# Patient Record
Sex: MALE | Race: BLACK OR AFRICAN AMERICAN | NOT HISPANIC OR LATINO | URBAN - METROPOLITAN AREA
[De-identification: names, ages, dates, MRNs, and addresses within clinical notes are randomized per-mention and may not be internally consistent; named-entity substitution may affect disease eponyms.]

---

## 2018-05-14 ENCOUNTER — EMERGENCY (EMERGENCY)
Facility: HOSPITAL | Age: 68
LOS: 1 days | Discharge: HOME | End: 2018-05-14
Attending: EMERGENCY MEDICINE | Admitting: EMERGENCY MEDICINE
Payer: COMMERCIAL

## 2018-05-14 VITALS
SYSTOLIC BLOOD PRESSURE: 156 MMHG | HEART RATE: 65 BPM | TEMPERATURE: 98 F | DIASTOLIC BLOOD PRESSURE: 88 MMHG | RESPIRATION RATE: 16 BRPM | OXYGEN SATURATION: 100 %

## 2018-05-14 VITALS
DIASTOLIC BLOOD PRESSURE: 93 MMHG | TEMPERATURE: 98 F | SYSTOLIC BLOOD PRESSURE: 155 MMHG | RESPIRATION RATE: 18 BRPM | HEART RATE: 79 BPM | OXYGEN SATURATION: 99 %

## 2018-05-14 DIAGNOSIS — Z98.890 OTHER SPECIFIED POSTPROCEDURAL STATES: Chronic | ICD-10-CM

## 2018-05-14 LAB
ALBUMIN SERPL ELPH-MCNC: 3.9 G/DL — SIGNIFICANT CHANGE UP (ref 3.3–5)
ALP SERPL-CCNC: 72 U/L — SIGNIFICANT CHANGE UP (ref 40–120)
ALT FLD-CCNC: 22 U/L — SIGNIFICANT CHANGE UP (ref 4–41)
APTT BLD: 38.4 SEC — HIGH (ref 27.5–37.4)
AST SERPL-CCNC: 30 U/L — SIGNIFICANT CHANGE UP (ref 4–40)
BASE EXCESS BLDV CALC-SCNC: 4 MMOL/L — SIGNIFICANT CHANGE UP
BASOPHILS # BLD AUTO: 0.05 K/UL — SIGNIFICANT CHANGE UP (ref 0–0.2)
BASOPHILS NFR BLD AUTO: 0.6 % — SIGNIFICANT CHANGE UP (ref 0–2)
BILIRUB SERPL-MCNC: 0.5 MG/DL — SIGNIFICANT CHANGE UP (ref 0.2–1.2)
BLD GP AB SCN SERPL QL: NEGATIVE — SIGNIFICANT CHANGE UP
BLOOD GAS VENOUS - CREATININE: 1.05 MG/DL — SIGNIFICANT CHANGE UP (ref 0.5–1.3)
BUN SERPL-MCNC: 25 MG/DL — HIGH (ref 7–23)
CALCIUM SERPL-MCNC: 8.6 MG/DL — SIGNIFICANT CHANGE UP (ref 8.4–10.5)
CHLORIDE BLDV-SCNC: 106 MMOL/L — SIGNIFICANT CHANGE UP (ref 96–108)
CHLORIDE SERPL-SCNC: 100 MMOL/L — SIGNIFICANT CHANGE UP (ref 98–107)
CO2 SERPL-SCNC: 28 MMOL/L — SIGNIFICANT CHANGE UP (ref 22–31)
CREAT SERPL-MCNC: 1.07 MG/DL — SIGNIFICANT CHANGE UP (ref 0.5–1.3)
EOSINOPHIL # BLD AUTO: 0.16 K/UL — SIGNIFICANT CHANGE UP (ref 0–0.5)
EOSINOPHIL NFR BLD AUTO: 1.9 % — SIGNIFICANT CHANGE UP (ref 0–6)
GAS PNL BLDV: 134 MMOL/L — LOW (ref 136–146)
GLUCOSE BLDV-MCNC: 98 — SIGNIFICANT CHANGE UP (ref 70–99)
GLUCOSE SERPL-MCNC: 93 MG/DL — SIGNIFICANT CHANGE UP (ref 70–99)
HCO3 BLDV-SCNC: 27 MMOL/L — SIGNIFICANT CHANGE UP (ref 20–27)
HCT VFR BLD CALC: 36.1 % — LOW (ref 39–50)
HCT VFR BLDV CALC: 37.4 % — LOW (ref 39–51)
HGB BLD-MCNC: 11.7 G/DL — LOW (ref 13–17)
HGB BLDV-MCNC: 12.1 G/DL — LOW (ref 13–17)
IMM GRANULOCYTES # BLD AUTO: 0.04 # — SIGNIFICANT CHANGE UP
IMM GRANULOCYTES NFR BLD AUTO: 0.5 % — SIGNIFICANT CHANGE UP (ref 0–1.5)
INR BLD: 1.19 — HIGH (ref 0.88–1.17)
LACTATE BLDV-MCNC: 0.6 MMOL/L — SIGNIFICANT CHANGE UP (ref 0.5–2)
LYMPHOCYTES # BLD AUTO: 1.56 K/UL — SIGNIFICANT CHANGE UP (ref 1–3.3)
LYMPHOCYTES # BLD AUTO: 18.2 % — SIGNIFICANT CHANGE UP (ref 13–44)
MCHC RBC-ENTMCNC: 29.6 PG — SIGNIFICANT CHANGE UP (ref 27–34)
MCHC RBC-ENTMCNC: 32.4 % — SIGNIFICANT CHANGE UP (ref 32–36)
MCV RBC AUTO: 91.4 FL — SIGNIFICANT CHANGE UP (ref 80–100)
MONOCYTES # BLD AUTO: 0.96 K/UL — HIGH (ref 0–0.9)
MONOCYTES NFR BLD AUTO: 11.2 % — SIGNIFICANT CHANGE UP (ref 2–14)
NEUTROPHILS # BLD AUTO: 5.82 K/UL — SIGNIFICANT CHANGE UP (ref 1.8–7.4)
NEUTROPHILS NFR BLD AUTO: 67.6 % — SIGNIFICANT CHANGE UP (ref 43–77)
NRBC # FLD: 0 — SIGNIFICANT CHANGE UP
PCO2 BLDV: 51 MMHG — SIGNIFICANT CHANGE UP (ref 41–51)
PH BLDV: 7.38 PH — SIGNIFICANT CHANGE UP (ref 7.32–7.43)
PLATELET # BLD AUTO: 165 K/UL — SIGNIFICANT CHANGE UP (ref 150–400)
PMV BLD: 11.4 FL — SIGNIFICANT CHANGE UP (ref 7–13)
PO2 BLDV: 35 MMHG — SIGNIFICANT CHANGE UP (ref 35–40)
POTASSIUM BLDV-SCNC: 3.6 MMOL/L — SIGNIFICANT CHANGE UP (ref 3.4–4.5)
POTASSIUM SERPL-MCNC: 3.9 MMOL/L — SIGNIFICANT CHANGE UP (ref 3.5–5.3)
POTASSIUM SERPL-SCNC: 3.9 MMOL/L — SIGNIFICANT CHANGE UP (ref 3.5–5.3)
PROT SERPL-MCNC: 6.8 G/DL — SIGNIFICANT CHANGE UP (ref 6–8.3)
PROTHROM AB SERPL-ACNC: 13.2 SEC — HIGH (ref 9.8–13.1)
RBC # BLD: 3.95 M/UL — LOW (ref 4.2–5.8)
RBC # FLD: 14.3 % — SIGNIFICANT CHANGE UP (ref 10.3–14.5)
RH IG SCN BLD-IMP: POSITIVE — SIGNIFICANT CHANGE UP
SAO2 % BLDV: 60.8 % — SIGNIFICANT CHANGE UP (ref 60–85)
SODIUM SERPL-SCNC: 138 MMOL/L — SIGNIFICANT CHANGE UP (ref 135–145)
WBC # BLD: 8.59 K/UL — SIGNIFICANT CHANGE UP (ref 3.8–10.5)
WBC # FLD AUTO: 8.59 K/UL — SIGNIFICANT CHANGE UP (ref 3.8–10.5)

## 2018-05-14 PROCEDURE — 99284 EMERGENCY DEPT VISIT MOD MDM: CPT

## 2018-05-14 NOTE — ED PROVIDER NOTE - MEDICAL DECISION MAKING DETAILS
68M w/ PMH of HTN, HLD, h/o hemorrhoidectomy (a few years ago) p/w BRBPR/melena for 3 days, also with lightheadedness and chest tightness.   Will check CBC, coags, T&S, cardiac enzymes.  [place]

## 2018-05-14 NOTE — ED PROVIDER NOTE - OBJECTIVE STATEMENT
68M w/ PMH of HTN, HLD, h/o hemorrhoidectomy (a few years ago) p/w BRBPR/melena for 3 days, also with lightheadedness and chest tightness.  Patient takes ASA 81mg, no other blood thinners, no iron tablets.  Has been feeling lightheaded prior to episodes of melena/BRPBR, endorses normal appetite and PO intake.  No dyspnea, n/v, diarrhea. Reports "sticking" sensation in his chest that has been on and off since yesterday.    Patient has had a colonoscopy in the past, was normal.  Has an appointment with a GI doctor at the end of May, but was told to come to the ED by his PMD due to multiple episodes of bleeding.

## 2018-05-14 NOTE — ED PROVIDER NOTE - SHIFT CHANGE DETAILS
awaiting labs and re-eval; pt stable for outpatient follow up if labs and repeat vitals reassuring and pt feeling well

## 2018-05-14 NOTE — ED ADULT TRIAGE NOTE - CHIEF COMPLAINT QUOTE
pt c/o BRBPR and some black stool x 3 days.  pt denies abd pain.  pt takes asa daily.  pt now reports chest pain

## 2018-05-14 NOTE — ED PROVIDER NOTE - ATTENDING CONTRIBUTION TO CARE
ED Attending Dr. Johnson: 67 yo male with HTN, HLD, s/p hemorrhoidectomy in the past, in ED with 3 days of BRBPR when having a bowel movement.  Bloody stools were initially formed, today more loose.  Pt also notes intermittent lightheadedness and chest "sticking" (denies "pain"), nonexertional and with no SOB, and these symptoms have been persisting for time longer than BRBPR.  Scheduled to see a GI doctor in 2 weeks.  No fever, urinary complaints or abdominal pain.  On exam pt well appearing, in NAD, heart RRR, lungs CTAB, abd NTND, extremities without swelling, strength 5/5 in all extremities and skin without rash.  Rectal exam as documented by resident.  Pt does not appear pale as per friend at the bedside.  EKG without acute changes.

## 2018-05-14 NOTE — ED PROVIDER NOTE - PROGRESS NOTE DETAILS
MD Yanick (resident): Patient feeling well, hgb 11.7, BUN 25, rectal exam without blood in vault.  Patient can follow-up with PMD this week and already has a GI appt on 5/25.  Agreeable for discharge home with outpatient follow-up.

## 2021-04-27 ENCOUNTER — INPATIENT (INPATIENT)
Facility: HOSPITAL | Age: 71
LOS: 3 days | Discharge: HOME | End: 2021-05-01
Attending: STUDENT IN AN ORGANIZED HEALTH CARE EDUCATION/TRAINING PROGRAM | Admitting: STUDENT IN AN ORGANIZED HEALTH CARE EDUCATION/TRAINING PROGRAM
Payer: COMMERCIAL

## 2021-04-27 VITALS
HEART RATE: 86 BPM | TEMPERATURE: 98 F | DIASTOLIC BLOOD PRESSURE: 58 MMHG | SYSTOLIC BLOOD PRESSURE: 93 MMHG | RESPIRATION RATE: 17 BRPM | OXYGEN SATURATION: 100 %

## 2021-04-27 DIAGNOSIS — C61 MALIGNANT NEOPLASM OF PROSTATE: ICD-10-CM

## 2021-04-27 DIAGNOSIS — M10.9 GOUT, UNSPECIFIED: ICD-10-CM

## 2021-04-27 DIAGNOSIS — Z98.890 OTHER SPECIFIED POSTPROCEDURAL STATES: Chronic | ICD-10-CM

## 2021-04-27 DIAGNOSIS — E78.5 HYPERLIPIDEMIA, UNSPECIFIED: ICD-10-CM

## 2021-04-27 DIAGNOSIS — K92.2 GASTROINTESTINAL HEMORRHAGE, UNSPECIFIED: ICD-10-CM

## 2021-04-27 DIAGNOSIS — I10 ESSENTIAL (PRIMARY) HYPERTENSION: ICD-10-CM

## 2021-04-27 LAB
ALBUMIN SERPL ELPH-MCNC: 3.1 G/DL — LOW (ref 3.3–5)
ALP SERPL-CCNC: 44 U/L — SIGNIFICANT CHANGE UP (ref 40–120)
ALT FLD-CCNC: 11 U/L — SIGNIFICANT CHANGE UP (ref 4–41)
ANION GAP SERPL CALC-SCNC: 8 MMOL/L — SIGNIFICANT CHANGE UP (ref 7–14)
APTT BLD: 27.3 SEC — SIGNIFICANT CHANGE UP (ref 27–36.3)
AST SERPL-CCNC: 16 U/L — SIGNIFICANT CHANGE UP (ref 4–40)
BASOPHILS # BLD AUTO: 0.07 K/UL — SIGNIFICANT CHANGE UP (ref 0–0.2)
BASOPHILS NFR BLD AUTO: 0.7 % — SIGNIFICANT CHANGE UP (ref 0–2)
BILIRUB SERPL-MCNC: 0.4 MG/DL — SIGNIFICANT CHANGE UP (ref 0.2–1.2)
BLD GP AB SCN SERPL QL: NEGATIVE — SIGNIFICANT CHANGE UP
BUN SERPL-MCNC: 40 MG/DL — HIGH (ref 7–23)
CALCIUM SERPL-MCNC: 8.2 MG/DL — LOW (ref 8.4–10.5)
CHLORIDE SERPL-SCNC: 102 MMOL/L — SIGNIFICANT CHANGE UP (ref 98–107)
CO2 SERPL-SCNC: 28 MMOL/L — SIGNIFICANT CHANGE UP (ref 22–31)
CREAT SERPL-MCNC: 1.14 MG/DL — SIGNIFICANT CHANGE UP (ref 0.5–1.3)
EOSINOPHIL # BLD AUTO: 0.13 K/UL — SIGNIFICANT CHANGE UP (ref 0–0.5)
EOSINOPHIL NFR BLD AUTO: 1.3 % — SIGNIFICANT CHANGE UP (ref 0–6)
GLUCOSE SERPL-MCNC: 203 MG/DL — HIGH (ref 70–99)
HCT VFR BLD CALC: 18.2 % — CRITICAL LOW (ref 39–50)
HCT VFR BLD CALC: 20.5 % — CRITICAL LOW (ref 39–50)
HGB BLD-MCNC: 6.1 G/DL — CRITICAL LOW (ref 13–17)
HGB BLD-MCNC: 6.8 G/DL — CRITICAL LOW (ref 13–17)
IANC: 8.01 K/UL — SIGNIFICANT CHANGE UP (ref 1.5–8.5)
IMM GRANULOCYTES NFR BLD AUTO: 1.5 % — SIGNIFICANT CHANGE UP (ref 0–1.5)
INR BLD: 1.14 RATIO — SIGNIFICANT CHANGE UP (ref 0.88–1.16)
LYMPHOCYTES # BLD AUTO: 1.41 K/UL — SIGNIFICANT CHANGE UP (ref 1–3.3)
LYMPHOCYTES # BLD AUTO: 13.6 % — SIGNIFICANT CHANGE UP (ref 13–44)
MCHC RBC-ENTMCNC: 31.3 PG — SIGNIFICANT CHANGE UP (ref 27–34)
MCHC RBC-ENTMCNC: 31.6 PG — SIGNIFICANT CHANGE UP (ref 27–34)
MCHC RBC-ENTMCNC: 32.4 GM/DL — SIGNIFICANT CHANGE UP (ref 32–36)
MCHC RBC-ENTMCNC: 33.2 GM/DL — SIGNIFICANT CHANGE UP (ref 32–36)
MCV RBC AUTO: 94.5 FL — SIGNIFICANT CHANGE UP (ref 80–100)
MCV RBC AUTO: 97.4 FL — SIGNIFICANT CHANGE UP (ref 80–100)
MONOCYTES # BLD AUTO: 0.61 K/UL — SIGNIFICANT CHANGE UP (ref 0–0.9)
MONOCYTES NFR BLD AUTO: 5.9 % — SIGNIFICANT CHANGE UP (ref 2–14)
NEUTROPHILS # BLD AUTO: 8.01 K/UL — HIGH (ref 1.8–7.4)
NEUTROPHILS NFR BLD AUTO: 77 % — SIGNIFICANT CHANGE UP (ref 43–77)
NRBC # BLD: 0 /100 WBCS — SIGNIFICANT CHANGE UP
NRBC # BLD: 0 /100 WBCS — SIGNIFICANT CHANGE UP
NRBC # FLD: 0.02 K/UL — HIGH
NRBC # FLD: 0.06 K/UL — HIGH
OB PNL STL: POSITIVE
PLATELET # BLD AUTO: 140 K/UL — LOW (ref 150–400)
PLATELET # BLD AUTO: 183 K/UL — SIGNIFICANT CHANGE UP (ref 150–400)
POTASSIUM SERPL-MCNC: 3.2 MMOL/L — LOW (ref 3.5–5.3)
POTASSIUM SERPL-SCNC: 3.2 MMOL/L — LOW (ref 3.5–5.3)
PROT SERPL-MCNC: 5.5 G/DL — LOW (ref 6–8.3)
PROTHROM AB SERPL-ACNC: 13 SEC — SIGNIFICANT CHANGE UP (ref 10.6–13.6)
RBC # BLD: 1.9 M/UL — LOW (ref 4.2–5.8)
RBC # BLD: 2.17 M/UL — LOW (ref 4.2–5.8)
RBC # FLD: 12.6 % — SIGNIFICANT CHANGE UP (ref 10.3–14.5)
RBC # FLD: 13.1 % — SIGNIFICANT CHANGE UP (ref 10.3–14.5)
RH IG SCN BLD-IMP: POSITIVE — SIGNIFICANT CHANGE UP
SARS-COV-2 RNA SPEC QL NAA+PROBE: SIGNIFICANT CHANGE UP
SODIUM SERPL-SCNC: 138 MMOL/L — SIGNIFICANT CHANGE UP (ref 135–145)
WBC # BLD: 10.39 K/UL — SIGNIFICANT CHANGE UP (ref 3.8–10.5)
WBC # BLD: 13 K/UL — HIGH (ref 3.8–10.5)
WBC # FLD AUTO: 10.39 K/UL — SIGNIFICANT CHANGE UP (ref 3.8–10.5)
WBC # FLD AUTO: 13 K/UL — HIGH (ref 3.8–10.5)

## 2021-04-27 PROCEDURE — 99291 CRITICAL CARE FIRST HOUR: CPT

## 2021-04-27 PROCEDURE — 71045 X-RAY EXAM CHEST 1 VIEW: CPT | Mod: 26

## 2021-04-27 PROCEDURE — 99223 1ST HOSP IP/OBS HIGH 75: CPT | Mod: GC

## 2021-04-27 PROCEDURE — 99291 CRITICAL CARE FIRST HOUR: CPT | Mod: 25

## 2021-04-27 PROCEDURE — 93010 ELECTROCARDIOGRAM REPORT: CPT

## 2021-04-27 PROCEDURE — 99253 IP/OBS CNSLTJ NEW/EST LOW 45: CPT | Mod: GC

## 2021-04-27 RX ORDER — PANTOPRAZOLE SODIUM 20 MG/1
40 TABLET, DELAYED RELEASE ORAL
Refills: 0 | Status: DISCONTINUED | OUTPATIENT
Start: 2021-04-27 | End: 2021-04-27

## 2021-04-27 RX ORDER — ACETAMINOPHEN 500 MG
650 TABLET ORAL EVERY 6 HOURS
Refills: 0 | Status: DISCONTINUED | OUTPATIENT
Start: 2021-04-27 | End: 2021-05-01

## 2021-04-27 RX ORDER — FINASTERIDE 5 MG/1
5 TABLET, FILM COATED ORAL DAILY
Refills: 0 | Status: DISCONTINUED | OUTPATIENT
Start: 2021-04-27 | End: 2021-05-01

## 2021-04-27 RX ORDER — SODIUM CHLORIDE 9 MG/ML
1000 INJECTION INTRAMUSCULAR; INTRAVENOUS; SUBCUTANEOUS ONCE
Refills: 0 | Status: COMPLETED | OUTPATIENT
Start: 2021-04-27 | End: 2021-04-27

## 2021-04-27 RX ORDER — ATORVASTATIN CALCIUM 80 MG/1
10 TABLET, FILM COATED ORAL AT BEDTIME
Refills: 0 | Status: DISCONTINUED | OUTPATIENT
Start: 2021-04-27 | End: 2021-05-01

## 2021-04-27 RX ORDER — PANTOPRAZOLE SODIUM 20 MG/1
80 TABLET, DELAYED RELEASE ORAL ONCE
Refills: 0 | Status: COMPLETED | OUTPATIENT
Start: 2021-04-27 | End: 2021-04-27

## 2021-04-27 RX ORDER — FERROUS SULFATE 325(65) MG
325 TABLET ORAL DAILY
Refills: 0 | Status: DISCONTINUED | OUTPATIENT
Start: 2021-04-27 | End: 2021-05-01

## 2021-04-27 RX ORDER — ONDANSETRON 8 MG/1
4 TABLET, FILM COATED ORAL EVERY 6 HOURS
Refills: 0 | Status: DISCONTINUED | OUTPATIENT
Start: 2021-04-27 | End: 2021-05-01

## 2021-04-27 RX ORDER — PANTOPRAZOLE SODIUM 20 MG/1
40 TABLET, DELAYED RELEASE ORAL ONCE
Refills: 0 | Status: DISCONTINUED | OUTPATIENT
Start: 2021-04-27 | End: 2021-04-27

## 2021-04-27 RX ORDER — POTASSIUM CHLORIDE 20 MEQ
40 PACKET (EA) ORAL ONCE
Refills: 0 | Status: COMPLETED | OUTPATIENT
Start: 2021-04-27 | End: 2021-04-27

## 2021-04-27 RX ORDER — PANTOPRAZOLE SODIUM 20 MG/1
8 TABLET, DELAYED RELEASE ORAL
Qty: 80 | Refills: 0 | Status: DISCONTINUED | OUTPATIENT
Start: 2021-04-27 | End: 2021-04-28

## 2021-04-27 RX ORDER — SODIUM CHLORIDE 9 MG/ML
1000 INJECTION INTRAMUSCULAR; INTRAVENOUS; SUBCUTANEOUS
Refills: 0 | Status: DISCONTINUED | OUTPATIENT
Start: 2021-04-27 | End: 2021-04-30

## 2021-04-27 RX ADMIN — PANTOPRAZOLE SODIUM 80 MILLIGRAM(S): 20 TABLET, DELAYED RELEASE ORAL at 07:52

## 2021-04-27 RX ADMIN — SODIUM CHLORIDE 1000 MILLILITER(S): 9 INJECTION INTRAMUSCULAR; INTRAVENOUS; SUBCUTANEOUS at 07:44

## 2021-04-27 RX ADMIN — PANTOPRAZOLE SODIUM 10 MG/HR: 20 TABLET, DELAYED RELEASE ORAL at 15:53

## 2021-04-27 RX ADMIN — Medication 40 MILLIEQUIVALENT(S): at 11:39

## 2021-04-27 RX ADMIN — Medication 30 MILLILITER(S): at 14:04

## 2021-04-27 RX ADMIN — ATORVASTATIN CALCIUM 10 MILLIGRAM(S): 80 TABLET, FILM COATED ORAL at 21:07

## 2021-04-27 RX ADMIN — SODIUM CHLORIDE 75 MILLILITER(S): 9 INJECTION INTRAMUSCULAR; INTRAVENOUS; SUBCUTANEOUS at 15:58

## 2021-04-27 NOTE — CONSULT NOTE ADULT - SUBJECTIVE AND OBJECTIVE BOX
Chief Complaint:  Patient is a 70y old  Male who presents with a chief complaint of GIB (27 Apr 2021 12:43)      HPI:    Allergies:  No Known Allergies      Home Medications:    Hospital Medications:  acetaminophen   Tablet .. 650 milliGRAM(s) Oral every 6 hours PRN  atorvastatin 10 milliGRAM(s) Oral at bedtime  ferrous    sulfate 325 milliGRAM(s) Oral daily  finasteride 5 milliGRAM(s) Oral daily  ondansetron Injectable 4 milliGRAM(s) IV Push every 6 hours PRN  pantoprazole Infusion 8 mG/Hr IV Continuous <Continuous>  sodium chloride 0.9%. 1000 milliLiter(s) IV Continuous <Continuous>      PMHX/PSHX:  Hypertension    Hyperlipidemia    GIB (gastrointestinal bleeding)    Prostate cancer    Gout    History of hemorrhoidectomy        Family history:  No pertinent family history in first degree relatives        Denies family history of colon cancer/polyps, stomach cancer/polyps, pancreatic cancer/masses, liver cancer/disease, ovarian cancer and endometrial cancer.    Social History:     Tob: Denies  EtOH: Denies  Illicit Drugs: Denies    ROS:     General:  No wt loss, fevers, chills, night sweats, fatigue  Eyes:  Good vision, no reported pain  ENT:  No sore throat, pain, runny nose, dysphagia  CV:  No pain, palpitations, hypo/hypertension  Pulm:  No dyspnea, cough, tachypnea, wheezing  GI:  No pain, No nausea, No vomiting, No diarrhea, No constipation, No weight loss, No fever, No pruritis, No rectal bleeding, No tarry stools, No dysphagia,  :  No pain, bleeding, incontinence, nocturia  Muscle:  No pain, weakness  Neuro:  No weakness, tingling, memory problems  Psych:  No fatigue, insomnia, mood problems, depression  Endocrine:  No polyuria, polydipsia, cold/heat intolerance  Heme:  No petechiae, ecchymosis, easy bruisability  Skin:  No rash, tattoos, scars, edema    PHYSICAL EXAM:     GENERAL:  No acute distress  HEENT:  Normocephalic/atraumatic, no scleral icterus  CHEST:  Clear to auscultation bilaterally, no wheezes/rales/ronchi, no accessory muscle use  HEART:  Regular rate and rhythm, no murmurs/rubs/gallops  ABDOMEN:  Soft, non-tender, non-distended, normoactive bowel sounds,  no masses, no hepato-splenomegaly, no signs of chronic liver disease  EXTREMITIES: No cyanosis, clubbing, or edema  SKIN:  No rash/erythema/ecchymoses/petechiae/wounds/abscess/warm/dry  NEURO:  Alert and oriented x 3, no asterixis    Vital Signs:  Vital Signs Last 24 Hrs  T(C): 36.4 (27 Apr 2021 13:58), Max: 36.8 (27 Apr 2021 07:59)  T(F): 97.6 (27 Apr 2021 13:58), Max: 98.2 (27 Apr 2021 07:59)  HR: 84 (27 Apr 2021 13:58) (78 - 86)  BP: 100/64 (27 Apr 2021 13:58) (93/58 - 116/57)  BP(mean): --  RR: 18 (27 Apr 2021 13:58) (17 - 18)  SpO2: 100% (27 Apr 2021 13:58) (100% - 100%)  Daily     Daily     LABS:                        6.1    10.39 )-----------( 183      ( 27 Apr 2021 09:23 )             18.2     Mean Cell Volume: 97.4 fL (04-27-21 @ 09:23)    04-27    138  |  102  |  40<H>  ----------------------------<  203<H>  3.2<L>   |  28  |  1.14    Ca    8.2<L>      27 Apr 2021 09:23    TPro  5.5<L>  /  Alb  3.1<L>  /  TBili  0.4  /  DBili  x   /  AST  16  /  ALT  11  /  AlkPhos  44  04-27    LIVER FUNCTIONS - ( 27 Apr 2021 09:23 )  Alb: 3.1 g/dL / Pro: 5.5 g/dL / ALK PHOS: 44 U/L / ALT: 11 U/L / AST: 16 U/L / GGT: x           PT/INR - ( 27 Apr 2021 09:23 )   PT: 13.0 sec;   INR: 1.14 ratio         PTT - ( 27 Apr 2021 09:23 )  PTT:27.3 sec                            6.1    10.39 )-----------( 183      ( 27 Apr 2021 09:23 )             18.2       Imaging:           Chief Complaint:  Patient is a 70y old  Male who presents with a chief complaint of GIB (27 Apr 2021 12:43)      HPI: 70M Hx of prostrate cancer s/p radiation, hemorrhoidectomy, gout, HTN, HLD who reports having diarrhea w/ black stools x 2 days.     Pt endorses for over 2 days now he has been having multiple black + dark red BMs, thinks greater than 10-15 episodes (have kept him running back to the bathroom). Also endorses some mild epigastric burning/pain. Denies n/v/f/c, weight loss. Has been taking     Allergies:  No Known Allergies      Home Medications:    Hospital Medications:  acetaminophen   Tablet .. 650 milliGRAM(s) Oral every 6 hours PRN  atorvastatin 10 milliGRAM(s) Oral at bedtime  ferrous    sulfate 325 milliGRAM(s) Oral daily  finasteride 5 milliGRAM(s) Oral daily  ondansetron Injectable 4 milliGRAM(s) IV Push every 6 hours PRN  pantoprazole Infusion 8 mG/Hr IV Continuous <Continuous>  sodium chloride 0.9%. 1000 milliLiter(s) IV Continuous <Continuous>      PMHX/PSHX:  Hypertension    Hyperlipidemia    GIB (gastrointestinal bleeding)    Prostate cancer    Gout    History of hemorrhoidectomy        Family history:  No pertinent family history in first degree relatives        Denies family history of colon cancer/polyps, stomach cancer/polyps, pancreatic cancer/masses, liver cancer/disease, ovarian cancer and endometrial cancer.    Social History:     Tob: Denies  EtOH: Denies  Illicit Drugs: Denies    ROS:     General:  No wt loss, fevers, chills, night sweats, fatigue  Eyes:  Good vision, no reported pain  ENT:  No sore throat, pain, runny nose, dysphagia  CV:  No pain, palpitations, hypo/hypertension  Pulm:  No dyspnea, cough, tachypnea, wheezing  GI:  No pain, No nausea, No vomiting, No diarrhea, No constipation, No weight loss, No fever, No pruritis, No rectal bleeding, No tarry stools, No dysphagia,  :  No pain, bleeding, incontinence, nocturia  Muscle:  No pain, weakness  Neuro:  No weakness, tingling, memory problems  Psych:  No fatigue, insomnia, mood problems, depression  Endocrine:  No polyuria, polydipsia, cold/heat intolerance  Heme:  No petechiae, ecchymosis, easy bruisability  Skin:  No rash, tattoos, scars, edema    PHYSICAL EXAM:     GENERAL:  No acute distress  HEENT:  Normocephalic/atraumatic, no scleral icterus  CHEST:  Clear to auscultation bilaterally, no wheezes/rales/ronchi, no accessory muscle use  HEART:  Regular rate and rhythm, no murmurs/rubs/gallops  ABDOMEN:  Soft, non-tender, non-distended, normoactive bowel sounds,  no masses, no hepato-splenomegaly, no signs of chronic liver disease  EXTREMITIES: No cyanosis, clubbing, or edema  SKIN:  No rash/erythema/ecchymoses/petechiae/wounds/abscess/warm/dry  NEURO:  Alert and oriented x 3, no asterixis    Vital Signs:  Vital Signs Last 24 Hrs  T(C): 36.4 (27 Apr 2021 13:58), Max: 36.8 (27 Apr 2021 07:59)  T(F): 97.6 (27 Apr 2021 13:58), Max: 98.2 (27 Apr 2021 07:59)  HR: 84 (27 Apr 2021 13:58) (78 - 86)  BP: 100/64 (27 Apr 2021 13:58) (93/58 - 116/57)  BP(mean): --  RR: 18 (27 Apr 2021 13:58) (17 - 18)  SpO2: 100% (27 Apr 2021 13:58) (100% - 100%)  Daily     Daily     LABS:                        6.1    10.39 )-----------( 183      ( 27 Apr 2021 09:23 )             18.2     Mean Cell Volume: 97.4 fL (04-27-21 @ 09:23)    04-27    138  |  102  |  40<H>  ----------------------------<  203<H>  3.2<L>   |  28  |  1.14    Ca    8.2<L>      27 Apr 2021 09:23    TPro  5.5<L>  /  Alb  3.1<L>  /  TBili  0.4  /  DBili  x   /  AST  16  /  ALT  11  /  AlkPhos  44  04-27    LIVER FUNCTIONS - ( 27 Apr 2021 09:23 )  Alb: 3.1 g/dL / Pro: 5.5 g/dL / ALK PHOS: 44 U/L / ALT: 11 U/L / AST: 16 U/L / GGT: x           PT/INR - ( 27 Apr 2021 09:23 )   PT: 13.0 sec;   INR: 1.14 ratio         PTT - ( 27 Apr 2021 09:23 )  PTT:27.3 sec                            6.1    10.39 )-----------( 183      ( 27 Apr 2021 09:23 )             18.2       Imaging:           Chief Complaint:  Patient is a 70y old  Male who presents with a chief complaint of GIB (27 Apr 2021 12:43)      HPI: 70M Hx of prostrate cancer s/p radiation, hemorrhoidectomy, gout, HTN, HLD who reports having diarrhea w/ black stools x 2 days.     Pt endorses for over 2 days now he has been having multiple black + dark red BMs, thinks greater than 10-15 episodes (have kept him running back to the bathroom). Also endorses some mild epigastric burning/pain. Denies n/v/f/c, weight loss. Has been taking indomethacin recently for gout flare (at least for the last 2 days). Had similar episode of black stools in 2018 (presented here to ED), however, did not receive endoscopic intervention at the time.     Last colonoscopy 3 years ago at [facility] -- reportedly with a lot of polyps, due for colonoscopy this year.     VS 90/50s, HR 70-80s  Hb 6.1 <-- 11.7   BUN 40/ Cr 1.14    Allergies:  No Known Allergies      Home Medications:    Hospital Medications:  acetaminophen   Tablet .. 650 milliGRAM(s) Oral every 6 hours PRN  atorvastatin 10 milliGRAM(s) Oral at bedtime  ferrous    sulfate 325 milliGRAM(s) Oral daily  finasteride 5 milliGRAM(s) Oral daily  ondansetron Injectable 4 milliGRAM(s) IV Push every 6 hours PRN  pantoprazole Infusion 8 mG/Hr IV Continuous <Continuous>  sodium chloride 0.9%. 1000 milliLiter(s) IV Continuous <Continuous>      PMHX/PSHX:  Hypertension    Hyperlipidemia    GIB (gastrointestinal bleeding)    Prostate cancer    Gout    History of hemorrhoidectomy        Family history:  No pertinent family history in first degree relatives        Denies family history of colon cancer/polyps, stomach cancer/polyps, pancreatic cancer/masses, liver cancer/disease, ovarian cancer and endometrial cancer.    Social History:     Tob: Denies  EtOH: Denies  Illicit Drugs: Denies    ROS:     General:  No wt loss, fevers, chills, night sweats, fatigue  Eyes:  Good vision, no reported pain  ENT:  No sore throat, pain, runny nose, dysphagia  CV:  No pain, palpitations, hypo/hypertension  Pulm:  No dyspnea, cough, tachypnea, wheezing  GI:  see above  :  No pain, bleeding, incontinence, nocturia  Muscle:  No pain, weakness  Neuro:  No weakness, tingling, memory problems  Psych:  No fatigue, insomnia, mood problems, depression  Endocrine:  No polyuria, polydipsia, cold/heat intolerance  Heme:  No petechiae, ecchymosis, easy bruisability  Skin:  No rash, tattoos, scars, edema    PHYSICAL EXAM:     GENERAL:  No acute distress, elderly man, eyes closed, tired appearing  HEENT:  Normocephalic/atraumatic, no scleral icterus  CHEST:  Clear to auscultation bilaterally, no wheezes/rales/ronchi, no accessory muscle use  HEART:  Regular rate and rhythm, no murmurs/rubs/gallops  ABDOMEN:  Soft, non-tender, non-distended, normoactive bowel sounds,  no masses  RECTAL: +dark red stool   EXTREMITIES: No cyanosis, clubbing, or edema  SKIN:  No rash/erythema/ecchymoses/petechiae/wounds/abscess/warm/dry  NEURO:  Alert and oriented x 3, no asterixis    Vital Signs:  Vital Signs Last 24 Hrs  T(C): 36.4 (27 Apr 2021 13:58), Max: 36.8 (27 Apr 2021 07:59)  T(F): 97.6 (27 Apr 2021 13:58), Max: 98.2 (27 Apr 2021 07:59)  HR: 84 (27 Apr 2021 13:58) (78 - 86)  BP: 100/64 (27 Apr 2021 13:58) (93/58 - 116/57)  BP(mean): --  RR: 18 (27 Apr 2021 13:58) (17 - 18)  SpO2: 100% (27 Apr 2021 13:58) (100% - 100%)  Daily     Daily     LABS:                        6.1    10.39 )-----------( 183      ( 27 Apr 2021 09:23 )             18.2     Mean Cell Volume: 97.4 fL (04-27-21 @ 09:23)    04-27    138  |  102  |  40<H>  ----------------------------<  203<H>  3.2<L>   |  28  |  1.14    Ca    8.2<L>      27 Apr 2021 09:23    TPro  5.5<L>  /  Alb  3.1<L>  /  TBili  0.4  /  DBili  x   /  AST  16  /  ALT  11  /  AlkPhos  44  04-27    LIVER FUNCTIONS - ( 27 Apr 2021 09:23 )  Alb: 3.1 g/dL / Pro: 5.5 g/dL / ALK PHOS: 44 U/L / ALT: 11 U/L / AST: 16 U/L / GGT: x           PT/INR - ( 27 Apr 2021 09:23 )   PT: 13.0 sec;   INR: 1.14 ratio         PTT - ( 27 Apr 2021 09:23 )  PTT:27.3 sec                            6.1    10.39 )-----------( 183      ( 27 Apr 2021 09:23 )             18.2       Imaging:           Chief Complaint:  Patient is a 70y old  Male who presents with a chief complaint of GIB (27 Apr 2021 12:43)      HPI: 70M Hx of prostrate cancer s/p radiation, hemorrhoidectomy, gout, HTN, HLD who reports having diarrhea w/ black stools x 2 days.     Pt endorses for over 2 days now he has been having multiple black + dark red BMs, thinks greater than 10-15 episodes (have kept him running back to the bathroom). Also endorses some mild epigastric burning/pain. Denies n/v/f/c, weight loss. Has been taking indomethacin recently for gout flare (at least for the last 2 days). Had similar episode of black stools in 2018 (presented here to ED), however, did not receive endoscopic intervention at the time.     Last colonoscopy 3 years ago at [facility] -- reportedly with a lot of polyps, due for colonoscopy this year.     VS 90/50s, HR 70-80s  Hb 6.1 <-- 11.7   BUN 40/ Cr 1.14    Allergies:  No Known Allergies      Home Medications:    Hospital Medications:  acetaminophen   Tablet .. 650 milliGRAM(s) Oral every 6 hours PRN  atorvastatin 10 milliGRAM(s) Oral at bedtime  ferrous    sulfate 325 milliGRAM(s) Oral daily  finasteride 5 milliGRAM(s) Oral daily  ondansetron Injectable 4 milliGRAM(s) IV Push every 6 hours PRN  pantoprazole Infusion 8 mG/Hr IV Continuous <Continuous>  sodium chloride 0.9%. 1000 milliLiter(s) IV Continuous <Continuous>      PMHX/PSHX:  Hypertension    Hyperlipidemia    GIB (gastrointestinal bleeding)    Prostate cancer    Gout    History of hemorrhoidectomy        Family history:  No pertinent family history in first degree relatives        Denies family history of colon cancer/polyps, stomach cancer/polyps, pancreatic cancer/masses, liver cancer/disease, ovarian cancer and endometrial cancer.    Social History:     Tob: Denies  EtOH: Denies  Illicit Drugs: Denies    ROS:     General:  No wt loss, fevers, chills, night sweats, fatigue  Eyes:  Good vision, no reported pain  ENT:  No sore throat, pain, runny nose, dysphagia  CV:  No pain, palpitations, hypo/hypertension  Pulm:  No dyspnea, cough, tachypnea, wheezing  GI:  see above  :  No pain, bleeding, incontinence, nocturia  Muscle:  No pain, weakness  Neuro:  No weakness, tingling, memory problems  Psych:  No fatigue, insomnia, mood problems, depression  Endocrine:  No polyuria, polydipsia, cold/heat intolerance  Heme:  No petechiae, ecchymosis, easy bruisability  Skin:  No rash, tattoos, scars, edema    PHYSICAL EXAM:     GENERAL:  No acute distress, elderly man, eyes closed, tired appearing  HEENT:  Normocephalic/atraumatic, no scleral icterus  NECK: supple  CHEST:  Clear to auscultation bilaterally, no wheezes/rales/ronchi, no accessory muscle use  HEART:  Regular rate and rhythm, no murmurs/rubs/gallops  ABDOMEN:  Soft, non-tender, non-distended, normoactive bowel sounds,  no masses  RECTAL: +dark red stool   EXTREMITIES: No cyanosis, clubbing, or edema  SKIN:  No rash/erythema/ecchymoses/petechiae/wounds/abscess/warm/dry  NEURO:  Alert and oriented x 3, no asterixis  PSYCH: normal affect    Vital Signs:  Vital Signs Last 24 Hrs  T(C): 36.4 (27 Apr 2021 13:58), Max: 36.8 (27 Apr 2021 07:59)  T(F): 97.6 (27 Apr 2021 13:58), Max: 98.2 (27 Apr 2021 07:59)  HR: 84 (27 Apr 2021 13:58) (78 - 86)  BP: 100/64 (27 Apr 2021 13:58) (93/58 - 116/57)  BP(mean): --  RR: 18 (27 Apr 2021 13:58) (17 - 18)  SpO2: 100% (27 Apr 2021 13:58) (100% - 100%)  Daily     Daily     LABS:                        6.1    10.39 )-----------( 183      ( 27 Apr 2021 09:23 )             18.2     Mean Cell Volume: 97.4 fL (04-27-21 @ 09:23)    04-27    138  |  102  |  40<H>  ----------------------------<  203<H>  3.2<L>   |  28  |  1.14    Ca    8.2<L>      27 Apr 2021 09:23    TPro  5.5<L>  /  Alb  3.1<L>  /  TBili  0.4  /  DBili  x   /  AST  16  /  ALT  11  /  AlkPhos  44  04-27    LIVER FUNCTIONS - ( 27 Apr 2021 09:23 )  Alb: 3.1 g/dL / Pro: 5.5 g/dL / ALK PHOS: 44 U/L / ALT: 11 U/L / AST: 16 U/L / GGT: x           PT/INR - ( 27 Apr 2021 09:23 )   PT: 13.0 sec;   INR: 1.14 ratio         PTT - ( 27 Apr 2021 09:23 )  PTT:27.3 sec                            6.1    10.39 )-----------( 183      ( 27 Apr 2021 09:23 )             18.2       Imaging:

## 2021-04-27 NOTE — ED ADULT NURSE REASSESSMENT NOTE - NS ED NURSE REASSESS COMMENT FT1
Patient completed second PRBC without adverse reactions. Patient with third blood stool in ED today. Patient slumped over bed after getting off commode, reports feeling dizzy, was able to call for help, no LOC or falls, MD Karissa Gifford ([id number]) made aware.

## 2021-04-27 NOTE — H&P ADULT - ASSESSMENT
71 yo M pm hx of GIB 1 year ago w/ negative colonoscopy at [facility], hemorrhoidectomy, HTN, HLD, prostate cancer s/p radiation therapy p/w GIB for 2 days.  69 yo M pm hx of GIB 1 year ago w/ negative colonoscopy at [facility], hemorrhoidectomy, gout, HTN, HLD, prostate cancer s/p radiation therapy, p/w GIB for 2 days.

## 2021-04-27 NOTE — H&P ADULT - NSHPLABSRESULTS_GEN_ALL_CORE
LABS:  cret                        6.1    10.39 )-----------( 183      ( 27 Apr 2021 09:23 )             18.2     04-27    138  |  102  |  40<H>  ----------------------------<  203<H>  3.2<L>   |  28  |  1.14    Ca    8.2<L>      27 Apr 2021 09:23    TPro  5.5<L>  /  Alb  3.1<L>  /  TBili  0.4  /  DBili  x   /  AST  16  /  ALT  11  /  AlkPhos  44  04-27    PT/INR - ( 27 Apr 2021 09:23 )   PT: 13.0 sec;   INR: 1.14 ratio         PTT - ( 27 Apr 2021 09:23 )  PTT:27.3 sec        IMAGING:     EXAM:  XR CHEST AP OR PA 1V        PROCEDURE DATE:  Apr 27 2021         INTERPRETATION:  TIME OF EXAM: April 2017, 2021 at 7:44 AM    CLINICAL INFORMATION: Preprocedure.    TECHNIQUE:   AP chest:    INTERPRETATION:    There is haziness at the left lung base that may represent a small effusion with underlying atelectasis. Lungs are otherwise clear, the heart is not enlarged and there is no vascular congestion to suggest CHF.      COMPARISON:  None available      IMPRESSION:  Clear lungs with possible small left effusion.

## 2021-04-27 NOTE — ED ADULT TRIAGE NOTE - CHIEF COMPLAINT QUOTE
Pt arrives to ED c/o abd pain starting yesterday morning which then progressed to diarrhea and nausea.  Pt feels tired and that he wants to lay down.  Pt denies CP.  Pt hypotensive in triage but reports hx of HTN.

## 2021-04-27 NOTE — H&P ADULT - HISTORY OF PRESENT ILLNESS
69 yo M pm hx of GIB 1 year ago w/ negative colonoscopy at [facility], hemorrhoidectomy, HTN, HLD, prostate cancer s/p radiation therapy, pw dark stools x 2 days. Pt states he started having dark loose stools since Saturday. Some abd pain, denies CP, SOB, nausea, vomiting, GI symptoms, cough, fever and chills. States the stools started out black but changed to have some red in them as well. Has blood on toilet paper as well. States blood was like "water gushing out." Has felt dizzy and weak since GIB began, particularly when standing up from toilet.     ED course: AF, 86, 93/58, 100% RA. Received 80mg protonix, 2 units PRBC, 1L NS, 40meQ KCl.  69 yo M pm hx of GIB 1 year ago w/ negative colonoscopy at [facility], hemorrhoidectomy, gout, HTN, HLD, prostate cancer s/p radiation therapy, p/w dark stools x 2 days. 2 days ago, pt took indomethacin 2 days ago for gout flare. Pt states he started having dark loose stools since Saturday. Some abd pain, denies CP, SOB, nausea, vomiting, GI symptoms, cough, fever and chills. States the stools started out black but changed to have some red in them as well. Has blood on toilet paper as well. States blood was like "water gushing out." Has felt dizzy and weak since GIB began, particularly when standing up from toilet.     ED course: AF, 86, 93/58, 100% RA. Received 80mg protonix, 2 units PRBC, 1L NS, 40meQ KCl.  71 yo M pm hx of GIB 1 year ago w/ negative colonoscopy at [facility], hemorrhoidectomy, gout, HTN, HLD, prostate cancer s/p radiation therapy, p/w dark stools x 2 days. 2 days ago, pt took indomethacin and tramadol 2 days ago for gout flare. Pt states he started having dark loose stools since Saturday. Some abd pain, denies CP, SOB, nausea, vomiting, GI symptoms, cough, fever and chills. States the stools started out black but changed to have some red in them as well. Has blood on toilet paper as well. States blood was like "water gushing out." Has felt dizzy and weak since GIB began, particularly when standing up from toilet.     ED course: AF, 86, 93/58, 100% RA. Received 80mg protonix, 2 units PRBC, 1L NS, 40meQ KCl.

## 2021-04-27 NOTE — ED PROVIDER NOTE - PHYSICAL EXAMINATION
GEN: Patient awake alert NAD.   HEENT: normocephalic, atraumatic, EOMI, no scleral icterus + pale conjunctiva  CARDIAC: RRR, S1, S2, no murmur.   PULM: CTA B/L no wheeze, rhonchi, rales.   ABD: soft NT, ND.   MSK: Moving all extremities, no edema.   NEURO: A&Ox3, gait normal, no focal neurological deficits, CN 2-12 grossly intact  SKIN: warm, dry, no rash.

## 2021-04-27 NOTE — H&P ADULT - ATTENDING COMMENTS
Telephone Encounter by Van Conway RMA at 01/27/18 02:06 PM     Author:  Van Conway RMA Service:  (none) Author Type:  Certified Medical Assistant     Filed:  01/27/18 02:07 PM Encounter Date:  1/27/2018 Status:  Signed     :  Van Conway RMA (Certified Medical Assistant)            [employer] message sent, postpone for read.  Electronically Signed by:    JESSENIA Cam , 1/27/2018[AK1.1T]        Revision History        User Key Date/Time User Provider Type Action    > AK1.1 01/27/18 02:07 PM Van Conway RMA Certified Medical Assistant Sign    T - Template             71 yo gentlemen with h/o of GIB, HTN, HLD pw dark stools x 1 day. Occult positive, Hb 6.1, and BP somewhat soft at 93/58 on admission. Last endoscopy 2 years ago. Patient blood pressure does appear to repspond to boluses.     Will monitor Hb and vitals, if worsening then low threshold for MICU consult  IV protonix   GI consult, possible endoscopy tomorrow  Continue the rest of the work up and management as stated above.

## 2021-04-27 NOTE — ED PROVIDER NOTE - CLINICAL SUMMARY MEDICAL DECISION MAKING FREE TEXT BOX
69 yo M pm hx of GIB, HTN, HLD pw dark stools x 1 day. likely UGIB. + dark blood on occult testing. no sig finding on exam. likely require transfusion and admission for GI scope.

## 2021-04-27 NOTE — ED ADULT NURSE REASSESSMENT NOTE - NS ED NURSE REASSESS COMMENT FT1
Patient alert and awake, oriented x4, completed PRBC #1 without any adverse reactions. Patient with bloody bowel movements x2 episodes. Patient reports feeling dizzy when getting in and out of bed to bedside commode. Patient reports "feeling the same" s/p PRBC #1. Release request for PRBC #1 en route.

## 2021-04-27 NOTE — H&P ADULT - NSICDXPASTMEDICALHX_GEN_ALL_CORE_FT
PAST MEDICAL HISTORY:  GIB (gastrointestinal bleeding)     Hyperlipidemia     Hypertension     Prostate cancer s/p radiation therapy     PAST MEDICAL HISTORY:  GIB (gastrointestinal bleeding)     Gout     Hyperlipidemia     Hypertension     Prostate cancer s/p radiation therapy

## 2021-04-27 NOTE — H&P ADULT - PROBLEM SELECTOR PLAN 4
Took indomethacin 2 days ago for gout flaire.   - Monitor off NSAIDs. S/p radiation therapy.   - C/w finasteride  - Hold tamsulosin iso hypotension

## 2021-04-27 NOTE — H&P ADULT - PROBLEM SELECTOR PLAN 5
Took indomethacin and tramadol 2 days ago for gout flaire.   - Monitor off NSAIDs.  - Hold home allopurinol

## 2021-04-27 NOTE — ED ADULT NURSE NOTE - OBJECTIVE STATEMENT
Patient received in room 5, A&OX4, ambulatory at baseline. Patient reports to ED complaining of nausea and diarrhea x 1 day. Patient reports blood in stool for past day causing him to feel weak. Patient denies chest pain, sob, abdominal pain, vomiting, syncope. 18 placed in right ac, labs collected and sent. Fluids/medication given as per MD order. MD at bedside for evaluation. Will continue to monitor.

## 2021-04-27 NOTE — H&P ADULT - NSHPREVIEWOFSYSTEMS_GEN_ALL_CORE
CONSTITUTIONAL: No fever, weight loss, + fatigue  EYES: No eye pain, visual disturbances, or discharge  ENMT:  No difficulty hearing, tinnitus, vertigo; No sinus or throat pain  NECK: No pain or stiffness  BREASTS: No pain, masses, or nipple discharge  RESPIRATORY: No cough, wheezing, chills or hemoptysis; No shortness of breath  CARDIOVASCULAR: No chest pain, palpitations, dizziness, or leg swelling  GASTROINTESTINAL: + epigastric pain. No nausea, vomiting, or hematemesis; No diarrhea or constipation. + black stools  GENITOURINARY: No dysuria, frequency, hematuria, or incontinence  NEUROLOGICAL: No headaches, memory loss, loss of strength, numbness, or tremors  SKIN: No itching, burning, rashes, or lesions   LYMPH NODES: No enlarged glands  ENDOCRINE: No heat or cold intolerance; No hair loss  MUSCULOSKELETAL: No joint pain or swelling; No muscle, back, or extremity pain  PSYCHIATRIC: No depression, anxiety, mood swings, or difficulty sleeping  HEME/LYMPH: No easy bruising, or bleeding gums  ALLERY AND IMMUNOLOGIC: No hives or eczema    Review of Systems: Systems otherwise negative except as per HPI

## 2021-04-27 NOTE — ED ADULT NURSE NOTE - NSIMPLEMENTINTERV_GEN_ALL_ED
Implemented All Fall Risk Interventions:  [place] to call system. Call bell, personal items and telephone within reach. Instruct patient to call for assistance. Room bathroom lighting operational. Non-slip footwear when patient is off stretcher. Physically safe environment: no spills, clutter or unnecessary equipment. Stretcher in lowest position, wheels locked, appropriate side rails in place. Provide visual cue, wrist band, yellow gown, etc. Monitor gait and stability. Monitor for mental status changes and reorient to person, place, and time. Review medications for side effects contributing to fall risk. Reinforce activity limits and safety measures with patient and family.

## 2021-04-27 NOTE — ED PROVIDER NOTE - PROGRESS NOTE DETAILS
Isabella Alcantara M.D. Resident  7 beats of [facility]. currently in sinus. Dr. Ramos: Pt was signed out to me awaiting labs. Concern for symptomatic anemia from GI bleed. Pt currently resting in [facility]. Isabella Alcantara M.D. Resident  7 beats of [facility]. currently in sinus.

## 2021-04-27 NOTE — CONSULT NOTE ADULT - SUBJECTIVE AND OBJECTIVE BOX
CHIEF COMPLAINT:     HPI:  69 yo M pm hx of GIB 1 year ago w/ negative colonoscopy at [facility], hemorrhoidectomy, gout, HTN, HLD, prostate cancer s/p radiation therapy, p/w dark stools x 2 days. 2 days ago, pt took indomethacin and tramadol 2 days ago for gout flare. Pt states he started having dark loose stools since Saturday. Some abd pain, denies CP, SOB, nausea, vomiting, GI symptoms, cough, fever and chills. States the stools started out black but changed to have some red in them as well. Has blood on toilet paper as well. States blood was like "water gushing out." Has felt dizzy and weak since GIB began, particularly when standing up from toilet.     ED course: AF, 86, 93/58, 100% RA. Received 80mg protonix, 2 units PRBC, 1L NS, 40meQ KCl.  (27 Apr 2021 12:43)    MICU consulted for hypotension in the setting of GIB. Patient was seen and examined at bedside in ED. Took indomethacin for gout flare 2 days ago and on aspirin at home, last dose was sunday. Prior hx of GIB and reportedly "found nothing." Reports having 3 "dark BMs" for the past 6 hours. Feels a little dizzy when sitting up. Otherwise, lyle sob, chest pain, abd pain, vomiting. Repeat BP at bedside with MAP>65.    FAMILY HISTORY:  No pertinent family history in first degree relatives        SOCIAL HISTORY:  Smoking: __ packs x ___ years  EtOH Use:  Marital Status:  Occupation:  Recent Travel:  Country of Birth:  Advance Directives:    Allergies    No Known Allergies    Intolerances        HOME MEDICATIONS:    REVIEW OF SYSTEMS:  Constitutional:   Eyes:  ENT:  CV:  Resp:  GI:  :  MSK:  Integumentary:  Neurological:  Psychiatric:  Endocrine:  Hematologic/Lymphatic:  Allergic/Immunologic:  [ ] All other systems negative  [ ] Unable to assess ROS because ________    OBJECTIVE:  ICU Vital Signs Last 24 Hrs  T(C): 36.2 (27 Apr 2021 17:00), Max: 36.8 (27 Apr 2021 07:59)  T(F): 97.1 (27 Apr 2021 17:00), Max: 98.2 (27 Apr 2021 07:59)  HR: 97 (27 Apr 2021 17:00) (78 - 97)  BP: 111/62 (27 Apr 2021 17:00) (93/58 - 116/57)  BP(mean): --  ABP: --  ABP(mean): --  RR: 18 (27 Apr 2021 17:00) (17 - 18)  SpO2: 100% (27 Apr 2021 17:00) (100% - 100%)        CAPILLARY BLOOD GLUCOSE          PHYSICAL EXAM:  GENERAL: NAD, tired-appearing laying in stretcher  HEAD:  Atraumatic, Normocephalic  EYES: conjunctiva pallor  NECK: Supple  CHEST/LUNG: non-labored breathing, satting well on room air  HEART: Regular rate and rhythm  ABDOMEN: Soft, Nontender, Nondistended; Bowel sounds present  EXTREMITIES:  2+ Peripheral Pulses, No clubbing, cyanosis, or edema  PSYCH: AAOx3  NEUROLOGY: non-focal  SKIN: No rashes or lesions      HOSPITAL MEDICATIONS:  MEDICATIONS  (STANDING):  atorvastatin 10 milliGRAM(s) Oral at bedtime  ferrous    sulfate 325 milliGRAM(s) Oral daily  finasteride 5 milliGRAM(s) Oral daily  pantoprazole Infusion 8 mG/Hr (10 mL/Hr) IV Continuous <Continuous>  sodium chloride 0.9%. 1000 milliLiter(s) (75 mL/Hr) IV Continuous <Continuous>    MEDICATIONS  (PRN):  acetaminophen   Tablet .. 650 milliGRAM(s) Oral every 6 hours PRN Temp greater or equal to 38C (100.4F), Mild Pain (1 - 3), Moderate Pain (4 - 6)  ondansetron Injectable 4 milliGRAM(s) IV Push every 6 hours PRN Nausea and/or Vomiting      LABS:                        6.8    13.00 )-----------( 140      ( 27 Apr 2021 17:47 )             20.5     04-27    138  |  102  |  40<H>  ----------------------------<  203<H>  3.2<L>   |  28  |  1.14    Ca    8.2<L>      27 Apr 2021 09:23    TPro  5.5<L>  /  Alb  3.1<L>  /  TBili  0.4  /  DBili  x   /  AST  16  /  ALT  11  /  AlkPhos  44  04-27    PT/INR - ( 27 Apr 2021 09:23 )   PT: 13.0 sec;   INR: 1.14 ratio         PTT - ( 27 Apr 2021 09:23 )  PTT:27.3 sec          MICROBIOLOGY:     RADIOLOGY:  [ ] Reviewed and interpreted by me    EKG: CHIEF COMPLAINT:     HPI:  71 yo M pm hx of GIB 1 year ago w/ negative colonoscopy at [facility], hemorrhoidectomy, gout, HTN, HLD, prostate cancer s/p radiation therapy, p/w dark stools x 2 days. 2 days ago, pt took indomethacin and tramadol 2 days ago for gout flare. Pt states he started having dark loose stools since Saturday. Some abd pain, denies CP, SOB, nausea, vomiting, GI symptoms, cough, fever and chills. States the stools started out black but changed to have some red in them as well. Has blood on toilet paper as well. States blood was like "water gushing out." Has felt dizzy and weak since GIB began, particularly when standing up from toilet.     ED course: AF, 86, 93/58, 100% RA. Received 80mg protonix, 2 units PRBC, 1L NS, 40meQ KCl.  (27 Apr 2021 12:43)    MICU consulted for hypotension in the setting of GIB. Hb 6.8 (<--6.1 after 2u prbc). Patient was seen and examined at bedside in ED. Took indomethacin for gout flare 2 days ago and on aspirin at home, last dose was sunday. Prior hx of GIB and reportedly "found nothing." Reports having 3 "dark BMs" for the past 6 hours. Feels a little dizzy when sitting up. Otherwise, lyle sob, chest pain, abd pain, vomiting. Repeat BP at bedside with MAP>65.    FAMILY HISTORY:  No pertinent family history in first degree relatives        SOCIAL HISTORY:  Smoking: __ packs x ___ years  EtOH Use:  Marital Status:  Occupation:  Recent Travel:  Country of Birth:  Advance Directives:    Allergies    No Known Allergies    Intolerances        HOME MEDICATIONS:      OBJECTIVE:  ICU Vital Signs Last 24 Hrs  T(C): 36.2 (27 Apr 2021 17:00), Max: 36.8 (27 Apr 2021 07:59)  T(F): 97.1 (27 Apr 2021 17:00), Max: 98.2 (27 Apr 2021 07:59)  HR: 97 (27 Apr 2021 17:00) (78 - 97)  BP: 111/62 (27 Apr 2021 17:00) (93/58 - 116/57)  BP(mean): --  ABP: --  ABP(mean): --  RR: 18 (27 Apr 2021 17:00) (17 - 18)  SpO2: 100% (27 Apr 2021 17:00) (100% - 100%)        CAPILLARY BLOOD GLUCOSE          PHYSICAL EXAM:  GENERAL: NAD, tired-appearing laying in stretcher  HEAD:  Atraumatic, Normocephalic  EYES: conjunctiva pallor  NECK: Supple  CHEST/LUNG: non-labored breathing, satting well on room air  HEART: Regular rate and rhythm  ABDOMEN: Soft, epigastric tender, Nondistended; Bowel sounds present  EXTREMITIES:  2+ Peripheral Pulses, No clubbing, cyanosis, or edema  PSYCH: AAOx3  NEUROLOGY: non-focal  SKIN: No rashes or lesions      HOSPITAL MEDICATIONS:  MEDICATIONS  (STANDING):  atorvastatin 10 milliGRAM(s) Oral at bedtime  ferrous    sulfate 325 milliGRAM(s) Oral daily  finasteride 5 milliGRAM(s) Oral daily  pantoprazole Infusion 8 mG/Hr (10 mL/Hr) IV Continuous <Continuous>  sodium chloride 0.9%. 1000 milliLiter(s) (75 mL/Hr) IV Continuous <Continuous>    MEDICATIONS  (PRN):  acetaminophen   Tablet .. 650 milliGRAM(s) Oral every 6 hours PRN Temp greater or equal to 38C (100.4F), Mild Pain (1 - 3), Moderate Pain (4 - 6)  ondansetron Injectable 4 milliGRAM(s) IV Push every 6 hours PRN Nausea and/or Vomiting      LABS:                        6.8    13.00 )-----------( 140      ( 27 Apr 2021 17:47 )             20.5     04-27    138  |  102  |  40<H>  ----------------------------<  203<H>  3.2<L>   |  28  |  1.14    Ca    8.2<L>      27 Apr 2021 09:23    TPro  5.5<L>  /  Alb  3.1<L>  /  TBili  0.4  /  DBili  x   /  AST  16  /  ALT  11  /  AlkPhos  44  04-27    PT/INR - ( 27 Apr 2021 09:23 )   PT: 13.0 sec;   INR: 1.14 ratio         PTT - ( 27 Apr 2021 09:23 )  PTT:27.3 sec          MICROBIOLOGY:     RADIOLOGY:  [ ] Reviewed and interpreted by me    EKG:

## 2021-04-27 NOTE — ED PROVIDER NOTE - ATTENDING CONTRIBUTION TO CARE
Afebrile. Awake and Alert. Lungs CTA. Heart RRR. Abdomen soft NTND. CN II-XII grossly intact. Moves all extremities without lateralization. Pale conjunctiva.    UGIB on ASA  HD monitoring  Trend H&H, transfuse as indicated

## 2021-04-27 NOTE — CONSULT NOTE ADULT - ATTENDING COMMENTS
69 yo Male with history of prostate cancer, recent gout flare on indomethacin presenting with melena, acute blood loss anemia, likely upper GI bleed.  Monitor Hb, transfuse to Hb > 7, pantoprazole gtt.  May have clear liquids today, NPO after midnight for EGD tomorrow.  Low threshold for MICU evaluation if patient remains hypotensive requiring transfusions.  If EGD negative will consider colonoscopy (was supposed to follow up next month for surveillance given history of polyps).
69 yo M pm hx of GIB 1 year ago w/ negative colonoscopy at [facility], hemorrhoidectomy, gout, HTN, HLD, prostate cancer s/p radiation therapy, p/w GIB for 2 days with up to 15 episodes of melena at home. Now s/p 2uPRBC with hb not adequate but improvement in symptoms. However on my exam still orthostatic with SBP as low as 70 but up to 110 laying down.  However he states he has only had 3 BMS over the last 6-8 hours.  Signifying improvement  - Would give him 1 uprbc, and 1 uplt in the setting of platelet dysfunction on asa until Sunday and NSAID.  if bleeding worsens will give txa  - if  Not improving or has hypotension will take to the ICU  - IF GI wants to scope tonight even if hemodynamically stable can facilitate scope in the ICU.

## 2021-04-27 NOTE — ED PROVIDER NOTE - OBJECTIVE STATEMENT
71 yo M pm hx of GIB, HTN, HLD pw dark stools x 1 day. Pt states he started having dark loose stools last night, no abd pain, denies CP, SOB, nausea, vomiting, GI symptoms, cough, fever and chills. In triage, soft BPS.

## 2021-04-27 NOTE — ED PROVIDER NOTE - NS ED ROS FT
GENERAL: No fever, chills  EYES: no vision changes, no discharge.   ENT: no difficulty swallowing or speaking   CARDIAC: no chest pain/pressure, SOB, lower extremity swelling  PULMONARY: no cough, SOB  GI: no abdominal pain, n/v + dark stools  : no dysuria, no hematuria  SKIN: no rashes, no ecchymosis  NEURO: no headache, lightheadedness  MSK: No joint pain, myalgia, weakness.

## 2021-04-27 NOTE — ED ADULT NURSE REASSESSMENT NOTE - NS ED NURSE REASSESS COMMENT FT1
break covering RN: pt receiving PRBCs at this time. pt denies any pain, discomfort, or sob. Pt respirations even and unlabored. 2nd IV access obtained at this time. 20G IV L AC. VS as noted, call bell in reach, comfort measures provided, will continue to monitor

## 2021-04-27 NOTE — H&P ADULT - PROBLEM SELECTOR PLAN 1
Pt with large GIB (black stools) over last 2 days, suspicious for UGIB. BUN 40, Cr 1.14. Soft BP in ED, Hgb 6.1. Had previous GIB approx. 1 year ago, had negative colonoscopy at [facility].   - S/p 2 units PRBC in ED, f/u post-transfusion CBC  - S/p 1L NS bolus  - S/p 80mg IV protonix  - Transfuse Hgb>7  - C/w NS 75cc/hr  - Protonix 40mg IV BID  - NPO  - GI consulted for endoscopy/colonoscopy  - Maintain active T&S Pt with large GIB (black stools) over last 2 days, suspicious for UGIB. BUN 40, Cr 1.14. Soft BP in ED, Hgb 6.1. Had previous GIB approx. 1 year ago, had negative colonoscopy at [facility]. Recently took indomethacin for gout flare, may have been triggered by this.   - S/p 2 units PRBC in ED, f/u post-transfusion CBC  - S/p 1L NS bolus  - S/p 80mg IV protonix  - Transfuse Hgb>7  - C/w NS 75cc/hr  - Protonix 40mg IV BID  - NPO  - GI consulted for endoscopy/colonoscopy  - Maintain active T&S Pt with large GIB (black stools) over last 2 days, suspicious for UGIB. BUN 40, Cr 1.14. Soft BP in ED, Hgb 6.1. Had previous GIB approx. 1 year ago, had negative colonoscopy at [facility]. Recently took indomethacin and tramadol for gout flare, may have been triggered by this.   - S/p 2 units PRBC in ED, f/u post-transfusion CBC  - S/p 1L NS bolus  - S/p 80mg IV protonix  - Transfuse Hgb>7  - C/w NS 75cc/hr  - Protonix 40mg IV BID  - NPO  - GI consulted for endoscopy/colonoscopy  - Maintain active T&S  - F/u iron studies

## 2021-04-27 NOTE — H&P ADULT - NSHPPHYSICALEXAM_GEN_ALL_CORE
Vital Signs (24 Hrs):  T(C): 36.5 (04-27-21 @ 11:00), Max: 36.8 (04-27-21 @ 07:59)  HR: 84 (04-27-21 @ 11:00) (78 - 86)  BP: 103/59 (04-27-21 @ 11:00) (93/58 - 116/57)  RR: 18 (04-27-21 @ 11:00) (17 - 18)  SpO2: 100% (04-27-21 @ 11:00) (100% - 100%)  Wt(kg): --  Daily     Daily     I&O's Summary      GENERAL: no acute distress; obese  HEAD:  Atraumatic, Normocephalic  EYES: EOMI, PERRLA, conjunctival pall  ENT: MMM; oropharynx clear  NECK: Supple, No JVD  CHEST/LUNG: Clear to auscultation bilaterally; No wheeze  HEART: Regular rate and rhythm; No murmurs, rubs, or gallops  ABDOMEN: Soft, TTP epigastric, Nondistended; Bowel sounds present  EXTREMITIES:  2+ Peripheral Pulses, No clubbing, cyanosis, or edema  PSYCH: AAOx3  NEUROLOGY: no focal motor or sensory deficits. 5/5 muscle strength in all extremities.   SKIN: No rashes or lesions

## 2021-04-28 LAB
ANION GAP SERPL CALC-SCNC: 12 MMOL/L — SIGNIFICANT CHANGE UP (ref 7–14)
BUN SERPL-MCNC: 7 MG/DL — SIGNIFICANT CHANGE UP (ref 7–23)
CALCIUM SERPL-MCNC: 9.5 MG/DL — SIGNIFICANT CHANGE UP (ref 8.4–10.5)
CHLORIDE SERPL-SCNC: 98 MMOL/L — SIGNIFICANT CHANGE UP (ref 98–107)
CO2 SERPL-SCNC: 24 MMOL/L — SIGNIFICANT CHANGE UP (ref 22–31)
COVID-19 SPIKE DOMAIN AB INTERP: POSITIVE
COVID-19 SPIKE DOMAIN ANTIBODY RESULT: 107 U/ML — HIGH
CREAT SERPL-MCNC: 0.93 MG/DL — SIGNIFICANT CHANGE UP (ref 0.5–1.3)
GLUCOSE SERPL-MCNC: 75 MG/DL — SIGNIFICANT CHANGE UP (ref 70–99)
HCT VFR BLD CALC: 21.4 % — LOW (ref 39–50)
HCT VFR BLD CALC: 37.1 % — LOW (ref 39–50)
HGB BLD-MCNC: 12.5 G/DL — LOW (ref 13–17)
HGB BLD-MCNC: 7.5 G/DL — LOW (ref 13–17)
MAGNESIUM SERPL-MCNC: 2.2 MG/DL — SIGNIFICANT CHANGE UP (ref 1.6–2.6)
MCHC RBC-ENTMCNC: 29.1 PG — SIGNIFICANT CHANGE UP (ref 27–34)
MCHC RBC-ENTMCNC: 31.8 PG — SIGNIFICANT CHANGE UP (ref 27–34)
MCHC RBC-ENTMCNC: 33.7 GM/DL — SIGNIFICANT CHANGE UP (ref 32–36)
MCHC RBC-ENTMCNC: 35 GM/DL — SIGNIFICANT CHANGE UP (ref 32–36)
MCV RBC AUTO: 86.3 FL — SIGNIFICANT CHANGE UP (ref 80–100)
MCV RBC AUTO: 90.7 FL — SIGNIFICANT CHANGE UP (ref 80–100)
NRBC # BLD: 0 /100 WBCS — SIGNIFICANT CHANGE UP
NRBC # BLD: 0 /100 WBCS — SIGNIFICANT CHANGE UP
NRBC # FLD: 0 K/UL — SIGNIFICANT CHANGE UP
NRBC # FLD: 0.13 K/UL — HIGH
PHOSPHATE SERPL-MCNC: 3.9 MG/DL — SIGNIFICANT CHANGE UP (ref 2.5–4.5)
PLATELET # BLD AUTO: 170 K/UL — SIGNIFICANT CHANGE UP (ref 150–400)
PLATELET # BLD AUTO: 289 K/UL — SIGNIFICANT CHANGE UP (ref 150–400)
POTASSIUM SERPL-MCNC: 4 MMOL/L — SIGNIFICANT CHANGE UP (ref 3.5–5.3)
POTASSIUM SERPL-SCNC: 4 MMOL/L — SIGNIFICANT CHANGE UP (ref 3.5–5.3)
RBC # BLD: 2.36 M/UL — LOW (ref 4.2–5.8)
RBC # BLD: 4.3 M/UL — SIGNIFICANT CHANGE UP (ref 4.2–5.8)
RBC # FLD: 12.8 % — SIGNIFICANT CHANGE UP (ref 10.3–14.5)
RBC # FLD: 14.4 % — SIGNIFICANT CHANGE UP (ref 10.3–14.5)
SARS-COV-2 IGG+IGM SERPL QL IA: 107 U/ML — HIGH
SARS-COV-2 IGG+IGM SERPL QL IA: POSITIVE
SODIUM SERPL-SCNC: 134 MMOL/L — LOW (ref 135–145)
WBC # BLD: 16.02 K/UL — HIGH (ref 3.8–10.5)
WBC # BLD: 4.5 K/UL — SIGNIFICANT CHANGE UP (ref 3.8–10.5)
WBC # FLD AUTO: 16.02 K/UL — HIGH (ref 3.8–10.5)
WBC # FLD AUTO: 4.5 K/UL — SIGNIFICANT CHANGE UP (ref 3.8–10.5)

## 2021-04-28 PROCEDURE — 43239 EGD BIOPSY SINGLE/MULTIPLE: CPT | Mod: GC

## 2021-04-28 PROCEDURE — 99233 SBSQ HOSP IP/OBS HIGH 50: CPT | Mod: GC

## 2021-04-28 PROCEDURE — 88305 TISSUE EXAM BY PATHOLOGIST: CPT | Mod: 26

## 2021-04-28 RX ORDER — PANTOPRAZOLE SODIUM 20 MG/1
40 TABLET, DELAYED RELEASE ORAL
Refills: 0 | Status: DISCONTINUED | OUTPATIENT
Start: 2021-04-28 | End: 2021-04-30

## 2021-04-28 RX ORDER — SOD SULF/SODIUM/NAHCO3/KCL/PEG
4000 SOLUTION, RECONSTITUTED, ORAL ORAL ONCE
Refills: 0 | Status: COMPLETED | OUTPATIENT
Start: 2021-04-28 | End: 2021-04-28

## 2021-04-28 RX ADMIN — Medication 325 MILLIGRAM(S): at 11:58

## 2021-04-28 RX ADMIN — ATORVASTATIN CALCIUM 10 MILLIGRAM(S): 80 TABLET, FILM COATED ORAL at 21:29

## 2021-04-28 RX ADMIN — FINASTERIDE 5 MILLIGRAM(S): 5 TABLET, FILM COATED ORAL at 11:59

## 2021-04-28 RX ADMIN — SODIUM CHLORIDE 75 MILLILITER(S): 9 INJECTION INTRAMUSCULAR; INTRAVENOUS; SUBCUTANEOUS at 12:49

## 2021-04-28 RX ADMIN — Medication 4000 MILLILITER(S): at 16:39

## 2021-04-28 NOTE — PROGRESS NOTE ADULT - PROBLEM SELECTOR PLAN 1
Pt with large GIB (black stools) over last 2 days, suspicious for UGIB. BUN 40, Cr 1.14. Soft BP in ED, Hgb 6.1. Had previous GIB approx. 1 year ago, had negative colonoscopy at [facility]. Recently took indomethacin and tramadol for gout flare, may have been triggered by this.   - S/p 3 units PRBC, 1 unit plt  - S/p 1L NS bolus  - C/w protonix gtt  - Transfuse Hgb>7  - C/w NS 75cc/hr  - NPO for endoscopy today  - MICU consulted, not a candidate  - Maintain active T&S Pt with large GIB (black stools) over last 2 days, negative for UGIB on endoscopy. Soft BP in ED, Hgb 6.1. Had previous GIB approx. 1 year ago, had negative colonoscopy at [facility].   - S/p 3 units PRBC, 1 unit plt  - S/p 1L NS bolus  - switch protonix gtt to PO 40 daily  - Transfuse Hgb>7  - C/w NS 75cc/hr  - endoscopy negative for UGIB  - NPO at [facility] for colonoscopy  - MICU consulted, not a candidate  - Maintain active T&S

## 2021-04-28 NOTE — PROGRESS NOTE ADULT - SUBJECTIVE AND OBJECTIVE BOX
PROGRESS NOTE:     CONTACT INFO:  Kamila Hurtado MD   PGY-1  Pager: [phone] [facility]    Patient is a 70y old  Male who presents with a chief complaint of GIB (27 Apr 2021 19:31)      SUBJECTIVE / OVERNIGHT EVENTS:  No acute events overnight. Patient seen and evaluated at bedside. No fever/chills.  Denies SOB at rest, chest pain, palpitations, abdominal pain, nausea/vomiting. Had one episode of dark stools yesterday. Felt dizzy standing up.     ADDITIONAL REVIEW OF SYSTEMS:    MEDICATIONS  (STANDING):  atorvastatin 10 milliGRAM(s) Oral at bedtime  ferrous    sulfate 325 milliGRAM(s) Oral daily  finasteride 5 milliGRAM(s) Oral daily  pantoprazole Infusion 8 mG/Hr (10 mL/Hr) IV Continuous <Continuous>  sodium chloride 0.9%. 1000 milliLiter(s) (75 mL/Hr) IV Continuous <Continuous>    MEDICATIONS  (PRN):  acetaminophen   Tablet .. 650 milliGRAM(s) Oral every 6 hours PRN Temp greater or equal to 38C (100.4F), Mild Pain (1 - 3), Moderate Pain (4 - 6)  ondansetron Injectable 4 milliGRAM(s) IV Push every 6 hours PRN Nausea and/or Vomiting      CAPILLARY BLOOD GLUCOSE        I&O's Summary      PHYSICAL EXAM:  Vital Signs Last 24 Hrs  T(C): 36.6 (28 Apr 2021 07:19), Max: 37.2 (27 Apr 2021 20:59)  T(F): 97.8 (28 Apr 2021 07:19), Max: 98.9 (27 Apr 2021 20:59)  HR: 96 (28 Apr 2021 07:19) (84 - 102)  BP: 112/54 (28 Apr 2021 07:19) (96/59 - 112/54)  BP(mean): --  RR: 15 (28 Apr 2021 07:19) (15 - 19)  SpO2: 98% (28 Apr 2021 07:19) (98% - 100%)    GENERAL: no acute distress; obese  HEAD:  Atraumatic, Normocephalic  EYES: EOMI, PERRLA, conjunctival pall  ENT: MMM; oropharynx clear  NECK: Supple, No JVD  CHEST/LUNG: Clear to auscultation bilaterally; No wheeze  HEART: Irregular rate and rhythm; No murmurs, rubs, or gallops  ABDOMEN: Soft, TTP epigastric, Nondistended; Bowel sounds present  EXTREMITIES:  2+ Peripheral Pulses, No clubbing, cyanosis, or edema  PSYCH: AAOx3  NEUROLOGY: no focal motor or sensory deficits. 5/5 muscle strength in all extremities.   SKIN: No rashes or lesions    LABS:                        12.5   4.50  )-----------( 289      ( 28 Apr 2021 07:27 )             37.1     04-28    134<L>  |  98  |  7   ----------------------------<  75  4.0   |  24  |  0.93    Ca    9.5      28 Apr 2021 07:27  Phos  3.9     04-28  Mg     2.2     04-28    TPro  5.5<L>  /  Alb  3.1<L>  /  TBili  0.4  /  DBili  x   /  AST  16  /  ALT  11  /  AlkPhos  44  04-27    PT/INR - ( 27 Apr 2021 09:23 )   PT: 13.0 sec;   INR: 1.14 ratio         PTT - ( 27 Apr 2021 09:23 )  PTT:27.3 sec            RADIOLOGY & ADDITIONAL TESTS:  Results Reviewed   PROGRESS NOTE:     CONTACT INFO:  Kamila Hurtado MD   PGY-1  Pager: [phone] [facility]    Patient is a 70y old  Male who presents with a chief complaint of GIB (27 Apr 2021 19:31)      SUBJECTIVE / OVERNIGHT EVENTS:  No acute events overnight. Patient seen and evaluated at bedside. No fever/chills.  Denies SOB at rest, chest pain, palpitations, abdominal pain, nausea/vomiting. Had one episode of dark stools yesterday. Felt dizzy standing up.     ADDITIONAL REVIEW OF SYSTEMS:    MEDICATIONS  (STANDING):  atorvastatin 10 milliGRAM(s) Oral at bedtime  ferrous    sulfate 325 milliGRAM(s) Oral daily  finasteride 5 milliGRAM(s) Oral daily  pantoprazole Infusion 8 mG/Hr (10 mL/Hr) IV Continuous <Continuous>  sodium chloride 0.9%. 1000 milliLiter(s) (75 mL/Hr) IV Continuous <Continuous>    MEDICATIONS  (PRN):  acetaminophen   Tablet .. 650 milliGRAM(s) Oral every 6 hours PRN Temp greater or equal to 38C (100.4F), Mild Pain (1 - 3), Moderate Pain (4 - 6)  ondansetron Injectable 4 milliGRAM(s) IV Push every 6 hours PRN Nausea and/or Vomiting      CAPILLARY BLOOD GLUCOSE        I&O's Summary      PHYSICAL EXAM:  Vital Signs Last 24 Hrs  T(C): 36.6 (28 Apr 2021 07:19), Max: 37.2 (27 Apr 2021 20:59)  T(F): 97.8 (28 Apr 2021 07:19), Max: 98.9 (27 Apr 2021 20:59)  HR: 96 (28 Apr 2021 07:19) (84 - 102)  BP: 112/54 (28 Apr 2021 07:19) (96/59 - 112/54)  BP(mean): --  RR: 15 (28 Apr 2021 07:19) (15 - 19)  SpO2: 98% (28 Apr 2021 07:19) (98% - 100%)    GENERAL: no acute distress; obese  HEAD:  Atraumatic, Normocephalic  EYES: EOMI, PERRLA, conjunctival pall  ENT: MMM; oropharynx clear  NECK: Supple, No JVD  CHEST/LUNG: Clear to auscultation bilaterally; No wheeze  HEART: Irregular rate and rhythm; No murmurs, rubs, or gallops  ABDOMEN: Soft, TTP epigastric, Nondistended; Bowel sounds present  EXTREMITIES:  2+ Peripheral Pulses, No clubbing, cyanosis, or edema  PSYCH: AAOx3  NEUROLOGY: no focal motor or sensory deficits. 5/5 muscle strength in all extremities.   SKIN: No rashes or lesions      LABS:  cret                        12.5   4.50  )-----------( 289      ( 28 Apr 2021 07:27 )             37.1     04-28    134<L>  |  98  |  7   ----------------------------<  75  4.0   |  24  |  0.93    Ca    9.5      28 Apr 2021 07:27  Phos  3.9     04-28  Mg     2.2     04-28    TPro  5.5<L>  /  Alb  3.1<L>  /  TBili  0.4  /  DBili  x   /  AST  16  /  ALT  11  /  AlkPhos  44  04-27    PT/INR - ( 27 Apr 2021 09:23 )   PT: 13.0 sec;   INR: 1.14 ratio         PTT - ( 27 Apr 2021 09:23 )  PTT:27.3 sec        RADIOLOGY & ADDITIONAL TESTS:  Procedure:           Upper GI endoscopy      Impression:          - No evidence of bleeding in the upper GI tract.                       - LA Grade A esophagitis.                       - Small hiatal hernia.                       - Antral erythema, otherwise normal stomach. Biopsied.                       - Normal duodenum.

## 2021-04-28 NOTE — PROGRESS NOTE ADULT - PROBLEM SELECTOR PLAN 5
Took indomethacin and tramadol 2 days ago for gout flaire.   - Monitor off NSAIDs.  - Hold home allopurinol Took indomethacin and tramadol 2 days ago for gout flare.   - Monitor off NSAIDs.  - Hold home allopurinol

## 2021-04-28 NOTE — PROGRESS NOTE ADULT - ATTENDING COMMENTS
Patient seen and examined. Case discussed with the medical team on rounds. I agree with the findings and the plan above.    69 yo gentlemen with h/o of GIB, HTN, HLD pw dark stools x 1 day. Occult positive, Hb 6.1, and BP somewhat soft at 93/58 on admission. Last endoscopy 2 years ago. MICU consulted on the day of admission. After multiple transfusions and IV hydration, BPs and hb improved.     Will continue monitor Hb and vitals  Endoscopy obtained, no source of bleeding noted. Colonoscopy to be done tomorrow  IV protonix   Continue the rest of the work up and management as stated above

## 2021-04-29 DIAGNOSIS — Z29.9 ENCOUNTER FOR PROPHYLACTIC MEASURES, UNSPECIFIED: ICD-10-CM

## 2021-04-29 LAB
ALBUMIN SERPL ELPH-MCNC: 3 G/DL — LOW (ref 3.3–5)
ALP SERPL-CCNC: 41 U/L — SIGNIFICANT CHANGE UP (ref 40–120)
ALT FLD-CCNC: 14 U/L — SIGNIFICANT CHANGE UP (ref 4–41)
ANION GAP SERPL CALC-SCNC: 11 MMOL/L — SIGNIFICANT CHANGE UP (ref 7–14)
AST SERPL-CCNC: 24 U/L — SIGNIFICANT CHANGE UP (ref 4–40)
BILIRUB SERPL-MCNC: 0.5 MG/DL — SIGNIFICANT CHANGE UP (ref 0.2–1.2)
BUN SERPL-MCNC: 15 MG/DL — SIGNIFICANT CHANGE UP (ref 7–23)
CALCIUM SERPL-MCNC: 7.5 MG/DL — LOW (ref 8.4–10.5)
CHLORIDE SERPL-SCNC: 104 MMOL/L — SIGNIFICANT CHANGE UP (ref 98–107)
CO2 SERPL-SCNC: 26 MMOL/L — SIGNIFICANT CHANGE UP (ref 22–31)
CREAT SERPL-MCNC: 0.81 MG/DL — SIGNIFICANT CHANGE UP (ref 0.5–1.3)
GLUCOSE SERPL-MCNC: 123 MG/DL — HIGH (ref 70–99)
HCT VFR BLD CALC: 18.4 % — CRITICAL LOW (ref 39–50)
HCT VFR BLD CALC: 19.3 % — CRITICAL LOW (ref 39–50)
HCT VFR BLD CALC: 23.8 % — LOW (ref 39–50)
HCV AB S/CO SERPL IA: 0.2 S/CO — SIGNIFICANT CHANGE UP (ref 0–0.99)
HCV AB SERPL-IMP: SIGNIFICANT CHANGE UP
HGB BLD-MCNC: 6.3 G/DL — CRITICAL LOW (ref 13–17)
HGB BLD-MCNC: 6.4 G/DL — CRITICAL LOW (ref 13–17)
HGB BLD-MCNC: 8 G/DL — LOW (ref 13–17)
MAGNESIUM SERPL-MCNC: 2 MG/DL — SIGNIFICANT CHANGE UP (ref 1.6–2.6)
MCHC RBC-ENTMCNC: 31.6 PG — SIGNIFICANT CHANGE UP (ref 27–34)
MCHC RBC-ENTMCNC: 31.8 PG — SIGNIFICANT CHANGE UP (ref 27–34)
MCHC RBC-ENTMCNC: 31.8 PG — SIGNIFICANT CHANGE UP (ref 27–34)
MCHC RBC-ENTMCNC: 33.2 GM/DL — SIGNIFICANT CHANGE UP (ref 32–36)
MCHC RBC-ENTMCNC: 33.6 GM/DL — SIGNIFICANT CHANGE UP (ref 32–36)
MCHC RBC-ENTMCNC: 34.2 GM/DL — SIGNIFICANT CHANGE UP (ref 32–36)
MCV RBC AUTO: 92.9 FL — SIGNIFICANT CHANGE UP (ref 80–100)
MCV RBC AUTO: 94.1 FL — SIGNIFICANT CHANGE UP (ref 80–100)
MCV RBC AUTO: 96 FL — SIGNIFICANT CHANGE UP (ref 80–100)
NRBC # BLD: 1 /100 WBCS — SIGNIFICANT CHANGE UP
NRBC # BLD: 1 /100 WBCS — SIGNIFICANT CHANGE UP
NRBC # BLD: 2 /100 WBCS — SIGNIFICANT CHANGE UP
NRBC # FLD: 0.11 K/UL — HIGH
NRBC # FLD: 0.13 K/UL — HIGH
NRBC # FLD: 0.16 K/UL — HIGH
PHOSPHATE SERPL-MCNC: 2.3 MG/DL — LOW (ref 2.5–4.5)
PLATELET # BLD AUTO: 175 K/UL — SIGNIFICANT CHANGE UP (ref 150–400)
PLATELET # BLD AUTO: 176 K/UL — SIGNIFICANT CHANGE UP (ref 150–400)
PLATELET # BLD AUTO: 196 K/UL — SIGNIFICANT CHANGE UP (ref 150–400)
POTASSIUM SERPL-MCNC: 2.8 MMOL/L — CRITICAL LOW (ref 3.5–5.3)
POTASSIUM SERPL-SCNC: 2.8 MMOL/L — CRITICAL LOW (ref 3.5–5.3)
PROT SERPL-MCNC: 4.8 G/DL — LOW (ref 6–8.3)
RBC # BLD: 1.98 M/UL — LOW (ref 4.2–5.8)
RBC # BLD: 2.01 M/UL — LOW (ref 4.2–5.8)
RBC # BLD: 2.53 M/UL — LOW (ref 4.2–5.8)
RBC # FLD: 15 % — HIGH (ref 10.3–14.5)
RBC # FLD: 15.4 % — HIGH (ref 10.3–14.5)
RBC # FLD: 15.5 % — HIGH (ref 10.3–14.5)
SODIUM SERPL-SCNC: 141 MMOL/L — SIGNIFICANT CHANGE UP (ref 135–145)
WBC # BLD: 10.11 K/UL — SIGNIFICANT CHANGE UP (ref 3.8–10.5)
WBC # BLD: 10.49 K/UL — SIGNIFICANT CHANGE UP (ref 3.8–10.5)
WBC # BLD: 9.69 K/UL — SIGNIFICANT CHANGE UP (ref 3.8–10.5)
WBC # FLD AUTO: 10.11 K/UL — SIGNIFICANT CHANGE UP (ref 3.8–10.5)
WBC # FLD AUTO: 10.49 K/UL — SIGNIFICANT CHANGE UP (ref 3.8–10.5)
WBC # FLD AUTO: 9.69 K/UL — SIGNIFICANT CHANGE UP (ref 3.8–10.5)

## 2021-04-29 PROCEDURE — 99233 SBSQ HOSP IP/OBS HIGH 50: CPT | Mod: GC

## 2021-04-29 PROCEDURE — 45378 DIAGNOSTIC COLONOSCOPY: CPT | Mod: GC

## 2021-04-29 RX ORDER — POTASSIUM PHOSPHATE, MONOBASIC POTASSIUM PHOSPHATE, DIBASIC 236; 224 MG/ML; MG/ML
15 INJECTION, SOLUTION INTRAVENOUS ONCE
Refills: 0 | Status: COMPLETED | OUTPATIENT
Start: 2021-04-29 | End: 2021-04-29

## 2021-04-29 RX ORDER — POTASSIUM CHLORIDE 20 MEQ
40 PACKET (EA) ORAL ONCE
Refills: 0 | Status: COMPLETED | OUTPATIENT
Start: 2021-04-29 | End: 2021-04-29

## 2021-04-29 RX ORDER — POTASSIUM CHLORIDE 20 MEQ
10 PACKET (EA) ORAL ONCE
Refills: 0 | Status: COMPLETED | OUTPATIENT
Start: 2021-04-29 | End: 2021-04-29

## 2021-04-29 RX ADMIN — FINASTERIDE 5 MILLIGRAM(S): 5 TABLET, FILM COATED ORAL at 11:42

## 2021-04-29 RX ADMIN — Medication 325 MILLIGRAM(S): at 11:42

## 2021-04-29 RX ADMIN — PANTOPRAZOLE SODIUM 40 MILLIGRAM(S): 20 TABLET, DELAYED RELEASE ORAL at 05:27

## 2021-04-29 RX ADMIN — Medication 40 MILLIEQUIVALENT(S): at 10:28

## 2021-04-29 RX ADMIN — SODIUM CHLORIDE 75 MILLILITER(S): 9 INJECTION INTRAMUSCULAR; INTRAVENOUS; SUBCUTANEOUS at 03:41

## 2021-04-29 RX ADMIN — POTASSIUM PHOSPHATE, MONOBASIC POTASSIUM PHOSPHATE, DIBASIC 62.5 MILLIMOLE(S): 236; 224 INJECTION, SOLUTION INTRAVENOUS at 11:41

## 2021-04-29 RX ADMIN — ATORVASTATIN CALCIUM 10 MILLIGRAM(S): 80 TABLET, FILM COATED ORAL at 21:16

## 2021-04-29 RX ADMIN — Medication 100 MILLIEQUIVALENT(S): at 09:12

## 2021-04-29 RX ADMIN — Medication 40 MILLIEQUIVALENT(S): at 11:41

## 2021-04-29 RX ADMIN — SODIUM CHLORIDE 75 MILLILITER(S): 9 INJECTION INTRAMUSCULAR; INTRAVENOUS; SUBCUTANEOUS at 11:41

## 2021-04-29 NOTE — PROGRESS NOTE ADULT - PROBLEM SELECTOR PLAN 5
Took indomethacin and tramadol prior to admission for gout flare.   - Monitor off NSAIDs.  - Hold home allopurinol

## 2021-04-29 NOTE — PROGRESS NOTE ADULT - PROBLEM SELECTOR PLAN 1
-Pt with large GIB (black stools) X 2 days  -EGD w/ no evidence of active GIB  -PPT gtt d/c'd, started pantoprazole 40mg QD  -Pending colonoscopy today  - S/p 3 units PRBC, 1 unit plt  - Monitor CBCs, Transfuse Hgb>7  - Maintain active T&S

## 2021-04-29 NOTE — ASU PREOP CHECKLIST - ISOLATION PRECAUTIONS
Progress Notes by Amarilis Tony DO at 03/14/17 08:34 AM     Author:  Amarilis Tony DO Service:  (none) Author Type:  Physician     Filed:  03/14/17 08:38 AM Encounter Date:  3/14/2017 Status:  Signed     :  Amarilis Tony DO (Physician)            Last visit with AMARILIS TONY was on 09/04/2012 at  7:30 AM [facility]  [facility]  Match done based on reference date of today 3/14/17    SUBJECTIVE  HPI Tyler Finney is 11 year old male who presents w/ c/o[MB1.1T]  sore throat[MB1.1M] this has been present for[MB1.1T] 2 days[MB1.1M]. Other symptoms include[MB1.1T] fever and nasal congestion[MB1.1M]. No nausea, vomiting, diarrhea, constipation, urinary difficulties. No SOB or wheezing. ROS negative otherwise    OTC meds tried:[MB1.1T] [-][MB1.1M]  No past medical history on file.   No past surgical history on file.     OBJECTIVE  Filed Vitals:     03/14/17 0821   Pulse: 76   Resp: 20   Temp: 97.7 °F (36.5 °C)   TempSrc: Temporal   SpO2: 100%   Weight: 72 lb (32.7 kg)   PainSc:   0 (0-10 Scale)     No acute distress, voice[MB1.1T] clear[MB1.1M]  Ears:[MB1.1T] Normal bilateral ear exam[MB1.1M]  Eyes:[MB1.1T] conjunctivae and sclerae normal, pupils equal, round, reactive to light and accomodation[MB1.1M]  Nares:[MB1.1T] mucosa erythematous and swollen, clear rhinorrhea[MB1.1M]  Throat:  Moist,[MB1.1T] mild redness[MB1.1M]  Neck:[MB1.1T] no lymphadenopathy or thyromegaly[MB1.1M]  Lungs:[MB1.1T] clear to auscultation[MB1.1M]  Heart:[MB1.1T] regular rate and rhythm[MB1.1M]  Abdomen:[MB1.1T] Abdomen soft, non-tender. BS normal. No masses, organomegaly[MB1.1M]  Peripheral pulse was intact, capillary refill was normal, sensory function was intact and no edema  Skin:[MB1.1T] Skin color, texture, turgor normal. No rashes or lesions.[MB1.1M]    ASSESSMENT[MB1.1T]  URI  ST[MB1.1M]    PLAN  Underwent discussion with[MB1.1T] mother[MB1.1M]  regarding this clinical situation. symptomatic measures were given and[MB1.1T] meds given[MB1.1M]    F/U: Tyler Finney will follow up with his primary care physician as needed or as directed but may return to the [facility] if needed. If symptoms become severe may go to the ER.    Electronically Signed by:    Alvarado Sanchez DO , 3/14/2017[MB1.1T]           Revision History        User Key Date/Time User Provider Type Action    > MB1.1 03/14/17 08:38 AM Alvarado Sanchez DO Physician Sign    M - Manual, T - Template             none

## 2021-04-29 NOTE — PROGRESS NOTE ADULT - ATTENDING COMMENTS
69 yo gentlemen with h/o of GIB, HTN, HLD pw dark stools x 1 day. Occult positive, Hb 6.1, and BP somewhat soft at 93/58 on admission. Last endoscopy 2 years ago. MICU consulted on the day of admission. After multiple transfusions and IV hydration, BPs improved, though Hb still remains low, patient requiring transfusions    Will continue monitor Hb and vitals  S/P endoscopy obtained, no source of bleeding noted. S/P Colonoscopy, diverticulosis noted, not active bleeding noted  IV protonix   Will require another transfusion today  Continue the rest of the work up and management as stated above.

## 2021-04-29 NOTE — PROVIDER CONTACT NOTE (OTHER) - ASSESSMENT
/57 T 97.9 HR 95 RR 17 oxygen sat %; not in distress, alert and responsive, no verbalized complaints or discomfort

## 2021-04-29 NOTE — PROVIDER CONTACT NOTE (CRITICAL VALUE NOTIFICATION) - ACTION/TREATMENT ORDERED:
MD notified. States she will order unit of PRBC
MD notified. States she will order unit of PRBC
Will order another unit of PRBC's, continue to monitor.
MD notified. States she will order potassium

## 2021-04-29 NOTE — PROVIDER CONTACT NOTE (CRITICAL VALUE NOTIFICATION) - BACKGROUND
Patient admitted for GI bleed after presenting with dark stools x1day
Patient admitted for GI bleed. PMH prostate cancer, hypertension

## 2021-04-29 NOTE — ASU PREOP CHECKLIST - HAIR REMOVAL
malnutrition  · Etiology: related to Cognitive or neurological impairment, Insufficient energy/nutrient consumption     Signs and symptoms:  as evidenced by Diet history of poor intake, Weight loss greater than or equal to 10% in 6 months, Severe loss of subcutaneous fat, Severe muscle loss    Objective Information:  · Nutrition-Focused Physical Findings: No edema noted.  Bowel sounds active  · Wound Type: None  · Current Nutrition Therapies:  · Oral Diet Orders: Cardiac, Dysphagia  Soft and Bite-Sized (Dysphagia 3)   · Oral Diet intake: 26-50%, 51-75%  · Oral Nutrition Supplement (ONS) Orders: None  · Anthropometric Measures:  · Ht: 5' 10\" (177.8 cm)   · Current Body Wt: 136 lb 4.8 oz (61.8 kg)  · Admission Body Wt: 136 lb 4.8 oz (61.8 kg)  · Usual Body Wt: (170 lbs 1 yr. ago, 163 lbs 6 months ago)  · % Weight Change:  ,  17% in 6 months  · Ideal Body Wt: 166 lb (75.3 kg), % Ideal Body 82% based on admission weight  · BMI Classification: BMI 18.5 - 24.9 Normal Weight    Nutrition Interventions:   Continue current diet, Start ONS  Continued Inpatient Monitoring    Nutrition Evaluation:   · Evaluation: Goals set   · Goals: PO intake to meet % of estimated nutrition needs    · Monitoring: Meal Intake, Supplement Intake, Diet Tolerance, Skin Integrity, Weight, Pertinent Labs, Chewing/Swallowing, Monitor Hemodynamic Status, Monitor Bowel Function        Some areas of assessment may be incomplete due to COVID-19 precautions    Olesya Kenney RD, LD  Office phone [phone] hair removal not indicated

## 2021-04-29 NOTE — PROGRESS NOTE ADULT - SUBJECTIVE AND OBJECTIVE BOX
Karissa Gifford MD  PGY 2 Department of Internal Medicine  Pager: [phone] ([facility])   Pager: [phone] ([facility])    Patient is a 70y old  Male who presents with a chief complaint of GIB (28 Apr 2021 08:54)      SUBJECTIVE / OVERNIGHT EVENTS: Pt seen and examined. No acute overnight events.     MEDICATIONS  (STANDING):  atorvastatin 10 milliGRAM(s) Oral at bedtime  ferrous    sulfate 325 milliGRAM(s) Oral daily  finasteride 5 milliGRAM(s) Oral daily  pantoprazole    Tablet 40 milliGRAM(s) Oral before breakfast  sodium chloride 0.9%. 1000 milliLiter(s) (75 mL/Hr) IV Continuous <Continuous>    MEDICATIONS  (PRN):  acetaminophen   Tablet .. 650 milliGRAM(s) Oral every 6 hours PRN Temp greater or equal to 38C (100.4F), Mild Pain (1 - 3), Moderate Pain (4 - 6)  ondansetron Injectable 4 milliGRAM(s) IV Push every 6 hours PRN Nausea and/or Vomiting      I&O's Summary      Vital Signs Last 24 Hrs  T(C): 36.6 (29 Apr 2021 05:25), Max: 36.7 (28 Apr 2021 14:15)  T(F): 97.9 (29 Apr 2021 05:25), Max: 98.1 (28 Apr 2021 14:15)  HR: 95 (29 Apr 2021 05:25) (91 - 96)  BP: 118/57 (29 Apr 2021 05:25) (102/62 - 132/78)  BP(mean): 88 (28 Apr 2021 10:07) (73 - 88)  RR: 17 (29 Apr 2021 05:25) (14 - 17)  SpO2: 100% (29 Apr 2021 05:25) (97% - 100%)    CAPILLARY BLOOD GLUCOSE          PHYSICAL EXAM:  Unable to assess pt as pt obtaining colonoscopy        LABS:                        12.5   4.50  )-----------( 289      ( 28 Apr 2021 07:27 )             37.1     Auto Eosinophil # x     / Auto Eosinophil % x     / Auto Neutrophil # x     / Auto Neutrophil % x     / BANDS % x                            7.5    16.02 )-----------( 170      ( 28 Apr 2021 01:48 )             21.4     Auto Eosinophil # x     / Auto Eosinophil % x     / Auto Neutrophil # x     / Auto Neutrophil % x     / BANDS % x                            6.8    13.00 )-----------( 140      ( 27 Apr 2021 17:47 )             20.5     Auto Eosinophil # x     / Auto Eosinophil % x     / Auto Neutrophil # x     / Auto Neutrophil % x     / BANDS % x        04-28    134<L>  |  98  |  7   ----------------------------<  75  4.0   |  24  |  0.93  04-27    138  |  102  |  40<H>  ----------------------------<  203<H>  3.2<L>   |  28  |  1.14    Ca    9.5      28 Apr 2021 07:27  Mg     2.2     04-28  Phos  3.9     04-28  TPro  5.5<L>  /  Alb  3.1<L>  /  TBili  0.4  /  DBili  x   /  AST  16  /  ALT  11  /  AlkPhos  44  04-27    PT/INR - ( 27 Apr 2021 09:23 )   PT: 13.0 sec;   INR: 1.14 ratio         PTT - ( 27 Apr 2021 09:23 )  PTT:27.3 sec              RADIOLOGY & ADDITIONAL TESTS:    Imaging Personally Reviewed:    Consultant(s) Notes Reviewed:      Care Discussed with Consultants/Other Providers:

## 2021-04-30 LAB
ANION GAP SERPL CALC-SCNC: 9 MMOL/L — SIGNIFICANT CHANGE UP (ref 7–14)
ANION GAP SERPL CALC-SCNC: 9 MMOL/L — SIGNIFICANT CHANGE UP (ref 7–14)
BUN SERPL-MCNC: 4 MG/DL — LOW (ref 7–23)
BUN SERPL-MCNC: 8 MG/DL — SIGNIFICANT CHANGE UP (ref 7–23)
CALCIUM SERPL-MCNC: 8 MG/DL — LOW (ref 8.4–10.5)
CALCIUM SERPL-MCNC: 8 MG/DL — LOW (ref 8.4–10.5)
CHLORIDE SERPL-SCNC: 105 MMOL/L — SIGNIFICANT CHANGE UP (ref 98–107)
CHLORIDE SERPL-SCNC: 105 MMOL/L — SIGNIFICANT CHANGE UP (ref 98–107)
CO2 SERPL-SCNC: 28 MMOL/L — SIGNIFICANT CHANGE UP (ref 22–31)
CO2 SERPL-SCNC: 28 MMOL/L — SIGNIFICANT CHANGE UP (ref 22–31)
CREAT SERPL-MCNC: 0.85 MG/DL — SIGNIFICANT CHANGE UP (ref 0.5–1.3)
CREAT SERPL-MCNC: 1.06 MG/DL — SIGNIFICANT CHANGE UP (ref 0.5–1.3)
GLUCOSE SERPL-MCNC: 124 MG/DL — HIGH (ref 70–99)
GLUCOSE SERPL-MCNC: 150 MG/DL — HIGH (ref 70–99)
HCT VFR BLD CALC: 23.6 % — LOW (ref 39–50)
HCT VFR BLD CALC: 24.3 % — LOW (ref 39–50)
HGB BLD-MCNC: 7.9 G/DL — LOW (ref 13–17)
HGB BLD-MCNC: 8.1 G/DL — LOW (ref 13–17)
MAGNESIUM SERPL-MCNC: 1.9 MG/DL — SIGNIFICANT CHANGE UP (ref 1.6–2.6)
MCHC RBC-ENTMCNC: 31.2 PG — SIGNIFICANT CHANGE UP (ref 27–34)
MCHC RBC-ENTMCNC: 31.9 PG — SIGNIFICANT CHANGE UP (ref 27–34)
MCHC RBC-ENTMCNC: 32.5 GM/DL — SIGNIFICANT CHANGE UP (ref 32–36)
MCHC RBC-ENTMCNC: 34.3 GM/DL — SIGNIFICANT CHANGE UP (ref 32–36)
MCV RBC AUTO: 92.9 FL — SIGNIFICANT CHANGE UP (ref 80–100)
MCV RBC AUTO: 96 FL — SIGNIFICANT CHANGE UP (ref 80–100)
NRBC # BLD: 2 /100 WBCS — SIGNIFICANT CHANGE UP
NRBC # BLD: 3 /100 WBCS — SIGNIFICANT CHANGE UP
NRBC # FLD: 0.17 K/UL — HIGH
NRBC # FLD: 0.23 K/UL — HIGH
PHOSPHATE SERPL-MCNC: 2.6 MG/DL — SIGNIFICANT CHANGE UP (ref 2.5–4.5)
PLATELET # BLD AUTO: 196 K/UL — SIGNIFICANT CHANGE UP (ref 150–400)
PLATELET # BLD AUTO: 199 K/UL — SIGNIFICANT CHANGE UP (ref 150–400)
POTASSIUM SERPL-MCNC: 3.3 MMOL/L — LOW (ref 3.5–5.3)
POTASSIUM SERPL-MCNC: 3.7 MMOL/L — SIGNIFICANT CHANGE UP (ref 3.5–5.3)
POTASSIUM SERPL-SCNC: 3.3 MMOL/L — LOW (ref 3.5–5.3)
POTASSIUM SERPL-SCNC: 3.7 MMOL/L — SIGNIFICANT CHANGE UP (ref 3.5–5.3)
RBC # BLD: 2.53 M/UL — LOW (ref 4.2–5.8)
RBC # BLD: 2.54 M/UL — LOW (ref 4.2–5.8)
RBC # FLD: 16.2 % — HIGH (ref 10.3–14.5)
RBC # FLD: 17 % — HIGH (ref 10.3–14.5)
SODIUM SERPL-SCNC: 142 MMOL/L — SIGNIFICANT CHANGE UP (ref 135–145)
SODIUM SERPL-SCNC: 142 MMOL/L — SIGNIFICANT CHANGE UP (ref 135–145)
SURGICAL PATHOLOGY STUDY: SIGNIFICANT CHANGE UP
WBC # BLD: 8.41 K/UL — SIGNIFICANT CHANGE UP (ref 3.8–10.5)
WBC # BLD: 8.59 K/UL — SIGNIFICANT CHANGE UP (ref 3.8–10.5)
WBC # FLD AUTO: 8.41 K/UL — SIGNIFICANT CHANGE UP (ref 3.8–10.5)
WBC # FLD AUTO: 8.59 K/UL — SIGNIFICANT CHANGE UP (ref 3.8–10.5)

## 2021-04-30 PROCEDURE — 99233 SBSQ HOSP IP/OBS HIGH 50: CPT | Mod: GC

## 2021-04-30 PROCEDURE — 99232 SBSQ HOSP IP/OBS MODERATE 35: CPT | Mod: GC

## 2021-04-30 RX ORDER — POTASSIUM CHLORIDE 20 MEQ
40 PACKET (EA) ORAL ONCE
Refills: 0 | Status: COMPLETED | OUTPATIENT
Start: 2021-04-30 | End: 2021-04-30

## 2021-04-30 RX ORDER — PANTOPRAZOLE SODIUM 20 MG/1
40 TABLET, DELAYED RELEASE ORAL
Refills: 0 | Status: DISCONTINUED | OUTPATIENT
Start: 2021-04-30 | End: 2021-05-01

## 2021-04-30 RX ORDER — METRONIDAZOLE 500 MG
250 TABLET ORAL
Refills: 0 | Status: DISCONTINUED | OUTPATIENT
Start: 2021-04-30 | End: 2021-05-01

## 2021-04-30 RX ADMIN — Medication 325 MILLIGRAM(S): at 11:20

## 2021-04-30 RX ADMIN — Medication 250 MILLIGRAM(S): at 18:19

## 2021-04-30 RX ADMIN — FINASTERIDE 5 MILLIGRAM(S): 5 TABLET, FILM COATED ORAL at 11:20

## 2021-04-30 RX ADMIN — Medication 500 MILLIGRAM(S): at 23:48

## 2021-04-30 RX ADMIN — Medication 500 MILLIGRAM(S): at 18:19

## 2021-04-30 RX ADMIN — PANTOPRAZOLE SODIUM 40 MILLIGRAM(S): 20 TABLET, DELAYED RELEASE ORAL at 18:18

## 2021-04-30 RX ADMIN — ATORVASTATIN CALCIUM 10 MILLIGRAM(S): 80 TABLET, FILM COATED ORAL at 21:49

## 2021-04-30 RX ADMIN — Medication 250 MILLIGRAM(S): at 23:48

## 2021-04-30 RX ADMIN — PANTOPRAZOLE SODIUM 40 MILLIGRAM(S): 20 TABLET, DELAYED RELEASE ORAL at 06:07

## 2021-04-30 RX ADMIN — Medication 40 MILLIEQUIVALENT(S): at 11:25

## 2021-04-30 NOTE — PROGRESS NOTE ADULT - SUBJECTIVE AND OBJECTIVE BOX
PROGRESS NOTE:     CONTACT INFO:  Kamila Hurtado MD   PGY-1  Pager: [phone] [facility]    Patient is a 70y old  Male who presents with a chief complaint of GIB (29 Apr 2021 08:06)      SUBJECTIVE / OVERNIGHT EVENTS:  No acute events overnight. Patient seen and evaluated at bedside. No fever/chills. Denies any dark stools over past day. No longer dizzy with standing. Denies SOB at rest, chest pain, palpitations, abdominal pain, nausea/vomiting    ADDITIONAL REVIEW OF SYSTEMS:    Negative except as above.     MEDICATIONS  (STANDING):  atorvastatin 10 milliGRAM(s) Oral at bedtime  ferrous    sulfate 325 milliGRAM(s) Oral daily  finasteride 5 milliGRAM(s) Oral daily  pantoprazole    Tablet 40 milliGRAM(s) Oral before breakfast  potassium chloride   Powder 40 milliEquivalent(s) Oral once  sodium chloride 0.9%. 1000 milliLiter(s) (75 mL/Hr) IV Continuous <Continuous>    MEDICATIONS  (PRN):  acetaminophen   Tablet .. 650 milliGRAM(s) Oral every 6 hours PRN Temp greater or equal to 38C (100.4F), Mild Pain (1 - 3), Moderate Pain (4 - 6)  ondansetron Injectable 4 milliGRAM(s) IV Push every 6 hours PRN Nausea and/or Vomiting      CAPILLARY BLOOD GLUCOSE        I&O's Summary    29 Apr 2021 07:01  -  30 Apr 2021 07:00  --------------------------------------------------------  IN: 0 mL / OUT: 900 mL / NET: -900 mL        PHYSICAL EXAM:  Vital Signs Last 24 Hrs  T(C): 37 (30 Apr 2021 06:08), Max: 37.1 (29 Apr 2021 21:16)  T(F): 98.6 (30 Apr 2021 06:08), Max: 98.7 (29 Apr 2021 21:16)  HR: 84 (30 Apr 2021 06:08) (84 - 95)  BP: 105/70 (30 Apr 2021 06:08) (103/61 - 135/79)  BP(mean): 93 (29 Apr 2021 09:30) (74 - 93)  RR: 18 (30 Apr 2021 06:08) (18 - 22)  SpO2: 100% (30 Apr 2021 06:08) (99% - 100%)    GENERAL: no acute distress; obese  HEAD:  Atraumatic, Normocephalic  EYES: EOMI, PERRLA, conjunctival pall  ENT: MMM; oropharynx clear  NECK: Supple, No JVD  CHEST/LUNG: Clear to auscultation bilaterally; No wheeze  HEART: Irregular rate and rhythm; No murmurs, rubs, or gallops  ABDOMEN: Soft, TTP epigastric, Nondistended; Bowel sounds present  EXTREMITIES:  2+ Peripheral Pulses, No clubbing, cyanosis, or edema  PSYCH: AAOx3  NEUROLOGY: no focal motor or sensory deficits. 5/5 muscle strength in all extremities.   SKIN: No rashes or lesions    LABS:                        8.1    8.41  )-----------( 199      ( 30 Apr 2021 07:28 )             23.6     04-30    142  |  105  |  4<L>  ----------------------------<  124<H>  3.3<L>   |  28  |  0.85    Ca    8.0<L>      30 Apr 2021 07:28  Phos  2.6     04-30  Mg     1.9     04-30    TPro  4.8<L>  /  Alb  3.0<L>  /  TBili  0.5  /  DBili  x   /  AST  24  /  ALT  14  /  AlkPhos  41  04-29                RADIOLOGY & ADDITIONAL TESTS:    Procedure:           Colonoscopy    Impression:          - No active bleeding in the entire colon. A small amount                       of dark stool was seen.                       - Diverticulosis in the entire examined colon, suspect                        resolved diverticular bleed.                       - One 2 mm polyp in the transverse colon. Polypectomy                        not attempted due to recent bleeding.                       - Non-bleeding internal hemorrhoids.                       - The examined portion of the ileum was normal.                       - No specimens collected.   PROGRESS NOTE:     CONTACT INFO:  Kamila Hurtado MD   PGY-1  Pager: [phone] [facility]    Patient is a 70y old  Male who presents with a chief complaint of GIB (29 Apr 2021 08:06)      SUBJECTIVE / OVERNIGHT EVENTS:  No acute events overnight. Hemoglobin was 6.4 and improved to 8 with 2 units PRBC. Patient seen and evaluated at bedside. No fever/chills. Denies any dark stools over past day. No longer dizzy with standing. Denies SOB at rest, chest pain, palpitations, abdominal pain, nausea/vomiting    ADDITIONAL REVIEW OF SYSTEMS:    Negative except as above.     MEDICATIONS  (STANDING):  atorvastatin 10 milliGRAM(s) Oral at bedtime  ferrous    sulfate 325 milliGRAM(s) Oral daily  finasteride 5 milliGRAM(s) Oral daily  pantoprazole    Tablet 40 milliGRAM(s) Oral before breakfast  potassium chloride   Powder 40 milliEquivalent(s) Oral once  sodium chloride 0.9%. 1000 milliLiter(s) (75 mL/Hr) IV Continuous <Continuous>    MEDICATIONS  (PRN):  acetaminophen   Tablet .. 650 milliGRAM(s) Oral every 6 hours PRN Temp greater or equal to 38C (100.4F), Mild Pain (1 - 3), Moderate Pain (4 - 6)  ondansetron Injectable 4 milliGRAM(s) IV Push every 6 hours PRN Nausea and/or Vomiting      CAPILLARY BLOOD GLUCOSE        I&O's Summary    29 Apr 2021 07:01  -  30 Apr 2021 07:00  --------------------------------------------------------  IN: 0 mL / OUT: 900 mL / NET: -900 mL        PHYSICAL EXAM:  Vital Signs Last 24 Hrs  T(C): 37 (30 Apr 2021 06:08), Max: 37.1 (29 Apr 2021 21:16)  T(F): 98.6 (30 Apr 2021 06:08), Max: 98.7 (29 Apr 2021 21:16)  HR: 84 (30 Apr 2021 06:08) (84 - 95)  BP: 105/70 (30 Apr 2021 06:08) (103/61 - 135/79)  BP(mean): 93 (29 Apr 2021 09:30) (74 - 93)  RR: 18 (30 Apr 2021 06:08) (18 - 22)  SpO2: 100% (30 Apr 2021 06:08) (99% - 100%)    GENERAL: no acute distress; obese  HEAD:  Atraumatic, Normocephalic  EYES: EOMI, PERRLA, conjunctival pall  ENT: MMM; oropharynx clear  NECK: Supple, No JVD  CHEST/LUNG: Clear to auscultation bilaterally; No wheeze  HEART: Irregular rate and rhythm; No murmurs, rubs, or gallops  ABDOMEN: Soft, TTP epigastric, Nondistended; Bowel sounds present  EXTREMITIES:  2+ Peripheral Pulses, No clubbing, cyanosis, or edema  PSYCH: AAOx3  NEUROLOGY: no focal motor or sensory deficits. 5/5 muscle strength in all extremities.   SKIN: No rashes or lesions    LABS:                        8.1    8.41  )-----------( 199      ( 30 Apr 2021 07:28 )             23.6     04-30    142  |  105  |  4<L>  ----------------------------<  124<H>  3.3<L>   |  28  |  0.85    Ca    8.0<L>      30 Apr 2021 07:28  Phos  2.6     04-30  Mg     1.9     04-30    TPro  4.8<L>  /  Alb  3.0<L>  /  TBili  0.5  /  DBili  x   /  AST  24  /  ALT  14  /  AlkPhos  41  04-29                RADIOLOGY & ADDITIONAL TESTS:    Procedure:           Colonoscopy    Impression:          - No active bleeding in the entire colon. A small amount                       of dark stool was seen.                       - Diverticulosis in the entire examined colon, suspect                        resolved diverticular bleed.                       - One 2 mm polyp in the transverse colon. Polypectomy                        not attempted due to recent bleeding.                       - Non-bleeding internal hemorrhoids.                       - The examined portion of the ileum was normal.                       - No specimens collected.

## 2021-04-30 NOTE — PROGRESS NOTE ADULT - ATTENDING COMMENTS
71 yo Male with history of prostate cancer, recent gout flare on indomethacin presenting with dark red stools, acute blood loss anemia.  EGD with esophagitis, colon with diverticulosis.  Likely diverticular bleed.  Hb 8 today, suspect due to equilibration.  No active bleeding today.  If Hb stable tomorrow, consider discharge planning.

## 2021-04-30 NOTE — PROGRESS NOTE ADULT - SUBJECTIVE AND OBJECTIVE BOX
Chief Complaint:  Patient is a 70y old  Male who presents with a chief complaint of GIB (30 Apr 2021 08:49)      Interval Events:   - Hb stable 8.1   - Pt Hb 6.3/6.4 --> 2U pRBCs yesterday --> 8.1   - 1 ?dark red stool since procedure   - Denies abd pain, n/v/d/f/c, melena, hematemesis     Allergies:  No Known Allergies        Hospital Medications:  acetaminophen   Tablet .. 650 milliGRAM(s) Oral every 6 hours PRN  atorvastatin 10 milliGRAM(s) Oral at bedtime  ferrous    sulfate 325 milliGRAM(s) Oral daily  finasteride 5 milliGRAM(s) Oral daily  ondansetron Injectable 4 milliGRAM(s) IV Push every 6 hours PRN  pantoprazole    Tablet 40 milliGRAM(s) Oral before breakfast  potassium chloride   Powder 40 milliEquivalent(s) Oral once  sodium chloride 0.9%. 1000 milliLiter(s) IV Continuous <Continuous>      PMHX/PSHX:  Hypertension    Hyperlipidemia    GIB (gastrointestinal bleeding)    Prostate cancer    Gout    History of hemorrhoidectomy        Family history:  No pertinent family history in first degree relatives        ROS:     General:  No wt loss, fevers, chills, night sweats, fatigue,   Eyes:  Good vision, no reported pain  ENT:  No sore throat, pain, runny nose, dysphagia  CV:  No pain, palpitations, hypo/hypertension  Pulm:  No dyspnea, cough, tachypnea, wheezing  GI:  see above  :  No pain, bleeding, incontinence, nocturia  Muscle:  No pain, weakness  Neuro:  No weakness, tingling, memory problems  Psych:  No fatigue, insomnia, mood problems, depression  Endocrine:  No polyuria, polydipsia, cold/heat intolerance  Heme:  No petechiae, ecchymosis, easy bruisability  Skin:  No rash, tattoos, scars, edema      PHYSICAL EXAM:   Vital Signs:  Vital Signs Last 24 Hrs  T(C): 37 (30 Apr 2021 06:08), Max: 37.1 (29 Apr 2021 21:16)  T(F): 98.6 (30 Apr 2021 06:08), Max: 98.7 (29 Apr 2021 21:16)  HR: 84 (30 Apr 2021 06:08) (84 - 95)  BP: 105/70 (30 Apr 2021 06:08) (105/70 - 135/79)  BP(mean): --  RR: 18 (30 Apr 2021 06:08) (18 - 20)  SpO2: 100% (30 Apr 2021 06:08) (99% - 100%)  Daily     Daily     GENERAL:  No acute distress, WD WN laying comfortably in bed   HEENT:  Normocephalic/atraumtic,  no scleral icterus  CHEST:  Clear to auscultation bilaterally, no wheezes/rales/ronchi, no accessory muscle use  HEART:  Regular rate and rhythm, no murmurs/rubs/gallops  ABDOMEN:  Soft, non-tender, non-distended, normoactive bowel sounds  EXTREMITIES:  No cyanosis, clubbing, or edema  SKIN:  No rash/warm/dry  NEURO:  Alert and oriented x     LABS:                        8.1    8.41  )-----------( 199      ( 30 Apr 2021 07:28 )             23.6     Mean Cell Volume: 92.9 fL (04-30-21 @ 07:28)    04-30    142  |  105  |  4<L>  ----------------------------<  124<H>  3.3<L>   |  28  |  0.85    Ca    8.0<L>      30 Apr 2021 07:28  Phos  2.6     04-30  Mg     1.9     04-30    TPro  4.8<L>  /  Alb  3.0<L>  /  TBili  0.5  /  DBili  x   /  AST  24  /  ALT  14  /  AlkPhos  41  04-29    LIVER FUNCTIONS - ( 29 Apr 2021 07:41 )  Alb: 3.0 g/dL / Pro: 4.8 g/dL / ALK PHOS: 41 U/L / ALT: 14 U/L / AST: 24 U/L / GGT: x                                       8.1    8.41  )-----------( 199      ( 30 Apr 2021 07:28 )             23.6                         8.0    9.69  )-----------( 175      ( 29 Apr 2021 21:32 )             23.8                         6.4    10.49 )-----------( 196      ( 29 Apr 2021 11:05 )             19.3                         6.3    10.11 )-----------( 176      ( 29 Apr 2021 07:41 )             18.4                         12.5   4.50  )-----------( 289      ( 28 Apr 2021 07:27 )             37.1       Imaging:    < from: Colonoscopy (04.29.21 @ 08:08) >  Findings:       A small amount of dark liquid stool and a few brown stool balls were        found in the colon. No active bleeding was seen in the entire colon.       Multiple small and large-mouthed diverticula were found in the entire        colon.       A 2 mm polyp was found in the transverse colon. The polyp was sessile.        Polypectomy was not attempted due to recent bleeding.       Non-bleeding internal hemorrhoids and hypertrophied anal papillae were        found during retroflexion. The hemorrhoids were small.       The terminal ileum appeared normal. No blood was seen in the ileum.                                                                                   Impression:          - No active bleeding in the entire colon. A small amount                       of dark stool was seen.                       - Diverticulosis in the entire examined colon, suspect                        resolved diverticular bleed.                       - One 2 mm polyp in the transverse colon. Polypectomy                        not attempted due to recent bleeding.                       - Non-bleeding internal hemorrhoids.                       - The examined portion of the ileum was normal.                       - No specimens collected.  Recommendation:      - Return patient to hospital [facility] for ongoing care.                       - Advance diet as tolerated.                       - Monitor Hb daily.                       - Repeat colonoscopy as outpatient for surveillance    purposes/ polypectomy.    < end of copied text >    < from: Upper Endoscopy (04.28.21 @ 08:23) >  Findings:       LA Grade A esophagitis with no bleeding was found in the lower        esophagus. The GE junction was 41 cm from the incisors.       A small hiatal hernia was present.       Loaclized erythema was found in the gastric antrum. There were no        stigmata of recent bleeding. The stomach was otherwise normal. Biopsies        of the gastric body and antrum were taken with a cold forceps for        histology.       The visualized portions of the duodenum up to the 2nd part was normal.                                                          Impression:          - No evidence of bleeding in the upper GI tract.                       - LA Grade A esophagitis.                       - Small hiatal hernia.                       - Antral erythema, otherwise normal stomach. Biopsied.                       - Normal duodenum.  Recommendation:      - Return patient to [facility] for ongoing care.                       - Follow up biopsies results.                       - PPI 40mg daily(can d/c PPI gtt).                       - Plan for colonoscopy tomorrow to rule out lower GI                        bleeding source.                                                                                     < end of copied text >             Chief Complaint:  Patient is a 70y old  Male who presents with a chief complaint of GIB (30 Apr 2021 08:49)      Interval Events:   - Hb stable 8.1   - Pt Hb 6.3/6.4 --> 2U pRBCs yesterday --> 8.1   - No BM since procedure  - Denies abd pain, n/v/d/f/c, melena, hematemesis     Allergies:  No Known Allergies        Hospital Medications:  acetaminophen   Tablet .. 650 milliGRAM(s) Oral every 6 hours PRN  atorvastatin 10 milliGRAM(s) Oral at bedtime  ferrous    sulfate 325 milliGRAM(s) Oral daily  finasteride 5 milliGRAM(s) Oral daily  ondansetron Injectable 4 milliGRAM(s) IV Push every 6 hours PRN  pantoprazole    Tablet 40 milliGRAM(s) Oral before breakfast  potassium chloride   Powder 40 milliEquivalent(s) Oral once  sodium chloride 0.9%. 1000 milliLiter(s) IV Continuous <Continuous>      PMHX/PSHX:  Hypertension    Hyperlipidemia    GIB (gastrointestinal bleeding)    Prostate cancer    Gout    History of hemorrhoidectomy        Family history:  No pertinent family history in first degree relatives        ROS:     General:  No wt loss, fevers, chills, night sweats, fatigue,   Eyes:  Good vision, no reported pain  ENT:  No sore throat, pain, runny nose, dysphagia  CV:  No pain, palpitations, hypo/hypertension  Pulm:  No dyspnea, cough, tachypnea, wheezing  GI:  see above  :  No pain, bleeding, incontinence, nocturia  Muscle:  No pain, weakness  Neuro:  No weakness, tingling, memory problems  Psych:  No fatigue, insomnia, mood problems, depression  Endocrine:  No polyuria, polydipsia, cold/heat intolerance  Heme:  No petechiae, ecchymosis, easy bruisability  Skin:  No rash, tattoos, scars, edema      PHYSICAL EXAM:   Vital Signs:  Vital Signs Last 24 Hrs  T(C): 37 (30 Apr 2021 06:08), Max: 37.1 (29 Apr 2021 21:16)  T(F): 98.6 (30 Apr 2021 06:08), Max: 98.7 (29 Apr 2021 21:16)  HR: 84 (30 Apr 2021 06:08) (84 - 95)  BP: 105/70 (30 Apr 2021 06:08) (105/70 - 135/79)  BP(mean): --  RR: 18 (30 Apr 2021 06:08) (18 - 20)  SpO2: 100% (30 Apr 2021 06:08) (99% - 100%)  Daily     Daily     GENERAL:  No acute distress, WD WN laying comfortably in bed   HEENT:  Normocephalic/atraumtic,  no scleral icterus  CHEST:  Clear to auscultation bilaterally, no wheezes/rales/ronchi, no accessory muscle use  HEART:  Regular rate and rhythm, no murmurs/rubs/gallops  ABDOMEN:  Soft, non-tender, non-distended, normoactive bowel sounds  EXTREMITIES:  No cyanosis, clubbing, or edema  SKIN:  No rash/warm/dry  NEURO:  Alert and oriented x     LABS:                        8.1    8.41  )-----------( 199      ( 30 Apr 2021 07:28 )             23.6     Mean Cell Volume: 92.9 fL (04-30-21 @ 07:28)    04-30    142  |  105  |  4<L>  ----------------------------<  124<H>  3.3<L>   |  28  |  0.85    Ca    8.0<L>      30 Apr 2021 07:28  Phos  2.6     04-30  Mg     1.9     04-30    TPro  4.8<L>  /  Alb  3.0<L>  /  TBili  0.5  /  DBili  x   /  AST  24  /  ALT  14  /  AlkPhos  41  04-29    LIVER FUNCTIONS - ( 29 Apr 2021 07:41 )  Alb: 3.0 g/dL / Pro: 4.8 g/dL / ALK PHOS: 41 U/L / ALT: 14 U/L / AST: 24 U/L / GGT: x                                       8.1    8.41  )-----------( 199      ( 30 Apr 2021 07:28 )             23.6                         8.0    9.69  )-----------( 175      ( 29 Apr 2021 21:32 )             23.8                         6.4    10.49 )-----------( 196      ( 29 Apr 2021 11:05 )             19.3                         6.3    10.11 )-----------( 176      ( 29 Apr 2021 07:41 )             18.4                         12.5   4.50  )-----------( 289      ( 28 Apr 2021 07:27 )             37.1       Imaging:    < from: Colonoscopy (04.29.21 @ 08:08) >  Findings:       A small amount of dark liquid stool and a few brown stool balls were        found in the colon. No active bleeding was seen in the entire colon.       Multiple small and large-mouthed diverticula were found in the entire        colon.       A 2 mm polyp was found in the transverse colon. The polyp was sessile.        Polypectomy was not attempted due to recent bleeding.       Non-bleeding internal hemorrhoids and hypertrophied anal papillae were        found during retroflexion. The hemorrhoids were small.       The terminal ileum appeared normal. No blood was seen in the ileum.                                                                                   Impression:          - No active bleeding in the entire colon. A small amount                       of dark stool was seen.                       - Diverticulosis in the entire examined colon, suspect                        resolved diverticular bleed.                       - One 2 mm polyp in the transverse colon. Polypectomy                        not attempted due to recent bleeding.                       - Non-bleeding internal hemorrhoids.                       - The examined portion of the ileum was normal.                       - No specimens collected.  Recommendation:      - Return patient to hospital [facility] for ongoing care.                       - Advance diet as tolerated.                       - Monitor Hb daily.                       - Repeat colonoscopy as outpatient for surveillance    purposes/ polypectomy.    < end of copied text >    < from: Upper Endoscopy (04.28.21 @ 08:23) >  Findings:       LA Grade A esophagitis with no bleeding was found in the lower        esophagus. The GE junction was 41 cm from the incisors.       A small hiatal hernia was present.       Loaclized erythema was found in the gastric antrum. There were no        stigmata of recent bleeding. The stomach was otherwise normal. Biopsies        of the gastric body and antrum were taken with a cold forceps for        histology.       The visualized portions of the duodenum up to the 2nd part was normal.                                                          Impression:          - No evidence of bleeding in the upper GI tract.                       - LA Grade A esophagitis.                       - Small hiatal hernia.                       - Antral erythema, otherwise normal stomach. Biopsied.                       - Normal duodenum.  Recommendation:      - Return patient to hospital [facility] for ongoing care.                       - Follow up biopsies results.                       - PPI 40mg daily(can d/c PPI gtt).                       - Plan for colonoscopy tomorrow to rule out lower GI                        bleeding source.                                                                                     < end of copied text >

## 2021-04-30 NOTE — CHART NOTE - NSCHARTNOTEFT_GEN_A_CORE
Brief GI Path F/u Note     Pt (+) H pylori on EGD biopsies. Will start on quad tx (amox, claritho, metronidazole, PPI) x 2 weeks.   - Will repeat H pylori stool Ag 4 weeks after completing tx (will need to be off PPI x 2 weeks at least)   - F/u GI as an outpatient ()

## 2021-04-30 NOTE — PROGRESS NOTE ADULT - PROBLEM SELECTOR PLAN 1
-Pt with large GIB (black stools) X 2 days  -EGD w/ no evidence of active GIB  -PPT gtt d/c'd, started pantoprazole 40mg QD  -Colonoscopy showing diverticulosis, likely resolved diverticular bleed  - S/p 5 units PRBC, 1 unit plt  - Still with occasional drop in hemoglobin to 6 range despite no evidence of active bleed on colonoscopy or EGD  - Monitor CBCs, Transfuse Hgb>7  - Maintain active T&S -Pt with large GIB (black stools) X 2 days  -EGD w/ no evidence of active GIB  -PPT gtt d/c'd, started pantoprazole 40mg QD  -Colonoscopy showing diverticulosis, likely resolved diverticular bleed  - S/p 5 units PRBC, 1 unit plt  - Still with occasional drop in hemoglobin to 6 range despite no evidence of active bleed on colonoscopy or EGD  - Orthostatics positive  - Monitor CBCs, Transfuse Hgb>7  - Maintain active T&S

## 2021-04-30 NOTE — PROGRESS NOTE ADULT - ATTENDING COMMENTS
71 yo gentlemen with h/o of GIB, HTN, HLD pw dark stools x 1 day. Occult positive, Hb 6.1, and BP somewhat soft at 93/58 on admission. Last endoscopy 2 years ago. MICU consulted on the day of admission. After multiple transfusions and IV hydration, BPs improved, though Hb still remains low, patient requiring transfusions    Will continue monitor Hb and vitals  S/P endoscopy obtained, no source of bleeding noted. S/P Colonoscopy, diverticulosis noted, no active bleeding noted  IV protonix, no further drop in hb noted today, will continue to monitor  Continue the rest of the work up and management as stated above. 69 yo gentlemen with h/o of GIB, HTN, HLD pw dark stools x 1 day. Occult positive, Hb 6.1, and BP somewhat soft at 93/58 on admission. Last endoscopy 2 years ago. MICU consulted on the day of admission. After multiple transfusions and IV hydration, BPs improved. Transfused last on 4/29.    Will continue monitor Hb and vitals  S/P endoscopy obtained, no source of bleeding noted. S/P Colonoscopy, diverticulosis noted, no active bleeding noted  IV protonix, no further drop in hb noted today, will continue to monitor  Continue the rest of the work up and management as stated above.

## 2021-05-01 ENCOUNTER — TRANSCRIPTION ENCOUNTER (OUTPATIENT)
Age: 71
End: 2021-05-01

## 2021-05-01 VITALS
DIASTOLIC BLOOD PRESSURE: 97 MMHG | TEMPERATURE: 98 F | RESPIRATION RATE: 18 BRPM | HEART RATE: 96 BPM | SYSTOLIC BLOOD PRESSURE: 138 MMHG | OXYGEN SATURATION: 100 %

## 2021-05-01 DIAGNOSIS — A04.8 OTHER SPECIFIED BACTERIAL INTESTINAL INFECTIONS: ICD-10-CM

## 2021-05-01 LAB
ANION GAP SERPL CALC-SCNC: 10 MMOL/L — SIGNIFICANT CHANGE UP (ref 7–14)
BUN SERPL-MCNC: 9 MG/DL — SIGNIFICANT CHANGE UP (ref 7–23)
CALCIUM SERPL-MCNC: 8 MG/DL — LOW (ref 8.4–10.5)
CHLORIDE SERPL-SCNC: 105 MMOL/L — SIGNIFICANT CHANGE UP (ref 98–107)
CO2 SERPL-SCNC: 25 MMOL/L — SIGNIFICANT CHANGE UP (ref 22–31)
CREAT SERPL-MCNC: 0.98 MG/DL — SIGNIFICANT CHANGE UP (ref 0.5–1.3)
GLUCOSE SERPL-MCNC: 138 MG/DL — HIGH (ref 70–99)
HCT VFR BLD CALC: 25.1 % — LOW (ref 39–50)
HGB BLD-MCNC: 8.3 G/DL — LOW (ref 13–17)
MAGNESIUM SERPL-MCNC: 2 MG/DL — SIGNIFICANT CHANGE UP (ref 1.6–2.6)
MCHC RBC-ENTMCNC: 32.2 PG — SIGNIFICANT CHANGE UP (ref 27–34)
MCHC RBC-ENTMCNC: 33.1 GM/DL — SIGNIFICANT CHANGE UP (ref 32–36)
MCV RBC AUTO: 97.3 FL — SIGNIFICANT CHANGE UP (ref 80–100)
NRBC # BLD: 2 /100 WBCS — SIGNIFICANT CHANGE UP
NRBC # FLD: 0.15 K/UL — HIGH
PHOSPHATE SERPL-MCNC: 3.5 MG/DL — SIGNIFICANT CHANGE UP (ref 2.5–4.5)
PLATELET # BLD AUTO: 199 K/UL — SIGNIFICANT CHANGE UP (ref 150–400)
POTASSIUM SERPL-MCNC: 3.8 MMOL/L — SIGNIFICANT CHANGE UP (ref 3.5–5.3)
POTASSIUM SERPL-SCNC: 3.8 MMOL/L — SIGNIFICANT CHANGE UP (ref 3.5–5.3)
RBC # BLD: 2.58 M/UL — LOW (ref 4.2–5.8)
RBC # FLD: 17.3 % — HIGH (ref 10.3–14.5)
SODIUM SERPL-SCNC: 140 MMOL/L — SIGNIFICANT CHANGE UP (ref 135–145)
WBC # BLD: 8.94 K/UL — SIGNIFICANT CHANGE UP (ref 3.8–10.5)
WBC # FLD AUTO: 8.94 K/UL — SIGNIFICANT CHANGE UP (ref 3.8–10.5)

## 2021-05-01 PROCEDURE — 99239 HOSP IP/OBS DSCHRG MGMT >30: CPT | Mod: GC

## 2021-05-01 RX ORDER — CLARITHROMYCIN 500 MG
1 TABLET ORAL
Qty: 28 | Refills: 0
Start: 2021-05-01 | End: 2021-05-14

## 2021-05-01 RX ORDER — ATENOLOL AND CHLORTHALIDONE 50; 25 MG/1; MG/1
1 TABLET ORAL
Qty: 0 | Refills: 0 | DISCHARGE

## 2021-05-01 RX ORDER — TRAMADOL HYDROCHLORIDE 50 MG/1
37.5 TABLET ORAL
Qty: 0 | Refills: 0 | DISCHARGE

## 2021-05-01 RX ORDER — COLCHICINE 0.6 MG
1 TABLET ORAL
Qty: 0 | Refills: 0 | DISCHARGE

## 2021-05-01 RX ORDER — INDOMETHACIN 50 MG
1 CAPSULE ORAL
Qty: 0 | Refills: 0 | DISCHARGE

## 2021-05-01 RX ORDER — METRONIDAZOLE 500 MG
1 TABLET ORAL
Qty: 56 | Refills: 0
Start: 2021-05-01 | End: 2021-05-14

## 2021-05-01 RX ORDER — AMOXICILLIN 250 MG/5ML
2 SUSPENSION, RECONSTITUTED, ORAL (ML) ORAL
Qty: 56 | Refills: 0
Start: 2021-05-01 | End: 2021-05-14

## 2021-05-01 RX ORDER — PANTOPRAZOLE SODIUM 20 MG/1
1 TABLET, DELAYED RELEASE ORAL
Qty: 14 | Refills: 0
Start: 2021-05-01 | End: 2021-05-14

## 2021-05-01 RX ORDER — EZETIMIBE 10 MG/1
1 TABLET ORAL
Qty: 0 | Refills: 0 | DISCHARGE

## 2021-05-01 RX ADMIN — FINASTERIDE 5 MILLIGRAM(S): 5 TABLET, FILM COATED ORAL at 11:25

## 2021-05-01 RX ADMIN — Medication 250 MILLIGRAM(S): at 11:25

## 2021-05-01 RX ADMIN — Medication 500 MILLIGRAM(S): at 06:32

## 2021-05-01 RX ADMIN — Medication 500 MILLIGRAM(S): at 11:25

## 2021-05-01 RX ADMIN — Medication 250 MILLIGRAM(S): at 06:32

## 2021-05-01 RX ADMIN — PANTOPRAZOLE SODIUM 40 MILLIGRAM(S): 20 TABLET, DELAYED RELEASE ORAL at 06:32

## 2021-05-01 RX ADMIN — Medication 325 MILLIGRAM(S): at 11:25

## 2021-05-01 NOTE — DISCHARGE NOTE PROVIDER - NSDCCPCAREPLAN_GEN_ALL_CORE_FT
PRINCIPAL DISCHARGE DIAGNOSIS  Diagnosis: GIB (gastrointestinal bleeding)  Assessment and Plan of Treatment: You came in to the hospital after having blood in your stool. An endoscopy was performed which did not show evidence of an upper GI bleed. A colonoscopy showed diverticulosis, a common cause of GI bleeding. You also came in anemic and were given blood and platelets during your hospital stay. When your anemia was stable, you were discharged home. Your colonoscopy also showed a polyp that was not removed since you had a GI bleed. You should follow-up outpatient with a GI to have the polyp removed.      SECONDARY DISCHARGE DIAGNOSES  Diagnosis: H. pylori infection  Assessment and Plan of Treatment: Your biopsy came back as H. pylori. You were started on quadruple therapy which will need to be continued as an outpatient. You should follow-up with your GI to have a repeat stool Ag test to be sure that H. pylori is resolved after completion of your treatment.     PRINCIPAL DISCHARGE DIAGNOSIS  Diagnosis: GIB (gastrointestinal bleeding)  Assessment and Plan of Treatment: You came in to the hospital after having blood in your stool. An endoscopy was performed which did not show evidence of an upper GI bleed. A colonoscopy showed diverticulosis, a common cause of GI bleeding. You also came in anemic and were given blood and platelets during your hospital stay. When your anemia was stable, you were discharged home. Your colonoscopy also showed a polyp that was not removed since you had a GI bleed. You should follow-up outpatient with a GI to have the polyp removed.      SECONDARY DISCHARGE DIAGNOSES  Diagnosis: H. pylori infection  Assessment and Plan of Treatment: Your biopsy came back as H. pylori. You were started on quadruple therapy which will need to be continued as an outpatient. You should follow-up with your GI to have a repeat stool Ag test to be sure that H. pylori is resolved after completion of your treatment.    Diagnosis: Hypertension  Assessment and Plan of Treatment: We held your blood pressure medications due to your GI bleed. We will discharge you on enalapril. You should resume your other blood pressure meds on advice of your primary care doctor.     PRINCIPAL DISCHARGE DIAGNOSIS  Diagnosis: GIB (gastrointestinal bleeding)  Assessment and Plan of Treatment: You came in to the hospital after having blood in your stool. An endoscopy was performed which did not show evidence of an upper GI bleed. A colonoscopy showed diverticulosis, a common cause of GI bleeding. You also came in anemic and were given blood and platelets during your hospital stay. When your anemia was stable, you were discharged home. Your colonoscopy also showed a polyp that was not removed since you had a GI bleed. You should follow-up outpatient with GI for further management. Please return to the ED should you experience any new or worsening symptoms.      SECONDARY DISCHARGE DIAGNOSES  Diagnosis: H. pylori infection  Assessment and Plan of Treatment: Results of an abdominal biopsy performed during your admission showed an infection called H. pylori which can cause stomach ulcers and possibly lead to bleeding. You were started on a set of medications to help treat ths infection (metronidazole, amoxicillin, clarithromycin and pantoprazole). These medications have been sent to your pharmacy, please take them as prescribed for the next two weeks. Please follow up with gastroenterology 2 weeks after completing these medications for repeat testing that will ensure resolution of this infection. Please return to the ED should you experience any new or worsening symptoms.    Diagnosis: Hypertension  Assessment and Plan of Treatment: Your blood pressure medications were discontinued due to your GI bleed. Please continue to hold all of these medications and follow up with your primary care doctor for recommendations regarding resuming these medications.

## 2021-05-01 NOTE — DISCHARGE NOTE PROVIDER - HOSPITAL COURSE
69 yo M pm hx of GIB 1 year ago w/ negative colonoscopy at [facility], hemorrhoidectomy, gout, HTN, HLD, prostate cancer s/p radiation therapy, p/w dark stools x 2 days. 2 days ago, pt took indomethacin and tramadol 2 days ago for gout flare. Pt states he started having dark loose stools since Saturday. Some abd pain, denies CP, SOB, nausea, vomiting, GI symptoms, cough, fever and chills. States the stools started out black but changed to have some red in them as well. Has blood on toilet paper as well. States blood was like "water gushing out." Has felt dizzy and weak since GIB began, particularly when standing up from toilet.     ED course: AF, 86, 93/58, 100% RA. Received 80mg protonix, 2 units PRBC, 1L NS, 40meQ KCl.     On admission hemoglobin was 6.1. An endoscopy was performed which was negative for an upper GI bleed. A colonoscopy was performed which showed diverticulosis and a sessile polyp that was not removed. Throughout his hospital stay, he received 5 units of pRBCs and 1u of platelets. His orthostatics were positive. His biopsy from the endoscopy showed H. pylori. He was started on quad therapy which should be continued for 2 weeks outpatient. H. pylori stool Ag should be repeated 4 weeks after treatment to ensure it is resolved. He was discharged when his hemoglobin stabilized at 8.3. He should follow-up with GI to have the polyp removed. 71 yo M pm hx of GIB 1 year ago w/ negative colonoscopy at [facility], hemorrhoidectomy, gout, HTN, HLD, prostate cancer s/p radiation therapy, p/w dark stools x 2 days. 2 days ago, pt took indomethacin and tramadol 2 days ago for gout flare. Pt states he started having dark loose stools since Saturday. Some abd pain, denies CP, SOB, nausea, vomiting, GI symptoms, cough, fever and chills. States the stools started out black but changed to have some red in them as well. Has blood on toilet paper as well. States blood was like "water gushing out." Has felt dizzy and weak since GIB began, particularly when standing up from toilet.     ED course: AF, 86, 93/58, 100% RA. Received 80mg protonix, 2 units PRBC, 1L NS, 40meQ KCl.     On admission hemoglobin was 6.1. An endoscopy was performed which was negative for an upper GI bleed. A colonoscopy was performed which showed diverticulosis and a sessile polyp that was not removed. Throughout his hospital stay, he received 5 units of pRBCs and 1u of platelets. His orthostatics were positive. His biopsy from the endoscopy showed H. pylori. He was started on quad therapy which should be continued for 2 weeks outpatient. H. pylori stool Ag should be repeated 4 weeks after treatment to ensure it is resolved. He was discharged when his hemoglobin stabilized at 8.3. He should follow-up with GI to have the polyp removed. We held all antihypertensives during admission. He was continued on enalapril. He should resume other antihypertensives per PCP. 71 yo M pm hx of GIB 1 year ago w/ negative colonoscopy at [facility], hemorrhoidectomy, gout, HTN, HLD, prostate cancer s/p radiation therapy, p/w dark stools x 2 days. 2 days ago, pt took indomethacin and tramadol 2 days ago for gout flare. Pt states he started having dark loose stools since Saturday. Some abd pain, denies CP, SOB, nausea, vomiting, GI symptoms, cough, fever and chills. States the stools started out black but changed to have some red in them as well. Has blood on toilet paper as well. States blood was like "water gushing out." Has felt dizzy and weak since GIB began, particularly when standing up from toilet.     ED course: AF, 86, 93/58, 100% RA. Received 80mg protonix, 2 units PRBC, 1L NS, 40meQ KCl.     On admission hemoglobin was 6.1. An endoscopy was performed which was negative for an upper GI bleed. A colonoscopy was performed which showed diverticulosis and a sessile polyp that was not removed. Throughout his hospital stay, he received 5 units of pRBCs and 1u of platelets. His orthostatics were positive. His biopsy from the endoscopy showed H. pylori. He was started on quad therapy which should be continued for 2 weeks outpatient. H. pylori stool Ag should be repeated 4 weeks after treatment to ensure it is resolved. He was discharged when his hemoglobin stabilized at 8.3. Pt now hemodynamically stable for discharge.

## 2021-05-01 NOTE — DISCHARGE NOTE PROVIDER - NSFOLLOWUPCLINICS_GEN_ALL_ED_FT
[facility]  Gastroenterology  [address] [place] [address]  Phone: [phone]  Fax:

## 2021-05-01 NOTE — DISCHARGE NOTE PROVIDER - CARE PROVIDER_API CALL
Riki Desai  [id number]  [address] [place] [address]  Phone: [phone]  Fax: [phone]  Follow Up Time:    Riki Desai  [id number]  [address] [place] [address]  Phone: [phone]  Fax: [phone]  Follow Up Time:     Kwadwo Gomez  GASTROENTEROLOGY  [address] [place] [address], [address]  [place] [address]  Phone: [phone]  Fax: [phone]  Follow Up Time:

## 2021-05-01 NOTE — PROGRESS NOTE ADULT - PROBLEM SELECTOR PLAN 4
- C/w atorvastatin
S/p radiation therapy.   - C/w finasteride  - Hold tamsulosin iso hypotension

## 2021-05-01 NOTE — PROGRESS NOTE ADULT - PROBLEM SELECTOR PLAN 6
DIET: NPO for colonoscopy  DVT: SCDs given c/f GIB  DISPO: Home
Took indomethacin and tramadol prior to admission for gout flare.   - Monitor off NSAIDs.  - Hold home allopurinol
DVT: SCDs given c/f GIB  DISPO: Home

## 2021-05-01 NOTE — PROGRESS NOTE ADULT - PROBLEM SELECTOR PROBLEM 1
GIB (gastrointestinal bleeding)

## 2021-05-01 NOTE — PROGRESS NOTE ADULT - PROBLEM SELECTOR PLAN 1
-Pt with large GIB (black stools) X 2 days  -EGD w/ no evidence of active GIB  -PPT gtt d/c'd, started pantoprazole 40mg QD  -Colonoscopy showing diverticulosis, likely resolved diverticular bleed  - S/p 5 units PRBC, 1 unit plt  - Hgb now stabilizing around 8  - Orthostatics positive  - Monitor CBCs, Transfuse Hgb>7  - Maintain active T&S

## 2021-05-01 NOTE — DISCHARGE NOTE PROVIDER - NSDCCPTREATMENT_GEN_ALL_CORE_FT
PRINCIPAL PROCEDURE  Procedure: UGI endoscopy  Findings and Treatment: `Impression:          - No evidence of bleeding in the upper GI tract.                       - LA Grade A esophagitis.                       - Small hiatal hernia.                       - Antral erythema, otherwise normal stomach. Biopsied.                       - Normal duodenum.        SECONDARY PROCEDURE  Procedure: Colonoscopy  Findings and Treatment: Impression:          - No active bleeding in the entire colon. A small amount                       of dark stool was seen.                       - Diverticulosis in the entire examined colon, suspect                        resolved diverticular bleed.                       - One 2 mm polyp in the transverse colon. Polypectomy                        not attempted due to recent bleeding.                       - Non-bleeding internal hemorrhoids.                       - The examined portion of the ileum was normal.                       - No specimens collected.

## 2021-05-01 NOTE — DISCHARGE NOTE NURSING/CASE MANAGEMENT/SOCIAL WORK - PATIENT PORTAL LINK FT
You can access the FollowMyHealth Patient Portal offered by [facility] by registering at the following website: [email or web address]. By joining [facility]’s FollowMyHealth portal, you will also be able to view your health information using other applications (apps) compatible with our system.

## 2021-05-01 NOTE — DISCHARGE NOTE PROVIDER - CARE PROVIDERS DIRECT ADDRESSES
,DirectAddress_Unknown ,DirectAddress_Unknown,[email or web address]

## 2021-05-01 NOTE — PROGRESS NOTE ADULT - PROBLEM SELECTOR PLAN 2
- Hold home atenolol, enalapril iso GIB
Pt with H. Pylori associated gastritis on surg path.   - Quad therapy x 2 weeks.   - Will repeat H pylori stool Ag 4 weeks after completing tx (will need to be off PPI x 2 weeks at least)   - F/u GI as an outpatient ().

## 2021-05-01 NOTE — DISCHARGE NOTE PROVIDER - PROVIDER TOKENS
PROVIDER:[TOKEN:[[id number]:[id number]]] PROVIDER:[TOKEN:[[id number]:[id number]]],PROVIDER:[TOKEN:[[id number]]]

## 2021-05-01 NOTE — PROGRESS NOTE ADULT - ASSESSMENT
70M Hx of prostrate cancer s/p radiation, hemorrhoidectomy, gout, HTN, HLD who reports having diarrhea w/ black stools x 2 days.       IMPRESSION:   #LGI bleed 2/2 diverticulosis: pt p/w hematochezia + c/o black stools w/ diarrhea x 2 days, Hb drop to 6.1 (previously in 11s in 2018). +NSAID use with indomethacin for gout flare and ASA use.  Underwent EGD (4/28) to r/o UGI bleed sources, only found to have mild esophagitis + gastric antral erythema. Then underwent colonoscopy (4/29) showing pan diverticulosis -- which is likely source of GI bleeding. Clinically improving with decreased frequency of bloody BMs, however still required 2U pRBCS yesterday.   #Colonic polyp: 2mm polyp on colonoscopy, not removed 2/2 hospitalization for GI bleeding.           RECOMMENDATIONS:   - Please monitor pt for further bleeding/Hb drop for one more day  - Trend CBC, transfuse Hb < 7   - Active T&S  - PPI 40mg daily   - Follow up path results   - Avoid NSAIDs   - Would increase fiber in diet as an outpatient  - Requires outpatient colonoscopy for screening / surveillance         Thank you for involving us in the care of this patient, please reach out if any further questions.     Byron Simms MD  Gastroenterology Fellow, PGY4    Available on Microsoft Teams  [phone] ([facility])  [phone] ([facility])  Please contact on call fellow weekdays after 5pm-7am and weekends: [phone]  
69 yo M pm hx of GIB 1 year ago w/ negative colonoscopy at [facility], hemorrhoidectomy, gout, HTN, HLD, prostate cancer s/p radiation therapy, admitted for GIB 
69 yo M pm hx of GIB 1 year ago w/ negative colonoscopy at [facility], hemorrhoidectomy, gout, HTN, HLD, prostate cancer s/p radiation therapy, p/w GIB for 2 days. 
71 yo M pm hx of GIB 1 year ago w/ negative colonoscopy at [facility], hemorrhoidectomy, gout, HTN, HLD, prostate cancer s/p radiation therapy, admitted for GIB 
69 yo M pm hx of GIB 1 year ago w/ negative colonoscopy at [facility], hemorrhoidectomy, gout, HTN, HLD, prostate cancer s/p radiation therapy, admitted for GIB

## 2021-05-01 NOTE — DISCHARGE NOTE PROVIDER - NSDCMRMEDTOKEN_GEN_ALL_CORE_FT
allopurinol 100 mg oral tablet: 1 tab(s) orally once a day  atenolol-chlorthalidone 50 mg-25 mg oral tablet: 1 tab(s) orally once a day  atorvastatin 10 mg oral tablet: 1 tab(s) orally once a day  colchicine 0.6 mg oral tablet: 1 tab(s) orally once a day  dilTIAZem 240 mg/24 hours oral capsule, extended release: 1 cap(s) orally once a day  enalapril 10 mg oral tablet: 1 tab(s) orally once a day  ezetimibe 10 mg oral tablet: 1 tab(s) orally once a day  ferrous sulfate 325 mg (65 mg elemental iron) oral tablet: 1 tab(s) orally once a day  finasteride 5 mg oral tablet: 1 tab(s) orally once a day  indomethacin 50 mg oral capsule: 1 cap(s) orally 3 times a day  potassium chloride 20 mEq oral powder for reconstitution: 1 each orally once a day  tamsulosin 0.4 mg oral capsule: 1 cap(s) orally once a day  traMADol: 37.5 milligram(s) orally once a day, As Needed   allopurinol 100 mg oral tablet: 1 tab(s) orally once a day  atenolol-chlorthalidone 50 mg-25 mg oral tablet: 1 tab(s) orally once a day  atorvastatin 10 mg oral tablet: 1 tab(s) orally once a day  colchicine 0.6 mg oral tablet: 1 tab(s) orally once a day  dilTIAZem 240 mg/24 hours oral capsule, extended release: 1 cap(s) orally once a day  enalapril 10 mg oral tablet: 1 tab(s) orally once a day  ezetimibe 10 mg oral tablet: 1 tab(s) orally once a day  ferrous sulfate 325 mg (65 mg elemental iron) oral tablet: 1 tab(s) orally once a day  finasteride 5 mg oral tablet: 1 tab(s) orally once a day  potassium chloride 20 mEq oral powder for reconstitution: 1 each orally once a day  tamsulosin 0.4 mg oral capsule: 1 cap(s) orally once a day   allopurinol 100 mg oral tablet: 1 tab(s) orally once a day  amoxicillin 500 mg oral capsule: 2 cap(s) orally 2 times a day   atorvastatin 10 mg oral tablet: 1 tab(s) orally once a day  clarithromycin 500 mg oral tablet: 1 tab(s) orally 2 times a day   dilTIAZem 240 mg/24 hours oral capsule, extended release: 1 cap(s) orally once a day  ferrous sulfate 325 mg (65 mg elemental iron) oral tablet: 1 tab(s) orally once a day  finasteride 5 mg oral tablet: 1 tab(s) orally once a day  metroNIDAZOLE 250 mg oral tablet: 1 tab(s) orally 4 times a day  potassium chloride 20 mEq oral powder for reconstitution: 1 each orally once a day  Protonix 40 mg oral delayed release tablet: 1 tab(s) orally once a day   tamsulosin 0.4 mg oral capsule: 1 cap(s) orally once a day

## 2021-05-01 NOTE — PROGRESS NOTE ADULT - SUBJECTIVE AND OBJECTIVE BOX
PROGRESS NOTE:     CONTACT INFO:  Kamila Hurtado MD   PGY-1  Pager: [phone] [facility]    Patient is a 70y old  Male who presents with a chief complaint of GIB (30 Apr 2021 10:48)      SUBJECTIVE / OVERNIGHT EVENTS:  No acute events overnight. Patient seen and evaluated at bedside. No fever/chills.  Denies SOB at rest, chest pain, palpitations, abdominal pain, nausea/vomiting. Denies any further bloody bowel movements.     ADDITIONAL REVIEW OF SYSTEMS:    Negative except as above.     MEDICATIONS  (STANDING):  atorvastatin 10 milliGRAM(s) Oral at bedtime  bismuth subsalicylate Liquid 300 milliGRAM(s) Oral four times a day  ferrous    sulfate 325 milliGRAM(s) Oral daily  finasteride 5 milliGRAM(s) Oral daily  metroNIDAZOLE    Tablet 250 milliGRAM(s) Oral four times a day  pantoprazole    Tablet 40 milliGRAM(s) Oral two times a day  tetracycline 500 milliGRAM(s) Oral four times a day    MEDICATIONS  (PRN):  acetaminophen   Tablet .. 650 milliGRAM(s) Oral every 6 hours PRN Temp greater or equal to 38C (100.4F), Mild Pain (1 - 3), Moderate Pain (4 - 6)  ondansetron Injectable 4 milliGRAM(s) IV Push every 6 hours PRN Nausea and/or Vomiting      CAPILLARY BLOOD GLUCOSE        I&O's Summary      PHYSICAL EXAM:  Vital Signs Last 24 Hrs  T(C): 36.7 (01 May 2021 06:28), Max: 37.1 (30 Apr 2021 21:54)  T(F): 98 (01 May 2021 06:28), Max: 98.7 (30 Apr 2021 21:54)  HR: 86 (01 May 2021 06:28) (86 - 97)  BP: 129/89 (01 May 2021 06:28) (126/85 - 129/89)  BP(mean): --  RR: 18 (01 May 2021 06:28) (18 - 18)  SpO2: 98% (01 May 2021 06:28) (98% - 100%)    GENERAL: no acute distress; obese  HEAD:  Atraumatic, Normocephalic  EYES: EOMI, PERRLA, conjunctival pall  ENT: MMM; oropharynx clear  NECK: Supple, No JVD  CHEST/LUNG: Clear to auscultation bilaterally; No wheeze  HEART: Irregular rate and rhythm; No murmurs, rubs, or gallops  ABDOMEN: Soft, TTP epigastric, Nondistended; Bowel sounds present  EXTREMITIES:  2+ Peripheral Pulses, No clubbing, cyanosis, or edema  PSYCH: AAOx3  NEUROLOGY: no focal motor or sensory deficits. 5/5 muscle strength in all extremities.   SKIN: No rashes or lesions    LABS:                        7.9    8.59  )-----------( 196      ( 30 Apr 2021 21:21 )             24.3     05-01    140  |  105  |  9   ----------------------------<  138<H>  3.8   |  25  |  0.98    Ca    8.0<L>      01 May 2021 07:09  Phos  3.5     05-01  Mg     2.0     05-01                  RADIOLOGY & ADDITIONAL TESTS:      Surgical Final Report   Final Diagnosis   Stomach, biopsy:   - Gastric antral and oxyntic mucosa with Helicobacter pylori   gastritis.   - Negative for intestinal metaplasia.    PROGRESS NOTE:     CONTACT INFO:  Kamila Hurtado MD   PGY-1  Pager: [phone] [facility]    Patient is a 70y old  Male who presents with a chief complaint of GIB (30 Apr 2021 10:48)      SUBJECTIVE / OVERNIGHT EVENTS:  No acute events overnight. Patient seen and evaluated at bedside. No fever/chills.  Denies SOB at rest, chest pain, palpitations, abdominal pain, nausea/vomiting. Denies any further bloody bowel movements.     ADDITIONAL REVIEW OF SYSTEMS:    Negative except as above.     MEDICATIONS  (STANDING):  atorvastatin 10 milliGRAM(s) Oral at bedtime  bismuth subsalicylate Liquid 300 milliGRAM(s) Oral four times a day  ferrous    sulfate 325 milliGRAM(s) Oral daily  finasteride 5 milliGRAM(s) Oral daily  metroNIDAZOLE    Tablet 250 milliGRAM(s) Oral four times a day  pantoprazole    Tablet 40 milliGRAM(s) Oral two times a day  tetracycline 500 milliGRAM(s) Oral four times a day    MEDICATIONS  (PRN):  acetaminophen   Tablet .. 650 milliGRAM(s) Oral every 6 hours PRN Temp greater or equal to 38C (100.4F), Mild Pain (1 - 3), Moderate Pain (4 - 6)  ondansetron Injectable 4 milliGRAM(s) IV Push every 6 hours PRN Nausea and/or Vomiting      CAPILLARY BLOOD GLUCOSE        I&O's Summary      PHYSICAL EXAM:  Vital Signs Last 24 Hrs  T(C): 36.7 (01 May 2021 06:28), Max: 37.1 (30 Apr 2021 21:54)  T(F): 98 (01 May 2021 06:28), Max: 98.7 (30 Apr 2021 21:54)  HR: 86 (01 May 2021 06:28) (86 - 97)  BP: 129/89 (01 May 2021 06:28) (126/85 - 129/89)  BP(mean): --  RR: 18 (01 May 2021 06:28) (18 - 18)  SpO2: 98% (01 May 2021 06:28) (98% - 100%)    GENERAL: no acute distress; obese  HEAD:  Atraumatic, Normocephalic  EYES: EOMI, PERRLA, conjunctival pall  ENT: MMM; oropharynx clear  NECK: Supple, No JVD  CHEST/LUNG: Clear to auscultation bilaterally; No wheeze  HEART: Irregular rate and rhythm; No murmurs, rubs, or gallops  ABDOMEN: Soft, TTP epigastric, Nondistended; Bowel sounds present  EXTREMITIES:  2+ Peripheral Pulses, No clubbing, cyanosis, or edema  PSYCH: AAOx3  NEUROLOGY: no focal motor or sensory deficits. 5/5 muscle strength in all extremities.   SKIN: No rashes or lesions      LABS:  cret                        8.3    8.94  )-----------( 199      ( 01 May 2021 09:57 )             25.1     05-01    140  |  105  |  9   ----------------------------<  138<H>  3.8   |  25  |  0.98    Ca    8.0<L>      01 May 2021 07:09  Phos  3.5     05-01  Mg     2.0     05-01                    RADIOLOGY & ADDITIONAL TESTS:      Surgical Final Report   Final Diagnosis   Stomach, biopsy:   - Gastric antral and oxyntic mucosa with Helicobacter pylori   gastritis.   - Negative for intestinal metaplasia.

## 2022-09-28 NOTE — PROGRESS NOTE ADULT - PROBLEM/PLAN-3
DISPLAY PLAN FREE TEXT
1
Principal Discharge DX:	Acute asthma exacerbation

## 2023-05-03 ENCOUNTER — INPATIENT (INPATIENT)
Facility: HOSPITAL | Age: 73
LOS: 2 days | Discharge: HOME | End: 2023-05-06
Attending: STUDENT IN AN ORGANIZED HEALTH CARE EDUCATION/TRAINING PROGRAM | Admitting: STUDENT IN AN ORGANIZED HEALTH CARE EDUCATION/TRAINING PROGRAM
Payer: MEDICARE

## 2023-05-03 VITALS
SYSTOLIC BLOOD PRESSURE: 108 MMHG | RESPIRATION RATE: 18 BRPM | HEART RATE: 93 BPM | OXYGEN SATURATION: 100 % | TEMPERATURE: 98 F | DIASTOLIC BLOOD PRESSURE: 71 MMHG

## 2023-05-03 DIAGNOSIS — K92.2 GASTROINTESTINAL HEMORRHAGE, UNSPECIFIED: ICD-10-CM

## 2023-05-03 DIAGNOSIS — I10 ESSENTIAL (PRIMARY) HYPERTENSION: ICD-10-CM

## 2023-05-03 DIAGNOSIS — Z29.9 ENCOUNTER FOR PROPHYLACTIC MEASURES, UNSPECIFIED: ICD-10-CM

## 2023-05-03 DIAGNOSIS — Z85.46 PERSONAL HISTORY OF MALIGNANT NEOPLASM OF PROSTATE: ICD-10-CM

## 2023-05-03 DIAGNOSIS — Z98.890 OTHER SPECIFIED POSTPROCEDURAL STATES: Chronic | ICD-10-CM

## 2023-05-03 DIAGNOSIS — M19.90 UNSPECIFIED OSTEOARTHRITIS, UNSPECIFIED SITE: ICD-10-CM

## 2023-05-03 DIAGNOSIS — K92.1 MELENA: ICD-10-CM

## 2023-05-03 DIAGNOSIS — I48.92 UNSPECIFIED ATRIAL FLUTTER: ICD-10-CM

## 2023-05-03 DIAGNOSIS — J45.909 UNSPECIFIED ASTHMA, UNCOMPLICATED: ICD-10-CM

## 2023-05-03 PROBLEM — C61 MALIGNANT NEOPLASM OF PROSTATE: Chronic | Status: ACTIVE | Noted: 2021-04-27

## 2023-05-03 PROBLEM — M10.9 GOUT, UNSPECIFIED: Chronic | Status: ACTIVE | Noted: 2021-04-27

## 2023-05-03 LAB
ALBUMIN SERPL ELPH-MCNC: 3.7 G/DL — SIGNIFICANT CHANGE UP (ref 3.3–5)
ALP SERPL-CCNC: 56 U/L — SIGNIFICANT CHANGE UP (ref 40–120)
ALT FLD-CCNC: 14 U/L — SIGNIFICANT CHANGE UP (ref 4–41)
ANION GAP SERPL CALC-SCNC: 12 MMOL/L — SIGNIFICANT CHANGE UP (ref 7–14)
APTT BLD: 35.3 SEC — SIGNIFICANT CHANGE UP (ref 27–36.3)
AST SERPL-CCNC: 17 U/L — SIGNIFICANT CHANGE UP (ref 4–40)
BASE EXCESS BLDV CALC-SCNC: 2.7 MMOL/L — SIGNIFICANT CHANGE UP (ref -2–3)
BASOPHILS # BLD AUTO: 0.06 K/UL — SIGNIFICANT CHANGE UP (ref 0–0.2)
BASOPHILS NFR BLD AUTO: 0.7 % — SIGNIFICANT CHANGE UP (ref 0–2)
BILIRUB SERPL-MCNC: 0.6 MG/DL — SIGNIFICANT CHANGE UP (ref 0.2–1.2)
BLD GP AB SCN SERPL QL: NEGATIVE — SIGNIFICANT CHANGE UP
BLOOD GAS VENOUS COMPREHENSIVE RESULT: SIGNIFICANT CHANGE UP
BUN SERPL-MCNC: 29 MG/DL — HIGH (ref 7–23)
CALCIUM SERPL-MCNC: 8.9 MG/DL — SIGNIFICANT CHANGE UP (ref 8.4–10.5)
CHLORIDE BLDV-SCNC: 102 MMOL/L — SIGNIFICANT CHANGE UP (ref 96–108)
CHLORIDE SERPL-SCNC: 102 MMOL/L — SIGNIFICANT CHANGE UP (ref 98–107)
CO2 BLDV-SCNC: 30.4 MMOL/L — HIGH (ref 22–26)
CO2 SERPL-SCNC: 25 MMOL/L — SIGNIFICANT CHANGE UP (ref 22–31)
CREAT SERPL-MCNC: 1.11 MG/DL — SIGNIFICANT CHANGE UP (ref 0.5–1.3)
EGFR: 70 ML/MIN/1.73M2 — SIGNIFICANT CHANGE UP
EOSINOPHIL # BLD AUTO: 0.04 K/UL — SIGNIFICANT CHANGE UP (ref 0–0.5)
EOSINOPHIL NFR BLD AUTO: 0.5 % — SIGNIFICANT CHANGE UP (ref 0–6)
GAS PNL BLDV: 136 MMOL/L — SIGNIFICANT CHANGE UP (ref 136–145)
GLUCOSE BLDV-MCNC: 158 MG/DL — HIGH (ref 70–99)
GLUCOSE SERPL-MCNC: 158 MG/DL — HIGH (ref 70–99)
HCO3 BLDV-SCNC: 29 MMOL/L — SIGNIFICANT CHANGE UP (ref 22–29)
HCT VFR BLD CALC: 30.1 % — LOW (ref 39–50)
HCT VFR BLDA CALC: 30 % — LOW (ref 39–51)
HGB BLD CALC-MCNC: 10.1 G/DL — LOW (ref 12.6–17.4)
HGB BLD-MCNC: 9.6 G/DL — LOW (ref 13–17)
IANC: 7.4 K/UL — SIGNIFICANT CHANGE UP (ref 1.8–7.4)
IMM GRANULOCYTES NFR BLD AUTO: 0.3 % — SIGNIFICANT CHANGE UP (ref 0–0.9)
INR BLD: 1.2 RATIO — HIGH (ref 0.88–1.16)
LACTATE BLDV-MCNC: 1.6 MMOL/L — SIGNIFICANT CHANGE UP (ref 0.5–2)
LIDOCAIN IGE QN: 38 U/L — SIGNIFICANT CHANGE UP (ref 7–60)
LYMPHOCYTES # BLD AUTO: 0.83 K/UL — LOW (ref 1–3.3)
LYMPHOCYTES # BLD AUTO: 9.5 % — LOW (ref 13–44)
MCHC RBC-ENTMCNC: 30.4 PG — SIGNIFICANT CHANGE UP (ref 27–34)
MCHC RBC-ENTMCNC: 31.9 GM/DL — LOW (ref 32–36)
MCV RBC AUTO: 95.3 FL — SIGNIFICANT CHANGE UP (ref 80–100)
MONOCYTES # BLD AUTO: 0.36 K/UL — SIGNIFICANT CHANGE UP (ref 0–0.9)
MONOCYTES NFR BLD AUTO: 4.1 % — SIGNIFICANT CHANGE UP (ref 2–14)
NEUTROPHILS # BLD AUTO: 7.4 K/UL — SIGNIFICANT CHANGE UP (ref 1.8–7.4)
NEUTROPHILS NFR BLD AUTO: 84.9 % — HIGH (ref 43–77)
NRBC # BLD: 0 /100 WBCS — SIGNIFICANT CHANGE UP (ref 0–0)
NRBC # FLD: 0 K/UL — SIGNIFICANT CHANGE UP (ref 0–0)
OB PNL STL: POSITIVE
PCO2 BLDV: 51 MMHG — SIGNIFICANT CHANGE UP (ref 42–55)
PH BLDV: 7.36 — SIGNIFICANT CHANGE UP (ref 7.32–7.43)
PLATELET # BLD AUTO: 179 K/UL — SIGNIFICANT CHANGE UP (ref 150–400)
PO2 BLDV: 28 MMHG — SIGNIFICANT CHANGE UP (ref 25–45)
POTASSIUM BLDV-SCNC: 3.7 MMOL/L — SIGNIFICANT CHANGE UP (ref 3.5–5.1)
POTASSIUM SERPL-MCNC: 3.8 MMOL/L — SIGNIFICANT CHANGE UP (ref 3.5–5.3)
POTASSIUM SERPL-SCNC: 3.8 MMOL/L — SIGNIFICANT CHANGE UP (ref 3.5–5.3)
PROT SERPL-MCNC: 6.3 G/DL — SIGNIFICANT CHANGE UP (ref 6–8.3)
PROTHROM AB SERPL-ACNC: 14 SEC — HIGH (ref 10.5–13.4)
RBC # BLD: 3.16 M/UL — LOW (ref 4.2–5.8)
RBC # FLD: 12.5 % — SIGNIFICANT CHANGE UP (ref 10.3–14.5)
RH IG SCN BLD-IMP: POSITIVE — SIGNIFICANT CHANGE UP
SAO2 % BLDV: 41 % — LOW (ref 67–88)
SODIUM SERPL-SCNC: 139 MMOL/L — SIGNIFICANT CHANGE UP (ref 135–145)
WBC # BLD: 8.72 K/UL — SIGNIFICANT CHANGE UP (ref 3.8–10.5)
WBC # FLD AUTO: 8.72 K/UL — SIGNIFICANT CHANGE UP (ref 3.8–10.5)

## 2023-05-03 PROCEDURE — 74177 CT ABD & PELVIS W/CONTRAST: CPT | Mod: 26

## 2023-05-03 PROCEDURE — 99053 MED SERV 10PM-8AM 24 HR FAC: CPT

## 2023-05-03 PROCEDURE — 99285 EMERGENCY DEPT VISIT HI MDM: CPT

## 2023-05-03 PROCEDURE — 99222 1ST HOSP IP/OBS MODERATE 55: CPT | Mod: GC

## 2023-05-03 PROCEDURE — 99223 1ST HOSP IP/OBS HIGH 75: CPT

## 2023-05-03 RX ORDER — ATORVASTATIN CALCIUM 80 MG/1
0 TABLET, FILM COATED ORAL
Qty: 0 | Refills: 1 | DISCHARGE

## 2023-05-03 RX ORDER — MONTELUKAST 4 MG/1
10 TABLET, CHEWABLE ORAL AT BEDTIME
Refills: 0 | Status: DISCONTINUED | OUTPATIENT
Start: 2023-05-03 | End: 2023-05-06

## 2023-05-03 RX ORDER — TAMSULOSIN HYDROCHLORIDE 0.4 MG/1
0.4 CAPSULE ORAL AT BEDTIME
Refills: 0 | Status: DISCONTINUED | OUTPATIENT
Start: 2023-05-03 | End: 2023-05-06

## 2023-05-03 RX ORDER — FINASTERIDE 5 MG/1
0 TABLET, FILM COATED ORAL
Qty: 0 | Refills: 0 | DISCHARGE

## 2023-05-03 RX ORDER — TAMSULOSIN HYDROCHLORIDE 0.4 MG/1
0 CAPSULE ORAL
Qty: 0 | Refills: 1 | DISCHARGE

## 2023-05-03 RX ORDER — LANOLIN ALCOHOL/MO/W.PET/CERES
3 CREAM (GRAM) TOPICAL AT BEDTIME
Refills: 0 | Status: DISCONTINUED | OUTPATIENT
Start: 2023-05-03 | End: 2023-05-06

## 2023-05-03 RX ORDER — FERROUS SULFATE 325(65) MG
1 TABLET ORAL
Qty: 0 | Refills: 0 | DISCHARGE

## 2023-05-03 RX ORDER — ONDANSETRON 8 MG/1
4 TABLET, FILM COATED ORAL ONCE
Refills: 0 | Status: COMPLETED | OUTPATIENT
Start: 2023-05-03 | End: 2023-05-03

## 2023-05-03 RX ORDER — ATORVASTATIN CALCIUM 80 MG/1
10 TABLET, FILM COATED ORAL AT BEDTIME
Refills: 0 | Status: DISCONTINUED | OUTPATIENT
Start: 2023-05-03 | End: 2023-05-06

## 2023-05-03 RX ORDER — FINASTERIDE 5 MG/1
5 TABLET, FILM COATED ORAL DAILY
Refills: 0 | Status: DISCONTINUED | OUTPATIENT
Start: 2023-05-03 | End: 2023-05-06

## 2023-05-03 RX ORDER — POTASSIUM CHLORIDE 20 MEQ
0 PACKET (EA) ORAL
Qty: 0 | Refills: 0 | DISCHARGE

## 2023-05-03 RX ORDER — SODIUM CHLORIDE 9 MG/ML
1000 INJECTION INTRAMUSCULAR; INTRAVENOUS; SUBCUTANEOUS ONCE
Refills: 0 | Status: COMPLETED | OUTPATIENT
Start: 2023-05-03 | End: 2023-05-03

## 2023-05-03 RX ORDER — DILTIAZEM HCL 120 MG
0 CAPSULE, EXT RELEASE 24 HR ORAL
Qty: 0 | Refills: 0 | DISCHARGE

## 2023-05-03 RX ORDER — ONDANSETRON 8 MG/1
4 TABLET, FILM COATED ORAL EVERY 8 HOURS
Refills: 0 | Status: DISCONTINUED | OUTPATIENT
Start: 2023-05-03 | End: 2023-05-06

## 2023-05-03 RX ORDER — ALLOPURINOL 300 MG
1 TABLET ORAL
Qty: 0 | Refills: 0 | DISCHARGE

## 2023-05-03 RX ORDER — ALBUTEROL 90 UG/1
1 AEROSOL, METERED ORAL
Refills: 0 | Status: DISCONTINUED | OUTPATIENT
Start: 2023-05-03 | End: 2023-05-06

## 2023-05-03 RX ORDER — ACETAMINOPHEN 500 MG
650 TABLET ORAL EVERY 6 HOURS
Refills: 0 | Status: DISCONTINUED | OUTPATIENT
Start: 2023-05-03 | End: 2023-05-06

## 2023-05-03 RX ORDER — POLYETHYLENE GLYCOL 3350 17 G/17G
17 POWDER, FOR SOLUTION ORAL
Refills: 0 | Status: DISCONTINUED | OUTPATIENT
Start: 2023-05-03 | End: 2023-05-04

## 2023-05-03 RX ADMIN — ONDANSETRON 4 MILLIGRAM(S): 8 TABLET, FILM COATED ORAL at 08:54

## 2023-05-03 RX ADMIN — TAMSULOSIN HYDROCHLORIDE 0.4 MILLIGRAM(S): 0.4 CAPSULE ORAL at 21:52

## 2023-05-03 RX ADMIN — MONTELUKAST 10 MILLIGRAM(S): 4 TABLET, CHEWABLE ORAL at 21:52

## 2023-05-03 RX ADMIN — SODIUM CHLORIDE 1000 MILLILITER(S): 9 INJECTION INTRAMUSCULAR; INTRAVENOUS; SUBCUTANEOUS at 10:54

## 2023-05-03 RX ADMIN — ATORVASTATIN CALCIUM 10 MILLIGRAM(S): 80 TABLET, FILM COATED ORAL at 21:52

## 2023-05-03 NOTE — ED ADULT TRIAGE NOTE - CHIEF COMPLAINT QUOTE
Pt c/o dark and bloody stools X 1 day with generalized weakness. Denies AC use. Denies chest pain, sob, abd pain, n/v/d, fevers/chills. pmhx: HTN, HLD

## 2023-05-03 NOTE — H&P ADULT - PROBLEM SELECTOR PLAN 1
Presenting with ~10 episodes of hematochezia over the day prior to admission. No episodes in ~12 hours. Initially felt lightheaded, but now resolved. Hgb 9.6 (was 8.3 on discharge from ED in 2021, no interval values available in HIE). Most likely recurrent diverticular bleed given history and clinical story. Unlikely upper GI bleed given no melena - though note pt was treated for H pylori in 2021.  - GI emailed by ED  - Trend CBC - next check in AM given no further episodes of bleeding  - Clear liquid diet  - Active type and screen  - Transfuse for Hgb < 7  - H pylori stool Ag to confirm completely treated

## 2023-05-03 NOTE — CONSULT NOTE ADULT - ASSESSMENT
Impression:     #Hematochezia  Imaging showed mild proctitis. Hgb stable around 9.6, at his baseline. Brown stool mixed with blood on RONNIE. Prior colonoscopy in 2021 showed diverticulosis with no active bleeding.   - Differentials include RAVE, diverticulosis, hemorrhoids etc.   - Discussed with the patient, would hold off on colonoscopy at this time as the hgb is stable.     #H. pylori s/p quad therapy.   - Eradication not confirmed.     Recommendations:   - Will hold off on colonoscopy for now, as the hgb is stable.   - CLD for now.   - Monitor stool.   - Can place him on bowel regimen with Miralax BID.   - CBC daily and transfuse if hgb < 7.     GI will continue to follow.     All recommendations are tentative until note is attested by an attending.     Leigh Ann Espino, PGY-4  Gastroenterology/Hepatology Fellow  Available on Microsoft Teams  [phone] (Short Range Pager)  [phone] (Long Range Pager)    After 5pm, please contact the on-call GI fellow. [phone]

## 2023-05-03 NOTE — PATIENT PROFILE ADULT - FALL HARM RISK - UNIVERSAL INTERVENTIONS
Bed in lowest position, wheels locked, appropriate side rails in place/Call bell, personal items and telephone in reach/Instruct patient to call for assistance before getting out of bed or chair/Non-slip footwear when patient is out of bed/[place] to call system/Physically safe environment - no spills, clutter or unnecessary equipment/Purposeful Proactive Rounding/Room/bathroom lighting operational, light cord in reach

## 2023-05-03 NOTE — ED PROVIDER NOTE - PHYSICAL EXAMINATION
Vital signs reviewed.   CONSTITUTIONAL: well-nourished; in no apparent distress. Non-toxic appearing.   HEAD: Normocephalic, atraumatic.  EYES: PERRL, EOM intact, conjunctiva and sclera WNL.  ENT: normal nose; no rhinorrhea; normal pharynx with no tonsillar hypertrophy, no erythema, no exudate, no lymphadenopathy.  NECK/LYMPH: Supple; non-tender; no cervical lymphadenopathy.  CARD: S1, S2; no murmurs, rubs, or gallops noted.  RESP: Normal chest excursion with respiration; breath sounds clear and equal bilaterally; no wheezes, rhonchi, or rales.  ABD/GI: soft, non-distended; non-tender; no palpable organomegaly, no pulsatile mass. Rectal exam chaperoned by FARTUN Garrison, red stool, non tender, no mass  EXT/MS: moves all extremities; distal pulses are normal, no pedal edema.  SKIN: Normal for age and race; warm; dry; good turgor; no apparent lesions or exudate noted.  NEURO: Awake, alert, oriented x 3, no gross deficits, CN II-XII grossly intact, no motor or sensory deficit noted.

## 2023-05-03 NOTE — H&P ADULT - HISTORY OF PRESENT ILLNESS
73M w/ h/o prostate cancer (s/p XRT), HTN, asthma, and h/o presumed diverticular bleeding (admitted 5/2021) presenting with hematochezia x1 day.    In 2020 he was hospitalized with presumed diverticular bleed at [facility]. In 2021 he was hospitalized at [facility] with another presumed diverticular bleed - he required 5U pRBC and colonoscopy showed diverticulosis and sessile polyp and pathology from EGD was positive for H pylori. He completed H pylori treatment after discharge and underwent follow-up colonoscopy 1 year ago, which he was told did not show any polyps. Yesterday mid-day he felt urgency to have a bowel movement and had a large, bloody BM. He subsequently had ~10 bowel movements with red/maroon blood. His last BM was 5:30AM this morning right before he came in. He had some associated light-headedness. He had no shortness of breath, chest pain, nausea/vomiting, fevers/chills, palpitations. At the urging of his wife he came to the ED for evaluation.    In the ED vital signs were all wnl. Hgb 9.6 (was 8.3 on last discharge) EKG showed atrial flutter with variable block. CT A/P showed mild proctitis. He received 1L NS and zofran IV. ED e-mail GI for consult. At the time of interview he reports feeling well. Lightheadedness has resolved. Last bloody BM was 5:30 AM this morning.

## 2023-05-03 NOTE — ED PROVIDER NOTE - NSICDXPASTMEDICALHX_GEN_ALL_CORE_FT
PAST MEDICAL HISTORY:  GIB (gastrointestinal bleeding)     Gout     Hyperlipidemia     Hypertension     Prostate cancer s/p radiation therapy

## 2023-05-03 NOTE — ED ADULT NURSE REASSESSMENT NOTE - NS ED NURSE REASSESS COMMENT FT1
Report received from FARTUN Elizondo. Pt A&Ox4, respirations equal and unlabored. Pt resting in stretcher at this time, offers no complaints. IV patent. VSS. Pt on cardiac monitor, a flutter. No acute distress noted. Pending inpatient bed assignment. Safety maintained, bed in lowest position, side rails raised, call bell in reach.
Pt A&Ox4, respirations equal and unlabored. Pt resting in stretcher at this time, offers no complaints. A flutter on cardiac monitor. Report given to FARTUN Bullard for CSSU-5 for continuity of care. Pending transport to inpatient bed assignment. No acute distress noted. Safety maintained.
report received from AM shift RN Samanta pt at baseline mental status respirations even unlabored completing full sentences. pt sitting in stretcher comfortably at this time, offers no new complaints at this time. stretcher lowest position, family at bedside. safety maintained. pt admitted, report given by AM shift to inpatient RN. pt awaiting transport to bed placement.

## 2023-05-03 NOTE — H&P ADULT - ASSESSMENT
73M w/ h/o prostate cancer (s/p XRT), HTN, asthma, and h/o presumed diverticular bleeding (admitted 5/2021) presenting with hematochezia x1 day, most likely recurrent diverticular bleed.

## 2023-05-03 NOTE — H&P ADULT - PROBLEM SELECTOR PLAN 2
New atrial flutter with variable block on admission EKG. No known prior history of atrial fibrillation or flutter.  Rates have been controlled since admission.  - Monitor on telemetry  - Check TSH  - Holding off on anticoagulation given active GIB ([id number] is 2 -- age and HTN)  - Holding home diltiazem and atenolol given GIB and rates currently controlled  - Repeat EKG ordered (appeared to be back in SR on telemetry during my encounter) New atrial flutter with variable block on admission EKG. No known prior history of atrial fibrillation or flutter.  Rates have been controlled since admission.  - Monitor on telemetry  - Check TSH  - Holding off on anticoagulation given active GIB ([id number] is 2 -- age and HTN)  - Holding home diltiazem and atenolol given GIB and rates currently controlled  - Repeat EKG ordered (appeared to be back in SR on telemetry during my encounter)  - If flutter persists and patient can be safely anticoagulated would consider APRIL/cardioversion New atrial flutter with variable block on admission EKG. No known prior history of atrial fibrillation or flutter.  Rates have been controlled since admission.  - Monitor on telemetry  - TTE  - Check TSH  - Holding off on anticoagulation given active GIB ([id number] is 2 -- age and HTN)  - Holding home diltiazem and atenolol given GIB and rates currently controlled  - Repeat EKG ordered (appeared to be back in SR on telemetry during my encounter)  - If flutter persists and patient can be safely anticoagulated would consider APRIL/cardioversion

## 2023-05-03 NOTE — H&P ADULT - NSHPREVIEWOFSYSTEMS_GEN_ALL_CORE
CONSTITUTIONAL: No fever, weight loss, or fatigue  EYES: No eye pain, visual disturbances, or discharge  ENMT:  No difficulty hearing, tinnitus, vertigo; No sinus or throat pain  NECK: No pain or stiffness  RESPIRATORY: No cough, wheezing, chills or hemoptysis; No shortness of breath  CARDIOVASCULAR: No chest pain, palpitations, dizziness, or leg swelling  GASTROINTESTINAL: No abdominal or epigastric pain. No nausea, vomiting, or hematemesis;   GENITOURINARY: No dysuria, frequency, hematuria, or incontinence  NEUROLOGICAL: No headaches, memory loss, loss of strength, numbness, or tremors  SKIN: No itching, burning, rashes, or lesions

## 2023-05-03 NOTE — H&P ADULT - NSHPLABSRESULTS_GEN_ALL_CORE
LABS:                        9.6    8.72  )-----------( 179      ( 03 May 2023 08:55 )             30.1     05-03    139  |  102  |  29<H>  ----------------------------<  158<H>  3.8   |  25  |  1.11    Ca    8.9      03 May 2023 08:55    TPro  6.3  /  Alb  3.7  /  TBili  0.6  /  DBili  x   /  AST  17  /  ALT  14  /  AlkPhos  56  05-03    PT/INR - ( 03 May 2023 08:55 )   PT: 14.0 sec;   INR: 1.20 ratio         PTT - ( 03 May 2023 08:55 )  PTT:35.3 sec        RADIOLOGY & ADDITIONAL TESTS:  New Imaging Personally Reviewed Today:  CT A/P with mild proctitis    New Electrocardiogram Personally Reviewed Today:  Atrial flutter with variable block    Prior or Outpatient Records Reviewed Today with Summary:  Discharge summary from 2021 admission for GIB, summarized in HPI

## 2023-05-03 NOTE — CONSULT NOTE ADULT - SUBJECTIVE AND OBJECTIVE BOX
HPI:    Mr. Leung is a 73 yrs old male w/ hx of prostate cancer s/p radiation, HTN, asthma, and h/o presumed diverticular bleeding (admitted 5/2021) presenting with hematochezia x1 day. In 2020 he was hospitalized with presumed diverticular bleed at [facility]. In 2021 he was hospitalized at [facility] with another presumed diverticular bleed - he required 5U pRBC and colonoscopy showed diverticulosis and sessile polyp and pathology from EGD was positive for H pylori. He completed H pylori treatment after discharge and underwent follow-up colonoscopy 1 year ago, which he was told did not show any polyps. Developed bright red bloody bowel movements yesterday, had more than 6 episodes per day, last one this morning at 5:30 am. Denied abd pain, nausea, vomiting, fevers and chills. Takes NSAIDs as needed, denied AC/ASA use.  GI consulted for hematochezia.     Allergies:  No Known Allergies    Home Medications:  · 	dilTIAZem 240 mg/24 hours oral capsule, extended release: Last Dose Taken:  , 1 cap(s) orally once a day  · 	tamsulosin 0.4 mg oral capsule: Last Dose Taken:  , 1 cap(s) orally once a day  · 	atorvastatin 10 mg oral tablet: Last Dose Taken:  , 1 tab(s) orally once a day  · 	albuterol sulfate HFA 90 mcg/actuation aerosol inhaler: Last Dose Taken:    · 	atenolol 50 mg-chlorthalidone 25 mg tablet: Last Dose Taken:    · 	celecoxib 200 mg capsule: Last Dose Taken:    · 	montelukast 10 mg tablet: Last Dose Taken:    · 	potassium chloride 20 mEq oral powder for reconstitution: Last Dose Taken:  , 1 each orally once a day  · 	finasteride 5 mg oral tablet: Last Dose Taken:  , 1 tab(s) orally once a day    Hospital Medications:  acetaminophen     Tablet .. 650 milliGRAM(s) Oral every 6 hours PRN  albuterol    90 MICROgram(s) HFA Inhaler 1 Puff(s) Inhalation four times a day PRN  aluminum hydroxide/magnesium hydroxide/simethicone Suspension 30 milliLiter(s) Oral every 4 hours PRN  atorvastatin 10 milliGRAM(s) Oral at bedtime  finasteride 5 milliGRAM(s) Oral daily  melatonin 3 milliGRAM(s) Oral at bedtime PRN  montelukast 10 milliGRAM(s) Oral at bedtime  ondansetron Injectable 4 milliGRAM(s) IV Push every 8 hours PRN  tamsulosin 0.4 milliGRAM(s) Oral at bedtime    PMHX/PSHX:  Hypertension    Hyperlipidemia    GIB (gastrointestinal bleeding)    Prostate cancer    Gout    History of hemorrhoidectomy    Family history:  No colon cancer.     Social History:   Tob: Denies  EtOH: Denies  Illicit Drugs: Denies    ROS:     General:  No wt loss, fevers, chills, night sweats, fatigue  Eyes:  Good vision, no reported pain  ENT:  No sore throat, pain, runny nose, dysphagia  CV:  No pain, palpitations, hypo/hypertension  Pulm:  No dyspnea, cough, tachypnea, wheezing  GI:  see HPI  :  No pain, bleeding, incontinence, nocturia  Muscle:  No pain, weakness  Neuro:  No weakness, tingling, memory problems  Psych:  No fatigue, insomnia, mood problems, depression  Endocrine:  No polyuria, polydipsia, cold/heat intolerance  Heme:  No petechiae, ecchymosis, easy bruisability  Skin:  No rash, tattoos, scars, edema    PHYSICAL EXAM:     GENERAL:  No acute distress, elderly male, lying in bed.   HEENT:  NCAT, no scleral icterus   CHEST:  no respiratory distress  HEART:  Regular rate and rhythm  ABDOMEN:  Soft, non-tender, non-distended,  no masses  RECTUM: had brown hard stool in rectal vault, w/ bright red blood.   EXTREMITIES: No LE edema  SKIN:  No rash/erythema/ecchymoses/petechiae/wounds/abscess/warm/dry  NEURO:  Alert and oriented x 3, no tremors.     Vital Signs:  Vital Signs Last 24 Hrs  T(C): 36.8 (03 May 2023 15:30), Max: 36.8 (03 May 2023 07:52)  T(F): 98.3 (03 May 2023 15:30), Max: 98.3 (03 May 2023 12:53)  HR: 89 (03 May 2023 15:30) (83 - 94)  BP: 99/76 (03 May 2023 15:30) (94/68 - 109/66)  BP(mean): 84 (03 May 2023 15:30) (84 - 84)  RR: 15 (03 May 2023 15:30) (12 - 18)  SpO2: 100% (03 May 2023 15:30) (99% - 100%)    Parameters below as of 03 May 2023 15:30  Patient On (Oxygen Delivery Method): room air      Daily Height in cm: 180.34 (03 May 2023 17:04)    Daily     LABS:                        9.6    8.72  )-----------( 179      ( 03 May 2023 08:55 )             30.1     Mean Cell Volume: 95.3 fL (05-03-23 @ 08:55)    05-03    139  |  102  |  29<H>  ----------------------------<  158<H>  3.8   |  25  |  1.11    Ca    8.9      03 May 2023 08:55    TPro  6.3  /  Alb  3.7  /  TBili  0.6  /  DBili  x   /  AST  17  /  ALT  14  /  AlkPhos  56  05-03    LIVER FUNCTIONS - ( 03 May 2023 08:55 )  Alb: 3.7 g/dL / Pro: 6.3 g/dL / ALK PHOS: 56 U/L / ALT: 14 U/L / AST: 17 U/L / GGT: x           PT/INR - ( 03 May 2023 08:55 )   PT: 14.0 sec;   INR: 1.20 ratio         PTT - ( 03 May 2023 08:55 )  PTT:35.3 sec    Amylase Serum--      Lipase serum38       Ammonia--                          9.6    8.72  )-----------( 179      ( 03 May 2023 08:55 )             30.1       Imaging:      CT A/P    PROCEDURE:  CT of the Abdomen and Pelvis was performed.  Precontrast, Arterial and Delayed phases were performed.  Sagittal and coronal reformats were performed.    FINDINGS:  LOWER CHEST: Right atrial enlargement.    LIVER: Within normal limits.  BILE DUCTS: Normal caliber.  GALLBLADDER: Within normal limits.  SPLEEN: Within normal limits.  PANCREAS: Within normal limits.  ADRENALS: 1.6 cm left adrenal adenoma.  KIDNEYS/URETERS: Left renal cysts.    BLADDER: Within normal limits.  REPRODUCTIVE ORGANS: Prostate gland is either atrophic or absent.    BOWEL: No bowel obstruction. Appendix is normal. Colonic diverticulosis.   Mural thickening and mild adjacent fatty stranding involving the rectum  PERITONEUM: No ascites.  VESSELS: Atherosclerotic changes.  RETROPERITONEUM/LYMPH NODES: No lymphadenopathy.  ABDOMINAL WALL: Small bilateral fat-containing inguinal hernias.  BONES: Degenerative changes.    IMPRESSION:  Mild proctitis. No evidence of active GI bleed.       HPI:    Mr. Leung is a 73 yrs old male w/ hx of prostate cancer s/p radiation, HTN, asthma, and h/o presumed diverticular bleeding (admitted 5/2021) presenting with hematochezia x1 day. In 2020 he was hospitalized with presumed diverticular bleed at [facility]. In 2021 he was hospitalized at [facility] with another presumed diverticular bleed - he required 5U pRBC and colonoscopy showed diverticulosis and sessile polyp and pathology from EGD was positive for H pylori. He completed H pylori treatment after discharge and underwent follow-up colonoscopy 1 year ago, which he was told did not show any polyps. Developed bright red bloody bowel movements yesterday, had more than 6 episodes per day, last one this morning at 5:30 am. Denied abd pain, nausea, vomiting, fevers and chills. Takes NSAIDs as needed, denied AC/ASA use.  GI consulted for hematochezia.     Allergies:  No Known Allergies    Home Medications:  · 	dilTIAZem 240 mg/24 hours oral capsule, extended release: Last Dose Taken:  , 1 cap(s) orally once a day  · 	tamsulosin 0.4 mg oral capsule: Last Dose Taken:  , 1 cap(s) orally once a day  · 	atorvastatin 10 mg oral tablet: Last Dose Taken:  , 1 tab(s) orally once a day  · 	albuterol sulfate HFA 90 mcg/actuation aerosol inhaler: Last Dose Taken:    · 	atenolol 50 mg-chlorthalidone 25 mg tablet: Last Dose Taken:    · 	celecoxib 200 mg capsule: Last Dose Taken:    · 	montelukast 10 mg tablet: Last Dose Taken:    · 	potassium chloride 20 mEq oral powder for reconstitution: Last Dose Taken:  , 1 each orally once a day  · 	finasteride 5 mg oral tablet: Last Dose Taken:  , 1 tab(s) orally once a day    Hospital Medications:  acetaminophen     Tablet .. 650 milliGRAM(s) Oral every 6 hours PRN  albuterol    90 MICROgram(s) HFA Inhaler 1 Puff(s) Inhalation four times a day PRN  aluminum hydroxide/magnesium hydroxide/simethicone Suspension 30 milliLiter(s) Oral every 4 hours PRN  atorvastatin 10 milliGRAM(s) Oral at bedtime  finasteride 5 milliGRAM(s) Oral daily  melatonin 3 milliGRAM(s) Oral at bedtime PRN  montelukast 10 milliGRAM(s) Oral at bedtime  ondansetron Injectable 4 milliGRAM(s) IV Push every 8 hours PRN  tamsulosin 0.4 milliGRAM(s) Oral at bedtime    PMHX/PSHX:  Hypertension    Hyperlipidemia    GIB (gastrointestinal bleeding)    Prostate cancer    Gout    History of hemorrhoidectomy    Family history:  No colon cancer.     Social History:   Tob: Denies  EtOH: Denies  Illicit Drugs: Denies    ROS: 14-point ROS reviewed and negative except as per HPI above    PHYSICAL EXAM:     GENERAL:  No acute distress, elderly male, lying in bed.   HEENT:  NCAT, no scleral icterus   CHEST:  no respiratory distress  HEART:  Regular rate and rhythm  ABDOMEN:  Soft, non-tender, non-distended,  no masses  RECTUM: had brown hard stool in rectal vault, w/ bright red blood.   EXTREMITIES: No LE edema  SKIN:  No rash/erythema/ecchymoses/petechiae/wounds/abscess/warm/dry  NEURO:  Alert and oriented x 3, no tremors.     Vital Signs:  Vital Signs Last 24 Hrs  T(C): 36.8 (03 May 2023 15:30), Max: 36.8 (03 May 2023 07:52)  T(F): 98.3 (03 May 2023 15:30), Max: 98.3 (03 May 2023 12:53)  HR: 89 (03 May 2023 15:30) (83 - 94)  BP: 99/76 (03 May 2023 15:30) (94/68 - 109/66)  BP(mean): 84 (03 May 2023 15:30) (84 - 84)  RR: 15 (03 May 2023 15:30) (12 - 18)  SpO2: 100% (03 May 2023 15:30) (99% - 100%)    Parameters below as of 03 May 2023 15:30  Patient On (Oxygen Delivery Method): room air      Daily Height in cm: 180.34 (03 May 2023 17:04)    Daily     LABS:                        9.6    8.72  )-----------( 179      ( 03 May 2023 08:55 )             30.1     Mean Cell Volume: 95.3 fL (05-03-23 @ 08:55)    05-03    139  |  102  |  29<H>  ----------------------------<  158<H>  3.8   |  25  |  1.11    Ca    8.9      03 May 2023 08:55    TPro  6.3  /  Alb  3.7  /  TBili  0.6  /  DBili  x   /  AST  17  /  ALT  14  /  AlkPhos  56  05-03    LIVER FUNCTIONS - ( 03 May 2023 08:55 )  Alb: 3.7 g/dL / Pro: 6.3 g/dL / ALK PHOS: 56 U/L / ALT: 14 U/L / AST: 17 U/L / GGT: x           PT/INR - ( 03 May 2023 08:55 )   PT: 14.0 sec;   INR: 1.20 ratio         PTT - ( 03 May 2023 08:55 )  PTT:35.3 sec    Amylase Serum--      Lipase serum38       Ammonia--                          9.6    8.72  )-----------( 179      ( 03 May 2023 08:55 )             30.1       Imaging:      CT A/P    PROCEDURE:  CT of the Abdomen and Pelvis was performed.  Precontrast, Arterial and Delayed phases were performed.  Sagittal and coronal reformats were performed.    FINDINGS:  LOWER CHEST: Right atrial enlargement.    LIVER: Within normal limits.  BILE DUCTS: Normal caliber.  GALLBLADDER: Within normal limits.  SPLEEN: Within normal limits.  PANCREAS: Within normal limits.  ADRENALS: 1.6 cm left adrenal adenoma.  KIDNEYS/URETERS: Left renal cysts.    BLADDER: Within normal limits.  REPRODUCTIVE ORGANS: Prostate gland is either atrophic or absent.    BOWEL: No bowel obstruction. Appendix is normal. Colonic diverticulosis.   Mural thickening and mild adjacent fatty stranding involving the rectum  PERITONEUM: No ascites.  VESSELS: Atherosclerotic changes.  RETROPERITONEUM/LYMPH NODES: No lymphadenopathy.  ABDOMINAL WALL: Small bilateral fat-containing inguinal hernias.  BONES: Degenerative changes.    IMPRESSION:  Mild proctitis. No evidence of active GI bleed.

## 2023-05-03 NOTE — ED PROVIDER NOTE - DISPOSITION TYPE
CLINICAL NUTRITION SERVICES - BRIEF NOTE     Nutrition Prescription    RECOMMENDATIONS FOR MDs/PROVIDERS TO ORDER:  RD to sign off at this time. If further nutrition needs arise please consult RD.     Recommendations already ordered by Registered Dietitian (RD):  -Ensure BID at lunch and dinner  -Writer ins: chicken tenders, ham sandwich, turkey sandwich        Visited patient on the unit to see if her appetite has improved since admission. She reports improving GI symptoms, though she still has some nausea or vomiting.  We discussed using her PRN Zofran prior to meals, pt amenable.     Writer noted in a nursing note that patient consumed a whole turkey sandwich when her mom was here but has not been eating hospital meals because she does not like the food. Spoke with patient again re write in options, this time she was able to verbalize interest in 3 write in options and was open to Ensure BID.     She had no further nutrition questions or concerns at this time and is looking forward to going home in the coming days.    Pastora Ghosh, MPH, RDN   Pager: [phone]  Weekend/holiday pager: [phone]     ADMIT

## 2023-05-03 NOTE — H&P ADULT - NSHPPHYSICALEXAM_GEN_ALL_CORE
CONSTITUTIONAL: no acute distress, conversant  EYES: PERRLA; conjunctiva and sclera clear  ENMT: Moist oral mucosa, no pharyngeal injection or exudates  RESPIRATORY: Normal respiratory effort; lungs are clear to auscultation bilaterally  CARDIOVASCULAR: Regular rate and rhythm, normal S1 and S2, no murmur/rub/gallop; No lower extremity edema  ABDOMEN: no tenderness to palpation, normoactive bowel sounds, no rebound/guarding  MUSCULOSKELETAL: no joint swelling or tenderness to palpation  PSYCH: A+O to person, place, and time; affect appropriate  NEUROLOGY: CN 2-12 are intact and symmetric; no gross sensory deficits   SKIN: No rashes; no palpable lesions

## 2023-05-03 NOTE — ED PROVIDER NOTE - CLINICAL SUMMARY MEDICAL DECISION MAKING FREE TEXT BOX
73-year-old male with past medical history of GIB secondary to diverticulosis, gout, hypertension, hyperlipidemia, prostate cancer status post radiation therapy presents to the ER complaining of 1 day of bloody stools.  Patient reports that he first began to have red stools and then progressed into dark color.  Patient reports that he feels generally weak and lightheaded with some nausea.    concern for GIB,   cbc, cmp, type, ptt, lipase vbg   zofran, CT admit.    patient fw new aflutter on EKG, trop added,.

## 2023-05-03 NOTE — ED PROVIDER NOTE - OBJECTIVE STATEMENT
73-year-old male with past medical history of GIB secondary to diverticulosis, gout, hypertension, hyperlipidemia, prostate cancer status post radiation therapy presents to the ER complaining of 1 day of bloody stools.  Patient reports that he first began to have red stools and then progressed into dark color.  Patient reports that he feels generally weak and lightheaded with some nausea.  Denies vomiting, fevers, abdominal pain, chest pain, shortness of breath, dysuria, hematuria.

## 2023-05-03 NOTE — ED PROVIDER NOTE - ATTENDING APP SHARED VISIT CONTRIBUTION OF CARE
Dr. Lee:  I performed a face to face bedside interview with patient regarding history of present illness, review of symptoms and past medical history. I completed an independent physical exam.  I have discussed patient's plan of care with PA.   I agree with note as stated above, having amended the EMR as needed to reflect my findings.   This includes HISTORY OF PRESENT ILLNESS, HIV, PAST MEDICAL/SURGICAL/FAMILY/SOCIAL HISTORY, ALLERGIES AND HOME MEDICATIONS, REVIEW OF SYSTEMS, PHYSICAL EXAM, and any PROGRESS NOTES during the time I functioned as the attending physician for this patient.    73M h/o gout, HTN, HLD, prostate cancer s/p radiation therapy, GIB secondary to diverticulosis, presents with GIB since yesterday and lightheadedness today.  States he has had more than 7 episodes of both black and red bloody stools.  Denies abd pain, ROS otherwise negative.    Exam:  - nad  - rrr  - ctab   -abd soft ntnd  - rectal exam as per PA    A/P  - GIB, likely colonic source  - cbc, cmp, vbg, type and screen, coags

## 2023-05-03 NOTE — H&P ADULT - PROBLEM SELECTOR PLAN 6
S/p TKR. Prescribed celebrex PRN pain but patient rarely takes it  - Holding home celebrex given GIB

## 2023-05-03 NOTE — ED ADULT NURSE NOTE - OBJECTIVE STATEMENT
Patient is a 72 yo male, h/x HTN, HLD, GIB, not on AC, presenting with 7 episodes of bloody stool since yesterday. AAOx4, no signs of distress, endorsing nausea and lightheadedness. Denies fever, abdominal pain, vomiting, chest pain, shortness of breath, palpitations. Abdomen soft, nontender, nondistended. Placed on cardiac monitor, irregular rhythm noted, mild hypotension, MD Lee notified, EKG in progress. 20G PIV placed to LAC, labs sent per orders. Pending CT. Fall precautions maintained.

## 2023-05-04 LAB
ANION GAP SERPL CALC-SCNC: 12 MMOL/L — SIGNIFICANT CHANGE UP (ref 7–14)
BLD GP AB SCN SERPL QL: NEGATIVE — SIGNIFICANT CHANGE UP
BUN SERPL-MCNC: 21 MG/DL — SIGNIFICANT CHANGE UP (ref 7–23)
CALCIUM SERPL-MCNC: 8.4 MG/DL — SIGNIFICANT CHANGE UP (ref 8.4–10.5)
CHLORIDE SERPL-SCNC: 103 MMOL/L — SIGNIFICANT CHANGE UP (ref 98–107)
CO2 SERPL-SCNC: 24 MMOL/L — SIGNIFICANT CHANGE UP (ref 22–31)
CREAT SERPL-MCNC: 0.9 MG/DL — SIGNIFICANT CHANGE UP (ref 0.5–1.3)
EGFR: 90 ML/MIN/1.73M2 — SIGNIFICANT CHANGE UP
GLUCOSE SERPL-MCNC: 148 MG/DL — HIGH (ref 70–99)
HCT VFR BLD CALC: 22.6 % — LOW (ref 39–50)
HCT VFR BLD CALC: 24.7 % — LOW (ref 39–50)
HGB BLD-MCNC: 7.2 G/DL — LOW (ref 13–17)
HGB BLD-MCNC: 7.9 G/DL — LOW (ref 13–17)
MAGNESIUM SERPL-MCNC: 2.2 MG/DL — SIGNIFICANT CHANGE UP (ref 1.6–2.6)
MCHC RBC-ENTMCNC: 30.4 PG — SIGNIFICANT CHANGE UP (ref 27–34)
MCHC RBC-ENTMCNC: 31 PG — SIGNIFICANT CHANGE UP (ref 27–34)
MCHC RBC-ENTMCNC: 31.9 GM/DL — LOW (ref 32–36)
MCHC RBC-ENTMCNC: 32 GM/DL — SIGNIFICANT CHANGE UP (ref 32–36)
MCV RBC AUTO: 95.4 FL — SIGNIFICANT CHANGE UP (ref 80–100)
MCV RBC AUTO: 96.9 FL — SIGNIFICANT CHANGE UP (ref 80–100)
NRBC # BLD: 0 /100 WBCS — SIGNIFICANT CHANGE UP (ref 0–0)
NRBC # BLD: SIGNIFICANT CHANGE UP (ref 0–0)
NRBC # FLD: 0 K/UL — SIGNIFICANT CHANGE UP (ref 0–0)
NRBC # FLD: SIGNIFICANT CHANGE UP K/UL (ref 0–0)
PHOSPHATE SERPL-MCNC: 3.3 MG/DL — SIGNIFICANT CHANGE UP (ref 2.5–4.5)
PLATELET # BLD AUTO: 141 K/UL — LOW (ref 150–400)
PLATELET # BLD AUTO: 147 K/UL — LOW (ref 150–400)
POTASSIUM SERPL-MCNC: 3.1 MMOL/L — LOW (ref 3.5–5.3)
POTASSIUM SERPL-SCNC: 3.1 MMOL/L — LOW (ref 3.5–5.3)
RBC # BLD: 2.37 M/UL — LOW (ref 4.2–5.8)
RBC # BLD: 2.55 M/UL — LOW (ref 4.2–5.8)
RBC # FLD: 12.7 % — SIGNIFICANT CHANGE UP (ref 10.3–14.5)
RBC # FLD: 12.8 % — SIGNIFICANT CHANGE UP (ref 10.3–14.5)
RH IG SCN BLD-IMP: POSITIVE — SIGNIFICANT CHANGE UP
SODIUM SERPL-SCNC: 139 MMOL/L — SIGNIFICANT CHANGE UP (ref 135–145)
WBC # BLD: 7 K/UL — SIGNIFICANT CHANGE UP (ref 3.8–10.5)
WBC # BLD: 9.54 K/UL — SIGNIFICANT CHANGE UP (ref 3.8–10.5)
WBC # FLD AUTO: 7 K/UL — SIGNIFICANT CHANGE UP (ref 3.8–10.5)
WBC # FLD AUTO: 9.54 K/UL — SIGNIFICANT CHANGE UP (ref 3.8–10.5)

## 2023-05-04 PROCEDURE — 99233 SBSQ HOSP IP/OBS HIGH 50: CPT

## 2023-05-04 PROCEDURE — 93306 TTE W/DOPPLER COMPLETE: CPT | Mod: 26

## 2023-05-04 PROCEDURE — 99232 SBSQ HOSP IP/OBS MODERATE 35: CPT | Mod: GC

## 2023-05-04 RX ORDER — PANTOPRAZOLE SODIUM 20 MG/1
40 TABLET, DELAYED RELEASE ORAL
Refills: 0 | Status: DISCONTINUED | OUTPATIENT
Start: 2023-05-04 | End: 2023-05-06

## 2023-05-04 RX ORDER — SOD SULF/SODIUM/NAHCO3/KCL/PEG
4000 SOLUTION, RECONSTITUTED, ORAL ORAL ONCE
Refills: 0 | Status: COMPLETED | OUTPATIENT
Start: 2023-05-04 | End: 2023-05-04

## 2023-05-04 RX ORDER — POTASSIUM CHLORIDE 20 MEQ
40 PACKET (EA) ORAL ONCE
Refills: 0 | Status: COMPLETED | OUTPATIENT
Start: 2023-05-04 | End: 2023-05-04

## 2023-05-04 RX ORDER — ATENOLOL 25 MG/1
50 TABLET ORAL DAILY
Refills: 0 | Status: DISCONTINUED | OUTPATIENT
Start: 2023-05-04 | End: 2023-05-06

## 2023-05-04 RX ADMIN — PANTOPRAZOLE SODIUM 40 MILLIGRAM(S): 20 TABLET, DELAYED RELEASE ORAL at 04:00

## 2023-05-04 RX ADMIN — ATENOLOL 50 MILLIGRAM(S): 25 TABLET ORAL at 17:19

## 2023-05-04 RX ADMIN — TAMSULOSIN HYDROCHLORIDE 0.4 MILLIGRAM(S): 0.4 CAPSULE ORAL at 22:22

## 2023-05-04 RX ADMIN — FINASTERIDE 5 MILLIGRAM(S): 5 TABLET, FILM COATED ORAL at 11:28

## 2023-05-04 RX ADMIN — Medication 4000 MILLILITER(S): at 17:55

## 2023-05-04 RX ADMIN — Medication 40 MILLIEQUIVALENT(S): at 04:01

## 2023-05-04 RX ADMIN — ONDANSETRON 4 MILLIGRAM(S): 8 TABLET, FILM COATED ORAL at 02:02

## 2023-05-04 RX ADMIN — ATORVASTATIN CALCIUM 10 MILLIGRAM(S): 80 TABLET, FILM COATED ORAL at 22:22

## 2023-05-04 RX ADMIN — PANTOPRAZOLE SODIUM 40 MILLIGRAM(S): 20 TABLET, DELAYED RELEASE ORAL at 17:19

## 2023-05-04 RX ADMIN — MONTELUKAST 10 MILLIGRAM(S): 4 TABLET, CHEWABLE ORAL at 22:22

## 2023-05-04 NOTE — PROVIDER CONTACT NOTE (EICU) - SITUATION
Hospitalist Progress Note    3/6/2018  Admit Date: 2018 11:47 PM   NAME: Amanda Crystal   :  1960   MRN:  [id number]   Attending: Delta Ruggiero MD  PCP:  None    SUBJECTIVE:   Per H/P:  '61 y/o female with hx diabetes was reportedly at a party or some other function. She was standing behind the DJ and went to sit in a chain but instead fell and landed on her right hip. No other injury reported. In ED xray reveals a comminuted intertrochanteric fracture. She has hx knee surgery and did not have any complications from anesthesia or bleeding. She is currently sedated from pain medications she received in ED. No reported hx cardiovascular or pulmonary disease. Her glucose is 314 without evidence of DKA or hyperosmolar coma. She used to be on insulin but her family members report she no longer takes it. Will admit for glucose control and surgical repair of hip.'    - seen with her daughter, Gavi Juan, at bedside. She reports her R hip pain is controlled. Awaiting surgery for today. Denies new concerns. Blood sugars controlled. - POD 0 from R femur intra-medullary nail insertion. Seen with daughter, Laureano, and sister, Manpreet Louie, at bedside. She reports she is in significant pain. Denies other concerns. 3/1- POD 1 for R femur intra-medullary nail insertion. Pain well controlled. No fever, chills, CP, abd pain or urinary symptoms. Had some nausea earlier which resolved with Zofran as per pt.    3/2- POD 2 for R femur intra-medullary nail insertion. Pain well controlled. No fever, chills, CP, abd pain or urinary symptoms. Worked with PT today with no issues. 3/3- Pt seen and examined. She was lying in bed. Denies any symptoms of bleeding or dark stool. However, has not had a BM for the past few days. Dulcolox ordered. Hb continues to drop. IVF discontinued. Instructions given to pt to inform RN if any dark or bloody stools. Pt eating and drinking well. 3/4- Pt seen and examined.  She was lying in bed. Reports not having a BM yet. No fever, chills, CP, abd pain or urinary symptoms. No symptoms of bleeding. Worked with PT today. Hb stable today. Eating and drinking well. 3/5- Pt seen and examined. Pain well controlled. No fever, chills, CP, abd pain, N/V/D, bleeding or urinary symptoms. 3/6- Pt seen and examined. Pain well controlled. No fever, chills, CP, abd pain, N/V/D, bleeding or urinary symptoms. Review of Systems negative with exception of pertinent positives noted above  PHYSICAL EXAM     Visit Vitals    BP (!) 170/96 (BP 1 Location: Right arm, BP Patient Position: Sitting)  Comment (BP Patient Position): recliner    Pulse 97    Temp 98 °F (36.7 °C)    Resp 17    Ht 5' 3\" (1.6 m)    Wt 79.4 kg (175 lb)    SpO2 97%    BMI 31 kg/m2      Temp (24hrs), Av.5 °F (36.9 °C), Min:97.8 °F (36.6 °C), Max:99.2 °F (37.3 °C)    Patient Vitals for the past 24 hrs:   Temp Pulse Resp BP SpO2   18 1201 98 °F (36.7 °C) 97 17 (!) 170/96 97 %   18 0755 99.2 °F (37.3 °C) 92 16 143/85 95 %   18 0511 98.9 °F (37.2 °C) (!) 105 15 133/78 93 %   18 0052 97.8 °F (36.6 °C) 93 18 126/84 96 %   18 97.9 °F (36.6 °C) 92 14 114/71 95 %   18 1504 99 °F (37.2 °C) 97 18 154/83 95 %       Oxygen Therapy  O2 Sat (%): 97 % (18 1201)  Pulse via Oximetry: 105 beats per minute (18)  O2 Device: Room air (18)  O2 Flow Rate (L/min): 3 l/min (18 09)  No intake or output data in the 24 hours ending 18 1211     General: NAD, obese    Lungs:  CTA Bilaterally.    Heart:  Regular rate and rhythm,  No murmur, rub, or gallop  Abdomen: Soft, Non distended, Non tender, Positive bowel sounds  Extremities: No cyanosis, clubbing or edema  Neurologic:  No focal deficits    Recent Results (from the past 24 hour(s))   GLUCOSE, POC    Collection Time: 18  6:24 PM   Result Value Ref Range    Glucose (POC) 196 (H) 65 - 100 mg/dL   GLUCOSE, POC Collection Time: 03/05/18 10:11 PM   Result Value Ref Range    Glucose (POC) 136 (H) 65 - 586 mg/dL   METABOLIC PANEL, BASIC    Collection Time: 03/06/18  6:09 AM   Result Value Ref Range    Sodium 141 136 - 145 mmol/L    Potassium 3.4 (L) 3.5 - 5.1 mmol/L    Chloride 104 98 - 107 mmol/L    CO2 29 21 - 32 mmol/L    Anion gap 8 7 - 16 mmol/L    Glucose 130 (H) 65 - 100 mg/dL    BUN 8 6 - 23 MG/DL    Creatinine 0.55 (L) 0.6 - 1.0 MG/DL    GFR est AA >60 >60 ml/min/1.73m2    GFR est non-AA >60 >60 ml/min/1.73m2    Calcium 8.6 8.3 - 10.4 MG/DL   GLUCOSE, POC    Collection Time: 03/06/18  7:11 AM   Result Value Ref Range    Glucose (POC) 136 (H) 65 - 100 mg/dL   GLUCOSE, POC    Collection Time: 03/06/18 11:52 AM   Result Value Ref Range    Glucose (POC) 135 (H) 65 - 100 mg/dL     Imaging:    XR HIP RT W OR WO PELV 2-3 VWS   Final Result   Impression: Unchanged postoperative findings as above. XR FEMUR RT 2 VS   Final Result   Impression: Unchanged postoperative findings as above. XR FEMUR RT 2 VS   Final Result   IMPRESSION: Surgical fixation of right hip fracture. XR FEMUR RT 2 VS   Final Result   IMPRESSION: Comminuted intertrochanteric fracture. The mid and distal femur are   intact. XR CHEST SNGL V   Final Result   IMPRESSION: Normal chest.             XR HIP RT W OR WO PELV 2-3 VWS   Final Result   IMPRESSION: Comminuted intertrochanteric fracture which extends of the proximal   femoral diaphysis.                NC XR TECHNOLOGIST SERVICE    (Results Pending)         ASSESSMENT      Hospital Problems as of 3/6/2018  Never Reviewed          Codes Class Noted - Resolved POA    Essential hypertension ICD-10-CM: I10  ICD-9-CM: 401.9  2/28/2018 - Present Unknown        * (Principal)Intertrochanteric fracture of right femur ([facility] ICD-10-CM: Q39.031V  ICD-9-CM: 820.21  2/24/2018 - Present Yes        Hyperglycemia due to type 2 diabetes mellitus ([facility] ICD-10-CM: E11.65  ICD-9-CM: 250.00  2/24/2018 - Present Yes            Plan:  · R femur fx- s/p ORIF 2/28. Pain well controlled. · T2DM- controlled. On SSI for now. · HTN- BP better controlled and suspect this is pain related. Cont current regimen. · Anemia- slight iron deficiency. Likely also related to blood loss from injury/surgery. Stable. Monitor for bleeding and transfuse as needed. Dispo: Discussed with CM. Awaiting rehab approval.     DVT Prophylaxis: SCDs    Signed By: Slaly Nails MD     March 6, 2018      . Patient had bright red bloody BM.

## 2023-05-04 NOTE — CONSULT NOTE ADULT - ASSESSMENT
INCOMPLETE    Recommendations:   - No acute surgical intervention; d/w patient indications for surgery for diverticular bleeding, however patient not interested in surgery at this time. Questioned stricture that was seen on prior studies, not appreciated on most recent CT   - Appreciate GI recs, possible colonoscopy - can also evaluate for ?stricture at this time if going for procedure   - Can obtain CTA if concern for continued bleeding, IR consult as needed   - Transfuse PRN, for Hb <7     D/w Dr. Odilia Tripp, PGY-2  A Team Surgery  #[id number]  INCOMPLETE    Recommendations:   - No acute surgical intervention; d/w patient indications for surgery for diverticular bleeding, however patient not interested in surgery at this time. Questioned stricture that was seen on prior studies, not appreciated on most recent CT   - Appreciate GI recs, possible colonoscopy if continued bleed - can also evaluate for ?stricture at this time if going for procedure   - Can obtain CTA if concern for active bleeding, IR consult as needed   - Transfuse PRN, for Hb <7     D/w Dr. Odilia Tripp, PGY-2  A Team Surgery  #[id number]  INCOMPLETE    Recommendations:   - No acute surgical intervention; d/w patient indications for surgery for diverticular bleeding, however patient not interested in surgery at this time. Questioned stricture that was seen on prior studies, not appreciated on most recent CT   - Appreciate GI recs, possible colonoscopy if continued bleed - can also evaluate for ?stricture at this time if going for procedure   - Can obtain CTA if concern for active bleeding, IR consult as needed for angioembolization  - Transfuse PRN, for Hb <7     D/w Dr. Odilia Tripp, PGY-2  A Team Surgery  #[id number]  73M PMH prostate cancer s/p XRT, HTN, asthma, prior hospital admissions for diverticular bleeding p/w blood per rectum. Surgery consulted for evaluation of operative intervention in context of recurrent diverticular bleeds.     Recommendations:   - No acute surgical intervention; d/w patient indications for surgery for diverticular bleeding, however patient not interested in surgery at this time. Questioned stricture that was seen on prior studies, not appreciated on most recent CT   - Appreciate GI recs, possible colonoscopy if continued bleed - can also evaluate for ?stricture at this time if going for procedure   - Can obtain CTA if concern for active bleeding, IR consult as needed for angioembolization  - Transfuse PRN, for Hb <7     D/w Dr. Odilia Tripp, PGY-2  A Team Surgery  #[id number]

## 2023-05-04 NOTE — CONSULT NOTE ADULT - SUBJECTIVE AND OBJECTIVE BOX
HPI:        PAST MEDICAL & SURGICAL HISTORY:  Hypertension      Hyperlipidemia      GIB (gastrointestinal bleeding)      Prostate cancer  s/p radiation therapy      Gout      History of hemorrhoidectomy          MEDICATIONS  (STANDING):  atorvastatin 10 milliGRAM(s) Oral at bedtime  finasteride 5 milliGRAM(s) Oral daily  montelukast 10 milliGRAM(s) Oral at bedtime  pantoprazole  Injectable 40 milliGRAM(s) IV Push two times a day  tamsulosin 0.4 milliGRAM(s) Oral at bedtime    MEDICATIONS  (PRN):  acetaminophen     Tablet .. 650 milliGRAM(s) Oral every 6 hours PRN Temp greater or equal to 38C (100.4F), Mild Pain (1 - 3)  albuterol    90 MICROgram(s) HFA Inhaler 1 Puff(s) Inhalation four times a day PRN Shortness of Breath  aluminum hydroxide/magnesium hydroxide/simethicone Suspension 30 milliLiter(s) Oral every 4 hours PRN Dyspepsia  melatonin 3 milliGRAM(s) Oral at bedtime PRN Insomnia  ondansetron Injectable 4 milliGRAM(s) IV Push every 8 hours PRN Nausea and/or Vomiting      Allergies    No Known Allergies    Intolerances        SOCIAL HISTORY:    FAMILY HISTORY:  No pertinent family history in first degree relatives            Physical Exam:  General: NAD, resting comfortably  HEENT: NC/AT, EOMI, normal hearing, no oral lesions, no LAD, neck supple  Pulmonary: normal resp effort, CTA-B  Cardiovascular: NSR, no murmurs  Abdominal: soft, ND/NT, no organomegaly  Extremities: WWP, normal strength, no clubbing/cyanosis/edema  Neuro: A/O x 3, CNs II-XII grossly intact, normal sensation, no focal deficits  Pulses: palpable distal pulses    Vital Signs Last 24 Hrs  T(C): 36.4 (04 May 2023 05:07), Max: 37.1 (03 May 2023 20:53)  T(F): 97.6 (04 May 2023 05:07), Max: 98.7 (03 May 2023 20:53)  HR: 84 (04 May 2023 05:07) (82 - 95)  BP: 125/74 (04 May 2023 05:07) (99/76 - 125/74)  BP(mean): 84 (03 May 2023 15:30) (84 - 84)  RR: 18 (04 May 2023 05:07) (15 - 18)  SpO2: 99% (04 May 2023 05:07) (99% - 100%)    Parameters below as of 04 May 2023 05:07  Patient On (Oxygen Delivery Method): room air        I&O's Summary          LABS:                        7.2    7.00  )-----------( 141      ( 04 May 2023 09:10 )             22.6     05-04    139  |  103  |  21  ----------------------------<  148<H>  3.1<L>   |  24  |  0.90    Ca    8.4      04 May 2023 02:16  Phos  3.3     05-04  Mg     2.20     05-04    TPro  6.3  /  Alb  3.7  /  TBili  0.6  /  DBili  x   /  AST  17  /  ALT  14  /  AlkPhos  56  05-03    PT/INR - ( 03 May 2023 08:55 )   PT: 14.0 sec;   INR: 1.20 ratio         PTT - ( 03 May 2023 08:55 )  PTT:35.3 sec    CAPILLARY BLOOD GLUCOSE        LIVER FUNCTIONS - ( 03 May 2023 08:55 )  Alb: 3.7 g/dL / Pro: 6.3 g/dL / ALK PHOS: 56 U/L / ALT: 14 U/L / AST: 17 U/L / GGT: x             Cultures:      RADIOLOGY & ADDITIONAL STUDIES:  < from: CT Abdomen and Pelvis w/ IV Cont (05.03.23 @ 12:03) >  IMPRESSION:  Mild proctitis. No evidence of active GI bleed.    < end of copied text >           HPI:  73M w/ h/o prostate cancer (s/p XRT), HTN, asthma, and h/o presumed diverticular bleeding (admitted 5/2021) presenting with hematochezia x1 day.    In 2020 he was hospitalized with presumed diverticular bleed at [facility]. In 2021 he was hospitalized at [facility] with another presumed diverticular bleed - he required 5U pRBC and colonoscopy showed diverticulosis and sessile polyp and pathology from EGD was positive for H pylori. He completed H pylori treatment after discharge and underwent follow-up colonoscopy 1 year ago, which he was told did not show any polyps. Yesterday mid-day he felt urgency to have a bowel movement and had a large, bloody BM. He subsequently had ~10 bowel movements with red/maroon blood. His last BM was 5:30AM this morning right before he came in. He had some associated light-headedness. He had no shortness of breath, chest pain, nausea/vomiting, fevers/chills, palpitations. At the urging of his wife he came to the ED for evaluation.    In the ED vital signs were all wnl. Hgb 9.6 (was 8.3 on last discharge) EKG showed atrial flutter with variable block. CT A/P showed mild proctitis. He received 1L NS and zofran IV. ED e-mail GI for consult. At the time of interview he reports feeling well. Lightheadedness has resolved. Last bloody BM was 5:30 AM this morning.  (03 May 2023 16:17)    Surgery consulted for evaluation of operative intervention in context of recurrent diverticular bleeds. Patient seen and examined at bedside, reports he has never had abdominal pain or issues with PO. Presented on this admission due to having 6-7 bloody BMs, most recently at 2:30 AM this morning. Endorses some dizziness/lightheadedness when he stands up, otherwise asymptomatic. Vitals stable, however labs notable for drop in Hb from 9 to 7, being transfused at time of evaluation.     PAST MEDICAL & SURGICAL HISTORY:  Hypertension      Hyperlipidemia      GIB (gastrointestinal bleeding)      Prostate cancer  s/p radiation therapy      Gout      History of hemorrhoidectomy          MEDICATIONS  (STANDING):  atorvastatin 10 milliGRAM(s) Oral at bedtime  finasteride 5 milliGRAM(s) Oral daily  montelukast 10 milliGRAM(s) Oral at bedtime  pantoprazole  Injectable 40 milliGRAM(s) IV Push two times a day  tamsulosin 0.4 milliGRAM(s) Oral at bedtime    MEDICATIONS  (PRN):  acetaminophen     Tablet .. 650 milliGRAM(s) Oral every 6 hours PRN Temp greater or equal to 38C (100.4F), Mild Pain (1 - 3)  albuterol    90 MICROgram(s) HFA Inhaler 1 Puff(s) Inhalation four times a day PRN Shortness of Breath  aluminum hydroxide/magnesium hydroxide/simethicone Suspension 30 milliLiter(s) Oral every 4 hours PRN Dyspepsia  melatonin 3 milliGRAM(s) Oral at bedtime PRN Insomnia  ondansetron Injectable 4 milliGRAM(s) IV Push every 8 hours PRN Nausea and/or Vomiting      Allergies    No Known Allergies    Intolerances        FAMILY HISTORY:  No pertinent family history in first degree relatives        Physical Exam:  General: NAD, resting comfortably  HEENT: NC/AT, EOMI, normal hearing  Pulmonary: normal resp effort  Cardiovascular: NSR  Abdominal: soft, ND/NT; RONNIE without stool or blood in rectal vault   Extremities: WWP, normal strength, no clubbing/cyanosis/edema  Neuro: A/O x 3, CNs II-XII grossly intact, normal sensation, no focal deficits    Vital Signs Last 24 Hrs  T(C): 36.4 (04 May 2023 05:07), Max: 37.1 (03 May 2023 20:53)  T(F): 97.6 (04 May 2023 05:07), Max: 98.7 (03 May 2023 20:53)  HR: 84 (04 May 2023 05:07) (82 - 95)  BP: 125/74 (04 May 2023 05:07) (99/76 - 125/74)  BP(mean): 84 (03 May 2023 15:30) (84 - 84)  RR: 18 (04 May 2023 05:07) (15 - 18)  SpO2: 99% (04 May 2023 05:07) (99% - 100%)    Parameters below as of 04 May 2023 05:07  Patient On (Oxygen Delivery Method): room air        I&O's Summary          LABS:                        7.2    7.00  )-----------( 141      ( 04 May 2023 09:10 )             22.6     05-04    139  |  103  |  21  ----------------------------<  148<H>  3.1<L>   |  24  |  0.90    Ca    8.4      04 May 2023 02:16  Phos  3.3     05-04  Mg     2.20     05-04    TPro  6.3  /  Alb  3.7  /  TBili  0.6  /  DBili  x   /  AST  17  /  ALT  14  /  AlkPhos  56  05-03    PT/INR - ( 03 May 2023 08:55 )   PT: 14.0 sec;   INR: 1.20 ratio         PTT - ( 03 May 2023 08:55 )  PTT:35.3 sec    CAPILLARY BLOOD GLUCOSE        LIVER FUNCTIONS - ( 03 May 2023 08:55 )  Alb: 3.7 g/dL / Pro: 6.3 g/dL / ALK PHOS: 56 U/L / ALT: 14 U/L / AST: 17 U/L / GGT: x             Cultures:      RADIOLOGY & ADDITIONAL STUDIES:  < from: CT Abdomen and Pelvis w/ IV Cont (05.03.23 @ 12:03) >  IMPRESSION:  Mild proctitis. No evidence of active GI bleed.    < end of copied text >           HPI:  73M w/ h/o prostate cancer (s/p XRT), HTN, asthma, and h/o presumed diverticular bleeding (admitted 5/2021) presenting with hematochezia x1 day.    In 2020 he was hospitalized with presumed diverticular bleed at [facility]. In 2021 he was hospitalized at [facility] with another presumed diverticular bleed - he required 5U pRBC and colonoscopy showed diverticulosis and sessile polyp and pathology from EGD was positive for H pylori. He completed H pylori treatment after discharge and underwent follow-up colonoscopy 1 year ago, which he was told did not show any polyps. Yesterday mid-day he felt urgency to have a bowel movement and had a large, bloody BM. He subsequently had ~10 bowel movements with red/maroon blood. His last BM was 5:30AM this morning right before he came in. He had some associated light-headedness. He had no shortness of breath, chest pain, nausea/vomiting, fevers/chills, palpitations. At the urging of his wife he came to the ED for evaluation.    In the ED vital signs were all wnl. Hgb 9.6 (was 8.3 on last discharge) EKG showed atrial flutter with variable block. CT A/P showed mild proctitis. He received 1L NS and zofran IV. ED e-mail GI for consult. At the time of interview he reports feeling well. Lightheadedness has resolved. Last bloody BM was 5:30 AM this morning.  (03 May 2023 16:17)    Surgery consulted for evaluation of operative intervention in context of recurrent diverticular bleeds. Patient seen and examined at bedside, reports he has never had abdominal pain or issues with PO. Presented on this admission due to having 6-7 bloody BMs, most recently at 2:30 AM this morning. Endorses some dizziness/lightheadedness when he stands up, otherwise asymptomatic. Vitals stable, however labs notable for drop in Hb from 9 to 7, being transfused at time of evaluation. Reports last colonoscopy a couple of years ago showed diverticulosis, polyps that were negative for malignancy.     PAST MEDICAL & SURGICAL HISTORY:  Hypertension      Hyperlipidemia      GIB (gastrointestinal bleeding)      Prostate cancer  s/p radiation therapy      Gout      History of hemorrhoidectomy          MEDICATIONS  (STANDING):  atorvastatin 10 milliGRAM(s) Oral at bedtime  finasteride 5 milliGRAM(s) Oral daily  montelukast 10 milliGRAM(s) Oral at bedtime  pantoprazole  Injectable 40 milliGRAM(s) IV Push two times a day  tamsulosin 0.4 milliGRAM(s) Oral at bedtime    MEDICATIONS  (PRN):  acetaminophen     Tablet .. 650 milliGRAM(s) Oral every 6 hours PRN Temp greater or equal to 38C (100.4F), Mild Pain (1 - 3)  albuterol    90 MICROgram(s) HFA Inhaler 1 Puff(s) Inhalation four times a day PRN Shortness of Breath  aluminum hydroxide/magnesium hydroxide/simethicone Suspension 30 milliLiter(s) Oral every 4 hours PRN Dyspepsia  melatonin 3 milliGRAM(s) Oral at bedtime PRN Insomnia  ondansetron Injectable 4 milliGRAM(s) IV Push every 8 hours PRN Nausea and/or Vomiting      Allergies    No Known Allergies    Intolerances        FAMILY HISTORY:  No pertinent family history in first degree relatives        Physical Exam:  General: NAD, resting comfortably  HEENT: NC/AT, EOMI, normal hearing  Pulmonary: normal resp effort  Cardiovascular: NSR  Abdominal: soft, ND/NT; RONNIE without stool or blood in rectal vault   Extremities: WWP, normal strength, no clubbing/cyanosis/edema  Neuro: A/O x 3, CNs II-XII grossly intact, normal sensation, no focal deficits    Vital Signs Last 24 Hrs  T(C): 36.4 (04 May 2023 05:07), Max: 37.1 (03 May 2023 20:53)  T(F): 97.6 (04 May 2023 05:07), Max: 98.7 (03 May 2023 20:53)  HR: 84 (04 May 2023 05:07) (82 - 95)  BP: 125/74 (04 May 2023 05:07) (99/76 - 125/74)  BP(mean): 84 (03 May 2023 15:30) (84 - 84)  RR: 18 (04 May 2023 05:07) (15 - 18)  SpO2: 99% (04 May 2023 05:07) (99% - 100%)    Parameters below as of 04 May 2023 05:07  Patient On (Oxygen Delivery Method): room air        I&O's Summary          LABS:                        7.2    7.00  )-----------( 141      ( 04 May 2023 09:10 )             22.6     05-04    139  |  103  |  21  ----------------------------<  148<H>  3.1<L>   |  24  |  0.90    Ca    8.4      04 May 2023 02:16  Phos  3.3     05-04  Mg     2.20     05-04    TPro  6.3  /  Alb  3.7  /  TBili  0.6  /  DBili  x   /  AST  17  /  ALT  14  /  AlkPhos  56  05-03    PT/INR - ( 03 May 2023 08:55 )   PT: 14.0 sec;   INR: 1.20 ratio         PTT - ( 03 May 2023 08:55 )  PTT:35.3 sec    CAPILLARY BLOOD GLUCOSE        LIVER FUNCTIONS - ( 03 May 2023 08:55 )  Alb: 3.7 g/dL / Pro: 6.3 g/dL / ALK PHOS: 56 U/L / ALT: 14 U/L / AST: 17 U/L / GGT: x             Cultures:      RADIOLOGY & ADDITIONAL STUDIES:  < from: CT Abdomen and Pelvis w/ IV Cont (05.03.23 @ 12:03) >  IMPRESSION:  Mild proctitis. No evidence of active GI bleed.    < end of copied text >

## 2023-05-04 NOTE — PROGRESS NOTE ADULT - ASSESSMENT
Impression:     #Hematochezia  Imaging showed mild proctitis. Hgb stable around 9.6, at his baseline. Brown stool mixed with blood on RONNIE. Prior colonoscopy in 2021 showed diverticulosis with no active bleeding. Patient had a repeat colonoscopy last year in 2022, had removal of polyps, no records. CT A/P showed mild proctitis.   - Differentials include RAVE, diverticulosis, hemorrhoids etc.   - Had persistent bloody stools, w/ downtrending hgb levels, after discussion w/ the patient, and wife, would like to proceed with colonoscopy tomorrow.      #H. pylori s/p quad therapy.   - Eradication not confirmed.     Recommendations:   - Keep on CLD for now.   - Make him NPO after midnight.   - Will order [employer] prep for this evening.   - Please obtain early 3 am labs prior to the procedure tomorrow.   - If the patient has significant bleeding, would recommend CT A/P.   - CBC Q12 hours and transfuse to maintain hgb > 7.    GI will continue to follow.     All recommendations are tentative until note is attested by an attending.     Leigh Ann Espino, PGY-4  Gastroenterology/Hepatology Fellow  Available on [employer] Teams  [phone] (Short Range Pager)  [phone] (Long Range Pager)    After 5pm, please contact the on-call GI fellow. [phone]   Impression:     #Hematochezia  Imaging showed mild proctitis. Hgb stable around 9.6, at his baseline. Brown stool mixed with blood on RONNIE. Prior colonoscopy in 2021 showed diverticulosis with no active bleeding. Patient had a repeat colonoscopy last year in 2022, had removal of polyps, no records. CT A/P showed mild proctitis.   - Differentials include RAVE, diverticulosis, hemorrhoids etc.   - Had persistent bloody stools, w/ downtrending hgb levels, after discussion w/ the patient, and wife, would like to proceed with colonoscopy tomorrow.      #H. pylori s/p quad therapy.   - Eradication not confirmed.     Recommendations:   - Keep on CLD for now.   - Make him NPO after midnight.   - Will order [employer] prep for this evening.   - Please obtain early 3 am labs prior to the procedure tomorrow.   - If the patient has significant bleeding, would recommend CT A/P.   - CBC Q12 hours and transfuse to maintain hgb > 7. Would transfuse additional 1U this evening given ongoing overt bleeding    GI will continue to follow.     All recommendations are tentative until note is attested by an attending.     Leigh Ann Espino, PGY-4  Gastroenterology/Hepatology Fellow  Available on [employer] Teams  [phone] (Short Range Pager)  [phone] (Long Range Pager)    After 5pm, please contact the on-call GI fellow. [phone]

## 2023-05-04 NOTE — CONSULT NOTE ADULT - ATTENDING COMMENTS
# Rectal bleeding: Patient with reports of rectal bleeding, blood mixed with brown stool. Has had prior colonoscopies with evidence of diverticulosis. Recent CT on this admission with mild proctitis. 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 Differential also includes diverticular bleed vs hemorrhoids vs less likely angioectasias or polyps/masses.   # H pylori s/p quad therapy    --Ensure adequate bowel regimen given possible RAVE. Adjust regimen as needed.   --Discussed potential colonoscopy with patient tomorrow to assess proctitis as possible etiology of bleed. Given recent endoscopic exam last year and patient's report that bleeding has slowed/stopped, he is currently declining colonoscopy and opting for conservative management.   --Clear liquid diet for now. OK to further advance if Hgb remains stable and no further overt bleeding.   --If not already performed, should have H pylori stool Ag (or urea breath testing) to confirm H pylori eradication. This can be performed as outpatient    Additional recommendations as above.
DATE OF SERVICE: 05-04-23 @ 21:13    73M with PMHx as above, hx of prior diverticular bleeds here with hematochezia. Noted to have multiple episodes of maroon stool prior to presentation. Was admitted, Hb initially stable then dropped from 9.6->7.9->7.2. Asymptomatic. No complaints at this time    Vitals reviewed, stable  Hb as above, currently 7.2, receiving PRBC transfusion  Abdomne soft NT/ND    Currently prepping for GI for colonoscopy  Should patient rebleed would recommend repeat CTA and possible IR for embolization  Transfuse PRN, monitor vitals/Hb  No acute surgical intervention at this time, will follow along

## 2023-05-04 NOTE — CHART NOTE - NSCHARTNOTEFT_GEN_A_CORE
Patient with 1 bloody BM. CBC and T&S sent. Hgb with slight downtrend. Will continue to monitor for further episodes. Transfusion goal Hgb < 7.0.                           7.9    9.54  )-----------( 147      ( 04 May 2023 02:16 )             24.7 Patient with 1 bloody BM. CBC and T&S sent. Hgb with slight downtrend. Will continue to monitor for further episodes. Transfusion goal Hgb < 7.0. GI following.                           7.9    9.54  )-----------( 147      ( 04 May 2023 02:16 )             24.7 Patient with 1 bloody BM. CBC and T&S sent. Hgb with slight downtrend. Will continue to monitor for further episodes. Transfusion goal Hgb < 7.0. GI following. Vital signs remain stable.                           7.9    9.54  )-----------( 147      ( 04 May 2023 02:16 )             24.7      Vital Signs Last 24 Hrs  T(C): 36.6 (03 May 2023 21:47), Max: 37.1 (03 May 2023 20:53)  T(F): 97.8 (03 May 2023 21:47), Max: 98.7 (03 May 2023 20:53)  HR: 90 (04 May 2023 02:02) (82 - 95)  BP: 117/97 (04 May 2023 02:02) (94/68 - 122/75)  BP(mean): 84 (03 May 2023 15:30) (84 - 84)  RR: 18 (03 May 2023 21:47) (12 - 18)  SpO2: 99% (03 May 2023 21:47) (99% - 100%)    Parameters below as of 03 May 2023 21:47  Patient On (Oxygen Delivery Method): room air Patient with 1 bloody BM. CBC and T&S sent. Hgb with slight downtrend. Will continue to monitor for further episodes. Transfusion goal Hgb < 7.0. GI following, remains on CLD. If continued BM, will change to NPO. Vital signs remain stable. Starting patient on Protonix 40 IV BID. Miralax BID discontinued. K 3.1. Potassium 40 mEq PO x1 ordered.                           7.9    9.54  )-----------( 147      ( 04 May 2023 02:16 )             24.7      05-04    139  |  103  |  21  ----------------------------<  148<H>  3.1<L>   |  24  |  0.90    Ca    8.4      04 May 2023 02:16  Phos  3.3     05-04  Mg     2.20     05-04    TPro  6.3  /  Alb  3.7  /  TBili  0.6  /  DBili  x   /  AST  17  /  ALT  14  /  AlkPhos  56  05-03      Vital Signs Last 24 Hrs  T(C): 36.6 (03 May 2023 21:47), Max: 37.1 (03 May 2023 20:53)  T(F): 97.8 (03 May 2023 21:47), Max: 98.7 (03 May 2023 20:53)  HR: 90 (04 May 2023 02:02) (82 - 95)  BP: 117/97 (04 May 2023 02:02) (94/68 - 122/75)  BP(mean): 84 (03 May 2023 15:30) (84 - 84)  RR: 18 (03 May 2023 21:47) (12 - 18)  SpO2: 99% (03 May 2023 21:47) (99% - 100%)    Parameters below as of 03 May 2023 21:47  Patient On (Oxygen Delivery Method): room air

## 2023-05-05 ENCOUNTER — TRANSCRIPTION ENCOUNTER (OUTPATIENT)
Age: 73
End: 2023-05-05

## 2023-05-05 LAB
ALBUMIN SERPL ELPH-MCNC: 3.4 G/DL — SIGNIFICANT CHANGE UP (ref 3.3–5)
ALP SERPL-CCNC: 56 U/L — SIGNIFICANT CHANGE UP (ref 40–120)
ALT FLD-CCNC: 13 U/L — SIGNIFICANT CHANGE UP (ref 4–41)
ANION GAP SERPL CALC-SCNC: 12 MMOL/L — SIGNIFICANT CHANGE UP (ref 7–14)
ANION GAP SERPL CALC-SCNC: 12 MMOL/L — SIGNIFICANT CHANGE UP (ref 7–14)
APTT BLD: 31.2 SEC — SIGNIFICANT CHANGE UP (ref 27–36.3)
AST SERPL-CCNC: 19 U/L — SIGNIFICANT CHANGE UP (ref 4–40)
BILIRUB SERPL-MCNC: 1 MG/DL — SIGNIFICANT CHANGE UP (ref 0.2–1.2)
BUN SERPL-MCNC: 10 MG/DL — SIGNIFICANT CHANGE UP (ref 7–23)
BUN SERPL-MCNC: 8 MG/DL — SIGNIFICANT CHANGE UP (ref 7–23)
CALCIUM SERPL-MCNC: 8.4 MG/DL — SIGNIFICANT CHANGE UP (ref 8.4–10.5)
CALCIUM SERPL-MCNC: 8.6 MG/DL — SIGNIFICANT CHANGE UP (ref 8.4–10.5)
CHLORIDE SERPL-SCNC: 104 MMOL/L — SIGNIFICANT CHANGE UP (ref 98–107)
CHLORIDE SERPL-SCNC: 104 MMOL/L — SIGNIFICANT CHANGE UP (ref 98–107)
CO2 SERPL-SCNC: 26 MMOL/L — SIGNIFICANT CHANGE UP (ref 22–31)
CO2 SERPL-SCNC: 27 MMOL/L — SIGNIFICANT CHANGE UP (ref 22–31)
CREAT SERPL-MCNC: 0.76 MG/DL — SIGNIFICANT CHANGE UP (ref 0.5–1.3)
CREAT SERPL-MCNC: 0.8 MG/DL — SIGNIFICANT CHANGE UP (ref 0.5–1.3)
EGFR: 93 ML/MIN/1.73M2 — SIGNIFICANT CHANGE UP
EGFR: 95 ML/MIN/1.73M2 — SIGNIFICANT CHANGE UP
GLUCOSE SERPL-MCNC: 101 MG/DL — HIGH (ref 70–99)
GLUCOSE SERPL-MCNC: 110 MG/DL — HIGH (ref 70–99)
HCT VFR BLD CALC: 27.7 % — LOW (ref 39–50)
HCT VFR BLD CALC: 27.8 % — LOW (ref 39–50)
HGB BLD-MCNC: 9.1 G/DL — LOW (ref 13–17)
HGB BLD-MCNC: 9.2 G/DL — LOW (ref 13–17)
INR BLD: 1.28 RATIO — HIGH (ref 0.88–1.16)
MAGNESIUM SERPL-MCNC: 1.8 MG/DL — SIGNIFICANT CHANGE UP (ref 1.6–2.6)
MAGNESIUM SERPL-MCNC: 2 MG/DL — SIGNIFICANT CHANGE UP (ref 1.6–2.6)
MCHC RBC-ENTMCNC: 30.6 PG — SIGNIFICANT CHANGE UP (ref 27–34)
MCHC RBC-ENTMCNC: 31.1 PG — SIGNIFICANT CHANGE UP (ref 27–34)
MCHC RBC-ENTMCNC: 32.9 GM/DL — SIGNIFICANT CHANGE UP (ref 32–36)
MCHC RBC-ENTMCNC: 33.1 GM/DL — SIGNIFICANT CHANGE UP (ref 32–36)
MCV RBC AUTO: 92.4 FL — SIGNIFICANT CHANGE UP (ref 80–100)
MCV RBC AUTO: 94.5 FL — SIGNIFICANT CHANGE UP (ref 80–100)
NRBC # BLD: 0 /100 WBCS — SIGNIFICANT CHANGE UP (ref 0–0)
NRBC # BLD: 0 /100 WBCS — SIGNIFICANT CHANGE UP (ref 0–0)
NRBC # FLD: 0 K/UL — SIGNIFICANT CHANGE UP (ref 0–0)
NRBC # FLD: 0 K/UL — SIGNIFICANT CHANGE UP (ref 0–0)
PLATELET # BLD AUTO: 135 K/UL — LOW (ref 150–400)
PLATELET # BLD AUTO: 153 K/UL — SIGNIFICANT CHANGE UP (ref 150–400)
POTASSIUM SERPL-MCNC: 3 MMOL/L — LOW (ref 3.5–5.3)
POTASSIUM SERPL-MCNC: 3.3 MMOL/L — LOW (ref 3.5–5.3)
POTASSIUM SERPL-SCNC: 3 MMOL/L — LOW (ref 3.5–5.3)
POTASSIUM SERPL-SCNC: 3.3 MMOL/L — LOW (ref 3.5–5.3)
PROT SERPL-MCNC: 5.7 G/DL — LOW (ref 6–8.3)
PROTHROM AB SERPL-ACNC: 14.9 SEC — HIGH (ref 10.5–13.4)
RBC # BLD: 2.93 M/UL — LOW (ref 4.2–5.8)
RBC # BLD: 3.01 M/UL — LOW (ref 4.2–5.8)
RBC # FLD: 13.3 % — SIGNIFICANT CHANGE UP (ref 10.3–14.5)
RBC # FLD: 13.8 % — SIGNIFICANT CHANGE UP (ref 10.3–14.5)
SODIUM SERPL-SCNC: 142 MMOL/L — SIGNIFICANT CHANGE UP (ref 135–145)
SODIUM SERPL-SCNC: 143 MMOL/L — SIGNIFICANT CHANGE UP (ref 135–145)
WBC # BLD: 7.29 K/UL — SIGNIFICANT CHANGE UP (ref 3.8–10.5)
WBC # BLD: 7.7 K/UL — SIGNIFICANT CHANGE UP (ref 3.8–10.5)
WBC # FLD AUTO: 7.29 K/UL — SIGNIFICANT CHANGE UP (ref 3.8–10.5)
WBC # FLD AUTO: 7.7 K/UL — SIGNIFICANT CHANGE UP (ref 3.8–10.5)

## 2023-05-05 PROCEDURE — 43235 EGD DIAGNOSTIC BRUSH WASH: CPT | Mod: GC,59

## 2023-05-05 PROCEDURE — 45378 DIAGNOSTIC COLONOSCOPY: CPT | Mod: GC

## 2023-05-05 PROCEDURE — 99233 SBSQ HOSP IP/OBS HIGH 50: CPT

## 2023-05-05 RX ORDER — POTASSIUM CHLORIDE 20 MEQ
10 PACKET (EA) ORAL
Refills: 0 | Status: DISCONTINUED | OUTPATIENT
Start: 2023-05-05 | End: 2023-05-05

## 2023-05-05 RX ORDER — POTASSIUM CHLORIDE 20 MEQ
10 PACKET (EA) ORAL
Refills: 0 | Status: COMPLETED | OUTPATIENT
Start: 2023-05-05 | End: 2023-05-05

## 2023-05-05 RX ADMIN — ATENOLOL 50 MILLIGRAM(S): 25 TABLET ORAL at 05:33

## 2023-05-05 RX ADMIN — TAMSULOSIN HYDROCHLORIDE 0.4 MILLIGRAM(S): 0.4 CAPSULE ORAL at 21:55

## 2023-05-05 RX ADMIN — FINASTERIDE 5 MILLIGRAM(S): 5 TABLET, FILM COATED ORAL at 17:25

## 2023-05-05 RX ADMIN — MONTELUKAST 10 MILLIGRAM(S): 4 TABLET, CHEWABLE ORAL at 21:55

## 2023-05-05 RX ADMIN — Medication 100 MILLIEQUIVALENT(S): at 18:57

## 2023-05-05 RX ADMIN — PANTOPRAZOLE SODIUM 40 MILLIGRAM(S): 20 TABLET, DELAYED RELEASE ORAL at 17:25

## 2023-05-05 RX ADMIN — Medication 100 MILLIEQUIVALENT(S): at 19:59

## 2023-05-05 RX ADMIN — PANTOPRAZOLE SODIUM 40 MILLIGRAM(S): 20 TABLET, DELAYED RELEASE ORAL at 05:33

## 2023-05-05 RX ADMIN — Medication 100 MILLIEQUIVALENT(S): at 17:25

## 2023-05-05 RX ADMIN — ATORVASTATIN CALCIUM 10 MILLIGRAM(S): 80 TABLET, FILM COATED ORAL at 21:55

## 2023-05-05 RX ADMIN — Medication 100 MILLIEQUIVALENT(S): at 03:38

## 2023-05-05 RX ADMIN — Medication 100 MILLIEQUIVALENT(S): at 04:31

## 2023-05-05 RX ADMIN — Medication 5 MILLIGRAM(S): at 21:55

## 2023-05-05 RX ADMIN — Medication 100 MILLIEQUIVALENT(S): at 05:32

## 2023-05-05 NOTE — PROGRESS NOTE ADULT - ASSESSMENT
73M with GIB    Colonoscopy with pandiverticulosis and possible ectasia in rectum, EGD negative  No ongoing bleeding noted  monitor Hb  Advance diet as tolerated  If rebleed consider IR eval  No surgical intervention at this time

## 2023-05-05 NOTE — DISCHARGE NOTE PROVIDER - NSDCCPCAREPLAN_GEN_ALL_CORE_FT
PRINCIPAL DISCHARGE DIAGNOSIS  Diagnosis: GI bleed  Assessment and Plan of Treatment: You were noted to have blood in stool likely due to diverticular bleed. You underwent colonoscopy. you refused surgical intervention. Please follow up with GI as outpatient.      SECONDARY DISCHARGE DIAGNOSES  Diagnosis: Atrial flutter  Assessment and Plan of Treatment: You were found to have aflutter. typically treated with anticoagulation to prevent stroke however given bleeding, blood thinners were not started. Recommend you follow up with cardiology as outpatient.     PRINCIPAL DISCHARGE DIAGNOSIS  Diagnosis: GI bleed  Assessment and Plan of Treatment: You were noted to have blood in stool likely due to diverticular bleed. You underwent EGD and colonoscopy. No active bleeding seen. You refused surgical intervention. Please follow up with GI as outpatient. Take miralax daily to avoid constipation.      SECONDARY DISCHARGE DIAGNOSES  Diagnosis: Atrial flutter  Assessment and Plan of Treatment: You were found to have aflutter. typically treated with anticoagulation to prevent stroke however given bleeding, blood thinners were not started. Recommend you follow up with cardiology as outpatient.     PRINCIPAL DISCHARGE DIAGNOSIS  Diagnosis: GI bleed  Assessment and Plan of Treatment: You were noted to have blood in stool likely due to diverticular bleed. You underwent EGD and colonoscopy. No active bleeding seen. You refused surgical intervention. Please follow up with GI as outpatient. Take miralax daily to avoid constipation. Follow up at the [facility] to arrange appointment; [phone] ([facility] at [address] [place] [address].      SECONDARY DISCHARGE DIAGNOSES  Diagnosis: Atrial flutter  Assessment and Plan of Treatment: You were found to have aflutter. typically treated with anticoagulation to prevent stroke however given bleeding, blood thinners were not started. Recommend you follow up with cardiology as outpatient.

## 2023-05-05 NOTE — DISCHARGE NOTE PROVIDER - PROVIDER TOKENS
PROVIDER:[TOKEN:[[id number]:[facility]:[id number]]] PROVIDER:[TOKEN:[[id number]:[facility]:[id number]]],PROVIDER:[TOKEN:[[id number]]]

## 2023-05-05 NOTE — DISCHARGE NOTE PROVIDER - NSDCMRMEDTOKEN_GEN_ALL_CORE_FT
albuterol sulfate HFA 90 mcg/actuation aerosol inhaler:   atenolol 50 mg-chlorthalidone 25 mg tablet:   atorvastatin 10 mg oral tablet: 1 tab(s) orally once a day  celecoxib 200 mg capsule:   dilTIAZem 240 mg/24 hours oral capsule, extended release: 1 cap(s) orally once a day  finasteride 5 mg oral tablet: 1 tab(s) orally once a day  montelukast 10 mg tablet:   potassium chloride 20 mEq oral powder for reconstitution: 1 each orally once a day  tamsulosin 0.4 mg oral capsule: 1 cap(s) orally once a day   albuterol 90 mcg/inh inhalation aerosol: 1 puff(s) inhaled 4 times a day As needed Shortness of Breath  atenolol 50 mg-chlorthalidone 25 mg tablet:   atorvastatin 10 mg oral tablet: 1 tab(s) orally once a day (at bedtime)  dilTIAZem 240 mg/24 hours oral capsule, extended release: 1 cap(s) orally once a day  finasteride 5 mg oral tablet: 1 tab(s) orally once a day  montelukast 10 mg oral tablet: 1 tab(s) orally once a day (at bedtime)  polyethylene glycol 3350 oral powder for reconstitution: 17 gram(s) orally once a day  potassium chloride 20 mEq oral powder for reconstitution: 1 each orally once a day  tamsulosin 0.4 mg oral capsule: 1 cap(s) orally once a day (at bedtime)

## 2023-05-05 NOTE — DISCHARGE NOTE PROVIDER - HOSPITAL COURSE
73M w/ h/o prostate cancer (s/p XRT), HTN, asthma, and h/o presumed diverticular bleeding (admitted 5/2021) presenting with hematochezia x1 day, most likely recurrent diverticular bleed. Seen by surgery and GI. Patient does not want any surgical intervention. Underwent colonoscopy on 5/5. Noted to have new aflutter. Seen by cardiologist. His hgb was monitored. 73M w/ h/o prostate cancer (s/p XRT), HTN, asthma, and h/o presumed diverticular bleeding (admitted 5/2021) presenting with hematochezia x1 day, most likely recurrent diverticular bleed. Seen by surgery and GI. Patient does not want any surgical intervention. Underwent EGD and colonoscopy on 5/5. No active bleeding seen. Cleared for discharge from GI perspective. Noted to have new aflutter this admission. Patient asymptomatic and rate remained controlled. Offer cardiology evaluation however patient refused and prefers to be discharged with outpatient follow up. Discussed risk of stroke with him given afib. Also discussed risk vs benefit of a/c. Patient understands that ac can make bleeding worse. I recommended outpatient follow up with pcp and cardiologist to continue to have risk vs benefit discussion of anticoagulation.

## 2023-05-05 NOTE — DISCHARGE NOTE PROVIDER - CARE PROVIDER_API CALL
Riki Desai  Internal Medicine  [address] [place] [address]  Phone: [phone]  Fax: [phone]  Follow Up Time:    Riki Desai  Internal Medicine  [address] [place] [address]  Phone: [phone]  Fax: [phone]  Follow Up Time:     Panchito Alfaro)  Cardiovascular Disease; Internal Medicine  [address] [place] [id number]  Phone: [phone]  Fax: [phone]  Follow Up Time:

## 2023-05-05 NOTE — DISCHARGE NOTE PROVIDER - NSFOLLOWUPCLINICS_GEN_ALL_ED_FT
[facility]  Gastroenterology  [address] [place] [address]  Phone: [phone]  Fax:   Follow Up Time: 1 week

## 2023-05-06 ENCOUNTER — TRANSCRIPTION ENCOUNTER (OUTPATIENT)
Age: 73
End: 2023-05-06

## 2023-05-06 VITALS
HEART RATE: 77 BPM | TEMPERATURE: 98 F | RESPIRATION RATE: 18 BRPM | SYSTOLIC BLOOD PRESSURE: 115 MMHG | OXYGEN SATURATION: 98 % | DIASTOLIC BLOOD PRESSURE: 71 MMHG

## 2023-05-06 LAB
ANION GAP SERPL CALC-SCNC: 13 MMOL/L — SIGNIFICANT CHANGE UP (ref 7–14)
BUN SERPL-MCNC: 6 MG/DL — LOW (ref 7–23)
CALCIUM SERPL-MCNC: 8.7 MG/DL — SIGNIFICANT CHANGE UP (ref 8.4–10.5)
CHLORIDE SERPL-SCNC: 103 MMOL/L — SIGNIFICANT CHANGE UP (ref 98–107)
CO2 SERPL-SCNC: 25 MMOL/L — SIGNIFICANT CHANGE UP (ref 22–31)
CREAT SERPL-MCNC: 0.76 MG/DL — SIGNIFICANT CHANGE UP (ref 0.5–1.3)
EGFR: 95 ML/MIN/1.73M2 — SIGNIFICANT CHANGE UP
GLUCOSE SERPL-MCNC: 120 MG/DL — HIGH (ref 70–99)
HCT VFR BLD CALC: 28.2 % — LOW (ref 39–50)
HGB BLD-MCNC: 9.3 G/DL — LOW (ref 13–17)
MCHC RBC-ENTMCNC: 31.2 PG — SIGNIFICANT CHANGE UP (ref 27–34)
MCHC RBC-ENTMCNC: 33 GM/DL — SIGNIFICANT CHANGE UP (ref 32–36)
MCV RBC AUTO: 94.6 FL — SIGNIFICANT CHANGE UP (ref 80–100)
NRBC # BLD: 0 /100 WBCS — SIGNIFICANT CHANGE UP (ref 0–0)
NRBC # FLD: 0.02 K/UL — HIGH (ref 0–0)
PLATELET # BLD AUTO: 148 K/UL — LOW (ref 150–400)
POTASSIUM SERPL-MCNC: 3.2 MMOL/L — LOW (ref 3.5–5.3)
POTASSIUM SERPL-SCNC: 3.2 MMOL/L — LOW (ref 3.5–5.3)
RBC # BLD: 2.98 M/UL — LOW (ref 4.2–5.8)
RBC # FLD: 13.9 % — SIGNIFICANT CHANGE UP (ref 10.3–14.5)
SODIUM SERPL-SCNC: 141 MMOL/L — SIGNIFICANT CHANGE UP (ref 135–145)
WBC # BLD: 5.79 K/UL — SIGNIFICANT CHANGE UP (ref 3.8–10.5)
WBC # FLD AUTO: 5.79 K/UL — SIGNIFICANT CHANGE UP (ref 3.8–10.5)

## 2023-05-06 PROCEDURE — 99239 HOSP IP/OBS DSCHRG MGMT >30: CPT

## 2023-05-06 PROCEDURE — 99232 SBSQ HOSP IP/OBS MODERATE 35: CPT | Mod: GC

## 2023-05-06 RX ORDER — TAMSULOSIN HYDROCHLORIDE 0.4 MG/1
1 CAPSULE ORAL
Qty: 0 | Refills: 0 | DISCHARGE

## 2023-05-06 RX ORDER — DILTIAZEM HCL 120 MG
1 CAPSULE, EXT RELEASE 24 HR ORAL
Qty: 0 | Refills: 0 | DISCHARGE
Start: 2023-05-06

## 2023-05-06 RX ORDER — CELECOXIB 200 MG/1
0 CAPSULE ORAL
Qty: 0 | Refills: 0 | DISCHARGE

## 2023-05-06 RX ORDER — MONTELUKAST 4 MG/1
0 TABLET, CHEWABLE ORAL
Qty: 0 | Refills: 1 | DISCHARGE

## 2023-05-06 RX ORDER — MONTELUKAST 4 MG/1
1 TABLET, CHEWABLE ORAL
Qty: 0 | Refills: 0 | DISCHARGE
Start: 2023-05-06

## 2023-05-06 RX ORDER — ALBUTEROL 90 UG/1
1 AEROSOL, METERED ORAL
Qty: 0 | Refills: 0 | DISCHARGE
Start: 2023-05-06

## 2023-05-06 RX ORDER — POLYETHYLENE GLYCOL 3350 17 G/17G
17 POWDER, FOR SOLUTION ORAL
Qty: 119 | Refills: 0
Start: 2023-05-06 | End: 2023-05-12

## 2023-05-06 RX ORDER — ATORVASTATIN CALCIUM 80 MG/1
1 TABLET, FILM COATED ORAL
Qty: 0 | Refills: 0 | DISCHARGE
Start: 2023-05-06

## 2023-05-06 RX ORDER — ALBUTEROL 90 UG/1
0 AEROSOL, METERED ORAL
Qty: 0 | Refills: 0 | DISCHARGE

## 2023-05-06 RX ORDER — DILTIAZEM HCL 120 MG
240 CAPSULE, EXT RELEASE 24 HR ORAL DAILY
Refills: 0 | Status: DISCONTINUED | OUTPATIENT
Start: 2023-05-06 | End: 2023-05-06

## 2023-05-06 RX ORDER — POLYETHYLENE GLYCOL 3350 17 G/17G
17 POWDER, FOR SOLUTION ORAL DAILY
Refills: 0 | Status: DISCONTINUED | OUTPATIENT
Start: 2023-05-06 | End: 2023-05-06

## 2023-05-06 RX ORDER — ATORVASTATIN CALCIUM 80 MG/1
1 TABLET, FILM COATED ORAL
Qty: 0 | Refills: 0 | DISCHARGE

## 2023-05-06 RX ORDER — FINASTERIDE 5 MG/1
1 TABLET, FILM COATED ORAL
Qty: 0 | Refills: 0 | DISCHARGE

## 2023-05-06 RX ORDER — POTASSIUM CHLORIDE 20 MEQ
40 PACKET (EA) ORAL ONCE
Refills: 0 | Status: COMPLETED | OUTPATIENT
Start: 2023-05-06 | End: 2023-05-06

## 2023-05-06 RX ORDER — FINASTERIDE 5 MG/1
1 TABLET, FILM COATED ORAL
Qty: 0 | Refills: 0 | DISCHARGE
Start: 2023-05-06

## 2023-05-06 RX ORDER — DILTIAZEM HCL 120 MG
1 CAPSULE, EXT RELEASE 24 HR ORAL
Qty: 0 | Refills: 0 | DISCHARGE

## 2023-05-06 RX ORDER — TAMSULOSIN HYDROCHLORIDE 0.4 MG/1
1 CAPSULE ORAL
Qty: 0 | Refills: 0 | DISCHARGE
Start: 2023-05-06

## 2023-05-06 RX ADMIN — FINASTERIDE 5 MILLIGRAM(S): 5 TABLET, FILM COATED ORAL at 09:11

## 2023-05-06 RX ADMIN — PANTOPRAZOLE SODIUM 40 MILLIGRAM(S): 20 TABLET, DELAYED RELEASE ORAL at 05:48

## 2023-05-06 RX ADMIN — Medication 40 MILLIEQUIVALENT(S): at 11:54

## 2023-05-06 RX ADMIN — ATENOLOL 50 MILLIGRAM(S): 25 TABLET ORAL at 05:47

## 2023-05-06 RX ADMIN — Medication 240 MILLIGRAM(S): at 09:12

## 2023-05-06 NOTE — PROGRESS NOTE ADULT - PROBLEM SELECTOR PLAN 3
- Holding home diltiazem given GIB  -Restart atenolol
- Holding home diltiazem given GIB  -Restart atenolol
-restart home diltiazem   -Restart atenolol

## 2023-05-06 NOTE — PROGRESS NOTE ADULT - PROBLEM SELECTOR PROBLEM 5
Asthma without acute exacerbation

## 2023-05-06 NOTE — PROGRESS NOTE ADULT - PROBLEM SELECTOR PLAN 2
-New atrial flutter with variable block on admission EKG. No known prior history of atrial fibrillation or flutter.  - Rates have been controlled since admission.  - Monitor on telemetry  - Holding off on anticoagulation given active GIB ([id number] is 2 -- age and HTN)  - Holding home diltiazem but restart home atenolol today   - If flutter persists and patient can be safely anticoagulated would consider APRIL/cardioversion
-New atrial flutter with variable block on admission EKG. No known prior history of atrial fibrillation or flutter.  - Rates have been controlled since admission.  - Monitor on telemetry  - Holding off on anticoagulation given active GIB ([id number] is 2 -- age and HTN)  - Holding home diltiazem but restart home atenolol   - no active chest pain, sob or palpitations  -TTE with normal LV function  - If flutter persists and patient can be safely anticoagulated would consider APRIL/cardioversion  - Cards Dr. Davis consulted.   -At this time medically optimized for planned colonoscopy.
-New atrial flutter with variable block on admission EKG. No known prior history of atrial fibrillation or flutter.  - Rates have been controlled since admission.  - Monitor on telemetry  - Holding off on anticoagulation given active GIB ([id number] is 2 -- age and HTN)  - no active chest pain, sob or palpitations  -TTE with normal LV function  - If flutter persists and patient can be safely anticoagulated would consider APRIL/cardioversion  - Cards Dr. Davis consulted. Patient does not want to wait for cards evaluation today. Wants to go home  -Risk of afib discussed including stroke   -RIsk vs benefit of ac discussed. Understands that we will not be starting ac this admission given GIB. He needs to follow up with cards and pcp as outpatient.   -At this time medically optimized for planned colonoscopy.

## 2023-05-06 NOTE — DISCHARGE NOTE NURSING/CASE MANAGEMENT/SOCIAL WORK - PATIENT PORTAL LINK FT
You can access the FollowMyHealth Patient Portal offered by [facility] by registering at the following website: [email or web address]. By joining [facility] FollowMyHealth portal, you will also be able to view your health information using other applications (apps) compatible with our system.

## 2023-05-06 NOTE — PROGRESS NOTE ADULT - PROBLEM SELECTOR PLAN 4
- Continue home finasteride and tamsulosin

## 2023-05-06 NOTE — CONSULT NOTE ADULT - ASSESSMENT
73M w/ h/o prostate cancer (s/p XRT), HTN, asthma, and h/o presumed diverticular bleeding (admitted 5/2021) presenting with hematochezia x1 day, most likely recurrent diverticular bleed. Cardiology consulted for new aflutter.     EKG: Aflutter with variable block  Tele: Aflutter 70s    1. Aflutter  -new onset, rate 70s  -denies CP, SOB  -TTE mild-mod AR, grossly normal LV function  -will need outpt follow up for possible APRIL/DCCV if patient is candidate for AC. currently not a candidate 2/2 anemia requiring PRBC transfusions    2. 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 HTN  -controlled  -c/w atenolol and cardizem  -continue to monitor BP

## 2023-05-06 NOTE — PROGRESS NOTE ADULT - PROBLEM SELECTOR PLAN 5
- Continue home albuterol PRN  - Continue home montelukast

## 2023-05-06 NOTE — CONSULT NOTE ADULT - CONSULT REASON
Patient's GBNE=336. Administered 15 units Humalog SQ and notified Dr. Dean Gage. MD stated he would look over patient's chart. Will continue to monitor patient.
diverticular bleed
new aflutter
Hematochezia

## 2023-05-06 NOTE — PROGRESS NOTE ADULT - SUBJECTIVE AND OBJECTIVE BOX
Dr. Forrest [facility] medicine     Patient is a 73y old  Male who presents with a chief complaint of Hematochezia (04 May 2023 13:43)      SUBJECTIVE / OVERNIGHT EVENTS: Patient seen and examined. NO more BRBPR since yesterday morning. Denies any chest pain, SOB, palpitations. No cardiologist. NO prior h/o afib.    MEDICATIONS  (STANDING):  atorvastatin 10 milliGRAM(s) Oral at bedtime  finasteride 5 milliGRAM(s) Oral daily  montelukast 10 milliGRAM(s) Oral at bedtime  pantoprazole  Injectable 40 milliGRAM(s) IV Push two times a day  tamsulosin 0.4 milliGRAM(s) Oral at bedtime    MEDICATIONS  (PRN):  acetaminophen     Tablet .. 650 milliGRAM(s) Oral every 6 hours PRN Temp greater or equal to 38C (100.4F), Mild Pain (1 - 3)  albuterol    90 MICROgram(s) HFA Inhaler 1 Puff(s) Inhalation four times a day PRN Shortness of Breath  aluminum hydroxide/magnesium hydroxide/simethicone Suspension 30 milliLiter(s) Oral every 4 hours PRN Dyspepsia  melatonin 3 milliGRAM(s) Oral at bedtime PRN Insomnia  ondansetron Injectable 4 milliGRAM(s) IV Push every 8 hours PRN Nausea and/or Vomiting      Vital Signs Last 24 Hrs  T(C): 36.8 (05 May 2023 05:30), Max: 36.8 (04 May 2023 12:55)  T(F): 98.2 (05 May 2023 05:30), Max: 98.2 (04 May 2023 12:55)  HR: 87 (05 May 2023 05:30) (72 - 98)  BP: 124/84 (05 May 2023 05:30) (121/76 - 142/85)  BP(mean): --  RR: 16 (05 May 2023 05:30) (16 - 19)  SpO2: 100% (05 May 2023 05:30) (100% - 100%)    Parameters below as of 05 May 2023 05:30  Patient On (Oxygen Delivery Method): room air      PHYSICAL EXAM:  GENERAL: NAD, well-developed  HEAD:  Atraumatic, Normocephalic  EYES: EOMI, PERRLA, conjunctiva and sclera clear  NECK: Supple, No JVD  CHEST/LUNG: Clear to auscultation bilaterally; No wheeze  HEART: irregular; No murmurs, rubs, or gallops  ABDOMEN: Soft, Nontender, Nondistended; Bowel sounds present  EXTREMITIES:  2+ Peripheral Pulses, No clubbing, cyanosis, or edema  PSYCH: AAOx3  NEUROLOGY: non-focal  SKIN: No rashes or lesions    LABS:                                   9.1    7.29  )-----------( 135      ( 05 May 2023 02:00 )             27.7   05-05    143  |  104  |  8   ----------------------------<  110<H>  3.3<L>   |  27  |  0.80    Ca    8.6      05 May 2023 09:10  Phos  3.3     05-04  Mg     2.00     05-05    TPro  5.7<L>  /  Alb  3.4  /  TBili  1.0  /  DBili  x   /  AST  19  /  ALT  13  /  AlkPhos  56  05-05        RADIOLOGY & ADDITIONAL TESTS:    < from: Transthoracic Echocardiogram (05.04.23 @ 10:05) >  CONCLUSIONS:  1. Mitral annular calcification, otherwise normal mitral  valve. Mild mitral regurgitation.  2. Calcified trileaflet aortic valve with normal opening.  Mild-moderate aortic regurgitation.  3. Severely dilated left atrium.  LA volume index = 56  cc/m2.  4. Increased relative wall thickness with normal left  ventricular mass index, consistent with concentric left  ventricular remodeling.  5. Endocardium not well visualized; grossly normal left  ventricular systolic function.  Paradoxical septal wall  motion.  6. The right ventricle is not well visualized; grossly  normal right ventricular systolic function.  7. Estimated right ventricular systolic pressure equals 44  mm Hg, assuming right atrial pressure equals 10 mm Hg,  consistent with mild pulmonary hypertensio    < end of copied text >      Imaging Personally Reviewed:    Consultant(s) Notes Reviewed:  Surgery     Care Discussed with Consultants/Other Providers: surgery and GI and cards     Assessment and Plan:
Gastroenterology Progress Note    Interval Events:   Pt feeling well today with no ongoing nausea, vomiting or abdominal pain. Denies any further signs of overt bleeding with no episodes of hematochezxia after the proceudr e    Allergies:  No Known Allergies      Hospital Medications:  acetaminophen     Tablet .. 650 milliGRAM(s) Oral every 6 hours PRN  albuterol    90 MICROgram(s) HFA Inhaler 1 Puff(s) Inhalation four times a day PRN  aluminum hydroxide/magnesium hydroxide/simethicone Suspension 30 milliLiter(s) Oral every 4 hours PRN  atenolol  Tablet 50 milliGRAM(s) Oral daily  atorvastatin 10 milliGRAM(s) Oral at bedtime  bisacodyl 5 milliGRAM(s) Oral at bedtime  finasteride 5 milliGRAM(s) Oral daily  melatonin 3 milliGRAM(s) Oral at bedtime PRN  montelukast 10 milliGRAM(s) Oral at bedtime  ondansetron Injectable 4 milliGRAM(s) IV Push every 8 hours PRN  pantoprazole  Injectable 40 milliGRAM(s) IV Push two times a day  tamsulosin 0.4 milliGRAM(s) Oral at bedtime      ROS: 14 point ROS negative unless otherwise state in subjective    PHYSICAL EXAM:   Vital Signs:  Vital Signs Last 24 Hrs  T(C): 36.7 (06 May 2023 05:44), Max: 36.9 (05 May 2023 21:44)  T(F): 98 (06 May 2023 05:44), Max: 98.4 (05 May 2023 21:44)  HR: 86 (06 May 2023 05:44) (77 - 86)  BP: 143/88 (06 May 2023 05:44) (104/68 - 152/80)  BP(mean): --  RR: 18 (06 May 2023 05:44) (13 - 20)  SpO2: 97% (06 May 2023 05:44) (97% - 100%)    Parameters below as of 06 May 2023 05:44  Patient On (Oxygen Delivery Method): room air      Daily Height in cm: 180.3 (05 May 2023 12:55)    Daily     GENERAL:  No acute distress  HEENT:  NCA  CHEST: no resp distress  HEART:  irregular irregular   ABDOMEN:  Soft, non-tender, non-distended, normoactive bowel sounds  EXTREMITIES:  No  edema  NEURO:  Alert and oriented x 3    LABS:                        9.3    5.79  )-----------( 148      ( 06 May 2023 06:36 )             28.2     Mean Cell Volume: 94.6 fL (05-06-23 @ 06:36)    05-06    141  |  103  |  6<L>  ----------------------------<  120<H>  3.2<L>   |  25  |  0.76    Ca    8.7      06 May 2023 06:36  Mg     2.00     05-05    TPro  5.7<L>  /  Alb  3.4  /  TBili  1.0  /  DBili  x   /  AST  19  /  ALT  13  /  AlkPhos  56  05-05    LIVER FUNCTIONS - ( 05 May 2023 02:00 )  Alb: 3.4 g/dL / Pro: 5.7 g/dL / ALK PHOS: 56 U/L / ALT: 13 U/L / AST: 19 U/L / GGT: x           PT/INR - ( 05 May 2023 02:00 )   PT: 14.9 sec;   INR: 1.28 ratio   PTT - ( 05 May 2023 02:00 )  PTT:31.2 sec    Imaging:  < from: CT Abdomen and Pelvis w/ IV Cont (05.03.23 @ 12:03) >  FINDINGS:  LOWER CHEST: Right atrial enlargement.    LIVER: Within normal limits.  BILE DUCTS: Normal caliber.  GALLBLADDER: Within normal limits.  SPLEEN: Within normal limits.  PANCREAS: Within normal limits.  ADRENALS: 1.6 cm left adrenal adenoma.  KIDNEYS/URETERS: Left renal cysts.    BLADDER: Within normal limits.  REPRODUCTIVE ORGANS: Prostate gland is either atrophic or absent.    BOWEL: No bowel obstruction. Appendix is normal. Colonic diverticulosis.   Mural thickening and mild adjacent fatty stranding involving the rectum  PERITONEUM: No ascites.  VESSELS: Atherosclerotic changes.  RETROPERITONEUM/LYMPH NODES: No lymphadenopathy.  ABDOMINAL WALL: Small bilateral fat-containing inguinal hernias.  BONES: Degenerative changes.    IMPRESSION:  Mild proctitis. No evidence of active GI bleed.    < end of copied text >  < from: Upper Endoscopy (05.05.23 @ 13:28) >  Findings:       A small hiatal hernia was present.       The entire examined stomach was normal.       The first portion of the duodenum and second portion of the duodenum        were normal.                                                                                   Impression:          - Small hiatal hernia.                       - Normal stomach.                       - Normal first portion of the duodenum and second                        portion of the duodenum.                       - No specimens collected.                       - No finding to explain the source of anemia and                        hematochezia. It is likely related to diverticulosis.  Recommendation:      - Return patient to [facility] for ongoing care.                       - Can resume diet as tolerated.                       - If the patient re-bleeds, recommend obtaining CT angio                        A/P to localize the source and potential IR consult.                       - Monitor CBC and transfuse if hgb < 7.    < end of copied text >  < from: Colonoscopy (05.05.23 @ 13:05) >                                              Findings:       Many small and large-mouthed diverticula were found in the entire colon.        There was no evidence of diverticular bleeding.       A moderate amount of semi-liquid stool was found in the entire colon,        making visualization difficult. Lavage of the area was performed using a        moderate amount of normal saline, resulting in clearance with adequate        visualization.       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       Non-bleeding internal hemorrhoids were found during retroflexion. The        hemorrhoids were small.           Impression:          - Severe diverticulosis in the entire examined colon.                        There was no evidence of active diverticular bleeding.                       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 This may correlate to recent CT findings.                       - Non-bleeding internal hemorrhoids.                       - No specimens collected.                       - Hematochezia likely related to diverticular bleed                        which is resolved now. There was no active bleeding.  Recommendation:      - Return patient to hospital [facility] for ongoing care.                       - Please refer to endoscopy report.                       - IR consultation for possible embolization if recurrent                        hematochezia.    < end of copied text >          
DATE OF SERVICE: 05-05-23 @ 18:58    INTERVAL HPI/OVERNIGHT EVENTS:  Responded to transfusion, HD stable    MEDICATIONS  (STANDING):  atenolol  Tablet 50 milliGRAM(s) Oral daily  atorvastatin 10 milliGRAM(s) Oral at bedtime  bisacodyl 5 milliGRAM(s) Oral at bedtime  finasteride 5 milliGRAM(s) Oral daily  montelukast 10 milliGRAM(s) Oral at bedtime  pantoprazole  Injectable 40 milliGRAM(s) IV Push two times a day  potassium chloride  10 mEq/100 mL IVPB 10 milliEquivalent(s) IV Intermittent every 1 hour  tamsulosin 0.4 milliGRAM(s) Oral at bedtime    MEDICATIONS  (PRN):  acetaminophen     Tablet .. 650 milliGRAM(s) Oral every 6 hours PRN Temp greater or equal to 38C (100.4F), Mild Pain (1 - 3)  albuterol    90 MICROgram(s) HFA Inhaler 1 Puff(s) Inhalation four times a day PRN Shortness of Breath  aluminum hydroxide/magnesium hydroxide/simethicone Suspension 30 milliLiter(s) Oral every 4 hours PRN Dyspepsia  melatonin 3 milliGRAM(s) Oral at bedtime PRN Insomnia  ondansetron Injectable 4 milliGRAM(s) IV Push every 8 hours PRN Nausea and/or Vomiting      Vital Signs Last 24 Hrs  T(C): 36.8 (05 May 2023 17:20), Max: 36.8 (04 May 2023 19:15)  T(F): 98.2 (05 May 2023 17:20), Max: 98.2 (04 May 2023 19:15)  HR: 79 (05 May 2023 17:20) (72 - 87)  BP: 152/80 (05 May 2023 17:20) (104/68 - 152/80)  BP(mean): --  RR: 18 (05 May 2023 17:20) (13 - 19)  SpO2: 100% (05 May 2023 17:20) (98% - 100%)    Parameters below as of 05 May 2023 17:20  Patient On (Oxygen Delivery Method): room air      I&O's Detail        Physical Exam:  General: WN/WD NAD  Respiratory: airway patent, respirations unlabored, no increased WoB  Neurology: A&Ox3, nonfocal, [place] x 4  Abdominal: Soft NT/ND      LABS:                        9.2    7.70  )-----------( 153      ( 05 May 2023 17:35 )             27.8     05-05    143  |  104  |  8   ----------------------------<  110<H>  3.3<L>   |  27  |  0.80    Ca    8.6      05 May 2023 09:10  Phos  3.3     05-04  Mg     2.00     05-05    TPro  5.7<L>  /  Alb  3.4  /  TBili  1.0  /  DBili  x   /  AST  19  /  ALT  13  /  AlkPhos  56  05-05    PT/INR - ( 05 May 2023 02:00 )   PT: 14.9 sec;   INR: 1.28 ratio         PTT - ( 05 May 2023 02:00 )  PTT:31.2 sec      RADIOLOGY & ADDITIONAL STUDIES:
Gastroenterology/Hepatology Progress Note      Interval Events:     Patient had one large bloody bowel movement, hgb trended down. Denied abd pain, nausea or vomiting. No fevers or chills. This morning, patient was hesitant to repeat another colonoscopy, wanted to discuss with his wife.     Allergies:  No Known Allergies    Hospital Medications:  acetaminophen     Tablet .. 650 milliGRAM(s) Oral every 6 hours PRN  albuterol    90 MICROgram(s) HFA Inhaler 1 Puff(s) Inhalation four times a day PRN  aluminum hydroxide/magnesium hydroxide/simethicone Suspension 30 milliLiter(s) Oral every 4 hours PRN  atenolol  Tablet 50 milliGRAM(s) Oral daily  atorvastatin 10 milliGRAM(s) Oral at bedtime  finasteride 5 milliGRAM(s) Oral daily  melatonin 3 milliGRAM(s) Oral at bedtime PRN  montelukast 10 milliGRAM(s) Oral at bedtime  ondansetron Injectable 4 milliGRAM(s) IV Push every 8 hours PRN  pantoprazole  Injectable 40 milliGRAM(s) IV Push two times a day  tamsulosin 0.4 milliGRAM(s) Oral at bedtime      ROS: 14 point ROS negative unless otherwise state in subjective    PHYSICAL EXAM:   Vital Signs:  Vital Signs Last 24 Hrs  T(C): 36.6 (04 May 2023 13:10), Max: 37.1 (03 May 2023 20:53)  T(F): 97.8 (04 May 2023 13:10), Max: 98.7 (03 May 2023 20:53)  HR: 98 (04 May 2023 13:10) (82 - 98)  BP: 121/76 (04 May 2023 13:10) (99/76 - 142/85)  BP(mean): 84 (03 May 2023 15:30) (84 - 84)  RR: 17 (04 May 2023 13:10) (15 - 18)  SpO2: 100% (04 May 2023 13:10) (99% - 100%)    Parameters below as of 04 May 2023 13:10  Patient On (Oxygen Delivery Method): room air      Daily Height in cm: 180.34 (03 May 2023 17:04)    Daily     PHYSICAL EXAM:     GENERAL:  No acute distress, elderly male, lying in bed.   HEENT:  NCAT, no scleral icterus   CHEST:  no respiratory distress  ABDOMEN:  Soft, non-tender, non-distended,  no masses  EXTREMITIES: No LE edema  NEURO:  Alert and oriented x 3, no tremors.       LABS:                        7.2    7.00  )-----------( 141      ( 04 May 2023 09:10 )             22.6     Mean Cell Volume: 95.4 fL (05-04-23 @ 09:10)    05-04    139  |  103  |  21  ----------------------------<  148<H>  3.1<L>   |  24  |  0.90    Ca    8.4      04 May 2023 02:16  Phos  3.3     05-04  Mg     2.20     05-04    TPro  6.3  /  Alb  3.7  /  TBili  0.6  /  DBili  x   /  AST  17  /  ALT  14  /  AlkPhos  56  05-03    LIVER FUNCTIONS - ( 03 May 2023 08:55 )  Alb: 3.7 g/dL / Pro: 6.3 g/dL / ALK PHOS: 56 U/L / ALT: 14 U/L / AST: 17 U/L / GGT: x           PT/INR - ( 03 May 2023 08:55 )   PT: 14.0 sec;   INR: 1.20 ratio         PTT - ( 03 May 2023 08:55 )  PTT:35.3 sec          Imaging:      CT A/P    PROCEDURE:  CT of the Abdomen and Pelvis was performed.  Precontrast, Arterial and Delayed phases were performed.  Sagittal and coronal reformats were performed.    FINDINGS:  LOWER CHEST: Right atrial enlargement.    LIVER: Within normal limits.  BILE DUCTS: Normal caliber.  GALLBLADDER: Within normal limits.  SPLEEN: Within normal limits.  PANCREAS: Within normal limits.  ADRENALS: 1.6 cm left adrenal adenoma.  KIDNEYS/URETERS: Left renal cysts.    BLADDER: Within normal limits.  REPRODUCTIVE ORGANS: Prostate gland is either atrophic or absent.    BOWEL: No bowel obstruction. Appendix is normal. Colonic diverticulosis.   Mural thickening and mild adjacent fatty stranding involving the rectum  PERITONEUM: No ascites.  VESSELS: Atherosclerotic changes.  RETROPERITONEUM/LYMPH NODES: No lymphadenopathy.  ABDOMINAL WALL: Small bilateral fat-containing inguinal hernias.  BONES: Degenerative changes.    IMPRESSION:  Mild proctitis. No evidence of active GI bleed.      
Dr. Forrest [facility] medicine     Patient is a 73y old  Male who presents with a chief complaint of Hematochezia (04 May 2023 13:43)      SUBJECTIVE / OVERNIGHT EVENTS: Patient seen and examined. had 1 episode of BRBPR this morning. No dizziness. NO chest pain or SOB. Interested in discussing options with surgical team.     MEDICATIONS  (STANDING):  atorvastatin 10 milliGRAM(s) Oral at bedtime  finasteride 5 milliGRAM(s) Oral daily  montelukast 10 milliGRAM(s) Oral at bedtime  pantoprazole  Injectable 40 milliGRAM(s) IV Push two times a day  tamsulosin 0.4 milliGRAM(s) Oral at bedtime    MEDICATIONS  (PRN):  acetaminophen     Tablet .. 650 milliGRAM(s) Oral every 6 hours PRN Temp greater or equal to 38C (100.4F), Mild Pain (1 - 3)  albuterol    90 MICROgram(s) HFA Inhaler 1 Puff(s) Inhalation four times a day PRN Shortness of Breath  aluminum hydroxide/magnesium hydroxide/simethicone Suspension 30 milliLiter(s) Oral every 4 hours PRN Dyspepsia  melatonin 3 milliGRAM(s) Oral at bedtime PRN Insomnia  ondansetron Injectable 4 milliGRAM(s) IV Push every 8 hours PRN Nausea and/or Vomiting      Vital Signs Last 24 Hrs  T(C): 36.6 (04 May 2023 13:10), Max: 37.1 (03 May 2023 20:53)  T(F): 97.8 (04 May 2023 13:10), Max: 98.7 (03 May 2023 20:53)  HR: 98 (04 May 2023 13:10) (82 - 98)  BP: 121/76 (04 May 2023 13:10) (99/76 - 142/85)  BP(mean): 84 (03 May 2023 15:30) (84 - 84)  RR: 17 (04 May 2023 13:10) (15 - 18)  SpO2: 100% (04 May 2023 13:10) (99% - 100%)    Parameters below as of 04 May 2023 13:10  Patient On (Oxygen Delivery Method): room air        PHYSICAL EXAM:  GENERAL: NAD, well-developed  HEAD:  Atraumatic, Normocephalic  EYES: EOMI, PERRLA, conjunctiva and sclera clear  NECK: Supple, No JVD  CHEST/LUNG: Clear to auscultation bilaterally; No wheeze  HEART: Regular rate and rhythm; No murmurs, rubs, or gallops  ABDOMEN: Soft, Nontender, Nondistended; Bowel sounds present  EXTREMITIES:  2+ Peripheral Pulses, No clubbing, cyanosis, or edema  PSYCH: AAOx3  NEUROLOGY: non-focal  SKIN: No rashes or lesions    LABS:                        7.2    7.00  )-----------( 141      ( 04 May 2023 09:10 )             22.6     05-04    139  |  103  |  21  ----------------------------<  148<H>  3.1<L>   |  24  |  0.90    Ca    8.4      04 May 2023 02:16  Phos  3.3     05-04  Mg     2.20     05-04    TPro  6.3  /  Alb  3.7  /  TBili  0.6  /  DBili  x   /  AST  17  /  ALT  14  /  AlkPhos  56  05-03    PT/INR - ( 03 May 2023 08:55 )   PT: 14.0 sec;   INR: 1.20 ratio         PTT - ( 03 May 2023 08:55 )  PTT:35.3 sec      RADIOLOGY & ADDITIONAL TESTS:    Imaging Personally Reviewed:    Consultant(s) Notes Reviewed:  Surgery     Care Discussed with Consultants/Other Providers: surgery     Assessment and Plan:
Dr. Forrest [facility] medicine     Patient is a 73y old  Male who presents with a chief complaint of Hematochezia (04 May 2023 13:43)      SUBJECTIVE / OVERNIGHT EVENTS: Patient seen and examined. NO more BRBPR since yesterday morning. Denies any chest pain, SOB, palpitations. No cardiologist. NO prior h/o afib. Discussed risk of afib in terms of stroke. Discussed risk of bleeding with anticoagulation. Offered cards evaluation but patient does not want to stay for cards evaluation. Wants to go home. States that he will follow up with PCP. Understands risks of ac high given recurent GIB.     MEDICATIONS  (STANDING):  atorvastatin 10 milliGRAM(s) Oral at bedtime  finasteride 5 milliGRAM(s) Oral daily  montelukast 10 milliGRAM(s) Oral at bedtime  pantoprazole  Injectable 40 milliGRAM(s) IV Push two times a day  tamsulosin 0.4 milliGRAM(s) Oral at bedtime    MEDICATIONS  (PRN):  acetaminophen     Tablet .. 650 milliGRAM(s) Oral every 6 hours PRN Temp greater or equal to 38C (100.4F), Mild Pain (1 - 3)  albuterol    90 MICROgram(s) HFA Inhaler 1 Puff(s) Inhalation four times a day PRN Shortness of Breath  aluminum hydroxide/magnesium hydroxide/simethicone Suspension 30 milliLiter(s) Oral every 4 hours PRN Dyspepsia  melatonin 3 milliGRAM(s) Oral at bedtime PRN Insomnia  ondansetron Injectable 4 milliGRAM(s) IV Push every 8 hours PRN Nausea and/or Vomiting      Vital Signs Last 24 Hrs  T(C): 36.5 (06 May 2023 08:46), Max: 36.9 (05 May 2023 21:44)  T(F): 97.7 (06 May 2023 08:46), Max: 98.4 (05 May 2023 21:44)  HR: 77 (06 May 2023 08:46) (77 - 86)  BP: 115/71 (06 May 2023 08:46) (104/68 - 152/80)  BP(mean): --  RR: 18 (06 May 2023 08:46) (13 - 20)  SpO2: 98% (06 May 2023 08:46) (97% - 100%)    Parameters below as of 06 May 2023 08:46  Patient On (Oxygen Delivery Method): room air    PHYSICAL EXAM:  GENERAL: NAD, well-developed  HEAD:  Atraumatic, Normocephalic  EYES: EOMI, PERRLA, conjunctiva and sclera clear  NECK: Supple, No JVD  CHEST/LUNG: Clear to auscultation bilaterally; No wheeze  HEART: irregular; No murmurs, rubs, or gallops  ABDOMEN: Soft, Nontender, Nondistended; Bowel sounds present  EXTREMITIES:  2+ Peripheral Pulses, No clubbing, cyanosis, or edema  PSYCH: AAOx3  NEUROLOGY: non-focal  SKIN: No rashes or lesions    LABS:                                   9.3    5.79  )-----------( 148      ( 06 May 2023 06:36 )             28.2   05-06    141  |  103  |  6<L>  ----------------------------<  120<H>  3.2<L>   |  25  |  0.76    Ca    8.7      06 May 2023 06:36  Mg     2.00     05-05    TPro  5.7<L>  /  Alb  3.4  /  TBili  1.0  /  DBili  x   /  AST  19  /  ALT  13  /  AlkPhos  56  05-05      RADIOLOGY & ADDITIONAL TESTS:    < from: Transthoracic Echocardiogram (05.04.23 @ 10:05) >  CONCLUSIONS:  1. Mitral annular calcification, otherwise normal mitral  valve. Mild mitral regurgitation.  2. Calcified trileaflet aortic valve with normal opening.  Mild-moderate aortic regurgitation.  3. Severely dilated left atrium.  LA volume index = 56  cc/m2.  4. Increased relative wall thickness with normal left  ventricular mass index, consistent with concentric left  ventricular remodeling.  5. Endocardium not well visualized; grossly normal left  ventricular systolic function.  Paradoxical septal wall  motion.  6. The right ventricle is not well visualized; grossly  normal right ventricular systolic function.  7. Estimated right ventricular systolic pressure equals 44  mm Hg, assuming right atrial pressure equals 10 mm Hg,  consistent with mild pulmonary hypertensio    < end of copied text >      Imaging Personally Reviewed:    Consultant(s) Notes Reviewed:  Surgery     Care Discussed with Consultants/Other Providers: surgery and GI and cards     Assessment and Plan:

## 2023-05-06 NOTE — DISCHARGE NOTE NURSING/CASE MANAGEMENT/SOCIAL WORK - [id number]_GEN_ALL_CORE
For information on Fall & Injury Prevention, visit: [email or web address] OR  [email or web address] OR  https://www.cdc.gov/steadi/patient.html

## 2023-05-06 NOTE — PROGRESS NOTE ADULT - PROBLEM SELECTOR PLAN 1
-Presenting with ~10 episodes of hematochezia over the day prior to admission. Initially felt lightheaded, but now resolved.  -Most likely recurrent diverticular bleed given history and clinical story. Unlikely upper GI bleed given no melena - though note pt was treated for H pylori in 2021.  -hgb dropped from 9.6 to 7.2. S/p 1 unit PRBC on 5/4  - Trend CBC. HGb 9.1  - Active type and screen  - Transfuse for Hgb < 7  - H pylori stool Ag to confirm completely treated  -GI consulted. Plan for colonoscopy today   -Seen by surgery given recurrent diverticular bleed and also colonic stricture. At this time no intervention planned as patient is not interested in surgery and no colon stricture noted in CT abdomen.
-Presenting with ~10 episodes of hematochezia over the day prior to admission. Initially felt lightheaded, but now resolved.  -Most likely recurrent diverticular bleed given history and clinical story. Unlikely upper GI bleed given no melena - though note pt was treated for H pylori in 2021.  -hgb dropped from 9.6 to 7.2. S/p 1 unit PRBC on 5/4  - Trend CBC. HGb 9.1  - Active type and screen  - Transfuse for Hgb < 7  - H pylori stool Ag to confirm completely treated  -GI consulted. s/p EGD/Colon on 5/6. No signs of active bleed.   -patient requesting to be discharged home today. Will follow up as outpatient.   -Seen by surgery given recurrent diverticular bleed and also colonic stricture. At this time no intervention planned as patient is not interested in surgery and no colon stricture noted in CT abdomen.
-Presenting with ~10 episodes of hematochezia over the day prior to admission. This morning another episode. Initially felt lightheaded, but now resolved. Most likely recurrent diverticular bleed given history and clinical story. Unlikely upper GI bleed given no melena - though note pt was treated for H pylori in 2021.  -hgb dropped from 9.6 to 7.2. Plan to transfuse 1 unit PRBC today  - Trend CBC  - Clear liquid diet  - Active type and screen  - Transfuse for Hgb < 7  - H pylori stool Ag to confirm completely treated  -GI consulted. IF recurrent bleed, can repeat colonoscopy.  -Seen by surgery given recurrent diverticular bleed and also colonic stricture. At this time no intervention planned as patient is not interested in surgery and no colon stricture noted in CT abdomen.

## 2023-05-06 NOTE — PROGRESS NOTE ADULT - ATTENDING COMMENTS
Ongoing hematochezia reported with downtrending Hgb. Remains HD stable. Patient now amenable to colonoscopy.    # Rectal bleeding: Patient with reports of rectal bleeding, blood mixed with brown stool. Has had prior colonoscopies with evidence of diverticulosis. Recent CT on this admission with mild proctitis. 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 Differential also includes diverticular bleed vs hemorrhoids vs less likely angioectasias or polyps/masses.   # H pylori s/p quad therapy    --Pursue colonoscopy tomorrow 5/5 for further evaluation of hematochezia if medically optimized. Please ensure Hgb > 7 and electrolytes corrected.   --Clear liquid diet today, NPO at midnight  --If HD instability, may benefit from CTA and possible IR embolization  --If not already performed, should have H pylori stool Ag (or urea breath testing) to confirm H pylori eradication. This can be performed as outpatient    Additional recommendations as above. Please reach out with further questions/concerns.
Agree with above  Bleeding resolved.

## 2023-05-06 NOTE — PROGRESS NOTE ADULT - PROBLEM SELECTOR PLAN 7
- SCDs given GIB  - Clear liquid diet
- SCDs given GIB    Dc pending Colonoscopy and stabilization of GIB.
- SCDs given GIB  DC home today. Time spent 50 mins  Stressed importance of outpatient follow up

## 2023-05-06 NOTE — PROGRESS NOTE ADULT - ASSESSMENT
#Hematochezia  Imaging showed mild proctitis. Hgb stable around 9.6, at his baseline. Brown stool mixed with blood on RONNIE. Prior colonoscopy in 2021 showed diverticulosis with no active bleeding. Patient had a repeat colonoscopy last year in 2022, had removal of polyps, no records. CT A/P showed mild proctitis.   - Had persistent bloody stools, w/ downtrending hgb levels, after discussion w/ the patient, and wife, would like to proceed with colonoscopy   - s/p colonoscopy on 5/5/2023 with severe diverticulosis in the entire examined colon although no evidence of active diverticular bleeding. 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 This may correlate to recent CT findings. Lastly, there was also non-bleeding internal hemorrhoids. EGD also done on 5/5 with no finding to explain the source of anemia and  hematochezia. Hematochezia likely related to diverticular bleed which is resolved now as evidenced by large amounts of brown stool.   -Hgb stable at 9 with no further signs of overt bleeding.     #H. pylori s/p quad therapy.   - Eradication not confirmed.     Recommendations  -Diet as tolerated   Miralax QD and Encourage high fiber diet   -No GI contraindications to discharge given stable hgb x 2 with no further signs of overt bleeding     GI will plan to sign off at this time. Please feel free to reach out to our team with any follow up questions. Please provide patient with Gastroenterology Clinic number to confirm/arrange appointment; [phone] ([facility] at [address] [place] [address]) or [phone] ([facility] at [address] [place] [address]) or [phone] ([facility] at [address] [facility]).    All recommendations are tentative until note is attested by attending.     Bertin Connell, PGY-4  Gastroenterology/Hepatology Fellow  Available on Microsoft Teams   [phone] (Long Range Pager)  [phone] (Short Range Pager [facility])    After 5pm, please contact the on-call GI fellow. [phone]

## 2023-05-06 NOTE — CONSULT NOTE ADULT - SUBJECTIVE AND OBJECTIVE BOX
Panchito Alfaro MD  Interventional Cardiology / Advance Heart Failure and Cardiac Transplant Specialist  [place] Office : [address] [place] [address]  Tel:   [place] Office : [address] [facility] [address]  Tel: [phone]      HISTORY OF PRESENTING ILLNESS:  73M w/ h/o prostate cancer (s/p XRT), HTN, asthma, and h/o presumed diverticular bleeding (admitted 5/2021) presenting with hematochezia x1 day, most likely recurrent diverticular bleed. Cardiology consulted for new aflutter. Patient denies cardiac history. Denies chest pain, SOB or palpitations  	  MEDICATIONS:  atenolol  Tablet 50 milliGRAM(s) Oral daily  diltiazem    milliGRAM(s) Oral daily      albuterol    90 MICROgram(s) HFA Inhaler 1 Puff(s) Inhalation four times a day PRN  montelukast 10 milliGRAM(s) Oral at bedtime    acetaminophen     Tablet .. 650 milliGRAM(s) Oral every 6 hours PRN  melatonin 3 milliGRAM(s) Oral at bedtime PRN  ondansetron Injectable 4 milliGRAM(s) IV Push every 8 hours PRN    aluminum hydroxide/magnesium hydroxide/simethicone Suspension 30 milliLiter(s) Oral every 4 hours PRN  bisacodyl 5 milliGRAM(s) Oral at bedtime  pantoprazole  Injectable 40 milliGRAM(s) IV Push two times a day  polyethylene glycol 3350 17 Gram(s) Oral daily    atorvastatin 10 milliGRAM(s) Oral at bedtime  finasteride 5 milliGRAM(s) Oral daily    tamsulosin 0.4 milliGRAM(s) Oral at bedtime      PAST MEDICAL/SURGICAL HISTORY  PAST MEDICAL & SURGICAL HISTORY:  Hypertension      Hyperlipidemia      GIB (gastrointestinal bleeding)      Prostate cancer  s/p radiation therapy      Gout      History of hemorrhoidectomy          SOCIAL HISTORY: Substance Use (street drugs): ( x ) never used  (  ) other:    FAMILY HISTORY:  No pertinent family history in first degree relatives        REVIEW OF SYSTEMS:  CONSTITUTIONAL: No fever, weight loss, or fatigue  EYES: No eye pain, visual disturbances, or discharge  ENMT:  No difficulty hearing, tinnitus, vertigo; No sinus or throat pain  BREASTS: No pain, masses, or nipple discharge  GASTROINTESTINAL: No abdominal or epigastric pain. No nausea, vomiting, or hematemesis; No diarrhea or constipation. No melena or hematochezia.  GENITOURINARY: No dysuria, frequency, hematuria, or incontinence  NEUROLOGICAL: No headaches, memory loss, loss of strength, numbness, or tremors  ENDOCRINE: No heat or cold intolerance; No hair loss  MUSCULOSKELETAL: No joint pain or swelling; No muscle, back, or extremity pain  PSYCHIATRIC: No depression, anxiety, mood swings, or difficulty sleeping  HEME/LYMPH: No easy bruising, or bleeding gums  All others negative    PHYSICAL EXAM:  T(C): 36.5 (05-06-23 @ 08:46), Max: 36.9 (05-05-23 @ 21:44)  HR: 77 (05-06-23 @ 08:46) (77 - 86)  BP: 115/71 (05-06-23 @ 08:46) (104/68 - 152/80)  RR: 18 (05-06-23 @ 08:46) (13 - 20)  SpO2: 98% (05-06-23 @ 08:46) (97% - 100%)  Wt(kg): --  I&O's Summary    Height (cm): 180.3 (05-05 @ 12:55)  Weight (kg): 101.6 (05-05 @ 12:55)  BMI (kg/m2): 31.3 (05-05 @ 12:55)  BSA (m2): 2.21 (05-05 @ 12:55)    GENERAL: NAD  EYES: EOMI, PERRLA, conjunctiva and sclera clear  ENMT: No tonsillar erythema, exudates, or enlargement  Cardiovascular: Normal S1 S2, No JVD, No murmurs, No edema  Respiratory: Lungs clear to auscultation	  Gastrointestinal:  Soft, Non-tender, + BS	  Extremities: No edema                                     9.3    5.79  )-----------( 148      ( 06 May 2023 06:36 )             28.2     05-06    141  |  103  |  6<L>  ----------------------------<  120<H>  3.2<L>   |  25  |  0.76    Ca    8.7      06 May 2023 06:36  Mg     2.00     05-05    TPro  5.7<L>  /  Alb  3.4  /  TBili  1.0  /  DBili  x   /  AST  19  /  ALT  13  /  AlkPhos  56  05-05    proBNP:   Lipid Profile:   HgA1c:   TSH:     Consultant(s) Notes Reviewed:  [x ] YES  [ ] NO    Care Discussed with Consultants/Other Providers [ x] YES  [ ] NO    Imaging Personally Reviewed independently:  [x] YES  [ ] NO    All labs, radiologic studies, vitals, orders and medications list reviewed. Patient is seen and examined at bedside. Case discussed with medical team.

## 2023-05-06 NOTE — PROGRESS NOTE ADULT - NSPROGADDITIONALINFOA_GEN_ALL_CORE
Spoke to Spouse Zeyadri and plan discussed on 5/4
Spoke to Spouse Zeyadri and plan discussed on 5/4
Spoke to Spouse Lesley today and plan discussed.

## 2023-05-08 ENCOUNTER — INPATIENT (INPATIENT)
Facility: HOSPITAL | Age: 73
LOS: 6 days | Discharge: HOME | End: 2023-05-15
Attending: STUDENT IN AN ORGANIZED HEALTH CARE EDUCATION/TRAINING PROGRAM | Admitting: STUDENT IN AN ORGANIZED HEALTH CARE EDUCATION/TRAINING PROGRAM
Payer: MEDICARE

## 2023-05-08 VITALS
OXYGEN SATURATION: 100 % | HEART RATE: 84 BPM | DIASTOLIC BLOOD PRESSURE: 83 MMHG | HEIGHT: 71 IN | TEMPERATURE: 98 F | SYSTOLIC BLOOD PRESSURE: 127 MMHG | RESPIRATION RATE: 19 BRPM

## 2023-05-08 DIAGNOSIS — I10 ESSENTIAL (PRIMARY) HYPERTENSION: ICD-10-CM

## 2023-05-08 DIAGNOSIS — I48.92 UNSPECIFIED ATRIAL FLUTTER: ICD-10-CM

## 2023-05-08 DIAGNOSIS — Z85.46 PERSONAL HISTORY OF MALIGNANT NEOPLASM OF PROSTATE: ICD-10-CM

## 2023-05-08 DIAGNOSIS — K92.1 MELENA: ICD-10-CM

## 2023-05-08 DIAGNOSIS — J45.909 UNSPECIFIED ASTHMA, UNCOMPLICATED: ICD-10-CM

## 2023-05-08 DIAGNOSIS — E78.5 HYPERLIPIDEMIA, UNSPECIFIED: ICD-10-CM

## 2023-05-08 DIAGNOSIS — M19.90 UNSPECIFIED OSTEOARTHRITIS, UNSPECIFIED SITE: ICD-10-CM

## 2023-05-08 DIAGNOSIS — Z29.9 ENCOUNTER FOR PROPHYLACTIC MEASURES, UNSPECIFIED: ICD-10-CM

## 2023-05-08 DIAGNOSIS — Z98.890 OTHER SPECIFIED POSTPROCEDURAL STATES: Chronic | ICD-10-CM

## 2023-05-08 LAB
ALBUMIN SERPL ELPH-MCNC: 3.2 G/DL — LOW (ref 3.3–5)
ALP SERPL-CCNC: 58 U/L — SIGNIFICANT CHANGE UP (ref 40–120)
ALT FLD-CCNC: 13 U/L — SIGNIFICANT CHANGE UP (ref 4–41)
ANION GAP SERPL CALC-SCNC: 13 MMOL/L — SIGNIFICANT CHANGE UP (ref 7–14)
APTT BLD: 27.3 SEC — SIGNIFICANT CHANGE UP (ref 27–36.3)
AST SERPL-CCNC: 14 U/L — SIGNIFICANT CHANGE UP (ref 4–40)
BILIRUB SERPL-MCNC: 0.4 MG/DL — SIGNIFICANT CHANGE UP (ref 0.2–1.2)
BLD GP AB SCN SERPL QL: NEGATIVE — SIGNIFICANT CHANGE UP
BUN SERPL-MCNC: 19 MG/DL — SIGNIFICANT CHANGE UP (ref 7–23)
CALCIUM SERPL-MCNC: 8.5 MG/DL — SIGNIFICANT CHANGE UP (ref 8.4–10.5)
CHLORIDE SERPL-SCNC: 100 MMOL/L — SIGNIFICANT CHANGE UP (ref 98–107)
CO2 SERPL-SCNC: 27 MMOL/L — SIGNIFICANT CHANGE UP (ref 22–31)
CREAT SERPL-MCNC: 1.01 MG/DL — SIGNIFICANT CHANGE UP (ref 0.5–1.3)
EGFR: 79 ML/MIN/1.73M2 — SIGNIFICANT CHANGE UP
GLUCOSE SERPL-MCNC: 227 MG/DL — HIGH (ref 70–99)
HCT VFR BLD CALC: 21.6 % — LOW (ref 39–50)
HCT VFR BLD CALC: 22.4 % — LOW (ref 39–50)
HCT VFR BLD CALC: 22.9 % — LOW (ref 39–50)
HCT VFR BLD CALC: 23 % — LOW (ref 39–50)
HGB BLD-MCNC: 7.1 G/DL — LOW (ref 13–17)
HGB BLD-MCNC: 7.2 G/DL — LOW (ref 13–17)
HGB BLD-MCNC: 7.3 G/DL — LOW (ref 13–17)
HGB BLD-MCNC: 7.6 G/DL — LOW (ref 13–17)
INR BLD: 1.26 RATIO — HIGH (ref 0.88–1.16)
LIDOCAIN IGE QN: 56 U/L — SIGNIFICANT CHANGE UP (ref 7–60)
MCHC RBC-ENTMCNC: 29.8 PG — SIGNIFICANT CHANGE UP (ref 27–34)
MCHC RBC-ENTMCNC: 30.9 PG — SIGNIFICANT CHANGE UP (ref 27–34)
MCHC RBC-ENTMCNC: 31.2 PG — SIGNIFICANT CHANGE UP (ref 27–34)
MCHC RBC-ENTMCNC: 31.3 PG — SIGNIFICANT CHANGE UP (ref 27–34)
MCHC RBC-ENTMCNC: 31.9 GM/DL — LOW (ref 32–36)
MCHC RBC-ENTMCNC: 32.1 GM/DL — SIGNIFICANT CHANGE UP (ref 32–36)
MCHC RBC-ENTMCNC: 32.9 GM/DL — SIGNIFICANT CHANGE UP (ref 32–36)
MCHC RBC-ENTMCNC: 33 GM/DL — SIGNIFICANT CHANGE UP (ref 32–36)
MCV RBC AUTO: 92.6 FL — SIGNIFICANT CHANGE UP (ref 80–100)
MCV RBC AUTO: 93.5 FL — SIGNIFICANT CHANGE UP (ref 80–100)
MCV RBC AUTO: 95.2 FL — SIGNIFICANT CHANGE UP (ref 80–100)
MCV RBC AUTO: 97.9 FL — SIGNIFICANT CHANGE UP (ref 80–100)
NRBC # BLD: 0 /100 WBCS — SIGNIFICANT CHANGE UP (ref 0–0)
NRBC # FLD: 0.02 K/UL — HIGH (ref 0–0)
NRBC # FLD: 0.04 K/UL — HIGH (ref 0–0)
PLATELET # BLD AUTO: 156 K/UL — SIGNIFICANT CHANGE UP (ref 150–400)
PLATELET # BLD AUTO: 164 K/UL — SIGNIFICANT CHANGE UP (ref 150–400)
PLATELET # BLD AUTO: 173 K/UL — SIGNIFICANT CHANGE UP (ref 150–400)
PLATELET # BLD AUTO: 190 K/UL — SIGNIFICANT CHANGE UP (ref 150–400)
POTASSIUM SERPL-MCNC: 3.4 MMOL/L — LOW (ref 3.5–5.3)
POTASSIUM SERPL-SCNC: 3.4 MMOL/L — LOW (ref 3.5–5.3)
PROT SERPL-MCNC: 5.5 G/DL — LOW (ref 6–8.3)
PROTHROM AB SERPL-ACNC: 14.7 SEC — HIGH (ref 10.5–13.4)
RBC # BLD: 2.27 M/UL — LOW (ref 4.2–5.8)
RBC # BLD: 2.34 M/UL — LOW (ref 4.2–5.8)
RBC # BLD: 2.42 M/UL — LOW (ref 4.2–5.8)
RBC # BLD: 2.46 M/UL — LOW (ref 4.2–5.8)
RBC # FLD: 13.8 % — SIGNIFICANT CHANGE UP (ref 10.3–14.5)
RBC # FLD: 15 % — HIGH (ref 10.3–14.5)
RBC # FLD: 15.5 % — HIGH (ref 10.3–14.5)
RBC # FLD: 15.5 % — HIGH (ref 10.3–14.5)
RH IG SCN BLD-IMP: POSITIVE — SIGNIFICANT CHANGE UP
SODIUM SERPL-SCNC: 140 MMOL/L — SIGNIFICANT CHANGE UP (ref 135–145)
WBC # BLD: 10.83 K/UL — HIGH (ref 3.8–10.5)
WBC # BLD: 9.51 K/UL — SIGNIFICANT CHANGE UP (ref 3.8–10.5)
WBC # BLD: 9.63 K/UL — SIGNIFICANT CHANGE UP (ref 3.8–10.5)
WBC # BLD: 9.92 K/UL — SIGNIFICANT CHANGE UP (ref 3.8–10.5)
WBC # FLD AUTO: 10.83 K/UL — HIGH (ref 3.8–10.5)
WBC # FLD AUTO: 9.51 K/UL — SIGNIFICANT CHANGE UP (ref 3.8–10.5)
WBC # FLD AUTO: 9.63 K/UL — SIGNIFICANT CHANGE UP (ref 3.8–10.5)
WBC # FLD AUTO: 9.92 K/UL — SIGNIFICANT CHANGE UP (ref 3.8–10.5)

## 2023-05-08 PROCEDURE — 71045 X-RAY EXAM CHEST 1 VIEW: CPT | Mod: 26

## 2023-05-08 PROCEDURE — 99285 EMERGENCY DEPT VISIT HI MDM: CPT

## 2023-05-08 PROCEDURE — 74178 CT ABD&PLV WO CNTR FLWD CNTR: CPT | Mod: 26,MD

## 2023-05-08 PROCEDURE — 99223 1ST HOSP IP/OBS HIGH 75: CPT

## 2023-05-08 PROCEDURE — 99053 MED SERV 10PM-8AM 24 HR FAC: CPT

## 2023-05-08 PROCEDURE — 70450 CT HEAD/BRAIN W/O DYE: CPT | Mod: 26,MD

## 2023-05-08 PROCEDURE — 99222 1ST HOSP IP/OBS MODERATE 55: CPT | Mod: GC

## 2023-05-08 RX ORDER — TAMSULOSIN HYDROCHLORIDE 0.4 MG/1
0.4 CAPSULE ORAL AT BEDTIME
Refills: 0 | Status: DISCONTINUED | OUTPATIENT
Start: 2023-05-08 | End: 2023-05-15

## 2023-05-08 RX ORDER — FINASTERIDE 5 MG/1
5 TABLET, FILM COATED ORAL DAILY
Refills: 0 | Status: DISCONTINUED | OUTPATIENT
Start: 2023-05-08 | End: 2023-05-15

## 2023-05-08 RX ORDER — MONTELUKAST 4 MG/1
10 TABLET, CHEWABLE ORAL AT BEDTIME
Refills: 0 | Status: DISCONTINUED | OUTPATIENT
Start: 2023-05-08 | End: 2023-05-15

## 2023-05-08 RX ORDER — ACETAMINOPHEN 500 MG
650 TABLET ORAL EVERY 6 HOURS
Refills: 0 | Status: DISCONTINUED | OUTPATIENT
Start: 2023-05-08 | End: 2023-05-14

## 2023-05-08 RX ORDER — TETANUS TOXOID, REDUCED DIPHTHERIA TOXOID AND ACELLULAR PERTUSSIS VACCINE, ADSORBED 5; 2.5; 8; 8; 2.5 [IU]/.5ML; [IU]/.5ML; UG/.5ML; UG/.5ML; UG/.5ML
0.5 SUSPENSION INTRAMUSCULAR ONCE
Refills: 0 | Status: COMPLETED | OUTPATIENT
Start: 2023-05-08 | End: 2023-05-08

## 2023-05-08 RX ORDER — TAMSULOSIN HYDROCHLORIDE 0.4 MG/1
0.4 CAPSULE ORAL AT BEDTIME
Refills: 0 | Status: DISCONTINUED | OUTPATIENT
Start: 2023-05-08 | End: 2023-05-08

## 2023-05-08 RX ORDER — ALBUTEROL 90 UG/1
1 AEROSOL, METERED ORAL
Refills: 0 | Status: DISCONTINUED | OUTPATIENT
Start: 2023-05-08 | End: 2023-05-15

## 2023-05-08 RX ORDER — ONDANSETRON 8 MG/1
4 TABLET, FILM COATED ORAL ONCE
Refills: 0 | Status: COMPLETED | OUTPATIENT
Start: 2023-05-08 | End: 2023-05-08

## 2023-05-08 RX ORDER — ATORVASTATIN CALCIUM 80 MG/1
10 TABLET, FILM COATED ORAL AT BEDTIME
Refills: 0 | Status: DISCONTINUED | OUTPATIENT
Start: 2023-05-08 | End: 2023-05-15

## 2023-05-08 RX ADMIN — TAMSULOSIN HYDROCHLORIDE 0.4 MILLIGRAM(S): 0.4 CAPSULE ORAL at 21:37

## 2023-05-08 RX ADMIN — TETANUS TOXOID, REDUCED DIPHTHERIA TOXOID AND ACELLULAR PERTUSSIS VACCINE, ADSORBED 0.5 MILLILITER(S): 5; 2.5; 8; 8; 2.5 SUSPENSION INTRAMUSCULAR at 07:17

## 2023-05-08 RX ADMIN — MONTELUKAST 10 MILLIGRAM(S): 4 TABLET, CHEWABLE ORAL at 21:37

## 2023-05-08 RX ADMIN — ONDANSETRON 4 MILLIGRAM(S): 8 TABLET, FILM COATED ORAL at 07:19

## 2023-05-08 RX ADMIN — ATORVASTATIN CALCIUM 10 MILLIGRAM(S): 80 TABLET, FILM COATED ORAL at 21:37

## 2023-05-08 NOTE — CONSULT NOTE ADULT - SUBJECTIVE AND OBJECTIVE BOX
Vascular & Interventional Radiology Brief Consult Note    Evaluate for Procedure: GI bleed embolization    HPI: 73y Male with hx of prostate cancer s/p radiation, HTN, asthma, and h/o presumed diverticular bleeding (admitted 5/2021) w/ recent admission (5/3/23-5/6/23) for hematochezia, now presenting w/ recurrent painless hematochezia. IR consulted for embolization of GI bleed.    Allergies: No Known Allergies    Medications (Abx/Cardiac/Anticoagulation/Blood Products)      Data:  180.3  T(C): 36.7  HR: 85  BP: 112/69  RR: 18  SpO2: 99%    -WBC 9.63 / HgB 7.6 / Hct 23.0 / Plt 164  -Na 140 / Cl 100 / BUN 19 / Glucose 227  -K 3.4 / CO2 27 / Cr 1.01  -ALT 13 / Alk Phos 58 / T.Bili 0.4  -INR1.26    Imaging: CTA 5/8    Assessment/Plan:   -73y Male with hx of prostate cancer s/p radiation, HTN, asthma, and h/o presumed diverticular bleeding (admitted 5/2021) w/ recent admission (5/3/23-5/6/23) for hematochezia, now presenting w/ recurrent painless hematochezia. IR consulted for embolization of GI bleed.    - Case discussed with attending Dr. Halaibeh  - As CTA is negative, embolization is not indicated at this time  - Please re-consult IR if needed

## 2023-05-08 NOTE — PATIENT PROFILE ADULT - FALL HARM RISK - FACTORS NURSING JUDGEMENT
Hernandes who 

was the on call 


Pregnant?: No


Onset/Duration: Gradual Onset - past 2 weeks, Still Present, Worse Since


Timing: Constant


 


 07/07/19 18:54  07/07/19 18:54  07/07/19 18:54  07/07/19 18:54  07/07/19 18:54











Vital Signs Reviewed: Yes





Diagnostics





- Vital Signs


 Vital Signs











  Temp Pulse Resp BP Pulse Ox


 


 07/07/19 18:54  98.5 F  77  18  138/90  99














- Laboratory


Result Diagrams: 


 07/08/19 05:40





 07/08/[facility] per Dr. Nielsen's request for her to 

receive a full work-up and she is agreeable.





Abdominal Pain Fem Course/Dx





- Course


Course Of Treatment: This patient is a 41 year old F [facility] 

[facility] Josue Nielsen


Scribe: Yes


Documenting Scribe: Moise Lo


Provider For Whom Scribe is Documenting (Include Credential): Jaden Mohamud MD


Scribe Attestation: 


Moise POSEY, scribed for Jaden Mohamud MD on 07/08/Jaden Mohamud MD


Status of Scribcoco Document: Viewed
Yes

## 2023-05-08 NOTE — H&P ADULT - NSHPLABSRESULTS_GEN_ALL_CORE
Complete Blood Count STAT (05.08.23 @ 10:15)   Nucleated RBC: 0 /100 WBCs  WBC Count: 9.63 K/uL  RBC Count: 2.46 M/uL  Hemoglobin: 7.6 g/dL  Hematocrit: 23.0 %  Mean Cell Volume: 93.5 fL  Mean Cell Hemoglobin: 30.9 pg  Mean Cell Hemoglobin Conc: 33.0 gm/dL  Red Cell Distrib Width: 15.0 %  Platelet Count - Automated: 164 K/uL  Nucleated RBC #: 0.02 K/uL    Lipase, Serum (05.08.23 @ 06:52)   Lipase, Serum: 56 U/L    Comprehensive Metabolic Panel (05.08.23 @ 06:52)   Sodium, Serum: 140 mmol/L  Potassium, Serum: 3.4 mmol/L  Chloride, Serum: 100 mmol/L  Carbon Dioxide, Serum: 27 mmol/L  Anion Gap, Serum: 13 mmol/L  Blood Urea Nitrogen, Serum: 19 mg/dL  Creatinine, Serum: 1.01 mg/dL  Glucose, Serum: 227 mg/dL  Calcium, Total Serum: 8.5 mg/dL  Protein Total, Serum: 5.5 g/dL  Albumin, Serum: 3.2 g/dL  Bilirubin Total, Serum: 0.4 mg/dL  Alkaline Phosphatase, Serum: 58 U/L  Aspartate Aminotransferase (AST/SGOT): 14 U/L  Alanine Aminotransferase (ALT/SGPT): 13 U/L  eGFR: 79    < from: CT Abdomen and Pelvis w/wo IV Cont (05.08.23 @ 11:44) >    IMPRESSION:  No evidence of active GI bleed.  Extensive colonic diverticulosis, without acute diverticulitis.    < from: CT Head No Cont (05.08.23 @ 11:44) >    IMPRESSION:    No acute hemorrhage, extra-axial collection or displaced calvarial   fracture.

## 2023-05-08 NOTE — ED ADULT TRIAGE NOTE - CHIEF COMPLAINT QUOTE
Pt c/o syncopal episode and x7-8 bloody bowel movements. Arrives with lac to bridge of nose no active bleeding. Reports recent diagnose with diverticular bleed requiring 5U PRBC. PMHX HTN, Diverticulosis.

## 2023-05-08 NOTE — H&P ADULT - ATTENDING COMMENTS
Iris De La Cruz MD  Medicine Attending  Teams preferred/Pager: [phone]    I have personally seen and examined patient. I discussed the case with Dr. Leung and agree with findings and plan as detailed per note above. In brief, 72 y/o M with hx of recent diverticular bleed last week, prostate Ca s/p radiation, HTN who presents with presyncope in the setting of repeat GI bleeding. Pt woke up to go to restroom, and had several large bloody BM no abdominal pain and 1 episode of NBNB vomiting. Pt was walking back to bedroom when he became lightheaded and fell, hitting his head anteriorly. no LOC (maybe a second). Pts wife then drove him to the hospital.     In the ED noted to be hypotensive. Was given fluid and 1 PRBC  In his last admission, required 5 PRBC transfusions. Pt had EGD/colo on 5/5. EGD was normal, colo showed severe diverticulosis and RAVE, no active bleed. Pt's diet was advanced and Pt was discharged on 5/6 and did well until today. Last hospitalization was also complicated by aflutter. given bleeding was NOT d/c on AC.    Exam  VS-soft BP but well appearing, not tachy  NAD  A&O x 3, following commands, no focal deficits  CVS S1 S2 RRR, sinus  Lungs CTA    Problem list  LGIB, likely diverticular  -discussed case with GI and IR. Per IR, if Pt rebleeds will get CTA of abdomen and attempt to embolize. no other intervention at this time  -GI will follow along  -don't know if there is utility for tagged RBC but typically also requires some brisk bleeding  -will place on CLD    #prolonged QT  -unclear etiology  -lytes repleted, did receive zofran  -repeat EKG in AM    #HTN  -hold meds    #Prostate ca s/p radiation  -hold tamsulosin  -c/w finasteride    Otherwise as noted above

## 2023-05-08 NOTE — H&P ADULT - PROBLEM SELECTOR PLAN 1
2-3 episodes of hematochezia prior to admission with dizziness and fall  Recent admission (5/3 - 5/6) w/ similar complaint and s/p EGD and colonoscopy w/o acute bleeding   On admission: Hgb - 7.3; s/p 1 U --> Hgb -7.6   s/p Zofran   CT abd/pelvis w/w/o IV con: no acute GI bleed; extensive diverticulosis w/o diverticulitis     Plan:   - Trend CBC q8h; Transfuse Hgb < 7; Active type & screen  - GI consult; recs pending   - IR consult; recs appreciated --> no acute intervention   - NPO pending further recs 2-3 episodes of hematochezia prior to admission with dizziness and fall  Recent admission (5/3 - 5/6) w/ similar complaint and s/p EGD and colonoscopy w/o acute bleeding   On admission: Hgb - 7.3; s/p 1 U --> Hgb -7.6   s/p Zofran   CT abd/pelvis w/w/o IV con: no acute GI bleed; extensive diverticulosis w/o diverticulitis     Plan:   - Trend CBC q8h; Transfuse Hgb < 7; Active type & screen  - GI consult; recs pending   - IR consult; recs appreciated --> no acute intervention   - Clear liquid diet; advance as tolerated

## 2023-05-08 NOTE — H&P ADULT - PROBLEM SELECTOR PLAN 2
On recent admission: EKG consistent for A-flutter   On this admission: EKG sinus   Echo (5/4): EF = 61% w. severely dilated LA; mild pulm HTN    Plan:  - F/u Dr. Davis (cardiologist) recs  - Hold anticoagulation i/so GIB ([id number]: 2 for age and HTN)   - Consider telemetry On recent admission: EKG consistent for A-flutter   On this admission: EKG sinus   Echo (5/4): EF = 61% w. severely dilated LA; mild pulm HTN    Plan:  - F/u outpatient w/ Dr. Davis (cardiologist)  - Hold anticoagulation i/so GIB ([id number]: 2 for age and HTN)

## 2023-05-08 NOTE — ED PROVIDER NOTE - CLINICAL SUMMARY MEDICAL DECISION MAKING FREE TEXT BOX
73-year-old male with history of diverticular bleed here now with bright red blood per rectum with several episodes, hypotensive, pale, cool to touch.  Ill-appearing, on rectal exam has bright red blood in the vault but no active bleed.  Concerning for hemorrhagic shock given his history will emergently transfuse some blood anticipate he will need more.  He is not anticoagulated, will check coags to ensure that he does not have a coagulopathy that needs reversal.  Given the fact that we know the source of his bleeding do not believe he would benefit from a CAT scan at this time.  Adacel for small nasal abrasion.  Has 2 large-bore IV access points, will need admission.

## 2023-05-08 NOTE — CONSULT NOTE ADULT - SUBJECTIVE AND OBJECTIVE BOX
HPI:  73 yrs old male w/ hx of prostate cancer s/p radiation, HTN, asthma, and h/o presumed diverticular bleeding (admitted 5/2021) w/ recent admission (5/3/23-5/6/23) for hematochezia, now presenting w/ recurrent painless hematochezia.      In 2020 he was hospitalized with presumed diverticular bleed at [facility]. In 2021 he was hospitalized at [facility] with another presumed diverticular bleed - he required 5U pRBC and colonoscopy showed diverticulosis and sessile polyp and pathology from EGD was positive for H pylori. He completed H pylori treatment after discharge and underwent follow-up colonoscopy 1 year ago, which he was told did not show any polyps. On 5/3/23 he developed 6 episodes of painless hematochezia for which he presented to the ED. He underwent colonoscopy 5/5/23 notable for severe pandiverticulosis as well as RAVE; no active bleeding noted. At the same time EGD performed notable for no source of bleeding. PLan was to trend CBC, and if evidence of recurrent bleeding to defer to IR.   Patient discharged 5/6 and doing well until overnight 5/7-5/8 when ~2am he developed additional episode of bloody stool. Stool color went maroon -> bright red, 10+ episodes + dizziness so he presented to the ED.   On presentation patient HDS, labs w/ Hg 7.3 (discharge 5/6 was 9.3), transfused 1u pRBC w/ post-transfusion CBC still showing Hg 7.3.         Allergies:  No Known Allergies      Home Medications:    Hospital Medications:  acetaminophen     Tablet .. 650 milliGRAM(s) Oral every 6 hours PRN      PMHX/PSHX:  Hypertension    Hyperlipidemia    GIB (gastrointestinal bleeding)    Prostate cancer    Gout    History of hemorrhoidectomy        Family history:  No pertinent family history in first degree relatives        Denies family history of colon cancer/polyps, stomach cancer/polyps, pancreatic cancer/masses, liver cancer/disease, ovarian cancer and endometrial cancer.    Social History:   Tob: Denies  EtOH: Denies  Illicit Drugs: Denies    ROS:     General:  No wt loss, fevers, chills, night sweats, fatigue  Eyes:  Good vision, no reported pain  ENT:  No sore throat, pain, runny nose, dysphagia  CV:  No pain, palpitations, hypo/hypertension  Pulm:  No dyspnea, cough, tachypnea, wheezing  GI:  see HPI  :  No pain, bleeding, incontinence, nocturia  Muscle:  No pain, weakness  Neuro:  No weakness, tingling, memory problems  Psych:  No fatigue, insomnia, mood problems, depression  Endocrine:  No polyuria, polydipsia, cold/heat intolerance  Heme:  No petechiae, ecchymosis, easy bruisability  Skin:  No rash, tattoos, scars, edema    PHYSICAL EXAM:     GENERAL:  No acute distress  HEENT:  NCAT, no scleral icterus   CHEST:  no respiratory distress  HEART:  Regular rate and rhythm  ABDOMEN:  Soft, non-tender, non-distended, normoactive bowel sounds,  no masses  EXTREMITIES: No edema  SKIN:  No rash/erythema/ecchymoses/petechiae/wounds/abscess/warm/dry  NEURO:  Alert and oriented x 3, no asterixis    Vital Signs:  Vital Signs Last 24 Hrs  T(C): 36.8 (08 May 2023 13:19), Max: 36.8 (08 May 2023 13:19)  T(F): 98.2 (08 May 2023 13:19), Max: 98.2 (08 May 2023 13:19)  HR: 83 (08 May 2023 13:19) (67 - 84)  BP: 115/71 (08 May 2023 13:19) (86/57 - 127/83)  BP(mean): --  RR: 17 (08 May 2023 13:19) (16 - 19)  SpO2: 99% (08 May 2023 13:19) (99% - 100%)    Parameters below as of 08 May 2023 13:19  Patient On (Oxygen Delivery Method): room air      Daily Height in cm: 180.34 (08 May 2023 06:13)    Daily     LABS:                        7.6    9.63  )-----------( 164      ( 08 May 2023 10:15 )             23.0     Mean Cell Volume: 93.5 fL (05-08-23 @ 10:15)    05-08    140  |  100  |  19  ----------------------------<  227<H>  3.4<L>   |  27  |  1.01    Ca    8.5      08 May 2023 06:52    TPro  5.5<L>  /  Alb  3.2<L>  /  TBili  0.4  /  DBili  x   /  AST  14  /  ALT  13  /  AlkPhos  58  05-08    LIVER FUNCTIONS - ( 08 May 2023 06:52 )  Alb: 3.2 g/dL / Pro: 5.5 g/dL / ALK PHOS: 58 U/L / ALT: 13 U/L / AST: 14 U/L / GGT: x           PT/INR - ( 08 May 2023 06:52 )   PT: 14.7 sec;   INR: 1.26 ratio         PTT - ( 08 May 2023 06:52 )  PTT:27.3 sec    Amylase Serum--      Lipase serum56       Ammonia--                          7.6    9.63  )-----------( 164      ( 08 May 2023 10:15 )             23.0                         7.3    9.51  )-----------( 190      ( 08 May 2023 06:52 )             22.9                         9.3    5.79  )-----------( 148      ( 06 May 2023 06:36 )             28.2                         9.2    7.70  )-----------( 153      ( 05 May 2023 17:35 )             27.8       Imaging:  CT A/P 5/8/23  FINDINGS:  LOWER CHEST: Within normal limits.    LIVER: Within normal limits.  BILE DUCTS: Normal caliber.  GALLBLADDER: Within normal limits.  SPLEEN: Within normal limits.  PANCREAS: Within normal limits.  ADRENALS: Left adrenal adenoma measures 2.0 cm. Right adrenal gland   within normal limits.  KIDNEYS/URETERS: Left renal cysts, largest measuring 6.3 cm in the lower   pole. Right renal cortical scarring. No hydronephrosis.    BLADDER: Within normal limits.  REPRODUCTIVE ORGANS: Small sized prostate.    BOWEL: Small hiatal hernia. No bowel obstruction. Appendix is normal.   Diverticulosis, without evidence of acute diverticulitis. No evidence of   active GI bleed.  PERITONEUM: No ascites.  VESSELS: Atherosclerotic calcifications.  RETROPERITONEUM/LYMPH NODES: No lymphadenopathy.  ABDOMINAL WALL: Fat-containing umbilical hernia.  BONES: Degenerative changes.    IMPRESSION:  No evidence of active GI bleed.  Extensive colonic diverticulosis, without acute diverticulitis.        EGD 5/5/23  Findings:       A small hiatal hernia was present.       The entire examined stomach was normal.       The first portion of the duodenum and second portion of the duodenum        were normal.                                                                                   Impression:          - Small hiatal hernia.                       - Normal stomach.                       - Normal first portion of the duodenum and second                        portion of the duodenum.                       - No specimens collected.                       - No finding to explain the source of anemia and                        hematochezia. It is likely related to diverticulosis.  Recommendation:      - Return patient to hospital [facility] for ongoing care.                       - Can resume diet as tolerated.                       - If the patient re-bleeds, recommend obtaining CT angio                        A/P to localize the source and potential IR consult.                       - Monitor CBC and transfuse if hgb < 7.      Colonoscopy 5/5/23             Findings:       Many small and large-mouthed diverticula were found in the entire colon.        There was no evidence of diverticular bleeding.       A moderate amount of semi-liquid stool was found in the entire colon,        making visualization difficult. Lavage of the area was performed using a        moderate amount of normal saline, resulting in clearance with adequate        visualization.       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       Non-bleeding internal hemorrhoids were found during retroflexion. The        hemorrhoids were small.           Impression:          - Severe diverticulosis in the entire examined colon.                        There was no evidence of active diverticular bleeding.                       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 This may correlate to recent CT findings.                       - Non-bleeding internal hemorrhoids.                       - No specimens collected.                       - Hematochezia likely related to diverticular bleed                        which is resolved now. There was no active bleeding.  Recommendation:      - Return patient to hospital [facility] for ongoing care.                       - Please refer to endoscopy report.                       - IR consultation for possible embolization if recurrent                        hematochezia.           HPI:  73 yrs old male w/ hx of prostate cancer s/p radiation, HTN, asthma, and h/o presumed diverticular bleeding (admitted 5/2021) w/ recent admission (5/3/23-5/6/23) for hematochezia, now presenting w/ recurrent painless hematochezia.     In 2020 he was hospitalized with presumed diverticular bleed at [facility]. In 2021 he was hospitalized at [facility] with another presumed diverticular bleed - he required 5U pRBC and colonoscopy showed diverticulosis and sessile polyp and pathology from EGD was positive for H pylori. He completed H pylori treatment after discharge and underwent follow-up colonoscopy 1 year ago, which he was told did not show any polyps. On 5/3/23 he developed 6 episodes of painless hematochezia for which he presented to the ED. He underwent colonoscopy 5/5/23 notable for severe pandiverticulosis as well as RAVE; no active bleeding noted. At the same time EGD performed notable for no source of bleeding. Plan was to trend CBC, and if evidence of recurrent bleeding to defer to IR.   Patient discharged 5/6 and doing well until overnight 5/7-5/8 when ~2am he developed additional episode of bloody stool. Stool color went maroon -> bright red, 10+ episodes + dizziness so he presented to the ED.   On presentation patient HDS, labs w/ Hg 7.3 (discharge 5/6 was 9.3), transfused 1u pRBC w/ post-transfusion CBC still showing Hg 7.3.         Allergies:  No Known Allergies      Home Medications:    Hospital Medications:  acetaminophen     Tablet .. 650 milliGRAM(s) Oral every 6 hours PRN      PMHX/PSHX:  Hypertension    Hyperlipidemia    GIB (gastrointestinal bleeding)    Prostate cancer    Gout    History of hemorrhoidectomy        Family history:  No pertinent family history in first degree relatives        Denies family history of colon cancer/polyps, stomach cancer/polyps, pancreatic cancer/masses, liver cancer/disease, ovarian cancer and endometrial cancer.    Social History:   Tob: Denies  EtOH: Denies  Illicit Drugs: Denies    ROS:     General:  No wt loss, fevers, chills, night sweats, fatigue  Eyes:  Good vision, no reported pain  ENT:  No sore throat, pain, runny nose, dysphagia  CV:  No pain, palpitations, hypo/hypertension  Pulm:  No dyspnea, cough, tachypnea, wheezing  GI:  see HPI  :  No pain, bleeding, incontinence, nocturia  Muscle:  No pain, weakness  Neuro:  No weakness, tingling, memory problems  Psych:  No fatigue, insomnia, mood problems, depression  Endocrine:  No polyuria, polydipsia, cold/heat intolerance  Heme:  No petechiae, ecchymosis, easy bruisability  Skin:  No rash, tattoos, scars, edema    PHYSICAL EXAM:     GENERAL:  No acute distress  HEENT:  NCAT, no scleral icterus   NECK: supple  CHEST:  no respiratory distress  HEART:  Regular rate and rhythm  ABDOMEN:  Soft, non-tender, non-distended, normoactive bowel sounds,  no masses  EXTREMITIES: No edema  SKIN:  No rash/erythema/ecchymoses/petechiae/wounds/abscess/warm/dry  NEURO:  Alert and oriented x 3, no asterixis  PSYCH: normal affect    Vital Signs:  Vital Signs Last 24 Hrs  T(C): 36.8 (08 May 2023 13:19), Max: 36.8 (08 May 2023 13:19)  T(F): 98.2 (08 May 2023 13:19), Max: 98.2 (08 May 2023 13:19)  HR: 83 (08 May 2023 13:19) (67 - 84)  BP: 115/71 (08 May 2023 13:19) (86/57 - 127/83)  BP(mean): --  RR: 17 (08 May 2023 13:19) (16 - 19)  SpO2: 99% (08 May 2023 13:19) (99% - 100%)    Parameters below as of 08 May 2023 13:19  Patient On (Oxygen Delivery Method): room air      Daily Height in cm: 180.34 (08 May 2023 06:13)    Daily     LABS:                        7.6    9.63  )-----------( 164      ( 08 May 2023 10:15 )             23.0     Mean Cell Volume: 93.5 fL (05-08-23 @ 10:15)    05-08    140  |  100  |  19  ----------------------------<  227<H>  3.4<L>   |  27  |  1.01    Ca    8.5      08 May 2023 06:52    TPro  5.5<L>  /  Alb  3.2<L>  /  TBili  0.4  /  DBili  x   /  AST  14  /  ALT  13  /  AlkPhos  58  05-08    LIVER FUNCTIONS - ( 08 May 2023 06:52 )  Alb: 3.2 g/dL / Pro: 5.5 g/dL / ALK PHOS: 58 U/L / ALT: 13 U/L / AST: 14 U/L / GGT: x           PT/INR - ( 08 May 2023 06:52 )   PT: 14.7 sec;   INR: 1.26 ratio         PTT - ( 08 May 2023 06:52 )  PTT:27.3 sec    Amylase Serum--      Lipase serum56       Ammonia--                          7.6    9.63  )-----------( 164      ( 08 May 2023 10:15 )             23.0                         7.3    9.51  )-----------( 190      ( 08 May 2023 06:52 )             22.9                         9.3    5.79  )-----------( 148      ( 06 May 2023 06:36 )             28.2                         9.2    7.70  )-----------( 153      ( 05 May 2023 17:35 )             27.8       Imaging:  CT A/P 5/8/23  FINDINGS:  LOWER CHEST: Within normal limits.    LIVER: Within normal limits.  BILE DUCTS: Normal caliber.  GALLBLADDER: Within normal limits.  SPLEEN: Within normal limits.  PANCREAS: Within normal limits.  ADRENALS: Left adrenal adenoma measures 2.0 cm. Right adrenal gland   within normal limits.  KIDNEYS/URETERS: Left renal cysts, largest measuring 6.3 cm in the lower   pole. Right renal cortical scarring. No hydronephrosis.    BLADDER: Within normal limits.  REPRODUCTIVE ORGANS: Small sized prostate.    BOWEL: Small hiatal hernia. No bowel obstruction. Appendix is normal.   Diverticulosis, without evidence of acute diverticulitis. No evidence of   active GI bleed.  PERITONEUM: No ascites.  VESSELS: Atherosclerotic calcifications.  RETROPERITONEUM/LYMPH NODES: No lymphadenopathy.  ABDOMINAL WALL: Fat-containing umbilical hernia.  BONES: Degenerative changes.    IMPRESSION:  No evidence of active GI bleed.  Extensive colonic diverticulosis, without acute diverticulitis.        EGD 5/5/23  Findings:       A small hiatal hernia was present.       The entire examined stomach was normal.       The first portion of the duodenum and second portion of the duodenum        were normal.                                                                                   Impression:          - Small hiatal hernia.                       - Normal stomach.                       - Normal first portion of the duodenum and second                        portion of the duodenum.                       - No specimens collected.                       - No finding to explain the source of anemia and                        hematochezia. It is likely related to diverticulosis.  Recommendation:      - Return patient to hospital [facility] for ongoing care.                       - Can resume diet as tolerated.                       - If the patient re-bleeds, recommend obtaining CT angio                        A/P to localize the source and potential IR consult.                       - Monitor CBC and transfuse if hgb < 7.      Colonoscopy 5/5/23             Findings:       Many small and large-mouthed diverticula were found in the entire colon.        There was no evidence of diverticular bleeding.       A moderate amount of semi-liquid stool was found in the entire colon,        making visualization difficult. Lavage of the area was performed using a        moderate amount of normal saline, resulting in clearance with adequate        visualization.       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       Non-bleeding internal hemorrhoids were found during retroflexion. The        hemorrhoids were small.           Impression:          - Severe diverticulosis in the entire examined colon.                        There was no evidence of active diverticular bleeding.                       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 This may correlate to recent CT findings.                       - Non-bleeding internal hemorrhoids.                       - No specimens collected.                       - Hematochezia likely related to diverticular bleed                        which is resolved now. There was no active bleeding.  Recommendation:      - Return patient to hospital [facility] for ongoing care.                       - Please refer to endoscopy report.                       - IR consultation for possible embolization if recurrent                        hematochezia.

## 2023-05-08 NOTE — ED PROVIDER NOTE - PROGRESS NOTE DETAILS
Theo: patient consented for blood will give 1uPRBC emergently given hypotension pallor and large volume of blood loss. Jessi Castro MD; EMIM PGY3: Patient signed out to me status post 1 unit of PRBCs with slight drop in maps, however still > 65, patient examined complaining of being cold but denies any focal abdominal pain, no tenderness elicited on my exam.  Documentation from previous hospitalization reviewed, GI deferring to IR if patient has concern for diverticular bleed, given that IR will likely manage patient will put in for CAT scan of the abdomen pelvis to localize bleed. CT read without any active diverticular bleed, patient hemodynamically stable placed on monitor.  Will admit for further management.

## 2023-05-08 NOTE — H&P ADULT - NSHPPHYSICALEXAM_GEN_ALL_CORE
LOS:     VITALS:   T(C): 36.8 (05-08-23 @ 13:19), Max: 36.8 (05-08-23 @ 13:19)  HR: 83 (05-08-23 @ 13:19) (67 - 84)  BP: 115/71 (05-08-23 @ 13:19) (86/57 - 127/83)  RR: 17 (05-08-23 @ 13:19) (16 - 19)  SpO2: 99% (05-08-23 @ 13:19) (99% - 100%)    GENERAL: NAD, lying in bed comfortably  HEAD:  Atraumatic, Normocephalic  EYES: , conjunctiva and sclera clear  ENT: Moist mucous membranes  NECK: Supple, No JVD  CHEST/LUNG: Clear to auscultation bilaterally; No rales, rhonchi, wheezing, or rubs. Unlabored respirations  HEART: Regular rate and rhythm; No murmurs, rubs, or gallops  ABDOMEN: BSx4; Soft, nontender, nondistended  EXTREMITIES:  2+ Peripheral Pulses, brisk capillary refill. No clubbing, cyanosis, or edema  NERVOUS SYSTEM:  A&Ox3, no focal deficits   SKIN: No rashes or lesions

## 2023-05-08 NOTE — ED ADULT NURSE NOTE - OBJECTIVE STATEMENT
Received pt in room 7. A&Ox4, ambulatory at baseline, PMH HTN, diverticulosis, previous admit diverticular bleed last week requiring 5 units PRBCs, diagnosed vascular ectasia and diverticulosis as source of the bleed. discharged home yesterday, today several large red bloody bowel movements, Felt weak lightheaded and dizzy, witnessed fall with anterior head strike, denies LOC, small lac on bridge of nose, 1 episode of NBNB emesis en route to ER, pt fully responsive but pale and cold on assessment, endorsed lethargy, No abdominal pain, no fever, no chest pain, shortness of breath. VSS. RR even and unlabored. 20g placed in left forearm, 18g right AC. 1 unit emergent blood given. Labs sent. Medication given. Awaiting further orders from provider.

## 2023-05-08 NOTE — H&P ADULT - HISTORY OF PRESENT ILLNESS
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  He did not have any cauterization and he did not any polyps removed . This morning, pt woke up in his usual state of health and went to the bathroom when he had several large red bloody bowel movements. Shortly upon after, pt felt lightheaded and dizzy when trying to get up from the toilet. Positional change ultimately culminated into a witness fall (anterior head strike) with temporary LOC ( ) . Denies fever, nausea, abdominal pain, CP, SOB, palpitations, or dysuria.     In the ED: T = 97.6F; BP = 95/61; HR = 83; RR = 16; O2 = 100% RA   s/p   CT abd/pelvis:    into the ER had 1 episode of nonbilious nonbloody emesis. 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  He did not have any cauterization and he did not any polyps removed . This morning, pt woke up in his usual state of health and went to the bathroom when he had several large red bloody bowel movements. Shortly upon after, pt felt lightheaded and dizzy when trying to get up from the toilet. Positional change ultimately culminated into a witness fall (anterior head strike) w/o LOC. Last BM in the ED with " a little red blood" and 1 episode of Non bloody non bilious emesis. Denies fever, nausea, abdominal pain, CP, SOB, palpitations, blurry vision or dysuria.     In the ED: T = 97.6F; BP = 95/61; HR = 83; RR = 16; O2 = 100% RA   s/p IV Zofran 4 mg; 1U pRBC   CT abd/pelvis: no active GI bleed; extensive diverticulosis w/o diverticulitis   CTH: no ICH

## 2023-05-08 NOTE — H&P ADULT - PROBLEM SELECTOR PLAN 3
On home diltiazem and atenolol    Plan:  Hold i/so hypotension   Will restart as tolerated On home diltiazem and atenolol-chlorthalidone      Plan:  Hold i/so hypotension   Will restart as tolerated

## 2023-05-08 NOTE — H&P ADULT - PROBLEM SELECTOR PLAN 4
On home finasteride and tamsulosin On home finasteride and tamsulosin    Plan:   C/w home finasteride

## 2023-05-08 NOTE — ED PROVIDER NOTE - OBJECTIVE STATEMENT
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  He did not have any cauterization and he did not many polyps removed.  Was discharged home yesterday,, today woke up in his usual state of health went to the bathroom had several large red bloody bowel movements.  Felt weak lightheaded and dizzy, stood up to get up from the bathroom and had a witnessed fall with anterior head strike, temporary LOC with prompt return to consciousness.  No abdominal pain, no fever, no chest pain, shortness of breath.  Here while coming into the ER had 1 episode of nonbilious nonbloody emesis. 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  He did not have any cauterization and he did not any polyps removed.  Was discharged home yesterday,, today woke up in his usual state of health went to the bathroom had several large red bloody bowel movements.  Felt weak lightheaded and dizzy, stood up to get up from the bathroom and had a witnessed fall with anterior head strike, temporary LOC with prompt return to consciousness.  No abdominal pain, no fever, no chest pain, shortness of breath.  Here while coming into the ER had 1 episode of nonbilious nonbloody emesis.

## 2023-05-08 NOTE — ED PROVIDER NOTE - ATTENDING CONTRIBUTION TO CARE
73-year-old male with recent admission for diverticular GI bleed requiring multiple blood product transfusions, had colonoscopy and EGD with diverticulosis noted, but no active bleeding on scope, hypertension, asthma, prostate cancer, recently diagnosed atrial fibrillation/flutter (not started on anticoagulation), presenting to emergency room for multiple witnessed syncopal episodes at home with fall from standing height with facial trauma in the setting of multiple bright red bloody bowel movements and abdominal discomfort.  Also had 1 episode of vomiting without blood in ER.  No fevers or rectal pain.    Exam  Borderline low blood pressure–systolic 90s, no tachycardia  Awake, alert, oriented patient  Conjunctiva pale  Abdomen soft, nondistended, mild left lower quadrant tenderness  Abrasion to nasal bridge with mild tenderness, no asymmetry, no nasal septal hematoma, no other facial bony tenderness    EKG appears to be NSR but on cardiac monitor- paroxsymal Afib     Assessment/plan  GI bleed, likely lower with diverticulosis source  1 unit PRBCs ordered based on exam and low blood pressure  We will get labs, H&H, type and screen, CT head due to trauma, CT abdomen/pelvis GI bleed protocol, Tdap  Will need admission and close observation

## 2023-05-08 NOTE — ED PROVIDER NOTE - PHYSICAL EXAMINATION
Vitals: I have reviewed the patients vital signs  General: ill appearing  HEENT: nasal bridge abrasion, no bleeding, no septal hematoma no deformity  Eyes: EOMI, tracking appropriately  Neck: no tracheal deviation, no JVD  Chest/Lungs: no external trauma, symmetric chest rise, speaking in complete sentences, no WOB  Heart: skin and extremities well perfused, irregular rhythm, slow cap refill  Neuro: A+Ox3, too weak to ambulate, [place] no focal deficits  MSK: global weakness  Skin: pale, cool, clammy  : rectal chaperone dr sandoval bright red blood in vault

## 2023-05-08 NOTE — CONSULT NOTE ADULT - ATTENDING COMMENTS
72 yo Male h/o prostate cancer s/p radiation, HTN, asthma, and h/o presumed diverticular bleeding w/ recent admission (5/3/23-5/6/23) for hematochezia, now presenting w/ recurrent painless hematochezia, likely recurrent diverticular bleed.  Monitor Hb, transfuse to Hb > 7.  If continued bleeding consider repeating CT Angio for possible IR embolization.

## 2023-05-08 NOTE — H&P ADULT - NSHPREVIEWOFSYSTEMS_GEN_ALL_CORE
REVIEW OF SYSTEMS:    CONSTITUTIONAL:  No weakness, fevers or chills  EYES/ENT:  No visual changes;  No vertigo or throat pain   NECK:  No pain or stiffness  RESPIRATORY:  No cough, wheezing, hemoptysis; No shortness of breath  CARDIOVASCULAR:  No chest pain or palpitations  GASTROINTESTINAL:  + hematochezia with non-blood non-bilious emesis; No abdominal or epigastric pain.  GENITOURINARY:  No dysuria, frequency or hematuria  MUSCULOSKELETAL:  FROM all extremities, normal strength, No calf tenderness  NEUROLOGICAL:  No numbness or weakness  SKIN:  No itching, rashes

## 2023-05-08 NOTE — H&P ADULT - ASSESSMENT
73Y M w/ PMH of HTN, HLD, prostate cancer (s/p XRT), asthma, diverticulosis with recent admission (5/3 - 5/6) for diverticular bleed requiring 5 units PRBCs and s/p endoscopy/colonoscopy with vascular ectasia and diverticulosis presenting with GIB (multiple BRBPR) admitted to medicine for further management.

## 2023-05-08 NOTE — ED ADULT NURSE NOTE - NSIMPLEMENTINTERV_GEN_ALL_ED
Implemented All Fall Risk Interventions:  [place] to call system. Call bell, personal items and telephone within reach. Instruct patient to call for assistance. Room bathroom lighting operational. Non-slip footwear when patient is off stretcher. Physically safe environment: no spills, clutter or unnecessary equipment. Stretcher in lowest position, wheels locked, appropriate side rails in place. Provide visual cue, wrist band, yellow gown, etc. Monitor gait and stability. Monitor for mental status changes and reorient to person, place, and time. Review medications for side effects contributing to fall risk. Reinforce activity limits and safety measures with patient and family.

## 2023-05-09 ENCOUNTER — TRANSCRIPTION ENCOUNTER (OUTPATIENT)
Age: 73
End: 2023-05-09

## 2023-05-09 LAB
ANION GAP SERPL CALC-SCNC: 12 MMOL/L — SIGNIFICANT CHANGE UP (ref 7–14)
BLD GP AB SCN SERPL QL: NEGATIVE — SIGNIFICANT CHANGE UP
BUN SERPL-MCNC: 14 MG/DL — SIGNIFICANT CHANGE UP (ref 7–23)
CALCIUM SERPL-MCNC: 8.5 MG/DL — SIGNIFICANT CHANGE UP (ref 8.4–10.5)
CHLORIDE SERPL-SCNC: 101 MMOL/L — SIGNIFICANT CHANGE UP (ref 98–107)
CO2 SERPL-SCNC: 25 MMOL/L — SIGNIFICANT CHANGE UP (ref 22–31)
CREAT SERPL-MCNC: 0.82 MG/DL — SIGNIFICANT CHANGE UP (ref 0.5–1.3)
EGFR: 93 ML/MIN/1.73M2 — SIGNIFICANT CHANGE UP
GLUCOSE SERPL-MCNC: 112 MG/DL — HIGH (ref 70–99)
HCT VFR BLD CALC: 22.1 % — LOW (ref 39–50)
HGB BLD-MCNC: 7.1 G/DL — LOW (ref 13–17)
MAGNESIUM SERPL-MCNC: 1.8 MG/DL — SIGNIFICANT CHANGE UP (ref 1.6–2.6)
MCHC RBC-ENTMCNC: 30.2 PG — SIGNIFICANT CHANGE UP (ref 27–34)
MCHC RBC-ENTMCNC: 32.1 GM/DL — SIGNIFICANT CHANGE UP (ref 32–36)
MCV RBC AUTO: 94 FL — SIGNIFICANT CHANGE UP (ref 80–100)
NRBC # BLD: 0 /100 WBCS — SIGNIFICANT CHANGE UP (ref 0–0)
NRBC # FLD: 0.02 K/UL — HIGH (ref 0–0)
PHOSPHATE SERPL-MCNC: 3.8 MG/DL — SIGNIFICANT CHANGE UP (ref 2.5–4.5)
PLATELET # BLD AUTO: 166 K/UL — SIGNIFICANT CHANGE UP (ref 150–400)
POTASSIUM SERPL-MCNC: 3.1 MMOL/L — LOW (ref 3.5–5.3)
POTASSIUM SERPL-SCNC: 3.1 MMOL/L — LOW (ref 3.5–5.3)
RBC # BLD: 2.35 M/UL — LOW (ref 4.2–5.8)
RBC # FLD: 15.6 % — HIGH (ref 10.3–14.5)
RH IG SCN BLD-IMP: POSITIVE — SIGNIFICANT CHANGE UP
SODIUM SERPL-SCNC: 138 MMOL/L — SIGNIFICANT CHANGE UP (ref 135–145)
WBC # BLD: 8 K/UL — SIGNIFICANT CHANGE UP (ref 3.8–10.5)
WBC # FLD AUTO: 8 K/UL — SIGNIFICANT CHANGE UP (ref 3.8–10.5)

## 2023-05-09 PROCEDURE — 99232 SBSQ HOSP IP/OBS MODERATE 35: CPT | Mod: GC

## 2023-05-09 RX ORDER — POTASSIUM CHLORIDE 20 MEQ
40 PACKET (EA) ORAL ONCE
Refills: 0 | Status: COMPLETED | OUTPATIENT
Start: 2023-05-09 | End: 2023-05-09

## 2023-05-09 RX ORDER — POTASSIUM CHLORIDE 20 MEQ
1 PACKET (EA) ORAL
Qty: 0 | Refills: 0 | DISCHARGE

## 2023-05-09 RX ADMIN — Medication 40 MILLIEQUIVALENT(S): at 08:38

## 2023-05-09 RX ADMIN — MONTELUKAST 10 MILLIGRAM(S): 4 TABLET, CHEWABLE ORAL at 22:26

## 2023-05-09 RX ADMIN — ATORVASTATIN CALCIUM 10 MILLIGRAM(S): 80 TABLET, FILM COATED ORAL at 22:26

## 2023-05-09 RX ADMIN — TAMSULOSIN HYDROCHLORIDE 0.4 MILLIGRAM(S): 0.4 CAPSULE ORAL at 22:26

## 2023-05-09 RX ADMIN — FINASTERIDE 5 MILLIGRAM(S): 5 TABLET, FILM COATED ORAL at 11:40

## 2023-05-09 NOTE — PROGRESS NOTE ADULT - ATTENDING COMMENTS
73Y M w/ PMH of HTN, HLD, prostate cancer (s/p XRT), asthma, diverticulosis with recent admission (5/3 - 5/6) for diverticular bleed requiring 5 units PRBCs and s/p endoscopy/colonoscopy with vascular ectasia and diverticulosis presenting with GIB (multiple BRBPR) admitted to medicine for further management. Last hospitalization was also complicated by aflutter. given bleeding was NOT d/c on AC.    Reports feeling well. No BM since admission so far.    Exam  VS-soft BP but well appearing, not tachy  NAD  A&O x 3, following commands, no focal deficits  CVS S1 S2 RRR, sinus  Lungs CTA    Problem list  LGIB, likely diverticular  -discussed case with GI and IR. Per IR, if Pt rebleeds will get CTA of abdomen and attempt to embolize. no other intervention at this time. If no more bleeding in the next 24 hours, can dc home    #prolonged QT  -unclear etiology  -lytes repleted, did receive zofran in ED  -repeat EKG     #HTN  -hold meds    Otherwise as noted above .

## 2023-05-09 NOTE — PROGRESS NOTE ADULT - PROBLEM SELECTOR PLAN 1
2-3 episodes of hematochezia prior to admission with dizziness and fall  Recent admission (5/3 - 5/6) w/ similar complaint and s/p EGD and colonoscopy w/o acute bleeding   On admission: Hgb - 7.3; s/p 1 U --> Hgb -7.6   s/p Zofran   CT abd/pelvis w/w/o IV con: no acute GI bleed; extensive diverticulosis w/o diverticulitis     Plan:   - Trend CBC q8h; Transfuse Hgb < 7; Active type & screen  - GI consult; recs pending   - IR consult; recs appreciated --> no acute intervention   - Clear liquid diet; advance as tolerated 2-3 episodes of hematochezia prior to admission with dizziness and fall  Recent admission (5/3 - 5/6) w/ similar complaint and s/p EGD and colonoscopy w/o acute bleeding   On admission: Hgb - 7.3; s/p 1 U --> Hgb -7.6   s/p Zofran   CT abd/pelvis w/w/o IV con: no acute GI bleed; extensive diverticulosis w/o diverticulitis     Plan:   - Trend CBC BID; Transfuse Hgb < 7; Active type & screen  - GI consult; recs appreciated --> CTM; possible d/c tmrw   - IR consult; recs appreciated --> no acute intervention   - Clear liquid diet; advance as tolerated

## 2023-05-09 NOTE — DISCHARGE NOTE PROVIDER - NSDCFUADDAPPT_GEN_ALL_CORE_FT
Please follow up with Cardiology in 1 week to assess need for blood thinners     Please follow up with your PCP in 1 week to assess your recovery

## 2023-05-09 NOTE — PROGRESS NOTE ADULT - PROBLEM SELECTOR PLAN 3
On home diltiazem and atenolol-chlorthalidone      Plan:  Hold i/so hypotension   Will restart as tolerated

## 2023-05-09 NOTE — DIETITIAN INITIAL EVALUATION ADULT - PERTINENT LABORATORY DATA
05-09    138  |  101  |  14  ----------------------------<  112<H>  3.1<L>   |  25  |  0.82    Ca    8.5      09 May 2023 06:05  Phos  3.8     05-09  Mg     1.80     05-09    TPro  5.5<L>  /  Alb  3.2<L>  /  TBili  0.4  /  DBili  x   /  AST  14  /  ALT  13  /  AlkPhos  58  05-08

## 2023-05-09 NOTE — DISCHARGE NOTE PROVIDER - HOSPITAL COURSE
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  He did not have any cauterization and he did not any polyps removed. Prior to this admission, pt woke up in his usual state of health and went to the bathroom when he had several large red bloody bowel movements. Shortly upon after, pt felt lightheaded and dizzy when trying to get up from the toilet. Positional change ultimately culminated into a witness fall (anterior head strike) w/o LOC. Last BM in the ED with " a little red blood" and 1 episode of Non bloody non bilious emesis. Denies fever, nausea, abdominal pain, CP, SOB, palpitations, blurry vision or dysuria.     In the ED: T = 97.6F; BP = 95/61; HR = 83; RR = 16; O2 = 100% RA   s/p IV Zofran 4 mg; 1U pRBC   CT abd/pelvis: no active GI bleed; extensive diverticulosis w/o diverticulitis   CTH: no ICH    Pt admitted to the medicine unit for further management. In the medicine unit, pt seen and examined routinely. Gastroenterology consulted and recommended IR consult with no further plans for intervention during admission. Interventional radiology consulted and stated no acute interventions with a CT abd/pelvis unremarkable for active GI bleed. During stay, pt hemoglobin stable and no further evidence of BRBPR. Pt's home medication were restarted and doing well. Pt hemodynamically stable and ready for discharge home with outpatient follow up. 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  He did not have any cauterization and he did not any polyps removed. Prior to this admission, pt woke up in his usual state of health and went to the bathroom when he had several large red bloody bowel movements. Shortly upon after, pt felt lightheaded and dizzy when trying to get up from the toilet. Positional change ultimately culminated into a witness fall (anterior head strike) w/o LOC. Last BM in the ED with " a little red blood" and 1 episode of Non bloody non bilious emesis. Denies fever, nausea, abdominal pain, CP, SOB, palpitations, blurry vision or dysuria.     In the ED: T = 97.6F; BP = 95/61; HR = 83; RR = 16; O2 = 100% RA   s/p IV Zofran 4 mg; 1U pRBC   CT abd/pelvis: no active GI bleed; extensive diverticulosis w/o diverticulitis   CTH: no ICH    Pt admitted to the medicine unit for further management. Patient was seen by gastroenterology and recommended IR consult for possible embolization. Interventional radiology was consulted and stated no acute intervention due to negative CTA abd/pelvis. Patient had rapid response the AM of 5/10 due to hypotension and low hemoglobin. Repeat CTA Abd/pelvis again did not show signs of active bleed. He was seen by cardiology over concern for abnormal heart rhythm on exam and EKG, which appeared as possible atrial flutter versus MAT. Patient also significantly had signs of atrial flutter on prior admission. Was unable to be on AC initially during admission due to his GI bleed. Patient underwent tagged RBC scan which demonstrated bleeding at the cecum/ascending colon. Per IR there would be limited benefit to embolization and was recommended for GI to scope the patient, which initially was the plan. Over the weekend of 5/13 patient had no further bloody bowel movements and his hemoglobin remained stable. Due to this GI deferred scope as it would be of little utility. Over the course of his admission patient required 6U pRBCs. On discharge his hemoglobin held steady at ~8.0. When discussed about restarting AC and the risks and benefits of doing so, especially regarding his bleeding history the patient wanted to defer it until he spoke to his PCP. At discharge patient was instructed to restart his home diltiazem at reduced dose with ability to uptitrate outpatient.    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  He did not have any cauterization and he did not any polyps removed. Prior to this admission, pt woke up in his usual state of health and went to the bathroom when he had several large red bloody bowel movements. Shortly upon after, pt felt lightheaded and dizzy when trying to get up from the toilet. Positional change ultimately culminated into a witness fall (anterior head strike) w/o LOC. Last BM in the ED with " a little red blood" and 1 episode of Non bloody non bilious emesis. Denies fever, nausea, abdominal pain, CP, SOB, palpitations, blurry vision or dysuria.     In the ED: T = 97.6F; BP = 95/61; HR = 83; RR = 16; O2 = 100% RA   s/p IV Zofran 4 mg; 1U pRBC   CT abd/pelvis: no active GI bleed; extensive diverticulosis w/o diverticulitis   CTH: no ICH    Pt admitted to the medicine unit for further management. Patient was seen by gastroenterology and recommended IR consult for possible embolization. Interventional radiology was consulted and stated no acute intervention due to negative CTA abd/pelvis. Patient had rapid response the AM of 5/10 due to hypotension and low hemoglobin. Repeat CTA Abd/pelvis again did not show signs of active bleed. He was seen by cardiology over concern for abnormal heart rhythm on exam and EKG, which appeared as possible atrial flutter versus MAT. Patient also significantly had signs of atrial flutter on prior admission. Was unable to be on AC initially during admission due to his GI bleed. Patient underwent tagged RBC scan which demonstrated bleeding at the cecum/ascending colon. Per IR there would be limited benefit to embolization and was recommended for GI to scope the patient, which initially was the plan. Over the weekend of 5/13 patient had no further bloody bowel movements and his hemoglobin remained stable. Due to this GI deferred scope as it would be of little utility. Over the course of his admission patient required 6U pRBCs. On discharge his hemoglobin held steady at ~8.0. When discussed about restarting AC and the risks and benefits of doing so, especially regarding his bleeding history the patient wanted to defer it until he spoke to his PCP. At discharge patient was instructed to restart his home diltiazem at reduced dose (180mg daily down from 240mg) with ability to uptitrate on an outpatient basis.    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  He did not have any cauterization and he did not any polyps removed. Prior to this admission, pt woke up in his usual state of health and went to the bathroom when he had several large red bloody bowel movements. Shortly upon after, pt felt lightheaded and dizzy when trying to get up from the toilet. Positional change ultimately culminated into a witness fall (anterior head strike) w/o LOC. Last BM in the ED with " a little red blood" and 1 episode of Non bloody non bilious emesis. Denies fever, nausea, abdominal pain, CP, SOB, palpitations, blurry vision or dysuria.     In the ED: T = 97.6F; BP = 95/61; HR = 83; RR = 16; O2 = 100% RA   s/p IV Zofran 4 mg; 1U pRBC   CT abd/pelvis: no active GI bleed; extensive diverticulosis w/o diverticulitis   CTH: no ICH    Pt admitted to the medicine unit for further management. Patient was seen by gastroenterology and recommended IR consult for possible embolization. Interventional radiology was consulted and stated no acute intervention due to negative CTA abd/pelvis. Patient had rapid response the AM of 5/10 due to hypotension and low hemoglobin. Repeat CTA Abd/pelvis again did not show signs of active bleed. He was seen by cardiology over concern for abnormal heart rhythm on exam and EKG, which appeared as possible atrial flutter versus MAT. Patient also significantly had signs of atrial flutter on prior admission. Was unable to be on AC initially during admission due to his GI bleed. Patient underwent tagged RBC scan which demonstrated bleeding at the cecum/ascending colon. Per IR there would be limited benefit to embolization and was recommended for GI to scope the patient, which initially was the plan. Over the weekend of 5/13 patient had no further bloody bowel movements and his hemoglobin remained stable. Due to this GI deferred scope as it would be of little utility. Over the course of his admission patient required 6U pRBCs. On discharge his hemoglobin held steady at ~8.0. When discussed about restarting AC and the risks and benefits of doing so, especially regarding his bleeding history the patient wanted to defer it until he spoke to his PCP. At discharge patient was instructed to restart his home diltiazem at reduced dose (180mg daily down from 240mg) with ability to uptitrate on an outpatient basis.    Patient also during his admission developed gout flare. Was treated with colchicine and tylenol.    Patient cleared for discharge home

## 2023-05-09 NOTE — DIETITIAN INITIAL EVALUATION ADULT - PROBLEM SELECTOR PLAN 1
2-3 episodes of hematochezia prior to admission with dizziness and fall  Recent admission (5/3 - 5/6) w/ similar complaint and s/p EGD and colonoscopy w/o acute bleeding   On admission: Hgb - 7.3; s/p 1 U --> Hgb -7.6   s/p Zofran   CT abd/pelvis w/w/o IV con: no acute GI bleed; extensive diverticulosis w/o diverticulitis     Plan:   - Trend CBC q8h; Transfuse Hgb < 7; Active type & screen  - GI consult; recs pending   - IR consult; recs appreciated --> no acute intervention   - Clear liquid diet; advance as tolerated

## 2023-05-09 NOTE — DISCHARGE NOTE PROVIDER - NSDCCPCAREPLAN_GEN_ALL_CORE_FT
PRINCIPAL DISCHARGE DIAGNOSIS  Diagnosis: Hematochezia  Assessment and Plan of Treatment: You came into the hospital after noticing blood in stool likely due to diverticular bleed. As such, GI was reconsulted and recommended no acute intervention given the recent EGD and colonoscopy. A CT scan did not show active bleeding. Interventional Radiology was consulted and did not see any need for acute embolization (ie. blocking off bleeding sites). During your stay, you no longer had any blood bowel movements and your blood levels were stable.   Please take miralax daily to avoid constipation and follow up at the [facility] to arrange appointment; [phone] ([facility] at [address] [place] [address]). Please also follow up with cardiology for Atrial flutter (irregular heart rhythm) and the need for blood thinners.   Please return to the ED if you experience weaknes and dizziness accompanied by bloody bowel movements.     PRINCIPAL DISCHARGE DIAGNOSIS  Diagnosis: Hematochezia  Assessment and Plan of Treatment: You came into the hospital after noticing blood in your stool likely due to a diverticular bleed, similar to your prior admission. As such, GI was reconsulted and recommended no acute intervention given the recent EGD and colonoscopy. A CT scan did not show active bleeding. Interventional Radiology was consulted and there was no acute intervention done due to no signs of active bleeding. During your stay you received blood transfusions again for low hemoglobin levels. You had a bleeding scan done which showed signs of bleeding at the cecum/ascending colon. Interventional radiology did not believe that embolization would be of benefit. GI was going to repeat a colonoscopy but you then stopped having bloody bowel movements and your hemoglobin stabilized, so colonoscopy was not done. Maintain a high fiber diet as an outpatient. Please return to the ED if you experience weaknes and dizziness accompanied by bloody bowel movements.      SECONDARY DISCHARGE DIAGNOSES  Diagnosis: Atrial flutter  Assessment and Plan of Treatment: There was concern on your EKG for an irregular heart rhythm called atrial flutter, which you had on your last admission. Your heart rate remained controlled and we could not start blood thinners due to your bleeding. Now that your bleeding has stopped it will be a risk versus benefit discussion about starting them. You endorsed that you wanted to follow up with your PCP regarding this. We recommend you also follow up with cardiology regarding this. When you leave the hospital you can also restart your diltiazem, but at a reduced dose of 180mg daily. This can be increased on the outpatient basis when you follow up with your PCP or cardiology.     PRINCIPAL DISCHARGE DIAGNOSIS  Diagnosis: Hematochezia  Assessment and Plan of Treatment: You came into the hospital after noticing blood in your stool likely due to a diverticular bleed, similar to your prior admission. As such, GI was reconsulted and recommended no acute intervention given the recent EGD and colonoscopy. A CT scan did not show active bleeding. Interventional Radiology was consulted and there was no acute intervention done due to no signs of active bleeding. During your stay you received blood transfusions again for low hemoglobin levels. You had a bleeding scan done which showed signs of bleeding at the cecum/ascending colon. Interventional radiology did not believe that embolization would be of benefit. GI was going to repeat a colonoscopy but you then stopped having bloody bowel movements and your hemoglobin stabilized, so colonoscopy was not done. Maintain a high fiber diet as an outpatient. Please return to the ED if you experience weaknes and dizziness accompanied by bloody bowel movements.      SECONDARY DISCHARGE DIAGNOSES  Diagnosis: Atrial flutter  Assessment and Plan of Treatment: There was concern on your EKG for an irregular heart rhythm called atrial flutter, which you had on your last admission. Your heart rate remained controlled and we could not start blood thinners due to your bleeding. Now that your bleeding has stopped it will be a risk versus benefit discussion about starting them. You endorsed that you wanted to follow up with your PCP regarding this. We recommend you also follow up with cardiology regarding this. When you leave the hospital you can also restart your diltiazem, but at a reduced dose of 180mg daily. This can be increased on the outpatient basis when you follow up with your PCP or cardiology.    Diagnosis: Gout flare  Assessment and Plan of Treatment: You developed pain in your right ankle, which then traveled to your big toe, concerning for a gout flare, which you endorsed having a history of. you were given colchicine and tylenol to help. you will be given an additional two days. If it persists you can follow up further with your PCP.

## 2023-05-09 NOTE — DIETITIAN INITIAL EVALUATION ADULT - ORAL INTAKE PTA/DIET HISTORY
Patient seen for assessment. Reports good appetite PTA. Live w/ wife. Per diet recall, patient avoids red meat/pork, consumes a wide variety of vegetables/legumes and consumes low fat dairy. Doesn't consume fruit for personal preference.

## 2023-05-09 NOTE — DIETITIAN INITIAL EVALUATION ADULT - OTHER INFO
73 year old male with a PMH of HTN, HLD, prostate cancer (s/p RT), diverticulosis presenting with GIB (multiple BRBPR) admitted to medicine for further management per chart.    Patient reports good appetite. No GI distress reported at this time. No chewing/swallowing difficulties reported. Has no food allergies. Reports UBW is 224 lbs. and has been trying to lose weight by cutting back on portions. ABW is 220 lbs. (5/8) per chart indicating a -1.8% weight loss. No edema or pressure injuries noted per RN flow sheet.    Patient requesting nutrition handout for recommended diet at home. Provided Mediterranean diet handout and reviewed w/ patient.

## 2023-05-09 NOTE — DIETITIAN INITIAL EVALUATION ADULT - PROBLEM SELECTOR PLAN 2
On recent admission: EKG consistent for A-flutter   On this admission: EKG sinus   Echo (5/4): EF = 61% w. severely dilated LA; mild pulm HTN    Plan:  - F/u outpatient w/ Dr. Davis (cardiologist)  - Hold anticoagulation i/so GIB ([id number]: 2 for age and HTN)

## 2023-05-09 NOTE — PROGRESS NOTE ADULT - ASSESSMENT
73 yrs old male w/ hx of prostate cancer s/p radiation, HTN, asthma, and h/o presumed diverticular bleeding (admitted 2021) w/ recent admission (5/3/23-23) for hematochezia, now presenting w/ recurrent painless hematochezia, suspected resolved diverticular bleed    # LGIB - etiology likely recurrent diverticular bleed; patient w/ severe pan-diverticulosis w/ recent colonoscopy 23 w/o identifiable source. Likely low yield of repeating colonoscopy, as well as patient is hesitant to repeat procedure. If able to time CTA w/ active bleeding may be able to localize site of diverticular bleed for possible embolization. Currently appears bleeding has stopped    Recommendations:  - trend CBC and monitor for s/s of rebleeding   - if evidence of recurrent bleeding (worsening hemodynamics, increased frequency of bloody stools), would order STAT CTA to localize diverticular bleed  - can advance   - if no bleeding today, can be discharged tomrow  - currently no plans for repeat colonoscopy  - rest of care per primary team    GI will sign off. Please reconsult as necessary     All recommendations are tentative until note is attested by attending.     Carlos Infante, PGY6  Gastroenterology/Hepatology Fellow  During weekdays: Available on [employer] Teams or pager:[phone] ([employer] Short Range Pager); [phone] (Long Range Pager)  Overnight/Weekend: Please page the on-call GI fellow

## 2023-05-09 NOTE — DISCHARGE NOTE PROVIDER - ATTENDING DISCHARGE PHYSICAL EXAMINATION:
CONSTITUTIONAL: NAD, elderly   RESPIRATORY: Normal respiratory effort; lungs are clear to auscultation bilaterally  CARDIOVASCULAR: Regular rate and rhythm, normal S1 and S2, no murmurs  ABDOMEN: Soft, nondistended, nontender to palpation, normoactive bowel sounds, no rebound/guarding  PSYCH: calm, cooperative  NEUROLOGY: nonfocal   SKIN: No rashes; no palpable lesions

## 2023-05-09 NOTE — DISCHARGE NOTE PROVIDER - NSDCCPTREATMENT_GEN_ALL_CORE_FT
PRINCIPAL PROCEDURE  Procedure: CT abdomen and pelvis w contrast  Findings and Treatment: LOWER CHEST: Within normal limits.  LIVER: Within normal limits.  BILE DUCTS: Normal caliber.  GALLBLADDER: Within normal limits.  SPLEEN: Within normal limits.  PANCREAS: Within normal limits.  ADRENALS: Left adrenal adenoma measures 2.0 cm. Right adrenal gland within normal limits.  KIDNEYS/URETERS: Left renal cysts, largest measuring 6.3 cm in the lower pole. Right renal cortical scarring. No hydronephrosis.  BLADDER: Within normal limits.  REPRODUCTIVE ORGANS: Small sized prostate.  BOWEL: Small hiatal hernia. No bowel obstruction. Appendix is normal. Diverticulosis, without evidence of acute diverticulitis. No evidence of active GI bleed.  PERITONEUM: No ascites.  VESSELS: Atherosclerotic calcifications.  RETROPERITONEUM/LYMPH NODES: No lymphadenopathy.  ABDOMINAL WALL: Fat-containing umbilical hernia.  BONES: Degenerative changes.  IMPRESSION:  No evidence of active GI bleed.  Extensive colonic diverticulosis, without acute diverticulitis.       PRINCIPAL PROCEDURE  Procedure: CT abdomen and pelvis w contrast  Findings and Treatment: LOWER CHEST: Within normal limits.  LIVER: Within normal limits.  BILE DUCTS: Normal caliber.  GALLBLADDER: Within normal limits.  SPLEEN: Within normal limits.  PANCREAS: Within normal limits.  ADRENALS: Left adrenal adenoma measures 2.0 cm. Right adrenal gland within normal limits.  KIDNEYS/URETERS: Left renal cysts, largest measuring 6.3 cm in the lower pole. Right renal cortical scarring. No hydronephrosis.  BLADDER: Within normal limits.  REPRODUCTIVE ORGANS: Small sized prostate.  BOWEL: Small hiatal hernia. No bowel obstruction. Appendix is normal. Diverticulosis, without evidence of acute diverticulitis. No evidence of active GI bleed.  PERITONEUM: No ascites.  VESSELS: Atherosclerotic calcifications.  RETROPERITONEUM/LYMPH NODES: No lymphadenopathy.  ABDOMINAL WALL: Fat-containing umbilical hernia.  BONES: Degenerative changes.  IMPRESSION:  No evidence of active GI bleed.  Extensive colonic diverticulosis, without acute diverticulitis.        SECONDARY PROCEDURE  Procedure: NM radionuclide GI bleeding study  Findings and Treatment: FINDINGS: Beginning at approximately 88 minutes after injection of   radiotracer, there is focal labeled red blood cell accumulationin the   right lower quadrant of the abdomen. This activity increases in intensity   over time and moves antegrade in the ascending and transverse colon as   the study progresses. These findings are compatible with an active   bleeding site in the cecum/proximal ascending colon.  IMPRESSION: Abnormal bleeding scan.  Findings compatible with active bleeding site in the region of the   cecum/proximal ascending colon.

## 2023-05-09 NOTE — DIETITIAN INITIAL EVALUATION ADULT - PERTINENT MEDS FT
MEDICATIONS  (STANDING):  atorvastatin 10 milliGRAM(s) Oral at bedtime  finasteride 5 milliGRAM(s) Oral daily  montelukast 10 milliGRAM(s) Oral at bedtime  tamsulosin 0.4 milliGRAM(s) Oral at bedtime    MEDICATIONS  (PRN):  acetaminophen     Tablet .. 650 milliGRAM(s) Oral every 6 hours PRN Temp greater or equal to 38C (100.4F), Mild Pain (1 - 3)  albuterol    90 MICROgram(s) HFA Inhaler 1 Puff(s) Inhalation four times a day PRN Shortness of Breath

## 2023-05-09 NOTE — PROGRESS NOTE ADULT - SUBJECTIVE AND OBJECTIVE BOX
Patient is a 73y old  Male who presents with a chief complaint of Lower GIB (08 May 2023 18:11)      SUBJECTIVE / OVERNIGHT EVENTS:    Brief Daily Plan    MEDICATIONS  (STANDING):  atorvastatin 10 milliGRAM(s) Oral at bedtime  finasteride 5 milliGRAM(s) Oral daily  montelukast 10 milliGRAM(s) Oral at bedtime  tamsulosin 0.4 milliGRAM(s) Oral at bedtime    MEDICATIONS  (PRN):  acetaminophen     Tablet .. 650 milliGRAM(s) Oral every 6 hours PRN Temp greater or equal to 38C (100.4F), Mild Pain (1 - 3)  albuterol    90 MICROgram(s) HFA Inhaler 1 Puff(s) Inhalation four times a day PRN Shortness of Breath      Vital Signs Last 24 Hrs  T(C): 36.7 (09 May 2023 05:47), Max: 36.8 (08 May 2023 13:19)  T(F): 98.1 (09 May 2023 05:47), Max: 98.2 (08 May 2023 13:19)  HR: 95 (09 May 2023 05:47) (67 - 96)  BP: 145/77 (09 May 2023 05:47) (95/61 - 145/77)  BP(mean): --  RR: 18 (09 May 2023 05:47) (16 - 18)  SpO2: 99% (09 May 2023 05:47) (99% - 100%)    Parameters below as of 09 May 2023 05:47  Patient On (Oxygen Delivery Method): room air      CAPILLARY BLOOD GLUCOSE        I&O's Summary      PHYSICAL EXAM:  GENERAL: NAD, well-developed  HEAD:  Atraumatic, Normocephalic  EYES: EOMI, PERRLA, conjunctiva and sclera clear  NECK: Supple, No JVD  CHEST/LUNG: Clear to auscultation bilaterally; No wheeze  HEART: Regular rate and rhythm; No murmurs, rubs, or gallops  ABDOMEN: Soft, Nontender, Nondistended; Bowel sounds present  EXTREMITIES:  2+ Peripheral Pulses, No clubbing, cyanosis, or edema  PSYCH: AAOx3  NEUROLOGY: non-focal  SKIN: No rashes or lesions    LABS:                        7.1    8.00  )-----------( 166      ( 09 May 2023 06:05 )             22.1     05-08    140  |  100  |  19  ----------------------------<  227<H>  3.4<L>   |  27  |  1.01    Ca    8.5      08 May 2023 06:52    TPro  5.5<L>  /  Alb  3.2<L>  /  TBili  0.4  /  DBili  x   /  AST  14  /  ALT  13  /  AlkPhos  58  05-08    PT/INR - ( 08 May 2023 06:52 )   PT: 14.7 sec;   INR: 1.26 ratio         PTT - ( 08 May 2023 06:52 )  PTT:27.3 sec          RADIOLOGY & ADDITIONAL TESTS:    Imaging Personally Reviewed:    Consultant(s) Notes Reviewed:      Care Discussed with Consultants/Other Providers:   Patient is a 73y old  Male who presents with a chief complaint of Lower GIB (08 May 2023 18:11)      SUBJECTIVE / OVERNIGHT EVENTS: No acute events overnight. Pt seen and examined at bedside this morning. Endorses feeling well and in good spirits. Denies fever, headache, nausea, vomiting, CP, SOB, changes in BM or dysuria.     Brief Daily Plan  - Advance diet   - CBC BID; Monitor BM  - Plan for D/c tmrw     MEDICATIONS  (STANDING):  atorvastatin 10 milliGRAM(s) Oral at bedtime  finasteride 5 milliGRAM(s) Oral daily  montelukast 10 milliGRAM(s) Oral at bedtime  tamsulosin 0.4 milliGRAM(s) Oral at bedtime    MEDICATIONS  (PRN):  acetaminophen     Tablet .. 650 milliGRAM(s) Oral every 6 hours PRN Temp greater or equal to 38C (100.4F), Mild Pain (1 - 3)  albuterol    90 MICROgram(s) HFA Inhaler 1 Puff(s) Inhalation four times a day PRN Shortness of Breath      Vital Signs Last 24 Hrs  T(C): 36.7 (09 May 2023 05:47), Max: 36.8 (08 May 2023 13:19)  T(F): 98.1 (09 May 2023 05:47), Max: 98.2 (08 May 2023 13:19)  HR: 95 (09 May 2023 05:47) (67 - 96)  BP: 145/77 (09 May 2023 05:47) (95/61 - 145/77)  BP(mean): --  RR: 18 (09 May 2023 05:47) (16 - 18)  SpO2: 99% (09 May 2023 05:47) (99% - 100%)    Parameters below as of 09 May 2023 05:47  Patient On (Oxygen Delivery Method): room air      CAPILLARY BLOOD GLUCOSE        I&O's Summary      PHYSICAL EXAM:  GENERAL: NAD, well-developed  HEAD:  Atraumatic, Normocephalic  EYES: EOMI, PERRLA, conjunctiva and sclera clear  NECK: Supple, No JVD  CHEST/LUNG: Clear to auscultation bilaterally; No wheeze  HEART: Regular rate and rhythm; No murmurs, rubs, or gallops  ABDOMEN: Soft, Nontender, Nondistended; Bowel sounds present  EXTREMITIES:  2+ Peripheral Pulses, No clubbing, cyanosis, or edema  PSYCH: AAOx3  NEUROLOGY: non-focal  SKIN: No rashes or lesions    LABS:                        7.1    8.00  )-----------( 166      ( 09 May 2023 06:05 )             22.1     05-08    140  |  100  |  19  ----------------------------<  227<H>  3.4<L>   |  27  |  1.01    Ca    8.5      08 May 2023 06:52    TPro  5.5<L>  /  Alb  3.2<L>  /  TBili  0.4  /  DBili  x   /  AST  14  /  ALT  13  /  AlkPhos  58  05-08    PT/INR - ( 08 May 2023 06:52 )   PT: 14.7 sec;   INR: 1.26 ratio         PTT - ( 08 May 2023 06:52 )  PTT:27.3 sec          RADIOLOGY & ADDITIONAL TESTS:    Imaging Personally Reviewed:    Consultant(s) Notes Reviewed:      Care Discussed with Consultants/Other Providers:   Patient is a 73y old  Male who presents with a chief complaint of Lower GIB (08 May 2023 18:11)      SUBJECTIVE / OVERNIGHT EVENTS: No acute events overnight. Pt seen and examined at bedside this morning. Endorses feeling well and in good spirits. Denies fever, headache, nausea, vomiting, CP, SOB, changes in BM or dysuria.     Brief Daily Plan  - Advance diet   - CBC QD; Monitor BM  - Plan for D/c tmrw     MEDICATIONS  (STANDING):  atorvastatin 10 milliGRAM(s) Oral at bedtime  finasteride 5 milliGRAM(s) Oral daily  montelukast 10 milliGRAM(s) Oral at bedtime  tamsulosin 0.4 milliGRAM(s) Oral at bedtime    MEDICATIONS  (PRN):  acetaminophen     Tablet .. 650 milliGRAM(s) Oral every 6 hours PRN Temp greater or equal to 38C (100.4F), Mild Pain (1 - 3)  albuterol    90 MICROgram(s) HFA Inhaler 1 Puff(s) Inhalation four times a day PRN Shortness of Breath      Vital Signs Last 24 Hrs  T(C): 36.7 (09 May 2023 05:47), Max: 36.8 (08 May 2023 13:19)  T(F): 98.1 (09 May 2023 05:47), Max: 98.2 (08 May 2023 13:19)  HR: 95 (09 May 2023 05:47) (67 - 96)  BP: 145/77 (09 May 2023 05:47) (95/61 - 145/77)  BP(mean): --  RR: 18 (09 May 2023 05:47) (16 - 18)  SpO2: 99% (09 May 2023 05:47) (99% - 100%)    Parameters below as of 09 May 2023 05:47  Patient On (Oxygen Delivery Method): room air      CAPILLARY BLOOD GLUCOSE        I&O's Summary      PHYSICAL EXAM:  GENERAL: NAD, well-developed  HEAD:  Atraumatic, Normocephalic  EYES: EOMI, PERRLA, conjunctiva and sclera clear  NECK: Supple, No JVD  CHEST/LUNG: Clear to auscultation bilaterally; No wheeze  HEART: Regular rate and rhythm; No murmurs, rubs, or gallops  ABDOMEN: Soft, Nontender, Nondistended; Bowel sounds present  EXTREMITIES:  2+ Peripheral Pulses, No clubbing, cyanosis, or edema  PSYCH: AAOx3  NEUROLOGY: non-focal  SKIN: No rashes or lesions    LABS:                        7.1    8.00  )-----------( 166      ( 09 May 2023 06:05 )             22.1     05-08    140  |  100  |  19  ----------------------------<  227<H>  3.4<L>   |  27  |  1.01    Ca    8.5      08 May 2023 06:52    TPro  5.5<L>  /  Alb  3.2<L>  /  TBili  0.4  /  DBili  x   /  AST  14  /  ALT  13  /  AlkPhos  58  05-08    PT/INR - ( 08 May 2023 06:52 )   PT: 14.7 sec;   INR: 1.26 ratio         PTT - ( 08 May 2023 06:52 )  PTT:27.3 sec          RADIOLOGY & ADDITIONAL TESTS:    Imaging Personally Reviewed:    Consultant(s) Notes Reviewed:      Care Discussed with Consultants/Other Providers:

## 2023-05-09 NOTE — DISCHARGE NOTE PROVIDER - NSDCMRMEDTOKEN_GEN_ALL_CORE_FT
albuterol 90 mcg/inh inhalation aerosol: 1 puff(s) inhaled 4 times a day As needed Shortness of Breath  atenolol 50 mg-chlorthalidone 25 mg tablet:   atorvastatin 10 mg oral tablet: 1 tab(s) orally once a day (at bedtime)  dilTIAZem 240 mg/24 hours oral capsule, extended release: 1 cap(s) orally once a day  finasteride 5 mg oral tablet: 1 tab(s) orally once a day  montelukast 10 mg oral tablet: 1 tab(s) orally once a day (at bedtime)  tamsulosin 0.4 mg oral capsule: 1 cap(s) orally once a day (at bedtime)   albuterol 90 mcg/inh inhalation aerosol: 1 puff(s) inhaled 4 times a day As needed Shortness of Breath  atorvastatin 10 mg oral tablet: 1 tab(s) orally once a day (at bedtime)  dilTIAZem 240 mg/24 hours oral capsule, extended release: 1 cap(s) orally once a day  finasteride 5 mg oral tablet: 1 tab(s) orally once a day  montelukast 10 mg oral tablet: 1 tab(s) orally once a day (at bedtime)  tamsulosin 0.4 mg oral capsule: 1 cap(s) orally once a day (at bedtime)   albuterol 90 mcg/inh inhalation aerosol: 1 puff(s) inhaled 4 times a day As needed Shortness of Breath  atorvastatin 10 mg oral tablet: 1 tab(s) orally once a day (at bedtime)  DilTIAZem (Eqv-Cardizem CD) 180 mg/24 hours oral capsule, extended release: 1 cap(s) orally once a day  finasteride 5 mg oral tablet: 1 tab(s) orally once a day  montelukast 10 mg oral tablet: 1 tab(s) orally once a day (at bedtime)  tamsulosin 0.4 mg oral capsule: 1 cap(s) orally once a day (at bedtime)   albuterol 90 mcg/inh inhalation aerosol: 1 puff(s) inhaled 4 times a day As needed Shortness of Breath  atorvastatin 10 mg oral tablet: 1 tab(s) orally once a day (at bedtime)  colchicine 0.6 mg oral tablet: 1 tab(s) orally once a day  DilTIAZem (Eqv-Cardizem CD) 180 mg/24 hours oral capsule, extended release: 1 cap(s) orally once a day  finasteride 5 mg oral tablet: 1 tab(s) orally once a day  montelukast 10 mg oral tablet: 1 tab(s) orally once a day (at bedtime)  tamsulosin 0.4 mg oral capsule: 1 cap(s) orally once a day (at bedtime)

## 2023-05-09 NOTE — PROGRESS NOTE ADULT - SUBJECTIVE AND OBJECTIVE BOX
Gastroenterology/Hepatology Progress Note      Interval Events:   - seen by IR yesterday, no intervention indicated  - no further bloody stools overnight  - no abd pain    Allergies:  No Known Allergies      Hospital Medications:  acetaminophen     Tablet .. 650 milliGRAM(s) Oral every 6 hours PRN  albuterol    90 MICROgram(s) HFA Inhaler 1 Puff(s) Inhalation four times a day PRN  atorvastatin 10 milliGRAM(s) Oral at bedtime  finasteride 5 milliGRAM(s) Oral daily  montelukast 10 milliGRAM(s) Oral at bedtime  potassium chloride    Tablet ER 40 milliEquivalent(s) Oral once  tamsulosin 0.4 milliGRAM(s) Oral at bedtime      ROS: 14 point ROS negative unless otherwise state in subjective    PHYSICAL EXAM:   Vital Signs:  Vital Signs Last 24 Hrs  T(C): 36.7 (09 May 2023 05:47), Max: 36.8 (08 May 2023 13:19)  T(F): 98.1 (09 May 2023 05:47), Max: 98.2 (08 May 2023 13:19)  HR: 95 (09 May 2023 05:47) (83 - 96)  BP: 145/77 (09 May 2023 05:47) (95/61 - 145/77)  BP(mean): --  RR: 18 (09 May 2023 05:47) (16 - 18)  SpO2: 99% (09 May 2023 05:47) (99% - 100%)    Parameters below as of 09 May 2023 05:47  Patient On (Oxygen Delivery Method): room air      Daily Height in cm: 177.8 (08 May 2023 18:51)    Daily Weight in k.8 (08 May 2023 18:51)    GENERAL:  No acute distress  HEENT:  NCAT, no scleral icterus  CHEST: no resp distress  HEART:  RRR  ABDOMEN:  Soft, non-tender, non-distended, normoactive bowel sounds, no masses  EXTREMITIES:  No cyanosis, clubbing, or edema  SKIN:  No rash/erythema/ecchymoses/petechiae/wounds/abscess/warm/dry  NEURO:  Alert and oriented x 3, no asterixis, no tremor    LABS:                        7.1    8.00  )-----------( 166      ( 09 May 2023 06:05 )             22.1     Mean Cell Volume: 94.0 fL (23 @ 06:05)        138  |  101  |  14  ----------------------------<  112<H>  3.1<L>   |  25  |  0.82    Ca    8.5      09 May 2023 06:05  Phos  3.8       Mg     1.80         TPro  5.5<L>  /  Alb  3.2<L>  /  TBili  0.4  /  DBili  x   /  AST  14  /  ALT  13  /  AlkPhos  58  08    LIVER FUNCTIONS - ( 08 May 2023 06:52 )  Alb: 3.2 g/dL / Pro: 5.5 g/dL / ALK PHOS: 58 U/L / ALT: 13 U/L / AST: 14 U/L / GGT: x           PT/INR - ( 08 May 2023 06:52 )   PT: 14.7 sec;   INR: 1.26 ratio         PTT - ( 08 May 2023 06:52 )  PTT:27.3 sec          Imaging:           Gastroenterology/Hepatology Progress Note    Interval Events:   - seen by IR yesterday, no intervention indicated  - no further bloody stools overnight  - no abd pain    Allergies:  No Known Allergies      Hospital Medications:  acetaminophen     Tablet .. 650 milliGRAM(s) Oral every 6 hours PRN  albuterol    90 MICROgram(s) HFA Inhaler 1 Puff(s) Inhalation four times a day PRN  atorvastatin 10 milliGRAM(s) Oral at bedtime  finasteride 5 milliGRAM(s) Oral daily  montelukast 10 milliGRAM(s) Oral at bedtime  potassium chloride    Tablet ER 40 milliEquivalent(s) Oral once  tamsulosin 0.4 milliGRAM(s) Oral at bedtime      ROS: 14 point ROS negative unless otherwise state in subjective    PHYSICAL EXAM:   Vital Signs:  Vital Signs Last 24 Hrs  T(C): 36.7 (09 May 2023 05:47), Max: 36.8 (08 May 2023 13:19)  T(F): 98.1 (09 May 2023 05:47), Max: 98.2 (08 May 2023 13:19)  HR: 95 (09 May 2023 05:47) (83 - 96)  BP: 145/77 (09 May 2023 05:47) (95/61 - 145/77)  BP(mean): --  RR: 18 (09 May 2023 05:47) (16 - 18)  SpO2: 99% (09 May 2023 05:47) (99% - 100%)    Parameters below as of 09 May 2023 05:47  Patient On (Oxygen Delivery Method): room air      Daily Height in cm: 177.8 (08 May 2023 18:51)    Daily Weight in k.8 (08 May 2023 18:51)    GENERAL:  No acute distress  HEENT:  NCAT, no scleral icterus  CHEST: no resp distress  HEART:  RRR  ABDOMEN:  Soft, non-tender, non-distended, normoactive bowel sounds, no masses  EXTREMITIES:  No cyanosis, clubbing, or edema  SKIN:  No rash/erythema/ecchymoses/petechiae/wounds/abscess/warm/dry  NEURO:  Alert and oriented x 3, no asterixis, no tremor    LABS:                        7.1    8.00  )-----------( 166      ( 09 May 2023 06:05 )             22.1     Mean Cell Volume: 94.0 fL (23 @ 06:05)        138  |  101  |  14  ----------------------------<  112<H>  3.1<L>   |  25  |  0.82    Ca    8.5      09 May 2023 06:05  Phos  3.8       Mg     1.80         TPro  5.5<L>  /  Alb  3.2<L>  /  TBili  0.4  /  DBili  x   /  AST  14  /  ALT  13  /  AlkPhos  58  08    LIVER FUNCTIONS - ( 08 May 2023 06:52 )  Alb: 3.2 g/dL / Pro: 5.5 g/dL / ALK PHOS: 58 U/L / ALT: 13 U/L / AST: 14 U/L / GGT: x           PT/INR - ( 08 May 2023 06:52 )   PT: 14.7 sec;   INR: 1.26 ratio         PTT - ( 08 May 2023 06:52 )  PTT:27.3 sec          Imaging:

## 2023-05-09 NOTE — PROGRESS NOTE ADULT - PROBLEM SELECTOR PLAN 2
On recent admission: EKG consistent for A-flutter   On this admission: EKG sinus   Echo (5/4): EF = 61% w. severely dilated LA; mild pulm HTN    Plan:  - F/u outpatient w/ Dr. Davis (cardiologist)  - Hold anticoagulation i/so GIB ([id number]: 2 for age and HTN)

## 2023-05-09 NOTE — PROGRESS NOTE ADULT - ATTENDING COMMENTS
74 yo Male h/o prostate cancer s/p radiation, HTN, asthma, and h/o presumed diverticular bleeding w/ recent admission (5/3/23-5/6/23) for hematochezia, now presenting w/ likely recurrent diverticular bleed, resolved.  Monitor Hb and bowel movements.  Patient advised to avoid NSAIDs.

## 2023-05-09 NOTE — DISCHARGE NOTE PROVIDER - CARE PROVIDER_API CALL
Panchito Alfaro)  Cardiovascular Disease; Internal Medicine  [address] [place] [address]  Phone: [phone]  Fax: [phone]  Established Patient  Follow Up Time: 1 week    Riki Desai  Internal Medicine  [address] [place] [address]  Phone: [phone]  Fax: [phone]  Established Patient  Follow Up Time: 1 week

## 2023-05-09 NOTE — DISCHARGE NOTE PROVIDER - PROVIDER TOKENS
PROVIDER:[TOKEN:[[id number]],FOLLOWUP:[1 week],ESTABLISHEDPATIENT:[T]],PROVIDER:[TOKEN:[[id number]],FOLLOWUP:[1 week],ESTABLISHEDPATIENT:[T]]

## 2023-05-10 DIAGNOSIS — D64.9 ANEMIA, UNSPECIFIED: ICD-10-CM

## 2023-05-10 LAB
ANION GAP SERPL CALC-SCNC: 12 MMOL/L — SIGNIFICANT CHANGE UP (ref 7–14)
BUN SERPL-MCNC: 18 MG/DL — SIGNIFICANT CHANGE UP (ref 7–23)
CALCIUM SERPL-MCNC: 8.2 MG/DL — LOW (ref 8.4–10.5)
CHLORIDE SERPL-SCNC: 101 MMOL/L — SIGNIFICANT CHANGE UP (ref 98–107)
CO2 SERPL-SCNC: 24 MMOL/L — SIGNIFICANT CHANGE UP (ref 22–31)
CREAT SERPL-MCNC: 0.92 MG/DL — SIGNIFICANT CHANGE UP (ref 0.5–1.3)
EGFR: 88 ML/MIN/1.73M2 — SIGNIFICANT CHANGE UP
GLUCOSE BLDC GLUCOMTR-MCNC: 224 MG/DL — HIGH (ref 70–99)
GLUCOSE SERPL-MCNC: 200 MG/DL — HIGH (ref 70–99)
HCT VFR BLD CALC: 20 % — CRITICAL LOW (ref 39–50)
HCT VFR BLD CALC: 20.6 % — CRITICAL LOW (ref 39–50)
HCT VFR BLD CALC: 21 % — CRITICAL LOW (ref 39–50)
HCT VFR BLD CALC: 22.7 % — LOW (ref 39–50)
HGB BLD-MCNC: 6.4 G/DL — CRITICAL LOW (ref 13–17)
HGB BLD-MCNC: 6.5 G/DL — CRITICAL LOW (ref 13–17)
HGB BLD-MCNC: 6.7 G/DL — CRITICAL LOW (ref 13–17)
HGB BLD-MCNC: 7.3 G/DL — LOW (ref 13–17)
MAGNESIUM SERPL-MCNC: 1.8 MG/DL — SIGNIFICANT CHANGE UP (ref 1.6–2.6)
MCHC RBC-ENTMCNC: 28.6 PG — SIGNIFICANT CHANGE UP (ref 27–34)
MCHC RBC-ENTMCNC: 29 PG — SIGNIFICANT CHANGE UP (ref 27–34)
MCHC RBC-ENTMCNC: 29.6 PG — SIGNIFICANT CHANGE UP (ref 27–34)
MCHC RBC-ENTMCNC: 30.2 PG — SIGNIFICANT CHANGE UP (ref 27–34)
MCHC RBC-ENTMCNC: 31.6 GM/DL — LOW (ref 32–36)
MCHC RBC-ENTMCNC: 31.9 GM/DL — LOW (ref 32–36)
MCHC RBC-ENTMCNC: 32 GM/DL — SIGNIFICANT CHANGE UP (ref 32–36)
MCHC RBC-ENTMCNC: 32.2 GM/DL — SIGNIFICANT CHANGE UP (ref 32–36)
MCV RBC AUTO: 89 FL — SIGNIFICANT CHANGE UP (ref 80–100)
MCV RBC AUTO: 90.5 FL — SIGNIFICANT CHANGE UP (ref 80–100)
MCV RBC AUTO: 92.9 FL — SIGNIFICANT CHANGE UP (ref 80–100)
MCV RBC AUTO: 95.8 FL — SIGNIFICANT CHANGE UP (ref 80–100)
NRBC # BLD: 0 /100 WBCS — SIGNIFICANT CHANGE UP (ref 0–0)
NRBC # FLD: 0.04 K/UL — HIGH (ref 0–0)
NRBC # FLD: 0.06 K/UL — HIGH (ref 0–0)
PHOSPHATE SERPL-MCNC: 3.7 MG/DL — SIGNIFICANT CHANGE UP (ref 2.5–4.5)
PLATELET # BLD AUTO: 176 K/UL — SIGNIFICANT CHANGE UP (ref 150–400)
PLATELET # BLD AUTO: 179 K/UL — SIGNIFICANT CHANGE UP (ref 150–400)
PLATELET # BLD AUTO: 181 K/UL — SIGNIFICANT CHANGE UP (ref 150–400)
PLATELET # BLD AUTO: 200 K/UL — SIGNIFICANT CHANGE UP (ref 150–400)
POTASSIUM SERPL-MCNC: 3.4 MMOL/L — LOW (ref 3.5–5.3)
POTASSIUM SERPL-SCNC: 3.4 MMOL/L — LOW (ref 3.5–5.3)
RBC # BLD: 2.15 M/UL — LOW (ref 4.2–5.8)
RBC # BLD: 2.21 M/UL — LOW (ref 4.2–5.8)
RBC # BLD: 2.26 M/UL — LOW (ref 4.2–5.8)
RBC # BLD: 2.55 M/UL — LOW (ref 4.2–5.8)
RBC # FLD: 14.8 % — HIGH (ref 10.3–14.5)
RBC # FLD: 15.8 % — HIGH (ref 10.3–14.5)
RBC # FLD: 15.9 % — HIGH (ref 10.3–14.5)
RBC # FLD: 16.5 % — HIGH (ref 10.3–14.5)
SODIUM SERPL-SCNC: 137 MMOL/L — SIGNIFICANT CHANGE UP (ref 135–145)
WBC # BLD: 8.28 K/UL — SIGNIFICANT CHANGE UP (ref 3.8–10.5)
WBC # BLD: 9.84 K/UL — SIGNIFICANT CHANGE UP (ref 3.8–10.5)
WBC # BLD: 9.86 K/UL — SIGNIFICANT CHANGE UP (ref 3.8–10.5)
WBC # BLD: 9.99 K/UL — SIGNIFICANT CHANGE UP (ref 3.8–10.5)
WBC # FLD AUTO: 8.28 K/UL — SIGNIFICANT CHANGE UP (ref 3.8–10.5)
WBC # FLD AUTO: 9.84 K/UL — SIGNIFICANT CHANGE UP (ref 3.8–10.5)
WBC # FLD AUTO: 9.86 K/UL — SIGNIFICANT CHANGE UP (ref 3.8–10.5)
WBC # FLD AUTO: 9.99 K/UL — SIGNIFICANT CHANGE UP (ref 3.8–10.5)

## 2023-05-10 PROCEDURE — 74174 CTA ABD&PLVS W/CONTRAST: CPT | Mod: 26

## 2023-05-10 PROCEDURE — 99233 SBSQ HOSP IP/OBS HIGH 50: CPT

## 2023-05-10 RX ORDER — SODIUM CHLORIDE 9 MG/ML
500 INJECTION, SOLUTION INTRAVENOUS ONCE
Refills: 0 | Status: COMPLETED | OUTPATIENT
Start: 2023-05-10 | End: 2023-05-10

## 2023-05-10 RX ORDER — POTASSIUM CHLORIDE 20 MEQ
40 PACKET (EA) ORAL EVERY 4 HOURS
Refills: 0 | Status: COMPLETED | OUTPATIENT
Start: 2023-05-10 | End: 2023-05-10

## 2023-05-10 RX ADMIN — MONTELUKAST 10 MILLIGRAM(S): 4 TABLET, CHEWABLE ORAL at 22:05

## 2023-05-10 RX ADMIN — TAMSULOSIN HYDROCHLORIDE 0.4 MILLIGRAM(S): 0.4 CAPSULE ORAL at 22:05

## 2023-05-10 RX ADMIN — SODIUM CHLORIDE 1000 MILLILITER(S): 9 INJECTION, SOLUTION INTRAVENOUS at 04:30

## 2023-05-10 RX ADMIN — FINASTERIDE 5 MILLIGRAM(S): 5 TABLET, FILM COATED ORAL at 17:34

## 2023-05-10 RX ADMIN — ATORVASTATIN CALCIUM 10 MILLIGRAM(S): 80 TABLET, FILM COATED ORAL at 22:05

## 2023-05-10 RX ADMIN — Medication 40 MILLIEQUIVALENT(S): at 17:33

## 2023-05-10 RX ADMIN — Medication 40 MILLIEQUIVALENT(S): at 10:21

## 2023-05-10 NOTE — PROGRESS NOTE ADULT - PROBLEM SELECTOR PLAN 4
On home finasteride and tamsulosin    Plan:   C/w home finasteride On home diltiazem and atenolol-chlorthalidone      Plan:  Hold i/so hypotension   Will restart as tolerated

## 2023-05-10 NOTE — PROGRESS NOTE ADULT - PROBLEM SELECTOR PLAN 6
On home albuterol PRN; montelukast    Plan:   C/w home meds On home atorvastatin 10 mg QD    Plan:  C/w home meds

## 2023-05-10 NOTE — PROGRESS NOTE ADULT - PROBLEM SELECTOR PLAN 8
Diet: regular   DVT ppx: SCD  Dispo: pending S/p TKR; prescribed celebrex PRN for pain (pt does not use)

## 2023-05-10 NOTE — PROGRESS NOTE ADULT - PROBLEM SELECTOR PLAN 5
On home atorvastatin 10 mg QD    Plan:  C/w home meds On home finasteride and tamsulosin    Plan:   C/w home finasteride

## 2023-05-10 NOTE — PROGRESS NOTE ADULT - PROBLEM SELECTOR PLAN 2
On recent admission: EKG consistent for A-flutter   On this admission: EKG sinus   Echo (5/4): EF = 61% w. severely dilated LA; mild pulm HTN    Plan:  - F/u outpatient w/ Dr. Davis (cardiologist)  - Hold anticoagulation i/so GIB ([id number]: 2 for age and HTN) - Likely acute blood loss anemia in the setting of GI bleed  - Plan as above, transfusing as needed

## 2023-05-10 NOTE — PROGRESS NOTE ADULT - PROBLEM SELECTOR PLAN 3
On home diltiazem and atenolol-chlorthalidone      Plan:  Hold i/so hypotension   Will restart as tolerated On recent admission: EKG consistent for A-flutter   On this admission: EKG sinus   Echo (5/4): EF = 61% w. severely dilated LA; mild pulm HTN    Plan:  - F/u outpatient w/ Dr. Davis (cardiologist)  - Hold anticoagulation i/so GIB ([id number]: 2 for age and HTN)

## 2023-05-10 NOTE — RAPID RESPONSE TEAM SUMMARY - NSSITUATIONBACKGROUNDRRT_GEN_ALL_CORE
73 yrs old male w/ hx of prostate cancer s/p radiation, HTN, asthma, and h/o presumed diverticular bleeding (admitted 5/2021) w/ recent admission (5/3/23-5/6/23) for hematochezia, now presenting w/ recurrent painless hematochezia.  RRT called for tachycardia and hypotension after a large bloody bowel movement. Patient also endorsing dizziness. Blood pressure initially 88/60s, HR 160s, sinus. O2 sat WNL. Patient returned to his bed, HR decreased to 120s-130s. SBP . Patient denying any SOB, chest pain. Dizziness resolving. Last Hgb 7.1 yesterday AM. Repeat CBC already sent prior to RRT. Ordered for 1U PRBC, 500cc NS given while waiting for blood. HR improved to 90s, . Per GI consult note, CTA ordered to evaluate for active bleed for potential embolization. RRT ended.

## 2023-05-10 NOTE — PROGRESS NOTE ADULT - PROBLEM SELECTOR PLAN 1
2-3 episodes of hematochezia prior to admission with dizziness and fall  Recent admission (5/3 - 5/6) w/ similar complaint and s/p EGD and colonoscopy w/o acute bleeding   On admission: Hgb - 7.3; s/p 1 U --> Hgb -7.6   s/p Zofran   CT abd/pelvis w/w/o IV con: no acute GI bleed; extensive diverticulosis w/o diverticulitis     Plan:   - Trend CBC BID; Transfuse Hgb < 7; Active type & screen  - GI consult; recs appreciated --> CTM; possible d/c tmrw   - IR consult; recs appreciated --> no acute intervention   - Clear liquid diet; advance as tolerated 2-3 episodes of hematochezia prior to admission with dizziness and fall  Recent admission (5/3 - 5/6) w/ similar complaint and s/p EGD and colonoscopy w/o acute bleeding   On admission: Hgb - 7.3; s/p 1 U --> Hgb -7.6   s/p Zofran   CT abd/pelvis w/w/o IV con: no acute GI bleed; extensive diverticulosis w/o diverticulitis     RRT AM of 5/10 for bloody BM and dizziness, given 1U pRBCs and 500cc LR bolus  - Ordered for additional unit in the AM due to still low hemoglobin  - Ordered for CTA A/P. If positive will reach out to IR    Plan:   - Trend CBC q8hr; Transfuse Hgb < 7; Active type & screen  - F/U CTA A/P and will consult IR if bleed found  - NPO for now 2-3 episodes of hematochezia prior to admission with dizziness and fall  Recent admission (5/3 - 5/6) w/ similar complaint and s/p EGD and colonoscopy w/o acute bleeding   On admission: Hgb - 7.3; s/p 1 U --> Hgb -7.6   s/p Zofran   CT abd/pelvis w/w/o IV con: no acute GI bleed; extensive diverticulosis w/o diverticulitis     RRT AM of 5/10 for bloody BM and dizziness, given 1U pRBCs and 500cc LR bolus  - Ordered for additional unit in the AM due to still low hemoglobin  - Ordered for CTA A/P to assess for active bleed    Plan:   - Trend CBC q8hr; Transfuse Hgb < 7  - F/U CTA A/P and will consult IR if bleed found  - NPO for now  - Maintain active type and screen

## 2023-05-10 NOTE — PROGRESS NOTE ADULT - ASSESSMENT
73Y M w/ PMH of HTN, HLD, prostate cancer (s/p XRT), asthma, diverticulosis with recent admission (5/3 - 5/6) for diverticular bleed requiring 5 units PRBCs and s/p endoscopy/colonoscopy with vascular ectasia and diverticulosis presenting with GIB (multiple BRBPR) admitted to medicine for further management.  73 year old man with history of HTN, HLD, prostate cancer (s/p XRT), asthma, diverticulosis with recent admission (5/3 - 5/6) for diverticular bleed requiring 5 units PRBCs and s/p endoscopy/colonoscopy with vascular ectasia and diverticulosis presenting with GIB (multiple BRBPR) admitted to medicine for further management.

## 2023-05-10 NOTE — PROGRESS NOTE ADULT - SUBJECTIVE AND OBJECTIVE BOX
Medicine Progress Note      Patient is a 73y old  Male who presents with a chief complaint of Lower GIB (09 May 2023 14:49)      SUBJECTIVE / OVERNIGHT EVENTS: This AM, patient seen and examined at bedside.      MEDICATIONS  (STANDING):  atorvastatin 10 milliGRAM(s) Oral at bedtime  finasteride 5 milliGRAM(s) Oral daily  montelukast 10 milliGRAM(s) Oral at bedtime  potassium chloride    Tablet ER 40 milliEquivalent(s) Oral every 4 hours  tamsulosin 0.4 milliGRAM(s) Oral at bedtime    MEDICATIONS  (PRN):  acetaminophen     Tablet .. 650 milliGRAM(s) Oral every 6 hours PRN Temp greater or equal to 38C (100.4F), Mild Pain (1 - 3)  albuterol    90 MICROgram(s) HFA Inhaler 1 Puff(s) Inhalation four times a day PRN Shortness of Breath    CAPILLARY BLOOD GLUCOSE      POCT Blood Glucose.: 224 mg/dL (10 May 2023 04:31)    I&O's Summary      PHYSICAL EXAM:  Vital Signs Last 24 Hrs  T(C): 37.1 (09 May 2023 21:43), Max: 37.2 (09 May 2023 13:38)  T(F): 98.7 (09 May 2023 21:43), Max: 99 (09 May 2023 13:38)  HR: 92 (09 May 2023 21:43) (91 - 92)  BP: 135/70 (09 May 2023 21:43) (102/63 - 135/70)  BP(mean): --  RR: 18 (09 May 2023 21:43) (17 - 18)  SpO2: 100% (09 May 2023 21:43) (100% - 100%)    Parameters below as of 09 May 2023 21:43  Patient On (Oxygen Delivery Method): room air      CONSTITUTIONAL: NAD  HEENT: Moist oral mucosa  RESPIRATORY: Normal respiratory effort; lungs are clear to auscultation bilaterally  CARDIOVASCULAR: Regular rate and rhythm, normal S1 and S2, no murmur/rub/gallop, no lower extremity edema, peripheral pulses are palpable bilaterally  ABDOMEN: Soft, nondistended, nontender to palpation, normoactive bowel sounds, no rebound/guarding  PSYCH: A+O to person, place, and time  NEUROLOGY: Facial expression appears symmetric, no gross sensory deficits appreciated, moving all extremities spontaneously  SKIN: No rashes; no palpable lesions    LABS:                        6.5    9.99  )-----------( 200      ( 10 May 2023 04:25 )             20.6     05-10    137  |  101  |  18  ----------------------------<  200<H>  3.4<L>   |  24  |  0.92    Ca    8.2<L>      10 May 2023 04:25  Phos  3.7     05-10  Mg     1.80     05-10                      RADIOLOGY & ADDITIONAL TESTS:  Imaging from Last 24 Hours: Medicine Progress Note      Patient is a 73y old  Male who presents with a chief complaint of Lower GIB (09 May 2023 14:49)      SUBJECTIVE / OVERNIGHT EVENTS: RRT overnight for dizziness and bloody bowel movement. Found to have hemoglobin less than 7, given one unit pRBCs along with 500cc LR bolus. This AM, patient seen and examined at bedside. Patient denied chest pain, abdominal pain, shortness of breath. Endorsed mild nausea but no vomiting. No further bloody bowel movements at time of exam.       MEDICATIONS  (STANDING):  atorvastatin 10 milliGRAM(s) Oral at bedtime  finasteride 5 milliGRAM(s) Oral daily  montelukast 10 milliGRAM(s) Oral at bedtime  potassium chloride    Tablet ER 40 milliEquivalent(s) Oral every 4 hours  tamsulosin 0.4 milliGRAM(s) Oral at bedtime    MEDICATIONS  (PRN):  acetaminophen     Tablet .. 650 milliGRAM(s) Oral every 6 hours PRN Temp greater or equal to 38C (100.4F), Mild Pain (1 - 3)  albuterol    90 MICROgram(s) HFA Inhaler 1 Puff(s) Inhalation four times a day PRN Shortness of Breath    CAPILLARY BLOOD GLUCOSE      POCT Blood Glucose.: 224 mg/dL (10 May 2023 04:31)    I&O's Summary      PHYSICAL EXAM:  Vital Signs Last 24 Hrs  T(C): 37.1 (09 May 2023 21:43), Max: 37.2 (09 May 2023 13:38)  T(F): 98.7 (09 May 2023 21:43), Max: 99 (09 May 2023 13:38)  HR: 92 (09 May 2023 21:43) (91 - 92)  BP: 135/70 (09 May 2023 21:43) (102/63 - 135/70)  BP(mean): --  RR: 18 (09 May 2023 21:43) (17 - 18)  SpO2: 100% (09 May 2023 21:43) (100% - 100%)    Parameters below as of 09 May 2023 21:43  Patient On (Oxygen Delivery Method): room air      CONSTITUTIONAL: NAD, resting in bed  HEENT: Moist oral mucosa  RESPIRATORY: Normal respiratory effort; lungs are clear to auscultation bilaterally  CARDIOVASCULAR: Regular rate and rhythm, normal S1 and S2, no murmur/rub/gallop, no lower extremity edema, peripheral pulses are palpable bilaterally  ABDOMEN: Soft, nondistended, nontender to palpation, normoactive bowel sounds, no rebound/guarding  PSYCH: A+O to person, place, and time  NEUROLOGY: Facial expression appears symmetric, no gross sensory deficits appreciated, moving all extremities spontaneously  SKIN: No rashes; no palpable lesions    LABS:                        6.5    9.99  )-----------( 200      ( 10 May 2023 04:25 )             20.6     05-10    137  |  101  |  18  ----------------------------<  200<H>  3.4<L>   |  24  |  0.92    Ca    8.2<L>      10 May 2023 04:25  Phos  3.7     05-10  Mg     1.80     05-10                      RADIOLOGY & ADDITIONAL TESTS:  Imaging from Last 24 Hours:

## 2023-05-11 LAB
ANION GAP SERPL CALC-SCNC: 11 MMOL/L — SIGNIFICANT CHANGE UP (ref 7–14)
BASOPHILS # BLD AUTO: 0.05 K/UL — SIGNIFICANT CHANGE UP (ref 0–0.2)
BASOPHILS NFR BLD AUTO: 0.6 % — SIGNIFICANT CHANGE UP (ref 0–2)
BLD GP AB SCN SERPL QL: NEGATIVE — SIGNIFICANT CHANGE UP
BUN SERPL-MCNC: 16 MG/DL — SIGNIFICANT CHANGE UP (ref 7–23)
CALCIUM SERPL-MCNC: 8.3 MG/DL — LOW (ref 8.4–10.5)
CHLORIDE SERPL-SCNC: 108 MMOL/L — HIGH (ref 98–107)
CO2 SERPL-SCNC: 23 MMOL/L — SIGNIFICANT CHANGE UP (ref 22–31)
CREAT SERPL-MCNC: 0.79 MG/DL — SIGNIFICANT CHANGE UP (ref 0.5–1.3)
EGFR: 94 ML/MIN/1.73M2 — SIGNIFICANT CHANGE UP
EOSINOPHIL # BLD AUTO: 0.28 K/UL — SIGNIFICANT CHANGE UP (ref 0–0.5)
EOSINOPHIL NFR BLD AUTO: 3.3 % — SIGNIFICANT CHANGE UP (ref 0–6)
GLUCOSE SERPL-MCNC: 95 MG/DL — SIGNIFICANT CHANGE UP (ref 70–99)
HCT VFR BLD CALC: 21.1 % — LOW (ref 39–50)
HCT VFR BLD CALC: 23.3 % — LOW (ref 39–50)
HCT VFR BLD CALC: 24 % — LOW (ref 39–50)
HGB BLD-MCNC: 6.8 G/DL — CRITICAL LOW (ref 13–17)
HGB BLD-MCNC: 7.7 G/DL — LOW (ref 13–17)
HGB BLD-MCNC: 7.7 G/DL — LOW (ref 13–17)
IANC: 6.54 K/UL — SIGNIFICANT CHANGE UP (ref 1.8–7.4)
IMM GRANULOCYTES NFR BLD AUTO: 1.5 % — HIGH (ref 0–0.9)
LYMPHOCYTES # BLD AUTO: 0.63 K/UL — LOW (ref 1–3.3)
LYMPHOCYTES # BLD AUTO: 7.5 % — LOW (ref 13–44)
MAGNESIUM SERPL-MCNC: 2 MG/DL — SIGNIFICANT CHANGE UP (ref 1.6–2.6)
MCHC RBC-ENTMCNC: 28.2 PG — SIGNIFICANT CHANGE UP (ref 27–34)
MCHC RBC-ENTMCNC: 29.2 PG — SIGNIFICANT CHANGE UP (ref 27–34)
MCHC RBC-ENTMCNC: 29.6 PG — SIGNIFICANT CHANGE UP (ref 27–34)
MCHC RBC-ENTMCNC: 32.1 GM/DL — SIGNIFICANT CHANGE UP (ref 32–36)
MCHC RBC-ENTMCNC: 32.2 GM/DL — SIGNIFICANT CHANGE UP (ref 32–36)
MCHC RBC-ENTMCNC: 33 GM/DL — SIGNIFICANT CHANGE UP (ref 32–36)
MCV RBC AUTO: 87.9 FL — SIGNIFICANT CHANGE UP (ref 80–100)
MCV RBC AUTO: 88.3 FL — SIGNIFICANT CHANGE UP (ref 80–100)
MCV RBC AUTO: 91.7 FL — SIGNIFICANT CHANGE UP (ref 80–100)
MONOCYTES # BLD AUTO: 0.76 K/UL — SIGNIFICANT CHANGE UP (ref 0–0.9)
MONOCYTES NFR BLD AUTO: 9.1 % — SIGNIFICANT CHANGE UP (ref 2–14)
NEUTROPHILS # BLD AUTO: 6.54 K/UL — SIGNIFICANT CHANGE UP (ref 1.8–7.4)
NEUTROPHILS NFR BLD AUTO: 78 % — HIGH (ref 43–77)
NRBC # BLD: 0 /100 WBCS — SIGNIFICANT CHANGE UP (ref 0–0)
NRBC # FLD: 0.02 K/UL — HIGH (ref 0–0)
NRBC # FLD: 0.04 K/UL — HIGH (ref 0–0)
NRBC # FLD: 0.06 K/UL — HIGH (ref 0–0)
PHOSPHATE SERPL-MCNC: 3.3 MG/DL — SIGNIFICANT CHANGE UP (ref 2.5–4.5)
PLATELET # BLD AUTO: 166 K/UL — SIGNIFICANT CHANGE UP (ref 150–400)
PLATELET # BLD AUTO: 170 K/UL — SIGNIFICANT CHANGE UP (ref 150–400)
PLATELET # BLD AUTO: 187 K/UL — SIGNIFICANT CHANGE UP (ref 150–400)
POTASSIUM SERPL-MCNC: 3.6 MMOL/L — SIGNIFICANT CHANGE UP (ref 3.5–5.3)
POTASSIUM SERPL-SCNC: 3.6 MMOL/L — SIGNIFICANT CHANGE UP (ref 3.5–5.3)
RBC # BLD: 2.3 M/UL — LOW (ref 4.2–5.8)
RBC # BLD: 2.64 M/UL — LOW (ref 4.2–5.8)
RBC # BLD: 2.73 M/UL — LOW (ref 4.2–5.8)
RBC # FLD: 16.8 % — HIGH (ref 10.3–14.5)
RBC # FLD: 17.2 % — HIGH (ref 10.3–14.5)
RBC # FLD: 17.6 % — HIGH (ref 10.3–14.5)
RETICS #: 182.4 K/UL — HIGH (ref 25–125)
RETICS/RBC NFR: 6.8 % — HIGH (ref 0.5–2.5)
RH IG SCN BLD-IMP: POSITIVE — SIGNIFICANT CHANGE UP
SODIUM SERPL-SCNC: 142 MMOL/L — SIGNIFICANT CHANGE UP (ref 135–145)
WBC # BLD: 6.72 K/UL — SIGNIFICANT CHANGE UP (ref 3.8–10.5)
WBC # BLD: 7.21 K/UL — SIGNIFICANT CHANGE UP (ref 3.8–10.5)
WBC # BLD: 8.39 K/UL — SIGNIFICANT CHANGE UP (ref 3.8–10.5)
WBC # FLD AUTO: 6.72 K/UL — SIGNIFICANT CHANGE UP (ref 3.8–10.5)
WBC # FLD AUTO: 7.21 K/UL — SIGNIFICANT CHANGE UP (ref 3.8–10.5)
WBC # FLD AUTO: 8.39 K/UL — SIGNIFICANT CHANGE UP (ref 3.8–10.5)

## 2023-05-11 PROCEDURE — 78278 ACUTE GI BLOOD LOSS IMAGING: CPT | Mod: 26

## 2023-05-11 PROCEDURE — 99233 SBSQ HOSP IP/OBS HIGH 50: CPT

## 2023-05-11 PROCEDURE — 93010 ELECTROCARDIOGRAM REPORT: CPT

## 2023-05-11 PROCEDURE — 51702 INSERT TEMP BLADDER CATH: CPT

## 2023-05-11 RX ORDER — POTASSIUM CHLORIDE 20 MEQ
40 PACKET (EA) ORAL ONCE
Refills: 0 | Status: COMPLETED | OUTPATIENT
Start: 2023-05-11 | End: 2023-05-11

## 2023-05-11 RX ADMIN — TAMSULOSIN HYDROCHLORIDE 0.4 MILLIGRAM(S): 0.4 CAPSULE ORAL at 22:01

## 2023-05-11 RX ADMIN — MONTELUKAST 10 MILLIGRAM(S): 4 TABLET, CHEWABLE ORAL at 22:03

## 2023-05-11 RX ADMIN — ATORVASTATIN CALCIUM 10 MILLIGRAM(S): 80 TABLET, FILM COATED ORAL at 22:01

## 2023-05-11 RX ADMIN — FINASTERIDE 5 MILLIGRAM(S): 5 TABLET, FILM COATED ORAL at 11:27

## 2023-05-11 RX ADMIN — Medication 40 MILLIEQUIVALENT(S): at 12:03

## 2023-05-11 NOTE — PROGRESS NOTE ADULT - PROBLEM SELECTOR PLAN 1
2-3 episodes of hematochezia prior to admission with dizziness and fall  Recent admission (5/3 - 5/6) w/ similar complaint and s/p EGD and colonoscopy w/o acute bleeding   On admission: Hgb - 7.3; s/p 1 U --> Hgb -7.6   s/p Zofran   CT abd/pelvis w/w/o IV con: no acute GI bleed; extensive diverticulosis w/o diverticulitis     RRT AM of 5/10 for bloody BM and dizziness, given 1U pRBCs and 500cc LR bolus  - Ordered for additional unit in the AM due to still low hemoglobin  - Ordered for CTA A/P to assess for active bleed    Plan:   - Trend CBC q8hr; Transfuse Hgb < 7  - F/U CTA A/P and will consult IR if bleed found  - NPO for now  - Maintain active type and screen 2-3 episodes of hematochezia prior to admission with dizziness and fall  Recent admission (5/3 - 5/6) w/ similar complaint and s/p EGD and colonoscopy w/o acute bleeding   On admission: Hgb - 7.3; s/p 1 U --> Hgb -7.6   s/p Zofran   CT abd/pelvis w/w/o IV con: no acute GI bleed; extensive diverticulosis w/o diverticulitis     RRT AM of 5/10 for bloody BM and dizziness, given 1U pRBCs and 500cc LR bolus  - Ordered for additional unit in the AM due to still low hemoglobin   - CTA A/P without sign of bleed    Plan:   - Trend CBC ~q8hr; Transfuse Hgb < 7  - GI reconsulted, no plan for colonoscopy. Recommended for CTA A/P if unstable with active bloody BMs  - Plan for tagged RBC NM scan to try and localize  - Maintain active type and screen

## 2023-05-11 NOTE — CHART NOTE - NSCHARTNOTEFT_GEN_A_CORE
Note    Called to place Difficult weston secondary to hypospadias    Pt requires weston for procedure    T(C): 36.6 (05-11-23 @ 13:03), Max: 37 (05-10-23 @ 21:32)  HR: 92 (05-11-23 @ 13:03) (92 - 93)  BP: 149/94 (05-11-23 @ 13:03) (115/72 - 149/94)  RR: 17 (05-11-23 @ 13:03) (17 - 17)  SpO2: 100% (05-11-23 @ 13:03) (97% - 100%)  Wt(kg): --    PE:  GEN:-NAD  ABD: soft NT ND  : Penis + hypospadias    Procedure:  16f  weston placed in aseptic fashion.  100cc of urine collected yellow color  pt tolerated well      Assessment/Plan:  Continue weston   strict I & O's  TOV as per primary team

## 2023-05-11 NOTE — CONSULT NOTE ADULT - SUBJECTIVE AND OBJECTIVE BOX
Panchito Alfaro MD  Interventional Cardiology / Endovascular Specialist  [place] Office : [address] [place] [id number] Tel:   [place] Office : [address] [facility] [address] [address]  Tel: [phone]      HISTORY OF PRESENTING ILLNESS:  73Y M w/ PMH of HTN, HLD, prostate cancer (s/p XRT), asthma, diverticulosis with recent admission (5/3 - 5/6) for diverticular bleed requiring 5 units PRBCs and s/p endoscopy/colonoscopy with vascular ectasia and diverticulosis presenting with GIB (multiple BRBPR) admitted to medicine for further management. On 5/10 patient had RRT for large bloody BM, hemoglobin found to be less than 7, s/p PRBC transfusion. Cardiology consulted for Aflutter. This is new on admission last week. Currently EKG shows Sinus rhythm vs MAT. Denies CP, SOB or palpitations  	  MEDICATIONS:      albuterol    90 MICROgram(s) HFA Inhaler 1 Puff(s) Inhalation four times a day PRN  montelukast 10 milliGRAM(s) Oral at bedtime    acetaminophen     Tablet .. 650 milliGRAM(s) Oral every 6 hours PRN      atorvastatin 10 milliGRAM(s) Oral at bedtime  finasteride 5 milliGRAM(s) Oral daily    tamsulosin 0.4 milliGRAM(s) Oral at bedtime      PAST MEDICAL/SURGICAL HISTORY  PAST MEDICAL & SURGICAL HISTORY:  Hypertension      Hyperlipidemia      GIB (gastrointestinal bleeding)      Prostate cancer  s/p radiation therapy      Gout      History of hemorrhoidectomy          SOCIAL HISTORY: Substance Use (street drugs): ( x ) never used  (  ) other:    FAMILY HISTORY:      REVIEW OF SYSTEMS:  CONSTITUTIONAL: No fever, weight loss, or fatigue  EYES: No eye pain, visual disturbances, or discharge  ENMT:  No difficulty hearing, tinnitus, vertigo; No sinus or throat pain  BREASTS: No pain, masses, or nipple discharge  GASTROINTESTINAL: No abdominal or epigastric pain. No nausea, vomiting, or hematemesis; No diarrhea or constipation. No melena or hematochezia.  GENITOURINARY: No dysuria, frequency, hematuria, or incontinence  NEUROLOGICAL: No headaches, memory loss, loss of strength, numbness, or tremors  ENDOCRINE: No heat or cold intolerance; No hair loss  MUSCULOSKELETAL: No joint pain or swelling; No muscle, back, or extremity pain  PSYCHIATRIC: No depression, anxiety, mood swings, or difficulty sleeping  HEME/LYMPH: No easy bruising, or bleeding gums  All others negative    PHYSICAL EXAM:  T(C): 37 (05-10-23 @ 21:32), Max: 37 (05-10-23 @ 12:25)  HR: 93 (05-10-23 @ 21:32) (76 - 93)  BP: 115/72 (05-10-23 @ 21:32) (107/67 - 115/72)  RR: 17 (05-10-23 @ 21:32) (17 - 18)  SpO2: 97% (05-10-23 @ 21:32) (97% - 100%)  Wt(kg): --  I&O's Summary    10 May 2023 07:01  -  11 May 2023 07:00  --------------------------------------------------------  IN: 325 mL / OUT: 0 mL / NET: 325 mL          GENERAL: NAD  EYES: EOMI, PERRLA, conjunctiva and sclera clear  ENMT: No tonsillar erythema, exudates, or enlargement  Cardiovascular: Normal S1 S2, No JVD, No murmurs, No edema  Respiratory: Lungs clear to auscultation	  Gastrointestinal:  Soft, Non-tender, + BS	  Extremities: No edema                                     7.7    7.21  )-----------( 170      ( 11 May 2023 09:31 )             24.0     05-11    142  |  108<H>  |  16  ----------------------------<  95  3.6   |  23  |  0.79    Ca    8.3<L>      11 May 2023 07:15  Phos  3.3     05-11  Mg     2.00     05-11      proBNP:   Lipid Profile:   HgA1c:   TSH:     Consultant(s) Notes Reviewed:  [x ] YES  [ ] NO    Care Discussed with Consultants/Other Providers [ x] YES  [ ] NO    Imaging Personally Reviewed independently:  [x] YES  [ ] NO    All labs, radiologic studies, vitals, orders and medications list reviewed. Patient is seen and examined at bedside. Case discussed with medical team.

## 2023-05-11 NOTE — PROGRESS NOTE ADULT - PROBLEM SELECTOR PLAN 2
- Likely acute blood loss anemia in the setting of GI bleed  - Plan as above, transfusing as needed - Likely acute blood loss anemia in the setting of GI bleed  - Plan as above, transfusing as needed  - Will add on reticulocyte count to morning labs

## 2023-05-11 NOTE — PROGRESS NOTE ADULT - SUBJECTIVE AND OBJECTIVE BOX
Medicine Progress Note      Patient is a 73y old  Male who presents with a chief complaint of Lower GIB (10 May 2023 07:37)      SUBJECTIVE / OVERNIGHT EVENTS: This AM, patient seen and examined at bedside.      MEDICATIONS  (STANDING):  atorvastatin 10 milliGRAM(s) Oral at bedtime  finasteride 5 milliGRAM(s) Oral daily  montelukast 10 milliGRAM(s) Oral at bedtime  tamsulosin 0.4 milliGRAM(s) Oral at bedtime    MEDICATIONS  (PRN):  acetaminophen     Tablet .. 650 milliGRAM(s) Oral every 6 hours PRN Temp greater or equal to 38C (100.4F), Mild Pain (1 - 3)  albuterol    90 MICROgram(s) HFA Inhaler 1 Puff(s) Inhalation four times a day PRN Shortness of Breath    CAPILLARY BLOOD GLUCOSE        I&O's Summary    10 May 2023 07:01  -  11 May 2023 07:00  --------------------------------------------------------  IN: 325 mL / OUT: 0 mL / NET: 325 mL        PHYSICAL EXAM:  Vital Signs Last 24 Hrs  T(C): 37 (10 May 2023 21:32), Max: 37 (10 May 2023 12:25)  T(F): 98.6 (10 May 2023 21:32), Max: 98.6 (10 May 2023 12:25)  HR: 93 (10 May 2023 21:32) (76 - 93)  BP: 115/72 (10 May 2023 21:32) (107/67 - 115/72)  BP(mean): --  RR: 17 (10 May 2023 21:32) (17 - 18)  SpO2: 97% (10 May 2023 21:32) (97% - 100%)    Parameters below as of 10 May 2023 21:32  Patient On (Oxygen Delivery Method): room air      CONSTITUTIONAL: NAD  HEENT: Moist oral mucosa  RESPIRATORY: Normal respiratory effort; lungs are clear to auscultation bilaterally  CARDIOVASCULAR: Regular rate and rhythm, normal S1 and S2, no murmur/rub/gallop, no lower extremity edema, peripheral pulses are palpable bilaterally  ABDOMEN: Soft, nondistended, nontender to palpation, normoactive bowel sounds, no rebound/guarding  PSYCH: A+O to person, place, and time  NEUROLOGY: Facial expression appears symmetric, no gross sensory deficits appreciated, moving all extremities spontaneously  SKIN: No rashes; no palpable lesions    LABS:                        6.8    6.72  )-----------( 166      ( 11 May 2023 00:22 )             21.1     05-10    137  |  101  |  18  ----------------------------<  200<H>  3.4<L>   |  24  |  0.92    Ca    8.2<L>      10 May 2023 04:25  Phos  3.7     05-10  Mg     1.80     05-10                      RADIOLOGY & ADDITIONAL TESTS:  Imaging from Last 24 Hours: Medicine Progress Note      Patient is a 73y old  Male who presents with a chief complaint of Lower GIB (10 May 2023 07:37)      SUBJECTIVE / OVERNIGHT EVENTS: CBC at midnight 6.8, ordered for extra unit of pRBCs. This AM, patient seen and examined at bedside. Patient expressed frustration with his situation, endorsing he feels like nothing is getting done. He endorsed last bloody BM was around 1630 on 5/10/23. Denied chest pain, shortness of breath, abdominal pain, nausea or vomiting.    MEDICATIONS  (STANDING):  atorvastatin 10 milliGRAM(s) Oral at bedtime  finasteride 5 milliGRAM(s) Oral daily  montelukast 10 milliGRAM(s) Oral at bedtime  tamsulosin 0.4 milliGRAM(s) Oral at bedtime    MEDICATIONS  (PRN):  acetaminophen     Tablet .. 650 milliGRAM(s) Oral every 6 hours PRN Temp greater or equal to 38C (100.4F), Mild Pain (1 - 3)  albuterol    90 MICROgram(s) HFA Inhaler 1 Puff(s) Inhalation four times a day PRN Shortness of Breath    CAPILLARY BLOOD GLUCOSE        I&O's Summary    10 May 2023 07:01  -  11 May 2023 07:00  --------------------------------------------------------  IN: 325 mL / OUT: 0 mL / NET: 325 mL        PHYSICAL EXAM:  Vital Signs Last 24 Hrs  T(C): 37 (10 May 2023 21:32), Max: 37 (10 May 2023 12:25)  T(F): 98.6 (10 May 2023 21:32), Max: 98.6 (10 May 2023 12:25)  HR: 93 (10 May 2023 21:32) (76 - 93)  BP: 115/72 (10 May 2023 21:32) (107/67 - 115/72)  BP(mean): --  RR: 17 (10 May 2023 21:32) (17 - 18)  SpO2: 97% (10 May 2023 21:32) (97% - 100%)    Parameters below as of 10 May 2023 21:32  Patient On (Oxygen Delivery Method): room air      CONSTITUTIONAL: NAD, resting in bed  HEENT: Moist oral mucosa  RESPIRATORY: Normal respiratory effort; lungs are clear to auscultation bilaterally  CARDIOVASCULAR: Regular rate and irregular rhythm, no murmur/rub/gallop, no lower extremity edema, peripheral pulses are palpable bilaterally  ABDOMEN: Soft, nondistended, nontender to palpation, normoactive bowel sounds, no rebound/guarding  PSYCH: A+O to person, place, and time  NEUROLOGY: Facial expression appears symmetric, no gross sensory deficits appreciated, moving all extremities spontaneously  SKIN: No rashes; no palpable lesionslesions    LABS:                        6.8    6.72  )-----------( 166      ( 11 May 2023 00:22 )             21.1     05-10    137  |  101  |  18  ----------------------------<  200<H>  3.4<L>   |  24  |  0.92    Ca    8.2<L>      10 May 2023 04:25  Phos  3.7     05-10  Mg     1.80     05-10                      RADIOLOGY & ADDITIONAL TESTS:  Imaging from Last 24 Hours: Medicine Progress Note      Patient is a 73y old  Male who presents with a chief complaint of Lower GIB (10 May 2023 07:37)      SUBJECTIVE / OVERNIGHT EVENTS: CBC at midnight 6.8, ordered for extra unit of pRBCs. This AM, patient seen and examined at bedside. Patient expressed frustration with his situation, endorsing he feels like nothing is getting done. He endorsed last bloody BM was around 1630 on 5/10/23. Denied chest pain, shortness of breath, abdominal pain, nausea or vomiting.    MEDICATIONS  (STANDING):  atorvastatin 10 milliGRAM(s) Oral at bedtime  finasteride 5 milliGRAM(s) Oral daily  montelukast 10 milliGRAM(s) Oral at bedtime  tamsulosin 0.4 milliGRAM(s) Oral at bedtime    MEDICATIONS  (PRN):  acetaminophen     Tablet .. 650 milliGRAM(s) Oral every 6 hours PRN Temp greater or equal to 38C (100.4F), Mild Pain (1 - 3)  albuterol    90 MICROgram(s) HFA Inhaler 1 Puff(s) Inhalation four times a day PRN Shortness of Breath    CAPILLARY BLOOD GLUCOSE        I&O's Summary    10 May 2023 07:01  -  11 May 2023 07:00  --------------------------------------------------------  IN: 325 mL / OUT: 0 mL / NET: 325 mL        PHYSICAL EXAM:  Vital Signs Last 24 Hrs  T(C): 37 (10 May 2023 21:32), Max: 37 (10 May 2023 12:25)  T(F): 98.6 (10 May 2023 21:32), Max: 98.6 (10 May 2023 12:25)  HR: 93 (10 May 2023 21:32) (76 - 93)  BP: 115/72 (10 May 2023 21:32) (107/67 - 115/72)  BP(mean): --  RR: 17 (10 May 2023 21:32) (17 - 18)  SpO2: 97% (10 May 2023 21:32) (97% - 100%)    Parameters below as of 10 May 2023 21:32  Patient On (Oxygen Delivery Method): room air      CONSTITUTIONAL: NAD, resting in bed  HEENT: Moist oral mucosa  RESPIRATORY: Normal respiratory effort; lungs are clear to auscultation bilaterally  CARDIOVASCULAR: Regular rate and irregular rhythm, no murmur/rub/gallop, no lower extremity edema, peripheral pulses are palpable bilaterally  ABDOMEN: Soft, nondistended, nontender to palpation, normoactive bowel sounds, no rebound/guarding  PSYCH: A+O to person, place, and time  NEUROLOGY: Facial expression appears symmetric, no gross sensory deficits appreciated, moving all extremities spontaneously  SKIN: No rashes; no palpable lesionslesions    LABS:                        6.8    6.72  )-----------( 166      ( 11 May 2023 00:22 )             21.1     05-10    137  |  101  |  18  ----------------------------<  200<H>  3.4<L>   |  24  |  0.92    Ca    8.2<L>      10 May 2023 04:25  Phos  3.7     05-10  Mg     1.80     05-10                      RADIOLOGY & ADDITIONAL TESTS:  Imaging from Last 24 Hours:  < from: CT Angio Abdomen and Pelvis w/ IV Cont (05.10.23 @ 13:53) >    FINDINGS:  LOWER CHEST: Cardiomegaly.    LIVER: Within normal limits.  BILE DUCTS: Normal caliber.  GALLBLADDER: Within normal limits.  SPLEEN: Within normal limits.  PANCREAS: Within normal limits.  ADRENALS: 2.1 cm left adrenal adenoma.  KIDNEYS/URETERS: No hydronephrosis. Left renal cysts.    BLADDER: Within normal limits.  REPRODUCTIVE ORGANS: Prostate within normal limits.    BOWEL: Small hiatal hernia. No bowel obstruction. Appendix is normal.   Diffuse colonic diverticulosis without evidence of acute diverticulitis.  PERITONEUM: No ascites.  VESSELS: Atherosclerotic changes.  RETROPERITONEUM/LYMPH NODES: No lymphadenopathy.  ABDOMINAL WALL: Small umbilical hernia containing a loop of small bowel.  BONES: Degenerative changes.    IMPRESSION:  No evidence of active GI bleed.    Colonic diverticulosis.    --- End of Report ---    < end of copied text >

## 2023-05-11 NOTE — PROGRESS NOTE ADULT - ASSESSMENT
73 year old man with history of HTN, HLD, prostate cancer (s/p XRT), asthma, diverticulosis with recent admission (5/3 - 5/6) for diverticular bleed requiring 5 units PRBCs and s/p endoscopy/colonoscopy with vascular ectasia and diverticulosis presenting with GIB (multiple BRBPR) admitted to medicine for further management.

## 2023-05-11 NOTE — PROGRESS NOTE ADULT - PROBLEM SELECTOR PLAN 9
Diet: regular   DVT ppx: SCD, holding chemical in setting of GI bleed  Dispo: pending Diet: high fiber regular   DVT ppx: SCD, holding chemical in setting of GI bleed  Dispo: pending

## 2023-05-11 NOTE — PROGRESS NOTE ADULT - ATTENDING COMMENTS
73M w/ PMH of HTN, HLD, prostate cancer (s/p XRT), asthma, diverticulosis with recent admission (5/3 - 5/6) for diverticular bleed requiring 5 units PRBCs and s/p endoscopy/colonoscopy with vascular ectasia and diverticulosis presenting with GIB (multiple BRBPR) admitted to medicine for further management. Last hospitalization was also complicated by aflutter. given bleeding was NOT d/c on AC.    #LGIB, suspecting diverticular: s/p multiple pRBC transfusions, Hb 7.7, GI/IR following, CTA 5/10 neg for active bleed, now planned for tagged RBC scan, however if pt actively bleeding, will pursue CTA again urgently.  #Active blood loss anemia   #Aflutter: appreciate cards eval   #HTN: hold meds.

## 2023-05-11 NOTE — CONSULT NOTE ADULT - ASSESSMENT
73 yrs old male w/ hx of prostate cancer s/p radiation, HTN, asthma, and h/o presumed diverticular bleeding (admitted 5/2021) w/ recent admission (5/3/23-5/6/23) for hematochezia, now presenting w/ recurrent painless hematochezia.    # LGIB - etiology likely recurrent diverticular bleed; patient w/ severe pan-diverticulosis w/ recent colonoscopy 5/5/23 w/o identifiable source. Likely low yield of repeating colonoscopy, as well as patient is hesitant to repeat procedure. If able to time CTA w/ active bleeding may be able to localize site of diverticular bleed for possible embolization.     Recommendations:  - consult IR  - if evidence of recurrent bleeding (worsening hemodynamics, increased frequency of bloody stools), would order STAT CTA to localize diverticular bleed  - trend CBC, transfuse as necessary  - currently no plans for repeat colonoscopy  - rest of care per primary team    GI will continue to follow.     All recommendations are tentative until note is attested by attending.     Carlos Infante, PGY6  Gastroenterology/Hepatology Fellow  During weekdays: Available on [employer] Teams or pager:[phone] ("[employer] Short Range Pager); [phone] (Long Range Pager)  Overnight/Weekend: Please page the on-call GI fellow    
73Y M w/ PMH of HTN, HLD, prostate cancer (s/p XRT), asthma, diverticulosis with recent admission (5/3 - 5/6) for diverticular bleed requiring 5 units PRBCs and s/p endoscopy/colonoscopy with vascular ectasia and diverticulosis presenting with GIB     1. Paroxysmal Aflutter  -new onset during recent admission   -denies CP, SOB  -TTE mild-mod AR, grossly normal LV function  - currently not a candidate 2/2 anemia requiring PRBC transfusions  -EKG 5/11 shows NSR vs multifocal atrial tachycardia     2. 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 HTN  - atenolol and cardizem held 2/2 hypotension  -continue to monitor BP

## 2023-05-11 NOTE — PROGRESS NOTE ADULT - PROBLEM SELECTOR PLAN 3
On recent admission: EKG consistent for A-flutter   On this admission: EKG sinus   Echo (5/4): EF = 61% w. severely dilated LA; mild pulm HTN    Plan:  - F/u outpatient w/ Dr. Davis (cardiologist)  - Hold anticoagulation i/so GIB ([id number]: 2 for age and HTN)

## 2023-05-12 LAB
ANION GAP SERPL CALC-SCNC: 9 MMOL/L — SIGNIFICANT CHANGE UP (ref 7–14)
APTT BLD: 29.2 SEC — SIGNIFICANT CHANGE UP (ref 27–36.3)
BUN SERPL-MCNC: 16 MG/DL — SIGNIFICANT CHANGE UP (ref 7–23)
CALCIUM SERPL-MCNC: 8 MG/DL — LOW (ref 8.4–10.5)
CHLORIDE SERPL-SCNC: 107 MMOL/L — SIGNIFICANT CHANGE UP (ref 98–107)
CO2 SERPL-SCNC: 24 MMOL/L — SIGNIFICANT CHANGE UP (ref 22–31)
CREAT SERPL-MCNC: 0.83 MG/DL — SIGNIFICANT CHANGE UP (ref 0.5–1.3)
EGFR: 92 ML/MIN/1.73M2 — SIGNIFICANT CHANGE UP
GLUCOSE SERPL-MCNC: 123 MG/DL — HIGH (ref 70–99)
HCT VFR BLD CALC: 20.6 % — CRITICAL LOW (ref 39–50)
HCT VFR BLD CALC: 22.6 % — LOW (ref 39–50)
HCT VFR BLD CALC: 23.8 % — LOW (ref 39–50)
HGB BLD-MCNC: 6.8 G/DL — CRITICAL LOW (ref 13–17)
HGB BLD-MCNC: 7.2 G/DL — LOW (ref 13–17)
HGB BLD-MCNC: 7.8 G/DL — LOW (ref 13–17)
INR BLD: 1.33 RATIO — HIGH (ref 0.88–1.16)
MAGNESIUM SERPL-MCNC: 1.9 MG/DL — SIGNIFICANT CHANGE UP (ref 1.6–2.6)
MCHC RBC-ENTMCNC: 27.9 PG — SIGNIFICANT CHANGE UP (ref 27–34)
MCHC RBC-ENTMCNC: 28.5 PG — SIGNIFICANT CHANGE UP (ref 27–34)
MCHC RBC-ENTMCNC: 29.4 PG — SIGNIFICANT CHANGE UP (ref 27–34)
MCHC RBC-ENTMCNC: 31.9 GM/DL — LOW (ref 32–36)
MCHC RBC-ENTMCNC: 32.8 GM/DL — SIGNIFICANT CHANGE UP (ref 32–36)
MCHC RBC-ENTMCNC: 33 GM/DL — SIGNIFICANT CHANGE UP (ref 32–36)
MCV RBC AUTO: 86.9 FL — SIGNIFICANT CHANGE UP (ref 80–100)
MCV RBC AUTO: 87.6 FL — SIGNIFICANT CHANGE UP (ref 80–100)
MCV RBC AUTO: 89.2 FL — SIGNIFICANT CHANGE UP (ref 80–100)
NRBC # BLD: 0 /100 WBCS — SIGNIFICANT CHANGE UP (ref 0–0)
NRBC # FLD: 0.05 K/UL — HIGH (ref 0–0)
NRBC # FLD: 0.05 K/UL — HIGH (ref 0–0)
NRBC # FLD: 0.06 K/UL — HIGH (ref 0–0)
PHOSPHATE SERPL-MCNC: 3.2 MG/DL — SIGNIFICANT CHANGE UP (ref 2.5–4.5)
PLATELET # BLD AUTO: 165 K/UL — SIGNIFICANT CHANGE UP (ref 150–400)
PLATELET # BLD AUTO: 166 K/UL — SIGNIFICANT CHANGE UP (ref 150–400)
PLATELET # BLD AUTO: 167 K/UL — SIGNIFICANT CHANGE UP (ref 150–400)
POTASSIUM SERPL-MCNC: 3.8 MMOL/L — SIGNIFICANT CHANGE UP (ref 3.5–5.3)
POTASSIUM SERPL-SCNC: 3.8 MMOL/L — SIGNIFICANT CHANGE UP (ref 3.5–5.3)
PROTHROM AB SERPL-ACNC: 15.5 SEC — HIGH (ref 10.5–13.4)
RBC # BLD: 2.31 M/UL — LOW (ref 4.2–5.8)
RBC # BLD: 2.58 M/UL — LOW (ref 4.2–5.8)
RBC # BLD: 2.74 M/UL — LOW (ref 4.2–5.8)
RBC # FLD: 16.3 % — HIGH (ref 10.3–14.5)
RBC # FLD: 16.8 % — HIGH (ref 10.3–14.5)
RBC # FLD: 16.9 % — HIGH (ref 10.3–14.5)
SODIUM SERPL-SCNC: 140 MMOL/L — SIGNIFICANT CHANGE UP (ref 135–145)
WBC # BLD: 8.3 K/UL — SIGNIFICANT CHANGE UP (ref 3.8–10.5)
WBC # BLD: 8.48 K/UL — SIGNIFICANT CHANGE UP (ref 3.8–10.5)
WBC # BLD: 8.68 K/UL — SIGNIFICANT CHANGE UP (ref 3.8–10.5)
WBC # FLD AUTO: 8.3 K/UL — SIGNIFICANT CHANGE UP (ref 3.8–10.5)
WBC # FLD AUTO: 8.48 K/UL — SIGNIFICANT CHANGE UP (ref 3.8–10.5)
WBC # FLD AUTO: 8.68 K/UL — SIGNIFICANT CHANGE UP (ref 3.8–10.5)

## 2023-05-12 PROCEDURE — 99233 SBSQ HOSP IP/OBS HIGH 50: CPT

## 2023-05-12 PROCEDURE — 99232 SBSQ HOSP IP/OBS MODERATE 35: CPT | Mod: GC

## 2023-05-12 RX ADMIN — FINASTERIDE 5 MILLIGRAM(S): 5 TABLET, FILM COATED ORAL at 11:43

## 2023-05-12 RX ADMIN — MONTELUKAST 10 MILLIGRAM(S): 4 TABLET, CHEWABLE ORAL at 21:55

## 2023-05-12 RX ADMIN — ATORVASTATIN CALCIUM 10 MILLIGRAM(S): 80 TABLET, FILM COATED ORAL at 21:55

## 2023-05-12 RX ADMIN — TAMSULOSIN HYDROCHLORIDE 0.4 MILLIGRAM(S): 0.4 CAPSULE ORAL at 21:55

## 2023-05-12 NOTE — PROGRESS NOTE ADULT - SUBJECTIVE AND OBJECTIVE BOX
Medicine Progress Note      Patient is a 73y old  Male who presents with a chief complaint of Lower GIB (11 May 2023 12:21)      SUBJECTIVE / OVERNIGHT EVENTS: This AM, patient seen and examined at bedside.      MEDICATIONS  (STANDING):  atorvastatin 10 milliGRAM(s) Oral at bedtime  finasteride 5 milliGRAM(s) Oral daily  montelukast 10 milliGRAM(s) Oral at bedtime  tamsulosin 0.4 milliGRAM(s) Oral at bedtime    MEDICATIONS  (PRN):  acetaminophen     Tablet .. 650 milliGRAM(s) Oral every 6 hours PRN Temp greater or equal to 38C (100.4F), Mild Pain (1 - 3)  albuterol    90 MICROgram(s) HFA Inhaler 1 Puff(s) Inhalation four times a day PRN Shortness of Breath    CAPILLARY BLOOD GLUCOSE        I&O's Summary    11 May 2023 07:01  -  12 May 2023 07:00  --------------------------------------------------------  IN: 0 mL / OUT: 1300 mL / NET: -1300 mL        PHYSICAL EXAM:  Vital Signs Last 24 Hrs  T(C): 36.8 (12 May 2023 06:45), Max: 36.9 (11 May 2023 21:30)  T(F): 98.2 (12 May 2023 06:45), Max: 98.5 (11 May 2023 21:30)  HR: 95 (12 May 2023 06:45) (92 - 103)  BP: 104/69 (12 May 2023 06:45) (104/69 - 149/94)  BP(mean): --  RR: 18 (12 May 2023 06:45) (17 - 18)  SpO2: 99% (12 May 2023 06:45) (97% - 100%)    Parameters below as of 12 May 2023 06:45  Patient On (Oxygen Delivery Method): room air      CONSTITUTIONAL: NAD  HEENT: Moist oral mucosa  RESPIRATORY: Normal respiratory effort; lungs are clear to auscultation bilaterally  CARDIOVASCULAR: Regular rate and rhythm, normal S1 and S2, no murmur/rub/gallop, no lower extremity edema, peripheral pulses are palpable bilaterally  ABDOMEN: Soft, nondistended, nontender to palpation, normoactive bowel sounds, no rebound/guarding  PSYCH: A+O to person, place, and time  NEUROLOGY: Facial expression appears symmetric, no gross sensory deficits appreciated, moving all extremities spontaneously  SKIN: No rashes; no palpable lesions    LABS:                        6.8    8.68  )-----------( 166      ( 12 May 2023 05:00 )             20.6     05-12    140  |  107  |  16  ----------------------------<  123<H>  3.8   |  24  |  0.83    Ca    8.0<L>      12 May 2023 05:00  Phos  3.2     05-12  Mg     1.90     05-12                      RADIOLOGY & ADDITIONAL TESTS:  Imaging from Last 24 Hours: Medicine Progress Note      Patient is a 73y old  Male who presents with a chief complaint of Lower GIB (11 May 2023 12:21)      SUBJECTIVE / OVERNIGHT EVENTS: Tagged RBC scan positive for bleeding in cecum/ascending colon. This AM, patient seen and examined at bedside. No acute complaints. Tolerating PO diet.      MEDICATIONS  (STANDING):  atorvastatin 10 milliGRAM(s) Oral at bedtime  finasteride 5 milliGRAM(s) Oral daily  montelukast 10 milliGRAM(s) Oral at bedtime  tamsulosin 0.4 milliGRAM(s) Oral at bedtime    MEDICATIONS  (PRN):  acetaminophen     Tablet .. 650 milliGRAM(s) Oral every 6 hours PRN Temp greater or equal to 38C (100.4F), Mild Pain (1 - 3)  albuterol    90 MICROgram(s) HFA Inhaler 1 Puff(s) Inhalation four times a day PRN Shortness of Breath    CAPILLARY BLOOD GLUCOSE        I&O's Summary    11 May 2023 07:01  -  12 May 2023 07:00  --------------------------------------------------------  IN: 0 mL / OUT: 1300 mL / NET: -1300 mL        PHYSICAL EXAM:  Vital Signs Last 24 Hrs  T(C): 36.8 (12 May 2023 06:45), Max: 36.9 (11 May 2023 21:30)  T(F): 98.2 (12 May 2023 06:45), Max: 98.5 (11 May 2023 21:30)  HR: 95 (12 May 2023 06:45) (92 - 103)  BP: 104/69 (12 May 2023 06:45) (104/69 - 149/94)  BP(mean): --  RR: 18 (12 May 2023 06:45) (17 - 18)  SpO2: 99% (12 May 2023 06:45) (97% - 100%)    Parameters below as of 12 May 2023 06:45  Patient On (Oxygen Delivery Method): room air    CONSTITUTIONAL: NAD  HEENT: Moist oral mucosa  RESPIRATORY: Normal respiratory effort; lungs are clear to auscultation bilaterally  CARDIOVASCULAR: Regular rate and rhythm, no murmurs, no lower extremity edema, peripheral pulses are palpable bilaterally  ABDOMEN: Soft, nondistended, nontender to palpation, normoactive bowel sounds, no rebound/guarding  PSYCH: A+O to person, place, and time  NEUROLOGY: Facial expression appears symmetric, no gross sensory deficits appreciated, moving all extremities spontaneously  SKIN: No rashes    LABS:                        6.8    8.68  )-----------( 166      ( 12 May 2023 05:00 )             20.6     05-12    140  |  107  |  16  ----------------------------<  123<H>  3.8   |  24  |  0.83    Ca    8.0<L>      12 May 2023 05:00  Phos  3.2     05-12  Mg     1.90     05-12                      RADIOLOGY & ADDITIONAL TESTS:  Imaging from Last 24 Hours:

## 2023-05-12 NOTE — PROVIDER CONTACT NOTE (OTHER) - REASON
hg 6.7 hct 21
Patient had 1 moderate sized bloody movement
HR elevated to 103
Lab cancelled CBC because blood was clotted

## 2023-05-12 NOTE — CONSULT NOTE ADULT - SUBJECTIVE AND OBJECTIVE BOX
Vascular & Interventional Radiology Brief Consult Note    Evaluate for Procedure: GI bleed embolization    HPI: 73y Male with hx of prostate cancer s/p radiation, HTN, asthma, and h/o presumed diverticular bleeding (admitted 5/2021) w/ recent admission (5/3/23-5/6/23) for hematochezia, now presenting w/ recurrent painless hematochezia. CTA 5/8 and 5/10 were negative. RBC scan showing a bleed in the region of the cecum/ proximal ascending colon. IR consulted for embolization of GI bleed.    Allergies: No Known Allergies    Medications (Abx/Cardiac/Anticoagulation/Blood Products)      Data:    T(C): 37.1  HR: 95  BP: 109/66  RR: 19  SpO2: 95%    -WBC 8.48 / HgB 7.2 / Hct 22.6 / Plt 167  -Na 140 / Cl 107 / BUN 16 / Glucose 123  -K 3.8 / CO2 24 / Cr 0.83  -ALT -- / Alk Phos -- / T.Bili --  -INR1.33    Imaging: CTA 5/11, RBC scan 5/11    Assessment/Plan:   -73y Male with hx of prostate cancer s/p radiation, HTN, asthma, and h/o presumed diverticular bleeding (admitted 5/2021) w/ recent admission (5/3/23-5/6/23) for hematochezia, now presenting w/ recurrent painless hematochezia. CTA 5/8 and 5/10 were negative. RBC scan showing a bleed in the region of the cecum/ proximal ascending colon. IR consulted for embolization of GI bleed.    - Case discussed with attending Dr. Dillard  - Embolization will likely be of limited benefit, recommend follow up with GI for colonoscopy     Vascular & Interventional Radiology Brief Consult Note    Evaluate for Procedure: GI bleed embolization    HPI: 73y Male with hx of prostate cancer s/p radiation, HTN, asthma, and h/o presumed diverticular bleeding (admitted 5/2021) w/ recent admission (5/3/23-5/6/23) for hematochezia, now presenting w/ recurrent painless hematochezia. CTA 5/8 and 5/10 were negative. RBC scan showing a bleed in the region of the cecum/ proximal ascending colon. IR consulted for embolization of GI bleed.    Allergies: No Known Allergies    Medications (Abx/Cardiac/Anticoagulation/Blood Products)      Data:    T(C): 37.1  HR: 95  BP: 109/66  RR: 19  SpO2: 95%    -WBC 8.48 / HgB 7.2 / Hct 22.6 / Plt 167  -Na 140 / Cl 107 / BUN 16 / Glucose 123  -K 3.8 / CO2 24 / Cr 0.83  -ALT -- / Alk Phos -- / T.Bili --  -INR1.33    Imaging: CTA 5/11, RBC scan 5/11    Assessment/Plan:   -73y Male with hx of prostate cancer s/p radiation, HTN, asthma, and h/o presumed diverticular bleeding (admitted 5/2021) w/ recent admission (5/3/23-5/6/23) for hematochezia, now presenting w/ recurrent painless hematochezia. CTA 5/8 and 5/10 were negative. RBC scan showing a bleed in the region of the cecum/ proximal ascending colon however this is seen on very delayed images. IR consulted for embolization of GI bleed.    - Case discussed with attending Dr. Dillard  - Embolization will likely be of limited benefit, recommend follow up with GI for colonoscopy     Vascular & Interventional Radiology Brief Consult Note    Evaluate for Procedure: GI bleed embolization    HPI: 73y Male with hx of prostate cancer s/p radiation, HTN, asthma, and h/o presumed diverticular bleeding (admitted 5/2021) w/ recent admission (5/3/23-5/6/23) for hematochezia, now presenting w/ recurrent painless hematochezia. CTA 5/8 and 5/10 were negative. RBC scan showing a bleed in the region of the cecum/ proximal ascending colon. IR consulted for embolization of GI bleed.    Allergies: No Known Allergies    Medications (Abx/Cardiac/Anticoagulation/Blood Products)      Data:    T(C): 37.1  HR: 95  BP: 109/66  RR: 19  SpO2: 95%    -WBC 8.48 / HgB 7.2 / Hct 22.6 / Plt 167  -Na 140 / Cl 107 / BUN 16 / Glucose 123  -K 3.8 / CO2 24 / Cr 0.83  -ALT -- / Alk Phos -- / T.Bili --  -INR1.33    Imaging: CTA 5/11, RBC scan 5/11    Assessment/Plan:   -73y Male with hx of prostate cancer s/p radiation, HTN, asthma, and h/o presumed diverticular bleeding (admitted 5/2021) w/ recent admission (5/3/23-5/6/23) for hematochezia, now presenting w/ recurrent painless hematochezia. CTA 5/8 and 5/10 were negative. RBC scan showing a bleed in the region of the cecum/ proximal ascending colon however this is seen on very delayed images. IR consulted for embolization of GI bleed.    - Case discussed with attending Dr. Dillard  - Given bleeding scan only showed pooling on dealyed images, this is likely to be angiographically occult, thus angio/embolization will likely be of limited benefit.     - Rrecommend follow up with GI for colonoscopy     Vascular & Interventional Radiology Brief Consult Note    Evaluate for Procedure: GI bleed embolization    HPI: 73y Male with hx of prostate cancer s/p radiation, HTN, asthma, and h/o presumed diverticular bleeding (admitted 5/2021) w/ recent admission (5/3/23-5/6/23) for hematochezia, now presenting w/ recurrent painless hematochezia. CTA 5/8 and 5/10 were negative. RBC scan showing a bleed in the region of the cecum/ proximal ascending colon. IR consulted for embolization of GI bleed.    Allergies: No Known Allergies    Medications (Abx/Cardiac/Anticoagulation/Blood Products)      Data:    T(C): 37.1  HR: 95  BP: 109/66  RR: 19  SpO2: 95%    -WBC 8.48 / HgB 7.2 / Hct 22.6 / Plt 167  -Na 140 / Cl 107 / BUN 16 / Glucose 123  -K 3.8 / CO2 24 / Cr 0.83  -ALT -- / Alk Phos -- / T.Bili --  -INR1.33    Imaging: CTA 5/11, RBC scan 5/11    Assessment/Plan:   -73y Male with hx of prostate cancer s/p radiation, HTN, asthma, and h/o presumed diverticular bleeding (admitted 5/2021) w/ recent admission (5/3/23-5/6/23) for hematochezia, now presenting w/ recurrent painless hematochezia. CTA 5/8 and 5/10 were negative. RBC scan showing a bleed in the region of the cecum/ proximal ascending colon however this is seen on very delayed images. IR consulted for embolization of GI bleed.    - Case discussed with attending Dr. Dillard  - Given bleeding scan only showed pooling on delayed images, this is likely to be angiographically occult, thus angio/embolization will likely be of limited benefit.     - Rrecommend follow up with GI for colonoscopy

## 2023-05-12 NOTE — PROVIDER CONTACT NOTE (CRITICAL VALUE NOTIFICATION) - ACTION/TREATMENT ORDERED:
MD Taylor Dumont notified and made aware. Rapid response called. Bolus ordered and administered. 1 unit RBC ordered and administered.
1 unit PRBC ordered, continue to monitor.
1 unit PRBC ordered

## 2023-05-12 NOTE — PROGRESS NOTE ADULT - PROBLEM SELECTOR PLAN 2
- Likely acute blood loss anemia in the setting of GI bleed  - Plan as above, transfusing as needed  - Will add on reticulocyte count to morning labs - Likely acute blood loss anemia in the setting of GI bleed  - Plan as above, transfusing as needed

## 2023-05-12 NOTE — PROGRESS NOTE ADULT - ASSESSMENT
73 yrs old male w/ hx of prostate cancer s/p radiation, HTN, asthma, and h/o presumed diverticular bleeding (admitted 5/2021) w/ recent admission (5/3/23-5/6/23) for hematochezia, now presenting w/ recurrent painless hematochezia, suspected resolved diverticular bleed    # LGIB - etiology likely recurrent diverticular bleed; patient w/ severe pan-diverticulosis w/ recent colonoscopy 5/5/23 w/o identifiable source. Bleeding initially has stopped but hospital course complicated by ongoing bleeding with CTA on 5/10 negative and RBC scan + for activ bleeding in the cecum/proximal colon. No intervention plan by IR. Has received total of 5 units of blood since the 10th.       Recommendations:  -Will discuss with patient but if agreeable can plan for repeat Colonoscopy on Monday (especially if continues to bleed)  -Clear liquid diet starting Sunday  -NPO after midnight on 5/14    Recommendations preliminary until signed by attending.     Max Sarah MD  Gastroenterology/Hepatology Fellow  1st option: [phone] (text or call), ONLY available from 7:00 am to 5:00 pm.   **Contact on-call GI fellow via answering service ([phone]) from 5pm-7am AND on weekends/holidays**  2nd option: Available via Microsoft Teams  3rd option: Pager: [phone]

## 2023-05-12 NOTE — PROGRESS NOTE ADULT - ATTENDING COMMENTS
73M w/ PMH of HTN, HLD, prostate cancer (s/p XRT), asthma, diverticulosis with recent admission (5/3 - 5/6) for diverticular bleed requiring 5 units PRBCs and s/p endoscopy/colonoscopy with vascular ectasia and diverticulosis presenting with GIB (multiple BRBPR) admitted to medicine for further management. Last hospitalization was also complicated by aflutter. given bleeding was NOT d/c on AC.    #LGIB, suspecting diverticular: NM scan 5/11 localized bleed to cecum/proximal ascending colon, pending IR vs GI intervention as pt continues to require pRBCs.   #Active blood loss anemia: as above   #Aflutter: appreciate cards eval   #HTN: hold meds. 73M w/ PMH of HTN, HLD, prostate cancer (s/p XRT), asthma, diverticulosis with recent admission (5/3 - 5/6) for diverticular bleed requiring 5 units PRBCs and s/p endoscopy/colonoscopy with vascular ectasia and diverticulosis presenting with GIB (multiple BRBPR) admitted to medicine for further management. Last hospitalization was also complicated by aflutter. given bleeding was NOT d/c on AC.    #LGIB, suspecting diverticular: NM scan 5/11 localized bleed to cecum/proximal ascending colon, pending IR vs GI intervention as pt continues to require pRBCs.   #Active blood loss anemia: as above   #Aflutter: appreciate cards eval   #HTN: hold meds.  Wife updated at bedside 5/10, 5/12.

## 2023-05-12 NOTE — PROVIDER CONTACT NOTE (OTHER) - BACKGROUND
Patient admitted for melena.   PMH of gout, prostate cancer, anemia.
Patient admitted for melena.   PMH of gout, prostate cancer, anemia.
Patient admitted with melena
pt admitted with bloody stool

## 2023-05-12 NOTE — PROGRESS NOTE ADULT - PROBLEM SELECTOR PLAN 3
On recent admission: EKG consistent for A-flutter   On this admission: EKG sinus   Echo (5/4): EF = 61% w. severely dilated LA; mild pulm HTN    Plan:  - F/u outpatient w/ Dr. Davis (cardiologist)  - Hold anticoagulation i/so GIB ([id number]: 2 for age and HTN)

## 2023-05-12 NOTE — PROVIDER CONTACT NOTE (OTHER) - ACTION/TREATMENT ORDERED:
MD notified and aware. MD will order 1U PRBC. Continue to monitor.
MD notified. planning for 1u
MD notified. MD ordered another CBC to be drawn.
MD notified and aware. No interventions at this time. Continue to monitor.

## 2023-05-12 NOTE — PROVIDER CONTACT NOTE (CRITICAL VALUE NOTIFICATION) - ASSESSMENT
Patient had a bloody bowel movement. Patient hypotensive and tachycardic. Patient hemoglobin is 6.5.
HGB 6.8 HCT 20.6
HGB 6.8 HCT 21.1

## 2023-05-12 NOTE — PROGRESS NOTE ADULT - SUBJECTIVE AND OBJECTIVE BOX
Panchito Alfaro MD  Interventional Cardiology / Endovascular Specialist  [place] Office : [phone] [place] [address] [place]. [address]  Tel:   [place] Office : [address] [facility] [place] [address]  Tel: [phone]    Pt lying denies CP SOB , continues to have GIB   	  MEDICATIONS:      albuterol    90 MICROgram(s) HFA Inhaler 1 Puff(s) Inhalation four times a day PRN  montelukast 10 milliGRAM(s) Oral at bedtime    acetaminophen     Tablet .. 650 milliGRAM(s) Oral every 6 hours PRN      atorvastatin 10 milliGRAM(s) Oral at bedtime  finasteride 5 milliGRAM(s) Oral daily    tamsulosin 0.4 milliGRAM(s) Oral at bedtime      PAST MEDICAL/SURGICAL HISTORY  PAST MEDICAL & SURGICAL HISTORY:  Hypertension      Hyperlipidemia      GIB (gastrointestinal bleeding)      Prostate cancer  s/p radiation therapy      Gout      History of hemorrhoidectomy          SOCIAL HISTORY: Substance Use (street drugs): ( x ) never used  (  ) other:    FAMILY HISTORY:      REVIEW OF SYSTEMS:  CONSTITUTIONAL: No fever, weight loss, or fatigue  EYES: No eye pain, visual disturbances, or discharge  ENMT:  No difficulty hearing, tinnitus, vertigo; No sinus or throat pain  BREASTS: No pain, masses, or nipple discharge  GASTROINTESTINAL: c/o hematochezia.  GENITOURINARY: No dysuria, frequency, hematuria, or incontinence  NEUROLOGICAL: No headaches, memory loss, loss of strength, numbness, or tremors  ENDOCRINE: No heat or cold intolerance; No hair loss  MUSCULOSKELETAL: No joint pain or swelling; No muscle, back, or extremity pain  PSYCHIATRIC: No depression, anxiety, mood swings, or difficulty sleeping  HEME/LYMPH: No easy bruising, or bleeding gums  All others negative    PHYSICAL EXAM:  T(C): 36.8 (05-12-23 @ 21:39), Max: 37.1 (05-12-23 @ 12:10)  HR: 98 (05-12-23 @ 21:39) (94 - 100)  BP: 129/76 (05-12-23 @ 21:39) (104/69 - 129/76)  RR: 18 (05-12-23 @ 21:39) (18 - 19)  SpO2: 100% (05-12-23 @ 21:39) (95% - 100%)  Wt(kg): --  I&O's Summary    11 May 2023 07:01  -  12 May 2023 07:00  --------------------------------------------------------  IN: 0 mL / OUT: 1300 mL / NET: -1300 mL        GENERAL: NAD  EYES: EOMI, PERRLA, conjunctiva and sclera clear  ENMT: No tonsillar erythema, exudates, or enlargement  Cardiovascular: Normal S1 S2, No JVD, No murmurs, No edema  Respiratory: Lungs clear to auscultation	  Gastrointestinal:  Soft, Non-tender, + BS	  Extremities: No edema                                 7.8    8.30  )-----------( 165      ( 12 May 2023 19:40 )             23.8     05-12    140  |  107  |  16  ----------------------------<  123<H>  3.8   |  24  |  0.83    Ca    8.0<L>      12 May 2023 05:00  Phos  3.2     05-12  Mg     1.90     05-12      proBNP:   Lipid Profile:   HgA1c:   TSH:     Consultant(s) Notes Reviewed:  [x ] YES  [ ] NO    Care Discussed with Consultants/Other Providers [ x] YES  [ ] NO    Imaging Personally Reviewed independently:  [x] YES  [ ] NO    All labs, radiologic studies, vitals, orders and medications list reviewed. Patient is seen and examined at bedside. Case discussed with medical team.

## 2023-05-12 NOTE — PROVIDER CONTACT NOTE (CRITICAL VALUE NOTIFICATION) - RECOMMENDATIONS
Notify MD, review lab results
review lab results
MD Taylor Dumont notified and made aware. Rapid response called. Bolus ordered and administered. 1 unit RBC ordered and administered.

## 2023-05-12 NOTE — PROGRESS NOTE ADULT - ASSESSMENT
73Y M w/ PMH of HTN, HLD, prostate cancer (s/p XRT), asthma, diverticulosis with recent admission (5/3 - 5/6) for diverticular bleed requiring 5 units PRBCs and s/p endoscopy/colonoscopy with vascular ectasia and diverticulosis presenting with GIB     1. Paroxysmal Aflutter  -new onset during recent admission   -denies CP, SOB  -TTE mild-mod AR, grossly normal LV function  - currently not a candidate 2/2 anemia requiring PRBC transfusions  -EKG 5/11 shows NSR vs multifocal atrial tachycardia     2. 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 HTN  - atenolol and cardizem held 2/2 hypotension  -continue to monitor BP

## 2023-05-12 NOTE — PROVIDER CONTACT NOTE (OTHER) - SITUATION
HR elevated to 103
Patient had 1 moderate sized bloody movement
Lab cancelled CBC because blood was clotted
hg 6.7 hct 21

## 2023-05-12 NOTE — PROGRESS NOTE ADULT - SUBJECTIVE AND OBJECTIVE BOX
Interval Events:   RBC scan +    Hospital Medications:  acetaminophen     Tablet .. 650 milliGRAM(s) Oral every 6 hours PRN  albuterol    90 MICROgram(s) HFA Inhaler 1 Puff(s) Inhalation four times a day PRN  atorvastatin 10 milliGRAM(s) Oral at bedtime  finasteride 5 milliGRAM(s) Oral daily  montelukast 10 milliGRAM(s) Oral at bedtime  tamsulosin 0.4 milliGRAM(s) Oral at bedtime      ROS: All system reviewed and negative except as mentioned above.    PHYSICAL EXAM:   Vital Signs:  Vital Signs Last 24 Hrs  T(C): 37.1 (12 May 2023 12:10), Max: 37.1 (12 May 2023 12:10)  T(F): 98.7 (12 May 2023 12:10), Max: 98.7 (12 May 2023 12:10)  HR: 95 (12 May 2023 12:10) (92 - 103)  BP: 109/66 (12 May 2023 12:10) (104/69 - 149/94)  BP(mean): --  RR: 19 (12 May 2023 12:10) (17 - 19)  SpO2: 95% (12 May 2023 12:10) (95% - 100%)    Parameters below as of 12 May 2023 12:10  Patient On (Oxygen Delivery Method): room air      Daily     Daily     GENERAL:  NAD, Appears stated age  HEENT:  NC/AT,  conjunctivae clear and pink, sclera -anicteric  CHEST:  Normal Effort, Breath sounds clear  HEART:  RRR, S1 + S2, no murmurs  ABDOMEN:  Soft, non-tender, non-distended, normoactive bowel sounds,  no masses  EXTREMITIES:  no cyanosis or edema  SKIN:  Warm & Dry. No rash or erythema  NEURO:  Alert, oriented, no focal deficit    LABS:                        7.2    8.48  )-----------( 167      ( 12 May 2023 11:30 )             22.6     Mean Cell Volume: 87.6 fL (05-12-23 @ 11:30)    05-12    140  |  107  |  16  ----------------------------<  123<H>  3.8   |  24  |  0.83    Ca    8.0<L>      12 May 2023 05:00  Phos  3.2     05-12  Mg     1.90     05-12        PT/INR - ( 12 May 2023 11:38 )   PT: 15.5 sec;   INR: 1.33 ratio         PTT - ( 12 May 2023 11:38 )  PTT:29.2 sec                            7.2    8.48  )-----------( 167      ( 12 May 2023 11:30 )             22.6                         6.8    8.68  )-----------( 166      ( 12 May 2023 05:00 )             20.6                         7.7    8.39  )-----------( 187      ( 11 May 2023 22:00 )             23.3                         7.7    7.21  )-----------( 170      ( 11 May 2023 09:31 )             24.0                         6.8    6.72  )-----------( 166      ( 11 May 2023 00:22 )             21.1       Imaging: Images reviewed.  < from: CT Angio Abdomen and Pelvis w/ IV Cont (05.10.23 @ 13:53) >  IMPRESSION:  No evidence of active GI bleed.    Colonic diverticulosis.    < end of copied text >  < from: NM GI Bleed Localization (NM GI Bleed Localization .) (05.11.23 @ 19:48) >  IMPRESSION: Abnormal bleeding scan.    Findings compatible with active bleeding site in the region of the   cecum/proximal ascending colon.      Findings were communicated to Dr. Luis Osorio by Dr. Duffy on   5/11/2023 at 8:35 PM.    < end of copied text >     Interval Events:   RBC scan + for     Hospital Medications:  acetaminophen     Tablet .. 650 milliGRAM(s) Oral every 6 hours PRN  albuterol    90 MICROgram(s) HFA Inhaler 1 Puff(s) Inhalation four times a day PRN  atorvastatin 10 milliGRAM(s) Oral at bedtime  finasteride 5 milliGRAM(s) Oral daily  montelukast 10 milliGRAM(s) Oral at bedtime  tamsulosin 0.4 milliGRAM(s) Oral at bedtime      ROS: All system reviewed and negative except as mentioned above.    PHYSICAL EXAM:   Vital Signs:  Vital Signs Last 24 Hrs  T(C): 37.1 (12 May 2023 12:10), Max: 37.1 (12 May 2023 12:10)  T(F): 98.7 (12 May 2023 12:10), Max: 98.7 (12 May 2023 12:10)  HR: 95 (12 May 2023 12:10) (92 - 103)  BP: 109/66 (12 May 2023 12:10) (104/69 - 149/94)  BP(mean): --  RR: 19 (12 May 2023 12:10) (17 - 19)  SpO2: 95% (12 May 2023 12:10) (95% - 100%)    Parameters below as of 12 May 2023 12:10  Patient On (Oxygen Delivery Method): room air      Daily     Daily     GENERAL:  NAD, Appears stated age  HEENT:  NC/AT,  conjunctivae clear and pink, sclera -anicteric  CHEST:  Normal Effort, Breath sounds clear  HEART:  RRR, S1 + S2, no murmurs  ABDOMEN:  Soft, non-tender, non-distended, normoactive bowel sounds,  no masses  EXTREMITIES:  no cyanosis or edema  SKIN:  Warm & Dry. No rash or erythema  NEURO:  Alert, oriented, no focal deficit    LABS:                        7.2    8.48  )-----------( 167      ( 12 May 2023 11:30 )             22.6     Mean Cell Volume: 87.6 fL (05-12-23 @ 11:30)    05-12    140  |  107  |  16  ----------------------------<  123<H>  3.8   |  24  |  0.83    Ca    8.0<L>      12 May 2023 05:00  Phos  3.2     05-12  Mg     1.90     05-12        PT/INR - ( 12 May 2023 11:38 )   PT: 15.5 sec;   INR: 1.33 ratio         PTT - ( 12 May 2023 11:38 )  PTT:29.2 sec                            7.2    8.48  )-----------( 167      ( 12 May 2023 11:30 )             22.6                         6.8    8.68  )-----------( 166      ( 12 May 2023 05:00 )             20.6                         7.7    8.39  )-----------( 187      ( 11 May 2023 22:00 )             23.3                         7.7    7.21  )-----------( 170      ( 11 May 2023 09:31 )             24.0                         6.8    6.72  )-----------( 166      ( 11 May 2023 00:22 )             21.1       Imaging: Images reviewed.  < from: CT Angio Abdomen and Pelvis w/ IV Cont (05.10.23 @ 13:53) >  IMPRESSION:  No evidence of active GI bleed.    Colonic diverticulosis.    < end of copied text >  < from: NM GI Bleed Localization (NM GI Bleed Localization .) (05.11.23 @ 19:48) >  IMPRESSION: Abnormal bleeding scan.    Findings compatible with active bleeding site in the region of the   cecum/proximal ascending colon.      Findings were communicated to Dr. Luis Osorio by Dr. Duffy on   5/11/2023 at 8:35 PM.    < end of copied text >

## 2023-05-12 NOTE — PROVIDER CONTACT NOTE (CRITICAL VALUE NOTIFICATION) - SITUATION
Abnormal labs
Abnormal labs
Patient had a bloody bowel movement.   Patient hypotensive and tachycardic. Patient hemoglobin is 6.5.

## 2023-05-12 NOTE — PROGRESS NOTE ADULT - PROBLEM SELECTOR PLAN 1
2-3 episodes of hematochezia prior to admission with dizziness and fall  Recent admission (5/3 - 5/6) w/ similar complaint and s/p EGD and colonoscopy w/o acute bleeding   On admission: Hgb - 7.3; s/p 1 U --> Hgb -7.6   s/p Zofran   CT abd/pelvis w/w/o IV con: no acute GI bleed; extensive diverticulosis w/o diverticulitis     RRT AM of 5/10 for bloody BM and dizziness, given 1U pRBCs and 500cc LR bolus  - Ordered for additional unit in the AM due to still low hemoglobin   - CTA A/P without sign of bleed    Plan:   - Trend CBC ~q8hr; Transfuse Hgb < 7  - GI reconsulted, no plan for colonoscopy. Recommended for CTA A/P if unstable with active bloody BMs  - Plan for tagged RBC NM scan to try and localize  - Maintain active type and screen 2-3 episodes of hematochezia prior to admission with dizziness and fall  Recent admission (5/3 - 5/6) w/ similar complaint and s/p EGD and colonoscopy w/o acute bleeding   On admission: Hgb - 7.3; s/p 1 U --> Hgb -7.6   s/p Zofran   CT abd/pelvis w/w/o IV con: no acute GI bleed; extensive diverticulosis w/o diverticulitis     RRT AM of 5/10 for bloody BM and dizziness, given 1U pRBCs and 500cc LR bolus  - Ordered for additional unit in the AM due to still low hemoglobin   - CTA A/P without sign of bleed    Plan:   - Trend CBC ~q8hr; Transfuse Hgb < 7  - GI reconsulted, no plan for colonoscopy. Recommended for CTA A/P if unstable with active bloody BMs  - Tagged RBC positive for bleeding in cecum/ascending colon. GI to scope on Monday  - Maintain active type and screen

## 2023-05-12 NOTE — PROGRESS NOTE ADULT - PROBLEM SELECTOR PROBLEM 8
We can see her in the office if needed To get better and follow your care plan as instructed. Osteoarthritis

## 2023-05-12 NOTE — PROVIDER CONTACT NOTE (OTHER) - ASSESSMENT
Patient assessed.
VS stable. Patient had 1 moderate sized blood movement.
hg 6.7 hct 21
/60 hr 103. No acute distress noted.

## 2023-05-12 NOTE — PROGRESS NOTE ADULT - ATTENDING COMMENTS
74 yo Male h/o prostate cancer s/p radiation, HTN, asthma, and h/o presumed diverticular bleeding w/ recent admission (5/3/23-5/6/23) for hematochezia, now presenting w/ likely recurrent diverticular bleed.  CT Angio negative but RBC scan + for bleeding in right colon.  Appreciate IR recs, will plan for colonoscopy but may be low yield unless patient actively bleeding as he has multiple diverticulum in right colon.

## 2023-05-13 DIAGNOSIS — M10.9 GOUT, UNSPECIFIED: ICD-10-CM

## 2023-05-13 LAB
ANION GAP SERPL CALC-SCNC: 9 MMOL/L — SIGNIFICANT CHANGE UP (ref 7–14)
BUN SERPL-MCNC: 18 MG/DL — SIGNIFICANT CHANGE UP (ref 7–23)
CALCIUM SERPL-MCNC: 8.4 MG/DL — SIGNIFICANT CHANGE UP (ref 8.4–10.5)
CHLORIDE SERPL-SCNC: 106 MMOL/L — SIGNIFICANT CHANGE UP (ref 98–107)
CO2 SERPL-SCNC: 24 MMOL/L — SIGNIFICANT CHANGE UP (ref 22–31)
CREAT SERPL-MCNC: 0.9 MG/DL — SIGNIFICANT CHANGE UP (ref 0.5–1.3)
EGFR: 90 ML/MIN/1.73M2 — SIGNIFICANT CHANGE UP
GLUCOSE SERPL-MCNC: 121 MG/DL — HIGH (ref 70–99)
HCT VFR BLD CALC: 23.3 % — LOW (ref 39–50)
HCT VFR BLD CALC: 24.6 % — LOW (ref 39–50)
HCT VFR BLD CALC: 25.2 % — LOW (ref 39–50)
HGB BLD-MCNC: 7.7 G/DL — LOW (ref 13–17)
HGB BLD-MCNC: 7.9 G/DL — LOW (ref 13–17)
HGB BLD-MCNC: 8 G/DL — LOW (ref 13–17)
MAGNESIUM SERPL-MCNC: 2 MG/DL — SIGNIFICANT CHANGE UP (ref 1.6–2.6)
MCHC RBC-ENTMCNC: 28.8 PG — SIGNIFICANT CHANGE UP (ref 27–34)
MCHC RBC-ENTMCNC: 29 PG — SIGNIFICANT CHANGE UP (ref 27–34)
MCHC RBC-ENTMCNC: 29.3 PG — SIGNIFICANT CHANGE UP (ref 27–34)
MCHC RBC-ENTMCNC: 31.7 GM/DL — LOW (ref 32–36)
MCHC RBC-ENTMCNC: 32.1 GM/DL — SIGNIFICANT CHANGE UP (ref 32–36)
MCHC RBC-ENTMCNC: 33 GM/DL — SIGNIFICANT CHANGE UP (ref 32–36)
MCV RBC AUTO: 88.6 FL — SIGNIFICANT CHANGE UP (ref 80–100)
MCV RBC AUTO: 89.8 FL — SIGNIFICANT CHANGE UP (ref 80–100)
MCV RBC AUTO: 91.3 FL — SIGNIFICANT CHANGE UP (ref 80–100)
NRBC # BLD: 0 /100 WBCS — SIGNIFICANT CHANGE UP (ref 0–0)
NRBC # FLD: 0.04 K/UL — HIGH (ref 0–0)
NRBC # FLD: 0.05 K/UL — HIGH (ref 0–0)
NRBC # FLD: 0.06 K/UL — HIGH (ref 0–0)
PHOSPHATE SERPL-MCNC: 3.9 MG/DL — SIGNIFICANT CHANGE UP (ref 2.5–4.5)
PLATELET # BLD AUTO: 162 K/UL — SIGNIFICANT CHANGE UP (ref 150–400)
PLATELET # BLD AUTO: 167 K/UL — SIGNIFICANT CHANGE UP (ref 150–400)
PLATELET # BLD AUTO: 174 K/UL — SIGNIFICANT CHANGE UP (ref 150–400)
POTASSIUM SERPL-MCNC: 3.7 MMOL/L — SIGNIFICANT CHANGE UP (ref 3.5–5.3)
POTASSIUM SERPL-SCNC: 3.7 MMOL/L — SIGNIFICANT CHANGE UP (ref 3.5–5.3)
RBC # BLD: 2.63 M/UL — LOW (ref 4.2–5.8)
RBC # BLD: 2.74 M/UL — LOW (ref 4.2–5.8)
RBC # BLD: 2.76 M/UL — LOW (ref 4.2–5.8)
RBC # FLD: 16.5 % — HIGH (ref 10.3–14.5)
SODIUM SERPL-SCNC: 139 MMOL/L — SIGNIFICANT CHANGE UP (ref 135–145)
URATE SERPL-MCNC: 7.3 MG/DL — SIGNIFICANT CHANGE UP (ref 3.4–8.8)
WBC # BLD: 8.13 K/UL — SIGNIFICANT CHANGE UP (ref 3.8–10.5)
WBC # BLD: 8.47 K/UL — SIGNIFICANT CHANGE UP (ref 3.8–10.5)
WBC # BLD: 9.92 K/UL — SIGNIFICANT CHANGE UP (ref 3.8–10.5)
WBC # FLD AUTO: 8.13 K/UL — SIGNIFICANT CHANGE UP (ref 3.8–10.5)
WBC # FLD AUTO: 8.47 K/UL — SIGNIFICANT CHANGE UP (ref 3.8–10.5)
WBC # FLD AUTO: 9.92 K/UL — SIGNIFICANT CHANGE UP (ref 3.8–10.5)

## 2023-05-13 PROCEDURE — 99232 SBSQ HOSP IP/OBS MODERATE 35: CPT | Mod: GC

## 2023-05-13 RX ORDER — COLCHICINE 0.6 MG
0.6 TABLET ORAL ONCE
Refills: 0 | Status: COMPLETED | OUTPATIENT
Start: 2023-05-13 | End: 2023-05-13

## 2023-05-13 RX ORDER — POTASSIUM CHLORIDE 20 MEQ
40 PACKET (EA) ORAL ONCE
Refills: 0 | Status: COMPLETED | OUTPATIENT
Start: 2023-05-13 | End: 2023-05-13

## 2023-05-13 RX ADMIN — FINASTERIDE 5 MILLIGRAM(S): 5 TABLET, FILM COATED ORAL at 11:04

## 2023-05-13 RX ADMIN — Medication 40 MILLIEQUIVALENT(S): at 10:43

## 2023-05-13 RX ADMIN — Medication 650 MILLIGRAM(S): at 22:02

## 2023-05-13 RX ADMIN — ATORVASTATIN CALCIUM 10 MILLIGRAM(S): 80 TABLET, FILM COATED ORAL at 21:55

## 2023-05-13 RX ADMIN — Medication 650 MILLIGRAM(S): at 23:02

## 2023-05-13 RX ADMIN — Medication 650 MILLIGRAM(S): at 11:43

## 2023-05-13 RX ADMIN — TAMSULOSIN HYDROCHLORIDE 0.4 MILLIGRAM(S): 0.4 CAPSULE ORAL at 21:54

## 2023-05-13 RX ADMIN — MONTELUKAST 10 MILLIGRAM(S): 4 TABLET, CHEWABLE ORAL at 21:55

## 2023-05-13 RX ADMIN — Medication 0.6 MILLIGRAM(S): at 16:38

## 2023-05-13 RX ADMIN — Medication 650 MILLIGRAM(S): at 10:43

## 2023-05-13 NOTE — PROGRESS NOTE ADULT - SUBJECTIVE AND OBJECTIVE BOX
Panchito Alfaro MD  Interventional Cardiology / Endovascular Specialist  [place] Office : [phone] [place] [address] [place]. [address]  Tel:   [place] Office : [address] [facility] [place] [address]  Tel: [phone]    Pt lying denies CP SOB , continues to have GIB   	  MEDICATIONS:  albuterol    90 MICROgram(s) HFA Inhaler 1 Puff(s) Inhalation four times a day PRN  montelukast 10 milliGRAM(s) Oral at bedtime  acetaminophen     Tablet .. 650 milliGRAM(s) Oral every 6 hours PRN  atorvastatin 10 milliGRAM(s) Oral at bedtime  colchicine 0.6 milliGRAM(s) Oral once  finasteride 5 milliGRAM(s) Oral daily  tamsulosin 0.4 milliGRAM(s) Oral at bedtime      PAST MEDICAL/SURGICAL HISTORY  PAST MEDICAL & SURGICAL HISTORY:  Hypertension      Hyperlipidemia      GIB (gastrointestinal bleeding)      Prostate cancer  s/p radiation therapy      Gout      History of hemorrhoidectomy          SOCIAL HISTORY: Substance Use (street drugs): ( x ) never used  (  ) other:    FAMILY HISTORY:      REVIEW OF SYSTEMS:  CONSTITUTIONAL: No fever, weight loss, or fatigue  EYES: No eye pain, visual disturbances, or discharge  ENMT:  No difficulty hearing, tinnitus, vertigo; No sinus or throat pain  BREASTS: No pain, masses, or nipple discharge  GASTROINTESTINAL: No abdominal or epigastric pain. No nausea, vomiting, or hematemesis; No diarrhea or constipation. No melena or hematochezia.  GENITOURINARY: No dysuria, frequency, hematuria, or incontinence  NEUROLOGICAL: No headaches, memory loss, loss of strength, numbness, or tremors  ENDOCRINE: No heat or cold intolerance; No hair loss  MUSCULOSKELETAL: No joint pain or swelling; No muscle, back, or extremity pain  PSYCHIATRIC: No depression, anxiety, mood swings, or difficulty sleeping  HEME/LYMPH: No easy bruising, or bleeding gums  All others negative    PHYSICAL EXAM:  T(C): 36.7 (05-13-23 @ 13:32), Max: 36.8 (05-12-23 @ 21:39)  HR: 96 (05-13-23 @ 13:32) (96 - 100)  BP: 130/69 (05-13-23 @ 13:32) (129/76 - 137/73)  RR: 17 (05-13-23 @ 13:32) (17 - 18)  SpO2: 99% (05-13-23 @ 13:32) (97% - 100%)  Wt(kg): --  I&O's Summary        GENERAL: NAD  EYES: EOMI, PERRLA, conjunctiva and sclera clear  ENMT: No tonsillar erythema, exudates, or enlargement  Cardiovascular: Normal S1 S2, No JVD, No murmurs, No edema  Respiratory: Lungs clear to auscultation	  Gastrointestinal:  Soft, Non-tender, + BS	  Extremities: No edema                             7.9    8.13  )-----------( 167      ( 13 May 2023 06:01 )             24.6     05-13    139  |  106  |  18  ----------------------------<  121<H>  3.7   |  24  |  0.90    Ca    8.4      13 May 2023 06:01  Phos  3.9     05-13  Mg     2.00     05-13      proBNP:   Lipid Profile:   HgA1c:   TSH:     Consultant(s) Notes Reviewed:  [x ] YES  [ ] NO    Care Discussed with Consultants/Other Providers [ x] YES  [ ] NO    Imaging Personally Reviewed independently:  [x] YES  [ ] NO    All labs, radiologic studies, vitals, orders and medications list reviewed. Patient is seen and examined at bedside. Case discussed with medical team.

## 2023-05-13 NOTE — PROGRESS NOTE ADULT - SUBJECTIVE AND OBJECTIVE BOX
Medicine Progress Note      Patient is a 73y old  Male who presents with a chief complaint of Lower GIB (12 May 2023 15:54)      SUBJECTIVE / OVERNIGHT EVENTS: This AM, patient seen and examined at bedside.      MEDICATIONS  (STANDING):  atorvastatin 10 milliGRAM(s) Oral at bedtime  finasteride 5 milliGRAM(s) Oral daily  montelukast 10 milliGRAM(s) Oral at bedtime  tamsulosin 0.4 milliGRAM(s) Oral at bedtime    MEDICATIONS  (PRN):  acetaminophen     Tablet .. 650 milliGRAM(s) Oral every 6 hours PRN Temp greater or equal to 38C (100.4F), Mild Pain (1 - 3)  albuterol    90 MICROgram(s) HFA Inhaler 1 Puff(s) Inhalation four times a day PRN Shortness of Breath    CAPILLARY BLOOD GLUCOSE        I&O's Summary      PHYSICAL EXAM:  Vital Signs Last 24 Hrs  T(C): 36.7 (13 May 2023 05:24), Max: 37.1 (12 May 2023 12:10)  T(F): 98.1 (13 May 2023 05:24), Max: 98.7 (12 May 2023 12:10)  HR: 100 (13 May 2023 05:24) (95 - 100)  BP: 137/73 (13 May 2023 05:24) (109/66 - 137/73)  BP(mean): --  RR: 18 (13 May 2023 05:24) (18 - 19)  SpO2: 97% (13 May 2023 05:24) (95% - 100%)    Parameters below as of 13 May 2023 05:24  Patient On (Oxygen Delivery Method): room air      CONSTITUTIONAL: NAD  HEENT: Moist oral mucosa  RESPIRATORY: Normal respiratory effort; lungs are clear to auscultation bilaterally  CARDIOVASCULAR: Regular rate and rhythm, normal S1 and S2, no murmur/rub/gallop, no lower extremity edema, peripheral pulses are palpable bilaterally  ABDOMEN: Soft, nondistended, nontender to palpation, normoactive bowel sounds, no rebound/guarding  PSYCH: A+O to person, place, and time  NEUROLOGY: Facial expression appears symmetric, no gross sensory deficits appreciated, moving all extremities spontaneously  SKIN: No rashes; no palpable lesions    LABS:                        7.7    8.47  )-----------( 162      ( 13 May 2023 01:20 )             23.3     05-12    140  |  107  |  16  ----------------------------<  123<H>  3.8   |  24  |  0.83    Ca    8.0<L>      12 May 2023 05:00  Phos  3.2     05-12  Mg     1.90     05-12        PT/INR - ( 12 May 2023 11:38 )   PT: 15.5 sec;   INR: 1.33 ratio         PTT - ( 12 May 2023 11:38 )  PTT:29.2 sec              RADIOLOGY & ADDITIONAL TESTS:  Imaging from Last 24 Hours: Medicine Progress Note      Patient is a 73y old  Male who presents with a chief complaint of Lower GIB (12 May 2023 15:54)      SUBJECTIVE / OVERNIGHT EVENTS: EDMUND. Hemoglobin overnight holding stable. This AM, patient seen and examined at bedside. No complaint from patient except pain in his right big toe. Prior history of gout and feels the same. Last BM was yesterday (5/12/23) afternoon, which was bloody. No chest pain or difficulty breathing. Tolerating PO      MEDICATIONS  (STANDING):  atorvastatin 10 milliGRAM(s) Oral at bedtime  finasteride 5 milliGRAM(s) Oral daily  montelukast 10 milliGRAM(s) Oral at bedtime  tamsulosin 0.4 milliGRAM(s) Oral at bedtime    MEDICATIONS  (PRN):  acetaminophen     Tablet .. 650 milliGRAM(s) Oral every 6 hours PRN Temp greater or equal to 38C (100.4F), Mild Pain (1 - 3)  albuterol    90 MICROgram(s) HFA Inhaler 1 Puff(s) Inhalation four times a day PRN Shortness of Breath    CAPILLARY BLOOD GLUCOSE        I&O's Summary      PHYSICAL EXAM:  Vital Signs Last 24 Hrs  T(C): 36.7 (13 May 2023 05:24), Max: 37.1 (12 May 2023 12:10)  T(F): 98.1 (13 May 2023 05:24), Max: 98.7 (12 May 2023 12:10)  HR: 100 (13 May 2023 05:24) (95 - 100)  BP: 137/73 (13 May 2023 05:24) (109/66 - 137/73)  BP(mean): --  RR: 18 (13 May 2023 05:24) (18 - 19)  SpO2: 97% (13 May 2023 05:24) (95% - 100%)    Parameters below as of 13 May 2023 05:24  Patient On (Oxygen Delivery Method): room air      CONSTITUTIONAL: NAD  HEENT: Moist oral mucosa  RESPIRATORY: Normal respiratory effort; lungs are clear to auscultation bilaterally  CARDIOVASCULAR: Regular rate and rhythm, no murmurs, no lower extremity edema, peripheral pulses are palpable bilaterally  ABDOMEN: Soft, nondistended, nontender to palpation, normoactive bowel sounds, no rebound/guarding  EXTREMITIES: Right first toe painful to palpation and manipulation  PSYCH: A+O to person, place, and time  NEUROLOGY: Facial expression appears symmetric, no gross sensory deficits appreciated, moving all extremities spontaneously  SKIN: No rashes    LABS:                        7.7    8.47  )-----------( 162      ( 13 May 2023 01:20 )             23.3     05-12    140  |  107  |  16  ----------------------------<  123<H>  3.8   |  24  |  0.83    Ca    8.0<L>      12 May 2023 05:00  Phos  3.2     05-12  Mg     1.90     05-12        PT/INR - ( 12 May 2023 11:38 )   PT: 15.5 sec;   INR: 1.33 ratio         PTT - ( 12 May 2023 11:38 )  PTT:29.2 sec              RADIOLOGY & ADDITIONAL TESTS:  Imaging from Last 24 Hours: Medicine Progress Note    Patient is a 73y old  Male who presents with a chief complaint of Lower GIB (12 May 2023 15:54)    SUBJECTIVE / OVERNIGHT EVENTS: EDMUND. Hemoglobin overnight holding stable. This AM, patient seen and examined at bedside. No complaint from patient except pain in his right big toe. Prior history of gout and feels the same. Last BM was yesterday (5/12/23) afternoon, which was bloody. No chest pain or difficulty breathing. Tolerating PO    MEDICATIONS  (STANDING):  atorvastatin 10 milliGRAM(s) Oral at bedtime  finasteride 5 milliGRAM(s) Oral daily  montelukast 10 milliGRAM(s) Oral at bedtime  tamsulosin 0.4 milliGRAM(s) Oral at bedtime    MEDICATIONS  (PRN):  acetaminophen     Tablet .. 650 milliGRAM(s) Oral every 6 hours PRN Temp greater or equal to 38C (100.4F), Mild Pain (1 - 3)  albuterol    90 MICROgram(s) HFA Inhaler 1 Puff(s) Inhalation four times a day PRN Shortness of Breath    PHYSICAL EXAM:  Vital Signs Last 24 Hrs  T(C): 36.7 (13 May 2023 05:24), Max: 37.1 (12 May 2023 12:10)  T(F): 98.1 (13 May 2023 05:24), Max: 98.7 (12 May 2023 12:10)  HR: 100 (13 May 2023 05:24) (95 - 100)  BP: 137/73 (13 May 2023 05:24) (109/66 - 137/73)  RR: 18 (13 May 2023 05:24) (18 - 19)  SpO2: 97% (13 May 2023 05:24) (95% - 100%)    Parameters below as of 13 May 2023 05:24  Patient On (Oxygen Delivery Method): room air    CONSTITUTIONAL: NAD  HEENT: Moist oral mucosa  RESPIRATORY: Normal respiratory effort; lungs are clear to auscultation bilaterally  CARDIOVASCULAR: Regular rate and rhythm, no murmurs, no lower extremity edema, peripheral pulses are palpable bilaterally  ABDOMEN: Soft, nondistended, nontender to palpation, normoactive bowel sounds, no rebound/guarding  EXTREMITIES: Right first toe painful to palpation and manipulation  PSYCH: A+O to person, place, and time  NEUROLOGY: Facial expression appears symmetric, no gross sensory deficits appreciated, moving all extremities spontaneously  SKIN: No rashes    LABS:                        7.7    8.47  )-----------( 162      ( 13 May 2023 01:20 )             23.3     05-12    140  |  107  |  16  ----------------------------<  123<H>  3.8   |  24  |  0.83    Ca    8.0<L>      12 May 2023 05:00  Phos  3.2     05-12  Mg     1.90     05-12    PT/INR - ( 12 May 2023 11:38 )   PT: 15.5 sec;   INR: 1.33 ratio    PTT - ( 12 May 2023 11:38 )  PTT:29.2 sec    RADIOLOGY & ADDITIONAL TESTS:  Imaging from Last 24 Hours:

## 2023-05-13 NOTE — PROGRESS NOTE ADULT - ATTENDING COMMENTS
Patient seen and examined, care d/w resident.    73M HTN, HLD, prostate cancer (s/p XRT), asthma, diverticulosis with recent admission (5/3 - 5/6) for diverticular bleed requiring 5 units PRBCs and s/p endoscopy/colonoscopy with vascular ectasia and diverticulosis presenting with GIB (multiple BRBPR) admitted to medicine for further management. Last hospitalization was also complicated by aflutter. given bleeding was NOT d/c on AC.    # LGIB, suspecting diverticular: NM scan 5/11 localized bleed to cecum/proximal ascending colon, pending  GI intervention as pt continues to require pRBCs, state last BM yesterday blood x 1, none since, Hb holding  # Active blood loss anemia: as above, s/p 6u pRBC   # Aflutter: appreciate cards eval, no ac due to ongoing bleeding/need for pRBC  # HTN: hold meds due to bleeding   # Toe pain: states c/w prior gout, colchicine x1

## 2023-05-13 NOTE — PROGRESS NOTE ADULT - PROBLEM SELECTOR PLAN 9
Diet: high fiber regular   DVT ppx: SCD, holding chemical in setting of GI bleed  Dispo: pending S/p TKR; prescribed celebrex PRN for pain (pt does not use)

## 2023-05-13 NOTE — PROGRESS NOTE ADULT - PROBLEM SELECTOR PLAN 3
On recent admission: EKG consistent for A-flutter   On this admission: EKG sinus   Echo (5/4): EF = 61% w. severely dilated LA; mild pulm HTN    Plan:  - F/u outpatient w/ Dr. Davis (cardiologist)  - Hold anticoagulation i/so GIB ([id number]: 2 for age and HTN) - Likely acute blood loss anemia in the setting of GI bleed  - Plan as above, transfusing as needed

## 2023-05-13 NOTE — PROGRESS NOTE ADULT - ASSESSMENT
73Y M w/ PMH of HTN, HLD, prostate cancer (s/p XRT), asthma, diverticulosis with recent admission (5/3 - 5/6) for diverticular bleed requiring 5 units PRBCs and s/p endoscopy/colonoscopy with vascular ectasia and diverticulosis presenting with GIB     1. Paroxysmal Aflutter  -new onset during recent admission   -denies CP, SOB  -TTE mild-mod AR, grossly normal LV function  - currently not a candidate 2/2 anemia requiring PRBC transfusions  -EKG 5/11 shows NSR vs multifocal atrial tachycardia     2. 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 HTN  - atenolol and cardizem held 2/2 hypotension  -continue to monitor BP

## 2023-05-13 NOTE — PROGRESS NOTE ADULT - PROBLEM SELECTOR PLAN 8
S/p TKR; prescribed celebrex PRN for pain (pt does not use) On home albuterol PRN; montelukast    Plan:   C/w home meds

## 2023-05-13 NOTE — PROGRESS NOTE ADULT - PROBLEM SELECTOR PLAN 2
- Likely acute blood loss anemia in the setting of GI bleed  - Plan as above, transfusing as needed - History of gout per patient. Now with pain in his right first toe, stating it feels like gout  - Pain control with tylenol and getting one time dose of .6mg colchicine. Will redose based on response  - Uric acid WNL AM of 5/13, but clinically appears to be gout flare

## 2023-05-13 NOTE — PROGRESS NOTE ADULT - PROBLEM SELECTOR PLAN 4
On home diltiazem and atenolol-chlorthalidone      Plan:  Hold i/so hypotension   Will restart as tolerated On recent admission: EKG consistent for A-flutter   On this admission: EKG sinus   Echo (5/4): EF = 61% w. severely dilated LA; mild pulm HTN    Plan:  - F/u outpatient w/ Dr. Davis (cardiologist)  - Hold anticoagulation i/so GIB ([id number]: 2 for age and HTN)

## 2023-05-13 NOTE — PROGRESS NOTE ADULT - PROBLEM SELECTOR PLAN 1
2-3 episodes of hematochezia prior to admission with dizziness and fall  Recent admission (5/3 - 5/6) w/ similar complaint and s/p EGD and colonoscopy w/o acute bleeding   On admission: Hgb - 7.3; s/p 1 U --> Hgb -7.6   s/p Zofran   CT abd/pelvis w/w/o IV con: no acute GI bleed; extensive diverticulosis w/o diverticulitis     RRT AM of 5/10 for bloody BM and dizziness, given 1U pRBCs and 500cc LR bolus  - Ordered for additional unit in the AM due to still low hemoglobin   - CTA A/P without sign of bleed    Plan:   - Trend CBC ~q8hr; Transfuse Hgb < 7  - GI reconsulted, no plan for colonoscopy. Recommended for CTA A/P if unstable with active bloody BMs  - Tagged RBC positive for bleeding in cecum/ascending colon. GI to scope on Monday  - Maintain active type and screen 2-3 episodes of hematochezia prior to admission with dizziness and fall  Recent admission (5/3 - 5/6) w/ similar complaint and s/p EGD and colonoscopy w/o acute bleeding   On admission: Hgb - 7.3; s/p 1 U --> Hgb -7.6   s/p Zofran   CT abd/pelvis w/w/o IV con: no acute GI bleed; extensive diverticulosis w/o diverticulitis     RRT AM of 5/10 for bloody BM and dizziness, given 1U pRBCs and 500cc LR bolus  - Ordered for additional unit in the AM due to still low hemoglobin   - CTA A/P without sign of bleed    Plan:   - Trend CBC ~q8hr; Transfuse Hgb < 7  - Tagged RBC positive for bleeding in cecum/ascending colon. GI to scope on Monday (5/15)  - Maintain active type and screen

## 2023-05-14 LAB
ALBUMIN SERPL ELPH-MCNC: 3.6 G/DL — SIGNIFICANT CHANGE UP (ref 3.3–5)
ALP SERPL-CCNC: 67 U/L — SIGNIFICANT CHANGE UP (ref 40–120)
ALT FLD-CCNC: 6 U/L — SIGNIFICANT CHANGE UP (ref 4–41)
ANION GAP SERPL CALC-SCNC: 11 MMOL/L — SIGNIFICANT CHANGE UP (ref 7–14)
AST SERPL-CCNC: 13 U/L — SIGNIFICANT CHANGE UP (ref 4–40)
BASOPHILS # BLD AUTO: 0.07 K/UL — SIGNIFICANT CHANGE UP (ref 0–0.2)
BASOPHILS NFR BLD AUTO: 0.7 % — SIGNIFICANT CHANGE UP (ref 0–2)
BILIRUB SERPL-MCNC: 0.6 MG/DL — SIGNIFICANT CHANGE UP (ref 0.2–1.2)
BLD GP AB SCN SERPL QL: NEGATIVE — SIGNIFICANT CHANGE UP
BUN SERPL-MCNC: 11 MG/DL — SIGNIFICANT CHANGE UP (ref 7–23)
CALCIUM SERPL-MCNC: 8.7 MG/DL — SIGNIFICANT CHANGE UP (ref 8.4–10.5)
CHLORIDE SERPL-SCNC: 103 MMOL/L — SIGNIFICANT CHANGE UP (ref 98–107)
CO2 SERPL-SCNC: 25 MMOL/L — SIGNIFICANT CHANGE UP (ref 22–31)
CREAT SERPL-MCNC: 0.8 MG/DL — SIGNIFICANT CHANGE UP (ref 0.5–1.3)
EGFR: 93 ML/MIN/1.73M2 — SIGNIFICANT CHANGE UP
EOSINOPHIL # BLD AUTO: 0.23 K/UL — SIGNIFICANT CHANGE UP (ref 0–0.5)
EOSINOPHIL NFR BLD AUTO: 2.3 % — SIGNIFICANT CHANGE UP (ref 0–6)
GLUCOSE SERPL-MCNC: 102 MG/DL — HIGH (ref 70–99)
HCT VFR BLD CALC: 24.9 % — LOW (ref 39–50)
HCT VFR BLD CALC: 27.9 % — LOW (ref 39–50)
HGB BLD-MCNC: 7.9 G/DL — LOW (ref 13–17)
HGB BLD-MCNC: 8.8 G/DL — LOW (ref 13–17)
IANC: 7.15 K/UL — SIGNIFICANT CHANGE UP (ref 1.8–7.4)
IMM GRANULOCYTES NFR BLD AUTO: 0.4 % — SIGNIFICANT CHANGE UP (ref 0–0.9)
LYMPHOCYTES # BLD AUTO: 1.2 K/UL — SIGNIFICANT CHANGE UP (ref 1–3.3)
LYMPHOCYTES # BLD AUTO: 12.2 % — LOW (ref 13–44)
MAGNESIUM SERPL-MCNC: 2 MG/DL — SIGNIFICANT CHANGE UP (ref 1.6–2.6)
MCHC RBC-ENTMCNC: 28.5 PG — SIGNIFICANT CHANGE UP (ref 27–34)
MCHC RBC-ENTMCNC: 28.8 PG — SIGNIFICANT CHANGE UP (ref 27–34)
MCHC RBC-ENTMCNC: 31.5 GM/DL — LOW (ref 32–36)
MCHC RBC-ENTMCNC: 31.7 GM/DL — LOW (ref 32–36)
MCV RBC AUTO: 89.9 FL — SIGNIFICANT CHANGE UP (ref 80–100)
MCV RBC AUTO: 91.2 FL — SIGNIFICANT CHANGE UP (ref 80–100)
MONOCYTES # BLD AUTO: 1.17 K/UL — HIGH (ref 0–0.9)
MONOCYTES NFR BLD AUTO: 11.9 % — SIGNIFICANT CHANGE UP (ref 2–14)
NEUTROPHILS # BLD AUTO: 7.15 K/UL — SIGNIFICANT CHANGE UP (ref 1.8–7.4)
NEUTROPHILS NFR BLD AUTO: 72.5 % — SIGNIFICANT CHANGE UP (ref 43–77)
NRBC # BLD: 0 /100 WBCS — SIGNIFICANT CHANGE UP (ref 0–0)
NRBC # BLD: 0 /100 WBCS — SIGNIFICANT CHANGE UP (ref 0–0)
NRBC # FLD: 0.05 K/UL — HIGH (ref 0–0)
NRBC # FLD: 0.05 K/UL — HIGH (ref 0–0)
PHOSPHATE SERPL-MCNC: 3.2 MG/DL — SIGNIFICANT CHANGE UP (ref 2.5–4.5)
PLATELET # BLD AUTO: 177 K/UL — SIGNIFICANT CHANGE UP (ref 150–400)
PLATELET # BLD AUTO: 192 K/UL — SIGNIFICANT CHANGE UP (ref 150–400)
POTASSIUM SERPL-MCNC: 3.3 MMOL/L — LOW (ref 3.5–5.3)
POTASSIUM SERPL-SCNC: 3.3 MMOL/L — LOW (ref 3.5–5.3)
PROT SERPL-MCNC: 6.2 G/DL — SIGNIFICANT CHANGE UP (ref 6–8.3)
RBC # BLD: 2.77 M/UL — LOW (ref 4.2–5.8)
RBC # BLD: 3.06 M/UL — LOW (ref 4.2–5.8)
RBC # FLD: 15.8 % — HIGH (ref 10.3–14.5)
RBC # FLD: 16.3 % — HIGH (ref 10.3–14.5)
RH IG SCN BLD-IMP: POSITIVE — SIGNIFICANT CHANGE UP
SODIUM SERPL-SCNC: 139 MMOL/L — SIGNIFICANT CHANGE UP (ref 135–145)
WBC # BLD: 8.69 K/UL — SIGNIFICANT CHANGE UP (ref 3.8–10.5)
WBC # BLD: 9.86 K/UL — SIGNIFICANT CHANGE UP (ref 3.8–10.5)
WBC # FLD AUTO: 8.69 K/UL — SIGNIFICANT CHANGE UP (ref 3.8–10.5)
WBC # FLD AUTO: 9.86 K/UL — SIGNIFICANT CHANGE UP (ref 3.8–10.5)

## 2023-05-14 PROCEDURE — 99232 SBSQ HOSP IP/OBS MODERATE 35: CPT | Mod: GC

## 2023-05-14 PROCEDURE — 99231 SBSQ HOSP IP/OBS SF/LOW 25: CPT | Mod: GC

## 2023-05-14 RX ORDER — COLCHICINE 0.6 MG
0.6 TABLET ORAL ONCE
Refills: 0 | Status: COMPLETED | OUTPATIENT
Start: 2023-05-14 | End: 2023-05-14

## 2023-05-14 RX ORDER — POTASSIUM CHLORIDE 20 MEQ
40 PACKET (EA) ORAL ONCE
Refills: 0 | Status: COMPLETED | OUTPATIENT
Start: 2023-05-14 | End: 2023-05-14

## 2023-05-14 RX ORDER — ACETAMINOPHEN 500 MG
650 TABLET ORAL EVERY 6 HOURS
Refills: 0 | Status: DISCONTINUED | OUTPATIENT
Start: 2023-05-14 | End: 2023-05-15

## 2023-05-14 RX ADMIN — ATORVASTATIN CALCIUM 10 MILLIGRAM(S): 80 TABLET, FILM COATED ORAL at 21:11

## 2023-05-14 RX ADMIN — MONTELUKAST 10 MILLIGRAM(S): 4 TABLET, CHEWABLE ORAL at 21:11

## 2023-05-14 RX ADMIN — Medication 0.6 MILLIGRAM(S): at 21:12

## 2023-05-14 RX ADMIN — Medication 40 MILLIEQUIVALENT(S): at 11:06

## 2023-05-14 RX ADMIN — Medication 650 MILLIGRAM(S): at 23:08

## 2023-05-14 RX ADMIN — FINASTERIDE 5 MILLIGRAM(S): 5 TABLET, FILM COATED ORAL at 11:06

## 2023-05-14 RX ADMIN — TAMSULOSIN HYDROCHLORIDE 0.4 MILLIGRAM(S): 0.4 CAPSULE ORAL at 21:12

## 2023-05-14 RX ADMIN — Medication 650 MILLIGRAM(S): at 11:05

## 2023-05-14 RX ADMIN — Medication 650 MILLIGRAM(S): at 17:08

## 2023-05-14 RX ADMIN — Medication 0.6 MILLIGRAM(S): at 08:53

## 2023-05-14 NOTE — PROGRESS NOTE ADULT - PROBLEM SELECTOR PLAN 2
- History of gout per patient. Now with pain in his right first toe, stating it feels like gout  - Pain control with tylenol and getting one time dose of .6mg colchicine. Will redose based on response  - Uric acid WNL AM of 5/13, but clinically appears to be gout flare  - redose colchicine

## 2023-05-14 NOTE — PROGRESS NOTE ADULT - PROBLEM SELECTOR PLAN 1
resolved  2-3 episodes of hematochezia prior to admission with dizziness and fall  Recent admission (5/3 - 5/6) w/ similar complaint and s/p EGD and colonoscopy w/o acute bleeding   On admission: Hgb - 7.3; s/p 1 U --> Hgb -7.6   s/p Zofran   CT abd/pelvis w/w/o IV con: no acute GI bleed; extensive diverticulosis w/o diverticulitis     RRT AM of 5/10 for bloody BM and dizziness, given 1U pRBCs and 500cc LR bolus  - Ordered for additional unit in the AM due to still low hemoglobin   - CTA A/P without sign of bleed    Plan:   - Transfuse Hgb < 7  - Tagged RBC positive for bleeding in cecum/ascending colon. no plans for scope given hematochezia resolved  - Maintain active type and screen  - repeat CBC tonight; if stable, can change to QD

## 2023-05-14 NOTE — PROGRESS NOTE ADULT - ASSESSMENT
73 yrs old male w/ hx of prostate cancer s/p radiation, HTN, asthma, and h/o presumed diverticular bleeding (admitted 5/2021) w/ recent admission (5/3/23-5/6/23) for hematochezia, now presenting w/ recurrent painless hematochezia, suspected resolved diverticular bleed    # LGIB - etiology likely recurrent diverticular bleed; patient w/ severe pan-diverticulosis w/ recent colonoscopy 5/5/23 w/o identifiable source. Bleeding initially has stopped but hospital course complicated by ongoing bleeding with CTA on 5/10 negative and RBC scan + for active bleeding in the cecum/proximal colon. No intervention plan by IR. Has received total of 5 units of blood since the 10th.       Recommendations:  -hb stable with no further overt GI bleeding during the weekend  -No plan for repeat Colonoscopy at this time  -Advance diet  -Discussed nature of diverticular bleed and patient is aware that there is a possibility of occurring again.     No further work up from our perspective, please reach out to us for any question or concern.     Recommendations preliminary until signed by attending.     Max Sarah MD  Gastroenterology/Hepatology Fellow  1st option: [phone] (text or call), ONLY available from 7:00 am to 5:00 pm.   **Contact on-call GI fellow via answering service ([phone]) from 5pm-7am AND on weekends/holidays**  2nd option: Available via [employer] Teams  3rd option: Pager: [phone]

## 2023-05-14 NOTE — PROGRESS NOTE ADULT - PROBLEM SELECTOR PLAN 4
On recent admission: EKG consistent for A-flutter   On this admission: EKG sinus   Echo (5/4): EF = 61% w. severely dilated LA; mild pulm HTN    Plan:  - F/u outpatient w/ Dr. Davis (cardiologist)  - Hold anticoagulation i/so GIB ([id number]: 2 for age and HTN)

## 2023-05-14 NOTE — PROGRESS NOTE ADULT - SUBJECTIVE AND OBJECTIVE BOX
Patient is a 73y old  Male who presents with a chief complaint of Lower GIB (14 May 2023 09:25)      SUBJECTIVE / OVERNIGHT EVENTS:  - no events overnight; vital signs stable  - no more bloody BM; no abd pain or light-headedness    MEDICATIONS  (STANDING):  acetaminophen     Tablet .. 650 milliGRAM(s) Oral every 6 hours  atorvastatin 10 milliGRAM(s) Oral at bedtime  finasteride 5 milliGRAM(s) Oral daily  montelukast 10 milliGRAM(s) Oral at bedtime  potassium chloride    Tablet ER 40 milliEquivalent(s) Oral once  tamsulosin 0.4 milliGRAM(s) Oral at bedtime    MEDICATIONS  (PRN):  albuterol    90 MICROgram(s) HFA Inhaler 1 Puff(s) Inhalation four times a day PRN Shortness of Breath      PHYSICAL EXAM:  Vital Signs Last 24 Hrs  T(C): 36.8 (14 May 2023 05:49), Max: 36.8 (14 May 2023 05:49)  T(F): 98.2 (14 May 2023 05:49), Max: 98.2 (14 May 2023 05:49)  HR: 97 (14 May 2023 05:49) (92 - 97)  BP: 131/84 (14 May 2023 05:49) (130/69 - 143/72)  BP(mean): --  RR: 18 (14 May 2023 05:49) (17 - 18)  SpO2: 100% (14 May 2023 05:49) (99% - 100%)    Parameters below as of 14 May 2023 05:49  Patient On (Oxygen Delivery Method): room air        CONSTITUTIONAL: NAD, well-developed  EYES: conjunctiva and sclera clear  ENMT: Moist oral mucosa  NECK: Supple, no palpable masses  RESPIRATORY: Normal WOB; lungs are CTA b/l  CARDIOVASCULAR: RRR; S1/S2 present; no m/r/g; No LE edema; Peripheral pulses are 2+ bilaterally  ABDOMEN: Soft, nontender, normoactive BS, no rebound/guarding; No hepatosplenomegaly  MUSCULOSKELETAL:  moving all extremities  NEUROLOGY: awake, A&O to person, place, and time  SKIN: No rashes  ----  I&O's Summary    ----  LABS:                        8.8    9.86  )-----------( 192      ( 14 May 2023 07:27 )             27.9     ----  05-14    139  |  103  |  11  ----------------------------<  102<H>  3.3<L>   |  25  |  0.80    Ca    8.7      14 May 2023 07:27  Phos  3.2     05-14  Mg     2.00     05-14    TPro  6.2  /  Alb  3.6  /  TBili  0.6  /  DBili  x   /  AST  13  /  ALT  6   /  AlkPhos  67  05-14    ----  PT/INR - ( 12 May 2023 11:38 )   PT: 15.5 sec;   INR: 1.33 ratio         PTT - ( 12 May 2023 11:38 )  PTT:29.2 sec  ----      ----    ----      RADIOLOGY & ADDITIONAL TESTS:  Results Reviewed:   Imaging Personally Reviewed:  Electrocardiogram Personally Reviewed:

## 2023-05-14 NOTE — PROGRESS NOTE ADULT - SUBJECTIVE AND OBJECTIVE BOX
Interval Events:   Patient denies any abdominal pain, nausea /vomiting, diarrhea or melena / bloody bm.  hb stable    Hospital Medications:  acetaminophen     Tablet .. 650 milliGRAM(s) Oral every 6 hours  albuterol    90 MICROgram(s) HFA Inhaler 1 Puff(s) Inhalation four times a day PRN  atorvastatin 10 milliGRAM(s) Oral at bedtime  finasteride 5 milliGRAM(s) Oral daily  montelukast 10 milliGRAM(s) Oral at bedtime  tamsulosin 0.4 milliGRAM(s) Oral at bedtime      ROS: All system reviewed and negative except as mentioned above.    PHYSICAL EXAM:   Vital Signs:  Vital Signs Last 24 Hrs  T(C): 36.8 (14 May 2023 05:49), Max: 36.8 (14 May 2023 05:49)  T(F): 98.2 (14 May 2023 05:49), Max: 98.2 (14 May 2023 05:49)  HR: 97 (14 May 2023 05:49) (92 - 97)  BP: 131/84 (14 May 2023 05:49) (130/69 - 143/72)  BP(mean): --  RR: 18 (14 May 2023 05:49) (17 - 18)  SpO2: 100% (14 May 2023 05:49) (99% - 100%)    Parameters below as of 14 May 2023 05:49  Patient On (Oxygen Delivery Method): room air      Daily     Daily     GENERAL:  NAD, Appears stated age  HEENT:  NC/AT,  conjunctivae clear and pink, sclera -anicteric  CHEST:  Normal Effort, Breath sounds clear  HEART:  RRR, S1 + S2, no murmurs  ABDOMEN:  Soft, non-tender, non-distended, normoactive bowel sounds,  no masses  EXTREMITIES:  no cyanosis or edema  SKIN:  Warm & Dry. No rash or erythema  NEURO:  Alert, oriented, no focal deficit    LABS:                        8.8    9.86  )-----------( 192      ( 14 May 2023 07:27 )             27.9     Mean Cell Volume: 91.2 fL (05-14-23 @ 07:27)    05-14    139  |  103  |  11  ----------------------------<  102<H>  3.3<L>   |  25  |  0.80    Ca    8.7      14 May 2023 07:27  Phos  3.2     05-14  Mg     2.00     05-14    TPro  6.2  /  Alb  3.6  /  TBili  0.6  /  DBili  x   /  AST  13  /  ALT  6   /  AlkPhos  67  05-14    LIVER FUNCTIONS - ( 14 May 2023 07:27 )  Alb: 3.6 g/dL / Pro: 6.2 g/dL / ALK PHOS: 67 U/L / ALT: 6 U/L / AST: 13 U/L / GGT: x           PT/INR - ( 12 May 2023 11:38 )   PT: 15.5 sec;   INR: 1.33 ratio         PTT - ( 12 May 2023 11:38 )  PTT:29.2 sec                            8.8    9.86  )-----------( 192      ( 14 May 2023 07:27 )             27.9                         8.0    9.92  )-----------( 174      ( 13 May 2023 21:39 )             25.2                         7.9    8.13  )-----------( 167      ( 13 May 2023 06:01 )             24.6                         7.7    8.47  )-----------( 162      ( 13 May 2023 01:20 )             23.3                         7.8    8.30  )-----------( 165      ( 12 May 2023 19:40 )             23.8       Imaging: Images reviewed.         Interval Events:   Patient denies any abdominal pain, nausea /vomiting, diarrhea or melena / bloody bm.  hb stable    Hospital Medications:  acetaminophen     Tablet .. 650 milliGRAM(s) Oral every 6 hours  albuterol    90 MICROgram(s) HFA Inhaler 1 Puff(s) Inhalation four times a day PRN  atorvastatin 10 milliGRAM(s) Oral at bedtime  finasteride 5 milliGRAM(s) Oral daily  montelukast 10 milliGRAM(s) Oral at bedtime  tamsulosin 0.4 milliGRAM(s) Oral at bedtime      ROS: All system reviewed and negative except as mentioned above.    PHYSICAL EXAM:   Vital Signs:  Vital Signs Last 24 Hrs  T(C): 36.8 (14 May 2023 05:49), Max: 36.8 (14 May 2023 05:49)  T(F): 98.2 (14 May 2023 05:49), Max: 98.2 (14 May 2023 05:49)  HR: 97 (14 May 2023 05:49) (92 - 97)  BP: 131/84 (14 May 2023 05:49) (130/69 - 143/72)  BP(mean): --  RR: 18 (14 May 2023 05:49) (17 - 18)  SpO2: 100% (14 May 2023 05:49) (99% - 100%)    Parameters below as of 14 May 2023 05:49  Patient On (Oxygen Delivery Method): room air    GENERAL:  NAD, Appears stated age  HEENT:  NC/AT,  conjunctivae clear and pink, sclera -anicteric  CHEST:  Normal Effort, Breath sounds clear  HEART:  RRR, S1 + S2, no murmurs  ABDOMEN:  Soft, non-tender, non-distended, normoactive bowel sounds,  no masses  EXTREMITIES:  no cyanosis or edema  SKIN:  Warm & Dry. No rash or erythema  NEURO:  Alert, oriented, no focal deficit    LABS:                        8.8    9.86  )-----------( 192      ( 14 May 2023 07:27 )             27.9     Mean Cell Volume: 91.2 fL (05-14-23 @ 07:27)    05-14    139  |  103  |  11  ----------------------------<  102<H>  3.3<L>   |  25  |  0.80    Ca    8.7      14 May 2023 07:27  Phos  3.2     05-14  Mg     2.00     05-14    TPro  6.2  /  Alb  3.6  /  TBili  0.6  /  DBili  x   /  AST  13  /  ALT  6   /  AlkPhos  67  05-14    LIVER FUNCTIONS - ( 14 May 2023 07:27 )  Alb: 3.6 g/dL / Pro: 6.2 g/dL / ALK PHOS: 67 U/L / ALT: 6 U/L / AST: 13 U/L / GGT: x           PT/INR - ( 12 May 2023 11:38 )   PT: 15.5 sec;   INR: 1.33 ratio         PTT - ( 12 May 2023 11:38 )  PTT:29.2 sec                            8.8    9.86  )-----------( 192      ( 14 May 2023 07:27 )             27.9                         8.0    9.92  )-----------( 174      ( 13 May 2023 21:39 )             25.2                         7.9    8.13  )-----------( 167      ( 13 May 2023 06:01 )             24.6                         7.7    8.47  )-----------( 162      ( 13 May 2023 01:20 )             23.3                         7.8    8.30  )-----------( 165      ( 12 May 2023 19:40 )             23.8       Imaging: Images reviewed.

## 2023-05-14 NOTE — PROGRESS NOTE ADULT - SUBJECTIVE AND OBJECTIVE BOX
Panchito Alfaro MD  Interventional Cardiology / Endovascular Specialist  [place] Office : [address] [place] [address] [place]. [address]  Tel:   [place] Office : [address] [facility] [place] [address]  Tel: [phone]    Pt lying denies CP SOB ,   	  MEDICATIONS:      albuterol    90 MICROgram(s) HFA Inhaler 1 Puff(s) Inhalation four times a day PRN  montelukast 10 milliGRAM(s) Oral at bedtime    acetaminophen     Tablet .. 650 milliGRAM(s) Oral every 6 hours      atorvastatin 10 milliGRAM(s) Oral at bedtime  colchicine 0.6 milliGRAM(s) Oral once PRN  finasteride 5 milliGRAM(s) Oral daily    tamsulosin 0.4 milliGRAM(s) Oral at bedtime      PAST MEDICAL/SURGICAL HISTORY  PAST MEDICAL & SURGICAL HISTORY:  Hypertension      Hyperlipidemia      GIB (gastrointestinal bleeding)      Prostate cancer  s/p radiation therapy      Gout      History of hemorrhoidectomy          SOCIAL HISTORY: Substance Use (street drugs): ( x ) never used  (  ) other:    FAMILY HISTORY:      REVIEW OF SYSTEMS:  CONSTITUTIONAL: No fever, weight loss, or fatigue  EYES: No eye pain, visual disturbances, or discharge  ENMT:  No difficulty hearing, tinnitus, vertigo; No sinus or throat pain  BREASTS: No pain, masses, or nipple discharge  GASTROINTESTINAL: No abdominal or epigastric pain. No nausea, vomiting, or hematemesis; No diarrhea or constipation. No melena or hematochezia.  GENITOURINARY: No dysuria, frequency, hematuria, or incontinence  NEUROLOGICAL: No headaches, memory loss, loss of strength, numbness, or tremors  ENDOCRINE: No heat or cold intolerance; No hair loss  MUSCULOSKELETAL: No joint pain or swelling; No muscle, back, or extremity pain  PSYCHIATRIC: No depression, anxiety, mood swings, or difficulty sleeping  HEME/LYMPH: No easy bruising, or bleeding gums  All others negative    PHYSICAL EXAM:  T(C): 36.8 (05-14-23 @ 05:49), Max: 36.8 (05-14-23 @ 05:49)  HR: 97 (05-14-23 @ 05:49) (92 - 97)  BP: 131/84 (05-14-23 @ 05:49) (131/84 - 143/72)  RR: 18 (05-14-23 @ 05:49) (18 - 18)  SpO2: 100% (05-14-23 @ 05:49) (100% - 100%)  Wt(kg): --  I&O's Summary      GENERAL: NAD  EYES: EOMI, PERRLA, conjunctiva and sclera clear  ENMT: No tonsillar erythema, exudates, or enlargement  Cardiovascular: Normal S1 S2, No JVD, No murmurs, No edema  Respiratory: Lungs clear to auscultation	  Gastrointestinal:  Soft, Non-tender, + BS	  Extremities: No edema                             8.8    9.86  )-----------( 192      ( 14 May 2023 07:27 )             27.9     05-14    139  |  103  |  11  ----------------------------<  102<H>  3.3<L>   |  25  |  0.80    Ca    8.7      14 May 2023 07:27  Phos  3.2     05-14  Mg     2.00     05-14    TPro  6.2  /  Alb  3.6  /  TBili  0.6  /  DBili  x   /  AST  13  /  ALT  6   /  AlkPhos  67  05-14    proBNP:   Lipid Profile:   HgA1c:   TSH:     Consultant(s) Notes Reviewed:  [x ] YES  [ ] NO    Care Discussed with Consultants/Other Providers [ x] YES  [ ] NO    Imaging Personally Reviewed independently:  [x] YES  [ ] NO    All labs, radiologic studies, vitals, orders and medications list reviewed. Patient is seen and examined at bedside. Case discussed with medical team.

## 2023-05-14 NOTE — PROGRESS NOTE ADULT - ATTENDING COMMENTS
No further bleeding reported throughout weekend and Hgb stabilizing.     # Recurrent hematochezia likely due to recurrent diverticular bleeding: Prior colonoscopies with extensive diverticulosis, but unable to identify culprit diverticulum. CTA negative, but NM bleeding scan positive for bleeding in right colon.   # History of prostate cancer s/p radiation    --Given absence of overt bleeding, repeat colonoscopy at this time of limited utility. Intervention deferred by IR. Can reconsider repeat colonoscopy vs angiogram with possible IR embolization if recurrent bleeding; however, if unable to localize bleed, may need to consider surgical consultation    Additional recommendations as above.

## 2023-05-14 NOTE — PROGRESS NOTE ADULT - ATTENDING COMMENTS
Patient seen and examined, care d/w resident.    73M HTN, HLD, prostate cancer (s/p XRT), asthma, diverticulosis with recent admission (5/3 - 5/6) for diverticular bleed requiring 5 units PRBCs and s/p endoscopy/colonoscopy with vascular ectasia and diverticulosis presenting with GIB (multiple BRBPR) admitted to medicine for further management. Last hospitalization was also complicated by aflutter. given bleeding was NOT d/c on AC.    # LGIB, suspecting diverticular: NM scan 5/11 localized bleed to cecum/proximal ascending colon, pending  last pRBC 5/12, state last BM yesterday blood 5/12, none since, Hb holding, was for c-scope however GI now differing given bleeding resolving, repeat colonoscopy at this time of limited utility. Intervention deferred by IR. Can reconsider repeat colonoscopy vs angiogram with possible IR embolization if recurrent bleeding; however, if unable to localize bleed, may need to consider surgical consultation  # Active blood loss anemia: as above, s/p 6u pRBC   # Aflutter: appreciate cards eval, no ac due to ongoing bleeding/need for pRBC  # HTN: hold meds due to bleeding   # Toe pain: states c/w prior gout, colchicine x1 on 5/13, reports still painful, will redose, declines x-ray of foot

## 2023-05-14 NOTE — PROGRESS NOTE ADULT - ASSESSMENT
73Y M w/ PMH of HTN, HLD, prostate cancer (s/p XRT), asthma, diverticulosis with recent admission (5/3 - 5/6) for diverticular bleed requiring 5 units PRBCs and s/p endoscopy/colonoscopy with vascular ectasia and diverticulosis presenting with GIB     1. Paroxysmal Aflutter  -new onset during recent admission   -denies CP, SOB  -TTE mild-mod AR, grossly normal LV function  - currently not a candidate 2/2 anemia requiring PRBC transfusions  -EKG 5/11 shows NSR vs multifocal atrial tachycardia     2. 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 HTN  - atenolol and cardizem held 2/2 hypotension  -continue to monitor BP

## 2023-05-15 ENCOUNTER — TRANSCRIPTION ENCOUNTER (OUTPATIENT)
Age: 73
End: 2023-05-15

## 2023-05-15 VITALS
TEMPERATURE: 97 F | OXYGEN SATURATION: 98 % | RESPIRATION RATE: 17 BRPM | DIASTOLIC BLOOD PRESSURE: 91 MMHG | SYSTOLIC BLOOD PRESSURE: 141 MMHG | HEART RATE: 100 BPM

## 2023-05-15 LAB
ANION GAP SERPL CALC-SCNC: 11 MMOL/L — SIGNIFICANT CHANGE UP (ref 7–14)
BUN SERPL-MCNC: 8 MG/DL — SIGNIFICANT CHANGE UP (ref 7–23)
CALCIUM SERPL-MCNC: 8.3 MG/DL — LOW (ref 8.4–10.5)
CHLORIDE SERPL-SCNC: 104 MMOL/L — SIGNIFICANT CHANGE UP (ref 98–107)
CO2 SERPL-SCNC: 23 MMOL/L — SIGNIFICANT CHANGE UP (ref 22–31)
CREAT SERPL-MCNC: 0.81 MG/DL — SIGNIFICANT CHANGE UP (ref 0.5–1.3)
EGFR: 93 ML/MIN/1.73M2 — SIGNIFICANT CHANGE UP
GLUCOSE SERPL-MCNC: 110 MG/DL — HIGH (ref 70–99)
HCT VFR BLD CALC: 24.8 % — LOW (ref 39–50)
HGB BLD-MCNC: 8 G/DL — LOW (ref 13–17)
MAGNESIUM SERPL-MCNC: 2 MG/DL — SIGNIFICANT CHANGE UP (ref 1.6–2.6)
MCHC RBC-ENTMCNC: 28.9 PG — SIGNIFICANT CHANGE UP (ref 27–34)
MCHC RBC-ENTMCNC: 32.3 GM/DL — SIGNIFICANT CHANGE UP (ref 32–36)
MCV RBC AUTO: 89.5 FL — SIGNIFICANT CHANGE UP (ref 80–100)
NRBC # BLD: 0 /100 WBCS — SIGNIFICANT CHANGE UP (ref 0–0)
NRBC # FLD: 0.04 K/UL — HIGH (ref 0–0)
PHOSPHATE SERPL-MCNC: 3.7 MG/DL — SIGNIFICANT CHANGE UP (ref 2.5–4.5)
PLATELET # BLD AUTO: 142 K/UL — LOW (ref 150–400)
POTASSIUM SERPL-MCNC: 3.9 MMOL/L — SIGNIFICANT CHANGE UP (ref 3.5–5.3)
POTASSIUM SERPL-SCNC: 3.9 MMOL/L — SIGNIFICANT CHANGE UP (ref 3.5–5.3)
RBC # BLD: 2.77 M/UL — LOW (ref 4.2–5.8)
RBC # FLD: 15.6 % — HIGH (ref 10.3–14.5)
SODIUM SERPL-SCNC: 138 MMOL/L — SIGNIFICANT CHANGE UP (ref 135–145)
WBC # BLD: 8.28 K/UL — SIGNIFICANT CHANGE UP (ref 3.8–10.5)
WBC # FLD AUTO: 8.28 K/UL — SIGNIFICANT CHANGE UP (ref 3.8–10.5)

## 2023-05-15 PROCEDURE — 99239 HOSP IP/OBS DSCHRG MGMT >30: CPT

## 2023-05-15 RX ORDER — COLCHICINE 0.6 MG
1 TABLET ORAL
Qty: 2 | Refills: 0
Start: 2023-05-15 | End: 2023-05-16

## 2023-05-15 RX ORDER — COLCHICINE 0.6 MG
0.6 TABLET ORAL ONCE
Refills: 0 | Status: COMPLETED | OUTPATIENT
Start: 2023-05-15 | End: 2023-05-15

## 2023-05-15 RX ORDER — DILTIAZEM HCL 120 MG
1 CAPSULE, EXT RELEASE 24 HR ORAL
Qty: 30 | Refills: 0
Start: 2023-05-15

## 2023-05-15 RX ORDER — POTASSIUM CHLORIDE 20 MEQ
40 PACKET (EA) ORAL ONCE
Refills: 0 | Status: COMPLETED | OUTPATIENT
Start: 2023-05-15 | End: 2023-05-15

## 2023-05-15 RX ORDER — ATENOLOL AND CHLORTHALIDONE 50; 25 MG/1; MG/1
0 TABLET ORAL
Qty: 0 | Refills: 0 | DISCHARGE

## 2023-05-15 RX ADMIN — Medication 40 MILLIEQUIVALENT(S): at 09:34

## 2023-05-15 RX ADMIN — Medication 650 MILLIGRAM(S): at 12:06

## 2023-05-15 RX ADMIN — Medication 0.6 MILLIGRAM(S): at 13:54

## 2023-05-15 RX ADMIN — Medication 650 MILLIGRAM(S): at 05:06

## 2023-05-15 RX ADMIN — FINASTERIDE 5 MILLIGRAM(S): 5 TABLET, FILM COATED ORAL at 12:06

## 2023-05-15 NOTE — PROGRESS NOTE ADULT - PROBLEM SELECTOR PLAN 10
Diet: high fiber regular   DVT ppx: SCD, holding chemical in setting of GI bleed  Dispo: pending Diet: high fiber regular   DVT ppx: SCD, holding chemical in setting of GI bleed  Dispo: DC home

## 2023-05-15 NOTE — PROGRESS NOTE ADULT - PROBLEM SELECTOR PLAN 1
resolved  2-3 episodes of hematochezia prior to admission with dizziness and fall  Recent admission (5/3 - 5/6) w/ similar complaint and s/p EGD and colonoscopy w/o acute bleeding   On admission: Hgb - 7.3; s/p 1 U --> Hgb -7.6   s/p Zofran   CT abd/pelvis w/w/o IV con: no acute GI bleed; extensive diverticulosis w/o diverticulitis     RRT AM of 5/10 for bloody BM and dizziness, given 1U pRBCs and 500cc LR bolus  - Ordered for additional unit in the AM due to still low hemoglobin   - CTA A/P without sign of bleed    Plan:   - Transfuse Hgb < 7  - Tagged RBC positive for bleeding in cecum/ascending colon. no plans for scope given hematochezia resolved  - Maintain active type and screen  - repeat CBC tonight; if stable, can change to QD resolved  2-3 episodes of hematochezia prior to admission with dizziness and fall  Recent admission (5/3 - 5/6) w/ similar complaint and s/p EGD and colonoscopy w/o acute bleeding   On admission: Hgb - 7.3; s/p 1 U --> Hgb -7.6   s/p Zofran   CT abd/pelvis w/w/o IV con: no acute GI bleed; extensive diverticulosis w/o diverticulitis       Plan:   - Transfuse Hgb < 8  - Tagged RBC positive for bleeding in cecum/ascending colon. no plans for scope given hematochezia resolved  - Maintain active type and screen  - Stable hemoglobin, no further bloody bowel movements  - Talked to by GI regarding course of diverticular bleeds and potential for rebleed, which patient understands

## 2023-05-15 NOTE — PROGRESS NOTE ADULT - REASON FOR ADMISSION
Lower GIB

## 2023-05-15 NOTE — PROGRESS NOTE ADULT - PROBLEM SELECTOR PLAN 4
On recent admission: EKG consistent for A-flutter   On this admission: EKG sinus   Echo (5/4): EF = 61% w. severely dilated LA; mild pulm HTN    Plan:  - F/u outpatient w/ Dr. Davis (cardiologist)  - Hold anticoagulation i/so GIB ([id number]: 2 for age and HTN) Event Note On recent admission: EKG consistent for A-flutter   On this admission: EKG sinus   Echo (5/4): EF = 61% w. severely dilated LA; mild pulm HTN    Plan:  - F/u outpatient w/ Dr. Davis (cardiologist)  - Hold anticoagulation i/so GIB ([id number]: 2 for age and HTN)  - Patient endorses wanting to discuss starting AC with his PCP  - Sending patient out on 180mg dilt extended. Can uptitrate with PCP

## 2023-05-15 NOTE — PROGRESS NOTE ADULT - SUBJECTIVE AND OBJECTIVE BOX
Panchito Alfaro MD  Interventional Cardiology / Advance Heart Failure and Cardiac Transplant Specialist  [place] Office : [address] [place] [address]  Tel:   [place] Office : [address] [facility] [address]  Tel: [phone]      Subjective/Overnight events: Pt is lying in bed comfortable not in distress, no chest pains no SOB no palpitations  	  MEDICATIONS:      albuterol    90 MICROgram(s) HFA Inhaler 1 Puff(s) Inhalation four times a day PRN  montelukast 10 milliGRAM(s) Oral at bedtime    acetaminophen     Tablet .. 650 milliGRAM(s) Oral every 6 hours      atorvastatin 10 milliGRAM(s) Oral at bedtime  finasteride 5 milliGRAM(s) Oral daily    tamsulosin 0.4 milliGRAM(s) Oral at bedtime      PAST MEDICAL/SURGICAL HISTORY  PAST MEDICAL & SURGICAL HISTORY:  Hypertension      Hyperlipidemia      GIB (gastrointestinal bleeding)      Prostate cancer  s/p radiation therapy      Gout      History of hemorrhoidectomy          SOCIAL HISTORY: Substance Use (street drugs): ( x ) never used  (  ) other:    FAMILY HISTORY:          PHYSICAL EXAM:  T(C): 36.7 (05-15-23 @ 05:11), Max: 36.8 (05-14-23 @ 22:31)  HR: 95 (05-15-23 @ 05:11) (82 - 98)  BP: 129/79 (05-15-23 @ 05:11) (119/72 - 129/79)  RR: 17 (05-15-23 @ 05:11) (16 - 17)  SpO2: 98% (05-15-23 @ 05:11) (97% - 98%)  Wt(kg): --  I&O's Summary    15 May 2023 07:01  -  15 May 2023 13:08  --------------------------------------------------------  IN: 0 mL / OUT: 400 mL / NET: -400 mL          GENERAL: NAD  EYES: EOMI, PERRLA, conjunctiva and sclera clear  ENMT: No tonsillar erythema, exudates, or enlargement  Cardiovascular: Normal S1 S2, No JVD, No murmurs, No edema  Respiratory: Lungs clear to auscultation	  Gastrointestinal:  Soft, Non-tender, + BS	  Extremities: No edema                                     8.0    8.28  )-----------( 142      ( 15 May 2023 04:35 )             24.8     05-15    138  |  104  |  8   ----------------------------<  110<H>  3.9   |  23  |  0.81    Ca    8.3<L>      15 May 2023 04:35  Phos  3.7     05-15  Mg     2.00     05-15    TPro  6.2  /  Alb  3.6  /  TBili  0.6  /  DBili  x   /  AST  13  /  ALT  6   /  AlkPhos  67  05-14    proBNP:   Lipid Profile:   HgA1c:   TSH:     Consultant(s) Notes Reviewed:  [x ] YES  [ ] NO    Care Discussed with Consultants/Other Providers [ x] YES  [ ] NO    Imaging Personally Reviewed independently:  [x] YES  [ ] NO    All labs, radiologic studies, vitals, orders and medications list reviewed. Patient is seen and examined at bedside. Case discussed with medical team.         [id number]]

## 2023-05-15 NOTE — PROGRESS NOTE ADULT - PROBLEM SELECTOR PLAN 2
- History of gout per patient. Now with pain in his right first toe, stating it feels like gout  - Pain control with tylenol and getting one time dose of .6mg colchicine. Will redose based on response  - Uric acid WNL AM of 5/13, but clinically appears to be gout flare  - redose colchicine - History of gout per patient. Now with pain in his right first toe, stating it feels like gout  - Pain control with tylenol and getting one time dose of .6mg colchicine. Will redose based on response  - Uric acid WNL AM of 5/13, but clinically appears to be gout flare  - Will get total 5 days of colchicine  - Feeling improved

## 2023-05-15 NOTE — PROGRESS NOTE ADULT - ASSESSMENT
73Y M w/ PMH of HTN, HLD, prostate cancer (s/p XRT), asthma, diverticulosis with recent admission (5/3 - 5/6) for diverticular bleed requiring 5 units PRBCs and s/p endoscopy/colonoscopy with vascular ectasia and diverticulosis presenting with GIB     1. Paroxysmal Aflutter  -new onset during recent admission   -denies CP, SOB  -TTE mild-mod AR, grossly normal LV function  - currently not a candidate 2/2 anemia requiring PRBC transfusions  -EKG 5/11 shows NSR vs multifocal atrial tachycardia     2. 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 HTN  - atenolol and cardizem held 2/2 hypotension now improved can resume cardizem at 180mg daily  -continue to monitor BP

## 2023-05-15 NOTE — PROGRESS NOTE ADULT - SUBJECTIVE AND OBJECTIVE BOX
Medicine Progress Note      Patient is a 73y old  Male who presents with a chief complaint of Lower GIB (14 May 2023 13:42)      SUBJECTIVE / OVERNIGHT EVENTS: This AM, patient seen and examined at bedside.      MEDICATIONS  (STANDING):  acetaminophen     Tablet .. 650 milliGRAM(s) Oral every 6 hours  atorvastatin 10 milliGRAM(s) Oral at bedtime  finasteride 5 milliGRAM(s) Oral daily  montelukast 10 milliGRAM(s) Oral at bedtime  tamsulosin 0.4 milliGRAM(s) Oral at bedtime    MEDICATIONS  (PRN):  albuterol    90 MICROgram(s) HFA Inhaler 1 Puff(s) Inhalation four times a day PRN Shortness of Breath    CAPILLARY BLOOD GLUCOSE        I&O's Summary      PHYSICAL EXAM:  Vital Signs Last 24 Hrs  T(C): 36.7 (15 May 2023 05:11), Max: 36.8 (14 May 2023 22:31)  T(F): 98 (15 May 2023 05:11), Max: 98.3 (14 May 2023 22:31)  HR: 95 (15 May 2023 05:11) (82 - 98)  BP: 129/79 (15 May 2023 05:11) (119/72 - 129/79)  BP(mean): --  RR: 17 (15 May 2023 05:11) (16 - 17)  SpO2: 98% (15 May 2023 05:11) (97% - 98%)    Parameters below as of 15 May 2023 05:11  Patient On (Oxygen Delivery Method): room air      CONSTITUTIONAL: NAD  HEENT: Moist oral mucosa  RESPIRATORY: Normal respiratory effort; lungs are clear to auscultation bilaterally  CARDIOVASCULAR: Regular rate and rhythm, normal S1 and S2, no murmur/rub/gallop, no lower extremity edema, peripheral pulses are palpable bilaterally  ABDOMEN: Soft, nondistended, nontender to palpation, normoactive bowel sounds, no rebound/guarding  PSYCH: A+O to person, place, and time  NEUROLOGY: Facial expression appears symmetric, no gross sensory deficits appreciated, moving all extremities spontaneously  SKIN: No rashes; no palpable lesions    LABS:                        8.0    8.28  )-----------( 142      ( 15 May 2023 04:35 )             24.8     05-15    138  |  104  |  8   ----------------------------<  110<H>  3.9   |  23  |  0.81    Ca    8.3<L>      15 May 2023 04:35  Phos  3.7     05-15  Mg     2.00     05-15    TPro  6.2  /  Alb  3.6  /  TBili  0.6  /  DBili  x   /  AST  13  /  ALT  6   /  AlkPhos  67  05-14                    RADIOLOGY & ADDITIONAL TESTS:  Imaging from Last 24 Hours: Medicine Progress Note      Patient is a 73y old  Male who presents with a chief complaint of Lower GIB (14 May 2023 13:42)      SUBJECTIVE / OVERNIGHT EVENTS: EDMUND. This AM, patient seen and examined at bedside. No further bloody bowel movements since Friday, though patient also endorses no further bowel movements. Still passing flatus. No abdominal pain, no chest pain, no shortness of breath. Toleating PO. Pain from gout flare improving and patient with more mobility now.      MEDICATIONS  (STANDING):  acetaminophen     Tablet .. 650 milliGRAM(s) Oral every 6 hours  atorvastatin 10 milliGRAM(s) Oral at bedtime  finasteride 5 milliGRAM(s) Oral daily  montelukast 10 milliGRAM(s) Oral at bedtime  tamsulosin 0.4 milliGRAM(s) Oral at bedtime    MEDICATIONS  (PRN):  albuterol    90 MICROgram(s) HFA Inhaler 1 Puff(s) Inhalation four times a day PRN Shortness of Breath    CAPILLARY BLOOD GLUCOSE        I&O's Summary      PHYSICAL EXAM:  Vital Signs Last 24 Hrs  T(C): 36.7 (15 May 2023 05:11), Max: 36.8 (14 May 2023 22:31)  T(F): 98 (15 May 2023 05:11), Max: 98.3 (14 May 2023 22:31)  HR: 95 (15 May 2023 05:11) (82 - 98)  BP: 129/79 (15 May 2023 05:11) (119/72 - 129/79)  BP(mean): --  RR: 17 (15 May 2023 05:11) (16 - 17)  SpO2: 98% (15 May 2023 05:11) (97% - 98%)    Parameters below as of 15 May 2023 05:11  Patient On (Oxygen Delivery Method): room air      CONSTITUTIONAL: NAD  HEENT: Moist oral mucosa  RESPIRATORY: Normal respiratory effort; lungs are clear to auscultation bilaterally  CARDIOVASCULAR: Regular rate and rhythm, normal S1 and S2, no murmur/rub/gallop, no lower extremity edema, peripheral pulses are palpable bilaterally  ABDOMEN: Soft, nondistended, nontender to palpation, normoactive bowel sounds, no rebound/guarding  PSYCH: A+O to person, place, and time  EXTREMITIES: Right first toe painful to palpation still, but better mobility  NEUROLOGY: Facial expression appears symmetric, no gross sensory deficits appreciated, moving all extremities spontaneously  SKIN: No rashes; no palpable lesions    LABS:                        8.0    8.28  )-----------( 142      ( 15 May 2023 04:35 )             24.8     05-15    138  |  104  |  8   ----------------------------<  110<H>  3.9   |  23  |  0.81    Ca    8.3<L>      15 May 2023 04:35  Phos  3.7     05-15  Mg     2.00     05-15    TPro  6.2  /  Alb  3.6  /  TBili  0.6  /  DBili  x   /  AST  13  /  ALT  6   /  AlkPhos  67  05-14                    RADIOLOGY & ADDITIONAL TESTS:  Imaging from Last 24 Hours:

## 2023-05-15 NOTE — DISCHARGE NOTE NURSING/CASE MANAGEMENT/SOCIAL WORK - NSDCVIVACCINE_GEN_ALL_CORE_FT
Tdap; 08-May-2023 07:17; Cedrick Coronel); [employer]; [id number] (Exp. Date: 04-Mar-2025); IntraMuscular; Deltoid Left.; 0.5 milliLiter(s); VIS (VIS Published: 09-May-2013, VIS Presented: 08-May-2023);

## 2023-05-15 NOTE — DISCHARGE NOTE NURSING/CASE MANAGEMENT/SOCIAL WORK - NSDCPEFALRISK_GEN_ALL_CORE
For information on Fall & Injury Prevention, visit: [email or web address] OR  [email or web address] OR  https://www.cdc.gov/steadi/patient.html

## 2023-05-15 NOTE — DISCHARGE NOTE NURSING/CASE MANAGEMENT/SOCIAL WORK - PATIENT PORTAL LINK FT
You can access the FollowMyHealth Patient Portal offered by [facility] by registering at the following website: [email or web address]. By joining [facility]’s FollowMyHealth portal, you will also be able to view your health information using other applications (apps) compatible with our system.

## 2023-05-15 NOTE — PROGRESS NOTE ADULT - PROVIDER SPECIALTY LIST ADULT
Gastroenterology
Cardiology
Gastroenterology
Gastroenterology
Cardiology
Internal Medicine

## 2023-05-15 NOTE — PROGRESS NOTE ADULT - ASSESSMENT
73 year old man with history of HTN, HLD, prostate cancer (s/p XRT), asthma, diverticulosis with recent admission (5/3 - 5/6) for diverticular bleed requiring 5 units PRBCs and s/p endoscopy/colonoscopy with vascular ectasia and diverticulosis presenting with GIB (multiple BRBPR) admitted to medicine for further management.  73 year old man with history of HTN, HLD, prostate cancer (s/p XRT), asthma, diverticulosis with recent admission (5/3 - 5/6) for diverticular bleed requiring 5 units PRBCs and s/p endoscopy/colonoscopy with vascular ectasia and diverticulosis presenting with GIB (multiple BRBPR) admitted to medicine for further management. DC today

## 2023-05-26 PROBLEM — J45.909 ASTHMA: Status: ACTIVE | Noted: 2023-05-26

## 2023-05-26 PROBLEM — K92.2 GIB (GASTROINTESTINAL BLEEDING): Status: ACTIVE | Noted: 2023-05-26

## 2023-05-26 PROBLEM — C61 PROSTATE CANCER: Status: ACTIVE | Noted: 2023-05-26

## 2023-05-26 PROBLEM — M10.9 GOUT: Status: ACTIVE | Noted: 2023-05-26

## 2023-05-26 PROBLEM — Z00.00 ENCOUNTER FOR PREVENTIVE HEALTH EXAMINATION: Status: ACTIVE | Noted: 2023-05-26

## 2023-05-30 ENCOUNTER — NON-APPOINTMENT (OUTPATIENT)
Age: 73
End: 2023-05-30

## 2023-05-30 ENCOUNTER — APPOINTMENT (OUTPATIENT)
Dept: ELECTROPHYSIOLOGY | Facility: CLINIC | Age: 73
End: 2023-05-30
Payer: MEDICARE

## 2023-05-30 VITALS
BODY MASS INDEX: 31.26 KG/M2 | DIASTOLIC BLOOD PRESSURE: 70 MMHG | HEIGHT: 70.5 IN | HEART RATE: 78 BPM | SYSTOLIC BLOOD PRESSURE: 102 MMHG | OXYGEN SATURATION: 99 %

## 2023-05-30 VITALS — WEIGHT: 221 LBS | HEIGHT: 70.5 IN | BODY MASS INDEX: 31.28 KG/M2

## 2023-05-30 DIAGNOSIS — K57.90 DIVERTICULOSIS OF INTESTINE, PART UNSPECIFIED, W/OUT PERFORATION OR ABSCESS W/OUT BLEEDING: ICD-10-CM

## 2023-05-30 DIAGNOSIS — K92.2 GASTROINTESTINAL HEMORRHAGE, UNSPECIFIED: ICD-10-CM

## 2023-05-30 DIAGNOSIS — Z86.79 PERSONAL HISTORY OF OTHER DISEASES OF THE CIRCULATORY SYSTEM: ICD-10-CM

## 2023-05-30 DIAGNOSIS — J45.909 UNSPECIFIED ASTHMA, UNCOMPLICATED: ICD-10-CM

## 2023-05-30 DIAGNOSIS — Z86.39 PERSONAL HISTORY OF OTHER ENDOCRINE, NUTRITIONAL AND METABOLIC DISEASE: ICD-10-CM

## 2023-05-30 DIAGNOSIS — M10.9 GOUT, UNSPECIFIED: ICD-10-CM

## 2023-05-30 DIAGNOSIS — C61 MALIGNANT NEOPLASM OF PROSTATE: ICD-10-CM

## 2023-05-30 PROCEDURE — 99204 OFFICE O/P NEW MOD 45 MIN: CPT

## 2023-05-30 PROCEDURE — 93000 ELECTROCARDIOGRAM COMPLETE: CPT

## 2023-05-30 RX ORDER — FINASTERIDE 5 MG/1
5 TABLET, FILM COATED ORAL
Qty: 90 | Refills: 1 | Status: ACTIVE | COMMUNITY
Start: 2023-05-30

## 2023-05-30 RX ORDER — MONTELUKAST 10 MG/1
10 TABLET, FILM COATED ORAL
Refills: 0 | Status: ACTIVE | COMMUNITY
Start: 2023-05-30

## 2023-05-30 RX ORDER — TAMSULOSIN HYDROCHLORIDE 0.4 MG/1
0.4 CAPSULE ORAL
Qty: 90 | Refills: 0 | Status: ACTIVE | COMMUNITY
Start: 2023-05-30

## 2023-05-30 RX ORDER — ATORVASTATIN CALCIUM 10 MG/1
10 TABLET, FILM COATED ORAL
Qty: 90 | Refills: 1 | Status: ACTIVE | COMMUNITY
Start: 2023-05-30

## 2023-05-30 NOTE — DISCUSSION/SUMMARY
[EKG obtained to assist in diagnosis and management of assessed problem(s)] : EKG obtained to assist in diagnosis and management of assessed problem(s) [FreeTextEntry1] : IMPRESSION:\par \par 1.Atrial Flutter/GIB: EKG performed today to assess for conduction disease and reveals Aflutter. Asymptomatic. Currently on Cardizem. Not on OAC r/t recent hospitalization with GIB requiring blood transfusion. Discussed treatment options for atrial flutter including rate control Vs antiarrhythmias vs possible ablation. Given recurrent AFL despite rate control management, recommend undergoing possible AFL ablation. The rationale for the procedure as well as its risks--including but not limited to bleeding, vascular injury, pericardial effusion/tamponade, heart block requiring pacemaker, stroke, and death--were reviewed in detail. After consideration of this information, the decision was made to proceed with the procedure.  Also discussed possibility of Watchman procedure for long term thromboembolic prophylaxis without need for use of long term AC. Risks, benefits and alternatives to procedure  discussed at length including f/u APRIL and use of ASA/Clopidogrel x 12 weeks post procedure. Order of procedure would be APRIL/anticoagulation/cardioversion/Watchman/ablation. \par \par 2. HTN: resume oral antihypertensives as prescribed. Encouraged heart healthy diet, sodium restriction, and weight loss. Continue regular f/u with Cardiologist for further HTN management.\par \par 3. HLD: resume statin therapy as prescribed and regular f/u with Cardiologist for routine lipid monitoring and management.\par \par PLAN: AFL ablation, Watchman device implant\par \par \par \par

## 2023-05-30 NOTE — HISTORY OF PRESENT ILLNESS
[FreeTextEntry1] : Mr. LENNOX CARTER  is a 73 year man  with past medical history of  HTN, HLD, prostate cancer (s/p XRT), asthma, diverticulosis with recent admission (5/3 - 5/6) for diverticular bleed last week requiring 5 \par units PRBCs, found  Atrial flutter  on EKG who presents here for initial visit. Patient was on AFL prior to admission and was not on OAC due to GIB. Discharged on Cardizem 180 mg. Saw Dr Calderón, EKG  on 5/24.23 showed atrial flutter 74bpm, Echo on 5/24/23 LVF 55-60% mild to mod LV wall thickness, mild to mod AR, MR. Patient is referred here for Watchman procedure due to contraindication to oral anticoagulant therapy for AFL. Currently on Cardizem. Doing well. Denies any chest pain, palpitations, sob, syncope.\par \par

## 2023-06-01 NOTE — PATIENT PROFILE ADULT - FALL HARM RISK TYPE OF ASSESSMENT
Abdomen , soft, nontender, nondistended , no guarding or rigidity , no masses palpable , normal bowel sounds , Liver and Spleen , no hepatomegaly present , no hepatosplenomegaly , liver nontender , spleen not palpable admission

## 2023-07-10 ENCOUNTER — OUTPATIENT (OUTPATIENT)
Dept: OUTPATIENT SERVICES | Facility: HOSPITAL | Age: 73
LOS: 1 days | End: 2023-07-10

## 2023-07-10 VITALS
RESPIRATION RATE: 17 BRPM | HEART RATE: 63 BPM | HEIGHT: 70 IN | SYSTOLIC BLOOD PRESSURE: 127 MMHG | WEIGHT: 218.04 LBS | TEMPERATURE: 98 F | DIASTOLIC BLOOD PRESSURE: 78 MMHG | OXYGEN SATURATION: 97 %

## 2023-07-10 DIAGNOSIS — I48.92 UNSPECIFIED ATRIAL FLUTTER: ICD-10-CM

## 2023-07-10 DIAGNOSIS — Z96.651 PRESENCE OF RIGHT ARTIFICIAL KNEE JOINT: Chronic | ICD-10-CM

## 2023-07-10 DIAGNOSIS — Z98.890 OTHER SPECIFIED POSTPROCEDURAL STATES: Chronic | ICD-10-CM

## 2023-07-10 DIAGNOSIS — J45.909 UNSPECIFIED ASTHMA, UNCOMPLICATED: ICD-10-CM

## 2023-07-10 LAB
ALBUMIN SERPL ELPH-MCNC: 4.1 G/DL — SIGNIFICANT CHANGE UP (ref 3.3–5)
ALP SERPL-CCNC: 81 U/L — SIGNIFICANT CHANGE UP (ref 40–120)
ALT FLD-CCNC: 23 U/L — SIGNIFICANT CHANGE UP (ref 4–41)
ANION GAP SERPL CALC-SCNC: 10 MMOL/L — SIGNIFICANT CHANGE UP (ref 7–14)
AST SERPL-CCNC: 23 U/L — SIGNIFICANT CHANGE UP (ref 4–40)
BILIRUB SERPL-MCNC: 0.7 MG/DL — SIGNIFICANT CHANGE UP (ref 0.2–1.2)
BLD GP AB SCN SERPL QL: NEGATIVE — SIGNIFICANT CHANGE UP
BUN SERPL-MCNC: 11 MG/DL — SIGNIFICANT CHANGE UP (ref 7–23)
CALCIUM SERPL-MCNC: 9.6 MG/DL — SIGNIFICANT CHANGE UP (ref 8.4–10.5)
CHLORIDE SERPL-SCNC: 98 MMOL/L — SIGNIFICANT CHANGE UP (ref 98–107)
CO2 SERPL-SCNC: 31 MMOL/L — SIGNIFICANT CHANGE UP (ref 22–31)
CREAT SERPL-MCNC: 0.83 MG/DL — SIGNIFICANT CHANGE UP (ref 0.5–1.3)
EGFR: 92 ML/MIN/1.73M2 — SIGNIFICANT CHANGE UP
GLUCOSE SERPL-MCNC: 100 MG/DL — HIGH (ref 70–99)
HCT VFR BLD CALC: 40.3 % — SIGNIFICANT CHANGE UP (ref 39–50)
HGB BLD-MCNC: 12 G/DL — LOW (ref 13–17)
MCHC RBC-ENTMCNC: 26.7 PG — LOW (ref 27–34)
MCHC RBC-ENTMCNC: 29.8 GM/DL — LOW (ref 32–36)
MCV RBC AUTO: 89.6 FL — SIGNIFICANT CHANGE UP (ref 80–100)
NRBC # BLD: 0 /100 WBCS — SIGNIFICANT CHANGE UP (ref 0–0)
NRBC # FLD: 0 K/UL — SIGNIFICANT CHANGE UP (ref 0–0)
PLATELET # BLD AUTO: 258 K/UL — SIGNIFICANT CHANGE UP (ref 150–400)
POTASSIUM SERPL-MCNC: 3.9 MMOL/L — SIGNIFICANT CHANGE UP (ref 3.5–5.3)
POTASSIUM SERPL-SCNC: 3.9 MMOL/L — SIGNIFICANT CHANGE UP (ref 3.5–5.3)
PROT SERPL-MCNC: 7.4 G/DL — SIGNIFICANT CHANGE UP (ref 6–8.3)
RBC # BLD: 4.5 M/UL — SIGNIFICANT CHANGE UP (ref 4.2–5.8)
RBC # FLD: 15.7 % — HIGH (ref 10.3–14.5)
RH IG SCN BLD-IMP: POSITIVE — SIGNIFICANT CHANGE UP
SODIUM SERPL-SCNC: 139 MMOL/L — SIGNIFICANT CHANGE UP (ref 135–145)
WBC # BLD: 6 K/UL — SIGNIFICANT CHANGE UP (ref 3.8–10.5)
WBC # FLD AUTO: 6 K/UL — SIGNIFICANT CHANGE UP (ref 3.8–10.5)

## 2023-07-10 NOTE — H&P PST ADULT - PROBLEM SELECTOR PLAN 1
pt scheduled for APRIL/Watchman and atrial flutter ablation w/biosense on 07/17/23  Preop instructions provided. Pt verbalized understanding.    written and verbal instructions with teach back on chlorhexidine shampoo provided,  pt verbalized understanding   pt to follow Dr Marie instructions regarding cardiac meds use preop  pt to hold supplements/vitamins and NSAIDS 7 days preop pt scheduled for APRIL/Watchman and atrial flutter ablation w/biosense on 07/17/23  Preop instructions provided. Pt verbalized understanding.    written and verbal instructions with teach back on chlorhexidine shampoo provided,  pt verbalized understanding   pt to follow Dr Marie instructions regarding cardiac meds use preop  pt to hold supplements/vitamins and NSAIDS 7 days preop    h/o blood transfusion in 05/2023, pt instructed to return to PST on 07/14/23 for repeat T&S, pt verbalized understanding    pt instructed to continue asthma meds as prescribed and to bring asthma inhaler to the hospital on am of the procedure pt scheduled for APRIL/Watchman and atrial flutter ablation w/biosense on 07/17/23  Preop instructions provided. Pt verbalized understanding.    written and verbal instructions with teach back on chlorhexidine shampoo provided,  pt verbalized understanding   pt to follow Dr Marie instructions regarding cardiac meds use preop  pt to hold supplements/vitamins and NSAIDS 7 days preop    h/o blood transfusion in 05/2023, pt instructed to return to PST on 07/14/23 for repeat T&S, pt verbalized understanding

## 2023-07-10 NOTE — H&P PST ADULT - GASTROINTESTINAL COMMENTS
h/o diverticulitis, s/p admission 05/2023 due to GI bleed, required 5 units PRBC h/o diverticulitis, s/p admission 05/2023 due to GI bleed, required 5 units PRBC;

## 2023-07-10 NOTE — H&P PST ADULT - NSICDXPASTMEDICALHX_GEN_ALL_CORE_FT
PAST MEDICAL HISTORY:  Asthma     Atrial flutter     Diverticulitis     GIB (gastrointestinal bleeding)     Gout     Hyperlipidemia     Hypertension     Prostate cancer s/p radiation therapy

## 2023-07-10 NOTE — H&P PST ADULT - PROBLEM SELECTOR PLAN 2
pt instructed to continue asthma meds as prescribed and to bring asthma inhaler to the hospital on am of the procedure

## 2023-07-10 NOTE — H&P PST ADULT - HISTORY OF PRESENT ILLNESS
73 y.o. male with hx of HTN, HLD, prostate cancer, s/p RT, asthma, Aflutter, presents to PST for evaluation scheduled for APRIL/Watcham and Atrial Flutter ablation w/Biosense, denies CP, palpitations, dyspnea 73 y.o. male with hx of HTN, HLD, prostate cancer, s/p RT, asthma, diverticulitis, Aflutter diagnosed during hospitalization due to GI bleed in 05/2023, presents to PST for evaluation scheduled for APRIL/Watcham and Atrial Flutter ablation w/Biosense, denies CP, palpitations, dyspnea

## 2023-07-10 NOTE — H&P PST ADULT - NSANTHOSAYNRD_GEN_A_CORE
No. NIA screening performed.  STOP BANG Legend: 0-2 = LOW Risk; 3-4 = INTERMEDIATE Risk; 5-8 = HIGH Risk

## 2023-07-10 NOTE — H&P PST ADULT - NEGATIVE GENERAL GENITOURINARY SYMPTOMS
no hematuria/no renal colic/no flank pain L/no flank pain R/no gas in urine/no bladder infections/no incontinence

## 2023-07-14 ENCOUNTER — OUTPATIENT (OUTPATIENT)
Dept: OUTPATIENT SERVICES | Facility: HOSPITAL | Age: 73
LOS: 1 days | End: 2023-07-14

## 2023-07-14 DIAGNOSIS — Z96.651 PRESENCE OF RIGHT ARTIFICIAL KNEE JOINT: Chronic | ICD-10-CM

## 2023-07-14 DIAGNOSIS — I48.92 UNSPECIFIED ATRIAL FLUTTER: ICD-10-CM

## 2023-07-14 DIAGNOSIS — Z98.890 OTHER SPECIFIED POSTPROCEDURAL STATES: Chronic | ICD-10-CM

## 2023-07-14 PROBLEM — K57.92 DIVERTICULITIS OF INTESTINE, PART UNSPECIFIED, WITHOUT PERFORATION OR ABSCESS WITHOUT BLEEDING: Chronic | Status: ACTIVE | Noted: 2023-07-10

## 2023-07-14 LAB
BLD GP AB SCN SERPL QL: NEGATIVE — SIGNIFICANT CHANGE UP
RH IG SCN BLD-IMP: POSITIVE — SIGNIFICANT CHANGE UP

## 2023-07-28 PROBLEM — J45.909 UNSPECIFIED ASTHMA, UNCOMPLICATED: Chronic | Status: ACTIVE | Noted: 2023-07-10

## 2023-07-28 PROBLEM — I48.92 UNSPECIFIED ATRIAL FLUTTER: Chronic | Status: ACTIVE | Noted: 2023-07-10

## 2023-08-14 ENCOUNTER — INPATIENT (INPATIENT)
Facility: HOSPITAL | Age: 73
LOS: 0 days | Discharge: HOME | End: 2023-08-15
Attending: INTERNAL MEDICINE | Admitting: INTERNAL MEDICINE
Payer: MEDICARE

## 2023-08-14 VITALS
SYSTOLIC BLOOD PRESSURE: 128 MMHG | TEMPERATURE: 97 F | HEART RATE: 71 BPM | DIASTOLIC BLOOD PRESSURE: 81 MMHG | OXYGEN SATURATION: 99 % | RESPIRATION RATE: 23 BRPM

## 2023-08-14 DIAGNOSIS — I48.92 UNSPECIFIED ATRIAL FLUTTER: ICD-10-CM

## 2023-08-14 DIAGNOSIS — K55.1 CHRONIC VASCULAR DISORDERS OF INTESTINE: ICD-10-CM

## 2023-08-14 DIAGNOSIS — Z96.651 PRESENCE OF RIGHT ARTIFICIAL KNEE JOINT: Chronic | ICD-10-CM

## 2023-08-14 DIAGNOSIS — Z98.890 OTHER SPECIFIED POSTPROCEDURAL STATES: Chronic | ICD-10-CM

## 2023-08-14 LAB
ANION GAP SERPL CALC-SCNC: 9 MMOL/L — SIGNIFICANT CHANGE UP (ref 7–14)
BUN SERPL-MCNC: 10 MG/DL — SIGNIFICANT CHANGE UP (ref 7–23)
CALCIUM SERPL-MCNC: 8.9 MG/DL — SIGNIFICANT CHANGE UP (ref 8.4–10.5)
CHLORIDE SERPL-SCNC: 107 MMOL/L — SIGNIFICANT CHANGE UP (ref 98–107)
CO2 SERPL-SCNC: 29 MMOL/L — SIGNIFICANT CHANGE UP (ref 22–31)
CREAT SERPL-MCNC: 0.93 MG/DL — SIGNIFICANT CHANGE UP (ref 0.5–1.3)
EGFR: 87 ML/MIN/1.73M2 — SIGNIFICANT CHANGE UP
GLUCOSE SERPL-MCNC: 98 MG/DL — SIGNIFICANT CHANGE UP (ref 70–99)
MAGNESIUM SERPL-MCNC: 2.2 MG/DL — SIGNIFICANT CHANGE UP (ref 1.6–2.6)
PHOSPHATE SERPL-MCNC: 4.8 MG/DL — HIGH (ref 2.5–4.5)
POTASSIUM SERPL-MCNC: 3.5 MMOL/L — SIGNIFICANT CHANGE UP (ref 3.5–5.3)
POTASSIUM SERPL-SCNC: 3.5 MMOL/L — SIGNIFICANT CHANGE UP (ref 3.5–5.3)
SODIUM SERPL-SCNC: 145 MMOL/L — SIGNIFICANT CHANGE UP (ref 135–145)

## 2023-08-14 PROCEDURE — 33340 PERQ CLSR TCAT L ATR APNDGE: CPT

## 2023-08-14 PROCEDURE — 93657 TX L/R ATRIAL FIB ADDL: CPT

## 2023-08-14 PROCEDURE — 93010 ELECTROCARDIOGRAM REPORT: CPT

## 2023-08-14 PROCEDURE — 93656 COMPRE EP EVAL ABLTJ ATR FIB: CPT

## 2023-08-14 RX ORDER — FENTANYL CITRATE 50 UG/ML
25 INJECTION INTRAVENOUS
Refills: 0 | Status: DISCONTINUED | OUTPATIENT
Start: 2023-08-14 | End: 2023-08-15

## 2023-08-14 RX ORDER — ALBUTEROL 90 UG/1
2 AEROSOL, METERED ORAL EVERY 6 HOURS
Refills: 0 | Status: DISCONTINUED | OUTPATIENT
Start: 2023-08-14 | End: 2023-08-15

## 2023-08-14 RX ORDER — CEFAZOLIN SODIUM 1 G
1000 VIAL (EA) INJECTION EVERY 8 HOURS
Refills: 0 | Status: COMPLETED | OUTPATIENT
Start: 2023-08-15 | End: 2023-08-15

## 2023-08-14 RX ORDER — FUROSEMIDE 40 MG
1 TABLET ORAL
Refills: 0 | DISCHARGE

## 2023-08-14 RX ORDER — DILTIAZEM HCL 120 MG
120 CAPSULE, EXT RELEASE 24 HR ORAL DAILY
Refills: 0 | Status: DISCONTINUED | OUTPATIENT
Start: 2023-08-15 | End: 2023-08-15

## 2023-08-14 RX ORDER — FINASTERIDE 5 MG/1
5 TABLET, FILM COATED ORAL DAILY
Refills: 0 | Status: DISCONTINUED | OUTPATIENT
Start: 2023-08-14 | End: 2023-08-15

## 2023-08-14 RX ORDER — METOPROLOL TARTRATE 50 MG
25 TABLET ORAL DAILY
Refills: 0 | Status: DISCONTINUED | OUTPATIENT
Start: 2023-08-14 | End: 2023-08-15

## 2023-08-14 RX ORDER — MULTIVIT-MIN/FERROUS GLUCONATE 9 MG/15 ML
0 LIQUID (ML) ORAL
Refills: 0 | DISCHARGE

## 2023-08-14 RX ORDER — TAMSULOSIN HYDROCHLORIDE 0.4 MG/1
0.4 CAPSULE ORAL AT BEDTIME
Refills: 0 | Status: DISCONTINUED | OUTPATIENT
Start: 2023-08-14 | End: 2023-08-15

## 2023-08-14 RX ORDER — PANTOPRAZOLE SODIUM 20 MG/1
40 TABLET, DELAYED RELEASE ORAL
Refills: 0 | Status: DISCONTINUED | OUTPATIENT
Start: 2023-08-14 | End: 2023-08-15

## 2023-08-14 RX ORDER — ASPIRIN/CALCIUM CARB/MAGNESIUM 324 MG
81 TABLET ORAL DAILY
Refills: 0 | Status: DISCONTINUED | OUTPATIENT
Start: 2023-08-15 | End: 2023-08-15

## 2023-08-14 RX ORDER — MONTELUKAST 4 MG/1
10 TABLET, CHEWABLE ORAL AT BEDTIME
Refills: 0 | Status: DISCONTINUED | OUTPATIENT
Start: 2023-08-14 | End: 2023-08-15

## 2023-08-14 RX ORDER — ATORVASTATIN CALCIUM 80 MG/1
10 TABLET, FILM COATED ORAL AT BEDTIME
Refills: 0 | Status: DISCONTINUED | OUTPATIENT
Start: 2023-08-14 | End: 2023-08-15

## 2023-08-14 RX ORDER — SODIUM CHLORIDE 9 MG/ML
3 INJECTION INTRAMUSCULAR; INTRAVENOUS; SUBCUTANEOUS EVERY 8 HOURS
Refills: 0 | Status: DISCONTINUED | OUTPATIENT
Start: 2023-08-14 | End: 2023-08-15

## 2023-08-14 RX ORDER — POTASSIUM CHLORIDE 20 MEQ
40 PACKET (EA) ORAL ONCE
Refills: 0 | Status: COMPLETED | OUTPATIENT
Start: 2023-08-14 | End: 2023-08-14

## 2023-08-14 RX ORDER — SODIUM CHLORIDE 9 MG/ML
1000 INJECTION, SOLUTION INTRAVENOUS
Refills: 0 | Status: DISCONTINUED | OUTPATIENT
Start: 2023-08-14 | End: 2023-08-15

## 2023-08-14 RX ORDER — CLOPIDOGREL BISULFATE 75 MG/1
75 TABLET, FILM COATED ORAL DAILY
Refills: 0 | Status: DISCONTINUED | OUTPATIENT
Start: 2023-08-15 | End: 2023-08-15

## 2023-08-14 RX ORDER — FENTANYL CITRATE 50 UG/ML
50 INJECTION INTRAVENOUS
Refills: 0 | Status: DISCONTINUED | OUTPATIENT
Start: 2023-08-14 | End: 2023-08-15

## 2023-08-14 RX ORDER — POTASSIUM CHLORIDE 20 MEQ
1 PACKET (EA) ORAL
Refills: 0 | DISCHARGE

## 2023-08-14 RX ADMIN — SODIUM CHLORIDE 75 MILLILITER(S): 9 INJECTION, SOLUTION INTRAVENOUS at 21:29

## 2023-08-14 RX ADMIN — TAMSULOSIN HYDROCHLORIDE 0.4 MILLIGRAM(S): 0.4 CAPSULE ORAL at 22:47

## 2023-08-14 RX ADMIN — Medication 40 MILLIEQUIVALENT(S): at 22:48

## 2023-08-14 RX ADMIN — ATORVASTATIN CALCIUM 10 MILLIGRAM(S): 80 TABLET, FILM COATED ORAL at 22:54

## 2023-08-14 RX ADMIN — MONTELUKAST 10 MILLIGRAM(S): 4 TABLET, CHEWABLE ORAL at 22:47

## 2023-08-14 RX ADMIN — FENTANYL CITRATE 50 MICROGRAM(S): 50 INJECTION INTRAVENOUS at 21:17

## 2023-08-14 RX ADMIN — FENTANYL CITRATE 50 MICROGRAM(S): 50 INJECTION INTRAVENOUS at 21:35

## 2023-08-15 ENCOUNTER — TRANSCRIPTION ENCOUNTER (OUTPATIENT)
Age: 73
End: 2023-08-15

## 2023-08-15 VITALS
TEMPERATURE: 98 F | SYSTOLIC BLOOD PRESSURE: 154 MMHG | DIASTOLIC BLOOD PRESSURE: 103 MMHG | RESPIRATION RATE: 18 BRPM | HEART RATE: 99 BPM | OXYGEN SATURATION: 100 %

## 2023-08-15 LAB
ANION GAP SERPL CALC-SCNC: 10 MMOL/L — SIGNIFICANT CHANGE UP (ref 7–14)
BASOPHILS # BLD AUTO: 0.01 K/UL — SIGNIFICANT CHANGE UP (ref 0–0.2)
BASOPHILS NFR BLD AUTO: 0.1 % — SIGNIFICANT CHANGE UP (ref 0–2)
BUN SERPL-MCNC: 14 MG/DL — SIGNIFICANT CHANGE UP (ref 7–23)
CALCIUM SERPL-MCNC: 9.5 MG/DL — SIGNIFICANT CHANGE UP (ref 8.4–10.5)
CHLORIDE SERPL-SCNC: 104 MMOL/L — SIGNIFICANT CHANGE UP (ref 98–107)
CO2 SERPL-SCNC: 25 MMOL/L — SIGNIFICANT CHANGE UP (ref 22–31)
CREAT SERPL-MCNC: 0.89 MG/DL — SIGNIFICANT CHANGE UP (ref 0.5–1.3)
EGFR: 90 ML/MIN/1.73M2 — SIGNIFICANT CHANGE UP
EOSINOPHIL # BLD AUTO: 0 K/UL — SIGNIFICANT CHANGE UP (ref 0–0.5)
EOSINOPHIL NFR BLD AUTO: 0 % — SIGNIFICANT CHANGE UP (ref 0–6)
GLUCOSE BLDC GLUCOMTR-MCNC: 136 MG/DL — HIGH (ref 70–99)
GLUCOSE SERPL-MCNC: 144 MG/DL — HIGH (ref 70–99)
HCT VFR BLD CALC: 44.7 % — SIGNIFICANT CHANGE UP (ref 39–50)
HGB BLD-MCNC: 13.5 G/DL — SIGNIFICANT CHANGE UP (ref 13–17)
IANC: 7.54 K/UL — HIGH (ref 1.8–7.4)
IMM GRANULOCYTES NFR BLD AUTO: 0.4 % — SIGNIFICANT CHANGE UP (ref 0–0.9)
LYMPHOCYTES # BLD AUTO: 0.43 K/UL — LOW (ref 1–3.3)
LYMPHOCYTES # BLD AUTO: 5.3 % — LOW (ref 13–44)
MAGNESIUM SERPL-MCNC: 2.1 MG/DL — SIGNIFICANT CHANGE UP (ref 1.6–2.6)
MCHC RBC-ENTMCNC: 27.3 PG — SIGNIFICANT CHANGE UP (ref 27–34)
MCHC RBC-ENTMCNC: 30.2 GM/DL — LOW (ref 32–36)
MCV RBC AUTO: 90.5 FL — SIGNIFICANT CHANGE UP (ref 80–100)
MONOCYTES # BLD AUTO: 0.07 K/UL — SIGNIFICANT CHANGE UP (ref 0–0.9)
MONOCYTES NFR BLD AUTO: 0.9 % — LOW (ref 2–14)
NEUTROPHILS # BLD AUTO: 7.54 K/UL — HIGH (ref 1.8–7.4)
NEUTROPHILS NFR BLD AUTO: 93.3 % — HIGH (ref 43–77)
NRBC # BLD: 0 /100 WBCS — SIGNIFICANT CHANGE UP (ref 0–0)
NRBC # FLD: 0 K/UL — SIGNIFICANT CHANGE UP (ref 0–0)
PHOSPHATE SERPL-MCNC: 3.7 MG/DL — SIGNIFICANT CHANGE UP (ref 2.5–4.5)
PLATELET # BLD AUTO: 186 K/UL — SIGNIFICANT CHANGE UP (ref 150–400)
POTASSIUM SERPL-MCNC: 4.1 MMOL/L — SIGNIFICANT CHANGE UP (ref 3.5–5.3)
POTASSIUM SERPL-SCNC: 4.1 MMOL/L — SIGNIFICANT CHANGE UP (ref 3.5–5.3)
RBC # BLD: 4.94 M/UL — SIGNIFICANT CHANGE UP (ref 4.2–5.8)
RBC # FLD: 14.2 % — SIGNIFICANT CHANGE UP (ref 10.3–14.5)
SODIUM SERPL-SCNC: 139 MMOL/L — SIGNIFICANT CHANGE UP (ref 135–145)
WBC # BLD: 8.08 K/UL — SIGNIFICANT CHANGE UP (ref 3.8–10.5)
WBC # FLD AUTO: 8.08 K/UL — SIGNIFICANT CHANGE UP (ref 3.8–10.5)

## 2023-08-15 PROCEDURE — 71045 X-RAY EXAM CHEST 1 VIEW: CPT | Mod: 26

## 2023-08-15 RX ORDER — FERROUS SULFATE 325(65) MG
0 TABLET ORAL
Refills: 0 | DISCHARGE

## 2023-08-15 RX ORDER — ASPIRIN/CALCIUM CARB/MAGNESIUM 324 MG
1 TABLET ORAL
Qty: 0 | Refills: 0 | DISCHARGE
Start: 2023-08-15

## 2023-08-15 RX ORDER — CLOPIDOGREL BISULFATE 75 MG/1
1 TABLET, FILM COATED ORAL
Qty: 90 | Refills: 0
Start: 2023-08-15 | End: 2023-11-12

## 2023-08-15 RX ORDER — ACETAMINOPHEN 500 MG
2 TABLET ORAL
Qty: 0 | Refills: 0 | DISCHARGE
Start: 2023-08-15

## 2023-08-15 RX ORDER — PANTOPRAZOLE SODIUM 20 MG/1
1 TABLET, DELAYED RELEASE ORAL
Qty: 30 | Refills: 0
Start: 2023-08-15 | End: 2023-09-13

## 2023-08-15 RX ORDER — CLOPIDOGREL BISULFATE 75 MG/1
1 TABLET, FILM COATED ORAL
Qty: 30 | Refills: 0
Start: 2023-08-15 | End: 2023-09-13

## 2023-08-15 RX ORDER — ACETAMINOPHEN 500 MG
650 TABLET ORAL EVERY 6 HOURS
Refills: 0 | Status: DISCONTINUED | OUTPATIENT
Start: 2023-08-15 | End: 2023-08-15

## 2023-08-15 RX ADMIN — Medication 25 MILLIGRAM(S): at 05:28

## 2023-08-15 RX ADMIN — Medication 650 MILLIGRAM(S): at 09:20

## 2023-08-15 RX ADMIN — Medication 650 MILLIGRAM(S): at 10:20

## 2023-08-15 RX ADMIN — Medication 81 MILLIGRAM(S): at 11:43

## 2023-08-15 RX ADMIN — Medication 100 MILLIGRAM(S): at 09:21

## 2023-08-15 RX ADMIN — Medication 120 MILLIGRAM(S): at 05:28

## 2023-08-15 RX ADMIN — PANTOPRAZOLE SODIUM 40 MILLIGRAM(S): 20 TABLET, DELAYED RELEASE ORAL at 06:45

## 2023-08-15 RX ADMIN — SODIUM CHLORIDE 3 MILLILITER(S): 9 INJECTION INTRAMUSCULAR; INTRAVENOUS; SUBCUTANEOUS at 06:45

## 2023-08-15 RX ADMIN — SODIUM CHLORIDE 3 MILLILITER(S): 9 INJECTION INTRAMUSCULAR; INTRAVENOUS; SUBCUTANEOUS at 00:37

## 2023-08-15 RX ADMIN — FINASTERIDE 5 MILLIGRAM(S): 5 TABLET, FILM COATED ORAL at 11:43

## 2023-08-15 RX ADMIN — Medication 100 MILLIGRAM(S): at 00:20

## 2023-08-15 RX ADMIN — SODIUM CHLORIDE 3 MILLILITER(S): 9 INJECTION INTRAMUSCULAR; INTRAVENOUS; SUBCUTANEOUS at 13:07

## 2023-08-15 RX ADMIN — CLOPIDOGREL BISULFATE 75 MILLIGRAM(S): 75 TABLET, FILM COATED ORAL at 11:42

## 2023-08-15 NOTE — PROGRESS NOTE ADULT - SUBJECTIVE AND OBJECTIVE BOX
ANESTHESIA POSTOP NOTE  73y Male POSTOP DAY 1  No complaints  Vital Signs Last 24 Hrs  T(C): 36.8 (15 Aug 2023 12:00), Max: 36.9 (14 Aug 2023 23:00)  T(F): 98.2 (15 Aug 2023 12:00), Max: 98.4 (14 Aug 2023 23:00)  HR: 99 (15 Aug 2023 12:00) (72 - 99)  BP: 154/103 (15 Aug 2023 12:00) (145/78 - 154/103)  BP(mean): 105 (15 Aug 2023 04:24) (100 - 108)  RR: 18 (15 Aug 2023 12:00) (14 - 18)  SpO2: 100% (15 Aug 2023 12:00) (95% - 100%)    Parameters below as of 15 Aug 2023 12:00  Patient On (Oxygen Delivery Method): room air      I&O's Summary    14 Aug 2023 07:01  -  15 Aug 2023 07:00  --------------------------------------------------------  IN: 425 mL / OUT: 2350 mL / NET: -1925 mL        [ X ] NO APPARENT ANESTHESIA COMPLICATIONS      Comments: 
Patient is a 73y old  Male who presents with a chief complaint of   Patient denies CP, SOB or palpitations.   Mild throat discomfort.      PAST MEDICAL & SURGICAL HISTORY:  Hypertension    Hyperlipidemia    GIB (gastrointestinal bleeding)    Prostate cancer  s/p radiation therapy    Gout    Diverticulitis    Asthma    Atrial flutter    History of hemorrhoidectomy    H/O total knee replacement, right        MEDICATIONS  (STANDING):  aspirin enteric coated 81 milliGRAM(s) Oral daily  atorvastatin 10 milliGRAM(s) Oral at bedtime  clopidogrel Tablet 75 milliGRAM(s) Oral daily  diltiazem    milliGRAM(s) Oral daily  finasteride 5 milliGRAM(s) Oral daily  metoprolol succinate ER 25 milliGRAM(s) Oral daily  montelukast 10 milliGRAM(s) Oral at bedtime  pantoprazole    Tablet 40 milliGRAM(s) Oral before breakfast  sodium chloride 0.9% lock flush 3 milliLiter(s) IV Push every 8 hours  tamsulosin 0.4 milliGRAM(s) Oral at bedtime    MEDICATIONS  (PRN):  acetaminophen     Tablet .. 650 milliGRAM(s) Oral every 6 hours PRN Temp greater or equal to 38.5C (101.3F), Mild Pain (1 - 3), Moderate Pain (4 - 6)  albuterol    90 MICROgram(s) HFA Inhaler 2 Puff(s) Inhalation every 6 hours PRN Shortness of Breath and/or Wheezing            Vital Signs Last 24 Hrs  T(C): 36.6 (15 Aug 2023 04:24), Max: 36.9 (14 Aug 2023 23:00)  T(F): 97.8 (15 Aug 2023 04:24), Max: 98.4 (14 Aug 2023 23:00)  HR: 89 (15 Aug 2023 04:24) (67 - 95)  BP: 151/95 (15 Aug 2023 04:24) (127/81 - 151/102)  BP(mean): 105 (15 Aug 2023 04:24) (91 - 115)  RR: 18 (15 Aug 2023 04:24) (12 - 23)  SpO2: 98% (15 Aug 2023 04:24) (95% - 100%)    Parameters below as of 15 Aug 2023 04:24  Patient On (Oxygen Delivery Method): room air                INTERPRETATION OF TELEMETRY:  PAF with V rate controlled --self converted to SR  bpm with few PVC's/ V couplets overnight around 3 am    ECG:         LABS:                        13.5   8.08  )-----------( 186      ( 15 Aug 2023 06:58 )             44.7     08-15    139  |  104  |  14  ----------------------------<  144<H>  4.1   |  25  |  0.89    Ca    9.5      15 Aug 2023 06:58  Phos  3.7     08-15  Mg     2.10     08-15            Urinalysis Basic - ( 15 Aug 2023 06:58 )    Color: x / Appearance: x / SG: x / pH: x  Gluc: 144 mg/dL / Ketone: x  / Bili: x / Urobili: x   Blood: x / Protein: x / Nitrite: x   Leuk Esterase: x / RBC: x / WBC x   Sq Epi: x / Non Sq Epi: x / Bacteria: x        BNP  RADIOLOGY & ADDITIONAL STUDIES:    Ejection Fraction (Modified Rogers Rule): 61 %  ------------------------------------------------------------------------  OBSERVATIONS:  Mitral Valve: Mitral annular calcification, otherwise  normal mitral valve. Mild mitral regurgitation.  Aortic Root: Normal aortic root.  Aortic Valve: Calcified trileaflet aortic valve with normal  opening. Mild-moderate aortic regurgitation.  Left Atrium: Severely dilated left atrium.  LA volume index  = 56 cc/m2.  Left Ventricle: Endocardium not well visualized; grossly  normal left ventricular systolic function.  Paradoxical  septal wallmotion. Increased relative wall thickness with  normal left ventricular mass index, consistent with  concentric left ventricular remodeling. Indeterminate  diastolic function in the setting of atrial flutter.  Right Heart: Normal right atrium. The right ventricle is  not well visualized; grossly normal right ventricular  systolic function. Normal tricuspid valve.   Mild-moderate  tricuspid regurgitation. Normal pulmonic valve.  Mild  pulmonic regurgitation.  Pericardium/PleuraNormal pericardium with no pericardial  effusion.  Hemodynamic: Estimated right ventricular systolic pressure  equals 44 mm Hg, assuming right atrial pressure equals 10  mm Hg, consistent with mild pulmonary hypertension.  ------------------------------------------------------------------------  CONCLUSIONS:  1. Mitral annular calcification, otherwise normal mitral  valve. Mild mitral regurgitation.  2. Calcified trileaflet aortic valve with normal opening.  Mild-moderate aortic regurgitation.  3. Severely dilated left atrium.  LA volume index = 56  cc/m2.  4. Increased relative wall thickness with normal left  ventricular mass index, consistent with concentric left  ventricular remodeling.  5. Endocardium not well visualized; grossly normal left  ventricular systolic function.  Paradoxical septal wall  motion.  6. The right ventricle is not well visualized; grossly  normal right ventricular systolic function.  7. Estimated right ventricular systolic pressure equals 44  mm Hg, assuming right atrial pressure equals 10 mm Hg,  consistent with mild pulmonary hypertension.  ------------------------------------------------------------------------  Confirmed on  5/4/2023 - 12:30:23 by Riccardo Veliz M.D.,  [facility], [place]  ------------------------------------------------------------------------      PHYSICAL EXAM:    GENERAL: In no apparent distress  NECK: Supple and normal thyroid.  No JVD or carotid bruit.  Carotid pulse is 2+ bilaterally.  HEART: Regular rate and rhythm; No murmurs, rubs, or gallops.  PULMONARY: Clear to auscultation and perfusion.  No rales, wheezing, or rhonchi bilaterally.  ABDOMEN: Soft, Nontender, Nondistended; Bowel sounds present  EXTREMITIES:  2+ Peripheral Pulses, No clubbing, cyanosis, or edema; R groin site  pressure dressing removed, incision site dry intact, no active bleeding or hematoma   NEUROLOGICAL: alert oriented x4 speech clear no focal deficit noted

## 2023-08-15 NOTE — DISCHARGE NOTE PROVIDER - HOSPITAL COURSE
73 year old male with hx of HTN, HLD, prostate cancer, s/p RT, asthma, diverticulitis, Aflutter /AF-not on AC due to GI bleed in 05/2023 who was admitted for AF/AFL ablation and s/p APRIL/Watcham device on 8/14.  Post ablation patient was in PAF then self converted to SR overnight.      Post ablation/WATCHMAN implantation teaching with instructions provided.  Patient and his spouse verbalized understanding.    Follow up with Dr. Marie on Oct 3 at 11:30am .  Stable for discharge home.   DAPT ASA 81mg daily /Plavix 75 mg daily for 12 weeks duration per Dr. Marie  -Rx for Protonix 40 mg daily for one month and Plavix Rx for 3 months  -Continue home meds  -Assist OOB and ambulate, plan for discharge home today if remains stable     Dispo: Home    Patient seen and evaluated, no acute somatic complaints. Reviewed discharge medications with patient; All new medications requiring new prescriptions were sent to the pharmacy of patient's choice. Reviewed need for prescription for previous home medications and new prescriptions sent if requested. Medically cleared/stable for discharge as per Dr. Marie on 8/15/23 with close outpatient follow up within 1-2 weeks of discharge. Patient understands and agrees with plan of care. 73 year old male with hx of HTN, HLD, prostate cancer, s/p RT, asthma, diverticulitis, Aflutter /AF-not on AC due to GI bleed in 05/2023 who was admitted for AF/AFL ablation and s/p APRIL/Watcham device on 8/14.  Post ablation patient was in PAF then self converted to SR overnight.      Post ablation/WATCHMAN implantation teaching with instructions provided.  Patient and his spouse verbalized understanding.    Follow up with Dr. Marie on Oct 3 at 11:30am .  Stable for discharge home.   DAPT ASA 81mg daily /Plavix 75 mg daily for 12 weeks duration per Dr. Marie  -Rx for Protonix 40 mg daily for one month and Plavix Rx for 3 months  -Continue home meds  -Assist OOB and ambulate, plan for discharge home today if remains stable     Dispo: Home, pt declined home services    Patient seen and evaluated, no acute somatic complaints. Reviewed discharge medications with patient; All new medications requiring new prescriptions were sent to the pharmacy of patient's choice. Reviewed need for prescription for previous home medications and new prescriptions sent if requested. Medically cleared/stable for discharge as per Dr. Marie on 8/15/23 with close outpatient follow up within 1-2 weeks of discharge. Patient understands and agrees with plan of care.

## 2023-08-15 NOTE — PATIENT PROFILE ADULT - FALL HARM RISK - RISK INTERVENTIONS

## 2023-08-15 NOTE — CHART NOTE - NSCHARTNOTEFT_GEN_A_CORE
Interventional Recovery Suite Pre-Procedure PA Note    In brief, this is a 74 y/o male with a PMHx of persistent atrial fibrillation/flutter not on AC secondary to significant diverticular GI bleed, HTN, HLD, prostate cancer s/p RT, and asthma who presents for elective APRIL (transesophageal echocardiogram), Watchman implantation, and atrial flutter/fibrillation ablation. H&P from PST reviewed. Medication reconciliation edited/updated where appropriate. EKG reviewed. Procedure explained in detail. Risks/benefits discussed. All questions answered. Consent obtained.    NPO date and time: 8/13/2023 at 14:00  Mallampati: 2  Anticoagulation date and time? N/A  Previous endoscopy? Yes  History of:  CVA? No  Sleep apnea? No  Dentures? No  Loose teeth? No    Patient denies:  Prior difficult intubation or airway problems  Odynophagia  Dysphagia  Esophageal stricture  Esophageal tumor  Esophageal varices  Esophageal perforation/laceration  Esophageal diverticulum  Large diaphragmatic hernia  History of GI surgery  History of esophageal surgery  History of Hope's esophagus  Tenuous cardiorespiratory status  Cervical spine arthritis with reduced range of motion or atlantoaxial joint disease  History of radiation to head, neck, or mediastinum  Severe thrombocytopenia  Obstructive sleep apnea  ---  + Recent upper gastrointestinal (GI) bleed
Patient s/p elective APRIL (transesophageal echocardiogram), Watchman implantation, and atrial flutter/fibrillation ablation. R femoral site is stable with pressure dressing that was placed in EP lab. Dressing is clean, intact and dry.  No hematoma or swelling. Good pedal pulses. EKG shows afib.
Pt s/p AF ablation and Watchman implant. As per Dr. Marie, plan is to start ASA 81mg and Plavix 75mg tomorrow 8/15 (no Xarelto or Eliquis) and start PPI. CXR ordered for AM. Ancef ordered.

## 2023-08-15 NOTE — DISCHARGE NOTE NURSING/CASE MANAGEMENT/SOCIAL WORK - PATIENT PORTAL LINK FT
You can access the FollowMyHealth Patient Portal offered by [facility] by registering at the following website: [email or web address]. By joining [email or web address]’s FollowMyHealth portal, you will also be able to view your health information using other applications (apps) compatible with our system.

## 2023-08-15 NOTE — DISCHARGE NOTE PROVIDER - NSDCFUSCHEDAPPT_GEN_ALL_CORE_FT
Gaurav Marie  [facility]  [facility] [phone]  Scheduled Appointment: 10/03/2023

## 2023-08-15 NOTE — DISCHARGE NOTE PROVIDER - NSDCCPCAREPLAN_GEN_ALL_CORE_FT
PRINCIPAL DISCHARGE DIAGNOSIS  Diagnosis: Paroxysmal atrial fibrillation  Assessment and Plan of Treatment: Electrophysiology performed an ablation for your afib and a transesophageal echocardiogram/insertion of watchman devvice. Your heart rhythm has now normalized.  Follow up with Dr. Marie on Oct 3 at 11:30am .  Stable for discharge home.   DAPT ASA 81mg daily /Plavix 75 mg daily for 12 weeks duration per Dr. Marie  Follow up with your pcp in 1-2 wks

## 2023-08-15 NOTE — PHARMACOTHERAPY INTERVENTION NOTE - COMMENTS
Aspirin, clopidogrel, and pantoprazole reviewed with the patient. Medication schedule was discussed in detail including: medication name, indication, dose, administration times, treatment duration, side effects, drug interactions, and special instructions. Patient questions and concerns were answered and addressed. Patient demonstrated understanding. Patient provided with educational handout.    Gabriel Nuno PharmD  Pharmacy Resident

## 2023-08-15 NOTE — PROGRESS NOTE ADULT - ASSESSMENT
73 year old male with hx of HTN, HLD, prostate cancer, s/p RT, asthma, diverticulitis, Aflutter /AF-not on AC due to GI bleed in 05/2023 who was admitted for AF/AFL ablation and s/p APRIL/Watcham device on 8/14.  Post ablation patient was in PAF then self converted to SR overnight.      Post ablation/WATCHMAN implantation teaching with instructions provided.  Patient and his spouse verbalized understanding.    Follow up with Dr. Marie on Oct 3 at 11:30am .  Stable for discharge home.   DAPT ASA 81mg daily /Plavix 75 mg daily for 12 weeks duration per Dr. Marie  -Please provide Rx for Protonix 40 mg daily for one month and Plavix Rx for 3 months  -Continue home meds  -Assist OOB and ambulate, plan for discharge home today if remains stable

## 2023-08-15 NOTE — DISCHARGE NOTE PROVIDER - NSDCMRMEDTOKEN_GEN_ALL_CORE_FT
acetaminophen 325 mg oral tablet: 2 tab(s) orally every 6 hours As needed Temp greater or equal to 38.5C (101.3F), Mild Pain (1 - 3), Moderate Pain (4 - 6)  Albuterol (Eqv-Proventil HFA) 90 mcg/inh inhalation aerosol: 2 puff(s) inhaled every 6 hours as needed  aspirin 81 mg oral delayed release tablet: 1 tab(s) orally once a day  atorvastatin 10 mg oral tablet: 1 tab(s) orally once a day  clopidogrel 75 mg oral tablet: 1 tab(s) orally once a day  DilTIAZem (Eqv-Cardizem CD) 120 mg/24 hours oral capsule, extended release: 1 cap(s) orally once a day  finasteride 5 mg oral tablet: 1 tab(s) orally once a day  metoprolol succinate 25 mg oral tablet, extended release: 1 tab(s) orally once a day  montelukast 10 mg oral tablet: 1 tab(s) orally once a day  pantoprazole 40 mg oral delayed release tablet: 1 tab(s) orally once a day before breakfast  tamsulosin 0.4 mg oral capsule: 1 cap(s) orally once a day

## 2023-08-15 NOTE — CONSULT NOTE ADULT - SUBJECTIVE AND OBJECTIVE BOX
CHIEF COMPLAINT: s/p ablation and watchman    HISTORY OF PRESENT ILLNESS:  73 y.o. male with hx of HTN, HLD, prostate cancer, s/p RT, asthma, diverticulitis, Aflutter diagnosed during hospitalization due to GI bleed in 05/2023, s/p APRIL/Watcham and Atrial Flutter ablation w/Biosense, denies CP, palpitations, dyspnea        Allergies    No Known Allergies    Intolerances    	    MEDICATIONS:  aspirin enteric coated 81 milliGRAM(s) Oral daily  clopidogrel Tablet 75 milliGRAM(s) Oral daily  diltiazem    milliGRAM(s) Oral daily  metoprolol succinate ER 25 milliGRAM(s) Oral daily    ceFAZolin   IVPB 1000 milliGRAM(s) IV Intermittent every 8 hours    albuterol    90 MICROgram(s) HFA Inhaler 2 Puff(s) Inhalation every 6 hours PRN  montelukast 10 milliGRAM(s) Oral at bedtime      pantoprazole    Tablet 40 milliGRAM(s) Oral before breakfast    atorvastatin 10 milliGRAM(s) Oral at bedtime  finasteride 5 milliGRAM(s) Oral daily    sodium chloride 0.9% lock flush 3 milliLiter(s) IV Push every 8 hours  tamsulosin 0.4 milliGRAM(s) Oral at bedtime      PAST MEDICAL & SURGICAL HISTORY:  Hypertension      Hyperlipidemia      GIB (gastrointestinal bleeding)      Prostate cancer  s/p radiation therapy      Gout      Diverticulitis      Asthma      Atrial flutter      History of hemorrhoidectomy      H/O total knee replacement, right          FAMILY HISTORY:      SOCIAL HISTORY:    non smoker. indep in adl    REVIEW OF SYSTEMS:  See HPI, otherwise complete 10 point review of systems negative    [ ] All others negative	      PHYSICAL EXAM:  T(C): 36.6 (08-15-23 @ 04:24), Max: 36.9 (08-14-23 @ 23:00)  HR: 89 (08-15-23 @ 04:24) (67 - 95)  BP: 151/95 (08-15-23 @ 04:24) (127/81 - 151/102)  RR: 18 (08-15-23 @ 04:24) (12 - 23)  SpO2: 98% (08-15-23 @ 04:24) (95% - 100%)  Wt(kg): --  I&O's Summary    14 Aug 2023 07:01  -  15 Aug 2023 07:00  --------------------------------------------------------  IN: 425 mL / OUT: 2350 mL / NET: -1925 mL        Appearance: No Acute Distress	  HEENT:  Normal oral mucosa, PERRL, EOMI	  Cardiovascular: Normal S1 S2, No JVD, No murmurs/rubs/gallops  Respiratory: Lungs clear to auscultation bilaterally  Gastrointestinal:  Soft, Non-tender, + BS	  Skin: No rashes, No ecchymoses, No cyanosis	  Neurologic: Non-focal  Extremities: No clubbing, cyanosis or edema  Vascular: Peripheral pulses palpable 2+ bilaterally  Psychiatry: A & O x 3, Mood & affect appropriate    Laboratory Data:	 	    CBC Full  -  ( 15 Aug 2023 06:58 )  WBC Count : 8.08 K/uL  Hemoglobin : 13.5 g/dL  Hematocrit : 44.7 %  Platelet Count - Automated : 186 K/uL  Mean Cell Volume : 90.5 fL  Mean Cell Hemoglobin : 27.3 pg  Mean Cell Hemoglobin Concentration : 30.2 gm/dL  Auto Neutrophil # : x  Auto Lymphocyte # : x  Auto Monocyte # : x  Auto Eosinophil # : x  Auto Basophil # : x  Auto Neutrophil % : x  Auto Lymphocyte % : x  Auto Monocyte % : x  Auto Eosinophil % : x  Auto Basophil % : x    08-15    139  |  104  |  14  ----------------------------<  144<H>  4.1   |  25  |  0.89  08-14    145  |  107  |  10  ----------------------------<  98  3.5   |  29  |  0.93    Ca    9.5      15 Aug 2023 06:58  Ca    8.9      14 Aug 2023 20:50  Phos  4.8     08-14  Mg     2.10     08-15  Mg     2.20     08-14        proBNP:   Lipid Profile:   HgA1c:   TSH:       CARDIAC MARKERS:            Interpretation of Telemetry: 	  afib rate controlled, 3 beats nsvt  ECG:  	  RADIOLOGY:  OTHER: 	    PREVIOUS DIAGNOSTIC TESTING:    [ ] Echocardiogram:  [ ] Catheterization:  [ ] Stress Test:  	    Assessment:  73 y.o. male with hx of HTN, HLD, prostate cancer, s/p RT, asthma, diverticulitis, Aflutter diagnosed during hospitalization due to GI bleed in 05/2023, s/p APRIL/Watcham and Atrial Flutter/fib ablation    Recs:  cardiac stable  denies any ischemic or hf sx  s/p watchman implant, cw antiplatelet meds per eps with close monitoring of h/h given recent gi bleed  s/p attempted afib/afl ablation, currently in rate controlled afib --> cw rate control meds (dilt, metop). can consider cardioversion and antiarrhythmics/amio pending clinical course  cw beta blockers for nsvt  f/u eps recs            Greater than 60 minutes spent on total encounter; more than 50% of the visit was spent counseling and/or coordinating care by the attending physician.   	  Isaac Calderón MD   Cardiovascular Diseases  [phone]

## 2023-08-15 NOTE — DISCHARGE NOTE PROVIDER - CARE PROVIDER_API CALL
Riki Desai  Pulmonary Disease  [address] [place] [address]  Phone: [phone]  Fax: [phone]  Follow Up Time: 2 weeks

## 2023-08-15 NOTE — PROGRESS NOTE ADULT - NS ATTEND AMEND GEN_ALL_CORE FT
73 year old male with hx of HTN, HLD, prostate cancer, s/p RT, asthma, diverticulitis, Aflutter /AF-not on AC due to GI bleed in 05/2023 who was admitted for AF/AFL ablation and s/p APRIL/Watcham device on 8/14.  Post ablation patient was in PAF then self converted to SR overnight.  Post ablation/WATCHMAN implantation teaching with instructions provided.  Patient and his spouse verbalized understanding.    Follow up Oct 3 at 11:30am .  Stable for discharge home. DAPT ASA 81mg daily /Plavix 75 mg daily for 12 weeks duration. Please provide Rx for Protonix 40 mg daily for one month and Plavix Rx for 3 months.

## 2023-08-21 ENCOUNTER — INPATIENT (INPATIENT)
Facility: HOSPITAL | Age: 73
LOS: 6 days | Discharge: HOME | End: 2023-08-28
Attending: INTERNAL MEDICINE | Admitting: INTERNAL MEDICINE
Payer: MEDICARE

## 2023-08-21 VITALS
TEMPERATURE: 98 F | HEIGHT: 70 IN | OXYGEN SATURATION: 100 % | HEART RATE: 98 BPM | SYSTOLIC BLOOD PRESSURE: 110 MMHG | DIASTOLIC BLOOD PRESSURE: 74 MMHG | RESPIRATION RATE: 16 BRPM

## 2023-08-21 DIAGNOSIS — Z29.9 ENCOUNTER FOR PROPHYLACTIC MEASURES, UNSPECIFIED: ICD-10-CM

## 2023-08-21 DIAGNOSIS — Z95.818 PRESENCE OF OTHER CARDIAC IMPLANTS AND GRAFTS: ICD-10-CM

## 2023-08-21 DIAGNOSIS — I10 ESSENTIAL (PRIMARY) HYPERTENSION: ICD-10-CM

## 2023-08-21 DIAGNOSIS — K92.2 GASTROINTESTINAL HEMORRHAGE, UNSPECIFIED: ICD-10-CM

## 2023-08-21 DIAGNOSIS — Z96.651 PRESENCE OF RIGHT ARTIFICIAL KNEE JOINT: Chronic | ICD-10-CM

## 2023-08-21 DIAGNOSIS — K57.90 DIVERTICULOSIS OF INTESTINE, PART UNSPECIFIED, WITHOUT PERFORATION OR ABSCESS WITHOUT BLEEDING: ICD-10-CM

## 2023-08-21 DIAGNOSIS — D62 ACUTE POSTHEMORRHAGIC ANEMIA: ICD-10-CM

## 2023-08-21 DIAGNOSIS — E78.5 HYPERLIPIDEMIA, UNSPECIFIED: ICD-10-CM

## 2023-08-21 DIAGNOSIS — Z98.890 OTHER SPECIFIED POSTPROCEDURAL STATES: Chronic | ICD-10-CM

## 2023-08-21 DIAGNOSIS — I48.92 UNSPECIFIED ATRIAL FLUTTER: ICD-10-CM

## 2023-08-21 LAB
ALBUMIN SERPL ELPH-MCNC: 3.5 G/DL — SIGNIFICANT CHANGE UP (ref 3.3–5)
ALP SERPL-CCNC: 73 U/L — SIGNIFICANT CHANGE UP (ref 40–120)
ALT FLD-CCNC: 12 U/L — SIGNIFICANT CHANGE UP (ref 4–41)
ANION GAP SERPL CALC-SCNC: 9 MMOL/L — SIGNIFICANT CHANGE UP (ref 7–14)
APTT BLD: 32.6 SEC — SIGNIFICANT CHANGE UP (ref 24.5–35.6)
AST SERPL-CCNC: 12 U/L — SIGNIFICANT CHANGE UP (ref 4–40)
BASE EXCESS BLDV CALC-SCNC: 6.1 MMOL/L — HIGH (ref -2–3)
BASOPHILS # BLD AUTO: 0.05 K/UL — SIGNIFICANT CHANGE UP (ref 0–0.2)
BASOPHILS NFR BLD AUTO: 0.7 % — SIGNIFICANT CHANGE UP (ref 0–2)
BILIRUB SERPL-MCNC: 0.5 MG/DL — SIGNIFICANT CHANGE UP (ref 0.2–1.2)
BLD GP AB SCN SERPL QL: NEGATIVE — SIGNIFICANT CHANGE UP
BLOOD GAS VENOUS COMPREHENSIVE RESULT: SIGNIFICANT CHANGE UP
BUN SERPL-MCNC: 23 MG/DL — SIGNIFICANT CHANGE UP (ref 7–23)
CALCIUM SERPL-MCNC: 8.7 MG/DL — SIGNIFICANT CHANGE UP (ref 8.4–10.5)
CHLORIDE BLDV-SCNC: 105 MMOL/L — SIGNIFICANT CHANGE UP (ref 96–108)
CHLORIDE SERPL-SCNC: 102 MMOL/L — SIGNIFICANT CHANGE UP (ref 98–107)
CK MB BLD-MCNC: 5.4 % — HIGH (ref 0–2.5)
CK MB CFR SERPL CALC: 3.1 NG/ML — SIGNIFICANT CHANGE UP
CK SERPL-CCNC: 57 U/L — SIGNIFICANT CHANGE UP (ref 30–200)
CO2 BLDV-SCNC: 34.6 MMOL/L — HIGH (ref 22–26)
CO2 SERPL-SCNC: 30 MMOL/L — SIGNIFICANT CHANGE UP (ref 22–31)
CREAT SERPL-MCNC: 1.01 MG/DL — SIGNIFICANT CHANGE UP (ref 0.5–1.3)
EGFR: 79 ML/MIN/1.73M2 — SIGNIFICANT CHANGE UP
EOSINOPHIL # BLD AUTO: 0.11 K/UL — SIGNIFICANT CHANGE UP (ref 0–0.5)
EOSINOPHIL NFR BLD AUTO: 1.5 % — SIGNIFICANT CHANGE UP (ref 0–6)
FIBRINOGEN PPP-MCNC: 195 MG/DL — LOW (ref 200–465)
GAS PNL BLDV: 138 MMOL/L — SIGNIFICANT CHANGE UP (ref 136–145)
GAS PNL BLDV: SIGNIFICANT CHANGE UP
GLUCOSE BLDV-MCNC: 158 MG/DL — HIGH (ref 70–99)
GLUCOSE SERPL-MCNC: 159 MG/DL — HIGH (ref 70–99)
HCO3 BLDV-SCNC: 33 MMOL/L — HIGH (ref 22–29)
HCT VFR BLD CALC: 28.1 % — LOW (ref 39–50)
HCT VFR BLD CALC: 30.6 % — LOW (ref 39–50)
HCT VFR BLD CALC: 33.3 % — LOW (ref 39–50)
HCT VFR BLD CALC: 33.3 % — LOW (ref 39–50)
HCT VFR BLDA CALC: 31 % — LOW (ref 39–51)
HGB BLD CALC-MCNC: 10.4 G/DL — LOW (ref 12.6–17.4)
HGB BLD-MCNC: 10.2 G/DL — LOW (ref 13–17)
HGB BLD-MCNC: 10.2 G/DL — LOW (ref 13–17)
HGB BLD-MCNC: 8.9 G/DL — LOW (ref 13–17)
HGB BLD-MCNC: 9.5 G/DL — LOW (ref 13–17)
IANC: 6.06 K/UL — SIGNIFICANT CHANGE UP (ref 1.8–7.4)
IMM GRANULOCYTES NFR BLD AUTO: 0.5 % — SIGNIFICANT CHANGE UP (ref 0–0.9)
INR BLD: 1.27 RATIO — HIGH (ref 0.85–1.18)
LACTATE BLDV-MCNC: 1.8 MMOL/L — SIGNIFICANT CHANGE UP (ref 0.5–2)
LIDOCAIN IGE QN: 40 U/L — SIGNIFICANT CHANGE UP (ref 7–60)
LYMPHOCYTES # BLD AUTO: 0.69 K/UL — LOW (ref 1–3.3)
LYMPHOCYTES # BLD AUTO: 9.4 % — LOW (ref 13–44)
MCHC RBC-ENTMCNC: 26.6 PG — LOW (ref 27–34)
MCHC RBC-ENTMCNC: 27.1 PG — SIGNIFICANT CHANGE UP (ref 27–34)
MCHC RBC-ENTMCNC: 27.6 PG — SIGNIFICANT CHANGE UP (ref 27–34)
MCHC RBC-ENTMCNC: 27.8 PG — SIGNIFICANT CHANGE UP (ref 27–34)
MCHC RBC-ENTMCNC: 30.6 GM/DL — LOW (ref 32–36)
MCHC RBC-ENTMCNC: 30.6 GM/DL — LOW (ref 32–36)
MCHC RBC-ENTMCNC: 31 GM/DL — LOW (ref 32–36)
MCHC RBC-ENTMCNC: 31.7 GM/DL — LOW (ref 32–36)
MCV RBC AUTO: 86.9 FL — SIGNIFICANT CHANGE UP (ref 80–100)
MCV RBC AUTO: 87.3 FL — SIGNIFICANT CHANGE UP (ref 80–100)
MCV RBC AUTO: 87.4 FL — SIGNIFICANT CHANGE UP (ref 80–100)
MCV RBC AUTO: 90.7 FL — SIGNIFICANT CHANGE UP (ref 80–100)
MONOCYTES # BLD AUTO: 0.38 K/UL — SIGNIFICANT CHANGE UP (ref 0–0.9)
MONOCYTES NFR BLD AUTO: 5.2 % — SIGNIFICANT CHANGE UP (ref 2–14)
NEUTROPHILS # BLD AUTO: 6.06 K/UL — SIGNIFICANT CHANGE UP (ref 1.8–7.4)
NEUTROPHILS NFR BLD AUTO: 82.7 % — HIGH (ref 43–77)
NRBC # BLD: 0 /100 WBCS — SIGNIFICANT CHANGE UP (ref 0–0)
NRBC # FLD: 0 K/UL — SIGNIFICANT CHANGE UP (ref 0–0)
PCO2 BLDV: 58 MMHG — HIGH (ref 42–55)
PH BLDV: 7.36 — SIGNIFICANT CHANGE UP (ref 7.32–7.43)
PLATELET # BLD AUTO: 117 K/UL — LOW (ref 150–400)
PLATELET # BLD AUTO: 132 K/UL — LOW (ref 150–400)
PLATELET # BLD AUTO: 132 K/UL — LOW (ref 150–400)
PLATELET # BLD AUTO: 155 K/UL — SIGNIFICANT CHANGE UP (ref 150–400)
PO2 BLDV: 21 MMHG — LOW (ref 25–45)
POTASSIUM BLDV-SCNC: 4.1 MMOL/L — SIGNIFICANT CHANGE UP (ref 3.5–5.1)
POTASSIUM SERPL-MCNC: 4.1 MMOL/L — SIGNIFICANT CHANGE UP (ref 3.5–5.3)
POTASSIUM SERPL-SCNC: 4.1 MMOL/L — SIGNIFICANT CHANGE UP (ref 3.5–5.3)
PROT SERPL-MCNC: 6.1 G/DL — SIGNIFICANT CHANGE UP (ref 6–8.3)
PROTHROM AB SERPL-ACNC: 14.1 SEC — HIGH (ref 9.5–13)
RBC # BLD: 3.22 M/UL — LOW (ref 4.2–5.8)
RBC # BLD: 3.5 M/UL — LOW (ref 4.2–5.8)
RBC # BLD: 3.67 M/UL — LOW (ref 4.2–5.8)
RBC # BLD: 3.83 M/UL — LOW (ref 4.2–5.8)
RBC # FLD: 14 % — SIGNIFICANT CHANGE UP (ref 10.3–14.5)
RBC # FLD: 15.9 % — HIGH (ref 10.3–14.5)
RBC # FLD: 16.1 % — HIGH (ref 10.3–14.5)
RBC # FLD: 16.2 % — HIGH (ref 10.3–14.5)
RH IG SCN BLD-IMP: POSITIVE — SIGNIFICANT CHANGE UP
SAO2 % BLDV: 22.2 % — LOW (ref 67–88)
SODIUM SERPL-SCNC: 141 MMOL/L — SIGNIFICANT CHANGE UP (ref 135–145)
TROPONIN T, HIGH SENSITIVITY RESULT: 56 NG/L — CRITICAL HIGH
WBC # BLD: 10.5 K/UL — SIGNIFICANT CHANGE UP (ref 3.8–10.5)
WBC # BLD: 10.79 K/UL — HIGH (ref 3.8–10.5)
WBC # BLD: 7.33 K/UL — SIGNIFICANT CHANGE UP (ref 3.8–10.5)
WBC # BLD: 8.47 K/UL — SIGNIFICANT CHANGE UP (ref 3.8–10.5)
WBC # FLD AUTO: 10.5 K/UL — SIGNIFICANT CHANGE UP (ref 3.8–10.5)
WBC # FLD AUTO: 10.79 K/UL — HIGH (ref 3.8–10.5)
WBC # FLD AUTO: 7.33 K/UL — SIGNIFICANT CHANGE UP (ref 3.8–10.5)
WBC # FLD AUTO: 8.47 K/UL — SIGNIFICANT CHANGE UP (ref 3.8–10.5)

## 2023-08-21 PROCEDURE — 74174 CTA ABD&PLVS W/CONTRAST: CPT | Mod: 26,MA

## 2023-08-21 PROCEDURE — 99233 SBSQ HOSP IP/OBS HIGH 50: CPT | Mod: GC

## 2023-08-21 PROCEDURE — 99222 1ST HOSP IP/OBS MODERATE 55: CPT

## 2023-08-21 PROCEDURE — 99223 1ST HOSP IP/OBS HIGH 75: CPT | Mod: GC

## 2023-08-21 PROCEDURE — 99223 1ST HOSP IP/OBS HIGH 75: CPT

## 2023-08-21 PROCEDURE — 99291 CRITICAL CARE FIRST HOUR: CPT

## 2023-08-21 RX ORDER — SODIUM CHLORIDE 9 MG/ML
500 INJECTION, SOLUTION INTRAVENOUS ONCE
Refills: 0 | Status: COMPLETED | OUTPATIENT
Start: 2023-08-21 | End: 2023-08-21

## 2023-08-21 RX ORDER — MONTELUKAST 4 MG/1
10 TABLET, CHEWABLE ORAL DAILY
Refills: 0 | Status: DISCONTINUED | OUTPATIENT
Start: 2023-08-21 | End: 2023-08-28

## 2023-08-21 RX ORDER — FINASTERIDE 5 MG/1
5 TABLET, FILM COATED ORAL DAILY
Refills: 0 | Status: DISCONTINUED | OUTPATIENT
Start: 2023-08-21 | End: 2023-08-28

## 2023-08-21 RX ORDER — ATORVASTATIN CALCIUM 80 MG/1
10 TABLET, FILM COATED ORAL AT BEDTIME
Refills: 0 | Status: DISCONTINUED | OUTPATIENT
Start: 2023-08-21 | End: 2023-08-28

## 2023-08-21 RX ORDER — DILTIAZEM HCL 120 MG
120 CAPSULE, EXT RELEASE 24 HR ORAL DAILY
Refills: 0 | Status: DISCONTINUED | OUTPATIENT
Start: 2023-08-21 | End: 2023-08-21

## 2023-08-21 RX ORDER — ALBUTEROL 90 UG/1
2 AEROSOL, METERED ORAL EVERY 6 HOURS
Refills: 0 | Status: DISCONTINUED | OUTPATIENT
Start: 2023-08-21 | End: 2023-08-28

## 2023-08-21 RX ORDER — PANTOPRAZOLE SODIUM 20 MG/1
40 TABLET, DELAYED RELEASE ORAL
Refills: 0 | Status: DISCONTINUED | OUTPATIENT
Start: 2023-08-21 | End: 2023-08-22

## 2023-08-21 RX ORDER — TAMSULOSIN HYDROCHLORIDE 0.4 MG/1
0.4 CAPSULE ORAL AT BEDTIME
Refills: 0 | Status: DISCONTINUED | OUTPATIENT
Start: 2023-08-21 | End: 2023-08-28

## 2023-08-21 RX ORDER — SODIUM CHLORIDE 9 MG/ML
500 INJECTION INTRAMUSCULAR; INTRAVENOUS; SUBCUTANEOUS ONCE
Refills: 0 | Status: COMPLETED | OUTPATIENT
Start: 2023-08-21 | End: 2023-08-21

## 2023-08-21 RX ORDER — DILTIAZEM HCL 120 MG
120 CAPSULE, EXT RELEASE 24 HR ORAL DAILY
Refills: 0 | Status: DISCONTINUED | OUTPATIENT
Start: 2023-08-21 | End: 2023-08-22

## 2023-08-21 RX ORDER — METOPROLOL TARTRATE 50 MG
25 TABLET ORAL DAILY
Refills: 0 | Status: DISCONTINUED | OUTPATIENT
Start: 2023-08-21 | End: 2023-08-22

## 2023-08-21 RX ADMIN — SODIUM CHLORIDE 2000 MILLILITER(S): 9 INJECTION, SOLUTION INTRAVENOUS at 18:26

## 2023-08-21 RX ADMIN — TAMSULOSIN HYDROCHLORIDE 0.4 MILLIGRAM(S): 0.4 CAPSULE ORAL at 23:31

## 2023-08-21 RX ADMIN — SODIUM CHLORIDE 500 MILLILITER(S): 9 INJECTION INTRAMUSCULAR; INTRAVENOUS; SUBCUTANEOUS at 10:48

## 2023-08-21 RX ADMIN — SODIUM CHLORIDE 2000 MILLILITER(S): 9 INJECTION, SOLUTION INTRAVENOUS at 23:31

## 2023-08-21 RX ADMIN — ATORVASTATIN CALCIUM 10 MILLIGRAM(S): 80 TABLET, FILM COATED ORAL at 23:31

## 2023-08-21 NOTE — CONSULT NOTE ADULT - SUBJECTIVE AND OBJECTIVE BOX
CHIEF COMPLAINT: GI bleeding    HPI:  73y Male with history of recurrent diverticular bleeding s/p multiple colonoscopies (no interventions) and prior +NM bleeding scan (5/2023 with active bleeding in cecum/proximal colon, no intervention), prostate cancer s/p radiation c/b radiation-associated vascular ectasia, internal hemorrhoids, HTN, asthma, recently dx afib s/p ablation started Plavix/ASA 1 week who now presents with complaints recurrent painless BRBPR.     Patient had one episode of bloody red blood early morning without n/v or abdominal pain, but given new AC medication and history he became concerned so he presented to the ED. Denies CP, sob or dizziness/light-headedness, syncope.       Patient has been HDS w/ borderline tachycardia Hgb 10.2 from 13 s/p 2u pRBC. Repeat hgb ~10. INR 1.27. BUN 23 from 14 and Cr 1.01. GI consulted in the ED, but no plan for urgent intervention. 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     PAST MEDICAL & SURGICAL HISTORY:  Hypertension      Hyperlipidemia      GIB (gastrointestinal bleeding)      Prostate cancer  s/p radiation therapy      Gout      Diverticulitis      Asthma      Atrial flutter      History of hemorrhoidectomy      H/O total knee replacement, right          FAMILY HISTORY:      Allergies    No Known Allergies    Intolerances        HOME MEDICATIONS:    REVIEW OF SYSTEMS:  CONSTITUTIONAL: No fever, no chills, no weight loss  EYES: No eye pain, no visual disturbance  RESPIRATORY: No cough, No SOB  CARDIOVASCULAR: No CP, no palpitations  GASTROINTESTINAL: no abdominal pain, no n/v/d, +hematochezia  GENITOURINARY: No dysuria, no hematuria  HEME: No easy bruising, no bleeding gums  NEURO: No headaches, no weakness  SKIN: No itching, no rashes  MSK: No joint pain, no joint swelling    OBJECTIVE:  ICU Vital Signs Last 24 Hrs  T(C): 36.6 (21 Aug 2023 15:35), Max: 36.8 (21 Aug 2023 09:36)  T(F): 97.8 (21 Aug 2023 15:35), Max: 98.3 (21 Aug 2023 09:36)  HR: 98 (21 Aug 2023 15:35) (73 - 98)  BP: 104/74 (21 Aug 2023 15:35) (102/84 - 143/90)  BP(mean): --  ABP: --  ABP(mean): --  RR: 16 (21 Aug 2023 15:35) (16 - 18)  SpO2: 100% (21 Aug 2023 15:35) (100% - 100%)    O2 Parameters below as of 21 Aug 2023 15:35  Patient On (Oxygen Delivery Method): room air              CAPILLARY BLOOD GLUCOSE          PHYSICAL EXAM:  .  VITAL SIGNS:  T(C): 36.6 (08-21-23 @ 15:35), Max: 36.8 (08-21-23 @ 09:36)  T(F): 97.8 (08-21-23 @ 15:35), Max: 98.3 (08-21-23 @ 09:36)  HR: 98 (08-21-23 @ 15:35) (73 - 98)  BP: 104/74 (08-21-23 @ 15:35) (102/84 - 143/90)  BP(mean): --  RR: 16 (08-21-23 @ 15:35) (16 - 18)  SpO2: 100% (08-21-23 @ 15:35) (100% - 100%)  Wt(kg): --    PHYSICAL EXAM:    Constitutional: WDWN resting comfortably in bed; NAD  Head: NC/AT  Eyes: PERRL, EOMI, anicteric sclera  ENT: no nasal discharge; uvula midline, no oropharyngeal erythema or exudates; MMM  Neck: supple; no JVD or thyromegaly  Respiratory: CTA B/L; no W/R/R, no retractions  Cardiac: +S1/S2; RRR; no M/R/G; PMI non-displaced  Gastrointestinal: soft, NT/ND; no rebound or guarding; +BSx4  Genitourinary: normal external genitalia  Back: spine midline, no bony tenderness or step-offs; no CVAT B/L  Extremities: WWP, no clubbing or cyanosis; no peripheral edema. Capillary refill <2 sec  Musculoskeletal: NROM x4; no joint swelling, tenderness or erythema  Vascular: 2+ radial, femoral, DP/PT pulses B/L  Dermatologic: skin warm, dry and intact; no rashes, wounds, or scars  Lymphatic: no submandibular or cervical LAD  Neurologic: AAOx3; CNII-XII grossly intact; no focal deficits  Psychiatric: affect and characteristics of appearance, verbalizations, behaviors are appropriate    HOSPITAL MEDICATIONS:  MEDICATIONS  (STANDING):    MEDICATIONS  (PRN):      LABS:                        10.2   8.47  )-----------( 132      ( 21 Aug 2023 14:50 )             33.3     08-21    141  |  102  |  23  ----------------------------<  159<H>  4.1   |  30  |  1.01    Ca    8.7      21 Aug 2023 10:43  Phos  3.0     08-21  Mg     1.90     08-21    TPro  6.1  /  Alb  3.5  /  TBili  0.5  /  DBili  x   /  AST  12  /  ALT  12  /  AlkPhos  73  08-21    PT/INR - ( 21 Aug 2023 11:20 )   PT: 14.1 sec;   INR: 1.27 ratio         PTT - ( 21 Aug 2023 11:20 )  PTT:32.6 sec  Urinalysis Basic - ( 21 Aug 2023 10:43 )    Color: x / Appearance: x / SG: x / pH: x  Gluc: 159 mg/dL / Ketone: x  / Bili: x / Urobili: x   Blood: x / Protein: x / Nitrite: x   Leuk Esterase: x / RBC: x / WBC x   Sq Epi: x / Non Sq Epi: x / Bacteria: x        Venous Blood Gas:  08-21 @ 10:10  7.36/58/21/33/22.2  VBG Lactate: 1.8      MICROBIOLOGY:     RADIOLOGY:  [ ] Reviewed and interpreted by me    < from: CT Angio Abdomen and Pelvis w/ IV Cont (08.21.23 @ 12:19) >  IMPRESSION:  No CT evidence of active GI bleed. Pancolonic diverticulosis without   diverticulitis.    < end of copied text >   CHIEF COMPLAINT: GI bleeding    HPI:  73y Male with history of recurrent diverticular bleeding s/p multiple colonoscopies (no interventions) and prior +NM bleeding scan (5/2023 with active bleeding in cecum/proximal colon, no intervention), prostate cancer s/p radiation c/b radiation-associated vascular ectasia, internal hemorrhoids, HTN, asthma, recently dx afib s/p ablation started Plavix/ASA 1 week who now presents with complaints recurrent painless BRBPR.     Patient had one episode of bloody red blood early morning, but given new AC medication and history he became concerned so he presented to the ED. Pt does report nausea intially w/ some dizziness, now improved. Denies CP, sob, vomiting, abdominal pain, hematuria, hemoptysis, hematemesis or syncope.       In the ED, HDS w/ borderline tachycardia. Hgb 10.2 from 13 s/p 2u pRBC. Repeat hgb ~10. INR 1.27. BUN 23 from 14 and Cr 1.01. GI consulted in the ED, but no plan for urgent intervention. 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     PAST MEDICAL & SURGICAL HISTORY:  Hypertension      Hyperlipidemia      GIB (gastrointestinal bleeding)      Prostate cancer  s/p radiation therapy      Gout      Diverticulitis      Asthma      Atrial flutter      History of hemorrhoidectomy      H/O total knee replacement, right          FAMILY HISTORY:      Allergies    No Known Allergies    Intolerances        HOME MEDICATIONS:    REVIEW OF SYSTEMS:  CONSTITUTIONAL: No fever, no chills, no weight loss  EYES: No eye pain, no visual disturbance  RESPIRATORY: No cough, No SOB  CARDIOVASCULAR: No CP, no palpitations  GASTROINTESTINAL: no abdominal pain, no n/v/d, +hematochezia  GENITOURINARY: No dysuria, no hematuria  HEME: No easy bruising, no bleeding gums  NEURO: No headaches, no weakness  SKIN: No itching, no rashes  MSK: No joint pain, no joint swelling    OBJECTIVE:  ICU Vital Signs Last 24 Hrs  T(C): 36.6 (21 Aug 2023 15:35), Max: 36.8 (21 Aug 2023 09:36)  T(F): 97.8 (21 Aug 2023 15:35), Max: 98.3 (21 Aug 2023 09:36)  HR: 98 (21 Aug 2023 15:35) (73 - 98)  BP: 104/74 (21 Aug 2023 15:35) (102/84 - 143/90)  BP(mean): --  ABP: --  ABP(mean): --  RR: 16 (21 Aug 2023 15:35) (16 - 18)  SpO2: 100% (21 Aug 2023 15:35) (100% - 100%)    O2 Parameters below as of 21 Aug 2023 15:35  Patient On (Oxygen Delivery Method): room air              CAPILLARY BLOOD GLUCOSE          PHYSICAL EXAM:  .  VITAL SIGNS:  T(C): 36.6 (08-21-23 @ 15:35), Max: 36.8 (08-21-23 @ 09:36)  T(F): 97.8 (08-21-23 @ 15:35), Max: 98.3 (08-21-23 @ 09:36)  HR: 98 (08-21-23 @ 15:35) (73 - 98)  BP: 104/74 (08-21-23 @ 15:35) (102/84 - 143/90)  BP(mean): --  RR: 16 (08-21-23 @ 15:35) (16 - 18)  SpO2: 100% (08-21-23 @ 15:35) (100% - 100%)  Wt(kg): --    PHYSICAL EXAM:    Constitutional: WDWN resting comfortably in bed; NAD  Head: NC/AT  Eyes: PERRL, EOMI, anicteric sclera  ENT: no nasal discharge; uvula midline, no oropharyngeal erythema or exudates; MMM  Neck: supple; no JVD or thyromegaly  Respiratory: CTA B/L; no W/R/R, no retractions  Cardiac: +S1/S2; RRR; no M/R/G; PMI non-displaced  Gastrointestinal: soft, NT/ND; no rebound or guarding; +BSx4  Genitourinary: normal external genitalia  Back: spine midline, no bony tenderness or step-offs; no CVAT B/L  Extremities: WWP, no clubbing or cyanosis; no peripheral edema. Capillary refill <2 sec  Musculoskeletal: NROM x4; no joint swelling, tenderness or erythema  Vascular: 2+ radial, femoral, DP/PT pulses B/L  Dermatologic: skin warm, dry and intact; no rashes, wounds, or scars  Lymphatic: no submandibular or cervical LAD  Neurologic: AAOx3; CNII-XII grossly intact; no focal deficits  Psychiatric: affect and characteristics of appearance, verbalizations, behaviors are appropriate    HOSPITAL MEDICATIONS:  MEDICATIONS  (STANDING):    MEDICATIONS  (PRN):      LABS:                        10.2   8.47  )-----------( 132      ( 21 Aug 2023 14:50 )             33.3     08-21    141  |  102  |  23  ----------------------------<  159<H>  4.1   |  30  |  1.01    Ca    8.7      21 Aug 2023 10:43  Phos  3.0     08-21  Mg     1.90     08-21    TPro  6.1  /  Alb  3.5  /  TBili  0.5  /  DBili  x   /  AST  12  /  ALT  12  /  AlkPhos  73  08-21    PT/INR - ( 21 Aug 2023 11:20 )   PT: 14.1 sec;   INR: 1.27 ratio         PTT - ( 21 Aug 2023 11:20 )  PTT:32.6 sec  Urinalysis Basic - ( 21 Aug 2023 10:43 )    Color: x / Appearance: x / SG: x / pH: x  Gluc: 159 mg/dL / Ketone: x  / Bili: x / Urobili: x   Blood: x / Protein: x / Nitrite: x   Leuk Esterase: x / RBC: x / WBC x   Sq Epi: x / Non Sq Epi: x / Bacteria: x        Venous Blood Gas:  08-21 @ 10:10  7.36/58/21/33/22.2  VBG Lactate: 1.8      MICROBIOLOGY:     RADIOLOGY:  [ ] Reviewed and interpreted by me    < from: CT Angio Abdomen and Pelvis w/ IV Cont (08.21.23 @ 12:19) >  IMPRESSION:  No CT evidence of active GI bleed. Pancolonic diverticulosis without   diverticulitis.    < end of copied text >

## 2023-08-21 NOTE — H&P ADULT - NSHPPHYSICALEXAM_GEN_ALL_CORE
T(C): 36.6 (08-21-23 @ 15:35), Max: 36.8 (08-21-23 @ 09:36)  HR: 98 (08-21-23 @ 15:35) (73 - 98)  BP: 104/74 (08-21-23 @ 15:35) (102/84 - 143/90)  RR: 16 (08-21-23 @ 15:35) (16 - 18)  SpO2: 100% (08-21-23 @ 15:35) (100% - 100%)    **CONSTITUTIONAL: Well groomed, no apparent distress  EYES: PERRLA and symmetric, EOMI, No conjunctival or scleral injection, non-icteric  ENMT: Oral mucosa with moist membranes. Normal dentition; no pharyngeal injection or exudates             NECK: Supple, symmetric and without tracheal deviation   RESP: No respiratory distress, no use of accessory muscles; CTA b/l, no WRR  CV: RRR, +S1S2, no MRG; no JVD; no peripheral edema  GI: Soft, NT, ND, no rebound, no guarding; no palpable masses; no hepatosplenomegaly; no hernia palpated  LYMPH: No cervical LAD or tenderness; no axillary LAD or tenderness; no inguinal LAD or tenderness  MSK: Normal gait; No digital clubbing or cyanosis; examination of the (head/neck/spine/ribs/pelvis, RUE, LUE, RLE, LLE) without misalignment,            Normal ROM without pain, no spinal tenderness, normal muscle strength/tone  SKIN: No rashes or ulcers noted; no subcutaneous nodules or induration palpable  NEURO: CN II-XII intact; normal reflexes in upper and lower extremities, sensation intact in upper and lower extremities b/l to light touch   PSYCH: Appropriate insight/judgment; A+O x 3, mood and affect appropriate, recent/remote memory intact T(C): 36.6 (08-21-23 @ 15:35), Max: 36.8 (08-21-23 @ 09:36)  HR: 98 (08-21-23 @ 15:35) (73 - 98)  BP: 104/74 (08-21-23 @ 15:35) (102/84 - 143/90)  RR: 16 (08-21-23 @ 15:35) (16 - 18)  SpO2: 100% (08-21-23 @ 15:35) (100% - 100%)    CONSTITUTIONAL: Well groomed, no apparent distress  EYES: PERRLA and symmetric, EOMI, No conjunctival or scleral injection, non-icteric  ENMT: Oral mucosa with moist membranes. Normal dentition; no pharyngeal injection or exudates  NECK: Supple, symmetric and without tracheal deviation   RESP: No respiratory distress, no use of accessory muscles; CTA b/l, no WRR  CV: RRR, +S1S2, no MRG; no JVD; no peripheral edema  GI: Soft, NT, ND, no rebound, no guarding; no palpable masses; no hepatosplenomegaly; no hernia palpated  LYMPH: No cervical LAD or tenderness; no axillary LAD or tenderness; no inguinal LAD or tenderness  MSK: Normal gait; No digital clubbing or cyanosis; examination of the (head/neck/spine/ribs/pelvis, RUE, LUE, RLE, LLE) without misalignment,   Normal ROM without pain, no spinal tenderness, normal muscle strength/tone  SKIN: No rashes or ulcers noted; no subcutaneous nodules or induration palpable  NEURO: CN II-XII intact; normal reflexes in upper and lower extremities, sensation intact in upper and lower extremities b/l to light touch   PSYCH: Appropriate insight/judgment; A+O x 3, mood and affect appropriate, recent/remote memory intact ICU Vital Signs Last 24 Hrs  T(C): 36.6 (21 Aug 2023 18:16), Max: 36.8 (21 Aug 2023 09:36)  T(F): 97.9 (21 Aug 2023 18:16), Max: 98.3 (21 Aug 2023 09:36)  HR: 102 (21 Aug 2023 18:16) (73 - 104)  BP: 86/63 (21 Aug 2023 18:25) (78/57 - 143/90)  RR: 18 (21 Aug 2023 18:25) (16 - 18)  SpO2: 100% (21 Aug 2023 18:25) (100% - 100%)    O2 Parameters below as of 21 Aug 2023 18:25  Patient On (Oxygen Delivery Method): room air    CONSTITUTIONAL: Well groomed, no apparent distress. Pale   EYES: PERRLA and symmetric, EOMI, No conjunctival or scleral injection, non-icteric  ENMT: Oral mucosa with moist membranes. Normal dentition; no pharyngeal injection or exudates  NECK: Supple, symmetric and without tracheal deviation   RESP: No respiratory distress, no use of accessory muscles; CTA b/l, no wheezes or rales   CV: Tachycardic with regular rhythm. +S1S2. No JVD; no peripheral edema  GI: Soft, NT, ND, no rebound, no guarding; no palpable masses; no hepatosplenomegaly. Patient with dark red liquid stool.   LYMPH: No cervical LAD or tenderness; no axillary LAD or tenderness; no inguinal LAD or tenderness  MSK: No digital clubbing or cyanosis; normal ROM of neck without pain, normal muscle strength/tone in 4 extremities   SKIN: Purpura at site of catheter site from cardiac procedure on upper R thigh and inguinal region; soft, non-tender to palpation. No rashes or ulcers noted.  NEURO: CN II-XII intact;, sensation intact in upper and lower extremities b/l to light touch   PSYCH: Appropriate insight/judgment; A+O x 3, mood and affect appropriate, recent/remote memory intact

## 2023-08-21 NOTE — ED ADULT NURSE NOTE - OBJECTIVE STATEMENT
Pt received to rm 12, awake and alert, A&OX4, ambulatory. Reports having BRBPR with bowel movement this morning. Feeling dizzy and weak. Appears pale. Wife at bedside states his BP was low at home. Recent had ablation for afib and needed multiple blood transfusions for rectal bleeding in May. Respirations even and unlabored. Denies CP, SOB, N/V, HA, dizziness, palpitations, blurry vision. 20G IV placed to BL ACs. Bed in lowest position, call bell within reach. Safety maintained.

## 2023-08-21 NOTE — ED PROVIDER NOTE - PHYSICAL EXAMINATION
PHYSICAL EXAM:  GENERAL: Pale  HENMT: Atraumatic, moist mucous membranes EYES: Clear bilaterally, PERRL, EOMs intact b/l  HEART: Regular rate and regular rhythm, S1/S2, no murmur  RESPIRATORY: Clear to auscultation bilaterally, no wheezes/rhonchi/rales  ABDOMEN: Soft, nontender, nondistended  RECTAL: with Dr. Bergman - no external or internal hemorrhoids, no fissures, small amount of coagulated blood  EXTREMITIES: No lower extremity edema  NEURO: A&Ox4, no focal neuro deficits  SKIN: Skin warm, dry and intact. No evidence of rash

## 2023-08-21 NOTE — ED ADULT TRIAGE NOTE - CHIEF COMPLAINT QUOTE
pt having large episode bright red bloody BM + weakness, dizziness at time, low BP at home as per wife. pt pale, hx of gib on plavix on asa for afib.

## 2023-08-21 NOTE — H&P ADULT - PROBLEM SELECTOR PLAN 3
- Patient is 1 week s/p Watchman procedure w/ Dr. Gaurav Marie and was started on ASA/Plavix at that time  - Holding for now ISO lower GI bleed  - Cards consulted regarding anti-coagulation management and risk vs benefit discussion - Patient is 1 week s/p ablation and Watchman placement w/ Dr. Gaurav Marie (8/14) and was started on ASA/Plavix at that time  - Holding for now ISO lower GI bleed  - Cards consulted regarding anti-coagulation management and risk vs benefit discussion; Dr. Marie evaluated at bedside, agreed with d/c of aspirin and plavix

## 2023-08-21 NOTE — H&P ADULT - PROBLEM SELECTOR PLAN 5
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   - Strong suspicion for diverticular hemorrhage considering similarity of this presentation compared to those in 5/2023  - GI consulted

## 2023-08-21 NOTE — CONSULT NOTE ADULT - ASSESSMENT
73y Male with history of recurrent diverticular bleeding s/p multiple colonoscopies (no interventions) and prior +NM bleeding scan (5/2023 with active bleeding in cecum/proximal colon, no intervention), prostate cancer s/p radiation c/b radiation-associated vascular ectasia, internal hemorrhoids, HTN, asthma, recently dx afib s/p ablation started Plavix/ASA 1 week PTA) who now presents with complaints recurrent painless BRBPR i/s/o recent initiation of anti-plt. s/p 2u pRBC without improvement in hgb. MICU consulted for further evaluation.    #Hematochezia    INCOMPLETE 73y Male with history of recurrent diverticular bleeding s/p multiple colonoscopies (no interventions) and prior +NM bleeding scan (5/2023 with active bleeding in cecum/proximal colon, no intervention), prostate cancer s/p radiation c/b radiation-associated vascular ectasia, internal hemorrhoids, HTN, asthma, recently dx afib s/p ablation started Plavix/ASA 1 week PTA) who now presents with complaints recurrent painless BRBPR i/s/o recent initiation of anti-plt. s/p 2u pRBC without improvement in hgb. MICU consulted for further evaluation.     #Hematochezia  Pt hematochezia likely 2/2 known diverticular bleed vs radiation ectasia i/s/o DAPT initiation. Pt and ED nurse reporting bleed is now dark w/ stool, no longer bright red. HDS  - CTA without any acute findings  - GI and IR following, appreciate recs; no indication for acute intervention  - s/p 2u pRBC, repeat CBC hgb 10.2 Recommend giving another 1u pRBC  - Consider frequent CBC q8 initially, transfuse as appropriate  -- If pt requires additional pRBC, consider transfusing FFP as well  - Cardiology /EP consult given DAPT and GI bleed  - Pt's bleeding appears to be improving w/ dark blood and clots. Given pt HR stable ~90 and BP MAP>90, pt does not have urgent need for MICU  - Low threshold for MICU consult again if pt becomes hemodynamically unstable with overt bleeding.    Case discussed w/ MICU Attending, Dr. Hannah    Thank you for involving us in this patient's care.    Heladio Barillas MD  IM Resident, PGY-3  Available via [employer] Teams

## 2023-08-21 NOTE — H&P ADULT - NSHPREVIEWOFSYSTEMS_GEN_ALL_CORE
**REVIEW OF SYSTEMS:    CONSTITUTIONAL: No weakness, fevers or chills  EYES/ENT: No visual changes;  No vertigo or throat pain   NECK: No pain or stiffness  RESPIRATORY: No cough, wheezing, hemoptysis; No shortness of breath  CARDIOVASCULAR: No chest pain or palpitations  GASTROINTESTINAL: No abdominal or epigastric pain. No nausea, vomiting, or hematemesis; No diarrhea or constipation. No melena or hematochezia.  GENITOURINARY: No dysuria, frequency or hematuria  NEUROLOGICAL: No numbness or weakness  SKIN: No itching, rashes REVIEW OF SYSTEMS:    CONSTITUTIONAL: No fevers or chills, +weakness  EYES/ENT: No visual changes;  No vertigo or throat pain   NECK: No pain or stiffness  RESPIRATORY: No cough, wheezing, hemoptysis; No shortness of breath  CARDIOVASCULAR: +Intermittent CP  GASTROINTESTINAL: +Bloody BM, bright red w/ clots, nausea. No abdominal or epigastric pain. No vomiting or hematemesis  GENITOURINARY: No dysuria, frequency or hematuria  NEUROLOGICAL: No focal deficits   SKIN: No itching, rashes

## 2023-08-21 NOTE — ED PROVIDER NOTE - OBJECTIVE STATEMENT
73 male with past medical history GI bleed, A-fib on Plavix and aspirin, diverticulitis, hypertension, hyperlipidemia, prostate cancer, asthma presenting with bright red bloody bowel movements, weakness, dizziness.  Wife at bedside for collateral.  This morning patient had a large bowel movement with bright red blood and then started feeling dizzy, weak.  Patient's blood pressure at home was low and wife notes that he is pale.  Patient was restarted on his Plavix and aspirin last week for afib. 73 male with past medical history GI bleed, diverticulitis, A-fib on Plavix and aspirin, diverticulitis, hypertension, hyperlipidemia, prostate cancer, asthma presenting with bright red bloody bowel movements, weakness, dizziness.  Wife at bedside for collateral.  This morning patient had a large bowel movement with bright red blood and then started feeling dizzy, weak.  Patient's blood pressure at home was low and wife notes that he is pale.  Patient was restarted on his Plavix and aspirin last week for afib. Pt had a diverticular GI bleed back in May and required 5u pRBCs. The bleed stopped and pt did not require an intervention. Denies fever, abdominal pain, CP, SOB, urinary symptoms.

## 2023-08-21 NOTE — H&P ADULT - PROBLEM SELECTOR PLAN 2
- Pt w/ Hb 10.2 (down from 13.5 8/15) ISO lower GI bleed w/ BRBPR; Hb unchanged after 2U pRBC  - MICU consulted; not admitting at this time but recommended additional unit and q8 CBC  - 3rd unit pRBC ordered; will recheck CBC and fibrinogen   - /74 on presentation; initially improved following blood transfusions but upon evaluation ~6PM, 104/74 - Pt w/ Hb 10.2 (down from 13.5 8/15) ISO lower GI bleed w/ BRBPR; Hb unchanged after 2U pRBC  - MICU consulted; not admitting at this time but recommended additional pRBC unit   - 3rd unit pRBC ordered; will recheck CBC and fibrinogen   - /74 on presentation; initially improved following blood transfusions, began dropping and currently MAP 65-75.

## 2023-08-21 NOTE — H&P ADULT - PROBLEM SELECTOR PLAN 6
- Pt w/ PMH HTN, however currently MAP in low 70s  - Continue to monitor and hold BP meds if systolic BP <100, diastolic BP <60

## 2023-08-21 NOTE — ED ADULT NURSE NOTE - NSFALLUNIVINTERV_ED_ALL_ED
Bed/Stretcher in lowest position, wheels locked, appropriate side rails in place/Call bell, personal items and telephone in reach/Instruct patient to call for assistance before getting out of bed/chair/stretcher/Non-slip footwear applied when patient is off stretcher/[place] to call system/Physically safe environment - no spills, clutter or unnecessary equipment/Purposeful proactive rounding/Room/bathroom lighting operational, light cord in reach

## 2023-08-21 NOTE — H&P ADULT - ATTENDING COMMENTS
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  The patient had one large BM with bright red blood this am associated weakness, dizziness, nausea and chest pain, but no shortness of breath or syncope.. BP at home was low and his wife felt he looked pale. In the ED he has had 3-4 additional loose bright red to maroon BMs with associated clots and reports dizziness/lightheadedness immediately following each BM, associated with low BPs.    MAP ranging from 65-75,   Hgb 10.2  Exam notable for R groin ecchymosis and tachycardia  #GIB: concerning for recurrent diverticular etiology versus radiation associated inflammation; also with low MAP, s/p 3 U PRBC, close monitoring of H/H, hold ASA/plavix, will give IVF and repeat transfusions pending repeat h/h, consult GI, MICU and colorectal surgery.   Case d/w Dr. Patel of EP

## 2023-08-21 NOTE — H&P ADULT - HISTORY OF PRESENT ILLNESS
73y Male with PMHx of recurrent diverticular bleeding s/p multiple colonoscopies (no interventions) and prior +nuclear medicine bleeding scan (5/2023 with active bleeding in cecum/proximal colon, no intervention), prostate cancer s/p radiation c/b radiation-associated vascular ectasia, internal hemorrhoids, HTN, asthma, recently dx afib s/p ablation started Plavix/ASA 1 week who now presents with  recurrent painless BRBPR.     Patient had one large BM with bright red blood earlier this morning and began feeling weak and dizzy. No n/v or abdominal pain at the time. Patient took BP at home and said it was low, and wife was concerned that he looked pale. Pt is recently s/p watchman procedure and was restarted on a/c 1 week ago, so he became concerned and came to the ED. Denies CP, sob or dizziness/light-headedness, syncope.       In ED: Patient has been HDS w/ borderline tachycardia Hgb 10.2 from 13 s/p 2u pRBC. Repeat hgb ~10. INR 1.27. BUN 23 from 14 and Cr 1.01. GI consulted in the ED, but no plan for urgent intervention.     Of note, last EGD/colon 5/2023 in setting of hematochezia which required 5U pRBC transfusion due to diverticular hemorrhage. EGD was unremarkable aside for small hiatal hernia, and colonoscopy revealed severe diverticulosis without evidence of active bleeding and a subtle increase in vasculare in patchy distribution in rectum, likely mild radiation-associated vascular ectasia (RAVE), and non-bleeding internal hemorrhoids.    73y Male with PMHx of recurrent diverticular bleeding s/p multiple colonoscopies (no interventions) and prior +nuclear medicine bleeding scan (5/2023 with active bleeding in cecum/proximal colon, no intervention), prostate cancer s/p radiation c/b radiation-associated vascular ectasia, internal hemorrhoids, HTN, asthma, recently dx A.fib s/p Watchman procedure and initiation of A/c with Plavix/ASA 1 week ago who now presents with  recurrent painless BRBPR.    Patient had one large BM with bright red blood earlier this morning and began feeling weak, dizzy, and nauseous. No vomiting or abdominal pain at the time. Patient took BP at home and said it was low, and wife was concerned that he looked pale. Pt was especially concerned due to recently restarting A/c, so he came to the ED Patient explained he had similar BRBPR back in May 2023 on two separate occasions, but denies any blood in the stool since then. Denies CP, sob or dizziness/light-headedness, syncope.       In ED: Patient has been HDS w/ borderline tachycardia Hgb 10.2 from 13 s/p 2u pRBC. Repeat hgb ~10. INR 1.27. BUN 23 from 14 and Cr 1.01. GI consulted in the ED, but no plan for urgent intervention. CTA without any signs of acute GI bleed. IR consulted for potential embolization however no indication given negative CTA. MICU consulted for possible admission given no improvement in Hb s/p 2U pRBC; however pt not a candidate at this time and instead MICU recommended 3rd unit pRBC.    Of note, last EGD/colon 5/2023 in setting of hematochezia which required 5U pRBC transfusion due to diverticular hemorrhage. EGD was unremarkable aside for small hiatal hernia, and colonoscopy revealed severe diverticulosis without evidence of active bleeding and a subtle increase in vasculare in patchy distribution in rectum, likely mild radiation-associated vascular ectasia (RAVE), and non-bleeding internal hemorrhoids.    73y Male with PMHx of recurrent diverticular bleeding s/p multiple colonoscopies (no interventions) and prior +nuclear medicine bleeding scan (5/2023 with active bleeding in cecum/proximal colon, no intervention), prostate cancer s/p radiation c/b radiation-associated vascular ectasia, internal hemorrhoids, HTN, asthma, recently dx A.fib s/p Watchman procedure and initiation of A/c with Plavix/ASA 1 week ago who now presents with  recurrent painless BRBPR.    Patient had one large BM with bright red blood earlier this morning and began feeling weak, dizzy, and nauseous. No vomiting or abdominal pain at the time. Patient took BP at home and said it was low, and wife was concerned that he looked pale. Pt was especially concerned due to recently restarting A/c, so he came to the ED Patient explained he had similar BRBPR back in May 2023 on two separate occasions, but denies any blood in the stool since then. Denies CP, sob or dizziness/light-headedness, syncope.       In ED: Patient has been HDS w/ borderline tachycardia Hgb 10.2 from 13 s/p 2u pRBC. Repeat hgb ~10. INR 1.27. BUN 23 from 14 and Cr 1.01. GI consulted in the ED, but no plan for urgent intervention. CTA without any signs of acute GI bleed. IR consulted for potential embolization however no indication given negative CTA. MICU consulted for possible admission given no improvement in Hb s/p 2U pRBC; however pt not a candidate at this time and instead MICU recommended 3rd unit pRBC and monitoring CBC/vital signs.    Of note, last EGD/colon 5/2023 in setting of hematochezia which required 5U pRBC transfusion due to diverticular hemorrhage. EGD was unremarkable aside for small hiatal hernia, and colonoscopy revealed severe diverticulosis without evidence of active bleeding and a subtle increase in vasculare in patchy distribution in rectum, likely mild radiation-associated vascular ectasia (RAVE), and non-bleeding internal hemorrhoids.    73y Male with PMHx of recurrent diverticular bleeding s/p multiple colonoscopies demonstrating severe diverticulosis (no interventions) and prior +nuclear medicine bleeding scan (5/2023 with active bleeding in cecum/proximal colon, no intervention), prostate cancer s/p radiation c/b radiation-associated vascular ectasia, internal hemorrhoids, HTN, HLD, asthma, recently dx A.fib/A.flutter (in 5/2023) s/p Watchman procedure and initiation of A/c with Plavix/ASA 1 week ago who now presents with  recurrent painless BRBPR.    Patient had one large BM with bright red blood earlier this morning and began feeling weak, dizzy, and nauseous. No vomiting or abdominal pain at the time. Patient took BP at home and said it was low (~60s systolic, ~40s diastolic), and wife felt he looked pale. Pt was especially concerned due to recently restarting A/c, so he came to the ED, Also endorses intermittent CP as well as frontal headache. Denies shortness of breath and syncope. Since coming to the ED he has had 3-4 additional loose bright red to maroon BMs and reports dizziness/lightheadedness immediately following each BM, which gradually resolves.    Patient explained he had similar BRBPR back in May 2023 on two separate occasions, but denies any blood in the stool since then. In ED: Patient has been HDS w/ borderline tachycardia Hgb 10.2 from 13.5 s/p 2u pRBC. Repeat hgb ~10 following 2U. GI consulted in the ED, but no plan for urgent intervention. CTA without any signs of acute GI bleed. IR consulted for potential embolization however no indication given negative CTA. MICU consulted for possible admission given no improvement in Hb s/p 2U pRBC; however pt not a candidate at this time and instead MICU recommended 3rd unit pRBC and monitoring CBC/vital signs.

## 2023-08-21 NOTE — H&P ADULT - PROBLEM SELECTOR PLAN 4
- Pt in sinus rhythm at this time, 1w s/p ablation and Watchman procedure  - Continue diltiazem 120 mg qd and metroprolol 25 mg qd per rate control   - EP evaluated, recs above  - Continuous telemetry

## 2023-08-21 NOTE — H&P ADULT - NSHPLABSRESULTS_GEN_ALL_CORE
10.2   8.47  )-----------( 132      ( 21 Aug 2023 14:50 )               33.3   08-21    141  |  102  |  23  ----------------------------<  159<H>  4.1   |  30  |  1.01    Ca    8.7      21 Aug 2023 10:43  Phos  3.0     08-21  Mg     1.90     08-21    TPro  6.1  /  Alb  3.5  /  TBili  0.5  /  DBili  x   /  AST  12  /  ALT  12  /  AlkPhos  73  08-21 10.2   8.47  )-----------( 132      ( 21 Aug 2023 14:50 )               33.3   08-21    141  |  102  |  23  ----------------------------<  159<H>  4.1   |  30  |  1.01    Ca    8.7      21 Aug 2023 10:43  Phos  3.0     08-21  Mg     1.90     08-21    TPro  6.1  /  Alb  3.5  /  TBili  0.5  /  DBili  x   /  AST  12  /  ALT  12  /  AlkPhos  73  08-21    < from: CT Angio Abdomen and Pelvis w/ IV Cont (08.21.23 @ 12:19) >    FINDINGS:  LOWER CHEST: Cardiomegaly. Trace pericardial effusion. Watchman device in   the left atrial appendage.    LIVER: Within normal limits.  BILE DUCTS: Normal caliber.  GALLBLADDER: Within normal limits.  SPLEEN: Within normal limits.  PANCREAS: Within normal limits.  ADRENALS: Stable left adrenal thickening. Normal right adrenal gland.  KIDNEYS/URETERS: 6.7 cm left lower pole cyst. 2.4 cm left upper pole   cyst. Right lower pole hypodensity too small to characterize. No   hydroureteronephrosis bilaterally.    BLADDER: Within normal limits.  REPRODUCTIVE ORGANS: Prostate not visualized.    BOWEL: No bowel obstruction. Appendixis normal.. Small sliding hiatus   hernia. Pancolonic diverticulosis without diverticulitis. No intraluminal   contrast extravasation.  PERITONEUM: No ascites.  VESSELS: Atherosclerotic changes.  RETROPERITONEUM/LYMPH NODES: No lymphadenopathy. No retroperitoneal   hematoma.  ABDOMINAL WALL: Subcutaneous fat stranding overlying the right common   femoral vein, likely related to recent procedure. Small bilateral   fat-containing inguinal hernias. Small fat-containing umbilical hernia.  BONES: Degenerative changes.    IMPRESSION:  No CT evidence of active GI bleed. Pancolonic diverticulosis without   diverticulitis.    < end of copied text >    < from: Xray Chest 1 View- PORTABLE-Routine (Xray Chest 1 View- PORTABLE-Routine in AM.) (08.15.23 @ 07:26) >      FINDINGS:  The Watchman device is faintly made out. No complicating   pneumothorax. Lungs are clear with small effusion unchanged. Heart size   is stable    < end of copied text >    < from: NM GI Bleed Localization (NM GI Bleed Localization .) (05.11.23 @ 19:48) >    FINDINGS: Beginning at approximately 88 minutes after injection of   radiotracer, there is focal labeled red blood cell accumulation in the   right lower quadrant of the abdomen. This activity increases in intensity   over time and moves antegrade in the ascending and transverse colon as   the study progresses. These findings are compatible with an active   bleeding site in the cecum/proximal ascending colon.    IMPRESSION: Abnormal bleeding scan.    Findings compatible with active bleeding site in the region of the   cecum/proximal ascending colon.    < end of copied text >    < from: CT Angio Abdomen and Pelvis w/ IV Cont (05.10.23 @ 13:53) >    IMPRESSION:  No evidence of active GI bleed.    Colonic diverticulosis.    < end of copied text >    < from: CT Abdomen and Pelvis w/wo IV Cont (05.08.23 @ 11:44) >    IMPRESSION:  No evidence of active GI bleed.  Extensive colonic diverticulosis, without acute diverticulitis.    < end of copied text >    < from: CT Abdomen and Pelvis w/ IV Cont (05.03.23 @ 12:03) >    IMPRESSION:  Mild proctitis. No evidence of active GI bleed.

## 2023-08-21 NOTE — ED PROVIDER NOTE - ATTENDING CONTRIBUTION TO CARE
74 yo M hx HTN, HLD, AF s/p Watchman procedure, on ASA and Plavix (restarted at last hospital visit), hx GIB from cecum/ascending colon seen on RBC scan, presenting s/p large bloody bowel movement this morning. Pt BP dropped to 80s with complaints of dizziness, appears pale with conjunctival pallor, 2 units emergent blood ordered . First hemoglobin 10.2. GI consulted, recommended CTA and MICU if patient unstable. CTA negative for bleed. Pt  had repeat episode of hypotension at which time MICU was consulted. IR consult placed per GI recs, though no obvious bleed on CT. Pt on cardiac monitor and tba.

## 2023-08-21 NOTE — CONSULT NOTE ADULT - SUBJECTIVE AND OBJECTIVE BOX
Interventional Radiology    Evaluate for Procedure: GI bleed embolization    HPI: 73y Male with hematochezia. CTA negative for active bleed.    Allergies: No Known Allergies    Medications (Abx/Cardiac/Anticoagulation/Blood Products)      Data:  177.8  T(C): 36.6  HR: 98  BP: 104/74  RR: 16  SpO2: 100%    -WBC 8.47 / HgB 10.2 / Hct 33.3 / Plt 132  -Na 141 / Cl 102 / BUN 23 / Glucose 159  -K 4.1 / CO2 30 / Cr 1.01  -ALT 12 / Alk Phos 73 / T.Bili 0.5  -INR 1.27 / PTT 32.6    Radiology: CTA 8/21/23 reviewed    Assessment/Plan:   -73y Male with hematochezia IR consulted for embolization.    -Given negative CTA, no role for IR at this time.  -If concern for re-bleeding, please obtain repeat CTA  -Staffed case with Dr Dillard  -Discussed case with ED resident      Nessa Esquivel MD, PGY-2 Interventional Radiology  Available on Microsoft Teams    - Non-emergent consults: Place IR consult order in [place]  - Emergent issues (pager): [facility] [phone]; [facility] [phone]; [phone]  - Scheduling questions: [facility] [phone]; [facility] [phone]  - Clinic/outpatient booking: [facility] [phone]; [facility] [phone]

## 2023-08-21 NOTE — ED PROVIDER NOTE - IV ALTEPLASE ADMIN OUTSIDE HIDDEN
show
report given to QUENTIN Woo in B2 in stable condition for continuation of care. pt a&ox3, right arm precaution for dialysis access. 20G left hand. pending MD dispo.

## 2023-08-21 NOTE — CONSULT NOTE ADULT - ASSESSMENT
73y Male with history of recurrent diverticular bleeding s/p multiple colonoscopies (no interventions) and prior +NM bleeding scan (5/2023 with active bleeding in cecum/proximal colon, no intervention), prostate cancer s/p radiation c/b radiation-associated vascular ectasia, internal hemorrhoids, HTN, asthma, recently diagnosed afib who is s/p ablation and implantation of a Watchman device on 8/14/23 and immediately placed on DAPT (Plavix/ASA). He now presents  with active lower GI bleed with hypotension. mild LLQ abdominal pain and intermittent chest discomfort, s/p 3 units PRBC's.   EKG and telemetry w/ sinus tachycardia 90's to 109 bpm with occasional PVC's.  No evidence of AFib/Flutter.  Labs:  Hgb 10.2 from 13 s/p 2u pRBC. Repeat hgb ~10. INR 1.27. BUN 23 from 14 and Cr 1.01.  GI consulted in the ED, but no plan for urgent intervention.    Plan:   Agree with discontinuation of aspirin and Plavix.   Keep K+ > 4.0 and Mg > 2.0     73y Male with history of recurrent diverticular bleeding s/p multiple colonoscopies (no interventions) and prior +NM bleeding scan (5/2023 with active bleeding in cecum/proximal colon, no intervention), prostate cancer s/p radiation c/b radiation-associated vascular ectasia, internal hemorrhoids, HTN, asthma, recently diagnosed afib who is s/p ablation and implantation of a Watchman device on 8/14/23 and immediately placed on DAPT (Plavix/ASA). He now presents  with active lower GI bleed with hypotension. mild LLQ abdominal pain and intermittent chest discomfort, s/p 3 units PRBC's.   EKG and telemetry w/ sinus tachycardia 90's to 109 bpm with occasional PVC's.  No evidence of AFib/Flutter.  Labs:  Hgb 10.2 from 13 s/p 2u pRBC. Repeat hgb ~10. INR 1.27. BUN 23 from 14 and Cr 1.01.  GI consulted in the ED, but no plan for urgent intervention.    Plan:   Agree with discontinuation of aspirin and Plavix.   Keep K+ > 4.0 and Mg > 2.0  Continue Cardizem CD 120mg daily and metoprolol succinate 25mg daily for rate control.   Telemetry monitoring.

## 2023-08-21 NOTE — CONSULT NOTE ADULT - ASSESSMENT
73y Male with PMHx of prostate cancer s/p radiation c/b radiation-associated vascular ectasia, internal hemorrhoids, HTN, HLD, asthma, recently dx A.fib/A.flutter (in 5/2023) s/p Watchman procedure s/p resumption of the Plavix/ASA 1 week prior. He also has a long history of recurrent diverticular bleeding s/p multiple colonoscopies demonstrating severe diverticulosis (no interventions) and prior +nuclear medicine bleeding scan (5/2023 with active bleeding in cecum/proximal colon, no intervention) and he presents with recurrent BRBPR.      Plan:  - Given he remains HD stable at this time and responds to fluid/transfusion, no role for emergent surgical intervention at this time  - Emergent resection would necessitate total colectomy and temporary/permanent ostomy - should be reserved only for life saving measures in the emergency setting  - recommend continued supportive care including trending cbc, two large bore IVs, and transfusion as needed  - agree with GI consult - patient may benefit from endoscopy if continued bleeding  - if ongoing bleeding significant may also benefit form repeat CTA  - apppreciate MICU consult, patient high risk for requiring higher level of care if ongoing bleeding persists    [facility]  [phone]

## 2023-08-21 NOTE — H&P ADULT - PROBLEM SELECTOR PLAN 1
- GI consulted in ED; no interventions at this time; recommend CTA/IR consult for embolization given recurrent bleeding and multiple colonoscopies - Pt w/ large bloody BM this morning, painless w/ symptoms of acute blood loss (weakness, lightheadedness, nausea) and appeared pale per wife. ISO recently starting anti-platelet medications 1 week ago  - Recurrent diverticular bleeds, most recently in 5/2023 when he had +NM scan w/ bleed in the cecum/proximal ascending colon   - GI consulted in ED; no interventions at this time  - Negative CTA; IR consulted, but no embolization indicated at this time 2/2 negative CTA - Pt w/ large bloody BM this morning, painless w/ symptoms of acute blood loss (weakness, lightheadedness, nausea) and appeared pale per wife. ISO recently starting anti-platelet medications 1 week ago. S/p 4 additional bloody BM in ED, ranging from bright red to maroon  - Recurrent diverticular bleeds, most recently in 5/2023 when he had +NM scan w/ bleed in the cecum/proximal ascending colon; required 5U pRBC transfusion at this time  - GI consulted in ED; no interventions at this time, if he continues to bleed or becomes hemodynamically unstable will consider colonoscopy on Wed. 8/23  - Negative CTA 8/21; IR consulted, but no embolization indicated at this time 2/2 negative CTA  - Pending repeat CBC following 3rd unit pRBC  - Protonix 40 mg qd   - Gen. surgery consulted to make aware in case of potential colorectal intervention; will assess patient tonight in ED  - Clear liquid diet for now ISO possible intervention  - S/p 1L bolus

## 2023-08-21 NOTE — CONSULT NOTE ADULT - SUBJECTIVE AND OBJECTIVE BOX
GENERAL SURGERY CONSULT NOTE  --------------------------------------------------------------------------------------------    HPI:   73y Male with PMHx of prostate cancer s/p radiation c/b radiation-associated vascular ectasia, internal hemorrhoids, HTN, HLD, asthma, recently dx A.fib/A.flutter (in 5/2023) s/p Watchman procedure s/p resumption of the Plavix/ASA 1 week prior. He also has a long history of recurrent diverticular bleeding s/p multiple colonoscopies demonstrating severe diverticulosis (no interventions) and prior +nuclear medicine bleeding scan (5/2023 with active bleeding in cecum/proximal colon, no intervention) and he presents with recurrent BRBPR. Patient had one large BM with bright red blood earlier this morning and began feeling weak, dizzy, and nauseous. No vomiting or abdominal pain at the time. Patient took BP at home and said it was low (~60s systolic, ~40s diastolic), and wife felt he looked pale. Pt was especially concerned due to recently restarting A/c, so he came to the ED, Also endorses intermittent CP as well as frontal headache. Denies shortness of breath and syncope. Since coming to the ED he has had multiple additional loose bright red to maroon BMs and reports dizziness/lightheadedness immediately following each BM, which gradually resolves. In ED: Patient has been HDS w/ borderline tachycardia Hgb 10.2 from 13.5 s/p 2u pRBC. Repeat hgb ~10 following 2U. GI consulted in the ED, but no plan for urgent intervention. CTA without any signs of acute GI bleed. IR consulted for potential embolization however no indication given negative CTA. MICU consulted for possible admission given no improvement in Hb s/p 2U pRBC; however pt not a candidate at this time and instead MICU recommended 3rd unit pRBC and monitoring CBC/vital signs.     Per GI: last colonoscopy 5/2023 with diverticulosis without active bleeding. Also noticed was an increase in vascularity in a patchy distribution in the rectum c/w radiation associated vascular ectasia.        ROS: 10-system review is otherwise negative except HPI above.      PAST MEDICAL & SURGICAL HISTORY:  Hypertension  Hyperlipidemia  GIB (gastrointestinal bleeding)  Prostate cancer  s/p radiation therapy  Gout  Diverticulitis  Asthma  Atrial flutter  Diverticulosis  Presence of Watchman left atrial appendage closure device  History of hemorrhoidectomy  H/O total knee replacement, right        FAMILY HISTORY:  [] Family history not pertinent as reviewed with the patient and family    ALLERGIES: No Known Allergies      --------------------------------------------------------------------------------------------    Vitals:   T(C): 36.6 (08-21-23 @ 18:16), Max: 36.8 (08-21-23 @ 09:36)  HR: 104 (08-21-23 @ 20:00) (73 - 104)  BP: 108/81 (08-21-23 @ 20:00) (78/57 - 143/90)  RR: 18 (08-21-23 @ 20:00) (16 - 18)  SpO2: 100% (08-21-23 @ 20:00) (100% - 100%)  CAPILLARY BLOOD GLUCOSE      Height (cm): 177.8 (08-21 @ 09:36)    Physical Examination:  GEN: NAD, resting quietly  NEURO: AAOx3, CN II-XII grossly intact, no focal deficits  PULM: symmetric chest rise bilaterally, no increased WOB  ABD: soft, nontender, nondistended, external anal exam noted no active ongoing bleeding, no notable external hemorrhoids  EXTR: no lower extremity edema, moving all extremities  --------------------------------------------------------------------------------------------    LABS  CBC (08-21 @ 19:30)                              8.9<L>                         10.79<H>  )----------------(  117<L>     --    % Neutrophils, --    % Lymphocytes, ANC: --                                  28.1<L>  CBC (08-21 @ 16:25)                              9.5<L>                         10.50   )----------------(  132<L>     --    % Neutrophils, --    % Lymphocytes, ANC: --                                  30.6<L>    BMP (08-21 @ 10:43)             141     |  102     |  23    		Ca++ --      Ca 8.7                ---------------------------------( 159<H>		Mg 1.90               4.1     |  30      |  1.01  			Ph 3.0       LFTs (08-21 @ 10:43)      TPro 6.1 / Alb 3.5 / TBili 0.5 / DBili -- / AST 12 / ALT 12 / AlkPhos 73    Coags (08-21 @ 11:20)  aPTT 32.6 / INR 1.27<H> / PT 14.1<H>    Cardiac Markers (08-21 @ 20:49)     HSTrop: -- / CKMB: -- / CK: 57      VBG (08-21 @ 10:10)     7.36 / 58<H> / 21<L> / 33<H> / 6.1<H> / 22.2<L>%     Lactate: 1.8    --------------------------------------------------------------------------------------------    MICROBIOLOGY  Urinalysis (08-21 @ 10:43):     Color:  / Appearance:  / SG:  / pH:  / Gluc: 159<H> / Ketones:  / Bili:  / Urobili:  / Protein : / Nitrites:  / Leuk.Est:  / RBC:  / WBC:  / Sq Epi:  / Non Sq Epi:  / Bacteria          --------------------------------------------------------------------------------------------    IMAGING  < from: CT Angio Abdomen and Pelvis w/ IV Cont (08.21.23 @ 12:19) >    FINDINGS:  LOWER CHEST: Cardiomegaly. Trace pericardial effusion. Watchman device in   the left atrial appendage.    LIVER: Within normal limits.  BILE DUCTS: Normal caliber.  GALLBLADDER: Within normal limits.  SPLEEN: Within normal limits.  PANCREAS: Within normal limits.  ADRENALS: Stable left adrenal thickening. Normal right adrenal gland.  KIDNEYS/URETERS: 6.7 cm left lower pole cyst. 2.4 cm left upper pole   cyst. Right lower pole hypodensity too small to characterize. No   hydroureteronephrosis bilaterally.    BLADDER: Within normal limits.  REPRODUCTIVE ORGANS: Prostate not visualized.    BOWEL: No bowel obstruction. Appendixis normal.. Small sliding hiatus   hernia. Pancolonic diverticulosis without diverticulitis. No intraluminal   contrast extravasation.  PERITONEUM: No ascites.  VESSELS: Atherosclerotic changes.  RETROPERITONEUM/LYMPH NODES: No lymphadenopathy. No retroperitoneal   hematoma.  ABDOMINAL WALL: Subcutaneous fat stranding overlying the right common   femoral vein, likely related to recent procedure. Small bilateral   fat-containing inguinal hernias. Small fat-containing umbilical hernia.  BONES: Degenerative changes.    IMPRESSION:  No CT evidence of active GI bleed. Pancolonic diverticulosis without   diverticulitis.      < end of copied text >      --------------------------------------------------------------------------------------------    ASSESSMENT: Patient is a 73y old m with ***    PLAN:  ***  -   -   -   -   - Patient seen/examined with attending.  - Plan to be discussed with Attending,

## 2023-08-21 NOTE — H&P ADULT - ASSESSMENT
73y Male with history of recurrent diverticular bleeding s/p multiple colonoscopies (no interventions) and prior +NM bleeding scan (5/2023 with active bleeding in cecum/proximal colon, no intervention), prostate cancer s/p radiation c/b radiation-associated vascular ectasia, internal hemorrhoids, HTN, asthma, recently dx afib s/p ablation started Plavix/ASA 1 week PTA) who now presents with recurrent painless BRBPR, weakness, and lightheadedness i/s/o recent initiation of anti-platelet medications. S/p 2u pRBC without improvement in hgb, third unit pRBC pending.  73y Male with history of recurrent diverticular bleeding s/p multiple colonoscopies (no interventions) and prior +NM bleeding scan (5/2023 with active bleeding in cecum/proximal colon, no intervention), prostate cancer s/p radiation c/b radiation-associated vascular ectasia, internal hemorrhoids, HTN, asthma, recently dx afib s/p ablation and Watchman placement on 8/14, immediately started on Plavix/ASA 1 week, who now presents with recurrent painless BRBPR, weakness, and lightheadedness i/s/o recent initiation of anti-platelet medications. S/p 2u pRBC without improvement in hgb, third unit pRBC recently completed w/ repeat CBC pending. Hemodynamically stable at this time, however during eval MAP ~64, now >70.   Likely 2/2 repeat diverticular hemorrhage.

## 2023-08-21 NOTE — ED PROVIDER NOTE - CLINICAL SUMMARY MEDICAL DECISION MAKING FREE TEXT BOX
73 male with past medical history GI bleed, diverticulitis, A-fib on Plavix and aspirin, diverticulitis, hypertension, hyperlipidemia, prostate cancer, asthma presenting with bright red bloody bowel movements, weakness, dizziness. Hypotensive at 90s/60s. Pt feeling more lightheaded. Will give 2u emergent pRBCs and 500cc NS. Plan: blood work, GI consult. Will re-assess. Plan to admit. 73 male with past medical history GI bleed, diverticulitis, A-fib on Plavix and aspirin, diverticulitis, hypertension, hyperlipidemia, prostate cancer, asthma presenting with bright red bloody bowel movements, weakness, dizziness. Hypotensive at 90s/60s. Pt feeling more lightheaded. Will give 2u emergent pRBCs. Plan: blood work, GI consult. Will re-assess. Plan to admit.

## 2023-08-21 NOTE — CONSULT NOTE ADULT - TIME BILLING
Evaluation, chart review, care coordination, treatment planning.
Patient does not require MICU level of care at this time.  Please re-consult as needed.  reviewing chart and coordinating care with primary team/staff, as well as reviewing vitals, radiology, medication list, recent labs, and prior records.

## 2023-08-21 NOTE — CONSULT NOTE ADULT - SUBJECTIVE AND OBJECTIVE BOX
Patient is a 73y old  Male who presents with a chief complaint of Lower GI Bleed (21 Aug 2023 16:57)        PAST MEDICAL & SURGICAL HISTORY:  Hypertension      Hyperlipidemia      GIB (gastrointestinal bleeding)      Prostate cancer  s/p radiation therapy      Gout      Diverticulitis      Asthma      Atrial flutter      Diverticulosis      Presence of Watchman left atrial appendage closure device      History of hemorrhoidectomy      H/O total knee replacement, right          HPI:                    MEDICATIONS  (STANDING):    MEDICATIONS  (PRN):      FAMILY HISTORY:      SOCIAL HISTORY:    CIGARETTES:    ALCOHOL:    REVIEW OF SYSTEMS:    CONSTITUTIONAL: No fever, weight loss, chills, shakes, or fatigue  EYES: No eye pain, visual disturbances, or discharge  ENMT:  No difficulty hearing, tinnitus, vertigo; No sinus or throat pain  NECK: No pain or stiffness  BREASTS: No pain, masses, or nipple discharge  RESPIRATORY: No cough, wheezing, hemoptysis, or shortness of breath  CARDIOVASCULAR: No chest pain, dyspnea, palpitations, dizziness, syncope, paroxysmal nocturnal dyspnea, orthopnea, or arm or leg swelling  GASTROINTESTINAL: No abdominal  or epigastric pain, nausea, vomiting, hematemesis, diarrhea, constipation, melena or bright red blood.  GENITOURINARY: No dysuria, nocturia, hematuria, or urinary incontinence  NEUROLOGICAL: No headaches, memory loss, slurred speech, limb weakness, loss of strength, numbness, or tremors  SKIN: No itching, burning, rashes, or lesions   LYMPH NODES: No enlarged glands  ENDOCRINE: No heat or cold intolerance, or hair loss  MUSCULOSKELETAL: No joint pain or swelling, muscle, back, or extremity pain  PSYCHIATRIC: No depression, anxiety, or difficulty sleeping  HEME/LYMPH: No easy bruising or bleeding gums  ALLERY AND IMMUNOLOGIC: No hives or rash.      Vital Signs Last 24 Hrs  T(C): 36.6 (21 Aug 2023 15:35), Max: 36.8 (21 Aug 2023 09:36)  T(F): 97.8 (21 Aug 2023 15:35), Max: 98.3 (21 Aug 2023 09:36)  HR: 98 (21 Aug 2023 15:35) (73 - 98)  BP: 104/74 (21 Aug 2023 15:35) (102/84 - 143/90)  BP(mean): --  RR: 16 (21 Aug 2023 15:35) (16 - 18)  SpO2: 100% (21 Aug 2023 15:35) (100% - 100%)    Parameters below as of 21 Aug 2023 15:35  Patient On (Oxygen Delivery Method): room air        PHYSICAL EXAM:    GENERAL: In no apparent distress, well nourished, and hydrated.  HEAD:  Atraumatic, Normocephalic  EYES: EOMI, PERRLA, conjunctiva and sclera clear  ENMT: No tonsillar erythema, exudates, or enlargement; Moist mucous membranes, Good dentition, No lesions  NECK: Supple and normal thyroid.  No JVD or carotid bruit.  Carotid pulse is 2+ bilaterally.  HEART: Regular rate and rhythm; No murmurs, rubs, or gallops.  PULMONARY: Clear to auscultation and perfusion.  No rales, wheezing, or rhonchi bilaterally.  ABDOMEN: Soft, Nontender, Nondistended; Bowel sounds present  EXTREMITIES:  2+ Peripheral Pulses, No clubbing, cyanosis, or edema  LYMPH: No lymphadenopathy noted  NEUROLOGICAL: Grossly nonfocal          INTERPRETATION OF TELEMETRY: Sinus tachycardia 100-109 bpm with occasional PVC's.     ECG: Normal sinus rhythm 93 bpm with occasional PVC. QRSd 92ms QT 400ms    I&O's Detail      LABS:                        10.2   8.47  )-----------( 132      ( 21 Aug 2023 14:50 )             33.3     08-21    141  |  102  |  23  ----------------------------<  159<H>  4.1   |  30  |  1.01    Ca    8.7      21 Aug 2023 10:43  Phos  3.0     08-21  Mg     1.90     08-21    TPro  6.1  /  Alb  3.5  /  TBili  0.5  /  DBili  x   /  AST  12  /  ALT  12  /  AlkPhos  73  08-21        PT/INR - ( 21 Aug 2023 11:20 )   PT: 14.1 sec;   INR: 1.27 ratio         PTT - ( 21 Aug 2023 11:20 )  PTT:32.6 sec  Urinalysis Basic - ( 21 Aug 2023 10:43 )    Color: x / Appearance: x / SG: x / pH: x  Gluc: 159 mg/dL / Ketone: x  / Bili: x / Urobili: x   Blood: x / Protein: x / Nitrite: x   Leuk Esterase: x / RBC: x / WBC x   Sq Epi: x / Non Sq Epi: x / Bacteria: x      BNP  I&O's Detail    Daily Height in cm: 177.8 (21 Aug 2023 09:36)    Daily     RADIOLOGY & ADDITIONAL STUDIES:  PREVIOUS DIAGNOSTIC TESTING:    < from: CT Angio Abdomen and Pelvis w/ IV Cont (08.21.23 @ 12:19) >  ACC: [id number] EXAM:  CT ANGIO ABD PELV (W)AW IC   ORDERED BY: BASIA CLAYTON     PROCEDURE DATE:  08/21/2023    INTERPRETATION:  CLINICAL INFORMATION: 73 years  Male with lower GI   bleed, hx of diverticular bleed.    COMPARISON: CT abdomen and pelvis 5/10/2023    CONTRAST/COMPLICATIONS:  IV Contrast: Omnipaque 350  90 cc administered   10 cc discarded  Oral Contrast: NONE  Complications: None reported at time of study completion    PROCEDURE:  CT of the Abdomen and Pelvis was performed.  Precontrast, Arterial and Delayed phases were performed.  Sagittal and coronal reformats were performed.    FINDINGS:  LOWER CHEST: Cardiomegaly. Trace pericardial effusion. Watchman device in   the left atrial appendage.    LIVER: Within normal limits.  BILE DUCTS: Normal caliber.  GALLBLADDER: Within normal limits.  SPLEEN: Within normal limits.  PANCREAS: Within normal limits.  ADRENALS: Stable left adrenal thickening. Normal right adrenal gland.  KIDNEYS/URETERS: 6.7 cm left lower pole cyst. 2.4 cm left upper pole   cyst. Right lower pole hypodensity too small to characterize. No   hydroureteronephrosis bilaterally.    BLADDER: Within normal limits.  REPRODUCTIVE ORGANS: Prostate not visualized.    BOWEL: No bowel obstruction. Appendixis normal.. Small sliding hiatus   hernia. Pancolonic diverticulosis without diverticulitis. No intraluminal   contrast extravasation.  PERITONEUM: No ascites  VESSELS: Atherosclerotic changes.  RETROPERITONEUM/LYMPH NODES: No lymphadenopathy. No retroperitoneal   hematoma.  ABDOMINAL WALL: Subcutaneous fat stranding overlying the right common   femoral vein, likely related to recent procedure. Small bilateral   fat-containing inguinal hernias. Small fat-containing umbilical hernia.  BONES: Degenerative changes.    IMPRESSION:  No CT evidence of active GI bleed. Pancolonic diverticulosis without   diverticulitis.        ECHO  FINDINGS:  < from: Intra-Operative Transesophageal Echo (08.14.23 @ 11:14) >  Patient name: CARTER, LENNOX  YOB: 1950   Age: 73 (M)   MR#: [id number]  Study Date: 8/14/2023  Location: [facility] 14 L DSonographer: Claudette Jay M.D.  Study quality: Technically good  Referring Physician: Gaurav Marie MD  Blood Pressure: 123/85 mmHg  Height: 183 cm  Weight: 83 kg  BSA: 2.1 m2  ------------------------------------------------------------------------  PROCEDURE: Echocardiography, transesophageal (APRIL) for  guidance of a transcatheter intracardiac or greatvessel(s)  structural intervention(s) (ishmael-and intra-procedural),  real-time image acquisition and documentation, guidance  with quantitative measurements, probe manipulation,  interpretation, and report, including diagnostic  transesophageal echocardiography and, when performed,  administration of ultrasound contrast, Doppler, color flow,  and 3D.  INDICATION: Unspecified atrial fibrillation (I48.91)  ------------------------------------------------------------------------  PRE-BYPASS OBSERVATIONS:  Mitral Valve: Mitral annular calcification, otherwise  normal mitral valve. Moderate mitral regurgitation.  Aortic Root: Normal aortic root, aortic arch and descending  thoracic aorta.  Aortic Valve: Calcified trileaflet aortic valve with normal  opening. Mild aortic regurgitation.  Left Atrium: Normal left atrium. No left atrial or left  atrial appendage thrombus.  Measurements of the dimensions  of the left atrial  appendage were made as follows. Width of the YANG refers to  width at the ostium. Length of the YNAG refers  to distance  from the ostium to the most distal point of the YANG.  0 degrees width 2.4 cm length 2.3 cm  45 degrees width 2.67 cm length 2.3 cm  90 degrees width 3.1 cm length 2.3 cm  135 degrees width 3 cm length 1.9 cm  Left Ventricle: Normal left ventricular systolic function.  No segmental wall motion abnormalities.  Right Heart: Normal right atrium. Normal right ventricular  size and function. Normal tricuspid valve. Normal pulmonic  < from: Intra-Operative Transesophageal Echo (08.14.23 @ 11:14) >  valve.  Pericardium/PleuraNormal pericardium with no pericardial  effusion.  ------------------------------------------------------------------------  PRE-BYPASS CONCLUSIONS:  1. Mitral annular calcification, otherwise normal mitral  valve. Moderate mitral regurgitation.  2. Calcified trileaflet aortic valve with normal opening.  Mild aortic regurgitation.  3. Normal left atrium. No left atrial or left atrial  appendage thrombus.  4. Normal left ventricular systolic function. No segmental  wall motion abnormalities.  5. Normal right ventricular size and function.  ------------------------------------------------------------------------  POST-BYPASS CONCLUSIONS:  Under continuous echocardiographic monitoring trans-septal  wire/catheter were passed into the left atrium. AWatchman  appendage closure device was placed within the left atrial  appendage. Following placement, a stability tug test was  performed to confirm the device moves with the left atrial  appendage. The device appears well-seated. There is no flow  seen with color Doppler around the device. The maximum  diameter of the compress device post placement is 28mm at 0  degree, 26 mm at 45 degree, 27 mm at 90 degree and 26 mm at  135 degree. The device is seen in the left atrial appendage  with 3D imaging. There is no pericardial effusion post  Watchman placement.  ------------------------------------------------------------------------  Confirmed on  8/15/2023 - 18:24:29 by ALVARO Barker  ------------------------------------------------------------------------                         73y Male with history of recurrent diverticular bleeding s/p multiple colonoscopies (no interventions) and prior +NM bleeding scan (5/2023 with active bleeding in cecum/proximal colon, no intervention), prostate cancer s/p radiation c/b radiation-associated vascular ectasia, internal hemorrhoids, HTN, asthma, recently dx afib s/p ablation and implantation of a Watchman device,  started Plavix/ASA one week ago, who now presents with complaints recurrent painless BRBPR associated with nausea, mild left lower quadrant abdominal pain, weakness and dizziness. No chest pain, shortness of breath, vomiting, hematuria, hemoptysis, hematemesis or syncope.     In the ED, EKG and telemetry w/ borderline tachycardia 90's to 109 bpm.   Labs:  Hgb 10.2 from 13 s/p 2u pRBC. Repeat hgb ~10. INR 1.27. BUN 23 from 14 and Cr 1.01.  GI consulted in the ED, but no plan for urgent intervention. 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           PAST MEDICAL & SURGICAL HISTORY:  Hypertension  Hyperlipidemia  GIB (gastrointestinal bleeding)  Prostate cancer /p radiation therapy  Gout  Diverticulitis  Asthma  Atrial fib/flutter s/p ablation 8/14/23  Presence of Watchman left atrial appendage closure device 8/14/23  History of hemorrhoidectomy  H/O total knee replacement, right                                MEDICATIONS  (STANDING):    MEDICATIONS  (PRN):      FAMILY HISTORY:      SOCIAL HISTORY:    CIGARETTES:    ALCOHOL:    REVIEW OF SYSTEMS:    CONSTITUTIONAL: No fever, weight loss, chills, shakes, or fatigue  EYES: No eye pain, visual disturbances, or discharge  ENMT:  No difficulty hearing, tinnitus, vertigo; No sinus or throat pain  NECK: No pain or stiffness  BREASTS: No pain, masses, or nipple discharge  RESPIRATORY: No cough, wheezing, hemoptysis, or shortness of breath  CARDIOVASCULAR: No chest pain, dyspnea, palpitations, dizziness, syncope, paroxysmal nocturnal dyspnea, orthopnea, or arm or leg swelling  GASTROINTESTINAL: No abdominal  or epigastric pain, nausea, vomiting, hematemesis, diarrhea, constipation, melena or bright red blood.  GENITOURINARY: No dysuria, nocturia, hematuria, or urinary incontinence  NEUROLOGICAL: No headaches, memory loss, slurred speech, limb weakness, loss of strength, numbness, or tremors  SKIN: No itching, burning, rashes, or lesions   LYMPH NODES: No enlarged glands  ENDOCRINE: No heat or cold intolerance, or hair loss  MUSCULOSKELETAL: No joint pain or swelling, muscle, back, or extremity pain  PSYCHIATRIC: No depression, anxiety, or difficulty sleeping  HEME/LYMPH: No easy bruising or bleeding gums  ALLERY AND IMMUNOLOGIC: No hives or rash.      Vital Signs Last 24 Hrs  T(C): 36.6 (21 Aug 2023 15:35), Max: 36.8 (21 Aug 2023 09:36)  T(F): 97.8 (21 Aug 2023 15:35), Max: 98.3 (21 Aug 2023 09:36)  HR: 98 (21 Aug 2023 15:35) (73 - 98)  BP: 104/74 (21 Aug 2023 15:35) (102/84 - 143/90)  BP(mean): --  RR: 16 (21 Aug 2023 15:35) (16 - 18)  SpO2: 100% (21 Aug 2023 15:35) (100% - 100%)    Parameters below as of 21 Aug 2023 15:35  Patient On (Oxygen Delivery Method): room air        PHYSICAL EXAM:    GENERAL: In no apparent distress, well nourished, and hydrated.  HEAD:  Atraumatic, Normocephalic  EYES: EOMI, PERRLA, conjunctiva and sclera clear  ENMT: No tonsillar erythema, exudates, or enlargement; Moist mucous membranes, Good dentition, No lesions  NECK: Supple and normal thyroid.  No JVD or carotid bruit.  Carotid pulse is 2+ bilaterally.  HEART: Regular rate and rhythm; No murmurs, rubs, or gallops.  PULMONARY: Clear to auscultation and perfusion.  No rales, wheezing, or rhonchi bilaterally.  ABDOMEN: Soft, Nontender, Nondistended; Bowel sounds present  EXTREMITIES:  2+ Peripheral Pulses, No clubbing, cyanosis, or edema  LYMPH: No lymphadenopathy noted  NEUROLOGICAL: Grossly nonfocal          INTERPRETATION OF TELEMETRY: Sinus tachycardia 100-109 bpm with occasional PVC's.     ECG: Normal sinus rhythm 93 bpm with occasional PVC. QRSd 92ms QT 400ms    I&O's Detail      LABS:                        10.2   8.47  )-----------( 132      ( 21 Aug 2023 14:50 )             33.3     08-21    141  |  102  |  23  ----------------------------<  159<H>  4.1   |  30  |  1.01    Ca    8.7      21 Aug 2023 10:43  Phos  3.0     08-21  Mg     1.90     08-21    TPro  6.1  /  Alb  3.5  /  TBili  0.5  /  DBili  x   /  AST  12  /  ALT  12  /  AlkPhos  73  08-21        PT/INR - ( 21 Aug 2023 11:20 )   PT: 14.1 sec;   INR: 1.27 ratio         PTT - ( 21 Aug 2023 11:20 )  PTT:32.6 sec  Urinalysis Basic - ( 21 Aug 2023 10:43 )    Color: x / Appearance: x / SG: x / pH: x  Gluc: 159 mg/dL / Ketone: x  / Bili: x / Urobili: x   Blood: x / Protein: x / Nitrite: x   Leuk Esterase: x / RBC: x / WBC x   Sq Epi: x / Non Sq Epi: x / Bacteria: x      BNP  I&O's Detail    Daily Height in cm: 177.8 (21 Aug 2023 09:36)    Daily     RADIOLOGY & ADDITIONAL STUDIES:  PREVIOUS DIAGNOSTIC TESTING:    < from: CT Angio Abdomen and Pelvis w/ IV Cont (08.21.23 @ 12:19) >  ACC: [id number] EXAM:  CT ANGIO ABD PELV (W)AW IC   ORDERED BY: BASIA CLAYTON     PROCEDURE DATE:  08/21/2023    INTERPRETATION:  CLINICAL INFORMATION: 73 years  Male with lower GI   bleed, hx of diverticular bleed.    COMPARISON: CT abdomen and pelvis 5/10/2023    CONTRAST/COMPLICATIONS:  IV Contrast: Omnipaque 350  90 cc administered   10 cc discarded  Oral Contrast: NONE  Complications: None reported at time of study completion    PROCEDURE:  CT of the Abdomen and Pelvis was performed.  Precontrast, Arterial and Delayed phases were performed.  Sagittal and coronal reformats were performed.    FINDINGS:  LOWER CHEST: Cardiomegaly. Trace pericardial effusion. Watchman device in   the left atrial appendage.    LIVER: Within normal limits.  BILE DUCTS: Normal caliber.  GALLBLADDER: Within normal limits.  SPLEEN: Within normal limits.  PANCREAS: Within normal limits.  ADRENALS: Stable left adrenal thickening. Normal right adrenal gland.  KIDNEYS/URETERS: 6.7 cm left lower pole cyst. 2.4 cm left upper pole   cyst. Right lower pole hypodensity too small to characterize. No   hydroureteronephrosis bilaterally.    BLADDER: Within normal limits.  REPRODUCTIVE ORGANS: Prostate not visualized.    BOWEL: No bowel obstruction. Appendixis normal.. Small sliding hiatus   hernia. Pancolonic diverticulosis without diverticulitis. No intraluminal   contrast extravasation.  PERITONEUM: No ascites  VESSELS: Atherosclerotic changes.  RETROPERITONEUM/LYMPH NODES: No lymphadenopathy. No retroperitoneal   hematoma.  ABDOMINAL WALL: Subcutaneous fat stranding overlying the right common   femoral vein, likely related to recent procedure. Small bilateral   fat-containing inguinal hernias. Small fat-containing umbilical hernia.  BONES: Degenerative changes.    IMPRESSION:  No CT evidence of active GI bleed. Pancolonic diverticulosis without   diverticulitis.        ECHO  FINDINGS:  < from: Intra-Operative Transesophageal Echo (08.14.23 @ 11:14) >  Patient name: CARTER, LENNOX  YOB: 1950   Age: 73 (M)   MR#: [id number]  Study Date: 8/14/2023  Location: [address] 14 L DSonographer: Claudette Jay M.D.  Study quality: Technically good  Referring Physician: Gaurav Marie MD  Blood Pressure: 123/85 mmHg  Height: 183 cm  Weight: 83 kg  BSA: 2.1 m2  ------------------------------------------------------------------------  PROCEDURE: Echocardiography, transesophageal (APRIL) for  guidance of a transcatheter intracardiac or greatvessel(s)  structural intervention(s) (ishmael-and intra-procedural),  real-time image acquisition and documentation, guidance  with quantitative measurements, probe manipulation,  interpretation, and report, including diagnostic  transesophageal echocardiography and, when performed,  administration of ultrasound contrast, Doppler, color flow,  and 3D.  INDICATION: Unspecified atrial fibrillation (I48.91)  ------------------------------------------------------------------------  PRE-BYPASS OBSERVATIONS:  Mitral Valve: Mitral annular calcification, otherwise  normal mitral valve. Moderate mitral regurgitation.  Aortic Root: Normal aortic root, aortic arch and descending  thoracic aorta.  Aortic Valve: Calcified trileaflet aortic valve with normal  opening. Mild aortic regurgitation.  Left Atrium: Normal left atrium. No left atrial or left  atrial appendage thrombus.  Measurements of the dimensions  of the left atrial  appendage were made as follows. Width of the YANG refers to  width at the ostium. Length of the YANG refers  to distance  from the ostium to the most distal point of the YANG.  0 degrees width 2.4 cm length 2.3 cm  45 degrees width 2.67 cm length 2.3 cm  90 degrees width 3.1 cm length 2.3 cm  135 degrees width 3 cm length 1.9 cm  Left Ventricle: Normal left ventricular systolic function.  No segmental wall motion abnormalities.  Right Heart: Normal right atrium. Normal right ventricular  size and function. Normal tricuspid valve. Normal pulmonic  < from: Intra-Operative Transesophageal Echo (08.14.23 @ 11:14) >  valve.  Pericardium/PleuraNormal pericardium with no pericardial  effusion.  ------------------------------------------------------------------------  PRE-BYPASS CONCLUSIONS:  1. Mitral annular calcification, otherwise normal mitral  valve. Moderate mitral regurgitation.  2. Calcified trileaflet aortic valve with normal opening.  Mild aortic regurgitation.  3. Normal left atrium. No left atrial or left atrial  appendage thrombus.  4. Normal left ventricular systolic function. No segmental  wall motion abnormalities.  5. Normal right ventricular size and function.  ------------------------------------------------------------------------  POST-BYPASS CONCLUSIONS:  Under continuous echocardiographic monitoring trans-septal  wire/catheter were passed into the left atrium. AWatchman  appendage closure device was placed within the left atrial  appendage. Following placement, a stability tug test was  performed to confirm the device moves with the left atrial  appendage. The device appears well-seated. There is no flow  seen with color Doppler around the device. The maximum  diameter of the compress device post placement is 28mm at 0  degree, 26 mm at 45 degree, 27 mm at 90 degree and 26 mm at  135 degree. The device is seen in the left atrial appendage  with 3D imaging. There is no pericardial effusion post  Watchman placement.  ------------------------------------------------------------------------  Confirmed on  8/15/2023 - 18:24:29 by ALVARO Barker  ------------------------------------------------------------------------                         73y Male with history of recurrent diverticular bleeding s/p multiple colonoscopies (no interventions) and prior +NM bleeding scan (5/2023 with active bleeding in cecum/proximal colon, no intervention), prostate cancer s/p radiation c/b radiation-associated vascular ectasia, internal hemorrhoids, HTN, asthma, recently diagnosed afib who is s/p ablation and implantation of a Watchman device on 8/14/23, discharged on 12 week course of  Plavix/ASA. He presents  with complaints recurrent BRBPR associated with nausea, mild left lower quadrant abdominal pain, weakness, dizziness and intermittent left sided chest discomfort , non radiating.  No shortness of breath, vomiting, hematuria, hemoptysis, hematemesis or syncope.     In the ED, EKG and telemetry w/ sinus tachycardia 90's to 109 bpm.   Labs:  Hgb 10.2 from 13 s/p 2u pRBC. Repeat hgb ~10. INR 1.27. BUN 23 from 14 and Cr 1.01.  GI consulted in the ED, but no plan for urgent intervention. MICU consulted for further evaluation and possible need for ICU    Of note, last EGD/colon 5/2023 in setting of hematochezia; EGD was unremarkable aside for small hiatal hernia. 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 Now recommending discontinuation of anticoagulation and if rebleeds, CTA and IR consult for embolization.    Patient currently in sinus rhythm with episodes of hypotension. Currently /109.  Receiving PRBC's unit #3.   .      PAST MEDICAL & SURGICAL HISTORY:  Hypertension  Hyperlipidemia  GIB (gastrointestinal bleeding)  Prostate cancer /p radiation therapy  Gout  Diverticulitis  Asthma  Atrial fib/flutter s/p ablation 8/14/23  Presence of Watchman left atrial appendage closure device 8/14/23  History of hemorrhoidectomy  H/O total knee replacement, right          MEDICATIONS  (STANDING):    MEDICATIONS  (PRN):      FAMILY HISTORY:      SOCIAL HISTORY: Lives with his wife.     CIGARETTES: no   DRUGS:  no  ALCOHOL: NO     REVIEW OF SYSTEMS:    CONSTITUTIONAL: No fever, weight loss, chills, shakes,   +fatigue  EYES: No eye pain, visual disturbances, or discharge  ENMT:  No difficulty hearing, tinnitus, vertigo; No sinus or throat pain  NECK: No pain or stiffness  BREASTS: No pain, masses, or nipple discharge  RESPIRATORY: No cough, wheezing, hemoptysis, or shortness of breath  CARDIOVASCULAR:  Mild intermittent left sided, non radiating  chest discomfort and dizziness.   No dyspnea, palpitations, syncope, paroxysmal nocturnal dyspnea, orthopnea, or arm or leg swelling  GASTROINTESTINAL: mild LLQ abdominal pain w/ nausea and BRBPR. No vomiting.   GENITOURINARY: No dysuria, nocturia, hematuria, or urinary incontinence  NEUROLOGICAL: No headaches, memory loss, slurred speech, limb weakness, loss of strength, numbness, or tremors  SKIN: No itching, burning, rashes, or lesions. Right groin ecchymosis with very small, nontender hematoma.   LYMPH NODES: No enlarged glands  ENDOCRINE: No heat or cold intolerance, or hair loss  MUSCULOSKELETAL: No joint pain or swelling, muscle, back, or extremity pain  PSYCHIATRIC: No depression, anxiety, or difficulty sleeping  HEME/LYMPH: No easy bruising or bleeding gums  ALLERGY AND IMMUNOLOGIC: No hives or rash.      Vital Signs Last 24 Hrs  T(C): 36.6 (21 Aug 2023 15:35), Max: 36.8 (21 Aug 2023 09:36)  T(F): 97.8 (21 Aug 2023 15:35), Max: 98.3 (21 Aug 2023 09:36)  HR: 98 (21 Aug 2023 15:35) (73 - 98)  BP: 104/74 (21 Aug 2023 15:35) (102/84 - 143/90)  BP(mean): --  RR: 16 (21 Aug 2023 15:35) (16 - 18)  SpO2: 100% (21 Aug 2023 15:35) (100% - 100%)    Parameters below as of 21 Aug 2023 15:35  Patient On (Oxygen Delivery Method): room air        PHYSICAL EXAM:    GENERAL: In no apparent distress, well nourished, and hydrated.  HEAD:  Atraumatic, Normocephalic  EYES: EOMI, PERRLA, conjunctiva and sclera clear  ENMT: No tonsillar erythema, exudates, or enlargement; Moist mucous membranes, Good dentition, No lesions  NECK: Supple and normal thyroid.  No JVD or carotid bruit.  Carotid pulse is 2+ bilaterally.  HEART: Regular rate and rhythm; No murmurs, rubs, or gallops.  PULMONARY: Clear to auscultation and perfusion.  No rales, wheezing, or rhonchi bilaterally.  ABDOMEN: Soft, Nontender, Nondistended; Bowel sounds present  EXTREMITIES:  2+ Peripheral Pulses, No clubbing, cyanosis, or edema  LYMPH: No lymphadenopathy noted  NEUROLOGICAL: Grossly nonfocal          INTERPRETATION OF TELEMETRY: Sinus tachycardia 100-109 bpm with occasional PVC's.     ECG: Normal sinus rhythm 93 bpm with occasional PVC. QRSd 92ms QT 400ms    I&O's Detail      LABS:                        10.2   8.47  )-----------( 132      ( 21 Aug 2023 14:50 )             33.3     08-21    141  |  102  |  23  ----------------------------<  159<H>  4.1   |  30  |  1.01    Ca    8.7      21 Aug 2023 10:43  Phos  3.0     08-21  Mg     1.90     08-21    TPro  6.1  /  Alb  3.5  /  TBili  0.5  /  DBili  x   /  AST  12  /  ALT  12  /  AlkPhos  73  08-21        PT/INR - ( 21 Aug 2023 11:20 )   PT: 14.1 sec;   INR: 1.27 ratio         PTT - ( 21 Aug 2023 11:20 )  PTT:32.6 sec  Urinalysis Basic - ( 21 Aug 2023 10:43 )    Color: x / Appearance: x / SG: x / pH: x  Gluc: 159 mg/dL / Ketone: x  / Bili: x / Urobili: x   Blood: x / Protein: x / Nitrite: x   Leuk Esterase: x / RBC: x / WBC x   Sq Epi: x / Non Sq Epi: x / Bacteria: x      BNP  I&O's Detail    Daily Height in cm: 177.8 (21 Aug 2023 09:36)    Daily     RADIOLOGY & ADDITIONAL STUDIES:  PREVIOUS DIAGNOSTIC TESTING:    < from: CT Angio Abdomen and Pelvis w/ IV Cont (08.21.23 @ 12:19) >  ACC: [id number] EXAM:  CT ANGIO ABD PELV (W)AW IC   ORDERED BY: BASIA CLAYTON     PROCEDURE DATE:  08/21/2023    INTERPRETATION:  CLINICAL INFORMATION: 73 years  Male with lower GI   bleed, hx of diverticular bleed.    COMPARISON: CT abdomen and pelvis 5/10/2023    CONTRAST/COMPLICATIONS:  IV Contrast: Omnipaque 350  90 cc administered   10 cc discarded  Oral Contrast: NONE  Complications: None reported at time of study completion    PROCEDURE:  CT of the Abdomen and Pelvis was performed.  Precontrast, Arterial and Delayed phases were performed.  Sagittal and coronal reformats were performed.    FINDINGS:  LOWER CHEST: Cardiomegaly. Trace pericardial effusion. Watchman device in   the left atrial appendage.    LIVER: Within normal limits.  BILE DUCTS: Normal caliber.  GALLBLADDER: Within normal limits.  SPLEEN: Within normal limits.  PANCREAS: Within normal limits.  ADRENALS: Stable left adrenal thickening. Normal right adrenal gland.  KIDNEYS/URETERS: 6.7 cm left lower pole cyst. 2.4 cm left upper pole   cyst. Right lower pole hypodensity too small to characterize. No   hydroureteronephrosis bilaterally.    BLADDER: Within normal limits.  REPRODUCTIVE ORGANS: Prostate not visualized.    BOWEL: No bowel obstruction. Appendixis normal.. Small sliding hiatus   hernia. Pancolonic diverticulosis without diverticulitis. No intraluminal   contrast extravasation.  PERITONEUM: No ascites  VESSELS: Atherosclerotic changes.  RETROPERITONEUM/LYMPH NODES: No lymphadenopathy. No retroperitoneal   hematoma.  ABDOMINAL WALL: Subcutaneous fat stranding overlying the right common   femoral vein, likely related to recent procedure. Small bilateral   fat-containing inguinal hernias. Small fat-containing umbilical hernia.  BONES: Degenerative changes.    IMPRESSION:  No CT evidence of active GI bleed. Pancolonic diverticulosis without   diverticulitis.        ECHO  FINDINGS:  < from: Intra-Operative Transesophageal Echo (08.14.23 @ 11:14) >  Patient name: CARTER, LENNOX  YOB: 1950   Age: 73 (M)   MR#: [id number]  Study Date: 8/14/2023  Location: [facility] L7CC 14 L DSonographer: Claudette Jay M.D.  Study quality: Technically good  Referring Physician: Gaurav Marie MD  Blood Pressure: 123/85 mmHg  Height: 183 cm  Weight: 83 kg  BSA: 2.1 m2  ------------------------------------------------------------------------  PROCEDURE: Echocardiography, transesophageal (APRIL) for  guidance of a transcatheter intracardiac or greatvessel(s)  structural intervention(s) (ishmael-and intra-procedural),  real-time image acquisition and documentation, guidance  with quantitative measurements, probe manipulation,  interpretation, and report, including diagnostic  transesophageal echocardiography and, when performed,  administration of ultrasound contrast, Doppler, color flow,  and 3D.  INDICATION: Unspecified atrial fibrillation (I48.91)  ------------------------------------------------------------------------  PRE-BYPASS OBSERVATIONS:  Mitral Valve: Mitral annular calcification, otherwise  normal mitral valve. Moderate mitral regurgitation.  Aortic Root: Normal aortic root, aortic arch and descending  thoracic aorta.  Aortic Valve: Calcified trileaflet aortic valve with normal  opening. Mild aortic regurgitation.  Left Atrium: Normal left atrium. No left atrial or left  atrial appendage thrombus.  Measurements of the dimensions  of the left atrial  appendage were made as follows. Width of the YANG refers to  width at the ostium. Length of the YANG refers  to distance  from the ostium to the most distal point of the YANG.  0 degrees width 2.4 cm length 2.3 cm  45 degrees width 2.67 cm length 2.3 cm  90 degrees width 3.1 cm length 2.3 cm  135 degrees width 3 cm length 1.9 cm  Left Ventricle: Normal left ventricular systolic function.  No segmental wall motion abnormalities.  Right Heart: Normal right atrium. Normal right ventricular  size and function. Normal tricuspid valve. Normal pulmonic  < from: Intra-Operative Transesophageal Echo (08.14.23 @ 11:14) >  valve.  Pericardium/PleuraNormal pericardium with no pericardial  effusion.  ------------------------------------------------------------------------  PRE-BYPASS CONCLUSIONS:  1. Mitral annular calcification, otherwise normal mitral  valve. Moderate mitral regurgitation.  2. Calcified trileaflet aortic valve with normal opening.  Mild aortic regurgitation.  3. Normal left atrium. No left atrial or left atrial  appendage thrombus.  4. Normal left ventricular systolic function. No segmental  wall motion abnormalities.  5. Normal right ventricular size and function.  ------------------------------------------------------------------------  POST-BYPASS CONCLUSIONS:  Under continuous echocardiographic monitoring trans-septal  wire/catheter were passed into the left atrium. AWatchman  appendage closure device was placed within the left atrial  appendage. Following placement, a stability tug test was  performed to confirm the device moves with the left atrial  appendage. The device appears well-seated. There is no flow  seen with color Doppler around the device. The maximum  diameter of the compress device post placement is 28mm at 0  degree, 26 mm at 45 degree, 27 mm at 90 degree and 26 mm at  135 degree. The device is seen in the left atrial appendage  with 3D imaging. There is no pericardial effusion post  Watchman placement.  ------------------------------------------------------------------------  Confirmed on  8/15/2023 - 18:24:29 by ALVARO Barker  ------------------------------------------------------------------------                         73y Male with history of recurrent diverticular bleeding s/p multiple colonoscopies (no interventions) and prior +NM bleeding scan (5/2023 with active bleeding in cecum/proximal colon, no intervention), prostate cancer s/p radiation c/b radiation-associated vascular ectasia, internal hemorrhoids, HTN, asthma, recently diagnosed afib who is s/p ablation and implantation of a Watchman device on 8/14/23, discharged on 12 week course of  Plavix/ASA. He presents  with complaints recurrent BRBPR associated with nausea, mild left lower quadrant abdominal pain, weakness, dizziness and intermittent left sided chest discomfort , non radiating.  No shortness of breath, vomiting, hematuria, hemoptysis, hematemesis or syncope.     In the ED, EKG and telemetry w/ sinus tachycardia 90's to 109 bpm.   Labs:  Hgb 10.2 from 13 s/p 2u pRBC. Repeat hgb ~10. INR 1.27. BUN 23 from 14 and Cr 1.01.  GI consulted in the ED, but no plan for urgent intervention. MICU consulted for further evaluation and possible need for ICU    Of note, last EGD/colon 5/2023 in setting of hematochezia; EGD was unremarkable aside for small hiatal hernia. 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 Now recommending discontinuation of anticoagulation and if rebleeds, CTA and IR consult for embolization.    Patient currently in sinus rhythm with episodes of hypotension. Currently /109.  Receiving PRBC's unit #3.   .      PAST MEDICAL & SURGICAL HISTORY:  Hypertension  Hyperlipidemia  GIB (gastrointestinal bleeding)  Prostate cancer /p radiation therapy  Gout  Diverticulitis  Asthma  Atrial fib/flutter s/p ablation 8/14/23  Presence of Watchman left atrial appendage closure device 8/14/23  History of hemorrhoidectomy  H/O total knee replacement, right          MEDICATIONS  (STANDING):    MEDICATIONS  (PRN):      FAMILY HISTORY: noncontribuatory      SOCIAL HISTORY: Lives with his wife.     CIGARETTES: no   DRUGS:  no  ALCOHOL: NO     REVIEW OF SYSTEMS:    CONSTITUTIONAL: No fever, weight loss, chills, shakes,   +fatigue  EYES: No eye pain, visual disturbances, or discharge  ENMT:  No difficulty hearing, tinnitus, vertigo; No sinus or throat pain  NECK: No pain or stiffness  BREASTS: No pain, masses, or nipple discharge  RESPIRATORY: No cough, wheezing, hemoptysis, or shortness of breath  CARDIOVASCULAR:  Mild intermittent left sided, non radiating  chest discomfort and dizziness.   No dyspnea, palpitations, syncope, paroxysmal nocturnal dyspnea, orthopnea, or arm or leg swelling  GASTROINTESTINAL: mild LLQ abdominal pain w/ nausea and BRBPR. No vomiting.   GENITOURINARY: No dysuria, nocturia, hematuria, or urinary incontinence  NEUROLOGICAL: No headaches, memory loss, slurred speech, limb weakness, loss of strength, numbness, or tremors  SKIN: No itching, burning, rashes, or lesions.  LYMPH NODES: No enlarged glands  ENDOCRINE: No heat or cold intolerance, or hair loss  MUSCULOSKELETAL: No joint pain or swelling, muscle, back, or extremity pain  PSYCHIATRIC: No depression, anxiety, or difficulty sleeping  HEME/LYMPH: No easy bruising or bleeding gums  ALLERGY AND IMMUNOLOGIC: No hives or rash.      Vital Signs Last 24 Hrs  T(C): 36.6 (21 Aug 2023 15:35), Max: 36.8 (21 Aug 2023 09:36)  T(F): 97.8 (21 Aug 2023 15:35), Max: 98.3 (21 Aug 2023 09:36)  HR: 98 (21 Aug 2023 15:35) (73 - 98)  BP: 104/74 (21 Aug 2023 15:35) (102/84 - 143/90)  BP(mean): --  RR: 16 (21 Aug 2023 15:35) (16 - 18)  SpO2: 100% (21 Aug 2023 15:35) (100% - 100%)    Parameters below as of 21 Aug 2023 15:35  Patient On (Oxygen Delivery Method): room air        PHYSICAL EXAM:    GENERAL: In no apparent distress, well nourished, and hydrated. Currently without pain  HEAD:  Atraumatic, Normocephalic  EYES: EOMI, PERRLA, conjunctiva and sclera clear  ENMT: No tonsillar erythema, exudates, or enlargement; Moist mucous membranes  NECK: Supple and normal thyroid.  No JVD or carotid bruit.    HEART: Regular rate and rhythm; No murmurs, rubs, or gallops.  PULMONARY: Clear to auscultation and perfusion.  No rales, wheezing, or rhonchi bilaterally.  ABDOMEN: Soft, mild LLQ tenderness to palpation.  Nondistended; Bowel sounds present +BRBPR  EXTREMITIES:  2+ Peripheral Pulses, No clubbing, cyanosis, or edema Right groin ecchymosis with inguinal stranding. No hematoma or bleeding.    LYMPH: No lymphadenopathy noted  NEUROLOGICAL: Grossly nonfocal          INTERPRETATION OF TELEMETRY: Sinus tachycardia 100-109 bpm with occasional PVC's.     ECG: Normal sinus rhythm 93 bpm with occasional PVC. QRSd 92ms QT 400ms          LABS:                        10.2   8.47  )-----------( 132      ( 21 Aug 2023 14:50 )             33.3     08-21    141  |  102  |  23  ----------------------------<  159<H>  4.1   |  30  |  1.01    Ca    8.7      21 Aug 2023 10:43  Phos  3.0     08-21  Mg     1.90     08-21    TPro  6.1  /  Alb  3.5  /  TBili  0.5  /  DBili  x   /  AST  12  /  ALT  12  /  AlkPhos  73  08-21        PT/INR - ( 21 Aug 2023 11:20 )   PT: 14.1 sec;   INR: 1.27 ratio         PTT - ( 21 Aug 2023 11:20 )  PTT:32.6 sec  Urinalysis Basic - ( 21 Aug 2023 10:43 )    Color: x / Appearance: x / SG: x / pH: x  Gluc: 159 mg/dL / Ketone: x  / Bili: x / Urobili: x   Blood: x / Protein: x / Nitrite: x   Leuk Esterase: x / RBC: x / WBC x   Sq Epi: x / Non Sq Epi: x / Bacteria: x      BNP  I&O's Detail    Daily Height in cm: 177.8 (21 Aug 2023 09:36)    Daily     RADIOLOGY & ADDITIONAL STUDIES:  PREVIOUS DIAGNOSTIC TESTING:    < from: CT Angio Abdomen and Pelvis w/ IV Cont (08.21.23 @ 12:19) >  ACC: [id number] EXAM:  CT ANGIO ABD PELV (W)AW IC   ORDERED BY: BASIA CLAYTON     PROCEDURE DATE:  08/21/2023    INTERPRETATION:  CLINICAL INFORMATION: 73 years  Male with lower GI   bleed, hx of diverticular bleed.    COMPARISON: CT abdomen and pelvis 5/10/2023    CONTRAST/COMPLICATIONS:  IV Contrast: Omnipaque 350  90 cc administered   10 cc discarded  Oral Contrast: NONE  Complications: None reported at time of study completion    PROCEDURE:  CT of the Abdomen and Pelvis was performed.  Precontrast, Arterial and Delayed phases were performed.  Sagittal and coronal reformats were performed.    FINDINGS:  LOWER CHEST: Cardiomegaly. Trace pericardial effusion. Watchman device in   the left atrial appendage.    LIVER: Within normal limits.  BILE DUCTS: Normal caliber.  GALLBLADDER: Within normal limits.  SPLEEN: Within normal limits.  PANCREAS: Within normal limits.  ADRENALS: Stable left adrenal thickening. Normal right adrenal gland.  KIDNEYS/URETERS: 6.7 cm left lower pole cyst. 2.4 cm left upper pole   cyst. Right lower pole hypodensity too small to characterize. No   hydroureteronephrosis bilaterally.    BLADDER: Within normal limits.  REPRODUCTIVE ORGANS: Prostate not visualized.    BOWEL: No bowel obstruction. Appendixis normal.. Small sliding hiatus   hernia. Pancolonic diverticulosis without diverticulitis. No intraluminal   contrast extravasation.  PERITONEUM: No ascites  VESSELS: Atherosclerotic changes.  RETROPERITONEUM/LYMPH NODES: No lymphadenopathy. No retroperitoneal   hematoma.  ABDOMINAL WALL: Subcutaneous fat stranding overlying the right common   femoral vein, likely related to recent procedure. Small bilateral   fat-containing inguinal hernias. Small fat-containing umbilical hernia.  BONES: Degenerative changes.    IMPRESSION:  No CT evidence of active GI bleed. Pancolonic diverticulosis without   diverticulitis.        ECHO  FINDINGS:  < from: Intra-Operative Transesophageal Echo (08.14.23 @ 11:14) >  Patient name: CARTER, LENNOX  YOB: 1950   Age: 73 (M)   MR#: [id number]  Study Date: 8/14/2023  Location: [facility] L7CC 14 L DSonographer: Claudette Jay M.D.  Study quality: Technically good  Referring Physician: Gaurav Marie MD  Blood Pressure: 123/85 mmHg  Height: 183 cm  Weight: 83 kg  BSA: 2.1 m2  ------------------------------------------------------------------------  PROCEDURE: Echocardiography, transesophageal (APRIL) for  guidance of a transcatheter intracardiac or greatvessel(s)  structural intervention(s) (ishmael-and intra-procedural),  real-time image acquisition and documentation, guidance  with quantitative measurements, probe manipulation,  interpretation, and report, including diagnostic  transesophageal echocardiography and, when performed,  administration of ultrasound contrast, Doppler, color flow,  and 3D.  INDICATION: Unspecified atrial fibrillation (I48.91)  ------------------------------------------------------------------------  PRE-BYPASS OBSERVATIONS:  Mitral Valve: Mitral annular calcification, otherwise  normal mitral valve. Moderate mitral regurgitation.  Aortic Root: Normal aortic root, aortic arch and descending  thoracic aorta.  Aortic Valve: Calcified trileaflet aortic valve with normal  opening. Mild aortic regurgitation.  Left Atrium: Normal left atrium. No left atrial or left  atrial appendage thrombus.  Measurements of the dimensions  of the left atrial  appendage were made as follows. Width of the YANG refers to  width at the ostium. Length of the YANG refers  to distance  from the ostium to the most distal point of the YANG.  0 degrees width 2.4 cm length 2.3 cm  45 degrees width 2.67 cm length 2.3 cm  90 degrees width 3.1 cm length 2.3 cm  135 degrees width 3 cm length 1.9 cm  Left Ventricle: Normal left ventricular systolic function.  No segmental wall motion abnormalities.  Right Heart: Normal right atrium. Normal right ventricular  size and function. Normal tricuspid valve. Normal pulmonic  < from: Intra-Operative Transesophageal Echo (08.14.23 @ 11:14) >  valve.  Pericardium/PleuraNormal pericardium with no pericardial  effusion.  ------------------------------------------------------------------------  PRE-BYPASS CONCLUSIONS:  1. Mitral annular calcification, otherwise normal mitral  valve. Moderate mitral regurgitation.  2. Calcified trileaflet aortic valve with normal opening.  Mild aortic regurgitation.  3. Normal left atrium. No left atrial or left atrial  appendage thrombus.  4. Normal left ventricular systolic function. No segmental  wall motion abnormalities.  5. Normal right ventricular size and function.  ------------------------------------------------------------------------  POST-BYPASS CONCLUSIONS:  Under continuous echocardiographic monitoring trans-septal  wire/catheter were passed into the left atrium. AWatchman  appendage closure device was placed within the left atrial  appendage. Following placement, a stability tug test was  performed to confirm the device moves with the left atrial  appendage. The device appears well-seated. There is no flow  seen with color Doppler around the device. The maximum  diameter of the compress device post placement is 28mm at 0  degree, 26 mm at 45 degree, 27 mm at 90 degree and 26 mm at  135 degree. The device is seen in the left atrial appendage  with 3D imaging. There is no pericardial effusion post  Watchman placement.  ------------------------------------------------------------------------  Confirmed on  8/15/2023 - 18:24:29 by ALVARO Barker  ------------------------------------------------------------------------

## 2023-08-21 NOTE — CONSULT NOTE ADULT - ATTENDING COMMENTS
pt with hematochezia and hypotension due to acute blood loss anemia from hematochezia, while on asa/plavix  improved with 2 units prbc  , pt mentating well, HR 90s, patient not lightheaded  would transfuse a third unit, check coags/ fibrinogen  discussed with EP who will see patient  Patient does not require MICU level of care at this time.  Please re-consult as needed.
Date of service: 8/21/23    Agree with E&M as outlined above.
Seen and examined by me. Denies abdominal pain but feels lightheaded after bowel movements.    Afebrile HR 90s-100s while provider in room, BP 110s/70s, NAD, benign abdomen, pad covered in dark red stool.     CTA negative this afternoon, 4 bowel movements so far today including this most recent one, per RNs at bed time it is becoming a more true red color when before it was darker.    Hgb 9.5 from 10s - first hgb this admission (10s from 13s) was obtained AFTER 2u pRBCs were given, per resident MD at bedside. BUN:Cr <25.    73M with pancolonic diverticulosis presents with hematochezia and new anemia likely 2/2 GI blood losses. Presentation is most c/w diverticular bleed; patient has had several in the past. CTA negative but continuing to have bloody bowel movements.    - monitor and document bloody bowel movements  - if HDUS or hgb drop >2u over 6 hours, obtain CTA  - we will CTM, if bleeding does not resolve on its own and is not amenable to IR embolization will consider colonoscopy on Wednesday    Kelly Ahumada MD  GI Attending

## 2023-08-21 NOTE — CONSULT NOTE ADULT - ASSESSMENT
# Hematochezia  # Afib s/p recent ablation on ASA/Plavix x 1 week (last dose Plavix 8/20 AM)  Patient with 1 episode of hematochezia after 1 week of Plavix. Given history of recurrent bleeding, likely due to diverticular bleeding and less likely 2/2 radiation-associated vascular or hemorrhoidal bleeding. He has had multiple endoscopic procedures for evaluation of hematochezia. Given severe diverticulosis and this history, there is low utility in repeat colonoscopy and patient is also does not wish to undergo any further colonoscopies.        Recommendations:  - No plan for endoscopic evaluation  - If rebleeds, recommend CTA and IR consult for embolization given recurrent bleeding and multiple colonoscopies  - If repeatedly rebeeds, reasonable to consider CRS evaluation  - Cards consult regarding need for AC  - Continue to hold AC if able  - Daily CBC, CMP, coags  - Ensure adequate hydration  - If becomes hemodynamically unstable with overt bleeding, consult MICU, obtain stat CTA, consider IR consult  - Rest of care per primary    Preliminary note until signed by Attending.    Thank you for involving us in this patient's care. Please reach out if any further questions or concerns.    Jes Haley MD  Gastroenterology/Hepatology Fellow, PGY-7    NON-URGENT CONSULTS:  Please email [email or web address]  [email or web address]  AT NIGHT AND ON WEEKENDS:  Contact on-call GI fellow via answering service ([phone]) from 5pm-8am and on weekends/holidays  MONDAY-FRIDAY 8AM-5PM:  Pager: [phone] ([facility])  Pager: [phone] (STEVIE)   # Hematochezia  # Afib s/p recent ablation on ASA/Plavix x 1 week (last dose Plavix 8/20 AM)  Patient with 1 episode of hematochezia after 1 week of Plavix. Given history of recurrent bleeding, likely due to diverticular bleeding and less likely 2/2 radiation-associated vascular or hemorrhoidal bleeding. He has had multiple endoscopic procedures for evaluation of hematochezia. Given severe diverticulosis and this history, there is low utility in repeat colonoscopy and patient is also does not wish to undergo any further colonoscopies.        Recommendations:  - No plan for endoscopic evaluation  - If rebleeds, recommend CTA and IR consult for embolization given recurrent bleeding and multiple colonoscopies  - If repeatedly rebleeds, reasonable to consider CRS evaluation  - Cards consult regarding need for AC  - Continue to hold AC if able  - Daily CBC, CMP, coags  - Ensure adequate hydration  - If becomes hemodynamically unstable with overt bleeding, consult MICU, obtain stat CTA, consider IR consult  - Note: if ASA resumed upon discharge, will need ppi 40 po daily ppx  - Rest of care per primary    Preliminary note until signed by Attending.    Thank you for involving us in this patient's care. Please reach out if any further questions or concerns.    Jes Haley MD  Gastroenterology/Hepatology Fellow, PGY-7    NON-URGENT CONSULTS:  Please email [email or web address]  [email or web address]  AT NIGHT AND ON WEEKENDS:  Contact on-call GI fellow via answering service ([phone]) from 5pm-8am and on weekends/holidays  MONDAY-FRIDAY 8AM-5PM:  Pager: [phone] ([facility])  Pager: [phone] ([facility])

## 2023-08-21 NOTE — ED PROVIDER NOTE - PROGRESS NOTE DETAILS
Lizet Phillips M.D. (Resident Physician): Discussed with GI. They will see pt. If pt becomes more unstable, they recommend getting CTA A/P and consulting MICU. Patient had another large bloody bowel movement, now BP 90s/60s. Consulted MICU, sent repeat CBC and recommending holding off on prbc for now. Discussed with IR per GI recommendations. CTA negative for active bleeding. Patient had another large bloody bowel movement, now BP 90s/60s. Consulted MICU, sent repeat CBC and recommending holding off on prbc for now. Discussed with IR per GI recommendations. CTA negative for active bleeding.  Discussed with GI. aLtoya Mendoza MD (PGY-2 EM): patient is admitted. paged hospitalist [phone], as patient is having large bright red blood per rectum, map stable 83. Latoya Mendoza MD (PGY-2 EM): discussed with hospitalist large blood and clots exiting patients rectum. hospitalist aware.

## 2023-08-21 NOTE — CONSULT NOTE ADULT - SUBJECTIVE AND OBJECTIVE BOX
Chief Complaint:  Patient is a 73y old  Male who presents with a chief complaint of     HPI:CARTER, LENNOX is a 73y Male    PMHX/PSHX:  Hypertension    Hyperlipidemia    GIB (gastrointestinal bleeding)    Prostate cancer    Gout    Diverticulitis    Asthma    Atrial flutter    History of hemorrhoidectomy    H/O total knee replacement, right      Allergies:  No Known Allergies      Home Medications: reviewed  Hospital Medications:      Social History:   Tob: Denies  EtOH: Denies  Illicit Drugs: Denies    Family history:  No pertinent family history in first degree relatives      Denies family history of colon cancer/polyps, stomach cancer/polyps, pancreatic cancer/masses, liver cancer/disease, ovarian cancer and endometrial cancer.    ROS:   General:  No  fevers, chills, night sweats, fatigue  Eyes:  Good vision, no reported pain  ENT:  No sore throat, pain, runny nose  CV:  No pain, palpitations  Pulm:  No dyspnea, cough  GI:  See HPI, otherwise negative  :  No  incontinence, nocturia  Muscle:  No pain, weakness  Neuro:  No memory problems  Psych:  No insomnia, mood problems, depression  Endocrine:  No polyuria, polydipsia, cold/heat intolerance  Heme:  No petechiae, ecchymosis, easy bruisability  Skin:  No rash    PHYSICAL EXAM:   Vital Signs:  Vital Signs Last 24 Hrs  T(C): 36.2 (21 Aug 2023 12:27), Max: 36.8 (21 Aug 2023 09:36)  T(F): 97.2 (21 Aug 2023 12:27), Max: 98.3 (21 Aug 2023 09:36)  HR: 80 (21 Aug 2023 12:27) (73 - 98)  BP: 142/96 (21 Aug 2023 12:27) (102/84 - 143/90)  BP(mean): --  RR: 18 (21 Aug 2023 12:27) (16 - 18)  SpO2: 100% (21 Aug 2023 12:27) (100% - 100%)    Parameters below as of 21 Aug 2023 12:27  Patient On (Oxygen Delivery Method): room air      Daily Height in cm: 177.8 (21 Aug 2023 09:36)    Daily     GENERAL: no acute distress  NEURO: alert  HEENT: anicteric sclera, no conjunctival pallor appreciated  CHEST: no respiratory distress, no accessory muscle use  CARDIAC: regular rate, rhythm  ABDOMEN: soft, non-tender, non-distended, no rebound or guarding  EXTREMITIES: warm, well perfused, no edema  SKIN: no lesions noted    LABS:                          10.2   7.33  )-----------( 155      ( 21 Aug 2023 10:43 )             33.3     08-21    141  |  102  |  23  ----------------------------<  159<H>  4.1   |  30  |  1.01    Ca    8.7      21 Aug 2023 10:43    TPro  6.1  /  Alb  3.5  /  TBili  0.5  /  DBili  x   /  AST  12  /  ALT  12  /  AlkPhos  73  08-21    LIVER FUNCTIONS - ( 21 Aug 2023 10:43 )  Alb: 3.5 g/dL / Pro: 6.1 g/dL / ALK PHOS: 73 U/L / ALT: 12 U/L / AST: 12 U/L / GGT: x               Diagnostic Studies:     XR CHEST   PROCEDURE DATE:  08/15/2023          INTERPRETATION:  CLINICAL INFORMATION: Post watchman implant    TIME OF EXAMINATION: August 15, 2023 at 6:58 AM    EXAM: Portable chest    FINDINGS:  The Watchman device is faintly made out. No complicating   pneumothorax. Lungs are clear with small effusion unchanged. Heart size   is stable        COMPARISON: May 8, 2023        IMPRESSION: Status post watchman implant.    --- End of Report ---      NM GI BLEEDING IMG     PROCEDURE DATE:  05/11/2023          INTERPRETATION:  CLINICAL INFORMATION: 73 year old male with GI Bleeding   referred for localization of bleeding site.    RADIOPHARMACEUTICAL:  22.1 mCi Tc-99m labeled autologous red blood cells,   I.V.    TECHNIQUE: Dynamic imaging of the abdomen and pelvis was performed for 2   hours following administration of radiolabeled autologous red blood   cells. Static images of the abdomen and pelvis in the anterior and   lateral views were obtained.    COMPARISON: No previous bleeding scan for comparison    FINDINGS: Beginning at approximately 88 minutes after injection of   radiotracer, there is focal labeled red blood cell accumulationin the   right lower quadrant of the abdomen. This activity increases in intensity   over time and moves antegrade in the ascending and transverse colon as   the study progresses. These findings are compatible with an active   bleeding site in the cecum/proximal ascending colon.    IMPRESSION: Abnormal bleeding scan.    Findings compatible with active bleeding site in the region of the   cecum/proximal ascending colon.      Findings were communicated to Dr. Luis Osorio by Dr. Duffy on     5/11/2023 at 8:35 PM.    CT ANGIO ABD PELV (W)AW IC   ORDERED BY: AHMET HERNANDEZ     PROCEDURE DATE:  05/10/2023          INTERPRETATION:  CLINICAL INFORMATION: Concern for diverticular bleed. GI   bleed.    COMPARISON: CT abdomen pelvis 5/8/2023.    CONTRAST/COMPLICATIONS:  IV Contrast: Omnipaque 350  95 cc administered   5 cc discarded  Oral Contrast: NONE  Complications: None reported at time of study completion    PROCEDURE:  CT of the Abdomen and Pelvis was performed.  Precontrast, Arterial and Delayed phases were performed.  Sagittal and coronal reformats were performed.    FINDINGS:  LOWER CHEST: Cardiomegaly.    LIVER: Within normal limits.  BILE DUCTS: Normal caliber.  GALLBLADDER: Within normal limits.  SPLEEN: Within normal limits.  PANCREAS: Within normal limits.  ADRENALS: 2.1 cm left adrenal adenoma.  KIDNEYS/URETERS: No hydronephrosis. Left renal cysts.    BLADDER: Within normal limits.  REPRODUCTIVE ORGANS: Prostate within normal limits.    BOWEL: Small hiatal hernia. No bowel obstruction. Appendix is normal.   Diffuse colonic diverticulosis without evidence of acute diverticulitis.  PERITONEUM: No ascites.  VESSELS: Atherosclerotic changes.  RETROPERITONEUM/LYMPH NODES: No lymphadenopathy.  ABDOMINAL WALL: Small umbilical hernia containing a loop of small bowel.  BONES: Degenerative changes.    IMPRESSION:  No evidence of active GI bleed.    Colonic diverticulosis.    --- End of Report ---           Chief Complaint:  Patient is a 73y old  Male who presents with a chief complaint of     HPI:CARTER, LENNOX is a 73y Male with history of recurrent diverticular bleeding s/p multiple colonoscopies (no interventions) and prior +NM bleeding scan (5/2023 with active bleeding in cecum/proximal colon, no intervention), prostate cancer s/p radiation c/b radiation-associated vascular ectasia, internal hemorrhoids, HTN, asthma, recently dx afib s/p ablation started Plavix/ASA 1 week PTA) who now presents with complaints recurrent painless BRBPR. GI consulted for hematochezia.     Patient had one episode of bloody red blood early morning without n/v or abdominal pain, but given new AC medication and history he became concerned so he went to the ED with is wife. Denies sob or dizziness/light-headedness.      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     Patient has been HDS w/o tachycardia. Hgb 10.2 from 13.5, previously 12. INR 1.27. BUn 23 from 14 and Cr 1.01.    PMHX/PSHX:  Hypertension    Hyperlipidemia    GIB (gastrointestinal bleeding)    Prostate cancer    Gout    ?Diverticulitis    Asthma    Atrial flutter    History of hemorrhoidectomy    H/O total knee replacement, right      Allergies:  No Known Allergies      Home Medications: reviewed  Hospital Medications:      Social History:   Tob: Denies  EtOH: Denies  Illicit Drugs: Denies    Family history:  No pertinent family history in first degree relatives       ROS:   Complete and normal except as mentioned above.    PHYSICAL EXAM:   Vital Signs:  Vital Signs Last 24 Hrs  T(C): 36.2 (21 Aug 2023 12:27), Max: 36.8 (21 Aug 2023 09:36)  T(F): 97.2 (21 Aug 2023 12:27), Max: 98.3 (21 Aug 2023 09:36)  HR: 80 (21 Aug 2023 12:27) (73 - 98)  BP: 142/96 (21 Aug 2023 12:27) (102/84 - 143/90)  BP(mean): --  RR: 18 (21 Aug 2023 12:27) (16 - 18)  SpO2: 100% (21 Aug 2023 12:27) (100% - 100%)    Parameters below as of 21 Aug 2023 12:27  Patient On (Oxygen Delivery Method): room air      Daily Height in cm: 177.8 (21 Aug 2023 09:36)    Daily     GENERAL: no acute distress  NEURO: aox3  HEENT: anicteric sclera, no conjunctival pallor appreciated  CHEST: no respiratory distress, no accessory muscle use  CARDIAC: regular rate, rhythm  ABDOMEN: soft, mild LLQ tenderness, non-distended, no rebound or guarding  RONNIE: patient deferred  EXTREMITIES: warm, well perfused, no edema  SKIN: no lesions noted    LABS:                          10.2   7.33  )-----------( 155      ( 21 Aug 2023 10:43 )             33.3     08-21    141  |  102  |  23  ----------------------------<  159<H>  4.1   |  30  |  1.01    Ca    8.7      21 Aug 2023 10:43    TPro  6.1  /  Alb  3.5  /  TBili  0.5  /  DBili  x   /  AST  12  /  ALT  12  /  AlkPhos  73  08-21    LIVER FUNCTIONS - ( 21 Aug 2023 10:43 )  Alb: 3.5 g/dL / Pro: 6.1 g/dL / ALK PHOS: 73 U/L / ALT: 12 U/L / AST: 12 U/L / GGT: x               Diagnostic Studies:     XR CHEST   PROCEDURE DATE:  08/15/2023          INTERPRETATION:  CLINICAL INFORMATION: Post watchman implant    TIME OF EXAMINATION: August 15, 2023 at 6:58 AM    EXAM: Portable chest    FINDINGS:  The Watchman device is faintly made out. No complicating   pneumothorax. Lungs are clear with small effusion unchanged. Heart size   is stable        COMPARISON: May 8, 2023        IMPRESSION: Status post watchman implant.    --- End of Report ---      NM GI BLEEDING IMG     PROCEDURE DATE:  05/11/2023          INTERPRETATION:  CLINICAL INFORMATION: 73 year old male with GI Bleeding   referred for localization of bleeding site.    RADIOPHARMACEUTICAL:  22.1 mCi Tc-99m labeled autologous red blood cells,   I.V.    TECHNIQUE: Dynamic imaging of the abdomen and pelvis was performed for 2   hours following administration of radiolabeled autologous red blood   cells. Static images of the abdomen and pelvis in the anterior and   lateral views were obtained.    COMPARISON: No previous bleeding scan for comparison    FINDINGS: Beginning at approximately 88 minutes after injection of   radiotracer, there is focal labeled red blood cell accumulationin the   right lower quadrant of the abdomen. This activity increases in intensity   over time and moves antegrade in the ascending and transverse colon as   the study progresses. These findings are compatible with an active   bleeding site in the cecum/proximal ascending colon.    IMPRESSION: Abnormal bleeding scan.    Findings compatible with active bleeding site in the region of the   cecum/proximal ascending colon.      Findings were communicated to Dr. Luis Osorio by Dr. Duffy on     5/11/2023 at 8:35 PM.    CT ANGIO ABD PELV (W)AW IC   ORDERED BY: AHMET HERNANDEZ     PROCEDURE DATE:  05/10/2023          INTERPRETATION:  CLINICAL INFORMATION: Concern for diverticular bleed. GI   bleed.    COMPARISON: CT abdomen pelvis 5/8/2023.    CONTRAST/COMPLICATIONS:  IV Contrast: Omnipaque 350  95 cc administered   5 cc discarded  Oral Contrast: NONE  Complications: None reported at time of study completion    PROCEDURE:  CT of the Abdomen and Pelvis was performed.  Precontrast, Arterial and Delayed phases were performed.  Sagittal and coronal reformats were performed.    FINDINGS:  LOWER CHEST: Cardiomegaly.    LIVER: Within normal limits.  BILE DUCTS: Normal caliber.  GALLBLADDER: Within normal limits.  SPLEEN: Within normal limits.  PANCREAS: Within normal limits.  ADRENALS: 2.1 cm left adrenal adenoma.  KIDNEYS/URETERS: No hydronephrosis. Left renal cysts.    BLADDER: Within normal limits.  REPRODUCTIVE ORGANS: Prostate within normal limits.    BOWEL: Small hiatal hernia. No bowel obstruction. Appendix is normal.   Diffuse colonic diverticulosis without evidence of acute diverticulitis.  PERITONEUM: No ascites.  VESSELS: Atherosclerotic changes.  RETROPERITONEUM/LYMPH NODES: No lymphadenopathy.  ABDOMINAL WALL: Small umbilical hernia containing a loop of small bowel.  BONES: Degenerative changes.    IMPRESSION:  No evidence of active GI bleed.    Colonic diverticulosis.    --- End of Report ---           Chief Complaint:  Patient is a 73y old  Male who presents with a chief complaint of     HPI:CARTER, LENNOX is a 73y Male with history of recurrent diverticular bleeding s/p multiple colonoscopies (no interventions) and prior +NM bleeding scan (5/2023 with active bleeding in cecum/proximal colon, no intervention), prostate cancer s/p radiation c/b radiation-associated vascular ectasia, internal hemorrhoids, HTN, asthma, recently dx afib s/p ablation started Plavix/ASA 1 week PTA) who now presents with complaints recurrent painless BRBPR. GI consulted for hematochezia.     Patient had one episode of bloody red blood early morning without n/v or abdominal pain, but given new AC medication and history he became concerned so he went to the ED with is wife. Denies sob or dizziness/light-headedness.      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     Patient has been HDS w/o tachycardia. Hgb 10.2 from 13.5, previously 12. INR 1.27. BUn 23 from 14 and Cr 1.01.    PMHX/PSHX:  Hypertension    Hyperlipidemia    GIB (gastrointestinal bleeding)    Prostate cancer    Gout    ?Diverticulitis    Asthma    Atrial flutter    History of hemorrhoidectomy    H/O total knee replacement, right      Allergies:  No Known Allergies      Home Medications: reviewed  Hospital Medications:      Social History:   Tob: Denies  EtOH: Denies  Illicit Drugs: Denies    Family history:  No pertinent family history in first degree relatives       ROS:   Complete and normal except as mentioned above.    PHYSICAL EXAM:   Vital Signs:  Vital Signs Last 24 Hrs  T(C): 36.2 (21 Aug 2023 12:27), Max: 36.8 (21 Aug 2023 09:36)  T(F): 97.2 (21 Aug 2023 12:27), Max: 98.3 (21 Aug 2023 09:36)  HR: 80 (21 Aug 2023 12:27) (73 - 98)  BP: 142/96 (21 Aug 2023 12:27) (102/84 - 143/90)  BP(mean): --  RR: 18 (21 Aug 2023 12:27) (16 - 18)  SpO2: 100% (21 Aug 2023 12:27) (100% - 100%)    Parameters below as of 21 Aug 2023 12:27  Patient On (Oxygen Delivery Method): room air      Daily Height in cm: 177.8 (21 Aug 2023 09:36)    Daily     GENERAL: no acute distress  NEURO: aox3  HEENT: anicteric sclera, no conjunctival pallor appreciated  CHEST: no respiratory distress, no accessory muscle use  CARDIAC: regular rate, rhythm  ABDOMEN: soft, mild LLQ tenderness, non-distended, no rebound or guarding  RONNIE: patient deferred  EXTREMITIES: warm, well perfused, no edema  SKIN: no lesions noted    LABS:                          10.2   7.33  )-----------( 155      ( 21 Aug 2023 10:43 )             33.3     08-21    141  |  102  |  23  ----------------------------<  159<H>  4.1   |  30  |  1.01    Ca    8.7      21 Aug 2023 10:43    TPro  6.1  /  Alb  3.5  /  TBili  0.5  /  DBili  x   /  AST  12  /  ALT  12  /  AlkPhos  73  08-21    LIVER FUNCTIONS - ( 21 Aug 2023 10:43 )  Alb: 3.5 g/dL / Pro: 6.1 g/dL / ALK PHOS: 73 U/L / ALT: 12 U/L / AST: 12 U/L / GGT: x               Diagnostic Studies:     XR CHEST   PROCEDURE DATE:  08/15/2023          INTERPRETATION:  CLINICAL INFORMATION: Post watchman implant    TIME OF EXAMINATION: August 15, 2023 at 6:58 AM    EXAM: Portable chest    FINDINGS:  The Watchman device is faintly made out. No complicating   pneumothorax. Lungs are clear with small effusion unchanged. Heart size   is stable        COMPARISON: May 8, 2023        IMPRESSION: Status post watchman implant.    --- End of Report ---      NM GI BLEEDING IMG     PROCEDURE DATE:  05/11/2023          INTERPRETATION:  CLINICAL INFORMATION: 73 year old male with GI Bleeding   referred for localization of bleeding site.    RADIOPHARMACEUTICAL:  22.1 mCi Tc-99m labeled autologous red blood cells,   I.V.    TECHNIQUE: Dynamic imaging of the abdomen and pelvis was performed for 2   hours following administration of radiolabeled autologous red blood   cells. Static images of the abdomen and pelvis in the anterior and   lateral views were obtained.    COMPARISON: No previous bleeding scan for comparison    FINDINGS: Beginning at approximately 88 minutes after injection of   radiotracer, there is focal labeled red blood cell accumulationin the   right lower quadrant of the abdomen. This activity increases in intensity   over time and moves antegrade in the ascending and transverse colon as   the study progresses. These findings are compatible with an active   bleeding site in the cecum/proximal ascending colon.    IMPRESSION: Abnormal bleeding scan.    Findings compatible with active bleeding site in the region of the   cecum/proximal ascending colon.      Findings were communicated to Dr. Luis Osorio by Dr. Duffy on     5/11/2023 at 8:35 PM.    CT ANGIO ABD PELV (W)AW IC   ORDERED BY: AHMET HERNANDEZ     PROCEDURE DATE:  05/10/2023          INTERPRETATION:  CLINICAL INFORMATION: Concern for diverticular bleed. GI   bleed.    COMPARISON: CT abdomen pelvis 5/8/2023.    CONTRAST/COMPLICATIONS:  IV Contrast: Omnipaque 350  95 cc administered   5 cc discarded  Oral Contrast: NONE  Complications: None reported at time of study completion    PROCEDURE:  CT of the Abdomen and Pelvis was performed.  Precontrast, Arterial and Delayed phases were performed.  Sagittal and coronal reformats were performed.    FINDINGS:  LOWER CHEST: Cardiomegaly.    LIVER: Within normal limits.  BILE DUCTS: Normal caliber.  GALLBLADDER: Within normal limits.  SPLEEN: Within normal limits.  PANCREAS: Within normal limits.  ADRENALS: 2.1 cm left adrenal adenoma.  KIDNEYS/URETERS: No hydronephrosis. Left renal cysts.    BLADDER: Within normal limits.  REPRODUCTIVE ORGANS: Prostate within normal limits.    BOWEL: Small hiatal hernia. No bowel obstruction. Appendix is normal.   Diffuse colonic diverticulosis without evidence of acute diverticulitis.  PERITONEUM: No ascites.  VESSELS: Atherosclerotic changes.  RETROPERITONEUM/LYMPH NODES: No lymphadenopathy.  ABDOMINAL WALL: Small umbilical hernia containing a loop of small bowel.  BONES: Degenerative changes.    IMPRESSION:  No evidence of active GI bleed.    Colonic diverticulosis.    --- End of Report ---           Chief Complaint:  Patient is a 73y old  Male who presents with a chief complaint of hematochezia    HPI:CARTER, LENNOX is a 73y Male with history of recurrent diverticular bleeding s/p multiple colonoscopies (no interventions) and prior +NM bleeding scan (5/2023 with active bleeding in cecum/proximal colon, no intervention), prostate cancer s/p radiation c/b radiation-associated vascular ectasia, internal hemorrhoids, HTN, asthma, recently dx afib s/p ablation started Plavix/ASA 1 week PTA) who now presents with complaints recurrent painless BRBPR. GI consulted for hematochezia.     Patient had one episode of bloody red blood early morning without n/v or abdominal pain, but given new AC medication and history he became concerned so he went to the ED with is wife. Denies sob or dizziness/light-headedness.      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     Patient has been HDS w/o tachycardia. Hgb 10.2 from 13.5, previously 12. INR 1.27. BUn 23 from 14 and Cr 1.01.    PMHX/PSHX:  Hypertension    Hyperlipidemia    GIB (gastrointestinal bleeding)    Prostate cancer    Gout    ?Diverticulitis    Asthma    Atrial flutter    History of hemorrhoidectomy    H/O total knee replacement, right      Allergies:  No Known Allergies      Home Medications: reviewed  Hospital Medications:      Social History:   Tob: Denies  EtOH: Denies  Illicit Drugs: Denies    Family history:  No pertinent family history in first degree relatives       ROS:   Complete and normal except as mentioned above.    PHYSICAL EXAM:   Vital Signs:  Vital Signs Last 24 Hrs  T(C): 36.2 (21 Aug 2023 12:27), Max: 36.8 (21 Aug 2023 09:36)  T(F): 97.2 (21 Aug 2023 12:27), Max: 98.3 (21 Aug 2023 09:36)  HR: 80 (21 Aug 2023 12:27) (73 - 98)  BP: 142/96 (21 Aug 2023 12:27) (102/84 - 143/90)  BP(mean): --  RR: 18 (21 Aug 2023 12:27) (16 - 18)  SpO2: 100% (21 Aug 2023 12:27) (100% - 100%)    Parameters below as of 21 Aug 2023 12:27  Patient On (Oxygen Delivery Method): room air      Daily Height in cm: 177.8 (21 Aug 2023 09:36)    Daily     GENERAL: no acute distress  NEURO: aox3  HEENT: anicteric sclera, no conjunctival pallor appreciated  CHEST: no respiratory distress, no accessory muscle use  CARDIAC: regular rate, rhythm  ABDOMEN: soft, mild LLQ tenderness, non-distended, no rebound or guarding  RONNIE: patient deferred  EXTREMITIES: warm, well perfused, no edema  SKIN: no lesions noted    LABS:                          10.2   7.33  )-----------( 155      ( 21 Aug 2023 10:43 )             33.3     08-21    141  |  102  |  23  ----------------------------<  159<H>  4.1   |  30  |  1.01    Ca    8.7      21 Aug 2023 10:43    TPro  6.1  /  Alb  3.5  /  TBili  0.5  /  DBili  x   /  AST  12  /  ALT  12  /  AlkPhos  73  08-21    LIVER FUNCTIONS - ( 21 Aug 2023 10:43 )  Alb: 3.5 g/dL / Pro: 6.1 g/dL / ALK PHOS: 73 U/L / ALT: 12 U/L / AST: 12 U/L / GGT: x               Diagnostic Studies:     XR CHEST   PROCEDURE DATE:  08/15/2023          INTERPRETATION:  CLINICAL INFORMATION: Post watchman implant    TIME OF EXAMINATION: August 15, 2023 at 6:58 AM    EXAM: Portable chest    FINDINGS:  The Watchman device is faintly made out. No complicating   pneumothorax. Lungs are clear with small effusion unchanged. Heart size   is stable        COMPARISON: May 8, 2023        IMPRESSION: Status post watchman implant.    --- End of Report ---      NM GI BLEEDING IMG     PROCEDURE DATE:  05/11/2023          INTERPRETATION:  CLINICAL INFORMATION: 73 year old male with GI Bleeding   referred for localization of bleeding site.    RADIOPHARMACEUTICAL:  22.1 mCi Tc-99m labeled autologous red blood cells,   I.V.    TECHNIQUE: Dynamic imaging of the abdomen and pelvis was performed for 2   hours following administration of radiolabeled autologous red blood   cells. Static images of the abdomen and pelvis in the anterior and   lateral views were obtained.    COMPARISON: No previous bleeding scan for comparison    FINDINGS: Beginning at approximately 88 minutes after injection of   radiotracer, there is focal labeled red blood cell accumulationin the   right lower quadrant of the abdomen. This activity increases in intensity   over time and moves antegrade in the ascending and transverse colon as   the study progresses. These findings are compatible with an active   bleeding site in the cecum/proximal ascending colon.    IMPRESSION: Abnormal bleeding scan.    Findings compatible with active bleeding site in the region of the   cecum/proximal ascending colon.      Findings were communicated to Dr. Luis Osorio by Dr. Duffy on     5/11/2023 at 8:35 PM.    CT ANGIO ABD PELV (W)AW IC   ORDERED BY: AHMET HERNANDEZ     PROCEDURE DATE:  05/10/2023          INTERPRETATION:  CLINICAL INFORMATION: Concern for diverticular bleed. GI   bleed.    COMPARISON: CT abdomen pelvis 5/8/2023.    CONTRAST/COMPLICATIONS:  IV Contrast: Omnipaque 350  95 cc administered   5 cc discarded  Oral Contrast: NONE  Complications: None reported at time of study completion    PROCEDURE:  CT of the Abdomen and Pelvis was performed.  Precontrast, Arterial and Delayed phases were performed.  Sagittal and coronal reformats were performed.    FINDINGS:  LOWER CHEST: Cardiomegaly.    LIVER: Within normal limits.  BILE DUCTS: Normal caliber.  GALLBLADDER: Within normal limits.  SPLEEN: Within normal limits.  PANCREAS: Within normal limits.  ADRENALS: 2.1 cm left adrenal adenoma.  KIDNEYS/URETERS: No hydronephrosis. Left renal cysts.    BLADDER: Within normal limits.  REPRODUCTIVE ORGANS: Prostate within normal limits.    BOWEL: Small hiatal hernia. No bowel obstruction. Appendix is normal.   Diffuse colonic diverticulosis without evidence of acute diverticulitis.  PERITONEUM: No ascites.  VESSELS: Atherosclerotic changes.  RETROPERITONEUM/LYMPH NODES: No lymphadenopathy.  ABDOMINAL WALL: Small umbilical hernia containing a loop of small bowel.  BONES: Degenerative changes.    IMPRESSION:  No evidence of active GI bleed.    Colonic diverticulosis.    --- End of Report ---

## 2023-08-22 LAB
ALBUMIN SERPL ELPH-MCNC: 2.6 G/DL — LOW (ref 3.3–5)
ALBUMIN SERPL ELPH-MCNC: 2.8 G/DL — LOW (ref 3.3–5)
ALP SERPL-CCNC: 47 U/L — SIGNIFICANT CHANGE UP (ref 40–120)
ALP SERPL-CCNC: 49 U/L — SIGNIFICANT CHANGE UP (ref 40–120)
ALT FLD-CCNC: 8 U/L — SIGNIFICANT CHANGE UP (ref 4–41)
ALT FLD-CCNC: 9 U/L — SIGNIFICANT CHANGE UP (ref 4–41)
ANION GAP SERPL CALC-SCNC: 10 MMOL/L — SIGNIFICANT CHANGE UP (ref 7–14)
ANION GAP SERPL CALC-SCNC: 8 MMOL/L — SIGNIFICANT CHANGE UP (ref 7–14)
APTT BLD: 32.2 SEC — SIGNIFICANT CHANGE UP (ref 24.5–35.6)
AST SERPL-CCNC: 11 U/L — SIGNIFICANT CHANGE UP (ref 4–40)
AST SERPL-CCNC: 13 U/L — SIGNIFICANT CHANGE UP (ref 4–40)
BASE EXCESS BLDV CALC-SCNC: -1.1 MMOL/L — SIGNIFICANT CHANGE UP (ref -2–3)
BASE EXCESS BLDV CALC-SCNC: -2.5 MMOL/L — LOW (ref -2–3)
BASOPHILS # BLD AUTO: 0.03 K/UL — SIGNIFICANT CHANGE UP (ref 0–0.2)
BASOPHILS # BLD AUTO: 0.07 K/UL — SIGNIFICANT CHANGE UP (ref 0–0.2)
BASOPHILS NFR BLD AUTO: 0.3 % — SIGNIFICANT CHANGE UP (ref 0–2)
BASOPHILS NFR BLD AUTO: 0.7 % — SIGNIFICANT CHANGE UP (ref 0–2)
BILIRUB SERPL-MCNC: 0.7 MG/DL — SIGNIFICANT CHANGE UP (ref 0.2–1.2)
BILIRUB SERPL-MCNC: 0.7 MG/DL — SIGNIFICANT CHANGE UP (ref 0.2–1.2)
BLD GP AB SCN SERPL QL: NEGATIVE — SIGNIFICANT CHANGE UP
BLOOD GAS VENOUS COMPREHENSIVE RESULT: SIGNIFICANT CHANGE UP
BUN SERPL-MCNC: 28 MG/DL — HIGH (ref 7–23)
BUN SERPL-MCNC: 29 MG/DL — HIGH (ref 7–23)
CA-I BLD-SCNC: 1.18 MMOL/L — SIGNIFICANT CHANGE UP (ref 1.15–1.29)
CA-I SERPL-SCNC: 1.22 MMOL/L — SIGNIFICANT CHANGE UP (ref 1.15–1.33)
CALCIUM SERPL-MCNC: 8 MG/DL — LOW (ref 8.4–10.5)
CALCIUM SERPL-MCNC: 8.4 MG/DL — SIGNIFICANT CHANGE UP (ref 8.4–10.5)
CHLORIDE BLDV-SCNC: 107 MMOL/L — SIGNIFICANT CHANGE UP (ref 96–108)
CHLORIDE BLDV-SCNC: 110 MMOL/L — HIGH (ref 96–108)
CHLORIDE SERPL-SCNC: 107 MMOL/L — SIGNIFICANT CHANGE UP (ref 98–107)
CHLORIDE SERPL-SCNC: 107 MMOL/L — SIGNIFICANT CHANGE UP (ref 98–107)
CK MB BLD-MCNC: 6 % — HIGH (ref 0–2.5)
CK MB CFR SERPL CALC: 3.4 NG/ML — SIGNIFICANT CHANGE UP
CK SERPL-CCNC: 57 U/L — SIGNIFICANT CHANGE UP (ref 30–200)
CO2 BLDV-SCNC: 25.5 MMOL/L — SIGNIFICANT CHANGE UP (ref 22–26)
CO2 BLDV-SCNC: 26.7 MMOL/L — HIGH (ref 22–26)
CO2 SERPL-SCNC: 23 MMOL/L — SIGNIFICANT CHANGE UP (ref 22–31)
CO2 SERPL-SCNC: 25 MMOL/L — SIGNIFICANT CHANGE UP (ref 22–31)
CREAT SERPL-MCNC: 0.93 MG/DL — SIGNIFICANT CHANGE UP (ref 0.5–1.3)
CREAT SERPL-MCNC: 0.93 MG/DL — SIGNIFICANT CHANGE UP (ref 0.5–1.3)
EGFR: 87 ML/MIN/1.73M2 — SIGNIFICANT CHANGE UP
EGFR: 87 ML/MIN/1.73M2 — SIGNIFICANT CHANGE UP
EOSINOPHIL # BLD AUTO: 0.18 K/UL — SIGNIFICANT CHANGE UP (ref 0–0.5)
EOSINOPHIL # BLD AUTO: 0.24 K/UL — SIGNIFICANT CHANGE UP (ref 0–0.5)
EOSINOPHIL NFR BLD AUTO: 1.7 % — SIGNIFICANT CHANGE UP (ref 0–6)
EOSINOPHIL NFR BLD AUTO: 2.3 % — SIGNIFICANT CHANGE UP (ref 0–6)
FIBRINOGEN PPP-MCNC: 137 MG/DL — LOW (ref 200–465)
GAS PNL BLDV: 135 MMOL/L — LOW (ref 136–145)
GAS PNL BLDV: 137 MMOL/L — SIGNIFICANT CHANGE UP (ref 136–145)
GAS PNL BLDV: SIGNIFICANT CHANGE UP
GLUCOSE BLDV-MCNC: 115 MG/DL — HIGH (ref 70–99)
GLUCOSE BLDV-MCNC: 137 MG/DL — HIGH (ref 70–99)
GLUCOSE SERPL-MCNC: 109 MG/DL — HIGH (ref 70–99)
GLUCOSE SERPL-MCNC: 121 MG/DL — HIGH (ref 70–99)
HCO3 BLDV-SCNC: 24 MMOL/L — SIGNIFICANT CHANGE UP (ref 22–29)
HCO3 BLDV-SCNC: 25 MMOL/L — SIGNIFICANT CHANGE UP (ref 22–29)
HCT VFR BLD CALC: 21.6 % — LOW (ref 39–50)
HCT VFR BLD CALC: 24 % — LOW (ref 39–50)
HCT VFR BLD CALC: 25.8 % — LOW (ref 39–50)
HCT VFR BLD CALC: 26.6 % — LOW (ref 39–50)
HCT VFR BLDA CALC: 22 % — LOW (ref 39–51)
HCT VFR BLDA CALC: 27 % — LOW (ref 39–51)
HGB BLD CALC-MCNC: 7.2 G/DL — LOW (ref 12.6–17.4)
HGB BLD CALC-MCNC: 9 G/DL — LOW (ref 12.6–17.4)
HGB BLD-MCNC: 7.1 G/DL — LOW (ref 13–17)
HGB BLD-MCNC: 7.6 G/DL — LOW (ref 13–17)
HGB BLD-MCNC: 8.2 G/DL — LOW (ref 13–17)
HGB BLD-MCNC: 8.6 G/DL — LOW (ref 13–17)
IANC: 7.84 K/UL — HIGH (ref 1.8–7.4)
IANC: 7.93 K/UL — HIGH (ref 1.8–7.4)
IMM GRANULOCYTES NFR BLD AUTO: 0.8 % — SIGNIFICANT CHANGE UP (ref 0–0.9)
IMM GRANULOCYTES NFR BLD AUTO: 1 % — HIGH (ref 0–0.9)
INR BLD: 1.27 RATIO — HIGH (ref 0.85–1.18)
INR BLD: 1.33 RATIO — HIGH (ref 0.85–1.18)
LACTATE BLDV-MCNC: 2.5 MMOL/L — HIGH (ref 0.5–2)
LACTATE BLDV-MCNC: 4.1 MMOL/L — CRITICAL HIGH (ref 0.5–2)
LYMPHOCYTES # BLD AUTO: 1.19 K/UL — SIGNIFICANT CHANGE UP (ref 1–3.3)
LYMPHOCYTES # BLD AUTO: 1.32 K/UL — SIGNIFICANT CHANGE UP (ref 1–3.3)
LYMPHOCYTES # BLD AUTO: 11.6 % — LOW (ref 13–44)
LYMPHOCYTES # BLD AUTO: 12.6 % — LOW (ref 13–44)
MAGNESIUM SERPL-MCNC: 1.8 MG/DL — SIGNIFICANT CHANGE UP (ref 1.6–2.6)
MAGNESIUM SERPL-MCNC: 1.9 MG/DL — SIGNIFICANT CHANGE UP (ref 1.6–2.6)
MCHC RBC-ENTMCNC: 27.2 PG — SIGNIFICANT CHANGE UP (ref 27–34)
MCHC RBC-ENTMCNC: 27.4 PG — SIGNIFICANT CHANGE UP (ref 27–34)
MCHC RBC-ENTMCNC: 27.7 PG — SIGNIFICANT CHANGE UP (ref 27–34)
MCHC RBC-ENTMCNC: 28.9 PG — SIGNIFICANT CHANGE UP (ref 27–34)
MCHC RBC-ENTMCNC: 31.7 GM/DL — LOW (ref 32–36)
MCHC RBC-ENTMCNC: 31.8 GM/DL — LOW (ref 32–36)
MCHC RBC-ENTMCNC: 32.3 GM/DL — SIGNIFICANT CHANGE UP (ref 32–36)
MCHC RBC-ENTMCNC: 32.9 GM/DL — SIGNIFICANT CHANGE UP (ref 32–36)
MCV RBC AUTO: 85.5 FL — SIGNIFICANT CHANGE UP (ref 80–100)
MCV RBC AUTO: 85.7 FL — SIGNIFICANT CHANGE UP (ref 80–100)
MCV RBC AUTO: 86.6 FL — SIGNIFICANT CHANGE UP (ref 80–100)
MCV RBC AUTO: 87.8 FL — SIGNIFICANT CHANGE UP (ref 80–100)
MONOCYTES # BLD AUTO: 0.88 K/UL — SIGNIFICANT CHANGE UP (ref 0–0.9)
MONOCYTES # BLD AUTO: 0.9 K/UL — SIGNIFICANT CHANGE UP (ref 0–0.9)
MONOCYTES NFR BLD AUTO: 8.6 % — SIGNIFICANT CHANGE UP (ref 2–14)
MONOCYTES NFR BLD AUTO: 8.6 % — SIGNIFICANT CHANGE UP (ref 2–14)
NEUTROPHILS # BLD AUTO: 7.84 K/UL — HIGH (ref 1.8–7.4)
NEUTROPHILS # BLD AUTO: 7.93 K/UL — HIGH (ref 1.8–7.4)
NEUTROPHILS NFR BLD AUTO: 74.8 % — SIGNIFICANT CHANGE UP (ref 43–77)
NEUTROPHILS NFR BLD AUTO: 77 % — SIGNIFICANT CHANGE UP (ref 43–77)
NRBC # BLD: 0 /100 WBCS — SIGNIFICANT CHANGE UP (ref 0–0)
NRBC # FLD: 0 K/UL — SIGNIFICANT CHANGE UP (ref 0–0)
PCO2 BLDV: 49 MMHG — SIGNIFICANT CHANGE UP (ref 42–55)
PCO2 BLDV: 50 MMHG — SIGNIFICANT CHANGE UP (ref 42–55)
PH BLDV: 7.29 — LOW (ref 7.32–7.43)
PH BLDV: 7.32 — SIGNIFICANT CHANGE UP (ref 7.32–7.43)
PHOSPHATE SERPL-MCNC: 3 MG/DL — SIGNIFICANT CHANGE UP (ref 2.5–4.5)
PHOSPHATE SERPL-MCNC: 3.7 MG/DL — SIGNIFICANT CHANGE UP (ref 2.5–4.5)
PLATELET # BLD AUTO: 100 K/UL — LOW (ref 150–400)
PLATELET # BLD AUTO: 104 K/UL — LOW (ref 150–400)
PLATELET # BLD AUTO: 92 K/UL — LOW (ref 150–400)
PLATELET # BLD AUTO: 96 K/UL — LOW (ref 150–400)
PO2 BLDV: 23 MMHG — LOW (ref 25–45)
PO2 BLDV: 40 MMHG — SIGNIFICANT CHANGE UP (ref 25–45)
POTASSIUM BLDV-SCNC: 3.4 MMOL/L — LOW (ref 3.5–5.1)
POTASSIUM BLDV-SCNC: 4 MMOL/L — SIGNIFICANT CHANGE UP (ref 3.5–5.1)
POTASSIUM SERPL-MCNC: 3.9 MMOL/L — SIGNIFICANT CHANGE UP (ref 3.5–5.3)
POTASSIUM SERPL-MCNC: 4.2 MMOL/L — SIGNIFICANT CHANGE UP (ref 3.5–5.3)
POTASSIUM SERPL-SCNC: 3.9 MMOL/L — SIGNIFICANT CHANGE UP (ref 3.5–5.3)
POTASSIUM SERPL-SCNC: 4.2 MMOL/L — SIGNIFICANT CHANGE UP (ref 3.5–5.3)
PROT SERPL-MCNC: 4.7 G/DL — LOW (ref 6–8.3)
PROT SERPL-MCNC: 5 G/DL — LOW (ref 6–8.3)
PROTHROM AB SERPL-ACNC: 14.2 SEC — HIGH (ref 9.5–13)
PROTHROM AB SERPL-ACNC: 14.8 SEC — HIGH (ref 9.5–13)
RBC # BLD: 2.46 M/UL — LOW (ref 4.2–5.8)
RBC # BLD: 2.77 M/UL — LOW (ref 4.2–5.8)
RBC # BLD: 3.01 M/UL — LOW (ref 4.2–5.8)
RBC # BLD: 3.11 M/UL — LOW (ref 4.2–5.8)
RBC # FLD: 15.8 % — HIGH (ref 10.3–14.5)
RBC # FLD: 15.9 % — HIGH (ref 10.3–14.5)
RBC # FLD: 16.5 % — HIGH (ref 10.3–14.5)
RBC # FLD: 16.7 % — HIGH (ref 10.3–14.5)
RH IG SCN BLD-IMP: POSITIVE — SIGNIFICANT CHANGE UP
SAO2 % BLDV: 29.4 % — LOW (ref 67–88)
SAO2 % BLDV: 66.1 % — LOW (ref 67–88)
SODIUM SERPL-SCNC: 140 MMOL/L — SIGNIFICANT CHANGE UP (ref 135–145)
SODIUM SERPL-SCNC: 140 MMOL/L — SIGNIFICANT CHANGE UP (ref 135–145)
TROPONIN T, HIGH SENSITIVITY RESULT: 54 NG/L — CRITICAL HIGH
WBC # BLD: 10.29 K/UL — SIGNIFICANT CHANGE UP (ref 3.8–10.5)
WBC # BLD: 10.47 K/UL — SIGNIFICANT CHANGE UP (ref 3.8–10.5)
WBC # BLD: 11.31 K/UL — HIGH (ref 3.8–10.5)
WBC # BLD: 11.43 K/UL — HIGH (ref 3.8–10.5)
WBC # FLD AUTO: 10.29 K/UL — SIGNIFICANT CHANGE UP (ref 3.8–10.5)
WBC # FLD AUTO: 10.47 K/UL — SIGNIFICANT CHANGE UP (ref 3.8–10.5)
WBC # FLD AUTO: 11.31 K/UL — HIGH (ref 3.8–10.5)
WBC # FLD AUTO: 11.43 K/UL — HIGH (ref 3.8–10.5)

## 2023-08-22 PROCEDURE — 99232 SBSQ HOSP IP/OBS MODERATE 35: CPT

## 2023-08-22 PROCEDURE — 99231 SBSQ HOSP IP/OBS SF/LOW 25: CPT

## 2023-08-22 PROCEDURE — 99232 SBSQ HOSP IP/OBS MODERATE 35: CPT | Mod: GC

## 2023-08-22 PROCEDURE — 74174 CTA ABD&PLVS W/CONTRAST: CPT | Mod: 26

## 2023-08-22 PROCEDURE — 99233 SBSQ HOSP IP/OBS HIGH 50: CPT | Mod: GC

## 2023-08-22 RX ORDER — SODIUM CHLORIDE 9 MG/ML
500 INJECTION, SOLUTION INTRAVENOUS ONCE
Refills: 0 | Status: COMPLETED | OUTPATIENT
Start: 2023-08-22 | End: 2023-08-22

## 2023-08-22 RX ORDER — CHLORHEXIDINE GLUCONATE 213 G/1000ML
1 SOLUTION TOPICAL
Refills: 0 | Status: DISCONTINUED | OUTPATIENT
Start: 2023-08-22 | End: 2023-08-23

## 2023-08-22 RX ORDER — MAGNESIUM OXIDE 400 MG ORAL TABLET 241.3 MG
400 TABLET ORAL ONCE
Refills: 0 | Status: COMPLETED | OUTPATIENT
Start: 2023-08-22 | End: 2023-08-22

## 2023-08-22 RX ORDER — PANTOPRAZOLE SODIUM 20 MG/1
40 TABLET, DELAYED RELEASE ORAL
Refills: 0 | Status: DISCONTINUED | OUTPATIENT
Start: 2023-08-22 | End: 2023-08-23

## 2023-08-22 RX ORDER — SODIUM CHLORIDE 9 MG/ML
1000 INJECTION, SOLUTION INTRAVENOUS ONCE
Refills: 0 | Status: COMPLETED | OUTPATIENT
Start: 2023-08-22 | End: 2023-08-22

## 2023-08-22 RX ADMIN — FINASTERIDE 5 MILLIGRAM(S): 5 TABLET, FILM COATED ORAL at 13:00

## 2023-08-22 RX ADMIN — PANTOPRAZOLE SODIUM 40 MILLIGRAM(S): 20 TABLET, DELAYED RELEASE ORAL at 07:34

## 2023-08-22 RX ADMIN — MAGNESIUM OXIDE 400 MG ORAL TABLET 400 MILLIGRAM(S): 241.3 TABLET ORAL at 13:00

## 2023-08-22 RX ADMIN — Medication 120 MILLIGRAM(S): at 07:33

## 2023-08-22 RX ADMIN — SODIUM CHLORIDE 1000 MILLILITER(S): 9 INJECTION, SOLUTION INTRAVENOUS at 21:00

## 2023-08-22 RX ADMIN — MONTELUKAST 10 MILLIGRAM(S): 4 TABLET, CHEWABLE ORAL at 13:00

## 2023-08-22 RX ADMIN — Medication 25 MILLIGRAM(S): at 07:33

## 2023-08-22 RX ADMIN — SODIUM CHLORIDE 500 MILLILITER(S): 9 INJECTION, SOLUTION INTRAVENOUS at 15:06

## 2023-08-22 NOTE — PROGRESS NOTE ADULT - ASSESSMENT
73y Male with history of recurrent diverticular bleeding s/p multiple colonoscopies (no interventions) and prior +NM bleeding scan (5/2023 with active bleeding in cecum/proximal colon, no intervention), prostate cancer s/p radiation c/b radiation-associated vascular ectasia, internal hemorrhoids, HTN, asthma, recently diagnosed afib who is s/p ablation and implantation of a Watchman device on 8/14/23 and immediately placed on DAPT (Plavix/ASA). He now presents  with active lower GI bleed with hypotension. mild LLQ abdominal pain and intermittent chest discomfort, s/p 3 units PRBC's.   EKG and telemetry w/ sinus tachycardia 90's to 109 bpm with occasional PVC's.  No evidence of AFib/Flutter.  Surgical consult: no plan for surgery at this time but will continue monitor.   GI consulted in the ED, but no plan for urgent intervention.    Plan:   ASA and Plavix discontinued.   Keep K+ > 4.0 and Mg > 2.0  Continue Cardizem CD 120mg daily and metoprolol succinate 25mg daily for rate control.   Telemetry monitoring.

## 2023-08-22 NOTE — CHART NOTE - NSCHARTNOTEFT_GEN_A_CORE
MICU Accept Note    CHIEF COMPLAINT: BRBPR    HPI / INTERVAL HISTORY:  Mr. Leung is a 72 YO man with PMH of recurrent diverticular bleeding s/p multiple colonoscopies (no interventions) and prior +NM bleeding scan (5/2023 with active bleeding in cecum/proximal colon, no intervention), prostate cancer s/p radiation c/b radiation-associated vascular ectasia, internal hemorrhoids, HTN, asthma, recently dx afib s/p ablation started Plavix/ASA 1 week PTA who now presents with complaints recurrent painless BRBPR.     Patient had one episode of bloody red blood early morning, but given new AC medication and history he became concerned so he presented to the ED. Pt does report nausea intially w/ some dizziness, now improved. Denies CP, sob, vomiting, abdominal pain, hematuria, hemoptysis, hematemesis or syncope.       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    In the ED, HDS w/ borderline tachycardia. GI consulted in the ED, but no plan for urgent intervention. CTA abdomen negative for active bleed, therefore no intervention per IR. Cardiology evaluated patient and agreed with discontinuation of aspirin and Plavix. Surgery also evaluated patient, and as patient was hemodynamically stable, felt that surgical intervention would be reserved only for life saving measures in the emergency setting. Baseline hemoglobin was 13, in ED, hgb noted to be 10. Patient has since received 6 units of PRBCs, however hgb has further dropped to 7.6.     MICU re-consulted for further evaluation and possible need for ICU given multiple large bloody BMs, and further drop in hemoglobin.       PAST MEDICAL & SURGICAL HISTORY:  Hypertension  Hyperlipidemia  GIB (gastrointestinal bleeding)  Prostate cancer  s/p radiation therapy  Gout  Diverticulitis  Asthma  Atrial flutter  Diverticulosis  Presence of Watchman left atrial appendage closure device  History of hemorrhoidectomy  H/O total knee replacement, right      FAMILY HISTORY:      SOCIAL HISTORY:  Smoking: No  Substance Use: No  EtOH Use:   Marital Status:   Sexual History:   Occupation:  Recent Travel:  Country of Birth:   Advance Directives:     HOME MEDICATIONS:      Allergies    No Known Allergies    Intolerances          REVIEW OF SYSTEMS:  CONSTITUTIONAL: No fever, no chills, no weight loss. + Light-headedness when travelling to bathroom  EYES: No eye pain, no visual disturbance  RESPIRATORY: No cough, No SOB  CARDIOVASCULAR: Mild chest pain overnight, now resolved, no palpitations  GASTROINTESTINAL: no abdominal pain, no n/v/d, +hematochezia  GENITOURINARY: No dysuria, no hematuria  HEME: No easy bruising, no bleeding gums  NEURO: No headaches, no weakness  SKIN: No itching, no rashes  MSK: No joint pain, no joint swelling    OBJECTIVE:  ICU Vital Signs Last 24 Hrs  T(C): 36.5 (22 Aug 2023 16:12), Max: 37.1 (21 Aug 2023 22:50)  T(F): 97.7 (22 Aug 2023 16:12), Max: 98.8 (21 Aug 2023 22:50)  HR: 81 (22 Aug 2023 16:12) (80 - 106)  BP: 114/70 (22 Aug 2023 16:12) (78/57 - 131/86)  BP(mean): --  ABP: --  ABP(mean): --  RR: 16 (22 Aug 2023 16:12) (15 - 24)  SpO2: 97% (22 Aug 2023 16:12) (97% - 100%)    O2 Parameters below as of 22 Aug 2023 16:12  Patient On (Oxygen Delivery Method): room air              CAPILLARY BLOOD GLUCOSE          PHYSICAL EXAM:  Constitutional: WDWN resting comfortably in bed; NAD  HEENT: NC/AT, EOMI, MMM  Neck: supple; no JVD or thyromegaly  Respiratory: CTA B/L; no W/R/R, no retractions  Cardiac: +S1/S2; RRR; no M/R/G; PMI non-displaced  Gastrointestinal: soft, NT/ND; no rebound or guarding; +BSx4  Extremities: WWP, no clubbing or cyanosis; no peripheral edema. Capillary refill <2 sec  Musculoskeletal: NROM x4; no joint swelling, tenderness or erythema  Dermatologic: skin warm, dry and intact; no rashes, wounds, or scars  Neurologic: AAOx3; CNII-XII grossly intact; no focal deficits  Psychiatric: affect/behavior appropriate     HOSPITAL MEDICATIONS:  MEDICATIONS  (STANDING):  atorvastatin 10 milliGRAM(s) Oral at bedtime  diltiazem    milliGRAM(s) Oral daily  finasteride 5 milliGRAM(s) Oral daily  metoprolol succinate ER 25 milliGRAM(s) Oral daily  montelukast 10 milliGRAM(s) Oral daily  pantoprazole    Tablet 40 milliGRAM(s) Oral before breakfast  tamsulosin 0.4 milliGRAM(s) Oral at bedtime    MEDICATIONS  (PRN):  albuterol    90 MICROgram(s) HFA Inhaler 2 Puff(s) Inhalation every 6 hours PRN Shortness of Breath and/or Wheezing      LABS:                        7.6    11.43 )-----------( 96       ( 22 Aug 2023 14:28 )             24.0     Hgb Trend: 7.6<--, 8.2<--, 8.6<--, 8.9<--, 9.5<--  08-22    140  |  107  |  29<H>  ----------------------------<  109<H>  4.2   |  25  |  0.93    Ca    8.4      22 Aug 2023 07:55  Phos  3.7     08-22  Mg     1.80     08-22    TPro  5.0<L>  /  Alb  2.8<L>  /  TBili  0.7  /  DBili  x   /  AST  13  /  ALT  8   /  AlkPhos  49  08-22    Creatinine Trend: 0.93<--, 0.93<--, 1.01<--, 0.89<--, 0.93<--  PT/INR - ( 22 Aug 2023 00:33 )   PT: 14.8 sec;   INR: 1.33 ratio         PTT - ( 22 Aug 2023 00:33 )  PTT:32.2 sec  Urinalysis Basic - ( 22 Aug 2023 07:55 )    Color: x / Appearance: x / SG: x / pH: x  Gluc: 109 mg/dL / Ketone: x  / Bili: x / Urobili: x   Blood: x / Protein: x / Nitrite: x   Leuk Esterase: x / RBC: x / WBC x   Sq Epi: x / Non Sq Epi: x / Bacteria: x        Venous Blood Gas:  08-22 @ 00:33  7.32/49/40/25/66.1  VBG Lactate: 2.5  Venous Blood Gas:  08-21 @ 10:10  7.36/58/21/33/22.2  VBG Lactate: 1.8      MICROBIOLOGY:     RADIOLOGY & ADDITIONAL TESTS:      ASSESSMENT AND PLAN:  73y Male with history of recurrent diverticular bleeding s/p multiple colonoscopies (no interventions) and prior +NM bleeding scan (5/2023 with active bleeding in cecum/proximal colon, no intervention), prostate cancer s/p radiation c/b radiation-associated vascular ectasia, internal hemorrhoids, HTN, asthma, recently dx afib s/p ablation started Plavix/ASA 1 week PTA) who now presents with complaints recurrent painless BRBPR i/s/o recent initiation of anti-plt. s/p 6u pRBC with further deterioration of hgb. Admitted to MICU for further monitoring given lack of response to transfusion.       #Neuro  A&O4, no active issues    #Cardiovascular  Currently hemodynamically stable. Monitor    Afib s/p Watchman  - Holding AC, asa/Plavix  - Continue cardizem and metoprolol    #Respiratory  Asthma  - Continue montelukast    #GI/Nutrition  Hematochezia s/p 6 units PRBCs  - F/u repeat CTA abdomen/pelvis -> IR or GI   - CBC q6, transfuse as needed  - Keep NPO  - PPI IV BID    #/Renal  Prostate cancer s/p radiation  Continue tamsulosin/finasteride    #Skin  No active issues    #ID  No active issues    #Endocrine  No active issues    #Hematologic/DVT ppx  DVT ppx: SCDs  GI ppx: PPI as above    #Ethics

## 2023-08-22 NOTE — CHART NOTE - NSCHARTNOTEFT_GEN_A_CORE
PRE-INTERVENTIONAL RADIOLOGY PROCEDURE NOTE      Patient Age: 73    Patient Gender: M    Procedure: Angio and ablation    Diagnosis/Indication: Lower GI Bleed    Interventional Radiology Attending Physician: Rhys Davis    Ordering Attending Physician: Dr. Velasco    Pertinent Medical History:    Pertinent labs:                      7.1    10.47 )-----------( 92       ( 22 Aug 2023 19:20 )             21.6       08-22    140  |  107  |  29<H>  ----------------------------<  109<H>  4.2   |  25  |  0.93    Ca    8.4      22 Aug 2023 07:55  Phos  3.7     08-22  Mg     1.80     08-22    TPro  5.0<L>  /  Alb  2.8<L>  /  TBili  0.7  /  DBili  x   /  AST  13  /  ALT  8   /  AlkPhos  49  08-22      PT/INR - ( 22 Aug 2023 19:20 )   PT: 14.2 sec;   INR: 1.27 ratio         PTT - ( 22 Aug 2023 00:33 )  PTT:32.2 sec        Patient and Family Aware ? Yes

## 2023-08-22 NOTE — PROGRESS NOTE ADULT - SUBJECTIVE AND OBJECTIVE BOX
Ruth Ann Jaramillo MD  PGY1  Preferred contact via [employer] Teams      Patient is a 73y old  Male who presents with a chief complaint of Lower GI Bleed (22 Aug 2023 06:48)      SUBJECTIVE / OVERNIGHT EVENTS: Received 2U additional units pRBC in evening/ON and 1U plasma, totalling 5U of blood transfused. ***    MEDICATIONS  (STANDING):  atorvastatin 10 milliGRAM(s) Oral at bedtime  diltiazem    milliGRAM(s) Oral daily  finasteride 5 milliGRAM(s) Oral daily  metoprolol succinate ER 25 milliGRAM(s) Oral daily  montelukast 10 milliGRAM(s) Oral daily  pantoprazole    Tablet 40 milliGRAM(s) Oral before breakfast  tamsulosin 0.4 milliGRAM(s) Oral at bedtime    MEDICATIONS  (PRN):  albuterol    90 MICROgram(s) HFA Inhaler 2 Puff(s) Inhalation every 6 hours PRN Shortness of Breath and/or Wheezing    PHYSICAL EXAM:    Vital Signs Last 24 Hrs  T(C): 36.8 (22 Aug 2023 04:42), Max: 37.1 (21 Aug 2023 22:50)  T(F): 98.3 (22 Aug 2023 04:42), Max: 98.8 (21 Aug 2023 22:50)  HR: 96 (22 Aug 2023 07:30) (73 - 104)  BP: 131/86 (22 Aug 2023 07:30) (78/57 - 143/90)  RR: 15 (22 Aug 2023 07:30) (15 - 24)  SpO2: 100% (22 Aug 2023 07:30) (100% - 100%)    Parameters below as of 22 Aug 2023 07:30  Patient On (Oxygen Delivery Method): room air    CONSTITUTIONAL: Well groomed, no apparent distress. Pale   EYES: PERRLA and symmetric, EOMI, No conjunctival or scleral injection, non-icteric  ENMT: Oral mucosa with moist membranes. Normal dentition; no pharyngeal injection or exudates  NECK: Supple, symmetric and without tracheal deviation   RESP: No respiratory distress, no use of accessory muscles; CTA b/l, no wheezes or rales   CV: Tachycardic with regular rhythm. +S1S2. No JVD; no peripheral edema  GI: Soft, NT, ND, no rebound, no guarding; no palpable masses; no hepatosplenomegaly. Patient with dark red liquid stool.   LYMPH: No cervical LAD or tenderness; no axillary LAD or tenderness; no inguinal LAD or tenderness  MSK: No digital clubbing or cyanosis; normal ROM of neck without pain, normal muscle strength/tone in 4 extremities   SKIN: Purpura at site of catheter site from cardiac procedure on upper R thigh and inguinal region; soft, non-tender to palpation. No rashes or ulcers noted.  NEURO: CN II-XII intact;, sensation intact in upper and lower extremities b/l to light touch   PSYCH: Appropriate insight/judgment; A+O x 3, mood and affect appropriate, recent/remote memory intact    LABS:                        8.6    11.31 )-----------( 104      ( 22 Aug 2023 00:33 )             26.6      08-22    140  |  107  |  28<H>  ----------------------------<  121<H>  3.9   |  23  |  0.93    Ca    8.0<L>      22 Aug 2023 00:33  Phos  3.0     08-22  Mg     1.90     08-22    TPro  4.7<L>  /  Alb  2.6<L>  /  TBili  0.7  /  DBili  x   /  AST  11  /  ALT  9   /  AlkPhos  47  08-22    PT/INR - ( 22 Aug 2023 00:33 )   PT: 14.8 sec;   INR: 1.33 ratio         PTT - ( 22 Aug 2023 00:33 )  PTT:32.2 sec  CARDIAC MARKERS ( 21 Aug 2023 20:49 )  x     / x     / 57 U/L / x     / 3.1 ng/mL      Urinalysis Basic - ( 22 Aug 2023 00:33 )    Color: x / Appearance: x / SG: x / pH: x  Gluc: 121 mg/dL / Ketone: x  / Bili: x / Urobili: x   Blood: x / Protein: x / Nitrite: x   Leuk Esterase: x / RBC: x / WBC x   Sq Epi: x / Non Sq Epi: x / Bacteria: x        RADIOLOGY & ADDITIONAL TESTS:    Imaging Personally Reviewed:    Consultant(s) Notes Reviewed:      Care Discussed with Consultants/Other Providers:   Ruth Ann Jaramillo MD  PGY1  Preferred contact via [employer] Teams      Patient is a 73y old  Male who presents with a chief complaint of Lower GI Bleed (22 Aug 2023 06:48)      SUBJECTIVE / OVERNIGHT EVENTS: Received 2U additional units pRBC in evening/ON and 1U plasma, totalling 5U of blood transfused. Patient feeling well this morning. No large bloody BM since ~9PM 8/21; this morning he was able to hold on BM until RN could walk with him to bathroom and he had one more BM that was small and dark, no bright red blood, and not tarry. No additional episodes of chest pain. No fatigue, lightheadedness, dizziness. Mentating well and feeling improved from yesterday.     MEDICATIONS  (STANDING):  atorvastatin 10 milliGRAM(s) Oral at bedtime  diltiazem    milliGRAM(s) Oral daily  finasteride 5 milliGRAM(s) Oral daily  metoprolol succinate ER 25 milliGRAM(s) Oral daily  montelukast 10 milliGRAM(s) Oral daily  pantoprazole    Tablet 40 milliGRAM(s) Oral before breakfast  tamsulosin 0.4 milliGRAM(s) Oral at bedtime    MEDICATIONS  (PRN):  albuterol    90 MICROgram(s) HFA Inhaler 2 Puff(s) Inhalation every 6 hours PRN Shortness of Breath and/or Wheezing    PHYSICAL EXAM:    Vital Signs Last 24 Hrs  T(C): 36.8 (22 Aug 2023 04:42), Max: 37.1 (21 Aug 2023 22:50)  T(F): 98.3 (22 Aug 2023 04:42), Max: 98.8 (21 Aug 2023 22:50)  HR: 90 (22 Aug 2023 08:31) (73 - 104)  BP: 123/81 (22 Aug 2023 08:31) (78/57 - 143/90)  RR: 15 (22 Aug 2023 08:31) (15 - 24)  SpO2: 100% (22 Aug 2023 08:31) (100% - 100%)    Parameters below as of 22 Aug 2023 08:31  Patient On (Oxygen Delivery Method): room air    CONSTITUTIONAL: Well groomed, no apparent distress.   EYES: PERRLA and symmetric, EOMI, No conjunctival or scleral injection, non-icteric  ENMT: Oral mucosa with moist membranes. Normal dentition; no pharyngeal injection or exudates  NECK: Supple, symmetric and without tracheal deviation   RESP: No respiratory distress, no use of accessory muscles; CTA b/l, no wheezes or rales   CV: Regular rate rhythm. +S1S2. No JVD; no peripheral edema  GI: Soft, NT, mildly tender diffusely, no rebound, no guarding; no palpable masses; no hepatosplenomegaly. Patient with dark red liquid stool.   LYMPH: No cervical LAD or tenderness; no axillary LAD or tenderness; no inguinal LAD or tenderness  MSK: No digital clubbing or cyanosis; normal ROM of neck without pain, normal muscle strength/tone in 4 extremities   SKIN: Purpura at site of catheter site from cardiac procedure on upper R thigh and inguinal region; soft, non-tender to palpation. No rashes or ulcers noted.  NEURO: CN II-XII intact;, sensation intact in upper and lower extremities b/l to light touch   PSYCH: Appropriate insight/judgment; A+O x 3, mood and affect appropriate, recent/remote memory intact    LABS:                                   8.2    10.29 )-----------( 100      ( 22 Aug 2023 07:55 )             25.8     08-22    140  |  107  |  29<H>  ----------------------------<  109<H>  4.2   |  25  |  0.93    Ca    8.4      22 Aug 2023 07:55  Phos  3.7     08-22  Mg     1.80     08-22    TPro  5.0<L>  /  Alb  2.8<L>  /  TBili  0.7  /  DBili  x   /  AST  13  /  ALT  8   /  AlkPhos  49  08-22      PT/INR - ( 22 Aug 2023 00:33 )   PT: 14.8 sec;   INR: 1.33 ratio    PTT - ( 22 Aug 2023 00:33 )  PTT:32.2 sec    CARDIAC MARKERS ( 21 Aug 2023 20:49 )  x     / x     / 57 U/L / x     / 3.1 ng/mL      Urinalysis Basic - ( 22 Aug 2023 00:33 )    Color: x / Appearance: x / SG: x / pH: x  Gluc: 121 mg/dL / Ketone: x  / Bili: x / Urobili: x   Blood: x / Protein: x / Nitrite: x   Leuk Esterase: x / RBC: x / WBC x   Sq Epi: x / Non Sq Epi: x / Bacteria: x        RADIOLOGY & ADDITIONAL TESTS:    Imaging Personally Reviewed:    Consultant(s) Notes Reviewed:      Care Discussed with Consultants/Other Providers:

## 2023-08-22 NOTE — PROGRESS NOTE ADULT - SUBJECTIVE AND OBJECTIVE BOX
Chief Complaint:  Patient is a 73y old  Male who presents with a chief complaint of Lower GI Bleed (22 Aug 2023 09:04)         Interval Events:   Remains stable and afebrile  Patient denying additional red bloody BMs   Per primary, last bloody BM was around 9 pm yesterday; had dark brown BM this AM w/o red blood  Now s/p 5 pRBCs. Hgb 8.2  BUN 29 with Cr wnl    Hospital Medications:  albuterol    90 MICROgram(s) HFA Inhaler 2 Puff(s) Inhalation every 6 hours PRN  atorvastatin 10 milliGRAM(s) Oral at bedtime  diltiazem    milliGRAM(s) Oral daily  finasteride 5 milliGRAM(s) Oral daily  magnesium oxide 400 milliGRAM(s) Oral once  metoprolol succinate ER 25 milliGRAM(s) Oral daily  montelukast 10 milliGRAM(s) Oral daily  pantoprazole    Tablet 40 milliGRAM(s) Oral before breakfast  tamsulosin 0.4 milliGRAM(s) Oral at bedtime      ROS:   Complete and normal except as mentioned above.    PHYSICAL EXAM:   Vital Signs:  Vital Signs Last 24 Hrs  T(C): 36.8 (22 Aug 2023 04:42), Max: 37.1 (21 Aug 2023 22:50)  T(F): 98.3 (22 Aug 2023 04:42), Max: 98.8 (21 Aug 2023 22:50)  HR: 90 (22 Aug 2023 08:31) (73 - 104)  BP: 123/81 (22 Aug 2023 08:31) (78/57 - 143/90)  BP(mean): --  RR: 15 (22 Aug 2023 08:31) (15 - 24)  SpO2: 100% (22 Aug 2023 08:31) (100% - 100%)    Parameters below as of 22 Aug 2023 08:31  Patient On (Oxygen Delivery Method): room air      Daily     Daily     GENERAL: no acute distress  NEURO: aox3  HEENT: anicteric sclera, no conjunctival pallor appreciated  CHEST: no respiratory distress, no accessory muscle use  CARDIAC: regular rate  ABDOMEN: soft, non-tender, non-distended, no rebound or guarding  EXTREMITIES: warm, well perfused   SKIN: no lesions noted    LABS:                         8.2    10.29 )-----------( 100      ( 22 Aug 2023 07:55 )             25.8     08-22    140  |  107  |  29<H>  ----------------------------<  109<H>  4.2   |  25  |  0.93    Ca    8.4      22 Aug 2023 07:55  Phos  3.7     08-22  Mg     1.80     08-22    TPro  5.0<L>  /  Alb  2.8<L>  /  TBili  0.7  /  DBili  x   /  AST  13  /  ALT  8   /  AlkPhos  49  08-22    LIVER FUNCTIONS - ( 22 Aug 2023 07:55 )  Alb: 2.8 g/dL / Pro: 5.0 g/dL / ALK PHOS: 49 U/L / ALT: 8 U/L / AST: 13 U/L / GGT: x             Interval Diagnostic Studies:   No new imaging

## 2023-08-22 NOTE — CHART NOTE - NSCHARTNOTEFT_GEN_A_CORE
IR follow up note    Patient with active extrav in ascending colon and is not responding to pRBCs.  IR will plan for mesenteric angio/embo tonight.  -keep npo  -please give platelets  -please order IR procedure order under Dr. Dillard  -please write pre-procedure note

## 2023-08-22 NOTE — PROGRESS NOTE ADULT - PROBLEM SELECTOR PLAN 3
- Patient is 1 week s/p ablation and Watchman placement w/ Dr. Gaurav Marie (8/14) and was started on ASA/Plavix at that time  - Holding for now ISO lower GI bleed  - Cards consulted regarding anti-coagulation management and risk vs benefit discussion; Dr. Marie evaluated at bedside, agreed with d/c of aspirin and plavix - Patient is 1 week s/p ablation and Watchman placement w/ Dr. Gaurav Marie (8/14) and was started on ASA/Plavix at that time  - Holding for now ISO lower GI bleed  - Cards consulted regarding anti-coagulation management and risk vs benefit discussion; Dr. Marie evaluated at bedside, agreed with d/c of aspirin and plavix  - EP recs appreciated regarding restarting DAPT considering pt is a high bleeding risk and has had multiple large diverticular hemorrhages over past 4-5 months

## 2023-08-22 NOTE — PROGRESS NOTE ADULT - ASSESSMENT
LGIB    Surgery to be performed as a last resort after localization (limited resection) or w/o localization (total abdominal colectomy)

## 2023-08-22 NOTE — PROGRESS NOTE ADULT - PROBLEM SELECTOR PLAN 2
- Pt w/ Hb 10.2 (down from 13.5 8/15) ISO lower GI bleed w/ BRBPR; Hb unchanged after 2U pRBC  - MICU consulted; not admitting at this time but recommended additional pRBC unit   - 3rd unit pRBC ordered; will recheck CBC and fibrinogen   - /74 on presentation; initially improved following blood transfusions, began dropping and currently MAP 65-75. - Pt w/ Hb 8.2 this AM 8/22; 8.6 midnight 8/22, down from 10.2 upon ED arrival, ISO lower GI bleed w/ BRBPR; Hb not improving thus far after 5U pRBC, however only one small BM with dark blood today 8/22 (no large bloody BM since 20:00-21:00 8/21)  - MICU consulted 8/21 not a candidate and patient more stable since then from a hemodynamic perspective, not continuously bleeding  - MAP upper 80s-100s this AM, improved from 8/21  - Trend CBC Q8, transfuse if Hb <8

## 2023-08-22 NOTE — ED ADULT NURSE REASSESSMENT NOTE - NS ED NURSE REASSESS COMMENT FT1
Break RN note- Patient resting quietly in bed, breathing even and nonlabored. No acute distress. No complaints at his time. Cardiac monitor in place- sinus rhythm. Plasma administered as ordered. Plasma verified by two RNs. S/sx of reaction reviewed with patient and when to notify nurse. Safety maintained.
First unit of blood finished. Pt awake and alert, A&OX4, resting comfortably. Denies CP, SOB, abd/back/ flank pain, itching. Resp even and unlabored. Will continue to monitor.
Pt had episode of hypotension and felt weak, MD Bergman and Oberly at bedside. Blood released emergently. First unit of PRBC started per order.  Pt resting comfortably. Awake and alert. A&OX4. Resp even and unlabored. Denies CP, SOB, N/V, palpitations. Educated on s/s of transfusion rxn. Consent in chart. Remains on cardiac monitor.
Pt had large dark bloody bowel movement with small clots. Cleaned and positioned for comfort. ER, medicine, and ICU teams aware as patients dispo is pending at this time. Pt awake and alert, A&OX4, resp even and unlabored. Denies CP, SOB, HA, dizziness, palpitations, blurry vision. Resting comfortably.
[facility] to [facility].
Report received from night shift RNMD Ackerman at pt bedside assessing pt, pt VSS, in NAD, a&ox4, will continue to monitor.
Second unit of PRBC started per order.  Pt resting comfortably. Awake and alert. A&OX4. Resp even and unlabored. Denies CP, SOB, N/V, palpitations. Educated on s/s of transfusion rxn. Consent in chart. Call bell in reach. Will continue to monitor.
Third unit of PRBC started per order.  Pt resting comfortably. Awake and alert. A&OX4. Resp even and unlabored. Denies CP, SOB, N/V, palpitations. Educated on s/s of transfusion rxn. Consent in chart. Call bell in reach. Will continue to monitor.
Pt resting comfortably in bed. Denies any rectal bleed at this time. VS stable. Continuous cardiac monitoring in progress. NAD at this time. Nursing will continue to monitor. Safety maintained.
Point of Care Testing BG: (5/21) 127-197 (5/22) 216 Glucose 189

## 2023-08-22 NOTE — PROGRESS NOTE ADULT - SUBJECTIVE AND OBJECTIVE BOX
INTERVAL HPI/OVERNIGHT EVENTS:  Patient is a 73yMale      Vital Signs Last 24 Hrs  T(C): 36.8 (22 Aug 2023 04:42), Max: 37.1 (21 Aug 2023 22:50)  T(F): 98.3 (22 Aug 2023 04:42), Max: 98.8 (21 Aug 2023 22:50)  HR: 90 (22 Aug 2023 08:31) (73 - 104)  BP: 123/81 (22 Aug 2023 08:31) (78/57 - 143/90)  BP(mean): --  RR: 15 (22 Aug 2023 08:31) (15 - 24)  SpO2: 100% (22 Aug 2023 08:31) (100% - 100%)    Parameters below as of 22 Aug 2023 08:31  Patient On (Oxygen Delivery Method): room air        PHYSICAL EXAM:  Constitutional: well developed, well nourished, NAD  Eyes: anicteric  ENMT: normal facies, symmetric  Neck: supple  Respiratory: CTA bilat  Cardiovascular: RRR  Gastrointestinal: abdomen soft, nontender, nondistended. No obvious masses. No peritonitis  Extremities: FROM, warm  Neurological: intact, non-focal  Skin: no gross lesions  Lymph Nodes: no gross adenopathy  Musculoskeletal: equal strength bilateral upper and lower extremities  Psychiatric: oriented x 3; appropriate  Rectal:        LABS:                        8.2    10.29 )-----------( 100      ( 22 Aug 2023 07:55 )             25.8     08-22    140  |  107  |  29<H>  ----------------------------<  109<H>  4.2   |  25  |  0.93    Ca    8.4      22 Aug 2023 07:55  Phos  3.7     08-22  Mg     1.80     08-22    TPro  5.0<L>  /  Alb  2.8<L>  /  TBili  0.7  /  DBili  x   /  AST  13  /  ALT  8   /  AlkPhos  49  08-22    PT/INR - ( 22 Aug 2023 00:33 )   PT: 14.8 sec;   INR: 1.33 ratio         PTT - ( 22 Aug 2023 00:33 )  PTT:32.2 sec  Urinalysis Basic - ( 22 Aug 2023 07:55 )    Color: x / Appearance: x / SG: x / pH: x  Gluc: 109 mg/dL / Ketone: x  / Bili: x / Urobili: x   Blood: x / Protein: x / Nitrite: x   Leuk Esterase: x / RBC: x / WBC x   Sq Epi: x / Non Sq Epi: x / Bacteria: x      Phosphorus: 3.7 mg/dL (08-22 @ 07:55)  Magnesium: 1.80 mg/dL (08-22 @ 07:55)  Magnesium: 1.90 mg/dL (08-22 @ 00:33)  Phosphorus: 3.0 mg/dL (08-22 @ 00:33)  Magnesium: 1.90 mg/dL (08-21 @ 10:43)  Phosphorus: 3.0 mg/dL (08-21 @ 10:43)

## 2023-08-22 NOTE — PROGRESS NOTE ADULT - PROBLEM SELECTOR PLAN 1
- Pt w/ large bloody BM this morning, painless w/ symptoms of acute blood loss (weakness, lightheadedness, nausea) and appeared pale per wife. ISO recently starting anti-platelet medications 1 week ago. S/p 4 additional bloody BM in ED, ranging from bright red to maroon  - Recurrent diverticular bleeds, most recently in 5/2023 when he had +NM scan w/ bleed in the cecum/proximal ascending colon; required 5U pRBC transfusion at this time  - GI consulted in ED; no interventions at this time, if he continues to bleed or becomes hemodynamically unstable will consider colonoscopy on Wed. 8/23  - Negative CTA 8/21; IR consulted, but no embolization indicated at this time 2/2 negative CTA  - Pending repeat CBC following 3rd unit pRBC  - Protonix 40 mg qd   - Gen. surgery consulted to make aware in case of potential colorectal intervention; will assess patient tonight in ED  - Clear liquid diet for now ISO possible intervention  - S/p 1L bolus - Pt w/ large bloody BM this morning, painless w/ symptoms of acute blood loss (weakness, lightheadedness, nausea) and appeared pale per wife. ISO recently starting anti-platelet medications 1 week ago. S/p 4 additional bloody BM in ED, ranging from bright red to maroon.   - Recurrent diverticular bleeds, most recently in 5/2023 when he had +NM scan w/ bleed in the cecum/proximal ascending colon; required 5U pRBC transfusion at this time  - GI consulted in ED; no interventions at this time, if he continues to bleed or becomes hemodynamically unstable will consider colonoscopy on Wed. 8/23  - Negative CTA 8/21; IR consulted, but no embolization indicated at this time 2/2 negative CTA  - S/p 1.5 L fluid, 5U pRBC, 1U plasma; Hb 8.2 8/22 AM, repeat CBC pending at 2PM   - Protonix 40 mg qd   - Gen. surgery consulted to make aware in case of potential colorectal intervention; discussed that surgery (colectomy) is a last resort following other workup and interventions   - Clear liquid diet for now

## 2023-08-22 NOTE — PROGRESS NOTE ADULT - PROBLEM SELECTOR PLAN 6
- Pt w/ PMH HTN, however currently MAP in low 70s  - Continue to monitor and hold BP meds if systolic BP <100, diastolic BP <60 - Pt w/ PMH HTN, became hypotensive ISO large diverticular bleed. BP improving this AM w/ MAP   - Continue to monitor and hold BP meds if systolic BP <100, diastolic BP <60

## 2023-08-22 NOTE — CONSULT NOTE ADULT - SUBJECTIVE AND OBJECTIVE BOX
CHIEF COMPLAINT: gi bleed    HISTORY OF PRESENT ILLNESS:  73y Male with history of recurrent diverticular bleeding s/p multiple colonoscopies (no interventions) and prior +NM bleeding scan (5/2023 with active bleeding in cecum/proximal colon, no intervention), prostate cancer s/p radiation c/b radiation-associated vascular ectasia, internal hemorrhoids, HTN, asthma, recently diagnosed afib who is s/p ablation and implantation of a Watchman device on 8/14/23, discharged on 12 week course of  Plavix/ASA. He presents  with complaints recurrent BRBPR associated with nausea, mild left lower quadrant abdominal pain, weakness, dizziness and intermittent left sided chest discomfort , non radiating.  No shortness of breath, vomiting, hematuria, hemoptysis, hematemesis or syncope.     In the ED, EKG and telemetry w/ sinus tachycardia 90's to 109 bpm.   Labs:  Hgb 10.2 from 13 s/p 2u pRBC. Repeat hgb ~10. INR 1.27. BUN 23 from 14 and Cr 1.01.  GI consulted in the ED, but no plan for urgent intervention. MICU consulted for further evaluation and possible need for ICU    Of note, last EGD/colon 5/2023 in setting of hematochezia; EGD was unremarkable aside for small hiatal hernia. 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 Now recommending discontinuation of anticoagulation and if rebleeds, CTA and IR consult for embolization.    Patient currently in sinus rhythm with episodes of hypotension. Currently /109.  Receiving PRBC's unit #3.   .        Allergies    No Known Allergies    Intolerances    	    MEDICATIONS:  diltiazem    milliGRAM(s) Oral daily  metoprolol succinate ER 25 milliGRAM(s) Oral daily      albuterol    90 MICROgram(s) HFA Inhaler 2 Puff(s) Inhalation every 6 hours PRN  montelukast 10 milliGRAM(s) Oral daily      pantoprazole    Tablet 40 milliGRAM(s) Oral before breakfast    atorvastatin 10 milliGRAM(s) Oral at bedtime  finasteride 5 milliGRAM(s) Oral daily    tamsulosin 0.4 milliGRAM(s) Oral at bedtime      PAST MEDICAL & SURGICAL HISTORY:  Hypertension      Hyperlipidemia      GIB (gastrointestinal bleeding)      Prostate cancer  s/p radiation therapy      Gout      Diverticulitis      Asthma      Atrial flutter      Diverticulosis      Presence of Watchman left atrial appendage closure device      History of hemorrhoidectomy      H/O total knee replacement, right          FAMILY HISTORY:      SOCIAL HISTORY:    non smoker. indep in adl    REVIEW OF SYSTEMS:  See HPI, otherwise complete 10 point review of systems negative    [ ] All others negative	      PHYSICAL EXAM:  T(C): 36.8 (08-22-23 @ 04:42), Max: 37.1 (08-21-23 @ 22:50)  HR: 96 (08-22-23 @ 07:30) (73 - 104)  BP: 131/86 (08-22-23 @ 07:30) (78/57 - 143/90)  RR: 15 (08-22-23 @ 07:30) (15 - 24)  SpO2: 100% (08-22-23 @ 07:30) (100% - 100%)  Wt(kg): --  I&O's Summary      Appearance: No Acute Distress	  HEENT:  Normal oral mucosa, PERRL, EOMI	  Cardiovascular: Normal S1 S2, No JVD, No murmurs/rubs/gallops  Respiratory: Lungs clear to auscultation bilaterally  Gastrointestinal:  Soft, Non-tender, + BS	  Skin: No rashes, No ecchymoses, No cyanosis	  Neurologic: Non-focal  Extremities: No clubbing, cyanosis or edema  Vascular: Peripheral pulses palpable 2+ bilaterally  Psychiatry: A & O x 3, Mood & affect appropriate    Laboratory Data:	 	    CBC Full  -  ( 22 Aug 2023 00:33 )  WBC Count : 11.31 K/uL  Hemoglobin : 8.6 g/dL  Hematocrit : 26.6 %  Platelet Count - Automated : 104 K/uL  Mean Cell Volume : 85.5 fL  Mean Cell Hemoglobin : 27.7 pg  Mean Cell Hemoglobin Concentration : 32.3 gm/dL  Auto Neutrophil # : x  Auto Lymphocyte # : x  Auto Monocyte # : x  Auto Eosinophil # : x  Auto Basophil # : x  Auto Neutrophil % : x  Auto Lymphocyte % : x  Auto Monocyte % : x  Auto Eosinophil % : x  Auto Basophil % : x    08-22    140  |  107  |  28<H>  ----------------------------<  121<H>  3.9   |  23  |  0.93  08-21    141  |  102  |  23  ----------------------------<  159<H>  4.1   |  30  |  1.01    Ca    8.0<L>      22 Aug 2023 00:33  Ca    8.7      21 Aug 2023 10:43  Phos  3.0     08-22  Phos  3.0     08-21  Mg     1.90     08-22  Mg     1.90     08-21    TPro  4.7<L>  /  Alb  2.6<L>  /  TBili  0.7  /  DBili  x   /  AST  11  /  ALT  9   /  AlkPhos  47  08-22  TPro  6.1  /  Alb  3.5  /  TBili  0.5  /  DBili  x   /  AST  12  /  ALT  12  /  AlkPhos  73  08-21      proBNP:   Lipid Profile:   HgA1c:   TSH:       CARDIAC MARKERS:            Interpretation of Telemetry: 	    ECG:  	  RADIOLOGY:  OTHER: 	    PREVIOUS DIAGNOSTIC TESTING:    [ ] Echocardiogram:  [ ] Catheterization:  [ ] Stress Test:  	    Assessment:  73y Male with history of recurrent diverticular bleeding s/p multiple colonoscopies (no interventions) and prior +NM bleeding scan (5/2023 with active bleeding in cecum/proximal colon, no intervention), prostate cancer s/p radiation c/b radiation-associated vascular ectasia, internal hemorrhoids, HTN, asthma, recently diagnosed afib who is s/p ablation and implantation of a Watchman device on 8/14/23, discharged on 12 week course of  Plavix/ASA. He presents  with complaints recurrent BRBPR      Recs:  cardiac stable  s/p watchman implant on 8/14/23, antiplatelet meds on hold due to acute gi bleed. f/u eps recs  s/p afib/afl ablation, currently in nsr. cont to monitor   cw beta blockers for nsvt  transfuse prn  f/u gi and surgery recs  will follow       Greater than 60 minutes spent on total encounter; more than 50% of the visit was spent counseling and/or coordinating care by the attending physician.   	  Isaac Calderón MD   Cardiovascular Diseases  [phone]

## 2023-08-22 NOTE — CONSULT NOTE ADULT - CONSULT REQUESTED DATE/TIME
21-Aug-2023 16:34
21-Aug-2023 23:25
22-Aug-2023 07:47
21-Aug-2023 12:51
21-Aug-2023 15:55
21-Aug-2023 17:15

## 2023-08-22 NOTE — PROGRESS NOTE ADULT - ASSESSMENT
# Hematochezia  # Afib s/p recent ablation and watchman 8/14 on ASA/Plavix x 1 week (last dose Plavix 8/20 AM)  Patient with 1 episode of hematochezia after 1 week of Plavix. Given history of recurrent bleeding, likely due to diverticular bleeding and less likely 2/2 radiation-associated vascular or hemorrhoidal bleeding. He has had multiple endoscopic procedures for evaluation of hematochezia. Given severe diverticulosis and this history, there is low utility in repeat colonoscopy and patient is also does not wish to undergo any further colonoscopies.        Recommendations:  - No plan for endoscopic evaluation and continue to monitor  - If continues to bleed throughout the day, but remains stable, will consider possible colonoscopy tomorrow if patient and family amenable  - If becomes hemodynamically unstable with overt bleeding, obtain stat CTA and consult IR for embolization given recurrent bleeding and multiple colonoscopies  - CRS consulted, appreciate recs: no plan for surgery unless as last resort  - Cars/EP consulted, appreciate recs: okay to hold AC  - Continue to hold AC if able  - Daily CBC, CMP, coags  - Transfuse if Hgb < 8  - Continue CLD while monitoring   - Ensure adequate hydration  - Note: if ASA resumed upon discharge, will need ppi 40 po daily ppx  - Rest of care per primary    Preliminary note until signed by Attending.    Thank you for involving us in this patient's care. Please reach out if any further questions or concerns.    Jes Haley MD  Gastroenterology/Hepatology Fellow, PGY-7    NON-URGENT CONSULTS:  Please email [email or web address]  [email or web address]  AT NIGHT AND ON WEEKENDS:  Contact on-call GI fellow via answering service ([phone]) from 5pm-8am and on weekends/holidays  MONDAY-FRIDAY 8AM-5PM:  Pager: [phone] ([facility])  Pager: [phone] ([facility])

## 2023-08-22 NOTE — PROGRESS NOTE ADULT - SUBJECTIVE AND OBJECTIVE BOX
Patient feeling better today. Denies chest pain, shortness of breath, palpitations, dizziness, abdominal pain, N/V this morning.   States the BRPBR is much less today although H/H significantly decreased despite 3U PRBC.s  Remains in sinus rhythm.     Vital Signs Last 24 Hrs  T(C): 36.6 (22 Aug 2023 11:18), Max: 37.1 (21 Aug 2023 22:50)  T(F): 97.8 (22 Aug 2023 11:18), Max: 98.8 (21 Aug 2023 22:50)  HR: 100 (22 Aug 2023 14:23) (80 - 106)  BP: 111/75 (22 Aug 2023 14:23) (78/57 - 131/86)  BP(mean): --  RR: 18 (22 Aug 2023 11:18) (15 - 24)  SpO2: 100% (22 Aug 2023 11:18) (100% - 100%)    Parameters below as of 22 Aug 2023 11:18  Patient On (Oxygen Delivery Method): room air      Telemetry: Normal sinus rhythm with occasional PVC's.   MEDICATIONS  (STANDING):  atorvastatin 10 milliGRAM(s) Oral at bedtime  diltiazem    milliGRAM(s) Oral daily  finasteride 5 milliGRAM(s) Oral daily  metoprolol succinate ER 25 milliGRAM(s) Oral daily  montelukast 10 milliGRAM(s) Oral daily  pantoprazole    Tablet 40 milliGRAM(s) Oral before breakfast  tamsulosin 0.4 milliGRAM(s) Oral at bedtime    MEDICATIONS  (PRN):  albuterol    90 MICROgram(s) HFA Inhaler 2 Puff(s) Inhalation every 6 hours PRN Shortness of Breath and/or Wheezing          Physical exam:   Gen- patient appears comfortable. NAD  Resp- clear to auscultation. No wheezing, rales or rhonchi  CV- S1 and S2 RRR. No murmurs,. gallops or rubs  ABD- obese nontender + bowel sounds. Continues to have small amount BRBPR  EXT- no edema no calf tenderness.  + pedal pulses. Extremities warm and dry.   Neuro- grossly nonfocal                            7.6    11.43 )-----------( 96       ( 22 Aug 2023 14:28 )             24.0     PT/INR - ( 22 Aug 2023 00:33 )   PT: 14.8 sec;   INR: 1.33 ratio         PTT - ( 22 Aug 2023 00:33 )  PTT:32.2 sec  08-22    140  |  107  |  29<H>  ----------------------------<  109<H>  4.2   |  25  |  0.93    Ca    8.4      22 Aug 2023 07:55  Phos  3.7     08-22  Mg     1.80     08-22    TPro  5.0<L>  /  Alb  2.8<L>  /  TBili  0.7  /  DBili  x   /  AST  13  /  ALT  8   /  AlkPhos  49  08-22    CARDIAC MARKERS ( 22 Aug 2023 14:28 )  x     / x     / 57 U/L / x     / 3.4 ng/mL  CARDIAC MARKERS ( 21 Aug 2023 20:49 )  x     / x     / 57 U/L / x     / 3.1 ng/mL                         Patient feeling better today. Denies chest pain, shortness of breath, palpitations, dizziness, abdominal pain, N/V this morning.   States the BRPBR is much less today although H/H significantly decreased despite 3U PRBC.s  Cardiac stable. Remains in sinus rhythm.     Vital Signs Last 24 Hrs  T(C): 36.6 (22 Aug 2023 11:18), Max: 37.1 (21 Aug 2023 22:50)  T(F): 97.8 (22 Aug 2023 11:18), Max: 98.8 (21 Aug 2023 22:50)  HR: 100 (22 Aug 2023 14:23) (80 - 106)  BP: 111/75 (22 Aug 2023 14:23) (78/57 - 131/86)  BP(mean): --  RR: 18 (22 Aug 2023 11:18) (15 - 24)  SpO2: 100% (22 Aug 2023 11:18) (100% - 100%)    Parameters below as of 22 Aug 2023 11:18  Patient On (Oxygen Delivery Method): room air      Telemetry: Normal sinus rhythm with occasional PVC's.   MEDICATIONS  (STANDING):  atorvastatin 10 milliGRAM(s) Oral at bedtime  diltiazem    milliGRAM(s) Oral daily  finasteride 5 milliGRAM(s) Oral daily  metoprolol succinate ER 25 milliGRAM(s) Oral daily  montelukast 10 milliGRAM(s) Oral daily  pantoprazole    Tablet 40 milliGRAM(s) Oral before breakfast  tamsulosin 0.4 milliGRAM(s) Oral at bedtime    MEDICATIONS  (PRN):  albuterol    90 MICROgram(s) HFA Inhaler 2 Puff(s) Inhalation every 6 hours PRN Shortness of Breath and/or Wheezing          Physical exam:   Gen- patient appears comfortable. NAD  Resp- clear to auscultation. No wheezing, rales or rhonchi  CV- S1 and S2 RRR. No murmurs,. gallops or rubs  ABD- obese nontender + bowel sounds. Continues to have small amount BRBPR  EXT- no edema no calf tenderness.  + pedal pulses. Extremities warm and dry.   Neuro- grossly nonfocal                            7.6    11.43 )-----------( 96       ( 22 Aug 2023 14:28 )             24.0     PT/INR - ( 22 Aug 2023 00:33 )   PT: 14.8 sec;   INR: 1.33 ratio         PTT - ( 22 Aug 2023 00:33 )  PTT:32.2 sec  08-22    140  |  107  |  29<H>  ----------------------------<  109<H>  4.2   |  25  |  0.93    Ca    8.4      22 Aug 2023 07:55  Phos  3.7     08-22  Mg     1.80     08-22    TPro  5.0<L>  /  Alb  2.8<L>  /  TBili  0.7  /  DBili  x   /  AST  13  /  ALT  8   /  AlkPhos  49  08-22    CARDIAC MARKERS ( 22 Aug 2023 14:28 )  x     / x     / 57 U/L / x     / 3.4 ng/mL  CARDIAC MARKERS ( 21 Aug 2023 20:49 )  x     / x     / 57 U/L / x     / 3.1 ng/mL

## 2023-08-22 NOTE — PROGRESS NOTE ADULT - PROBLEM SELECTOR PLAN 8
- DVT PPX: Held ISO Bleed  - Diet: CLD  - Dispo: Pending medical mangement - DVT PPX: Held ISO Bleed  - Diet: CLD  - Dispo: Pending medical management and decision to restart DAPT

## 2023-08-22 NOTE — CONSULT NOTE ADULT - CONSULT REASON
From: Logan Sands  To: Juan Christensen MD  Sent: 11/3/2022 10:58 AM CDT  Subject: Test Results    Were all my test results sent to my surgeon?
Reports faxed as requested by patient to surgeon.
cardiac management
recent AFib ablation and implantation of a Watchman device
hematochezia
Hypotension
GIB
GI bleed

## 2023-08-22 NOTE — PROGRESS NOTE ADULT - ASSESSMENT
73y Male with history of recurrent diverticular bleeding s/p multiple colonoscopies (no interventions) and prior +NM bleeding scan (5/2023 with active bleeding in cecum/proximal colon, no intervention), prostate cancer s/p radiation c/b radiation-associated vascular ectasia, internal hemorrhoids, HTN, asthma, recently dx afib s/p ablation and Watchman placement on 8/14, immediately started on Plavix/ASA 1 week, who now presents with recurrent painless BRBPR, weakness, and lightheadedness i/s/o recent initiation of anti-platelet medications. Now s/p 5U pRBC transfusion and 1Uplasma, repeat CBC pending this AM but most recently 8.6 at 00:30. HDS, BP improving with /86 this AM. *** Likely 2/2 repeat diverticular hemorrhage.  73y Male with history of recurrent diverticular bleeding s/p multiple colonoscopies (no interventions) and prior +NM bleeding scan (5/2023 with active bleeding in cecum/proximal colon, no intervention), prostate cancer s/p radiation c/b radiation-associated vascular ectasia, internal hemorrhoids, HTN, asthma, recently dx afib s/p ablation and Watchman placement on 8/14, immediately started on Plavix/ASA 1 week, who presented 8/21 with recurrent painless BRBPR, weakness, and lightheadedness i/s/o recent initiation of anti-platelet medications. Now s/p 5U pRBC transfusion and 1Uplasma, repeat CBC Hb 8.2, anemic but stable from midnight CBC. No additional large bloody BM since 9PM 8/21. HDS, MAP in 80s this AM and in no distress, no dizziness or lightheadedness. but most recently 8.6 at 00:30. HDS, BP improving with /86 this AM. Likely 2/2 repeat diverticular hemorrhage, improving. Evaluated by GI, IR, Surgery, and Cardiology. 73y Male with history of recurrent diverticular bleeding s/p multiple colonoscopies (no interventions) and prior +NM bleeding scan (5/2023 with active bleeding in cecum/proximal colon, no intervention), prostate cancer s/p radiation c/b radiation-associated vascular ectasia, internal hemorrhoids, HTN, asthma, recently dx afib s/p ablation and Watchman placement on 8/14, immediately started on Plavix/ASA 1 week, who presented 8/21 with recurrent painless BRBPR, weakness, and lightheadedness i/s/o recent initiation of anti-platelet medications. Now s/p 5U pRBC transfusion and 1Uplasma, repeat CBC Hb 8.2, anemic but stable from midnight CBC. No additional large bloody BM since 9PM 8/21. HDS, MAP in 80s this AM and in no distress, no dizziness or lightheadedness. but most recently 8.2 8/22 AM. HDS, BP improving with /86 this AM. LGIB likely 2/2 repeat diverticular hemorrhage, improving. Evaluated by GI, IR, Surgery, and Cardiology. Next steps pending decision regarding reinitiation of DAPT and patient's stabilization post-hemorrhage.

## 2023-08-23 LAB
ALBUMIN SERPL ELPH-MCNC: 2.4 G/DL — LOW (ref 3.3–5)
ALP SERPL-CCNC: 38 U/L — LOW (ref 40–120)
ALT FLD-CCNC: 8 U/L — SIGNIFICANT CHANGE UP (ref 4–41)
ANION GAP SERPL CALC-SCNC: 5 MMOL/L — LOW (ref 7–14)
AST SERPL-CCNC: 11 U/L — SIGNIFICANT CHANGE UP (ref 4–40)
BASE EXCESS BLDV CALC-SCNC: -1.6 MMOL/L — SIGNIFICANT CHANGE UP (ref -2–3)
BASE EXCESS BLDV CALC-SCNC: -1.8 MMOL/L — SIGNIFICANT CHANGE UP (ref -2–3)
BILIRUB SERPL-MCNC: 0.7 MG/DL — SIGNIFICANT CHANGE UP (ref 0.2–1.2)
BLOOD GAS VENOUS COMPREHENSIVE RESULT: SIGNIFICANT CHANGE UP
BLOOD GAS VENOUS COMPREHENSIVE RESULT: SIGNIFICANT CHANGE UP
BUN SERPL-MCNC: 26 MG/DL — HIGH (ref 7–23)
CALCIUM SERPL-MCNC: 8.1 MG/DL — LOW (ref 8.4–10.5)
CHLORIDE BLDV-SCNC: 111 MMOL/L — HIGH (ref 96–108)
CHLORIDE BLDV-SCNC: 112 MMOL/L — HIGH (ref 96–108)
CHLORIDE SERPL-SCNC: 113 MMOL/L — HIGH (ref 98–107)
CO2 BLDV-SCNC: 26.1 MMOL/L — HIGH (ref 22–26)
CO2 BLDV-SCNC: 26.2 MMOL/L — HIGH (ref 22–26)
CO2 SERPL-SCNC: 23 MMOL/L — SIGNIFICANT CHANGE UP (ref 22–31)
CREAT SERPL-MCNC: 0.87 MG/DL — SIGNIFICANT CHANGE UP (ref 0.5–1.3)
EGFR: 91 ML/MIN/1.73M2 — SIGNIFICANT CHANGE UP
FIBRINOGEN PPP-MCNC: 157 MG/DL — LOW (ref 200–465)
GAS PNL BLDV: 138 MMOL/L — SIGNIFICANT CHANGE UP (ref 136–145)
GAS PNL BLDV: 140 MMOL/L — SIGNIFICANT CHANGE UP (ref 136–145)
GAS PNL BLDV: SIGNIFICANT CHANGE UP
GLUCOSE BLDV-MCNC: 122 MG/DL — HIGH (ref 70–99)
GLUCOSE BLDV-MCNC: 94 MG/DL — SIGNIFICANT CHANGE UP (ref 70–99)
GLUCOSE SERPL-MCNC: 122 MG/DL — HIGH (ref 70–99)
HCO3 BLDV-SCNC: 25 MMOL/L — SIGNIFICANT CHANGE UP (ref 22–29)
HCO3 BLDV-SCNC: 25 MMOL/L — SIGNIFICANT CHANGE UP (ref 22–29)
HCT VFR BLD CALC: 19.2 % — CRITICAL LOW (ref 39–50)
HCT VFR BLD CALC: 21.1 % — LOW (ref 39–50)
HCT VFR BLD CALC: 21.3 % — LOW (ref 39–50)
HCT VFR BLD CALC: 21.8 % — LOW (ref 39–50)
HCT VFR BLDA CALC: 22 % — LOW (ref 39–51)
HCT VFR BLDA CALC: 23 % — LOW (ref 39–51)
HGB BLD CALC-MCNC: 7.3 G/DL — LOW (ref 12.6–17.4)
HGB BLD CALC-MCNC: 7.6 G/DL — LOW (ref 12.6–17.4)
HGB BLD-MCNC: 6.5 G/DL — CRITICAL LOW (ref 13–17)
HGB BLD-MCNC: 7 G/DL — CRITICAL LOW (ref 13–17)
HGB BLD-MCNC: 7.2 G/DL — LOW (ref 13–17)
HGB BLD-MCNC: 7.4 G/DL — LOW (ref 13–17)
INR BLD: 1.21 RATIO — HIGH (ref 0.85–1.18)
LACTATE BLDV-MCNC: 1.7 MMOL/L — SIGNIFICANT CHANGE UP (ref 0.5–2)
LACTATE BLDV-MCNC: 1.8 MMOL/L — SIGNIFICANT CHANGE UP (ref 0.5–2)
MAGNESIUM SERPL-MCNC: 1.8 MG/DL — SIGNIFICANT CHANGE UP (ref 1.6–2.6)
MCHC RBC-ENTMCNC: 28.8 PG — SIGNIFICANT CHANGE UP (ref 27–34)
MCHC RBC-ENTMCNC: 29.1 PG — SIGNIFICANT CHANGE UP (ref 27–34)
MCHC RBC-ENTMCNC: 29.3 PG — SIGNIFICANT CHANGE UP (ref 27–34)
MCHC RBC-ENTMCNC: 29.4 PG — SIGNIFICANT CHANGE UP (ref 27–34)
MCHC RBC-ENTMCNC: 33.2 GM/DL — SIGNIFICANT CHANGE UP (ref 32–36)
MCHC RBC-ENTMCNC: 33.8 GM/DL — SIGNIFICANT CHANGE UP (ref 32–36)
MCHC RBC-ENTMCNC: 33.9 GM/DL — SIGNIFICANT CHANGE UP (ref 32–36)
MCHC RBC-ENTMCNC: 33.9 GM/DL — SIGNIFICANT CHANGE UP (ref 32–36)
MCV RBC AUTO: 86.1 FL — SIGNIFICANT CHANGE UP (ref 80–100)
MCV RBC AUTO: 86.5 FL — SIGNIFICANT CHANGE UP (ref 80–100)
MCV RBC AUTO: 86.6 FL — SIGNIFICANT CHANGE UP (ref 80–100)
MCV RBC AUTO: 86.8 FL — SIGNIFICANT CHANGE UP (ref 80–100)
NRBC # BLD: 0 /100 WBCS — SIGNIFICANT CHANGE UP (ref 0–0)
NRBC # FLD: 0 K/UL — SIGNIFICANT CHANGE UP (ref 0–0)
NRBC # FLD: 0.02 K/UL — HIGH (ref 0–0)
NRBC # FLD: 0.02 K/UL — HIGH (ref 0–0)
NRBC # FLD: 0.07 K/UL — HIGH (ref 0–0)
PCO2 BLDV: 49 MMHG — SIGNIFICANT CHANGE UP (ref 42–55)
PCO2 BLDV: 50 MMHG — SIGNIFICANT CHANGE UP (ref 42–55)
PH BLDV: 7.3 — LOW (ref 7.32–7.43)
PH BLDV: 7.31 — LOW (ref 7.32–7.43)
PHOSPHATE SERPL-MCNC: 4.6 MG/DL — HIGH (ref 2.5–4.5)
PLATELET # BLD AUTO: 122 K/UL — LOW (ref 150–400)
PLATELET # BLD AUTO: 95 K/UL — LOW (ref 150–400)
PLATELET # BLD AUTO: 96 K/UL — LOW (ref 150–400)
PLATELET # BLD AUTO: 96 K/UL — LOW (ref 150–400)
PO2 BLDV: 35 MMHG — SIGNIFICANT CHANGE UP (ref 25–45)
PO2 BLDV: 39 MMHG — SIGNIFICANT CHANGE UP (ref 25–45)
POTASSIUM BLDV-SCNC: 3.9 MMOL/L — SIGNIFICANT CHANGE UP (ref 3.5–5.1)
POTASSIUM BLDV-SCNC: 4.2 MMOL/L — SIGNIFICANT CHANGE UP (ref 3.5–5.1)
POTASSIUM SERPL-MCNC: 4.2 MMOL/L — SIGNIFICANT CHANGE UP (ref 3.5–5.3)
POTASSIUM SERPL-SCNC: 4.2 MMOL/L — SIGNIFICANT CHANGE UP (ref 3.5–5.3)
PROT SERPL-MCNC: 4.1 G/DL — LOW (ref 6–8.3)
PROTHROM AB SERPL-ACNC: 13.6 SEC — HIGH (ref 9.5–13)
RBC # BLD: 2.23 M/UL — LOW (ref 4.2–5.8)
RBC # BLD: 2.43 M/UL — LOW (ref 4.2–5.8)
RBC # BLD: 2.46 M/UL — LOW (ref 4.2–5.8)
RBC # BLD: 2.52 M/UL — LOW (ref 4.2–5.8)
RBC # FLD: 14.9 % — HIGH (ref 10.3–14.5)
RBC # FLD: 15 % — HIGH (ref 10.3–14.5)
RBC # FLD: 15.4 % — HIGH (ref 10.3–14.5)
RBC # FLD: 15.8 % — HIGH (ref 10.3–14.5)
SAO2 % BLDV: 53.8 % — LOW (ref 67–88)
SAO2 % BLDV: 67.6 % — SIGNIFICANT CHANGE UP (ref 67–88)
SODIUM SERPL-SCNC: 141 MMOL/L — SIGNIFICANT CHANGE UP (ref 135–145)
WBC # BLD: 11.84 K/UL — HIGH (ref 3.8–10.5)
WBC # BLD: 12.39 K/UL — HIGH (ref 3.8–10.5)
WBC # BLD: 12.91 K/UL — HIGH (ref 3.8–10.5)
WBC # BLD: 9.88 K/UL — SIGNIFICANT CHANGE UP (ref 3.8–10.5)
WBC # FLD AUTO: 11.84 K/UL — HIGH (ref 3.8–10.5)
WBC # FLD AUTO: 12.39 K/UL — HIGH (ref 3.8–10.5)
WBC # FLD AUTO: 12.91 K/UL — HIGH (ref 3.8–10.5)
WBC # FLD AUTO: 9.88 K/UL — SIGNIFICANT CHANGE UP (ref 3.8–10.5)

## 2023-08-23 PROCEDURE — 99291 CRITICAL CARE FIRST HOUR: CPT

## 2023-08-23 PROCEDURE — 76937 US GUIDE VASCULAR ACCESS: CPT | Mod: 26

## 2023-08-23 PROCEDURE — 36248 INS CATH ABD/L-EXT ART ADDL: CPT | Mod: 59,RT

## 2023-08-23 PROCEDURE — 37244 VASC EMBOLIZE/OCCLUDE BLEED: CPT

## 2023-08-23 PROCEDURE — 36247 INS CATH ABD/L-EXT ART 3RD: CPT | Mod: RT

## 2023-08-23 PROCEDURE — 93010 ELECTROCARDIOGRAM REPORT: CPT

## 2023-08-23 PROCEDURE — 99232 SBSQ HOSP IP/OBS MODERATE 35: CPT | Mod: GC

## 2023-08-23 PROCEDURE — 99231 SBSQ HOSP IP/OBS SF/LOW 25: CPT

## 2023-08-23 RX ORDER — ACETAMINOPHEN 500 MG
1000 TABLET ORAL ONCE
Refills: 0 | Status: COMPLETED | OUTPATIENT
Start: 2023-08-23 | End: 2023-08-23

## 2023-08-23 RX ORDER — PANTOPRAZOLE SODIUM 20 MG/1
40 TABLET, DELAYED RELEASE ORAL DAILY
Refills: 0 | Status: DISCONTINUED | OUTPATIENT
Start: 2023-08-23 | End: 2023-08-26

## 2023-08-23 RX ORDER — MAGNESIUM SULFATE 500 MG/ML
2 VIAL (ML) INJECTION ONCE
Refills: 0 | Status: COMPLETED | OUTPATIENT
Start: 2023-08-23 | End: 2023-08-23

## 2023-08-23 RX ORDER — CHLORHEXIDINE GLUCONATE 213 G/1000ML
1 SOLUTION TOPICAL DAILY
Refills: 0 | Status: DISCONTINUED | OUTPATIENT
Start: 2023-08-23 | End: 2023-08-28

## 2023-08-23 RX ORDER — PHENYLEPHRINE HYDROCHLORIDE 10 MG/ML
0.2 INJECTION INTRAVENOUS
Qty: 40 | Refills: 0 | Status: DISCONTINUED | OUTPATIENT
Start: 2023-08-23 | End: 2023-08-23

## 2023-08-23 RX ADMIN — Medication 400 MILLIGRAM(S): at 23:16

## 2023-08-23 RX ADMIN — FINASTERIDE 5 MILLIGRAM(S): 5 TABLET, FILM COATED ORAL at 11:57

## 2023-08-23 RX ADMIN — MONTELUKAST 10 MILLIGRAM(S): 4 TABLET, CHEWABLE ORAL at 11:56

## 2023-08-23 RX ADMIN — Medication 1000 MILLIGRAM(S): at 23:30

## 2023-08-23 RX ADMIN — PANTOPRAZOLE SODIUM 40 MILLIGRAM(S): 20 TABLET, DELAYED RELEASE ORAL at 11:56

## 2023-08-23 RX ADMIN — ATORVASTATIN CALCIUM 10 MILLIGRAM(S): 80 TABLET, FILM COATED ORAL at 22:46

## 2023-08-23 RX ADMIN — CHLORHEXIDINE GLUCONATE 1 APPLICATION(S): 213 SOLUTION TOPICAL at 15:38

## 2023-08-23 RX ADMIN — Medication 1000 MILLIGRAM(S): at 08:51

## 2023-08-23 RX ADMIN — Medication 25 GRAM(S): at 04:52

## 2023-08-23 RX ADMIN — TAMSULOSIN HYDROCHLORIDE 0.4 MILLIGRAM(S): 0.4 CAPSULE ORAL at 22:46

## 2023-08-23 RX ADMIN — Medication 400 MILLIGRAM(S): at 08:36

## 2023-08-23 NOTE — DIETITIAN INITIAL EVALUATION ADULT - OTHER INFO
RDN present during interdisciplinary rounds.  Niece at Pt.'s bedside.    Pt. NPO in setting of LGIB.  Repeat CBC pending... if stable providers to advance diet to clear liquids.      No Pt. c/o nausea/vomiting/diarrhea/constipation @ this time.  No reported significant weight change PTA.

## 2023-08-23 NOTE — DIETITIAN INITIAL EVALUATION ADULT - PROBLEM SELECTOR PLAN 2
- Pt w/ Hb 10.2 (down from 13.5 8/15) ISO lower GI bleed w/ BRBPR; Hb unchanged after 2U pRBC  - MICU consulted; not admitting at this time but recommended additional pRBC unit   - 3rd unit pRBC ordered; will recheck CBC and fibrinogen   - /74 on presentation; initially improved following blood transfusions, began dropping and currently MAP 65-75.

## 2023-08-23 NOTE — DIETITIAN INITIAL EVALUATION ADULT - REASON FOR ADMISSION
74 yo M w Hx of prostate Ca s/p radiation, severe diverticulosis with recurrent bleeding s/p multiple colonoscopies, as well as s/p recent Watchman procedure & ablation.  Presenting with BRBPR.  Admitted with LGIB, now s/p IR embolization of R colic artery (8/23).

## 2023-08-23 NOTE — PROGRESS NOTE ADULT - ASSESSMENT
73y Male with history of recurrent diverticular bleeding s/p multiple colonoscopies (no interventions) and prior +NM bleeding scan (5/2023 with active bleeding in cecum/proximal colon, no intervention), prostate cancer s/p radiation c/b radiation-associated vascular ectasia, internal hemorrhoids, HTN, asthma, recently dx afib s/p ablation started Plavix/ASA 1 week PTA) who now presents with complaints recurrent painless BRBPR i/s/o recent initiation of anti-plt. s/p 6u pRBC with further deterioration of hgb. Admitted to MICU for further monitoring given lack of response to transfusion.       #Neuro  A&O4, no active issues    #Cardiovascular  Currently hemodynamically stable. Monitor    Afib s/p Watchman  - Holding AC, asa/Plavix  - Continue cardizem and metoprolol    #Respiratory  Asthma  - Continue montelukast    #GI/Nutrition  Hematochezia s/p 6 units PRBCs  - F/u repeat CTA abdomen/pelvis -> IR or GI   - CBC q6, transfuse as needed  - Keep NPO  - PPI IV BID    #/Renal  Prostate cancer s/p radiation  Continue tamsulosin/finasteride    #Skin  No active issues    #ID  No active issues    #Endocrine  No active issues    #Hematologic/DVT ppx  DVT ppx: SCDs  GI ppx: PPI as above    #Ethics.     73y Male with history of recurrent diverticular bleeding s/p multiple colonoscopies (no interventions) and prior +NM bleeding scan (5/2023 with active bleeding in cecum/proximal colon, no intervention), prostate cancer s/p radiation c/b radiation-associated vascular ectasia, internal hemorrhoids, HTN, asthma, recently dx afib s/p ablation started Plavix/ASA 1 week PTA) who now presents with complaints recurrent painless BRBPR i/s/o recent initiation of anti-plt. s/p 6u pRBC with further deterioration of hgb. Admitted to MICU for further monitoring given lack of response to transfusion.       #Neuro  A&O4, no active issues    #Cardiovascular  # h/o AF/A flutter s/p ablation and Watchmen Procedure on 8/14/23__ remains in NSR with PAC's  Currently hemodynamically stable. Monitor  - -pt was on short term Gene-Synephrine at 0.2 > 0.1 and remains off pressors since 01:45 am,   - -SBP in 130-120's.   -- ASA and Plavix held in s/p GI bleeding  -- cardiology following     Afib s/p Watchman  - Holding AC, asa/Plavix  -- -- cardiology recommends to continue BB for h/o NSVT__ hold for now to avoid controlling tachycardia if present as a sign of recurrent bleeding   - Hold Cardizem and metoprolol for now, and reassess tomorrow     #Respiratory  Asthma  -- no acute issues on RA   - Continue montelukast    #GI/Nutrition  Hematochezia s/p 8 units PRBCs, 3 FFP, 1 Plts, and 1 Cryo   - CTA abdomen/pelvis -> < from: CT Angio Abdomen and Pelvis w/ IV Cont (08.22.23 @ 18:35) >  Active bleeding in the cecum.  -- s/p IR R coil embolization of R colonic artery   - CBC q6, transfuse as needed, keep hgb> 7  - Keep NPO, recheck CBC q 6 hrs and feed if stable and change to q 8 hrs   - PPI IV daily     #/Renal  Prostate cancer s/p radiation  Continue tamsulosin/finasteride  -- no acute issues     #Skin  No active issues    #ID  No active issues    #Endocrine  No active issues  -- HgbA1c and TSH pending, no prior history    #Hematologic/DVT ppx  DVT ppx: SCDs, no chemical prophylaxis in s/o acute bleeding   GI ppx: PPI as above    #Ethics. Full code   Lines: PIV's   Dispo: MICU     73y Male with history of recurrent diverticular bleeding s/p multiple colonoscopies (no interventions) and prior +NM bleeding scan (5/2023 with active bleeding in cecum/proximal colon, no intervention), prostate cancer s/p radiation c/b radiation-associated vascular ectasia, internal hemorrhoids, HTN, asthma, recently dx afib s/p ablation started Plavix/ASA 1 week PTA) who now presents with complaints recurrent painless BRBPR i/s/o recent initiation of anti-plt. s/p 6u pRBC with further deterioration of hgb. Admitted to MICU for further monitoring given lack of response to transfusion.       #Neuro  A&O4, no active issues    #Cardiovascular  # h/o AF/A flutter s/p ablation and Watchmen Procedure on 8/14/23__ remains in NSR with PAC's  Currently hemodynamically stable. Monitor  - -pt was on short term Gene-Synephrine at 0.2 > 0.1 and remains off pressors since 01:45 am,   - -SBP in 130-120's.   -- ASA and Plavix held in s/p GI bleeding  -- cardiology following     Afib s/p Watchman  - Holding AC, asa/Plavix  -- -- cardiology recommends to continue BB for h/o NSVT__ hold for now to avoid controlling tachycardia if present as a sign of recurrent bleeding   - Hold Cardizem and metoprolol for now, and reassess tomorrow     #Respiratory  Asthma  -- no acute issues on RA   - Continue montelukast    #GI/Nutrition  Hematochezia s/p 8 units PRBCs, 3 FFP, 1 Plts, and 1 Cryo   - CTA abdomen/pelvis -> < from: CT Angio Abdomen and Pelvis w/ IV Cont (08.22.23 @ 18:35) >  Active bleeding in the cecum.  -- s/p IR R coil embolization of R colonic artery   - CBC q6, transfuse as needed, keep hgb> 7  - Keep NPO, recheck CBC q 6 hrs and feed if stable and change to q 8 hrs   -- Surgery is following -Continue to monitor abdominal exam, notify surgery for any changes/ worsening exam__ abdomen is soft and pt is asymptomatic   -- -- no melena/GI bleeding episodes after embolization   - -GI following   - PPI IV daily     #/Renal  Prostate cancer s/p radiation  Continue tamsulosin/finasteride  -- no acute issues     #Skin  No active issues    #ID  No active issues    #Endocrine  No active issues  -- HgbA1c and TSH pending, no prior history    #Hematologic/DVT ppx  DVT ppx: SCDs, no chemical prophylaxis in s/o acute bleeding   -- CBC q 6 hrs and if stable, will do q 8 hrs  -- no melena/GI bleeding episodes after embolization   GI ppx: PPI as above    #Ethics. Full code   Lines: PIV's   Dispo: MICU     73y Male with history of recurrent diverticular bleeding s/p multiple colonoscopies (no interventions) and prior +NM bleeding scan (5/2023 with active bleeding in cecum/proximal colon, no intervention), prostate cancer s/p radiation c/b radiation-associated vascular ectasia, internal hemorrhoids, HTN, asthma, recently dx afib s/p ablation started Plavix/ASA 1 week PTA) who now presents with complaints recurrent painless BRBPR i/s/o recent initiation of anti-plt. s/p 6u pRBC with further deterioration of hgb. Admitted to MICU for further monitoring given lack of response to transfusion.       #Neuro  A&O4, no active issues    #Cardiovascular  # h/o AF/A flutter s/p ablation and Watchmen Procedure on 8/14/23__ remains in NSR with PAC's  Currently hemodynamically stable. Monitor  - -pt was on short term Gene-Synephrine at 0.2 > 0.1 and remains off pressors since 01:45 am,   - -SBP in 130-120's.   -- ASA and Plavix held in s/p GI bleeding  -- cardiology following     Afib s/p Watchman  - Holding AC, asa/Plavix  -- -- cardiology recommends to continue BB for h/o NSVT__ hold for now to avoid controlling tachycardia if present as a sign of recurrent bleeding   - Hold Cardizem and metoprolol for now, and reassess tomorrow     #Respiratory  Asthma  -- no acute issues on RA   - Continue montelukast    #GI/Nutrition  Hematochezia s/p 8 units PRBCs, 3 FFP, 1 Plts, and 1 Cryo   - CTA abdomen/pelvis -> < from: CT Angio Abdomen and Pelvis w/ IV Cont (08.22.23 @ 18:35) >  Active bleeding in the cecum.  -- s/p IR R coil embolization of R colonic artery   - CBC q6, transfuse as needed, keep hgb> 7  - Keep NPO, recheck CBC q 6 hrs and feed if stable and change to q 8 hrs   -- Surgery is following -Continue to monitor abdominal exam, notify surgery for any changes/ worsening exam__ abdomen is soft and pt is asymptomatic   -- -- no melena/GI bleeding episodes after embolization   - -GI following   - PPI IV daily     #/Renal  Prostate cancer s/p radiation  Continue tamsulosin/finasteride  -- no acute issues     #Skin  No active issues    #ID  No active issues    #Endocrine  No active issues  -- HgbA1c and TSH pending, no prior history    #Hematologic/DVT ppx  DVT ppx: SCDs, no chemical prophylaxis in s/o acute bleeding   -- s/p 8 U PRBC, 3 FFP, 1 plts and 1 Cryo (low fibrinogen)  -- CBC q 6 hrs and if stable, will do q 8 hrs  -- no melena/GI bleeding episodes after embolization   GI ppx: PPI as above    #Ethics. Full code   Lines: PIV's   Dispo: MICU

## 2023-08-23 NOTE — DIETITIAN INITIAL EVALUATION ADULT - PROBLEM SELECTOR PLAN 3
- Patient is 1 week s/p ablation and Watchman placement w/ Dr. Gaurav Marie (8/14) and was started on ASA/Plavix at that time  - Holding for now ISO lower GI bleed  - Cards consulted regarding anti-coagulation management and risk vs benefit discussion; Dr. Marie evaluated at bedside, agreed with d/c of aspirin and plavix

## 2023-08-23 NOTE — PATIENT PROFILE ADULT - FUNCTIONAL ASSESSMENT - BASIC MOBILITY 6.
3-calculated by average/Not able to assess (calculate score using [facility] averaging method)

## 2023-08-23 NOTE — DIETITIAN INITIAL EVALUATION ADULT - DIET TYPE
pending improvement in GI status.  Then advance as tolerated and medically appropriate to DASH/TLC (cholesterol & Na restricted) diet./clear fluid

## 2023-08-23 NOTE — PROGRESS NOTE ADULT - ASSESSMENT
73y Male with PMHx of prostate cancer s/p radiation c/b radiation-associated vascular ectasia, internal hemorrhoids, HTN, HLD, asthma, recently dx A.fib/A.flutter (in 5/2023) s/p Watchman procedure s/p resumption of the Plavix/ASA 1 week prior. He also has a long history of recurrent diverticular bleeding s/p multiple colonoscopies demonstrating severe diverticulosis (no interventions) and prior +nuclear medicine bleeding scan (5/2023 with active bleeding in cecum/proximal colon, no intervention) and he presents with recurrent BRBPR.  S/P IR embolization on 8/22.     PLAN:  -Patient off pressors   -S/P IR embolization   -Monitor H/H   -Continue to monitor abdominal exam, notify surgery for any changes/ worsening exam   -Discussed with Dr. Swanson Team, [id number]

## 2023-08-23 NOTE — DIETITIAN INITIAL EVALUATION ADULT - PERTINENT MEDS FT
MEDICATIONS  (STANDING):  atorvastatin 10 milliGRAM(s) Oral at bedtime  finasteride 5 milliGRAM(s) Oral daily  montelukast 10 milliGRAM(s) Oral daily  pantoprazole  Injectable 40 milliGRAM(s) IV Push daily  tamsulosin 0.4 milliGRAM(s) Oral at bedtime    MEDICATIONS  (PRN):  albuterol    90 MICROgram(s) HFA Inhaler 2 Puff(s) Inhalation every 6 hours PRN Shortness of Breath and/or Wheezing

## 2023-08-23 NOTE — PROGRESS NOTE ADULT - ASSESSMENT
# Hematochezia  # Afib s/p recent ablation and watchman 8/14 on ASA/Plavix x 1 week (last dose Plavix 8/20 AM)  Patient with 1 episode of hematochezia after 1 week of Plavix. Given history of recurrent bleeding, likely due to diverticular bleeding and less likely 2/2 radiation-associated vascular or hemorrhoidal bleeding. He has had multiple endoscopic procedures for evaluation of hematochezia. Given severe diverticulosis and this history, there is low utility in repeat colonoscopy and patient is also does not wish to undergo any further colonoscopies. -- now s/p embolization with IR in branch of the Rt colic A after CTA+ revealed intraluminal extravasation of contrast in the cecum.      Recommendations:  - No plan for endoscopic intervention at this time  - If rebleeds/with downtrending hgb, obtain stat CTA and consider reconsulting IR for embolization given recurrent bleeding and multiple colonoscopies; inform GI  - CRS consulted, appreciate recs: no plan for surgery unless as last resort  - Cards/EP consulted, appreciate recs: okay to hold AC  - Would continue holding AC indefinitely if able given recurrent bleeding risks; defer to primary/Cards/EP  - Daily CBC, CMP, coags  - Transfuse if Hgb < 8  - If hgb remains stable and no additional signs of overt bleeding, can advance diet  - Ensure adequate hydration  - Note: if ASA resumed upon discharge, will need ppi 40 po daily ppx  - Rest of care per primary    Preliminary note until signed by Attending.    Thank you for involving us in this patient's care. Please reach out if any further questions or concerns. Signing off.    Jes Haley MD  Gastroenterology/Hepatology Fellow, PGY-7    NON-URGENT CONSULTS:  Please email [email or web address]  [email or web address]  AT NIGHT AND ON WEEKENDS:  Contact on-call GI fellow via answering service ([phone]) from 5pm-8am and on weekends/holidays  MONDAY-FRIDAY 8AM-5PM:  Pager: [phone] ([facility])  Pager: [phone] ([facility])

## 2023-08-23 NOTE — DIETITIAN INITIAL EVALUATION ADULT - PERTINENT LABORATORY DATA
08-23    141  |  113<H>  |  26<H>  ----------------------------<  122<H>  4.2   |  23  |  0.87    Ca    8.1<L>      23 Aug 2023 01:37  Phos  4.6     08-23  Mg     1.80     08-23    TPro  4.1<L>  /  Alb  2.4<L>  /  TBili  0.7  /  DBili  x   /  AST  11  /  ALT  8   /  AlkPhos  38<L>  08-23      Total Hemoglobin, Calculated: 7.6 g/dL (08.23.23 @ 05:24)  Hematocrit, Calculated: 23.0 % (08.23.23 @ 05:24)

## 2023-08-23 NOTE — PROGRESS NOTE ADULT - ASSESSMENT
73y Male with history of recurrent diverticular bleeding s/p multiple colonoscopies (no interventions) and prior +NM bleeding scan (5/2023 with active bleeding in cecum/proximal colon, no intervention), prostate cancer s/p radiation c/b radiation-associated vascular ectasia, internal hemorrhoids, HTN, asthma, recently diagnosed afib who is s/p ablation and implantation of a Watchman device on 8/14/23 and immediately placed on DAPT (Plavix/ASA). He presented  with active lower GI bleed with hypotension associated with mild LLQ abdominal pain and intermittent chest discomfort, s/p 3 units PRBC's. Patient underwent embolization last night in IR. Tolerated the procedure well. No further bleeding.   EKG and telemetry w/ sinus rhythm 80's bpm with occasional PVC's.  No evidence of AFib/Flutter.  Off anticoagulation.         Plan:   ASA and Plavix discontinued.   Keep K+ > 4.0 and Mg > 2.0  Continue Cardizem CD 120mg daily and metoprolol succinate 25mg daily for rate control when BP permits  Telemetry monitoring.

## 2023-08-23 NOTE — PATIENT PROFILE ADULT - NSPRESCRUSEDDRG_GEN_A_NUR

## 2023-08-23 NOTE — PROCEDURE NOTE - PROCEDURE FINDINGS AND DETAILS
Left common femoral artery access was obtained.  Mesenteric angio was performed, with contrast extravasation seen in branch of right colic artery. Coil embolization was performed.

## 2023-08-23 NOTE — DIETITIAN INITIAL EVALUATION ADULT - NSICDXPASTMEDICALHX_GEN_ALL_CORE_FT
PAST MEDICAL HISTORY:  Asthma     Atrial flutter     Diverticulitis     Diverticulosis     GIB (gastrointestinal bleeding)     Gout     Hyperlipidemia     Hypertension     Prostate cancer s/p radiation therapy

## 2023-08-23 NOTE — PROGRESS NOTE ADULT - SUBJECTIVE AND OBJECTIVE BOX
Cardiovascular Disease Progress Note    Overnight events: No acute events overnight.  no new cardiac sx. s/p ir embo  Otherwise review of systems negative    Objective Findings:  T(C): 36.2 (23 @ 04:00), Max: 36.6 (23 @ 11:18)  HR: 88 (23 @ 06:00) (80 - 111)  BP: 138/84 (23 @ 06:00) (98/67 - 138/84)  RR: 16 (23 @ 06:00) (12 - 24)  SpO2: 98% (23 @ 06:00) (96% - 100%)  Wt(kg): --  Daily     Daily Weight in k.9 (23 Aug 2023 03:00)      Physical Exam:  Gen: NAD  HEENT: EOMI  CV: RRR, normal S1 + S2, no m/r/g  Lungs: CTAB  Abd: soft, non-tender  Ext: No edema    Telemetry:    Laboratory Data:                        7.4    12.91 )-----------( 96       ( 23 Aug 2023 05:24 )             21.8     08    141  |  113<H>  |  26<H>  ----------------------------<  122<H>  4.2   |  23  |  0.87    Ca    8.1<L>      23 Aug 2023 01:37  Phos  4.6       Mg     1.80         TPro  4.1<L>  /  Alb  2.4<L>  /  TBili  0.7  /  DBili  x   /  AST  11  /  ALT  8   /  AlkPhos  38<L>      PT/INR - ( 23 Aug 2023 01:37 )   PT: 13.6 sec;   INR: 1.21 ratio         PTT - ( 22 Aug 2023 00:33 )  PTT:32.2 sec  CARDIAC MARKERS ( 22 Aug 2023 14:28 )  x     / x     / 57 U/L / x     / 3.4 ng/mL  CARDIAC MARKERS ( 21 Aug 2023 20:49 )  x     / x     / 57 U/L / x     / 3.1 ng/mL          Inpatient Medications:  MEDICATIONS  (STANDING):  atorvastatin 10 milliGRAM(s) Oral at bedtime  finasteride 5 milliGRAM(s) Oral daily  montelukast 10 milliGRAM(s) Oral daily  pantoprazole  Injectable 40 milliGRAM(s) IV Push daily  tamsulosin 0.4 milliGRAM(s) Oral at bedtime      Assessment:  73y Male with history of recurrent diverticular bleeding s/p multiple colonoscopies (no interventions) and prior +NM bleeding scan (2023 with active bleeding in cecum/proximal colon, no intervention), prostate cancer s/p radiation c/b radiation-associated vascular ectasia, internal hemorrhoids, HTN, asthma, recently diagnosed afib who is s/p ablation and implantation of a Watchman device on 23, discharged on 12 week course of  Plavix/ASA. He presents  with complaints recurrent BRBPR      Recs:  cardiac stable  s/p watchman implant on 23, antiplatelet meds on hold due to acute gi bleed. f/u eps recs  s/p afib/afl ablation, currently in nsr. cont to monitor   cw beta blockers for nsvt  transfuse prn  f/u gi and surgery recs  s/p ir embolization, cont to monitor h/h closely  will follow       Over 25 minutes spent on total encounter; more than 50% of the visit was spent counseling and/or coordinating care by the attending physician.      Isaac Calderón MD   Cardiovascular Disease  [phone]

## 2023-08-23 NOTE — PROGRESS NOTE ADULT - SUBJECTIVE AND OBJECTIVE BOX
Patient s/p embolization in IR overnight.   Feeling much better. Off pressors.   Denies chest pain, shortness of breath, abdominal pain, palpitations or lightheadedness.  Remains in sinus rhythm    Vital Signs Last 24 Hrs  T(C): 36.4 (23 Aug 2023 12:00), Max: 36.6 (22 Aug 2023 15:51)  T(F): 97.6 (23 Aug 2023 12:00), Max: 97.8 (22 Aug 2023 15:51)  HR: 87 (23 Aug 2023 12:00) (81 - 111)  BP: 120/73 (23 Aug 2023 12:00) (98/67 - 140/89)  BP(mean): 85 (23 Aug 2023 12:00) (64 - 102)  RR: 16 (23 Aug 2023 12:00) (10 - 24)  SpO2: 99% (23 Aug 2023 12:00) (96% - 100%)    Parameters below as of 23 Aug 2023 12:00  Patient On (Oxygen Delivery Method): room air          EKG: NSR  Telemetry: NSR with APC's 80's  MEDICATIONS  (STANDING):  atorvastatin 10 milliGRAM(s) Oral at bedtime  chlorhexidine 2% Cloths 1 Application(s) Topical daily  finasteride 5 milliGRAM(s) Oral daily  montelukast 10 milliGRAM(s) Oral daily  pantoprazole  Injectable 40 milliGRAM(s) IV Push daily  tamsulosin 0.4 milliGRAM(s) Oral at bedtime    MEDICATIONS  (PRN):  albuterol    90 MICROgram(s) HFA Inhaler 2 Puff(s) Inhalation every 6 hours PRN Shortness of Breath and/or Wheezing          Physical exam:   Gen- well developed well nourished NAD  Resp- clear to auscultation. No wheezing rales or rhonchi  CV- S1 and S2 RRR. No murmurs, gallops or rubs  ABD- soft nontender + bowel sounds  EXT- no edema no calf tenderness. extremities warm and dry  Neuro- grossly nonfocal                            7.4    12.91 )-----------( 96       ( 23 Aug 2023 05:24 )             21.8     PT/INR - ( 23 Aug 2023 01:37 )   PT: 13.6 sec;   INR: 1.21 ratio         PTT - ( 22 Aug 2023 00:33 )  PTT:32.2 sec  08-23    141  |  113<H>  |  26<H>  ----------------------------<  122<H>  4.2   |  23  |  0.87    Ca    8.1<L>      23 Aug 2023 01:37  Phos  4.6     08-23  Mg     1.80     08-23    TPro  4.1<L>  /  Alb  2.4<L>  /  TBili  0.7  /  DBili  x   /  AST  11  /  ALT  8   /  AlkPhos  38<L>  08-23    CARDIAC MARKERS ( 22 Aug 2023 14:28 )  x     / x     / 57 U/L / x     / 3.4 ng/mL  CARDIAC MARKERS ( 21 Aug 2023 20:49 )  x     / x     / 57 U/L / x     / 3.1 ng/mL

## 2023-08-23 NOTE — PROGRESS NOTE ADULT - SUBJECTIVE AND OBJECTIVE BOX
73y Male s/p mesenteric angiogram and Embolization on 8/23 in Interventional Radiology.     Patient seen and examined bedside resting comfortably. No complaints offered. VVS. Currently off pressors.     T(F): 97 (08-23-23 @ 08:00), Max: 97.8 (08-22-23 @ 15:51)  HR: 88 (08-23-23 @ 11:00) (81 - 111)  BP: 133/67 (08-23-23 @ 11:00) (98/67 - 140/89)  RR: 14 (08-23-23 @ 11:00) (10 - 24)  SpO2: 100% (08-23-23 @ 11:00) (96% - 100%)  Wt(kg): --    LABS:                        7.4    12.91 )-----------( 96       ( 23 Aug 2023 05:24 )             21.8     08-23    141  |  113<H>  |  26<H>  ----------------------------<  122<H>  4.2   |  23  |  0.87    Ca    8.1<L>      23 Aug 2023 01:37  Phos  4.6     08-23  Mg     1.80     08-23    TPro  4.1<L>  /  Alb  2.4<L>  /  TBili  0.7  /  DBili  x   /  AST  11  /  ALT  8   /  AlkPhos  38<L>  08-23    PT/INR - ( 23 Aug 2023 01:37 )   PT: 13.6 sec;   INR: 1.21 ratio         PTT - ( 22 Aug 2023 00:33 )  PTT:32.2 sec  I&O's Detail    22 Aug 2023 07:01  -  23 Aug 2023 07:00  --------------------------------------------------------  IN:    Cryoprecipitate: 147 mL    IV PiggyBack: 50 mL    Lactated Ringers Bolus: 500 mL    Phenylephrine: 11.3 mL    Plasma: 556 mL    Platelets - Single Donor: 201 mL  Total IN: 1465.3 mL    OUT:    Voided (mL): 600 mL  Total OUT: 600 mL    Total NET: 865.3 mL      23 Aug 2023 07:01  -  23 Aug 2023 11:39  --------------------------------------------------------  IN:  Total IN: 0 mL    OUT:    Voided (mL): 400 mL  Total OUT: 400 mL    Total NET: -400 mL        PHYSICAL EXAM:  General: Nontoxic, in NAD  Left groin site: dressing c/d/i

## 2023-08-23 NOTE — PROGRESS NOTE ADULT - ASSESSMENT
73y Male history of recurrent diverticular bleeding with diverticulosis s/p mesenteric angiogram and Embolization on 8/23 in Interventional Radiology.       Plan:  - Keep left groin c/d/i  - Monitor H/H  - Reconsult PRN    [id number]

## 2023-08-23 NOTE — PROGRESS NOTE ADULT - SUBJECTIVE AND OBJECTIVE BOX
Surgery Progress Note    SUBJECTIVE: Pt seen and examined at bedside. Patient comfortable and in no-apparent distress. Pt s/p embolization with IR last night. No longer requiring pressors.     Vital Signs Last 24 Hrs  T(C): 36.2 (23 Aug 2023 04:00), Max: 36.6 (22 Aug 2023 11:18)  T(F): 97.2 (23 Aug 2023 04:00), Max: 97.8 (22 Aug 2023 11:18)  HR: 89 (23 Aug 2023 07:00) (80 - 111)  BP: 139/90 (23 Aug 2023 07:00) (98/67 - 139/90)  BP(mean): 102 (23 Aug 2023 07:00) (64 - 102)  RR: 15 (23 Aug 2023 07:00) (12 - 24)  SpO2: 98% (23 Aug 2023 07:00) (96% - 100%)    Parameters below as of 23 Aug 2023 07:00  Patient On (Oxygen Delivery Method): room air    Physical Exam:  General Appearance: Appears well, NAD  Respiratory: No labored breathing  CV: Pulse regularly present  Abdomen: Soft, nontender, slightly distended   Groin: Left groin access site c/d/i, no evidence of hematoma     LABS:                        7.4    12.91 )-----------( 96       ( 23 Aug 2023 05:24 )             21.8     08-23    141  |  113<H>  |  26<H>  ----------------------------<  122<H>  4.2   |  23  |  0.87    Ca    8.1<L>      23 Aug 2023 01:37  Phos  4.6     08-23  Mg     1.80     08-23    TPro  4.1<L>  /  Alb  2.4<L>  /  TBili  0.7  /  DBili  x   /  AST  11  /  ALT  8   /  AlkPhos  38<L>  08-23    PT/INR - ( 23 Aug 2023 01:37 )   PT: 13.6 sec;   INR: 1.21 ratio         PTT - ( 22 Aug 2023 00:33 )  PTT:32.2 sec  Urinalysis Basic - ( 23 Aug 2023 01:37 )    Color: x / Appearance: x / SG: x / pH: x  Gluc: 122 mg/dL / Ketone: x  / Bili: x / Urobili: x   Blood: x / Protein: x / Nitrite: x   Leuk Esterase: x / RBC: x / WBC x   Sq Epi: x / Non Sq Epi: x / Bacteria: x        INs and OUTs:    08-22-23 @ 07:01  -  08-23-23 @ 07:00  --------------------------------------------------------  IN: 1465.3 mL / OUT: 600 mL / NET: 865.3 mL

## 2023-08-23 NOTE — PROGRESS NOTE ADULT - SUBJECTIVE AND OBJECTIVE BOX
POST ANESTHESIA EVALUATION    73y Male POSTOP DAY 1 S/P     MENTAL STATUS: Patient participation [ x ] Awake     [  ] Arousable     [  ] Sedated    AIRWAY PATENCY: [x  ] Satisfactory  [  ] Other:     Vital Signs Last 24 Hrs  T(C): 36.2 (23 Aug 2023 04:00), Max: 36.6 (22 Aug 2023 11:18)  T(F): 97.2 (23 Aug 2023 04:00), Max: 97.8 (22 Aug 2023 11:18)  HR: 83 (23 Aug 2023 10:00) (80 - 111)  BP: 126/81 (23 Aug 2023 10:00) (98/67 - 140/89)  BP(mean): 92 (23 Aug 2023 10:00) (64 - 102)  RR: 14 (23 Aug 2023 10:00) (10 - 24)  SpO2: 100% (23 Aug 2023 10:00) (96% - 100%)    Parameters below as of 23 Aug 2023 10:00  Patient On (Oxygen Delivery Method): room air      I&O's Summary    22 Aug 2023 07:01  -  23 Aug 2023 07:00  --------------------------------------------------------  IN: 1465.3 mL / OUT: 600 mL / NET: 865.3 mL          NAUSEA/ VOMITTING:  [ x ] NONE  [  ] CONTROLLED [  ] OTHER     PAIN: [ x ] CONTROLLED WITH CURRENT REGIMEN  [  ] OTHER    [ x ] NO APPARENT ANESTHESIA COMPLICATIONS      Comments:

## 2023-08-23 NOTE — PROGRESS NOTE ADULT - SUBJECTIVE AND OBJECTIVE BOX
Chief Complaint:  Patient is a 73y old  Male who presents with a chief complaint of Lower GI Bleed (23 Aug 2023 11:39)       Interval Events:   Transferred to MICU after becoming hypotensive in setting of hematochezia.   Hgb not responding properly to blood.  Subsequent CTA+ revealed intraluminal extravasation of contrast in the cecum  Underwent embolization with IR in branch of the [facility]   Patient HDS  Denies any BMs since yesterday  No n/v/abdominal pain     Hospital Medications:  albuterol    90 MICROgram(s) HFA Inhaler 2 Puff(s) Inhalation every 6 hours PRN  atorvastatin 10 milliGRAM(s) Oral at bedtime  chlorhexidine 2% Cloths 1 Application(s) Topical daily  finasteride 5 milliGRAM(s) Oral daily  montelukast 10 milliGRAM(s) Oral daily  pantoprazole  Injectable 40 milliGRAM(s) IV Push daily  tamsulosin 0.4 milliGRAM(s) Oral at bedtime      ROS:   Complete and normal except as mentioned above.      PHYSICAL EXAM:   Vital Signs:  Vital Signs Last 24 Hrs  T(C): 36.1 (23 Aug 2023 08:00), Max: 36.6 (22 Aug 2023 15:51)  T(F): 97 (23 Aug 2023 08:00), Max: 97.8 (22 Aug 2023 15:51)  HR: 88 (23 Aug 2023 11:00) (81 - 111)  BP: 133/67 (23 Aug 2023 11:00) (98/67 - 140/89)  BP(mean): 83 (23 Aug 2023 11:00) (64 - 102)  RR: 14 (23 Aug 2023 11:00) (10 - 24)  SpO2: 100% (23 Aug 2023 11:00) (96% - 100%)    Parameters below as of 23 Aug 2023 11:00  Patient On (Oxygen Delivery Method): room air      Daily     Daily Weight in k.9 (23 Aug 2023 03:00)    GENERAL: no acute distress  NEURO: alert  HEENT: anicteric sclera, no conjunctival pallor appreciated  CHEST: no respiratory distress, no accessory muscle use  CARDIAC: regular rate   ABDOMEN: soft, non-tender, non-distended, no rebound or guarding  EXTREMITIES: warm, well perfused   SKIN: no lesions noted    LABS:                         7.4    12.91 )-----------( 96       ( 23 Aug 2023 05:24 )             21.8     08-    141  |  113<H>  |  26<H>  ----------------------------<  122<H>  4.2   |  23  |  0.87    Ca    8.1<L>      23 Aug 2023 01:37  Phos  4.6       Mg     1.80         TPro  4.1<L>  /  Alb  2.4<L>  /  TBili  0.7  /  DBili  x   /  AST  11  /  ALT  8   /  AlkPhos  38<L>      LIVER FUNCTIONS - ( 23 Aug 2023 01:37 )  Alb: 2.4 g/dL / Pro: 4.1 g/dL / ALK PHOS: 38 U/L / ALT: 8 U/L / AST: 11 U/L / GGT: x             Interval Diagnostic Studies:   CTA   CT ANGIO ABD PELV (W)AW IC     *** ADDENDUM # 1 ***    This addendum is being generated to clarify the findings in the ABDOMINAL   WALL section of the original report. It should read as follows: Unchanged   minimal subcutaneous fatty stranding in the right groin, which can be   from prior instrumentation in this region.    --- End of Report ---    *** END OF ADDENDUM # 1 ***      PROCEDURE DATE:  2023          INTERPRETATION:  CLINICAL INFORMATION: Multiple diverticular bleeds,   status post 5 units of RBC transfusion.    COMPARISON: CT angiogram abdomen and pelvis dated 2023    CONTRAST/COMPLICATIONS:  IV Contrast: Omnipaque 350  98 cc administered   2 cc discarded  Oral Contrast: NONE  Complications: None reported at time of study completion    PROCEDURE:  CT of the Abdomen and Pelvis was performed.  Precontrast, Arterial and Delayed phases were performed.  Sagittal and coronal reformats were performed.    FINDINGS:  LOWERCHEST: Cardiomegaly. Trace pericardial effusion.    LIVER: Within normal limits.  BILE DUCTS: Normal caliber.  GALLBLADDER: Stones/sludge.  SPLEEN: Within normal limits.  PANCREAS: Within normal limits.  ADRENALS: Unchanged left adrenal adenoma. Normal right adrenal gland.    KIDNEYS/URETERS: Left renal cysts are unchanged. No hydroureteronephrosis   bilaterally.    BLADDER: Decompressed.  REPRODUCTIVE ORGANS: Prostate within normal limits.    BOWEL: New intraluminal extravasation of contrast in the cecum consistent   with acute gastrointestinal bleeding (4-72). Diverticulosis without   diverticulitis. No bowel obstruction. Appendix is normal. Small hiatal   hernia.  PERITONEUM: No ascites.  VESSELS: Atherosclerotic changes.  RETROPERITONEUM/LYMPH NODES: No lymphadenopathy.  ABDOMINAL WALL: Unchanged subcutaneous fat stranding overlying the right   common femoral vein, likely related to recent embolization. Small   bilateral fat-containing inguinal hernias, right larger than left. Small   fat-containing umbilical hernia.  BONES: Degenerative changes.    IMPRESSION:  Active bleeding in the cecum. It is noted that embolization had been   performed by the time of this dictation and is reported separately.  Preliminary findings discussed by Ayah Soto DO with [facility] at   23 at 6:46 PM with read back.        --- End of Report ---

## 2023-08-23 NOTE — PROGRESS NOTE ADULT - SUBJECTIVE AND OBJECTIVE BOX
Significant recent/past 24 hr events:    Subjective:    Review of Systems         [ ] A ten-point review of systems was otherwise negative except as noted.  [ ] Due to altered mental status/intubation, subjective information were not able to be obtained from the patient. History was obtained, to the extent possible, from review of the chart and collateral sources of information.      Patient is a 73y old  Male who presents with a chief complaint of Lower GI Bleed (22 Aug 2023 15:59)    HPI:  73y Male with PMHx of recurrent diverticular bleeding s/p multiple colonoscopies demonstrating severe diverticulosis (no interventions) and prior +nuclear medicine bleeding scan (5/2023 with active bleeding in cecum/proximal colon, no intervention), prostate cancer s/p radiation c/b radiation-associated vascular ectasia, internal hemorrhoids, HTN, HLD, asthma, recently dx A.fib/A.flutter (in 5/2023) s/p Watchman procedure and initiation of A/c with Plavix/ASA 1 week ago who now presents with  recurrent painless BRBPR.    Patient had one large BM with bright red blood earlier this morning and began feeling weak, dizzy, and nauseous. No vomiting or abdominal pain at the time. Patient took BP at home and said it was low (~60s systolic, ~40s diastolic), and wife felt he looked pale. Pt was especially concerned due to recently restarting A/c, so he came to the ED, Also endorses intermittent CP as well as frontal headache. Denies shortness of breath and syncope. Since coming to the ED he has had 3-4 additional loose bright red to maroon BMs and reports dizziness/lightheadedness immediately following each BM, which gradually resolves.    Patient explained he had similar BRBPR back in May 2023 on two separate occasions, but denies any blood in the stool since then. In ED: Patient has been HDS w/ borderline tachycardia Hgb 10.2 from 13.5 s/p 2u pRBC. Repeat hgb ~10 following 2U. GI consulted in the ED, but no plan for urgent intervention. CTA without any signs of acute GI bleed. IR consulted for potential embolization however no indication given negative CTA. MICU consulted for possible admission given no improvement in Hb s/p 2U pRBC; however pt not a candidate at this time and instead MICU recommended 3rd unit pRBC and monitoring CBC/vital signs.     (21 Aug 2023 16:57)    PAST MEDICAL & SURGICAL HISTORY:  Hypertension      Hyperlipidemia      GIB (gastrointestinal bleeding)      Prostate cancer  s/p radiation therapy      Gout      Diverticulitis      Asthma      Atrial flutter      Diverticulosis      Presence of Watchman left atrial appendage closure device      History of hemorrhoidectomy      H/O total knee replacement, right        FAMILY HISTORY:      Vitals   ICU Vital Signs Last 24 Hrs  T(C): 36.2 (23 Aug 2023 04:00), Max: 36.6 (22 Aug 2023 11:18)  T(F): 97.2 (23 Aug 2023 04:00), Max: 97.8 (22 Aug 2023 11:18)  HR: 88 (23 Aug 2023 06:00) (80 - 111)  BP: 138/84 (23 Aug 2023 06:00) (98/67 - 138/84)  BP(mean): 97 (23 Aug 2023 06:00) (64 - 97)  ABP: --  ABP(mean): --  RR: 16 (23 Aug 2023 06:00) (12 - 24)  SpO2: 98% (23 Aug 2023 06:00) (96% - 100%)    O2 Parameters below as of 23 Aug 2023 06:00  Patient On (Oxygen Delivery Method): room air            Physical Exam:   Constitutional: NAD, well-groomed, well-developed  HEENT: PERRLA, EOMI, no drainage or redness  Neck: supple,  No JVD, Trachea midline  Back: Normal spine flexure, No CVA tenderness, No deformity or limitation of movement  Respiratory: Breath Sounds equal & clear bilaterally to auscultation, no accessory muscle use noted  Cardiovascular: Regular rate, regular rhythm, normal S1, S2; no murmurs or rub  Gastrointestinal: Soft, non-tender, non distended, no hepatosplenomegaly, normal bowel sounds  Extremities: [place] x 4, no peripheral edema, no cyanosis, no clubbing   Vascular: Equal and normal pulses: 2+ peripheral pulses throughout  Neurological: A+O x 3; speech clear and intact; no sensory, motor  deficits, normal reflexes  Psychiatric: calm, normal mood, normal affect  Musculoskeletal: No joint swelling or deformity; no limitation of movement  Skin: warm, dry, well perfused, no rashes    VENT SETTINGS     ABG - ( 23 Aug 2023 00:25 )  pH, Arterial: 7.29  pH, Blood: x     /  pCO2: 50    /  pO2: 197   / HCO3: 24    / Base Excess: -2.5  /  SaO2: 99.2                I&O's Detail    22 Aug 2023 07:01  -  23 Aug 2023 07:00  --------------------------------------------------------  IN:    Cryoprecipitate: 147 mL    IV PiggyBack: 50 mL    Lactated Ringers Bolus: 500 mL    Phenylephrine: 11.3 mL    Plasma: 556 mL    Platelets - Single Donor: 201 mL  Total IN: 1465.3 mL    OUT:    Voided (mL): 600 mL  Total OUT: 600 mL    Total NET: 865.3 mL          LABS                        7.4    12.91 )-----------( 96       ( 23 Aug 2023 05:24 )             21.8     08-23    141  |  113<H>  |  26<H>  ----------------------------<  122<H>  4.2   |  23  |  0.87    Ca    8.1<L>      23 Aug 2023 01:37  Phos  4.6     08-23  Mg     1.80     08-23    TPro  4.1<L>  /  Alb  2.4<L>  /  TBili  0.7  /  DBili  x   /  AST  11  /  ALT  8   /  AlkPhos  38<L>  08-23    LIVER FUNCTIONS - ( 23 Aug 2023 01:37 )  Alb: 2.4 g/dL / Pro: 4.1 g/dL / ALK PHOS: 38 U/L / ALT: 8 U/L / AST: 11 U/L / GGT: x           PT/INR - ( 23 Aug 2023 01:37 )   PT: 13.6 sec;   INR: 1.21 ratio         PTT - ( 22 Aug 2023 00:33 )  PTT:32.2 sec  CARDIAC MARKERS ( 22 Aug 2023 14:28 )  CK57 U/L / CKMB3.4 ng/mL / Troponin Tx     /        Urinalysis Basic - ( 23 Aug 2023 01:37 )    Color: x / Appearance: x / SG: x / pH: x  Gluc: 122 mg/dL / Ketone: x  / Bili: x / Urobili: x   Blood: x / Protein: x / Nitrite: x   Leuk Esterase: x / RBC: x / WBC x   Sq Epi: x / Non Sq Epi: x / Bacteria: x            MEDICATIONS  (STANDING):  atorvastatin 10 milliGRAM(s) Oral at bedtime  chlorhexidine 4% Liquid 1 Application(s) Topical <User Schedule>  finasteride 5 milliGRAM(s) Oral daily  montelukast 10 milliGRAM(s) Oral daily  pantoprazole  Injectable 40 milliGRAM(s) IV Push daily  tamsulosin 0.4 milliGRAM(s) Oral at bedtime    MEDICATIONS  (PRN):  albuterol    90 MICROgram(s) HFA Inhaler 2 Puff(s) Inhalation every 6 hours PRN Shortness of Breath and/or Wheezing      Allergies:  No Known Allergies        CRITICAL CARE TIME SPENT:  minutes of critical care time spent providing medical care for patient's acute illness/conditions that impairs at least one vital organ system and/or poses a high risk of imminent or life threatening deterioration in the patient's condition. It includes time spent evaluating and treating the patient's acute illness as well as time spent reviewing labs, radiology, discussing goals of care with patient and/or patient's family, and discussing the case with a multidisciplinary team, in an effort to prevent further life threatening deterioration or end organ damage. This time is independent of any procedures performed.         Significant recent/past 24 hr events: s/p colonic artery embolization by IR     Subjective: pt c/o low back pain- 6-7/10, since today morning, Pt denies any abdominal; pain/ V.N/ other complaints     Review of Systems         [ ] A ten-point review of systems was otherwise negative except as noted.  [ ] Due to altered mental status/intubation, subjective information were not able to be obtained from the patient. History was obtained, to the extent possible, from review of the chart and collateral sources of information.      Patient is a 73y old  Male who presents with a chief complaint of Lower GI Bleed (22 Aug 2023 15:59)    HPI:  73y Male with PMHx of recurrent diverticular bleeding s/p multiple colonoscopies demonstrating severe diverticulosis (no interventions) and prior +nuclear medicine bleeding scan (5/2023 with active bleeding in cecum/proximal colon, no intervention), prostate cancer s/p radiation c/b radiation-associated vascular ectasia, internal hemorrhoids, HTN, HLD, asthma, recently dx A.fib/A.flutter (in 5/2023) s/p Watchman procedure and ablation on 8/14/23 and initiation of A/c with Plavix/ASA 1 week ago who now presents with  recurrent painless BRBPR.    Patient had one large BM with bright red blood earlier this morning and began feeling weak, dizzy, and nauseous. No vomiting or abdominal pain at the time. Patient took BP at home and said it was low (~60s systolic, ~40s diastolic), and wife felt he looked pale. Pt was especially concerned due to recently restarting A/c, so he came to the ED, Also endorses intermittent CP as well as frontal headache. Denies shortness of breath and syncope. Since coming to the ED he has had 3-4 additional loose bright red to maroon BMs and reports dizziness/lightheadedness immediately following each BM, which gradually resolves.    Patient explained he had similar BRBPR back in May 2023 on two separate occasions, but denies any blood in the stool since then. In ED: Patient has been HDS w/ borderline tachycardia Hgb 10.2 from 13.5 s/p 2u pRBC. Repeat hgb ~10 following 2U. GI consulted in the ED, but no plan for urgent intervention. CTA without any signs of acute GI bleed. IR consulted for potential embolization however no indication given negative CTA. MICU consulted for possible admission given no improvement in Hb s/p 2U pRBC; however pt not a candidate at this time and instead MICU recommended 3rd unit pRBC and monitoring CBC/vital signs.     (21 Aug 2023 16:57)    PAST MEDICAL & SURGICAL HISTORY:  Hypertension      Hyperlipidemia      GIB (gastrointestinal bleeding)      Prostate cancer  s/p radiation therapy      Gout      Diverticulitis      Asthma      Atrial flutter      Diverticulosis      Presence of Watchman left atrial appendage closure device      History of hemorrhoidectomy      H/O total knee replacement, right        FAMILY HISTORY:      Vitals   ICU Vital Signs Last 24 Hrs  T(C): 36.2 (23 Aug 2023 04:00), Max: 36.6 (22 Aug 2023 11:18)  T(F): 97.2 (23 Aug 2023 04:00), Max: 97.8 (22 Aug 2023 11:18)  HR: 88 (23 Aug 2023 06:00) (80 - 111)  BP: 138/84 (23 Aug 2023 06:00) (98/67 - 138/84)  BP(mean): 97 (23 Aug 2023 06:00) (64 - 97)  ABP: --  ABP(mean): --  RR: 16 (23 Aug 2023 06:00) (12 - 24)  SpO2: 98% (23 Aug 2023 06:00) (96% - 100%)    O2 Parameters below as of 23 Aug 2023 06:00  Patient On (Oxygen Delivery Method): room air            Physical Exam:   Constitutional: NAD, well-groomed, well-developed  HEENT: PERRLA, EOMI, no drainage or redness  Neck: supple,  No JVD, Trachea midline  Back: Normal spine flexure, No CVA tenderness, No deformity or limitation of movement  Respiratory: Breath Sounds equal & clear bilaterally to auscultation, no accessory muscle use noted  Cardiovascular: Regular rate, regular rhythm, normal S1, S2; no murmurs or rub  Gastrointestinal: Soft, non-tender, non distended, no hepatosplenomegaly, normal bowel sounds  Extremities: [place] x 4, no peripheral edema, no cyanosis, no clubbing   Vascular: Equal and normal pulses: 2+ peripheral pulses throughout  Neurological: A+O x 3; speech clear and intact; no sensory, motor  deficits, normal reflexes  Psychiatric: calm, normal mood, normal affect  Musculoskeletal: No joint swelling or deformity; no limitation of movement  Skin: warm, dry, well perfused, no rashes    VENT SETTINGS     ABG - ( 23 Aug 2023 00:25 )  pH, Arterial: 7.29  pH, Blood: x     /  pCO2: 50    /  pO2: 197   / HCO3: 24    / Base Excess: -2.5  /  SaO2: 99.2                I&O's Detail    22 Aug 2023 07:01  -  23 Aug 2023 07:00  --------------------------------------------------------  IN:    Cryoprecipitate: 147 mL    IV PiggyBack: 50 mL    Lactated Ringers Bolus: 500 mL    Phenylephrine: 11.3 mL    Plasma: 556 mL    Platelets - Single Donor: 201 mL  Total IN: 1465.3 mL    OUT:    Voided (mL): 600 mL  Total OUT: 600 mL    Total NET: 865.3 mL          LABS                        7.4    12.91 )-----------( 96       ( 23 Aug 2023 05:24 )             21.8     08-23    141  |  113<H>  |  26<H>  ----------------------------<  122<H>  4.2   |  23  |  0.87    Ca    8.1<L>      23 Aug 2023 01:37  Phos  4.6     08-23  Mg     1.80     08-23    TPro  4.1<L>  /  Alb  2.4<L>  /  TBili  0.7  /  DBili  x   /  AST  11  /  ALT  8   /  AlkPhos  38<L>  08-23    LIVER FUNCTIONS - ( 23 Aug 2023 01:37 )  Alb: 2.4 g/dL / Pro: 4.1 g/dL / ALK PHOS: 38 U/L / ALT: 8 U/L / AST: 11 U/L / GGT: x           PT/INR - ( 23 Aug 2023 01:37 )   PT: 13.6 sec;   INR: 1.21 ratio         PTT - ( 22 Aug 2023 00:33 )  PTT:32.2 sec  CARDIAC MARKERS ( 22 Aug 2023 14:28 )  CK57 U/L / CKMB3.4 ng/mL / Troponin Tx     /        Urinalysis Basic - ( 23 Aug 2023 01:37 )    Color: x / Appearance: x / SG: x / pH: x  Gluc: 122 mg/dL / Ketone: x  / Bili: x / Urobili: x   Blood: x / Protein: x / Nitrite: x   Leuk Esterase: x / RBC: x / WBC x   Sq Epi: x / Non Sq Epi: x / Bacteria: x            MEDICATIONS  (STANDING):  atorvastatin 10 milliGRAM(s) Oral at bedtime  chlorhexidine 4% Liquid 1 Application(s) Topical <User Schedule>  finasteride 5 milliGRAM(s) Oral daily  montelukast 10 milliGRAM(s) Oral daily  pantoprazole  Injectable 40 milliGRAM(s) IV Push daily  tamsulosin 0.4 milliGRAM(s) Oral at bedtime    MEDICATIONS  (PRN):  albuterol    90 MICROgram(s) HFA Inhaler 2 Puff(s) Inhalation every 6 hours PRN Shortness of Breath and/or Wheezing      Allergies:  No Known Allergies

## 2023-08-23 NOTE — DIETITIAN INITIAL EVALUATION ADULT - PROBLEM SELECTOR PLAN 1
- Pt w/ large bloody BM this morning, painless w/ symptoms of acute blood loss (weakness, lightheadedness, nausea) and appeared pale per wife. ISO recently starting anti-platelet medications 1 week ago. S/p 4 additional bloody BM in ED, ranging from bright red to maroon  - Recurrent diverticular bleeds, most recently in 5/2023 when he had +NM scan w/ bleed in the cecum/proximal ascending colon; required 5U pRBC transfusion at this time  - GI consulted in ED; no interventions at this time, if he continues to bleed or becomes hemodynamically unstable will consider colonoscopy on Wed. 8/23  - Negative CTA 8/21; IR consulted, but no embolization indicated at this time 2/2 negative CTA  - Pending repeat CBC following 3rd unit pRBC  - Protonix 40 mg qd   - Gen. surgery consulted to make aware in case of potential colorectal intervention; will assess patient tonight in ED  - Clear liquid diet for now ISO possible intervention  - S/p 1L bolus

## 2023-08-23 NOTE — PATIENT PROFILE ADULT - FALL HARM RISK - HARM RISK INTERVENTIONS

## 2023-08-24 LAB
ALBUMIN SERPL ELPH-MCNC: 2.9 G/DL — LOW (ref 3.3–5)
ALBUMIN SERPL ELPH-MCNC: 3.4 G/DL — SIGNIFICANT CHANGE UP (ref 3.3–5)
ALP SERPL-CCNC: 50 U/L — SIGNIFICANT CHANGE UP (ref 40–120)
ALP SERPL-CCNC: 61 U/L — SIGNIFICANT CHANGE UP (ref 40–120)
ALT FLD-CCNC: 11 U/L — SIGNIFICANT CHANGE UP (ref 4–41)
ALT FLD-CCNC: 11 U/L — SIGNIFICANT CHANGE UP (ref 4–41)
ANION GAP SERPL CALC-SCNC: 11 MMOL/L — SIGNIFICANT CHANGE UP (ref 7–14)
ANION GAP SERPL CALC-SCNC: 9 MMOL/L — SIGNIFICANT CHANGE UP (ref 7–14)
ANISOCYTOSIS BLD QL: SLIGHT — SIGNIFICANT CHANGE UP
APTT BLD: 29.7 SEC — SIGNIFICANT CHANGE UP (ref 24.5–35.6)
AST SERPL-CCNC: 15 U/L — SIGNIFICANT CHANGE UP (ref 4–40)
AST SERPL-CCNC: 15 U/L — SIGNIFICANT CHANGE UP (ref 4–40)
BILIRUB SERPL-MCNC: 0.8 MG/DL — SIGNIFICANT CHANGE UP (ref 0.2–1.2)
BILIRUB SERPL-MCNC: 0.9 MG/DL — SIGNIFICANT CHANGE UP (ref 0.2–1.2)
BUN SERPL-MCNC: 10 MG/DL — SIGNIFICANT CHANGE UP (ref 7–23)
BUN SERPL-MCNC: 13 MG/DL — SIGNIFICANT CHANGE UP (ref 7–23)
CALCIUM SERPL-MCNC: 8 MG/DL — LOW (ref 8.4–10.5)
CALCIUM SERPL-MCNC: 8.7 MG/DL — SIGNIFICANT CHANGE UP (ref 8.4–10.5)
CHLORIDE SERPL-SCNC: 103 MMOL/L — SIGNIFICANT CHANGE UP (ref 98–107)
CHLORIDE SERPL-SCNC: 106 MMOL/L — SIGNIFICANT CHANGE UP (ref 98–107)
CO2 SERPL-SCNC: 24 MMOL/L — SIGNIFICANT CHANGE UP (ref 22–31)
CO2 SERPL-SCNC: 28 MMOL/L — SIGNIFICANT CHANGE UP (ref 22–31)
CREAT SERPL-MCNC: 0.78 MG/DL — SIGNIFICANT CHANGE UP (ref 0.5–1.3)
CREAT SERPL-MCNC: 0.84 MG/DL — SIGNIFICANT CHANGE UP (ref 0.5–1.3)
EGFR: 92 ML/MIN/1.73M2 — SIGNIFICANT CHANGE UP
EGFR: 94 ML/MIN/1.73M2 — SIGNIFICANT CHANGE UP
FIBRINOGEN PPP-MCNC: 239 MG/DL — SIGNIFICANT CHANGE UP (ref 200–465)
GLUCOSE SERPL-MCNC: 139 MG/DL — HIGH (ref 70–99)
GLUCOSE SERPL-MCNC: 83 MG/DL — SIGNIFICANT CHANGE UP (ref 70–99)
HCT VFR BLD CALC: 22.7 % — LOW (ref 39–50)
HCT VFR BLD CALC: 24.7 % — LOW (ref 39–50)
HCT VFR BLD CALC: 26.4 % — LOW (ref 39–50)
HGB BLD-MCNC: 7.7 G/DL — LOW (ref 13–17)
HGB BLD-MCNC: 8.3 G/DL — LOW (ref 13–17)
HGB BLD-MCNC: 8.6 G/DL — LOW (ref 13–17)
INR BLD: 1.14 RATIO — SIGNIFICANT CHANGE UP (ref 0.85–1.18)
MAGNESIUM SERPL-MCNC: 2.1 MG/DL — SIGNIFICANT CHANGE UP (ref 1.6–2.6)
MAGNESIUM SERPL-MCNC: 2.2 MG/DL — SIGNIFICANT CHANGE UP (ref 1.6–2.6)
MANUAL SMEAR VERIFICATION: SIGNIFICANT CHANGE UP
MCHC RBC-ENTMCNC: 28 PG — SIGNIFICANT CHANGE UP (ref 27–34)
MCHC RBC-ENTMCNC: 28.4 PG — SIGNIFICANT CHANGE UP (ref 27–34)
MCHC RBC-ENTMCNC: 28.5 PG — SIGNIFICANT CHANGE UP (ref 27–34)
MCHC RBC-ENTMCNC: 32.6 GM/DL — SIGNIFICANT CHANGE UP (ref 32–36)
MCHC RBC-ENTMCNC: 33.6 GM/DL — SIGNIFICANT CHANGE UP (ref 32–36)
MCHC RBC-ENTMCNC: 33.9 GM/DL — SIGNIFICANT CHANGE UP (ref 32–36)
MCV RBC AUTO: 83.4 FL — SIGNIFICANT CHANGE UP (ref 80–100)
MCV RBC AUTO: 83.8 FL — SIGNIFICANT CHANGE UP (ref 80–100)
MCV RBC AUTO: 87.4 FL — SIGNIFICANT CHANGE UP (ref 80–100)
MICROCYTES BLD QL: SIGNIFICANT CHANGE UP
MYELOCYTES NFR BLD: 0.9 % — HIGH (ref 0–0)
NRBC # BLD: 0 /100 WBCS — SIGNIFICANT CHANGE UP (ref 0–0)
NRBC # FLD: 0.05 K/UL — HIGH (ref 0–0)
NRBC # FLD: 0.06 K/UL — HIGH (ref 0–0)
NRBC # FLD: 0.07 K/UL — HIGH (ref 0–0)
OVALOCYTES BLD QL SMEAR: SLIGHT — SIGNIFICANT CHANGE UP
PHOSPHATE SERPL-MCNC: 3 MG/DL — SIGNIFICANT CHANGE UP (ref 2.5–4.5)
PLAT MORPH BLD: NORMAL — SIGNIFICANT CHANGE UP
PLATELET # BLD AUTO: 104 K/UL — LOW (ref 150–400)
PLATELET # BLD AUTO: 127 K/UL — LOW (ref 150–400)
PLATELET # BLD AUTO: 92 K/UL — LOW (ref 150–400)
PLATELET COUNT - ESTIMATE: ABNORMAL
POIKILOCYTOSIS BLD QL AUTO: SLIGHT — SIGNIFICANT CHANGE UP
POLYCHROMASIA BLD QL SMEAR: SLIGHT — SIGNIFICANT CHANGE UP
POTASSIUM SERPL-MCNC: 3.2 MMOL/L — LOW (ref 3.5–5.3)
POTASSIUM SERPL-MCNC: 3.8 MMOL/L — SIGNIFICANT CHANGE UP (ref 3.5–5.3)
POTASSIUM SERPL-SCNC: 3.2 MMOL/L — LOW (ref 3.5–5.3)
POTASSIUM SERPL-SCNC: 3.8 MMOL/L — SIGNIFICANT CHANGE UP (ref 3.5–5.3)
PROT SERPL-MCNC: 5.1 G/DL — LOW (ref 6–8.3)
PROT SERPL-MCNC: 6 G/DL — SIGNIFICANT CHANGE UP (ref 6–8.3)
PROTHROM AB SERPL-ACNC: 12.8 SEC — SIGNIFICANT CHANGE UP (ref 9.5–13)
RBC # BLD: 2.71 M/UL — LOW (ref 4.2–5.8)
RBC # BLD: 2.96 M/UL — LOW (ref 4.2–5.8)
RBC # BLD: 3.02 M/UL — LOW (ref 4.2–5.8)
RBC # FLD: 16.6 % — HIGH (ref 10.3–14.5)
RBC # FLD: 17.2 % — HIGH (ref 10.3–14.5)
RBC # FLD: 17.5 % — HIGH (ref 10.3–14.5)
RBC BLD AUTO: NORMAL — SIGNIFICANT CHANGE UP
SODIUM SERPL-SCNC: 140 MMOL/L — SIGNIFICANT CHANGE UP (ref 135–145)
SODIUM SERPL-SCNC: 141 MMOL/L — SIGNIFICANT CHANGE UP (ref 135–145)
VARIANT LYMPHS # BLD: 5.2 % — SIGNIFICANT CHANGE UP (ref 0–6)
WBC # BLD: 8.25 K/UL — SIGNIFICANT CHANGE UP (ref 3.8–10.5)
WBC # BLD: 8.52 K/UL — SIGNIFICANT CHANGE UP (ref 3.8–10.5)
WBC # BLD: 8.85 K/UL — SIGNIFICANT CHANGE UP (ref 3.8–10.5)
WBC # FLD AUTO: 8.25 K/UL — SIGNIFICANT CHANGE UP (ref 3.8–10.5)
WBC # FLD AUTO: 8.52 K/UL — SIGNIFICANT CHANGE UP (ref 3.8–10.5)
WBC # FLD AUTO: 8.85 K/UL — SIGNIFICANT CHANGE UP (ref 3.8–10.5)

## 2023-08-24 PROCEDURE — 99291 CRITICAL CARE FIRST HOUR: CPT

## 2023-08-24 PROCEDURE — 99231 SBSQ HOSP IP/OBS SF/LOW 25: CPT

## 2023-08-24 RX ORDER — POTASSIUM CHLORIDE 20 MEQ
40 PACKET (EA) ORAL ONCE
Refills: 0 | Status: COMPLETED | OUTPATIENT
Start: 2023-08-24 | End: 2023-08-24

## 2023-08-24 RX ORDER — METOPROLOL TARTRATE 50 MG
25 TABLET ORAL DAILY
Refills: 0 | Status: DISCONTINUED | OUTPATIENT
Start: 2023-08-24 | End: 2023-08-28

## 2023-08-24 RX ORDER — POTASSIUM CHLORIDE 20 MEQ
20 PACKET (EA) ORAL ONCE
Refills: 0 | Status: COMPLETED | OUTPATIENT
Start: 2023-08-24 | End: 2023-08-24

## 2023-08-24 RX ORDER — CALCIUM GLUCONATE 100 MG/ML
2 VIAL (ML) INTRAVENOUS ONCE
Refills: 0 | Status: COMPLETED | OUTPATIENT
Start: 2023-08-24 | End: 2023-08-24

## 2023-08-24 RX ADMIN — Medication 200 GRAM(S): at 09:02

## 2023-08-24 RX ADMIN — Medication 40 MILLIEQUIVALENT(S): at 09:02

## 2023-08-24 RX ADMIN — TAMSULOSIN HYDROCHLORIDE 0.4 MILLIGRAM(S): 0.4 CAPSULE ORAL at 22:07

## 2023-08-24 RX ADMIN — MONTELUKAST 10 MILLIGRAM(S): 4 TABLET, CHEWABLE ORAL at 11:49

## 2023-08-24 RX ADMIN — ATORVASTATIN CALCIUM 10 MILLIGRAM(S): 80 TABLET, FILM COATED ORAL at 22:07

## 2023-08-24 RX ADMIN — FINASTERIDE 5 MILLIGRAM(S): 5 TABLET, FILM COATED ORAL at 11:48

## 2023-08-24 RX ADMIN — CHLORHEXIDINE GLUCONATE 1 APPLICATION(S): 213 SOLUTION TOPICAL at 11:49

## 2023-08-24 RX ADMIN — Medication 20 MILLIEQUIVALENT(S): at 19:14

## 2023-08-24 RX ADMIN — Medication 25 MILLIGRAM(S): at 11:54

## 2023-08-24 RX ADMIN — PANTOPRAZOLE SODIUM 40 MILLIGRAM(S): 20 TABLET, DELAYED RELEASE ORAL at 11:55

## 2023-08-24 NOTE — PROGRESS NOTE ADULT - SUBJECTIVE AND OBJECTIVE BOX
Patient is seen and examined. he denies any chest pain, SOB, palpitations or dizziness.  S/P embolization in IR     PAST MEDICAL & SURGICAL HISTORY:  Hypertension    Hyperlipidemia    GIB (gastrointestinal bleeding)    Prostate cancer  s/p radiation therapy    Gout    Diverticulitis    Asthma    Atrial flutter    Diverticulosis    Presence of Watchman left atrial appendage closure device    History of hemorrhoidectomy    H/O total knee replacement, right        MEDICATIONS  (STANDING):  atorvastatin 10 milliGRAM(s) Oral at bedtime  calcium gluconate IVPB 2 Gram(s) IV Intermittent once  chlorhexidine 2% Cloths 1 Application(s) Topical daily  finasteride 5 milliGRAM(s) Oral daily  montelukast 10 milliGRAM(s) Oral daily  pantoprazole  Injectable 40 milliGRAM(s) IV Push daily  potassium chloride    Tablet ER 40 milliEquivalent(s) Oral once  tamsulosin 0.4 milliGRAM(s) Oral at bedtime    MEDICATIONS  (PRN):  albuterol    90 MICROgram(s) HFA Inhaler 2 Puff(s) Inhalation every 6 hours PRN Shortness of Breath and/or Wheezing      Vital Signs Last 24 Hrs  T(C): 36.1 (24 Aug 2023 08:00), Max: 36.7 (23 Aug 2023 20:00)  T(F): 97 (24 Aug 2023 08:00), Max: 98.1 (24 Aug 2023 00:00)  HR: 84 (24 Aug 2023 08:00) (77 - 91)  BP: 100/64 (24 Aug 2023 08:00) (100/64 - 152/82)  BP(mean): 73 (24 Aug 2023 08:00) (73 - 104)  RR: 18 (24 Aug 2023 08:00) (13 - 20)  SpO2: 100% (24 Aug 2023 08:00) (95% - 100%)    Parameters below as of 24 Aug 2023 08:00  Patient On (Oxygen Delivery Method): room air    INTERPRETATION OF TELEMETRY: Sinus rhythm with frequent APCs and PVCs, couplets HR 80s    LABS:                        7.7    8.85  )-----------( 104      ( 24 Aug 2023 03:16 )             22.7     08-24    141  |  106  |  13  ----------------------------<  83  3.2<L>   |  24  |  0.78    Ca    8.0<L>      24 Aug 2023 03:16  Phos  3.0     08-24  Mg     2.10     08-24    TPro  5.1<L>  /  Alb  2.9<L>  /  TBili  0.9  /  DBili  x   /  AST  15  /  ALT  11  /  AlkPhos  50  08-24    CARDIAC MARKERS ( 22 Aug 2023 14:28 )  x     / x     / 57 U/L / x     / 3.4 ng/mL      PT/INR - ( 24 Aug 2023 03:16 )   PT: 12.8 sec;   INR: 1.14 ratio         PTT - ( 24 Aug 2023 03:16 )  PTT:29.7 sec  Urinalysis Basic - ( 24 Aug 2023 03:16 )    Color: x / Appearance: x / SG: x / pH: x  Gluc: 83 mg/dL / Ketone: x  / Bili: x / Urobili: x   Blood: x / Protein: x / Nitrite: x   Leuk Esterase: x / RBC: x / WBC x   Sq Epi: x / Non Sq Epi: x / Bacteria: x      I&O's Summary    23 Aug 2023 07:01  -  24 Aug 2023 07:00  --------------------------------------------------------  IN: 300 mL / OUT: 4420 mL / NET: -4120 mL    24 Aug 2023 07:01  -  24 Aug 2023 08:18  --------------------------------------------------------  IN: 0 mL / OUT: 600 mL / NET: -600 mL      PHYSICAL EXAM:    GENERAL: In no apparent distress, well nourished, and hydrated.  HEART: Irregular rate and regular rhythm; No murmurs, rubs, or gallops.  PULMONARY: Clear to auscultation and percussion.  No rales, wheezing, or rhonchi bilaterally.  ABDOMEN: Soft, Nontender, Nondistended; Bowel sounds present  EXTREMITIES:  2+ Peripheral Pulses, No clubbing, cyanosis, or edema

## 2023-08-24 NOTE — PROGRESS NOTE ADULT - SUBJECTIVE AND OBJECTIVE BOX
CHIEF COMPLAINT:    Interval Events:    HPI:  73y Male with PMHx of recurrent diverticular bleeding s/p multiple colonoscopies demonstrating severe diverticulosis (no interventions) and prior +nuclear medicine bleeding scan (5/2023 with active bleeding in cecum/proximal colon, no intervention), prostate cancer s/p radiation c/b radiation-associated vascular ectasia, internal hemorrhoids, HTN, HLD, asthma, recently dx A.fib/A.flutter (in 5/2023) s/p Watchman procedure and initiation of A/c with Plavix/ASA 1 week ago who now presents with  recurrent painless BRBPR.    Patient had one large BM with bright red blood earlier this morning and began feeling weak, dizzy, and nauseous. No vomiting or abdominal pain at the time. Patient took BP at home and said it was low (~60s systolic, ~40s diastolic), and wife felt he looked pale. Pt was especially concerned due to recently restarting A/c, so he came to the ED, Also endorses intermittent CP as well as frontal headache. Denies shortness of breath and syncope. Since coming to the ED he has had 3-4 additional loose bright red to maroon BMs and reports dizziness/lightheadedness immediately following each BM, which gradually resolves.    Patient explained he had similar BRBPR back in May 2023 on two separate occasions, but denies any blood in the stool since then. In ED: Patient has been HDS w/ borderline tachycardia Hgb 10.2 from 13.5 s/p 2u pRBC. Repeat hgb ~10 following 2U. GI consulted in the ED, but no plan for urgent intervention. CTA without any signs of acute GI bleed. IR consulted for potential embolization however no indication given negative CTA. MICU consulted for possible admission given no improvement in Hb s/p 2U pRBC; however pt not a candidate at this time and instead MICU recommended 3rd unit pRBC and monitoring CBC/vital signs.     (21 Aug 2023 16:57)      REVIEW OF SYSTEMS:  Constitutional: [ ] negative [ ] fevers [ ] chills [ ] weight loss [ ] weight gain  HEENT: [ ] negative [ ] dry eyes [ ] eye irritation [ ] postnasal drip [ ] nasal congestion  CV: [ ] negative  [ ] chest pain [ ] orthopnea [ ] palpitations [ ] murmur  Resp: [ ] negative [ ] cough [ ] shortness of breath [ ] dyspnea [ ] wheezing [ ] sputum [ ] hemoptysis  GI: [ ] negative [ ] nausea [ ] vomiting [ ] diarrhea [ ] constipation [ ] abd pain [ ] dysphagia   : [ ] negative [ ] dysuria [ ] nocturia [ ] hematuria [ ] increased urinary frequency  Musculoskeletal: [ ] negative [ ] back pain [ ] myalgias [ ] arthralgias [ ] fracture  Skin: [ ] negative [ ] rash [ ] itch  Neurological: [ ] negative [ ] headache [ ] dizziness [ ] syncope [ ] weakness [ ] numbness  Psychiatric: [ ] negative [ ] anxiety [ ] depression  Endocrine: [ ] negative [ ] diabetes [ ] thyroid problem  Hematologic/Lymphatic: [ ] negative [ ] anemia [ ] bleeding problem  Allergic/Immunologic: [ ] negative [ ] itchy eyes [ ] nasal discharge [ ] hives [ ] angioedema  [ ] All other systems negative  [ ] Unable to assess ROS because ________    OBJECTIVE:  ICU Vital Signs Last 24 Hrs  T(C): 36.6 (24 Aug 2023 04:00), Max: 36.7 (23 Aug 2023 20:00)  T(F): 97.9 (24 Aug 2023 04:00), Max: 98.1 (24 Aug 2023 00:00)  HR: 86 (24 Aug 2023 04:00) (77 - 91)  BP: 136/81 (24 Aug 2023 04:00) (120/73 - 152/82)  BP(mean): 91 (24 Aug 2023 04:00) (81 - 102)  ABP: --  ABP(mean): --  RR: 16 (24 Aug 2023 04:00) (10 - 20)  SpO2: 95% (24 Aug 2023 04:00) (95% - 100%)    O2 Parameters below as of 24 Aug 2023 04:00  Patient On (Oxygen Delivery Method): room air              08-22 @ 07:01  -  08-23 @ 07:00  --------------------------------------------------------  IN: 1465.3 mL / OUT: 600 mL / NET: 865.3 mL    08-23 @ 07:01  -  08-24 @ 06:30  --------------------------------------------------------  IN: 300 mL / OUT: 3070 mL / NET: -2770 mL      CAPILLARY BLOOD GLUCOSE          Physical Exam:   Constitutional: ill appearing, no acute distress   HEENT: + PERRLA, EOMI, no drainage or redness  Neck: supple,  No JVD  Respiratory: Ventilator assisted breath Sounds equal & clear bilaterally to auscultation, no accessory muscle use noted  Cardiovascular: Regular rate, regular rhythm, normal S1, S2; no murmurs or rub  Gastrointestinal: Soft, non-tender, non distended, no hepatosplenomegaly, normal bowel sounds  Extremities: + 2 peripheral edema, no cyanosis, no clubbing   Vascular: Equal and normal pulses: 2+ peripheral pulses throughout  Neurological: sedated/intubated  Psychiatric: calm, normal mood, normal affect  Musculoskeletal: No joint swelling or deformity; no limitation of movement  Skin: warm, dry, well perfused, no rashes    HOSPITAL MEDICATIONS:  Standing Meds:  atorvastatin 10 milliGRAM(s) Oral at bedtime  chlorhexidine 2% Cloths 1 Application(s) Topical daily  finasteride 5 milliGRAM(s) Oral daily  montelukast 10 milliGRAM(s) Oral daily  pantoprazole  Injectable 40 milliGRAM(s) IV Push daily  potassium chloride    Tablet ER 40 milliEquivalent(s) Oral once  tamsulosin 0.4 milliGRAM(s) Oral at bedtime      PRN Meds:  albuterol    90 MICROgram(s) HFA Inhaler 2 Puff(s) Inhalation every 6 hours PRN      LABS:                        7.7    8.85  )-----------( 104      ( 24 Aug 2023 03:16 )             22.7     Hgb Trend: 7.7<--, 6.5<--, 7.2<--, 7.4<--, 7.0<--  08-24    141  |  106  |  13  ----------------------------<  83  3.2<L>   |  24  |  0.78    Ca    8.0<L>      24 Aug 2023 03:16  Phos  3.0     08-24  Mg     2.10     08-24    TPro  5.1<L>  /  Alb  2.9<L>  /  TBili  0.9  /  DBili  x   /  AST  15  /  ALT  11  /  AlkPhos  50  08-24    Creatinine Trend: 0.78<--, 0.87<--, 0.93<--, 0.93<--, 1.01<--, 0.89<--  PT/INR - ( 24 Aug 2023 03:16 )   PT: 12.8 sec;   INR: 1.14 ratio         PTT - ( 24 Aug 2023 03:16 )  PTT:29.7 sec  Urinalysis Basic - ( 24 Aug 2023 03:16 )    Color: x / Appearance: x / SG: x / pH: x  Gluc: 83 mg/dL / Ketone: x  / Bili: x / Urobili: x   Blood: x / Protein: x / Nitrite: x   Leuk Esterase: x / RBC: x / WBC x   Sq Epi: x / Non Sq Epi: x / Bacteria: x      Arterial Blood Gas:  08-23 @ 00:25  7.29/50/197/24/99.2/-2.5  ABG lactate: --  Arterial Blood Gas:  08-22 @ 23:20  7.32/42/188/22/98.8/-4.1  ABG lactate: --    Venous Blood Gas:  08-23 @ 05:24  7.30/50/35/25/53.8  VBG Lactate: 1.7  Venous Blood Gas:  08-23 @ 01:37  7.31/49/39/25/67.6  VBG Lactate: 1.8  Venous Blood Gas:  08-22 @ 19:20  7.29/50/23/24/29.4  VBG Lactate: 4.1      MICROBIOLOGY:     RADIOLOGY:  [ ] Reviewed and interpreted by me         Interval Events: Hgb drop 7.2 >  6.5, remained asymptomatic with no bleeding noted,   pRBC's x1 given, post hgb is now 7.7    HPI:  73y Male with PMHx of recurrent diverticular bleeding s/p multiple colonoscopies demonstrating severe diverticulosis (no interventions) and prior +nuclear medicine bleeding scan (5/2023 with active bleeding in cecum/proximal colon, no intervention), prostate cancer s/p radiation c/b radiation-associated vascular ectasia, internal hemorrhoids, HTN, HLD, asthma, recently dx A.fib/A.flutter (in 5/2023) s/p Watchman procedure and initiation of A/c with Plavix/ASA 1 week ago who now presents with  recurrent painless BRBPR.    Patient had one large BM with bright red blood earlier this morning and began feeling weak, dizzy, and nauseous. No vomiting or abdominal pain at the time. Patient took BP at home and said it was low (~60s systolic, ~40s diastolic), and wife felt he looked pale. Pt was especially concerned due to recently restarting A/c, so he came to the ED, Also endorses intermittent CP as well as frontal headache. Denies shortness of breath and syncope. Since coming to the ED he has had 3-4 additional loose bright red to maroon BMs and reports dizziness/lightheadedness immediately following each BM, which gradually resolves.    Patient explained he had similar BRBPR back in May 2023 on two separate occasions, but denies any blood in the stool since then. In ED: Patient has been HDS w/ borderline tachycardia Hgb 10.2 from 13.5 s/p 2u pRBC. Repeat hgb ~10 following 2U. GI consulted in the ED, but no plan for urgent intervention. CTA without any signs of acute GI bleed. IR consulted for potential embolization however no indication given negative CTA. MICU consulted for possible admission given no improvement in Hb s/p 2U pRBC; however pt not a candidate at this time and instead MICU recommended 3rd unit pRBC and monitoring CBC/vital signs.     (21 Aug 2023 16:57)      REVIEW OF SYSTEMS:  Constitutional: [ ] negative [ ] fevers [ ] chills [ ] weight loss [ ] weight gain  HEENT: [ ] negative [ ] dry eyes [ ] eye irritation [ ] postnasal drip [ ] nasal congestion  CV: [ ] negative  [ ] chest pain [ ] orthopnea [ ] palpitations [ ] murmur  Resp: [ ] negative [ ] cough [ ] shortness of breath [ ] dyspnea [ ] wheezing [ ] sputum [ ] hemoptysis  GI: [ ] negative [ ] nausea [ ] vomiting [ ] diarrhea [ ] constipation [ ] abd pain [ ] dysphagia   : [ ] negative [ ] dysuria [ ] nocturia [ ] hematuria [ ] increased urinary frequency  Musculoskeletal: [ ] negative [ ] back pain [ ] myalgias [ ] arthralgias [ ] fracture  Skin: [ ] negative [ ] rash [ ] itch  Neurological: [ ] negative [ ] headache [ ] dizziness [ ] syncope [ ] weakness [ ] numbness  Psychiatric: [ ] negative [ ] anxiety [ ] depression  Endocrine: [ ] negative [ ] diabetes [ ] thyroid problem  Hematologic/Lymphatic: [ ] negative [ ] anemia [ ] bleeding problem  Allergic/Immunologic: [ ] negative [ ] itchy eyes [ ] nasal discharge [ ] hives [ ] angioedema  [ ] All other systems negative  [ ] Unable to assess ROS because ________    OBJECTIVE:  ICU Vital Signs Last 24 Hrs  T(C): 36.6 (24 Aug 2023 04:00), Max: 36.7 (23 Aug 2023 20:00)  T(F): 97.9 (24 Aug 2023 04:00), Max: 98.1 (24 Aug 2023 00:00)  HR: 86 (24 Aug 2023 04:00) (77 - 91)  BP: 136/81 (24 Aug 2023 04:00) (120/73 - 152/82)  BP(mean): 91 (24 Aug 2023 04:00) (81 - 102)  ABP: --  ABP(mean): --  RR: 16 (24 Aug 2023 04:00) (10 - 20)  SpO2: 95% (24 Aug 2023 04:00) (95% - 100%)    O2 Parameters below as of 24 Aug 2023 04:00  Patient On (Oxygen Delivery Method): room air              08-22 @ 07:01  -  08-23 @ 07:00  --------------------------------------------------------  IN: 1465.3 mL / OUT: 600 mL / NET: 865.3 mL    08-23 @ 07:01  -  08-24 @ 06:30  --------------------------------------------------------  IN: 300 mL / OUT: 3070 mL / NET: -2770 mL      CAPILLARY BLOOD GLUCOSE          Physical Exam:   Constitutional: ill appearing, no acute distress   HEENT: + PERRLA, EOMI, no drainage or redness  Neck: supple,  No JVD  Respiratory: Ventilator assisted breath Sounds equal & clear bilaterally to auscultation, no accessory muscle use noted  Cardiovascular: Regular rate, regular rhythm, normal S1, S2; no murmurs or rub  Gastrointestinal: Soft, non-tender, non distended, no hepatosplenomegaly, normal bowel sounds  Extremities: + 2 peripheral edema, no cyanosis, no clubbing   Vascular: Equal and normal pulses: 2+ peripheral pulses throughout  Neurological: sedated/intubated  Psychiatric: calm, normal mood, normal affect  Musculoskeletal: No joint swelling or deformity; no limitation of movement  Skin: warm, dry, well perfused, no rashes    HOSPITAL MEDICATIONS:  Standing Meds:  atorvastatin 10 milliGRAM(s) Oral at bedtime  chlorhexidine 2% Cloths 1 Application(s) Topical daily  finasteride 5 milliGRAM(s) Oral daily  montelukast 10 milliGRAM(s) Oral daily  pantoprazole  Injectable 40 milliGRAM(s) IV Push daily  potassium chloride    Tablet ER 40 milliEquivalent(s) Oral once  tamsulosin 0.4 milliGRAM(s) Oral at bedtime      PRN Meds:  albuterol    90 MICROgram(s) HFA Inhaler 2 Puff(s) Inhalation every 6 hours PRN      LABS:                        7.7    8.85  )-----------( 104      ( 24 Aug 2023 03:16 )             22.7     Hgb Trend: 7.7<--, 6.5<--, 7.2<--, 7.4<--, 7.0<--  08-24    141  |  106  |  13  ----------------------------<  83  3.2<L>   |  24  |  0.78    Ca    8.0<L>      24 Aug 2023 03:16  Phos  3.0     08-24  Mg     2.10     08-24    TPro  5.1<L>  /  Alb  2.9<L>  /  TBili  0.9  /  DBili  x   /  AST  15  /  ALT  11  /  AlkPhos  50  08-24    Creatinine Trend: 0.78<--, 0.87<--, 0.93<--, 0.93<--, 1.01<--, 0.89<--  PT/INR - ( 24 Aug 2023 03:16 )   PT: 12.8 sec;   INR: 1.14 ratio         PTT - ( 24 Aug 2023 03:16 )  PTT:29.7 sec  Urinalysis Basic - ( 24 Aug 2023 03:16 )    Color: x / Appearance: x / SG: x / pH: x  Gluc: 83 mg/dL / Ketone: x  / Bili: x / Urobili: x   Blood: x / Protein: x / Nitrite: x   Leuk Esterase: x / RBC: x / WBC x   Sq Epi: x / Non Sq Epi: x / Bacteria: x      Arterial Blood Gas:  08-23 @ 00:25  7.29/50/197/24/99.2/-2.5  ABG lactate: --  Arterial Blood Gas:  08-22 @ 23:20  7.32/42/188/22/98.8/-4.1  ABG lactate: --    Venous Blood Gas:  08-23 @ 05:24  7.30/50/35/25/53.8  VBG Lactate: 1.7  Venous Blood Gas:  08-23 @ 01:37  7.31/49/39/25/67.6  VBG Lactate: 1.8  Venous Blood Gas:  08-22 @ 19:20  7.29/50/23/24/29.4  VBG Lactate: 4.1      MICROBIOLOGY:     RADIOLOGY:  [ ] Reviewed and interpreted by me         Interval Events: Hgb drop 7.2 >  6.5, remained asymptomatic with no bleeding noted,   pRBC's x1 given, post hgb is now 7.7    HPI:  73y Male with PMHx of recurrent diverticular bleeding s/p multiple colonoscopies demonstrating severe diverticulosis (no interventions) and prior +nuclear medicine bleeding scan (5/2023 with active bleeding in cecum/proximal colon, no intervention), prostate cancer s/p radiation c/b radiation-associated vascular ectasia, internal hemorrhoids, HTN, HLD, asthma, recently dx A.fib/A.flutter (in 5/2023) s/p Watchman procedure and initiation of A/c with Plavix/ASA 1 week ago who now presents with  recurrent painless BRBPR.    Patient had one large BM with bright red blood earlier this morning and began feeling weak, dizzy, and nauseous. No vomiting or abdominal pain at the time. Patient took BP at home and said it was low (~60s systolic, ~40s diastolic), and wife felt he looked pale. Pt was especially concerned due to recently restarting A/c, so he came to the ED, Also endorses intermittent CP as well as frontal headache. Denies shortness of breath and syncope. Since coming to the ED he has had 3-4 additional loose bright red to maroon BMs and reports dizziness/lightheadedness immediately following each BM, which gradually resolves.    Patient explained he had similar BRBPR back in May 2023 on two separate occasions, but denies any blood in the stool since then. In ED: Patient has been HDS w/ borderline tachycardia Hgb 10.2 from 13.5 s/p 2u pRBC. Repeat hgb ~10 following 2U. GI consulted in the ED, but no plan for urgent intervention. CTA without any signs of acute GI bleed. IR consulted for potential embolization however no indication given negative CTA. MICU consulted for possible admission given no improvement in Hb s/p 2U pRBC; however pt not a candidate at this time and instead MICU recommended 3rd unit pRBC and monitoring CBC/vital signs.     (21 Aug 2023 16:57)      REVIEW OF SYSTEMS:  Constitutional: [x] negative [ ] fevers [ ] chills [ ] weight loss [ ] weight gain  HEENT: [x] negative [ ] dry eyes [ ] eye irritation [ ] postnasal drip [ ] nasal congestion  CV: [x] negative  [ ] chest pain [ ] orthopnea [ ] palpitations [ ] murmur  Resp: [x] negative [ ] cough [ ] shortness of breath [ ] dyspnea [ ] wheezing [ ] sputum [ ] hemoptysis  GI: [ ] negative [ ] nausea [ ] vomiting [ ] diarrhea [ ] constipation [ ] abd pain [ ] dysphagia   : [ ] negative [ ] dysuria [ ] nocturia [ ] hematuria [ ] increased urinary frequency  Musculoskeletal: [ ] negative [ ] back pain [ ] myalgias [ ] arthralgias [ ] fracture  Skin: [ ] negative [ ] rash [ ] itch  Neurological: [ ] negative [ ] headache [ ] dizziness [ ] syncope [ ] weakness [ ] numbness  Psychiatric: [ ] negative [ ] anxiety [ ] depression  Endocrine: [ ] negative [ ] diabetes [ ] thyroid problem  Hematologic/Lymphatic: [ ] negative [ ] anemia [ ] bleeding problem  Allergic/Immunologic: [ ] negative [ ] itchy eyes [ ] nasal discharge [ ] hives [ ] angioedema  [x] All other systems negative  [ ] Unable to assess ROS because ________    OBJECTIVE:  ICU Vital Signs Last 24 Hrs  T(C): 36.6 (24 Aug 2023 04:00), Max: 36.7 (23 Aug 2023 20:00)  T(F): 97.9 (24 Aug 2023 04:00), Max: 98.1 (24 Aug 2023 00:00)  HR: 86 (24 Aug 2023 04:00) (77 - 91)  BP: 136/81 (24 Aug 2023 04:00) (120/73 - 152/82)  BP(mean): 91 (24 Aug 2023 04:00) (81 - 102)  ABP: --  ABP(mean): --  RR: 16 (24 Aug 2023 04:00) (10 - 20)  SpO2: 95% (24 Aug 2023 04:00) (95% - 100%)    O2 Parameters below as of 24 Aug 2023 04:00  Patient On (Oxygen Delivery Method): room air              08-22 @ 07:01  -  08-23 @ 07:00  --------------------------------------------------------  IN: 1465.3 mL / OUT: 600 mL / NET: 865.3 mL    08-23 @ 07:01 - 08-24 @ 06:30  --------------------------------------------------------  IN: 300 mL / OUT: 3070 mL / NET: -2770 mL      CAPILLARY BLOOD GLUCOSE          Physical Exam:   Constitutional: no acute distress   HEENT: + PERRLA, EOMI, no drainage or redness  Neck: supple,  No JVD  Respiratory: breath Sounds equal & clear bilaterally to auscultation, no accessory muscle use noted  Cardiovascular: Regular rate, regular rhythm, normal S1, S2; no murmurs or rub  Gastrointestinal: Soft, non-tender, distended, no hepatosplenomegaly, normal bowel sounds  Extremities: + 2 peripheral edema,   Vascular: Equal and normal pulses: 1+ peripheral pulses throughout  Neurological: sedated/intubated  Psychiatric: calm, normal mood, normal affect  Musculoskeletal: No joint swelling or deformity; no limitation of movement  Skin: warm, dry, well perfused, no rashes, ecchymosis right groin, left groin c/d/i    HOSPITAL MEDICATIONS:  Standing Meds:  atorvastatin 10 milliGRAM(s) Oral at bedtime  chlorhexidine 2% Cloths 1 Application(s) Topical daily  finasteride 5 milliGRAM(s) Oral daily  montelukast 10 milliGRAM(s) Oral daily  pantoprazole  Injectable 40 milliGRAM(s) IV Push daily  potassium chloride    Tablet ER 40 milliEquivalent(s) Oral once  tamsulosin 0.4 milliGRAM(s) Oral at bedtime      PRN Meds:  albuterol    90 MICROgram(s) HFA Inhaler 2 Puff(s) Inhalation every 6 hours PRN      LABS:                        7.7    8.85  )-----------( 104      ( 24 Aug 2023 03:16 )             22.7     Hgb Trend: 7.7<--, 6.5<--, 7.2<--, 7.4<--, 7.0<--  08-24    141  |  106  |  13  ----------------------------<  83  3.2<L>   |  24  |  0.78    Ca    8.0<L>      24 Aug 2023 03:16  Phos  3.0     08-24  Mg     2.10     08-24    TPro  5.1<L>  /  Alb  2.9<L>  /  TBili  0.9  /  DBili  x   /  AST  15  /  ALT  11  /  AlkPhos  50  08-24    Creatinine Trend: 0.78<--, 0.87<--, 0.93<--, 0.93<--, 1.01<--, 0.89<--  PT/INR - ( 24 Aug 2023 03:16 )   PT: 12.8 sec;   INR: 1.14 ratio         PTT - ( 24 Aug 2023 03:16 )  PTT:29.7 sec  Urinalysis Basic - ( 24 Aug 2023 03:16 )    Color: x / Appearance: x / SG: x / pH: x  Gluc: 83 mg/dL / Ketone: x  / Bili: x / Urobili: x   Blood: x / Protein: x / Nitrite: x   Leuk Esterase: x / RBC: x / WBC x   Sq Epi: x / Non Sq Epi: x / Bacteria: x      Arterial Blood Gas:  08-23 @ 00:25  7.29/50/197/24/99.2/-2.5  ABG lactate: --  Arterial Blood Gas:  08-22 @ 23:20  7.32/42/188/22/98.8/-4.1  ABG lactate: --    Venous Blood Gas:  08-23 @ 05:24  7.30/50/35/25/53.8  VBG Lactate: 1.7  Venous Blood Gas:  08-23 @ 01:37  7.31/49/39/25/67.6  VBG Lactate: 1.8  Venous Blood Gas:  08-22 @ 19:20  7.29/50/23/24/29.4  VBG Lactate: 4.1      MICROBIOLOGY:     RADIOLOGY:  [ ] Reviewed and interpreted by me

## 2023-08-24 NOTE — PROGRESS NOTE ADULT - SUBJECTIVE AND OBJECTIVE BOX
Surgery Progress Note    SUBJECTIVE: Pt seen and examined at bedside. Patient comfortable and in no-apparent distress. No nausea, vomiting, diarrhea. Pain is controlled. Patient denies bloody BMs overnight. Transfused 1U pRBC yesterday for Hgb 6.5.     Vital Signs Last 24 Hrs  T(C): 36.6 (24 Aug 2023 04:00), Max: 36.7 (23 Aug 2023 20:00)  T(F): 97.9 (24 Aug 2023 04:00), Max: 98.1 (24 Aug 2023 00:00)  HR: 84 (24 Aug 2023 08:00) (77 - 91)  BP: 140/93 (24 Aug 2023 07:00) (120/73 - 152/82)  BP(mean): 104 (24 Aug 2023 07:00) (81 - 104)  RR: 18 (24 Aug 2023 08:00) (10 - 20)  SpO2: 100% (24 Aug 2023 08:00) (95% - 100%)    Parameters below as of 24 Aug 2023 08:00  Patient On (Oxygen Delivery Method): room air    Physical Exam:  General Appearance: Appears well, NAD  Respiratory: No labored breathing  CV: Pulse regularly present  Abdomen: Soft, nontender    LABS:                        7.7    8.85  )-----------( 104      ( 24 Aug 2023 03:16 )             22.7     08-24    141  |  106  |  13  ----------------------------<  83  3.2<L>   |  24  |  0.78    Ca    8.0<L>      24 Aug 2023 03:16  Phos  3.0     08-24  Mg     2.10     08-24    TPro  5.1<L>  /  Alb  2.9<L>  /  TBili  0.9  /  DBili  x   /  AST  15  /  ALT  11  /  AlkPhos  50  08-24    PT/INR - ( 24 Aug 2023 03:16 )   PT: 12.8 sec;   INR: 1.14 ratio         PTT - ( 24 Aug 2023 03:16 )  PTT:29.7 sec  Urinalysis Basic - ( 24 Aug 2023 03:16 )    Color: x / Appearance: x / SG: x / pH: x  Gluc: 83 mg/dL / Ketone: x  / Bili: x / Urobili: x   Blood: x / Protein: x / Nitrite: x   Leuk Esterase: x / RBC: x / WBC x   Sq Epi: x / Non Sq Epi: x / Bacteria: x        INs and OUTs:    08-23-23 @ 07:01  -  08-24-23 @ 07:00  --------------------------------------------------------  IN: 300 mL / OUT: 4420 mL / NET: -4120 mL

## 2023-08-24 NOTE — PROGRESS NOTE ADULT - ASSESSMENT
73y Male with history of recurrent diverticular bleeding s/p multiple colonoscopies (no interventions) and prior +NM bleeding scan (5/2023 with active bleeding in cecum/proximal colon, no intervention), prostate cancer s/p radiation c/b radiation-associated vascular ectasia, internal hemorrhoids, HTN, asthma, recently dx afib s/p ablation started Plavix/ASA 1 week PTA) who now presents with complaints recurrent painless BRBPR i/s/o recent initiation of anti-plt. s/p 6u pRBC with further deterioration of hgb. Admitted to MICU for further monitoring given lack of response to transfusion.       #Neuro  A&O4, no active issues    #Cardiovascular  # h/o AF/A flutter s/p ablation and Watchmen Procedure on 8/14/23__ remains in NSR with PAC's  Currently hemodynamically stable. Monitor  - -pt was on short term Gene-Synephrine at 0.2 > 0.1 and remains off pressors since 01:45 am,   - -SBP in 130-120's.   -- ASA and Plavix held in s/p GI bleeding  -- cardiology following     Afib s/p Watchman  - Holding AC, asa/Plavix  -- -- cardiology recommends to continue BB for h/o NSVT__ hold for now to avoid controlling tachycardia if present as a sign of recurrent bleeding   - Hold Cardizem and metoprolol for now, and reassess tomorrow     #Respiratory  Asthma  -- no acute issues on RA   - Continue montelukast    #GI/Nutrition  Hematochezia s/p 8 units PRBCs, 3 FFP, 1 Plts, and 1 Cryo   - CTA abdomen/pelvis -> < from: CT Angio Abdomen and Pelvis w/ IV Cont (08.22.23 @ 18:35) >  Active bleeding in the cecum.  -- s/p IR R coil embolization of R colonic artery   - CBC q6, transfuse as needed, keep hgb> 7  - Keep NPO, recheck CBC q 6 hrs and feed if stable and change to q 8 hrs   -- Surgery is following -Continue to monitor abdominal exam, notify surgery for any changes/ worsening exam__ abdomen is soft and pt is asymptomatic   -- -- no melena/GI bleeding episodes after embolization   - -GI following   - PPI IV daily     #/Renal  Prostate cancer s/p radiation  Continue tamsulosin/finasteride  -- no acute issues     #Skin  No active issues    #ID  No active issues    #Endocrine  No active issues  -- HgbA1c and TSH pending, no prior history    #Hematologic/DVT ppx  DVT ppx: SCDs, no chemical prophylaxis in s/o acute bleeding   -- s/p 8 U PRBC, 3 FFP, 1 plts and 1 Cryo (low fibrinogen)  -- CBC q 6 hrs and if stable, will do q 8 hrs  -- no melena/GI bleeding episodes after embolization   GI ppx: PPI as above    #Ethics. Full code   Lines: PIV's   Dispo: MICU       73y Male with history of recurrent diverticular bleeding s/p multiple colonoscopies (no interventions) and prior +NM bleeding scan (5/2023 with active bleeding in cecum/proximal colon, no intervention), prostate cancer s/p radiation c/b radiation-associated vascular ectasia, internal hemorrhoids, HTN, asthma, recently dx afib s/p ablation started Plavix/ASA 1 week PTA) who now presents with complaints recurrent painless BRBPR i/s/o recent initiation of anti-plt. s/p 6u pRBC with further deterioration of hgb. Admitted to MICU for further monitoring given lack of response to transfusion.       #Neuro  A&O4, no active issues    #Cardiovascular  # h/o AF/A flutter s/p ablation and Watchmen Procedure on 8/14/23__ remains in NSR with PAC's  Currently hemodynamically stable. Monitor  - pt was on short term Gene-Synephrine at 0.2 > 0.1 and remains off pressors since 8/23   - SBP in 120-130's.   - ASA and Plavix held in s/p GI bleeding  - cardiology following     Afib s/p Watchman  - Holding AC, asa/Plavix  - cardiology recommends to continue BB for h/o NSVT__ held for now to avoid controlling tachycardia if present as a sign of recurrent bleeding   - Hold Cardizem and metoprolol for now, and reassess     #Respiratory  Asthma  - no acute issues on RA   - Continue montelukast    #GI/Nutrition  Hematochezia s/p 9 units PRBCs, 3 FFP, 1 Plts, and 1 Cryo   - CTA abdomen/pelvis -> < from: CT Angio Abdomen and Pelvis w/ IV Cont   Active bleeding in the cecum.  - s/p IR R coil embolization of R colonic artery   - CBC q6, transfuse as needed, keep hgb> 7  - Keep NPO, recheck CBC q 6 hrs and feed if stable and change to q 8 hrs   - Surgery is following -Continue to monitor abdominal exam, notify surgery for any changes/ worsening exam__ abdomen is soft and pt is asymptomatic   - no melena/GI bleeding episodes after embolization   - GI following   - PPI IV daily     #/Renal  Prostate cancer s/p radiation  Continue tamsulosin/finasteride  -no acute issues     #Skin  No active issues    #ID  No active issues    #Endocrine  No active issues  -- HgbA1c and TSH pending, no prior history    #Hematologic/DVT ppx  DVT ppx: SCDs, no chemical prophylaxis in s/o acute bleeding   - s/p 9 U PRBC, 3 FFP, 1 plts and 1 Cryo (low fibrinogen)  - CBC q 6 hrs and if stable, will do q 8 hrs  - no melena/GI bleeding episodes after embolization   GI ppx: PPI as above    #Ethics. Full code   Lines: PIV's   Dispo: MICU       73y Male with history of recurrent diverticular bleeding s/p multiple colonoscopies (no interventions) and prior +NM bleeding scan (5/2023 with active bleeding in cecum/proximal colon, no intervention), prostate cancer s/p radiation c/b radiation-associated vascular ectasia, internal hemorrhoids, HTN, asthma, recently dx afib s/p ablation started Plavix/ASA 1 week PTA) who now presents with complaints recurrent painless BRBPR i/s/o recent initiation of anti-plt. s/p 6u pRBC with further deterioration of hgb. Admitted to MICU for further monitoring given lack of response to transfusion.       #Neuro  A&O4, no active issues    #Cardiovascular  # h/o AF/A flutter s/p ablation and Watchmen Procedure on 8/14/23__ remains in NSR with PAC's  Currently hemodynamically stable. Monitor  - required low dose Gene to maintain map >65, remains off since 8/23   - SBP in 120-130's.   - resume betablocker given h/o NSVT, metoprolol 25mg PO daily  - continue to hold Cardizem for now, can resume at a later date  - cardiology following     Afib s/p Watchman  - EP following  - ASA and Plavix held in s/p GI bleeding  - can resume DAPT in a few days if stable, and after GI clearance      #Respiratory  Asthma  - no acute issues on RA   - Continue montelukast    #GI/Nutrition  Hematochezia s/p 9 units PRBCs, 3 FFP, 1 Plts, and 1 Cryo   - CT Angio Abdomen and Pelvis w/ IV Cont Active bleeding in the cecum.  - s/p IR R coil embolization of R colonic artery   - no melena/GI bleeding episodes after embolization   - required pRBC's overnight for hgb 6.7, asymptomatic with no s/s of bleed post tx hgb stable > 7.7 >8.3  - CBC q6, transfuse as needed, keep hgb> 7  -  recheck CBC q 6 hrs, once hgb stable can change to q 8 hrs   - Surgery is following -Continue to monitor abdominal exam, notify surgery for any changes/ worsening exam__ abdomen is soft and pt is asymptomatic     - will need GI clearance to restart ASA/Plavix  - GI following   - continue PPI IV daily     Diet: Clear Liquids  - can advance diet as tolerated    #/Renal  Prostate cancer s/p radiation  - Continue tamsulosin/finasteride  - monitor i/o's negative -4Liters 24 hours/ negative -3.2L LOS  - voiding, 150-200ml/hr   - monitor and supplement electrolytes mag >2 phos >3 K >4    #Skin  No active issues    #ID  No active issues    #Endocrine  -HgbA1c  4.6  No active issues  - blood glucose 's  - TSH 1.92    #Hematologic/DVT ppx  DVT ppx: SCDs, no chemical prophylaxis in s/o acute bleeding   - s/p 9 U PRBC, 3 FFP, 1 plts and 1 Cryo (low fibrinogen)  - CBC q 6 hrs and if stable, will do q 8 hrs  - no melena/GI bleeding episodes after embolization   GI ppx: PPI as above    #Ethics. Full code   Lines: PIV's   Dispo: MICU

## 2023-08-24 NOTE — PROGRESS NOTE ADULT - ASSESSMENT
73y Male with history of recurrent diverticular bleeding s/p multiple colonoscopies (no interventions) and prior +NM bleeding scan (5/2023 with active bleeding in cecum/proximal colon, no intervention), prostate cancer s/p radiation c/b radiation-associated vascular ectasia, internal hemorrhoids, HTN, asthma, recently diagnosed afib who is s/p ablation and implantation of a Watchman device on 8/14/23 and immediately placed on DAPT (Plavix/ASA). He presented  with active lower GI bleed with hypotension associated with mild LLQ abdominal pain and intermittent chest discomfort, s/p 3 units PRBC's and was on pressors.  Patient underwent embolization in IR. No further bleeding.   EKG and telemetry w/ sinus rhythm 80's bpm with APCs occasional PVC's.  No evidence of AFib/Flutter. Off AVN blockers. Now off pressores  Off anticoagulation.         Plan:   ASA and Plavix discontinued.   Keep K+ > 4.0 and Mg > 2.0  Resume Cardizem CD 120mg daily and metoprolol succinate 25mg daily for rate control when BP permits  Telemetry monitoring  Continue management as per primary team

## 2023-08-24 NOTE — PROGRESS NOTE ADULT - ASSESSMENT
73y Male with PMHx of prostate cancer s/p radiation c/b radiation-associated vascular ectasia, internal hemorrhoids, HTN, HLD, asthma, recently dx A.fib/A.flutter (in 5/2023) s/p Watchman procedure s/p resumption of the Plavix/ASA 1 week prior. He also has a long history of recurrent diverticular bleeding s/p multiple colonoscopies demonstrating severe diverticulosis (no interventions) and prior +nuclear medicine bleeding scan (5/2023 with active bleeding in cecum/proximal colon, no intervention) and he presents with recurrent BRBPR.  S/P IR embolization on 8/22.     PLAN:  -S/P IR embolization on 8/22   -Monitor H/H   -Continue to monitor abdominal exam, notify surgery for any changes/ worsening exam   -Discussed with Dr. Swanson Team, [id number]

## 2023-08-25 LAB
ALBUMIN SERPL ELPH-MCNC: 3.3 G/DL — SIGNIFICANT CHANGE UP (ref 3.3–5)
ALP SERPL-CCNC: 59 U/L — SIGNIFICANT CHANGE UP (ref 40–120)
ALT FLD-CCNC: 10 U/L — SIGNIFICANT CHANGE UP (ref 4–41)
ANION GAP SERPL CALC-SCNC: 9 MMOL/L — SIGNIFICANT CHANGE UP (ref 7–14)
APTT BLD: 29.6 SEC — SIGNIFICANT CHANGE UP (ref 24.5–35.6)
AST SERPL-CCNC: 14 U/L — SIGNIFICANT CHANGE UP (ref 4–40)
BILIRUB SERPL-MCNC: 0.7 MG/DL — SIGNIFICANT CHANGE UP (ref 0.2–1.2)
BUN SERPL-MCNC: 10 MG/DL — SIGNIFICANT CHANGE UP (ref 7–23)
CALCIUM SERPL-MCNC: 8.5 MG/DL — SIGNIFICANT CHANGE UP (ref 8.4–10.5)
CHLORIDE SERPL-SCNC: 104 MMOL/L — SIGNIFICANT CHANGE UP (ref 98–107)
CO2 SERPL-SCNC: 27 MMOL/L — SIGNIFICANT CHANGE UP (ref 22–31)
CREAT SERPL-MCNC: 0.95 MG/DL — SIGNIFICANT CHANGE UP (ref 0.5–1.3)
EGFR: 85 ML/MIN/1.73M2 — SIGNIFICANT CHANGE UP
FIBRINOGEN PPP-MCNC: 227 MG/DL — SIGNIFICANT CHANGE UP (ref 200–465)
GLUCOSE SERPL-MCNC: 104 MG/DL — HIGH (ref 70–99)
HCT VFR BLD CALC: 25.4 % — LOW (ref 39–50)
HCT VFR BLD CALC: 28.1 % — LOW (ref 39–50)
HGB BLD-MCNC: 8.4 G/DL — LOW (ref 13–17)
HGB BLD-MCNC: 9.2 G/DL — LOW (ref 13–17)
INR BLD: 1.05 RATIO — SIGNIFICANT CHANGE UP (ref 0.85–1.18)
MAGNESIUM SERPL-MCNC: 2 MG/DL — SIGNIFICANT CHANGE UP (ref 1.6–2.6)
MCHC RBC-ENTMCNC: 28.7 PG — SIGNIFICANT CHANGE UP (ref 27–34)
MCHC RBC-ENTMCNC: 29 PG — SIGNIFICANT CHANGE UP (ref 27–34)
MCHC RBC-ENTMCNC: 32.7 GM/DL — SIGNIFICANT CHANGE UP (ref 32–36)
MCHC RBC-ENTMCNC: 33.1 GM/DL — SIGNIFICANT CHANGE UP (ref 32–36)
MCV RBC AUTO: 86.7 FL — SIGNIFICANT CHANGE UP (ref 80–100)
MCV RBC AUTO: 88.6 FL — SIGNIFICANT CHANGE UP (ref 80–100)
NRBC # BLD: 0 /100 WBCS — SIGNIFICANT CHANGE UP (ref 0–0)
NRBC # BLD: 0 /100 WBCS — SIGNIFICANT CHANGE UP (ref 0–0)
NRBC # FLD: 0.05 K/UL — HIGH (ref 0–0)
NRBC # FLD: 0.06 K/UL — HIGH (ref 0–0)
PHOSPHATE SERPL-MCNC: 3.4 MG/DL — SIGNIFICANT CHANGE UP (ref 2.5–4.5)
PLATELET # BLD AUTO: 127 K/UL — LOW (ref 150–400)
PLATELET # BLD AUTO: 148 K/UL — LOW (ref 150–400)
POTASSIUM SERPL-MCNC: 3.4 MMOL/L — LOW (ref 3.5–5.3)
POTASSIUM SERPL-SCNC: 3.4 MMOL/L — LOW (ref 3.5–5.3)
PROT SERPL-MCNC: 5.5 G/DL — LOW (ref 6–8.3)
PROTHROM AB SERPL-ACNC: 11.8 SEC — SIGNIFICANT CHANGE UP (ref 9.5–13)
RBC # BLD: 2.93 M/UL — LOW (ref 4.2–5.8)
RBC # BLD: 3.17 M/UL — LOW (ref 4.2–5.8)
RBC # FLD: 17.5 % — HIGH (ref 10.3–14.5)
RBC # FLD: 18.1 % — HIGH (ref 10.3–14.5)
SODIUM SERPL-SCNC: 140 MMOL/L — SIGNIFICANT CHANGE UP (ref 135–145)
WBC # BLD: 7.35 K/UL — SIGNIFICANT CHANGE UP (ref 3.8–10.5)
WBC # BLD: 8.56 K/UL — SIGNIFICANT CHANGE UP (ref 3.8–10.5)
WBC # FLD AUTO: 7.35 K/UL — SIGNIFICANT CHANGE UP (ref 3.8–10.5)
WBC # FLD AUTO: 8.56 K/UL — SIGNIFICANT CHANGE UP (ref 3.8–10.5)

## 2023-08-25 PROCEDURE — 99291 CRITICAL CARE FIRST HOUR: CPT

## 2023-08-25 RX ORDER — POTASSIUM CHLORIDE 20 MEQ
40 PACKET (EA) ORAL ONCE
Refills: 0 | Status: COMPLETED | OUTPATIENT
Start: 2023-08-25 | End: 2023-08-25

## 2023-08-25 RX ORDER — DILTIAZEM HCL 120 MG
120 CAPSULE, EXT RELEASE 24 HR ORAL DAILY
Refills: 0 | Status: DISCONTINUED | OUTPATIENT
Start: 2023-08-25 | End: 2023-08-28

## 2023-08-25 RX ORDER — SODIUM CHLORIDE 9 MG/ML
500 INJECTION, SOLUTION INTRAVENOUS ONCE
Refills: 0 | Status: DISCONTINUED | OUTPATIENT
Start: 2023-08-25 | End: 2023-08-25

## 2023-08-25 RX ORDER — POTASSIUM CHLORIDE 20 MEQ
20 PACKET (EA) ORAL ONCE
Refills: 0 | Status: COMPLETED | OUTPATIENT
Start: 2023-08-25 | End: 2023-08-25

## 2023-08-25 RX ADMIN — MONTELUKAST 10 MILLIGRAM(S): 4 TABLET, CHEWABLE ORAL at 12:03

## 2023-08-25 RX ADMIN — CHLORHEXIDINE GLUCONATE 1 APPLICATION(S): 213 SOLUTION TOPICAL at 12:05

## 2023-08-25 RX ADMIN — Medication 20 MILLIEQUIVALENT(S): at 10:09

## 2023-08-25 RX ADMIN — Medication 40 MILLIEQUIVALENT(S): at 06:31

## 2023-08-25 RX ADMIN — PANTOPRAZOLE SODIUM 40 MILLIGRAM(S): 20 TABLET, DELAYED RELEASE ORAL at 12:03

## 2023-08-25 RX ADMIN — ATORVASTATIN CALCIUM 10 MILLIGRAM(S): 80 TABLET, FILM COATED ORAL at 23:07

## 2023-08-25 RX ADMIN — FINASTERIDE 5 MILLIGRAM(S): 5 TABLET, FILM COATED ORAL at 12:03

## 2023-08-25 RX ADMIN — Medication 120 MILLIGRAM(S): at 12:06

## 2023-08-25 RX ADMIN — Medication 25 MILLIGRAM(S): at 06:31

## 2023-08-25 RX ADMIN — TAMSULOSIN HYDROCHLORIDE 0.4 MILLIGRAM(S): 0.4 CAPSULE ORAL at 23:07

## 2023-08-25 NOTE — PROGRESS NOTE ADULT - ASSESSMENT
73y Male with PMHx of prostate cancer s/p radiation c/b radiation-associated vascular ectasia, internal hemorrhoids, HTN, HLD, asthma, recently dx A.fib/A.flutter (in 5/2023) s/p Watchman procedure s/p resumption of the Plavix/ASA 1 week prior. He also has a long history of recurrent diverticular bleeding s/p multiple colonoscopies demonstrating severe diverticulosis (no interventions) and prior +nuclear medicine bleeding scan (5/2023 with active bleeding in cecum/proximal colon, no intervention) and he presents with recurrent BRBPR.  S/P IR embolization on 8/22.     PLAN:  -S/P IR embolization on 8/22   -Monitor H/H   -Recommend repeat colonoscopy in the future   -Continue to monitor abdominal exam, notify surgery for any changes/ worsening exam   -Discussed with Dr. Swanson Team, [id number]

## 2023-08-25 NOTE — PROGRESS NOTE ADULT - CRITICAL CARE ATTENDING COMMENT
73 y.o. Male with recurrent diverticular bleeding s/p multiple colonoscopies, prostate cancer s/p radiation c/b radiation-associated vascular ectasia, internal hemorrhoids, HTN, asthma, afib s/p ablation and Watchman placement on ASA/Plavix admitted for GIB with melena. CTA of the abdomen shows bleeding at the cecum, underwent IR embolization right colic artery. Patient is currently hemodynamically stable. ON 8/23 Hgb dropped to 6.7, received 1U of pRBC. Repeat Hgb have all been above 8. Currently tolerating regular diet. Tolerating metoprolol as patient's BP has been stable. Will resume Cardizem today. Continue to check CBC q12h. Plan to resume ASA/Plavix this weekend. SCDs for VTE PPX.     patient updated on the plan. Critically ill patient requiring frequent bedside visits with therapy changes.    Plan to transfer to telemetry
73 y.o. Male with recurrent diverticular bleeding s/p multiple colonoscopies, prostate cancer s/p radiation c/b radiation-associated vascular ectasia, internal hemorrhoids, HTN, asthma, afib s/p ablation and Watchman placement on ASA/Plavix admitted for GIB with melena. CTA of the abdomen shows bleeding at the cecum, underwent IR embolization right colic artery. Patient is currently hemodynamically stable. ON 8/23 Hgb dropped to 6.7, received 1U of pRBC. Hemoglobin on repeat has been 7.7 and 8.3. Will advance diet to regular diet. Will also start metoprolol as patient's BP has been stable. Will resume Cardizem at a later date. Continue to check CBC q6h. Will need to follow up with EP on when to safely resume ASA/Plavix. SCDs for VTE PPX.     patient updated on the plan. Critically ill patient requiring frequent bedside visits with therapy changes.
73 y.o. Male with recurrent diverticular bleeding s/p multiple colonoscopies, prostate cancer s/p radiation c/b radiation-associated vascular ectasia, internal hemorrhoids, HTN, asthma, afib s/p ablation and Watchman placement on ASA/Plavix admitted for GIB with melena. CTA of the abdomen shows bleeding at the cecum, underwent IR embolization right colic artery. Patient is currently hemodynamically stable. Hemoglobin has been above 7 following transfusion and has remained stable. Will advance diet to Clear liquid diet. Check CBC q8h. Will resume metoprolol and Cardizem once patient's BP can tolerate it. Will need to follow up with EP on when to safely resume ASA/Plavix. SCDs for VTE PPX.     patient and patient's niece updated on the plan. Critically ill patient requiring frequent bedside visits with therapy changes.

## 2023-08-25 NOTE — PROGRESS NOTE ADULT - SUBJECTIVE AND OBJECTIVE BOX
Subjective/Objective: OOB in chair feels well. No complaints at this time.     MEDICATIONS  (STANDING):  atorvastatin 10 milliGRAM(s) Oral at bedtime  chlorhexidine 2% Cloths 1 Application(s) Topical daily  finasteride 5 milliGRAM(s) Oral daily  metoprolol succinate ER 25 milliGRAM(s) Oral daily  montelukast 10 milliGRAM(s) Oral daily  pantoprazole  Injectable 40 milliGRAM(s) IV Push daily  potassium chloride    Tablet ER 20 milliEquivalent(s) Oral once  tamsulosin 0.4 milliGRAM(s) Oral at bedtime    MEDICATIONS  (PRN):  albuterol    90 MICROgram(s) HFA Inhaler 2 Puff(s) Inhalation every 6 hours PRN Shortness of Breath and/or Wheezing    Vital Signs Last 24 Hrs  T(C): 36.6 (25 Aug 2023 08:00), Max: 37.1 (25 Aug 2023 00:00)  T(F): 97.8 (25 Aug 2023 08:00), Max: 98.7 (25 Aug 2023 00:00)  HR: 98 (25 Aug 2023 08:00) (81 - 98)  BP: 125/74 (25 Aug 2023 08:00) (120/74 - 145/86)  BP(mean): 80 (25 Aug 2023 08:00) (79 - 117)  RR: 19 (25 Aug 2023 08:00) (12 - 23)  SpO2: 95% (25 Aug 2023 08:00) (92% - 100%)    Parameters below as of 25 Aug 2023 08:00  Patient On (Oxygen Delivery Method): room air      I&O's Detail    24 Aug 2023 07:01  -  25 Aug 2023 07:00  --------------------------------------------------------  IN:    IV PiggyBack: 100 mL    Oral Fluid: 1150 mL  Total IN: 1250 mL    OUT:    Voided (mL): 3625 mL  Total OUT: 3625 mL    Total NET: -2375 mL      25 Aug 2023 07:01  -  25 Aug 2023 09:34  --------------------------------------------------------  IN:  Total IN: 0 mL    OUT:    Voided (mL): 300 mL  Total OUT: 300 mL    Total NET: -300 mL    PHYSICAL EXAM  GEN: NAD, skin W & D  RESP: CTA B/L  CV: nl S1S2, no M/R/C  GI: soft, NT/ND, BS +  EXT: no C/C/E  NEURO: A & O X 3    EKG/ TELEM: NSR occ APC/VPC    LABS:                          8.4    8.56  )-----------( 127      ( 25 Aug 2023 00:23 )             25.4     PT/INR - ( 25 Aug 2023 00:23 )   PT: 11.8 sec;   INR: 1.05 ratio         PTT - ( 25 Aug 2023 00:23 )  PTT:29.6 sec  25 Aug 2023 00:23    140    |  104    |  10     ----------------------------<  104<H>  3.4<L>   |  27     |  0.95     24 Aug 2023 16:40    140    |  103    |  10     ----------------------------<  139<H>  3.8     |  28     |  0.84     Ca    8.5        25 Aug 2023 00:23  Ca    8.7        24 Aug 2023 16:40  Phos  3.4       25 Aug 2023 00:23  Phos  3.0       24 Aug 2023 16:40  Mg     2.00      25 Aug 2023 00:23  Mg     2.20      24 Aug 2023 16:40    TPro  5.5<L>  /  Alb  3.3    /  TBili  0.7    /  DBili  x      /  AST  14     /  ALT  10     /  AlkPhos  59     25 Aug 2023 00:23  TPro  6.0    /  Alb  3.4    /  TBili  0.8    /  DBili  x      /  AST  15     /  ALT  11     /  AlkPhos  61     24 Aug 2023 16:40

## 2023-08-25 NOTE — CHART NOTE - NSCHARTNOTEFT_GEN_A_CORE
Gagan Ceja  Internal Medicine PGY-3    MAR ACCEPT NOTE    Please refer to full transfer note for details. Briefly, this is a 73y Male with PMHx of recurrent diverticular bleeding s/p multiple colonoscopies demonstrating severe diverticulosis (no interventions) and prior +nuclear medicine bleeding scan (5/2023 with active bleeding in cecum/proximal colon, no intervention), prostate cancer s/p radiation c/b radiation-associated vascular ectasia, internal hemorrhoids, HTN, HLD, asthma, recently dx A.fib/A.flutter (in 5/2023) s/p Watchman procedure and initiation of A/c with Plavix/ASA. The patient has a history of recurrent painless BRBPR.           The patient presented to the ED for intermittent CP, frontal headache, and 3-4 additional loose bright red to maroon BMs and reports dizziness/lightheadedness immediately following each BM requiring multiple transfusions of PRBC with no improvement in Hgb therefore MICU was consulted for GIB.          MICU COURSE: While in the MICU a CTA was performed which showed active bleeding in the cecum requiring 9 U PRBC, 3 FFP, 1 plts and 1 Cryo (low fibrinogen). GI and IR were consulted and on 8/22/23 IR performed a right coil embolization of the right colonic artery. After the procedure, the patient’s BRBPR resolved. At this time the patient’s Hgb is stable and the patient is stable for transfer a Medicine floor.           PAST MEDICAL HISTORY:     Asthma      Atrial flutter      Diverticulitis      Diverticulosis      GIB (gastrointestinal bleeding)      Gout      Hyperlipidemia      Hypertension      Prostate cancer s/p radiation therapy.                 PAST SURGICAL HISTORY:     H/O total knee replacement, right      History of hemorrhoidectomy.               ALLERGIES     No known allergies                   ASSESSMENT:     73y Male with history of recurrent diverticular bleeding s/p multiple colonoscopies (no interventions) and prior +NM bleeding scan (5/2023 with active bleeding in cecum/proximal colon, no intervention), prostate cancer s/p radiation c/b radiation-associated vascular ectasia, internal hemorrhoids, HTN, asthma, recently dx afib s/p ablation started Plavix/ASA 1 week PTA) who presented with complaints recurrent painless BRBPR i/s/o recent initiation of anti-plt. The patient was transferred to the MICU and a CTA was performed which showed active bleeding in the cecum requiring 9 U PRBC, 3 FFP, 1 plts and 1 Cryo (low fibrinogen). IR was consulted and on 8/22/23 IR performed a right coil embolization of the right colonic artery.            #Neuro     A&O4, no active issues           #Cardiovascular     # h/o AF/A flutter s/p ablation and Watchmen Procedure on 8/14/23 remains in NSR with PAC's     Currently hemodynamically stable. Monitor     - required low dose Gene to maintain map >65, remains off since 8/23      - SBP in 120-130's.      - resume betablocker given h/o NSVT, metoprolol 25mg PO daily     - continue to hold Cardizem for now, can resume at a later date     - cardiology following            Afib s/p Watchman     - EP following     - ASA and Plavix held in s/p GI bleeding     - can resume DAPT in a few days if stable, and after GI clearance                 #Respiratory     Asthma     - no acute issues on RA      - Continue montelukast           #GI/Nutrition     Hematochezia s/p 9 units PRBCs, 3 FFP, 1 Plts, and 1 Cryo      - CT Angio Abdomen and Pelvis w/ IV Cont Active bleeding in the cecum.     - s/p IR R coil embolization of R colonic artery on 8/22/23    - no melena/GI bleeding episodes after embolization      - required pRBC's overnight for hgb 6.7, asymptomatic with no s/s of bleed post tx hgb stable > 7.7 >8.3     - CBC q6, transfuse as needed, keep hgb> 7     -  recheck CBC q 6 hrs, once hgb stable can change to q 8 hrs      - Surgery is following -Continue to monitor abdominal exam, notify surgery for any changes/ worsening exam__ abdomen is soft and pt is asymptomatic            - will need GI clearance to restart ASA/Plavix     - GI following      - continue PPI IV daily            Diet: Dash/TLC           #/Renal     Prostate cancer s/p radiation     - Continue tamsulosin/finasteride     - monitor i/o's     - monitor and supplement electrolytes mag >2 phos >3 K >4           #Skin     No active issues           #ID     No active issues           #Endocrine     -HgbA1c  4.6     No active issues     - blood glucose 's     - TSH 1.92           #Hematologic/DVT ppx     DVT ppx: SCDs, no chemical prophylaxis in s/o acute bleeding      - s/p 9 U PRBC, 3 FFP, 1 plts and 1 Cryo (low fibrinogen)     - q12h CBC     - no melena/GI bleeding episodes after embolization      GI ppx: PPI as above           #Ethics. Full code      Lines: PIV's                     FOR FOLLOW UP:     [] Continue to hold Cardizem for now, can resume at a later date – Cardiology following      [] Follow up with EP and GI regarding restarting ASA and Plavix – s/p Watchman procedure on 8/14/2023     [] CBC q12h to maintain Hgb > 7

## 2023-08-25 NOTE — CHART NOTE - NSCHARTNOTEFT_GEN_A_CORE
MICU Transfer Note           Transfer from: MICU     Transfer to:  Medicine ( X )    Telemetry (  )    RCU  (  )     Palliative (   )    Stroke Unit (   ) ______________________________________________________________________________          Accepting Physician:            HPI: 73y Male with PMHx of recurrent diverticular bleeding s/p multiple colonoscopies demonstrating severe diverticulosis (no interventions) and prior +nuclear medicine bleeding scan (5/2023 with active bleeding in cecum/proximal colon, no intervention), prostate cancer s/p radiation c/b radiation-associated vascular ectasia, internal hemorrhoids, HTN, HLD, asthma, recently dx A.fib/A.flutter (in 5/2023) s/p Watchman procedure and initiation of A/c with Plavix/ASA. The patient has a history of recurrent painless BRBPR.           The patient presented to the ED for intermittent CP, frontal headache, and 3-4 additional loose bright red to maroon BMs and reports dizziness/lightheadedness immediately following each BM requiring multiple transfusions of PRBC with no improvement in Hgb therefore MICU was consulted for GIB.          MICU COURSE: While in the MICU a CTA was performed which showed active bleeding in the cecum requiring 9 U PRBC, 3 FFP, 1 plts and 1 Cryo (low fibrinogen). GI and IR were consulted and on 8/23/23 IR performed a right coil embolization of the right colonic artery. After the procedure, the patient’s BRBPR resolved. At this time the patient’s Hgb is stable and the patient is stable for transfer a Medicine floor.           PAST MEDICAL HISTORY:     Asthma      Atrial flutter      Diverticulitis      Diverticulosis      GIB (gastrointestinal bleeding)      Gout      Hyperlipidemia      Hypertension      Prostate cancer s/p radiation therapy.                 PAST SURGICAL HISTORY:     H/O total knee replacement, right      History of hemorrhoidectomy.               ALLERGIES     No known allergies                   ASSESSMENT:     73y Male with history of recurrent diverticular bleeding s/p multiple colonoscopies (no interventions) and prior +NM bleeding scan (5/2023 with active bleeding in cecum/proximal colon, no intervention), prostate cancer s/p radiation c/b radiation-associated vascular ectasia, internal hemorrhoids, HTN, asthma, recently dx afib s/p ablation started Plavix/ASA 1 week PTA) who presented with complaints recurrent painless BRBPR i/s/o recent initiation of anti-plt. The patient was transferred to the MICU and a CTA was performed which showed active bleeding in the cecum requiring 9 U PRBC, 3 FFP, 1 plts and 1 Cryo (low fibrinogen). IR was consulted and on 8/23/23 IR performed a right coil embolization of the right colonic artery.            #Neuro     A&O4, no active issues           #Cardiovascular     # h/o AF/A flutter s/p ablation and Watchmen Procedure on 8/14/23 remains in NSR with PAC's     Currently hemodynamically stable. Monitor     - required low dose Gene to maintain map >65, remains off since 8/23      - SBP in 120-130's.      - resume betablocker given h/o NSVT, metoprolol 25mg PO daily     - continue to hold Cardizem for now, can resume at a later date     - cardiology following            Afib s/p Watchman     - EP following     - ASA and Plavix held in s/p GI bleeding     - can resume DAPT in a few days if stable, and after GI clearance                 #Respiratory     Asthma     - no acute issues on RA      - Continue montelukast           #GI/Nutrition     Hematochezia s/p 9 units PRBCs, 3 FFP, 1 Plts, and 1 Cryo      - CT Angio Abdomen and Pelvis w/ IV Cont Active bleeding in the cecum.     - s/p IR R coil embolization of R colonic artery      - no melena/GI bleeding episodes after embolization      - required pRBC's overnight for hgb 6.7, asymptomatic with no s/s of bleed post tx hgb stable > 7.7 >8.3     - CBC q6, transfuse as needed, keep hgb> 7     -  recheck CBC q 6 hrs, once hgb stable can change to q 8 hrs      - Surgery is following -Continue to monitor abdominal exam, notify surgery for any changes/ worsening exam__ abdomen is soft and pt is asymptomatic            - will need GI clearance to restart ASA/Plavix     - GI following      - continue PPI IV daily            Diet: Dash/TLC           #/Renal     Prostate cancer s/p radiation     - Continue tamsulosin/finasteride     - monitor i/o's     - monitor and supplement electrolytes mag >2 phos >3 K >4           #Skin     No active issues           #ID     No active issues           #Endocrine     -HgbA1c  4.6     No active issues     - blood glucose 's     - TSH 1.92           #Hematologic/DVT ppx     DVT ppx: SCDs, no chemical prophylaxis in s/o acute bleeding      - s/p 9 U PRBC, 3 FFP, 1 plts and 1 Cryo (low fibrinogen)     - q12h CBC     - no melena/GI bleeding episodes after embolization      GI ppx: PPI as above           #Ethics. Full code      Lines: PIV's                     FOR FOLLOW UP:     [] Continue to hold Cardizem for now, can resume at a later date – Cardiology following      [] Follow up with EP and GI regarding restarting ASA and Plavix – s/p Watchman procedure on 8/14/2023     [] CBC q12h to maintain Hgb > 7           Kandice Moeller PA-C MICU Transfer Note           Transfer from: MICU     Transfer to:  Medicine ( X )    Telemetry (  )    RCU  (  )     Palliative (   )    Stroke Unit (   ) ______________________________________________________________________________          Accepting Physician:            HPI: 73y Male with PMHx of recurrent diverticular bleeding s/p multiple colonoscopies demonstrating severe diverticulosis (no interventions) and prior +nuclear medicine bleeding scan (5/2023 with active bleeding in cecum/proximal colon, no intervention), prostate cancer s/p radiation c/b radiation-associated vascular ectasia, internal hemorrhoids, HTN, HLD, asthma, recently dx A.fib/A.flutter (in 5/2023) s/p Watchman procedure and initiation of A/c with Plavix/ASA. The patient has a history of recurrent painless BRBPR.           The patient presented to the ED for intermittent CP, frontal headache, and 3-4 additional loose bright red to maroon BMs and reports dizziness/lightheadedness immediately following each BM requiring multiple transfusions of PRBC with no improvement in Hgb therefore MICU was consulted for GIB.          MICU COURSE: While in the MICU a CTA was performed which showed active bleeding in the cecum requiring 9 U PRBC, 3 FFP, 1 plts and 1 Cryo (low fibrinogen). GI and IR were consulted and on 8/22/23 IR performed a right coil embolization of the right colonic artery. After the procedure, the patient’s BRBPR resolved. At this time the patient’s Hgb is stable and the patient is stable for transfer a Medicine floor.           PAST MEDICAL HISTORY:     Asthma      Atrial flutter      Diverticulitis      Diverticulosis      GIB (gastrointestinal bleeding)      Gout      Hyperlipidemia      Hypertension      Prostate cancer s/p radiation therapy.                 PAST SURGICAL HISTORY:     H/O total knee replacement, right      History of hemorrhoidectomy.               ALLERGIES     No known allergies                   ASSESSMENT:     73y Male with history of recurrent diverticular bleeding s/p multiple colonoscopies (no interventions) and prior +NM bleeding scan (5/2023 with active bleeding in cecum/proximal colon, no intervention), prostate cancer s/p radiation c/b radiation-associated vascular ectasia, internal hemorrhoids, HTN, asthma, recently dx afib s/p ablation started Plavix/ASA 1 week PTA) who presented with complaints recurrent painless BRBPR i/s/o recent initiation of anti-plt. The patient was transferred to the MICU and a CTA was performed which showed active bleeding in the cecum requiring 9 U PRBC, 3 FFP, 1 plts and 1 Cryo (low fibrinogen). IR was consulted and on 8/22/23 IR performed a right coil embolization of the right colonic artery.            #Neuro     A&O4, no active issues           #Cardiovascular     # h/o AF/A flutter s/p ablation and Watchmen Procedure on 8/14/23 remains in NSR with PAC's     Currently hemodynamically stable. Monitor     - required low dose Gene to maintain map >65, remains off since 8/23      - SBP in 120-130's.      - resume betablocker given h/o NSVT, metoprolol 25mg PO daily     - continue to hold Cardizem for now, can resume at a later date     - cardiology following            Afib s/p Watchman     - EP following     - ASA and Plavix held in s/p GI bleeding     - can resume DAPT in a few days if stable, and after GI clearance                 #Respiratory     Asthma     - no acute issues on RA      - Continue montelukast           #GI/Nutrition     Hematochezia s/p 9 units PRBCs, 3 FFP, 1 Plts, and 1 Cryo      - CT Angio Abdomen and Pelvis w/ IV Cont Active bleeding in the cecum.     - s/p IR R coil embolization of R colonic artery on 8/22/23    - no melena/GI bleeding episodes after embolization      - required pRBC's overnight for hgb 6.7, asymptomatic with no s/s of bleed post tx hgb stable > 7.7 >8.3     - CBC q6, transfuse as needed, keep hgb> 7     -  recheck CBC q 6 hrs, once hgb stable can change to q 8 hrs      - Surgery is following -Continue to monitor abdominal exam, notify surgery for any changes/ worsening exam__ abdomen is soft and pt is asymptomatic            - will need GI clearance to restart ASA/Plavix     - GI following      - continue PPI IV daily            Diet: Dash/TLC           #/Renal     Prostate cancer s/p radiation     - Continue tamsulosin/finasteride     - monitor i/o's     - monitor and supplement electrolytes mag >2 phos >3 K >4           #Skin     No active issues           #ID     No active issues           #Endocrine     -HgbA1c  4.6     No active issues     - blood glucose 's     - TSH 1.92           #Hematologic/DVT ppx     DVT ppx: SCDs, no chemical prophylaxis in s/o acute bleeding      - s/p 9 U PRBC, 3 FFP, 1 plts and 1 Cryo (low fibrinogen)     - q12h CBC     - no melena/GI bleeding episodes after embolization      GI ppx: PPI as above           #Ethics. Full code      Lines: PIV's                     FOR FOLLOW UP:     [] Continue to hold Cardizem for now, can resume at a later date – Cardiology following      [] Follow up with EP and GI regarding restarting ASA and Plavix – s/p Watchman procedure on 8/14/2023     [] CBC q12h to maintain Hgb > 7           Kandice Moeller PA-C MICU Transfer Note           Transfer from: MICU     Transfer to:  Medicine ( X )    Telemetry (  )    RCU  (  )     Palliative (   )    Stroke Unit (   ) ______________________________________________________________________________          Accepting Physician:  Kaylie Person MD           HPI: 73y Male with PMHx of recurrent diverticular bleeding s/p multiple colonoscopies demonstrating severe diverticulosis (no interventions) and prior +nuclear medicine bleeding scan (5/2023 with active bleeding in cecum/proximal colon, no intervention), prostate cancer s/p radiation c/b radiation-associated vascular ectasia, internal hemorrhoids, HTN, HLD, asthma, recently dx A.fib/A.flutter (in 5/2023) s/p Watchman procedure and initiation of A/c with Plavix/ASA. The patient has a history of recurrent painless BRBPR.           The patient presented to the ED for intermittent CP, frontal headache, and 3-4 additional loose bright red to maroon BMs and reports dizziness/lightheadedness immediately following each BM requiring multiple transfusions of PRBC with no improvement in Hgb therefore MICU was consulted for GIB.          MICU COURSE: While in the MICU a CTA was performed which showed active bleeding in the cecum requiring 9 U PRBC, 3 FFP, 1 plts and 1 Cryo (low fibrinogen). GI and IR were consulted and on 8/22/23 IR performed a right coil embolization of the right colonic artery. After the procedure, the patient’s BRBPR resolved. At this time the patient’s Hgb is stable and the patient is stable for transfer a Medicine floor.           PAST MEDICAL HISTORY:     Asthma      Atrial flutter      Diverticulitis      Diverticulosis      GIB (gastrointestinal bleeding)      Gout      Hyperlipidemia      Hypertension      Prostate cancer s/p radiation therapy.                 PAST SURGICAL HISTORY:     H/O total knee replacement, right      History of hemorrhoidectomy.               ALLERGIES     No known allergies                   ASSESSMENT:     73y Male with history of recurrent diverticular bleeding s/p multiple colonoscopies (no interventions) and prior +NM bleeding scan (5/2023 with active bleeding in cecum/proximal colon, no intervention), prostate cancer s/p radiation c/b radiation-associated vascular ectasia, internal hemorrhoids, HTN, asthma, recently dx afib s/p ablation started Plavix/ASA 1 week PTA) who presented with complaints recurrent painless BRBPR i/s/o recent initiation of anti-plt. The patient was transferred to the MICU and a CTA was performed which showed active bleeding in the cecum requiring 9 U PRBC, 3 FFP, 1 plts and 1 Cryo (low fibrinogen). IR was consulted and on 8/22/23 IR performed a right coil embolization of the right colonic artery.            #Neuro     A&O4, no active issues           #Cardiovascular     # h/o AF/A flutter s/p ablation and Watchmen Procedure on 8/14/23 remains in NSR with PAC's     Currently hemodynamically stable. Monitor     - required low dose Gene to maintain map >65, remains off since 8/23      - SBP in 120-130's.      - resume betablocker given h/o NSVT, metoprolol 25mg PO daily     - continue to hold Cardizem for now, can resume at a later date     - cardiology following            Afib s/p Watchman     - EP following     - ASA and Plavix held in s/p GI bleeding     - can resume DAPT in a few days if stable, and after GI clearance                 #Respiratory     Asthma     - no acute issues on RA      - Continue montelukast           #GI/Nutrition     Hematochezia s/p 9 units PRBCs, 3 FFP, 1 Plts, and 1 Cryo      - CT Angio Abdomen and Pelvis w/ IV Cont Active bleeding in the cecum.     - s/p IR R coil embolization of R colonic artery on 8/22/23    - no melena/GI bleeding episodes after embolization      - required pRBC's overnight for hgb 6.7, asymptomatic with no s/s of bleed post tx hgb stable > 7.7 >8.3     - CBC q6, transfuse as needed, keep hgb> 7     -  recheck CBC q 6 hrs, once hgb stable can change to q 8 hrs      - Surgery is following -Continue to monitor abdominal exam, notify surgery for any changes/ worsening exam__ abdomen is soft and pt is asymptomatic            - will need GI clearance to restart ASA/Plavix     - GI following      - continue PPI IV daily            Diet: Dash/TLC           #/Renal     Prostate cancer s/p radiation     - Continue tamsulosin/finasteride     - monitor i/o's     - monitor and supplement electrolytes mag >2 phos >3 K >4           #Skin     No active issues           #ID     No active issues           #Endocrine     -HgbA1c  4.6     No active issues     - blood glucose 's     - TSH 1.92           #Hematologic/DVT ppx     DVT ppx: SCDs, no chemical prophylaxis in s/o acute bleeding      - s/p 9 U PRBC, 3 FFP, 1 plts and 1 Cryo (low fibrinogen)     - q12h CBC     - no melena/GI bleeding episodes after embolization      GI ppx: PPI as above           #Ethics. Full code      Lines: PIV's                     FOR FOLLOW UP:     [] Continue to hold Cardizem for now, can resume at a later date – Cardiology following      [] Follow up with EP and GI regarding restarting ASA and Plavix – s/p Watchman procedure on 8/14/2023     [] CBC q12h to maintain Hgb > 7           Kandice Moeller PA-C MICU Transfer Note           Transfer from: MICU     Transfer to:  Medicine (  )    Telemetry ( X )    RCU  (  )     Palliative (   )    Stroke Unit (   ) ______________________________________________________________________________          Accepting Physician:  Kaylie Person MD           HPI: 73y Male with PMHx of recurrent diverticular bleeding s/p multiple colonoscopies demonstrating severe diverticulosis (no interventions) and prior +nuclear medicine bleeding scan (5/2023 with active bleeding in cecum/proximal colon, no intervention), prostate cancer s/p radiation c/b radiation-associated vascular ectasia, internal hemorrhoids, HTN, HLD, asthma, recently dx A.fib/A.flutter (in 5/2023) s/p Watchman procedure and initiation of A/c with Plavix/ASA. The patient has a history of recurrent painless BRBPR.           The patient presented to the ED for intermittent CP, frontal headache, and 3-4 additional loose bright red to maroon BMs and reports dizziness/lightheadedness immediately following each BM requiring multiple transfusions of PRBC with no improvement in Hgb therefore MICU was consulted for GIB.          MICU COURSE: While in the MICU a CTA was performed which showed active bleeding in the cecum requiring 9 U PRBC, 3 FFP, 1 plts and 1 Cryo (low fibrinogen). GI and IR were consulted and on 8/22/23 IR performed a right coil embolization of the right colonic artery. After the procedure, the patient’s BRBPR resolved. At this time the patient’s Hgb is stable and the patient is stable for transfer a Medicine floor.           PAST MEDICAL HISTORY:     Asthma      Atrial flutter      Diverticulitis      Diverticulosis      GIB (gastrointestinal bleeding)      Gout      Hyperlipidemia      Hypertension      Prostate cancer s/p radiation therapy.                 PAST SURGICAL HISTORY:     H/O total knee replacement, right      History of hemorrhoidectomy.               ALLERGIES     No known allergies                   ASSESSMENT:     73y Male with history of recurrent diverticular bleeding s/p multiple colonoscopies (no interventions) and prior +NM bleeding scan (5/2023 with active bleeding in cecum/proximal colon, no intervention), prostate cancer s/p radiation c/b radiation-associated vascular ectasia, internal hemorrhoids, HTN, asthma, recently dx afib s/p ablation started Plavix/ASA 1 week PTA) who presented with complaints recurrent painless BRBPR i/s/o recent initiation of anti-plt. The patient was transferred to the MICU and a CTA was performed which showed active bleeding in the cecum requiring 9 U PRBC, 3 FFP, 1 plts and 1 Cryo (low fibrinogen). IR was consulted and on 8/22/23 IR performed a right coil embolization of the right colonic artery.            #Neuro     A&O4, no active issues           #Cardiovascular     # h/o AF/A flutter s/p ablation and Watchmen Procedure on 8/14/23 remains in NSR with PAC's     Currently hemodynamically stable. Monitor     - required low dose Gene to maintain map >65, remains off since 8/23      - SBP in 120-130's.      - resume betablocker given h/o NSVT, metoprolol 25mg PO daily     - continue to hold Cardizem for now, can resume at a later date     - cardiology following            Afib s/p Watchman     - EP following     - ASA and Plavix held in s/p GI bleeding     - can resume DAPT in a few days if stable, and after GI clearance                 #Respiratory     Asthma     - no acute issues on RA      - Continue montelukast           #GI/Nutrition     Hematochezia s/p 9 units PRBCs, 3 FFP, 1 Plts, and 1 Cryo      - CT Angio Abdomen and Pelvis w/ IV Cont Active bleeding in the cecum.     - s/p IR R coil embolization of R colonic artery on 8/22/23    - no melena/GI bleeding episodes after embolization      - required pRBC's overnight for hgb 6.7, asymptomatic with no s/s of bleed post tx hgb stable > 7.7 >8.3     - CBC q6, transfuse as needed, keep hgb> 7     -  recheck CBC q 6 hrs, once hgb stable can change to q 8 hrs      - Surgery is following -Continue to monitor abdominal exam, notify surgery for any changes/ worsening exam__ abdomen is soft and pt is asymptomatic            - will need GI clearance to restart ASA/Plavix     - GI following      - continue PPI IV daily            Diet: Dash/TLC           #/Renal     Prostate cancer s/p radiation     - Continue tamsulosin/finasteride     - monitor i/o's     - monitor and supplement electrolytes mag >2 phos >3 K >4           #Skin     No active issues           #ID     No active issues           #Endocrine     -HgbA1c  4.6     No active issues     - blood glucose 's     - TSH 1.92           #Hematologic/DVT ppx     DVT ppx: SCDs, no chemical prophylaxis in s/o acute bleeding      - s/p 9 U PRBC, 3 FFP, 1 plts and 1 Cryo (low fibrinogen)     - q12h CBC     - no melena/GI bleeding episodes after embolization      GI ppx: PPI as above           #Ethics. Full code      Lines: PIV's                     FOR FOLLOW UP:     [] Continue to hold Cardizem for now, can resume at a later date – Cardiology following      [] Follow up with EP and GI regarding restarting ASA and Plavix – s/p Watchman procedure on 8/14/2023     [] CBC q12h to maintain Hgb > 7           Kandice Moeller PA-C

## 2023-08-25 NOTE — PROGRESS NOTE ADULT - ASSESSMENT
73M with recurrent diverticular bleeding, s/p multiple colonoscopies but no intervention, +NM bleeding scan with active bleeding in cecum and prox colon, prostate CA with RT, HTN, asthma and recently diagnosed afib and now s/p afib ablation with Watchman placement on 8/14/23. Started on DAPT post procedure per protocol. Patient presents with LGIB requiring multiple transfusions and now s/p IR embolization of R colic artery. DAPT on hold.       Plan:   - continue to monitor off A/C  - continue Toprol 25 mg po QD. Add Cardizem  mg QD if BP can tolerate  - maintain K > 4.0, Mg > 2.0  - dispo per primary team 73M with recurrent diverticular bleeding, s/p multiple colonoscopies but no intervention, +NM bleeding scan with active bleeding in cecum and prox colon, prostate CA with RT, HTN, asthma and recently diagnosed afib and now s/p afib ablation with Watchman placement on 8/14/23. Started on DAPT post procedure per protocol. Patient presents with LGIB requiring multiple transfusions and now s/p IR embolization of R colic artery. DAPT on hold.       Plan:   - continue to monitor off A/C. Will need GI clearance if plan to resume.  - continue Toprol 25 mg po QD. Add Cardizem  mg QD if BP can tolerate  - maintain K > 4.0, Mg > 2.0  - dispo per primary team

## 2023-08-25 NOTE — PROGRESS NOTE ADULT - ASSESSMENT
73y Male with history of recurrent diverticular bleeding s/p multiple colonoscopies (no interventions) and prior +NM bleeding scan (5/2023 with active bleeding in cecum/proximal colon, no intervention), prostate cancer s/p radiation c/b radiation-associated vascular ectasia, internal hemorrhoids, HTN, asthma, recently dx afib s/p ablation started Plavix/ASA 1 week PTA) who now presents with complaints recurrent painless BRBPR i/s/o recent initiation of anti-plt. s/p 6u pRBC with further deterioration of hgb. Admitted to MICU for further monitoring given lack of response to transfusion.       #Neuro  A&O4, no active issues    #Cardiovascular  # h/o AF/A flutter s/p ablation and Watchmen Procedure on 8/14/23__ remains in NSR with PAC's  Currently hemodynamically stable. Monitor  - required low dose Gene to maintain map >65, remains off since 8/23   - SBP in 120-130's.   - resume betablocker given h/o NSVT, metoprolol 25mg PO daily  - continue to hold Cardizem for now, can resume at a later date  - cardiology following     Afib s/p Watchman  - EP following  - ASA and Plavix held in s/p GI bleeding  - can resume DAPT in a few days if stable, and after GI clearance      #Respiratory  Asthma  - no acute issues on RA   - Continue montelukast    #GI/Nutrition  Hematochezia s/p 9 units PRBCs, 3 FFP, 1 Plts, and 1 Cryo   - CT Angio Abdomen and Pelvis w/ IV Cont Active bleeding in the cecum.  - s/p IR R coil embolization of R colonic artery   - no melena/GI bleeding episodes after embolization   - required pRBC's overnight for hgb 6.7, asymptomatic with no s/s of bleed post tx hgb stable > 7.7 >8.3  - CBC q6, transfuse as needed, keep hgb> 7  -  recheck CBC q 6 hrs, once hgb stable can change to q 8 hrs   - Surgery is following -Continue to monitor abdominal exam, notify surgery for any changes/ worsening exam__ abdomen is soft and pt is asymptomatic     - will need GI clearance to restart ASA/Plavix  - GI following   - continue PPI IV daily     Diet: Clear Liquids  - can advance diet as tolerated    #/Renal  Prostate cancer s/p radiation  - Continue tamsulosin/finasteride  - monitor i/o's negative -4Liters 24 hours/ negative -3.2L LOS  - voiding, 150-200ml/hr   - monitor and supplement electrolytes mag >2 phos >3 K >4    #Skin  No active issues    #ID  No active issues    #Endocrine  -HgbA1c  4.6  No active issues  - blood glucose 's  - TSH 1.92    #Hematologic/DVT ppx  DVT ppx: SCDs, no chemical prophylaxis in s/o acute bleeding   - s/p 9 U PRBC, 3 FFP, 1 plts and 1 Cryo (low fibrinogen)  - CBC q 6 hrs and if stable, will do q 8 hrs  - no melena/GI bleeding episodes after embolization   GI ppx: PPI as above    #Ethics. Full code   Lines: PIV's   Dispo: MICU 73y Male with history of recurrent diverticular bleeding s/p multiple colonoscopies (no interventions) and prior +NM bleeding scan (5/2023 with active bleeding in cecum/proximal colon, no intervention), prostate cancer s/p radiation c/b radiation-associated vascular ectasia, internal hemorrhoids, HTN, asthma, recently dx afib s/p ablation started Plavix/ASA 1 week PTA) who now presents with complaints recurrent painless BRBPR i/s/o recent initiation of anti-plt. s/p 6u pRBC with further deterioration of hgb. Admitted to MICU for further monitoring given lack of response to transfusion.       #Neuro  A&O4, no active issues    #Cardiovascular  # h/o AF/A flutter s/p ablation and Watchmen Procedure on 8/14/23__ remains in NSR with infrequent PAC's  Currently hemodynamically stable. Monitor  - required low dose Gene to maintain map >65, remains off since 8/23   - SBP in 120-130's.   - Continue with betablocker given h/o NSVT, __ metoprolol 25mg PO daily  - 8/25 -- resumed Cardizem 120 mg daily   - cardiology following and EP  team following     Afib s/p Watchman  - EP following  - ASA and Plavix held in s/p GI bleeding  - can resume DAPT in a few days if stable, and after GI clearance      #Respiratory  Asthma  - no acute issues on RA   - Continue montelukast    #GI/Nutrition  Hematochezia s/p 9 units PRBCs, 3 FFP, 1 Plts, and 1 Cryo   - CT Angio Abdomen and Pelvis w/ IV Cont Active bleeding in the cecum.  - s/p IR R coil embolization of R colonic artery   - no melena/GI bleeding episodes after embolization   - required pRBC's overnight for hgb 6.7, asymptomatic with no s/s of bleed post tx hgb stable > 7.7 >8.3  - CBC q6, transfuse as needed, keep hgb> 7  -  recheck CBC q 6 hrs, once hgb stable can change to q 8 hrs   - Surgery is following -Continue to monitor abdominal exam, notify surgery for any changes/ worsening exam__ abdomen is soft and pt is asymptomatic     - will need GI clearance to restart ASA/Plavix  - GI following   - continue PPI IV daily     Diet: Clear Liquids  - can advance diet as tolerated    #/Renal  Prostate cancer s/p radiation  - Continue tamsulosin/finasteride  - monitor i/o's negative -4Liters 24 hours/ negative -3.2L LOS  - voiding, 150-200ml/hr   - monitor and supplement electrolytes mag >2 phos >3 K >4    #Skin  No active issues    #ID  No active issues    #Endocrine  -HgbA1c  4.6  No active issues  - blood glucose 's  - TSH 1.92    #Hematologic/DVT ppx  DVT ppx: SCDs, no chemical prophylaxis in s/o acute bleeding   - s/p 9 U PRBC, 3 FFP, 1 plts and 1 Cryo (low fibrinogen)  - CBC q 6 hrs and if stable, will do q 8 hrs  - no melena/GI bleeding episodes after embolization   GI ppx: PPI as above    #Ethics. Full code   Lines: PIV's   Dispo: MICU 73y Male with history of recurrent diverticular bleeding s/p multiple colonoscopies (no interventions) and prior +NM bleeding scan (5/2023 with active bleeding in cecum/proximal colon, no intervention), prostate cancer s/p radiation c/b radiation-associated vascular ectasia, internal hemorrhoids, HTN, asthma, recently dx afib s/p ablation started Plavix/ASA 1 week PTA) who now presents with complaints recurrent painless BRBPR i/s/o recent initiation of anti-plt. s/p 6u pRBC with further deterioration of hgb. Admitted to MICU for further monitoring given lack of response to transfusion.       #Neuro  A&O4, no active issues    #Cardiovascular  # h/o AF/A flutter s/p ablation and Watchman Procedure on 8/14/23__ remains in NSR with infrequent PAC's  Currently hemodynamically stable. Monitor  - required low dose Gene to maintain map >65, remains off since 8/23   - SBP in 120-130's.   - Continue with betablocker given h/o NSVT, __ Toprol XL 25mg PO daily  - 8/25 -- resumed Cardizem 120 mg daily, pt remains in NSR   - cardiology following and EP team following     # Afib s/p Watchman  - EP following  - ASA and Plavix held in s/p GI bleeding  - can resume DAPT in a few days if stable, and after GI clearance      #Respiratory  Asthma  - no acute issues on RA   - Continue montelukast    #GI/Nutrition  Hematochezia s/p 9 units PRBCs, 3 FFP, 1 Plts, and 1 Cryo, last transfusion of PRBC on 8/23    - CT Angio Abdomen and Pelvis w/ IV Cont Active bleeding in the cecum.  - s/p IR R coil embolization of R colonic artery 8/22, no BM;s since the procedure   - no melena/GI bleeding episodes after embolization   -  recheck CBC q 12 hrs,  transfuse as needed, keep hgb> 7  - Surgery is following -Continue to monitor abdominal exam, notify surgery for any changes/ worsening exam__ abdomen is soft and pt is asymptomatic   -- Surgery following __ -Recommend repeat colonoscopy in the future   -- pt is on regular diet and tolerating it well   - - GI following __ will need GI clearance to restart ASA/Plavix -- please call in am to clarify   - continue PPI IV daily     Diet: regular diet dash    #/Renal  Prostate cancer s/p radiation  - Continue tamsulosin/finasteride  - monitor i/o's  -- UOP for LOS is neg 5.6 L, and for 24 hrs is neg 2.37 L,   - voiding 400>500>675/hr, x 3 hrs, but now UOP improved and pt is making about 70  cc /hr, voided 3.65 L in 24 hrs,   -- no concern for DI   - monitor and supplement electrolytes mag >2 phos >3 K >4    #Skin  No active issues    #ID  No active issues    #Endocrine  -HgbA1c  4.6  No active issues  - blood glucose 's  - TSH 1.92    #Hematologic/DVT ppx  DVT ppx: SCDs, no chemical prophylaxis in s/o acute bleeding   - s/p 9 U PRBC, 3 FFP, 1 plts and 1 Cryo (low fibrinogen)  - CBC q 12 hrs and if stable,   - no melena/GI bleeding episodes after embolization (8/22)  GI ppx: PPI as above    #Ethics. Full code   Lines: PIV's   Dispo: TELE medicine once bed is available  73y Male with history of recurrent diverticular bleeding s/p multiple colonoscopies (no interventions) and prior +NM bleeding scan (5/2023 with active bleeding in cecum/proximal colon, no intervention), prostate cancer s/p radiation c/b radiation-associated vascular ectasia, internal hemorrhoids, HTN, asthma, recently dx afib s/p ablation started Plavix/ASA 1 week PTA) who now presents with complaints recurrent painless BRBPR i/s/o recent initiation of anti-plt. s/p 6u pRBC with further deterioration of hgb. Admitted to MICU for further monitoring given lack of response to transfusion.       #Neuro  A&O4, no active issues    #Cardiovascular  # h/o AF/A flutter s/p ablation and Watchman Procedure on 8/14/23__ remains in NSR with infrequent PAC's  Currently hemodynamically stable. Monitor  - required low dose Gene to maintain map >65, remains off since 8/23   - SBP in 120-130's.   - Continue with betablocker given h/o NSVT, __ Toprol XL 25mg PO daily  - 8/25 -- resumed Cardizem 120 mg daily, pt remains in NSR   - cardiology following and EP team following     # Afib s/p Watchman  - EP following  - ASA and Plavix held in s/p GI bleeding  -- EP team -- DAPT on hold. Continue to monitor off A/C. Will need GI clearance.  If H/H remains stable and GI is in agreement, would prefer to restart ASA 81 mg and Plavix 75 mg.         #Respiratory  Asthma  - no acute issues on RA   - Continue montelukast    #GI/Nutrition  Hematochezia s/p 9 units PRBCs, 3 FFP, 1 Plts, and 1 Cryo, last transfusion of PRBC on 8/23    - CT Angio Abdomen and Pelvis w/ IV Cont Active bleeding in the cecum.  - s/p IR R coil embolization of R colonic artery 8/22, no BM;s since the procedure   - no melena/GI bleeding episodes after embolization   -  recheck CBC q 12 hrs,  transfuse as needed, keep hgb> 7  - Surgery is following -Continue to monitor abdominal exam, notify surgery for any changes/ worsening exam__ abdomen is soft and pt is asymptomatic   -- Surgery following __ -Recommend repeat colonoscopy in the future   -- pt is on regular diet and tolerating it well   - - GI following __ will need GI clearance to restart ASA/Plavix -- please call in am to clarify   - continue PPI IV daily   -- no BM's __ please start on bowel regimen in am if no GI bleeding     Diet: regular diet dash    #/Renal  Prostate cancer s/p radiation  - Continue tamsulosin/finasteride  - monitor i/o's  -- UOP for LOS is neg 5.6 L, and for 24 hrs is neg 2.37 L,   - voiding 400>500>675/hr, x 3 hrs, but now UOP improved and pt is making about 70  cc /hr, voided 3.65 L in 24 hrs,   -- no concern for DI   - monitor and supplement electrolytes mag >2 phos >3 K >4    #Skin  No active issues    #ID  No active issues    #Endocrine  -HgbA1c  4.6  No active issues  - blood glucose 's  - TSH 1.92    #Hematologic/DVT ppx  DVT ppx: SCDs, no chemical prophylaxis in s/o acute bleeding   - s/p 9 U PRBC, 3 FFP, 1 plts and 1 Cryo (low fibrinogen)  - CBC q 12 hrs and if stable,   - no melena/GI bleeding episodes after embolization (8/22)  GI ppx: PPI as above    #Ethics. Full code   Lines: PIV's   Dispo: TELE medicine once bed is available

## 2023-08-25 NOTE — PROGRESS NOTE ADULT - SUBJECTIVE AND OBJECTIVE BOX
Cardiovascular Disease Progress Note    Overnight events: No acute events overnight.  no new cardiac sx  Otherwise review of systems negative    Objective Findings:  T(C): 36.8 (23 @ 04:00), Max: 37.1 (23 @ 00:00)  HR: 86 (23 @ 06:00) (81 - 101)  BP: 122/69 (23 @ 06:00) (100/64 - 145/86)  RR: 18 (23 @ 04:00) (12 - 23)  SpO2: 97% (23 @ 04:00) (92% - 100%)  Wt(kg): --  Daily     Daily Weight in k.8 (25 Aug 2023 04:00)      Physical Exam:  Gen: NAD  HEENT: EOMI  CV: RRR, normal S1 + S2, no m/r/g  Lungs: CTAB  Abd: soft, non-tender  Ext: No edema    Telemetry:    Laboratory Data:                        8.4    8.56  )-----------( 127      ( 25 Aug 2023 00:23 )             25.4     08-    140  |  104  |  10  ----------------------------<  104<H>  3.4<L>   |  27  |  0.95    Ca    8.5      25 Aug 2023 00:23  Phos  3.4     08-25  Mg     2.00     -25    TPro  5.5<L>  /  Alb  3.3  /  TBili  0.7  /  DBili  x   /  AST  14  /  ALT  10  /  AlkPhos  59  08-25    PT/INR - ( 25 Aug 2023 00:23 )   PT: 11.8 sec;   INR: 1.05 ratio         PTT - ( 25 Aug 2023 00:23 )  PTT:29.6 sec          Inpatient Medications:  MEDICATIONS  (STANDING):  atorvastatin 10 milliGRAM(s) Oral at bedtime  chlorhexidine 2% Cloths 1 Application(s) Topical daily  finasteride 5 milliGRAM(s) Oral daily  metoprolol succinate ER 25 milliGRAM(s) Oral daily  montelukast 10 milliGRAM(s) Oral daily  pantoprazole  Injectable 40 milliGRAM(s) IV Push daily  potassium chloride    Tablet ER 20 milliEquivalent(s) Oral once  tamsulosin 0.4 milliGRAM(s) Oral at bedtime      Assessment:  73y Male with history of recurrent diverticular bleeding s/p multiple colonoscopies (no interventions) and prior +NM bleeding scan (2023 with active bleeding in cecum/proximal colon, no intervention), prostate cancer s/p radiation c/b radiation-associated vascular ectasia, internal hemorrhoids, HTN, asthma, recently diagnosed afib who is s/p ablation and implantation of a Watchman device on 23, discharged on 12 week course of  Plavix/ASA. He presents  with complaints recurrent BRBPR      Recs:  cardiac stable  s/p watchman implant on 23, antiplatelet meds on hold due to acute gi bleed. f/u eps recs  s/p afib/afl ablation, currently in nsr. cont to monitor   cw beta blockers for nsvt  transfuse prn. monitor cbc closely  f/u gi and surgery recs  s/p ir embolization, cont to monitor h/h closely  will follow           Over 25 minutes spent on total encounter; more than 50% of the visit was spent counseling and/or coordinating care by the attending physician.      Isaac Calderón MD   Cardiovascular Disease  [phone]

## 2023-08-25 NOTE — PROGRESS NOTE ADULT - SUBJECTIVE AND OBJECTIVE BOX
Significant recent/past 24 hr events:    Subjective:    Review of Systems         [ ] A ten-point review of systems was otherwise negative except as noted.  [ ] Due to altered mental status/intubation, subjective information were not able to be obtained from the patient. History was obtained, to the extent possible, from review of the chart and collateral sources of information.      Patient is a 73y old  Male who presents with a chief complaint of Lower GI Bleed (24 Aug 2023 08:16)    HPI:  73y Male with PMHx of recurrent diverticular bleeding s/p multiple colonoscopies demonstrating severe diverticulosis (no interventions) and prior +nuclear medicine bleeding scan (5/2023 with active bleeding in cecum/proximal colon, no intervention), prostate cancer s/p radiation c/b radiation-associated vascular ectasia, internal hemorrhoids, HTN, HLD, asthma, recently dx A.fib/A.flutter (in 5/2023) s/p Watchman procedure and initiation of A/c with Plavix/ASA 1 week ago who now presents with  recurrent painless BRBPR.    Patient had one large BM with bright red blood earlier this morning and began feeling weak, dizzy, and nauseous. No vomiting or abdominal pain at the time. Patient took BP at home and said it was low (~60s systolic, ~40s diastolic), and wife felt he looked pale. Pt was especially concerned due to recently restarting A/c, so he came to the ED, Also endorses intermittent CP as well as frontal headache. Denies shortness of breath and syncope. Since coming to the ED he has had 3-4 additional loose bright red to maroon BMs and reports dizziness/lightheadedness immediately following each BM, which gradually resolves.    Patient explained he had similar BRBPR back in May 2023 on two separate occasions, but denies any blood in the stool since then. In ED: Patient has been HDS w/ borderline tachycardia Hgb 10.2 from 13.5 s/p 2u pRBC. Repeat hgb ~10 following 2U. GI consulted in the ED, but no plan for urgent intervention. CTA without any signs of acute GI bleed. IR consulted for potential embolization however no indication given negative CTA. MICU consulted for possible admission given no improvement in Hb s/p 2U pRBC; however pt not a candidate at this time and instead MICU recommended 3rd unit pRBC and monitoring CBC/vital signs.     (21 Aug 2023 16:57)    PAST MEDICAL & SURGICAL HISTORY:  Hypertension      Hyperlipidemia      GIB (gastrointestinal bleeding)      Prostate cancer  s/p radiation therapy      Gout      Diverticulitis      Asthma      Atrial flutter      Diverticulosis      Presence of Watchman left atrial appendage closure device      History of hemorrhoidectomy      H/O total knee replacement, right        FAMILY HISTORY:      Vitals   ICU Vital Signs Last 24 Hrs  T(C): 36.8 (25 Aug 2023 04:00), Max: 37.1 (25 Aug 2023 00:00)  T(F): 98.2 (25 Aug 2023 04:00), Max: 98.7 (25 Aug 2023 00:00)  HR: 86 (25 Aug 2023 06:00) (81 - 101)  BP: 122/69 (25 Aug 2023 06:00) (100/64 - 145/86)  BP(mean): 81 (25 Aug 2023 06:00) (73 - 117)  ABP: --  ABP(mean): --  RR: 18 (25 Aug 2023 04:00) (12 - 23)  SpO2: 97% (25 Aug 2023 04:00) (92% - 100%)    O2 Parameters below as of 25 Aug 2023 04:00  Patient On (Oxygen Delivery Method): room air            Physical Exam:   Constitutional: NAD, well-groomed, well-developed  HEENT: PERRLA, EOMI, no drainage or redness  Neck: supple,  No JVD, Trachea midline  Back: Normal spine flexure, No CVA tenderness, No deformity or limitation of movement  Respiratory: Breath Sounds equal & clear bilaterally to auscultation, no accessory muscle use noted  Cardiovascular: Regular rate, regular rhythm, normal S1, S2; no murmurs or rub  Gastrointestinal: Soft, non-tender, non distended, no hepatosplenomegaly, normal bowel sounds  Extremities: [place] x 4, no peripheral edema, no cyanosis, no clubbing   Vascular: Equal and normal pulses: 2+ peripheral pulses throughout  Neurological: A+O x 3; speech clear and intact; no sensory, motor  deficits, normal reflexes  Psychiatric: calm, normal mood, normal affect  Musculoskeletal: No joint swelling or deformity; no limitation of movement  Skin: warm, dry, well perfused, no rashes    VENT SETTINGS         I&O's Detail    24 Aug 2023 07:01  -  25 Aug 2023 07:00  --------------------------------------------------------  IN:    IV PiggyBack: 100 mL    Oral Fluid: 1150 mL  Total IN: 1250 mL    OUT:    Voided (mL): 3625 mL  Total OUT: 3625 mL    Total NET: -2375 mL          LABS                        8.4    8.56  )-----------( 127      ( 25 Aug 2023 00:23 )             25.4     08-25    140  |  104  |  10  ----------------------------<  104<H>  3.4<L>   |  27  |  0.95    Ca    8.5      25 Aug 2023 00:23  Phos  3.4     08-25  Mg     2.00     08-25    TPro  5.5<L>  /  Alb  3.3  /  TBili  0.7  /  DBili  x   /  AST  14  /  ALT  10  /  AlkPhos  59  08-25    LIVER FUNCTIONS - ( 25 Aug 2023 00:23 )  Alb: 3.3 g/dL / Pro: 5.5 g/dL / ALK PHOS: 59 U/L / ALT: 10 U/L / AST: 14 U/L / GGT: x           PT/INR - ( 25 Aug 2023 00:23 )   PT: 11.8 sec;   INR: 1.05 ratio         PTT - ( 25 Aug 2023 00:23 )  PTT:29.6 sec        Urinalysis Basic - ( 25 Aug 2023 00:23 )    Color: x / Appearance: x / SG: x / pH: x  Gluc: 104 mg/dL / Ketone: x  / Bili: x / Urobili: x   Blood: x / Protein: x / Nitrite: x   Leuk Esterase: x / RBC: x / WBC x   Sq Epi: x / Non Sq Epi: x / Bacteria: x            MEDICATIONS  (STANDING):  atorvastatin 10 milliGRAM(s) Oral at bedtime  chlorhexidine 2% Cloths 1 Application(s) Topical daily  finasteride 5 milliGRAM(s) Oral daily  metoprolol succinate ER 25 milliGRAM(s) Oral daily  montelukast 10 milliGRAM(s) Oral daily  pantoprazole  Injectable 40 milliGRAM(s) IV Push daily  potassium chloride    Tablet ER 20 milliEquivalent(s) Oral once  tamsulosin 0.4 milliGRAM(s) Oral at bedtime    MEDICATIONS  (PRN):  albuterol    90 MICROgram(s) HFA Inhaler 2 Puff(s) Inhalation every 6 hours PRN Shortness of Breath and/or Wheezing      Allergies:  No Known Allergies        CRITICAL CARE TIME SPENT:  minutes of critical care time spent providing medical care for patient's acute illness/conditions that impairs at least one vital organ system and/or poses a high risk of imminent or life threatening deterioration in the patient's condition. It includes time spent evaluating and treating the patient's acute illness as well as time spent reviewing labs, radiology, discussing goals of care with patient and/or patient's family, and discussing the case with a multidisciplinary team, in an effort to prevent further life threatening deterioration or end organ damage. This time is independent of any procedures performed.         Significant recent/past 24 hr events: no bloody BM;s,     Subjective: pt denies any complaints     Review of Systems         [ ] A ten-point review of systems was otherwise negative except as noted.  [ ] Due to altered mental status/intubation, subjective information were not able to be obtained from the patient. History was obtained, to the extent possible, from review of the chart and collateral sources of information.      Patient is a 73y old  Male who presents with a chief complaint of Lower GI Bleed (24 Aug 2023 08:16)    HPI:  73y Male with PMHx of recurrent diverticular bleeding s/p multiple colonoscopies demonstrating severe diverticulosis (no interventions) and prior +nuclear medicine bleeding scan (5/2023 with active bleeding in cecum/proximal colon, no intervention), prostate cancer s/p radiation c/b radiation-associated vascular ectasia, internal hemorrhoids, HTN, HLD, asthma, recently dx A.fib/A.flutter (in 5/2023) s/p Watchman procedure and initiation of A/c with Plavix/ASA 1 week ago who now presents with  recurrent painless BRBPR.    Patient had one large BM with bright red blood earlier this morning and began feeling weak, dizzy, and nauseous. No vomiting or abdominal pain at the time. Patient took BP at home and said it was low (~60s systolic, ~40s diastolic), and wife felt he looked pale. Pt was especially concerned due to recently restarting A/c, so he came to the ED, Also endorses intermittent CP as well as frontal headache. Denies shortness of breath and syncope. Since coming to the ED he has had 3-4 additional loose bright red to maroon BMs and reports dizziness/lightheadedness immediately following each BM, which gradually resolves.    Patient explained he had similar BRBPR back in May 2023 on two separate occasions, but denies any blood in the stool since then. In ED: Patient has been HDS w/ borderline tachycardia Hgb 10.2 from 13.5 s/p 2u pRBC. Repeat hgb ~10 following 2U. GI consulted in the ED, but no plan for urgent intervention. CTA without any signs of acute GI bleed. IR consulted for potential embolization however no indication given negative CTA. MICU consulted for possible admission given no improvement in Hb s/p 2U pRBC; however pt not a candidate at this time and instead MICU recommended 3rd unit pRBC and monitoring CBC/vital signs.     (21 Aug 2023 16:57)    PAST MEDICAL & SURGICAL HISTORY:  Hypertension      Hyperlipidemia      GIB (gastrointestinal bleeding)      Prostate cancer  s/p radiation therapy      Gout      Diverticulitis      Asthma      Atrial flutter      Diverticulosis      Presence of Watchman left atrial appendage closure device      History of hemorrhoidectomy      H/O total knee replacement, right        FAMILY HISTORY:      Vitals   ICU Vital Signs Last 24 Hrs  T(C): 36.8 (25 Aug 2023 04:00), Max: 37.1 (25 Aug 2023 00:00)  T(F): 98.2 (25 Aug 2023 04:00), Max: 98.7 (25 Aug 2023 00:00)  HR: 86 (25 Aug 2023 06:00) (81 - 101)  BP: 122/69 (25 Aug 2023 06:00) (100/64 - 145/86)  BP(mean): 81 (25 Aug 2023 06:00) (73 - 117)  ABP: --  ABP(mean): --  RR: 18 (25 Aug 2023 04:00) (12 - 23)  SpO2: 97% (25 Aug 2023 04:00) (92% - 100%)    O2 Parameters below as of 25 Aug 2023 04:00  Patient On (Oxygen Delivery Method): room air            Physical Exam:   Constitutional: NAD, well-groomed, well-developed  HEENT: PERRLA, EOMI, no drainage or redness  Neck: supple,  No JVD, Trachea midline  Back: Normal spine flexure, No CVA tenderness, No deformity or limitation of movement  Respiratory: Breath Sounds equal & clear bilaterally to auscultation, no accessory muscle use noted  Cardiovascular: Regular rate, regular rhythm, normal S1, S2; no murmurs or rub  Gastrointestinal: Soft, non-tender, non distended, no hepatosplenomegaly, normal bowel sounds  Extremities: [place] x 4, no peripheral edema, no cyanosis, no clubbing   Vascular: Equal and normal pulses: 2+ peripheral pulses throughout  Neurological: A+O x 3; speech clear and intact; no sensory, motor  deficits, normal reflexes  Psychiatric: calm, normal mood, normal affect  Musculoskeletal: No joint swelling or deformity; no limitation of movement  Skin: warm, dry, well perfused, no rashes    VENT SETTINGS         I&O's Detail    24 Aug 2023 07:01  -  25 Aug 2023 07:00  --------------------------------------------------------  IN:    IV PiggyBack: 100 mL    Oral Fluid: 1150 mL  Total IN: 1250 mL    OUT:    Voided (mL): 3625 mL  Total OUT: 3625 mL    Total NET: -2375 mL          LABS                        8.4    8.56  )-----------( 127      ( 25 Aug 2023 00:23 )             25.4     08-25    140  |  104  |  10  ----------------------------<  104<H>  3.4<L>   |  27  |  0.95    Ca    8.5      25 Aug 2023 00:23  Phos  3.4     08-25  Mg     2.00     08-25    TPro  5.5<L>  /  Alb  3.3  /  TBili  0.7  /  DBili  x   /  AST  14  /  ALT  10  /  AlkPhos  59  08-25    LIVER FUNCTIONS - ( 25 Aug 2023 00:23 )  Alb: 3.3 g/dL / Pro: 5.5 g/dL / ALK PHOS: 59 U/L / ALT: 10 U/L / AST: 14 U/L / GGT: x           PT/INR - ( 25 Aug 2023 00:23 )   PT: 11.8 sec;   INR: 1.05 ratio         PTT - ( 25 Aug 2023 00:23 )  PTT:29.6 sec        Urinalysis Basic - ( 25 Aug 2023 00:23 )    Color: x / Appearance: x / SG: x / pH: x  Gluc: 104 mg/dL / Ketone: x  / Bili: x / Urobili: x   Blood: x / Protein: x / Nitrite: x   Leuk Esterase: x / RBC: x / WBC x   Sq Epi: x / Non Sq Epi: x / Bacteria: x            MEDICATIONS  (STANDING):  atorvastatin 10 milliGRAM(s) Oral at bedtime  chlorhexidine 2% Cloths 1 Application(s) Topical daily  finasteride 5 milliGRAM(s) Oral daily  metoprolol succinate ER 25 milliGRAM(s) Oral daily  montelukast 10 milliGRAM(s) Oral daily  pantoprazole  Injectable 40 milliGRAM(s) IV Push daily  potassium chloride    Tablet ER 20 milliEquivalent(s) Oral once  tamsulosin 0.4 milliGRAM(s) Oral at bedtime    MEDICATIONS  (PRN):  albuterol    90 MICROgram(s) HFA Inhaler 2 Puff(s) Inhalation every 6 hours PRN Shortness of Breath and/or Wheezing      Allergies:  No Known Allergies

## 2023-08-25 NOTE — PROGRESS NOTE ADULT - SUBJECTIVE AND OBJECTIVE BOX
Surgery Progress Note    SUBJECTIVE: Pt seen and examined at bedside. Patient comfortable and in no-apparent distress. No nausea, vomiting, diarrhea. Pain is controlled. +Gas/-BM. Tolerating diet.    Vital Signs Last 24 Hrs  T(C): 36.6 (25 Aug 2023 08:00), Max: 37.1 (25 Aug 2023 00:00)  T(F): 97.8 (25 Aug 2023 08:00), Max: 98.7 (25 Aug 2023 00:00)  HR: 98 (25 Aug 2023 08:00) (81 - 98)  BP: 125/74 (25 Aug 2023 08:00) (120/74 - 136/92)  BP(mean): 80 (25 Aug 2023 08:00) (79 - 112)  RR: 19 (25 Aug 2023 08:00) (15 - 23)  SpO2: 95% (25 Aug 2023 08:00) (92% - 100%)    Parameters below as of 25 Aug 2023 08:00  Patient On (Oxygen Delivery Method): room air        Physical Exam:  General Appearance: Appears well, NAD  Respiratory: No labored breathing  CV: Pulse regularly present  Abdomen: Soft, nontender    LABS:                        9.2    7.35  )-----------( 148      ( 25 Aug 2023 10:20 )             28.1     08-25    140  |  104  |  10  ----------------------------<  104<H>  3.4<L>   |  27  |  0.95    Ca    8.5      25 Aug 2023 00:23  Phos  3.4     08-25  Mg     2.00     08-25    TPro  5.5<L>  /  Alb  3.3  /  TBili  0.7  /  DBili  x   /  AST  14  /  ALT  10  /  AlkPhos  59  08-25    PT/INR - ( 25 Aug 2023 00:23 )   PT: 11.8 sec;   INR: 1.05 ratio         PTT - ( 25 Aug 2023 00:23 )  PTT:29.6 sec  Urinalysis Basic - ( 25 Aug 2023 00:23 )    Color: x / Appearance: x / SG: x / pH: x  Gluc: 104 mg/dL / Ketone: x  / Bili: x / Urobili: x   Blood: x / Protein: x / Nitrite: x   Leuk Esterase: x / RBC: x / WBC x   Sq Epi: x / Non Sq Epi: x / Bacteria: x        INs and OUTs:    08-24-23 @ 07:01  -  08-25-23 @ 07:00  --------------------------------------------------------  IN: 1250 mL / OUT: 3625 mL / NET: -2375 mL    08-25-23 @ 07:01  -  08-25-23 @ 14:02  --------------------------------------------------------  IN: 0 mL / OUT: 300 mL / NET: -300 mL

## 2023-08-25 NOTE — CHART NOTE - NSCHARTNOTESELECT_GEN_ALL_CORE
MICU Acceptance Note/Event Note
IR Pre-procedure Note/Event Note
IR/Event Note
MAR Accept Note/Event Note
Transfer Note

## 2023-08-26 LAB
ANION GAP SERPL CALC-SCNC: 9 MMOL/L — SIGNIFICANT CHANGE UP (ref 7–14)
BUN SERPL-MCNC: 9 MG/DL — SIGNIFICANT CHANGE UP (ref 7–23)
CALCIUM SERPL-MCNC: 8.4 MG/DL — SIGNIFICANT CHANGE UP (ref 8.4–10.5)
CHLORIDE SERPL-SCNC: 101 MMOL/L — SIGNIFICANT CHANGE UP (ref 98–107)
CO2 SERPL-SCNC: 29 MMOL/L — SIGNIFICANT CHANGE UP (ref 22–31)
CREAT SERPL-MCNC: 0.68 MG/DL — SIGNIFICANT CHANGE UP (ref 0.5–1.3)
EGFR: 98 ML/MIN/1.73M2 — SIGNIFICANT CHANGE UP
GLUCOSE SERPL-MCNC: 140 MG/DL — HIGH (ref 70–99)
HCT VFR BLD CALC: 25.6 % — LOW (ref 39–50)
HGB BLD-MCNC: 8.4 G/DL — LOW (ref 13–17)
MAGNESIUM SERPL-MCNC: 2 MG/DL — SIGNIFICANT CHANGE UP (ref 1.6–2.6)
MCHC RBC-ENTMCNC: 28.6 PG — SIGNIFICANT CHANGE UP (ref 27–34)
MCHC RBC-ENTMCNC: 32.8 GM/DL — SIGNIFICANT CHANGE UP (ref 32–36)
MCV RBC AUTO: 87.1 FL — SIGNIFICANT CHANGE UP (ref 80–100)
NRBC # BLD: 0 /100 WBCS — SIGNIFICANT CHANGE UP (ref 0–0)
NRBC # FLD: 0.03 K/UL — HIGH (ref 0–0)
PHOSPHATE SERPL-MCNC: 3.6 MG/DL — SIGNIFICANT CHANGE UP (ref 2.5–4.5)
PLATELET # BLD AUTO: 145 K/UL — LOW (ref 150–400)
POTASSIUM SERPL-MCNC: 3.3 MMOL/L — LOW (ref 3.5–5.3)
POTASSIUM SERPL-SCNC: 3.3 MMOL/L — LOW (ref 3.5–5.3)
RBC # BLD: 2.94 M/UL — LOW (ref 4.2–5.8)
RBC # FLD: 17.6 % — HIGH (ref 10.3–14.5)
SODIUM SERPL-SCNC: 139 MMOL/L — SIGNIFICANT CHANGE UP (ref 135–145)
WBC # BLD: 7.39 K/UL — SIGNIFICANT CHANGE UP (ref 3.8–10.5)
WBC # FLD AUTO: 7.39 K/UL — SIGNIFICANT CHANGE UP (ref 3.8–10.5)

## 2023-08-26 PROCEDURE — 99232 SBSQ HOSP IP/OBS MODERATE 35: CPT | Mod: GC

## 2023-08-26 PROCEDURE — 93010 ELECTROCARDIOGRAM REPORT: CPT

## 2023-08-26 RX ORDER — POTASSIUM CHLORIDE 20 MEQ
20 PACKET (EA) ORAL EVERY 4 HOURS
Refills: 0 | Status: COMPLETED | OUTPATIENT
Start: 2023-08-26 | End: 2023-08-26

## 2023-08-26 RX ORDER — PANTOPRAZOLE SODIUM 20 MG/1
40 TABLET, DELAYED RELEASE ORAL
Refills: 0 | Status: DISCONTINUED | OUTPATIENT
Start: 2023-08-26 | End: 2023-08-28

## 2023-08-26 RX ORDER — ASPIRIN/CALCIUM CARB/MAGNESIUM 324 MG
81 TABLET ORAL DAILY
Refills: 0 | Status: DISCONTINUED | OUTPATIENT
Start: 2023-08-26 | End: 2023-08-26

## 2023-08-26 RX ORDER — ASPIRIN/CALCIUM CARB/MAGNESIUM 324 MG
81 TABLET ORAL DAILY
Refills: 0 | Status: DISCONTINUED | OUTPATIENT
Start: 2023-08-26 | End: 2023-08-27

## 2023-08-26 RX ORDER — POLYETHYLENE GLYCOL 3350 17 G/17G
17 POWDER, FOR SOLUTION ORAL DAILY
Refills: 0 | Status: DISCONTINUED | OUTPATIENT
Start: 2023-08-26 | End: 2023-08-28

## 2023-08-26 RX ADMIN — Medication 25 MILLIGRAM(S): at 05:53

## 2023-08-26 RX ADMIN — Medication 20 MILLIEQUIVALENT(S): at 09:54

## 2023-08-26 RX ADMIN — FINASTERIDE 5 MILLIGRAM(S): 5 TABLET, FILM COATED ORAL at 13:23

## 2023-08-26 RX ADMIN — CHLORHEXIDINE GLUCONATE 1 APPLICATION(S): 213 SOLUTION TOPICAL at 13:23

## 2023-08-26 RX ADMIN — MONTELUKAST 10 MILLIGRAM(S): 4 TABLET, CHEWABLE ORAL at 13:23

## 2023-08-26 RX ADMIN — Medication 120 MILLIGRAM(S): at 05:54

## 2023-08-26 RX ADMIN — TAMSULOSIN HYDROCHLORIDE 0.4 MILLIGRAM(S): 0.4 CAPSULE ORAL at 22:04

## 2023-08-26 RX ADMIN — Medication 20 MILLIEQUIVALENT(S): at 13:24

## 2023-08-26 RX ADMIN — ATORVASTATIN CALCIUM 10 MILLIGRAM(S): 80 TABLET, FILM COATED ORAL at 22:04

## 2023-08-26 RX ADMIN — POLYETHYLENE GLYCOL 3350 17 GRAM(S): 17 POWDER, FOR SOLUTION ORAL at 09:54

## 2023-08-26 NOTE — PROGRESS NOTE ADULT - ASSESSMENT
73y Male with PMHx of recurrent diverticular bleeding s/p multiple colonoscopies demonstrating severe diverticulosis (no interventions) and prior +nuclear medicine bleeding scan (5/2023 with active bleeding in cecum/proximal colon, no intervention), prostate cancer s/p radiation c/b radiation-associated vascular ectasia, internal hemorrhoids, HTN, HLD, asthma, recently dx A.fib/A.flutter (in 5/2023) s/p Watchman procedure and initiation of A/c with Plavix/ASA. The patient has a history of recurrent painless BRBPR.    73y Male with PMHx of recurrent diverticular bleeding s/p multiple colonoscopies demonstrating severe diverticulosis (no interventions) and prior +nuclear medicine bleeding scan (5/2023 with active bleeding in cecum/proximal colon, no intervention), prostate cancer s/p radiation c/b radiation-associated vascular ectasia, internal hemorrhoids, HTN, HLD, asthma, recently dx A.fib/A.flutter (in 5/2023) s/p Watchman procedure and initiation of A/c with Plavix/ASA, who initially presented on 8/21 with episode of BRBPR and dizziness/lightheadedness; he had additional episodes while workup ongoing and ultimately required intensive care. 9U pRBC, 3FFP, 1U Plt and 1 Cryo given. CTA with active bleeding in the cecum, now s/p coil embolization of branch of R colic artery. Pt improving since procedure with increasing Hb and no additional episodes of bleeding; he is stable for medical care on the medicine floor.    73y Male with PMHx of recurrent diverticular bleeding s/p multiple colonoscopies demonstrating severe diverticulosis (no interventions) and prior +nuclear medicine bleeding scan (5/2023 with active bleeding in cecum/proximal colon, no intervention), prostate cancer s/p radiation c/b radiation-associated vascular ectasia, internal hemorrhoids, HTN, HLD, asthma, recently dx A.fib/A.flutter (in 5/2023) s/p Watchman procedure and initiation of A/c with Plavix/ASA, who initially presented on 8/21 with episode of BRBPR and dizziness/lightheadedness; he had additional episodes while workup ongoing and ultimately required intensive care. 9U pRBC, 3FFP, 1U Plt and 1 Cryo given. CTA with active bleeding in the cecum, now s/p coil embolization of branch of R colic artery. Pt improving since procedure with increasing Hb and no additional episodes of bleeding. Pt has not had BM since coil embolization on 8/22. Dispo pending PT recs and decision of whether to restart DAPT.

## 2023-08-26 NOTE — PROVIDER CONTACT NOTE (OTHER) - ACTION/TREATMENT ORDERED:
notified by [employer] that patient had 3 beats at 1:46am , patient stable and assymptomatic at this time
HS notified - will continue to monitor

## 2023-08-26 NOTE — PROGRESS NOTE ADULT - SUBJECTIVE AND OBJECTIVE BOX
Ruth Ann Jaramillo MD  PGY1  Preferred contact via Microsoft Teams      Patient is a 73y old  Male who presents with a chief complaint of Lower GI Bleed (25 Aug 2023 14:02)      SUBJECTIVE / OVERNIGHT EVENTS: Pt transferred from MICU to tele. S/p coil embolization of branch of R colic artery; pt has been hemodynamically stable since then with uptrending Hb.     MEDICATIONS  (STANDING):  atorvastatin 10 milliGRAM(s) Oral at bedtime  chlorhexidine 2% Cloths 1 Application(s) Topical daily  diltiazem    milliGRAM(s) Oral daily  finasteride 5 milliGRAM(s) Oral daily  metoprolol succinate ER 25 milliGRAM(s) Oral daily  montelukast 10 milliGRAM(s) Oral daily  pantoprazole  Injectable 40 milliGRAM(s) IV Push daily  tamsulosin 0.4 milliGRAM(s) Oral at bedtime    MEDICATIONS  (PRN):  albuterol    90 MICROgram(s) HFA Inhaler 2 Puff(s) Inhalation every 6 hours PRN Shortness of Breath and/or Wheezing    I&O's Summary    25 Aug 2023 07:01  -  26 Aug 2023 07:00  --------------------------------------------------------  IN: 240 mL / OUT: 1000 mL / NET: -760 mL        PHYSICAL EXAM:  T(C): 36.7 (08-25-23 @ 20:27), Max: 36.7 (08-25-23 @ 12:00)  HR: 82 (08-25-23 @ 20:27) (82 - 98)  BP: 154/93 (08-25-23 @ 20:27) (125/74 - 154/93)  RR: 18 (08-25-23 @ 20:27) (16 - 22)  SpO2: 100% (08-25-23 @ 20:27) (95% - 100%)    CONSTITUTIONAL: Well groomed, no apparent distress  EYES: PERRLA and symmetric, EOMI, No conjunctival or scleral injection, non-icteric  ENMT: Oral mucosa with moist membranes. Normal dentition; no pharyngeal injection or exudates             NECK: Supple, symmetric and without tracheal deviation   RESP: No respiratory distress, no use of accessory muscles; CTA b/l, no WRR  CV: RRR, +S1S2, no MRG; no JVD; no peripheral edema  GI: Soft, NT, ND, no rebound, no guarding; no palpable masses; no hepatosplenomegaly; no hernia palpated  LYMPH: No cervical LAD or tenderness; no axillary LAD or tenderness; no inguinal LAD or tenderness  MSK: Normal gait; No digital clubbing or cyanosis; examination of the (head/neck/spine/ribs/pelvis, RUE, LUE, RLE, LLE) without misalignment,            Normal ROM without pain, no spinal tenderness, normal muscle strength/tone  SKIN: No rashes or ulcers noted; no subcutaneous nodules or induration palpable  NEURO: CN II-XII intact; normal reflexes in upper and lower extremities, sensation intact in upper and lower extremities b/l to light touch   PSYCH: Appropriate insight/judgment; A+O x 3, mood and affect appropriate, recent/remote memory intact      LABS:                        9.2    7.35  )-----------( 148      ( 25 Aug 2023 10:20 )             28.1      08-25    140  |  104  |  10  ----------------------------<  104<H>  3.4<L>   |  27  |  0.95    Ca    8.5      25 Aug 2023 00:23  Phos  3.4     08-25  Mg     2.00     08-25    TPro  5.5<L>  /  Alb  3.3  /  TBili  0.7  /  DBili  x   /  AST  14  /  ALT  10  /  AlkPhos  59  08-25    PT/INR - ( 25 Aug 2023 00:23 )   PT: 11.8 sec;   INR: 1.05 ratio         PTT - ( 25 Aug 2023 00:23 )  PTT:29.6 sec      Urinalysis Basic - ( 25 Aug 2023 00:23 )    Color: x / Appearance: x / SG: x / pH: x  Gluc: 104 mg/dL / Ketone: x  / Bili: x / Urobili: x   Blood: x / Protein: x / Nitrite: x   Leuk Esterase: x / RBC: x / WBC x   Sq Epi: x / Non Sq Epi: x / Bacteria: x        RADIOLOGY & ADDITIONAL TESTS:    Imaging Personally Reviewed:    Consultant(s) Notes Reviewed:      Care Discussed with Consultants/Other Providers:   Ruth Ann Jaramillo MD  PGY1  Preferred contact via Microsoft Teams    Patient is a 73y old  Male who presents with a chief complaint of Lower GI Bleed (25 Aug 2023 14:02)    SUBJECTIVE / OVERNIGHT EVENTS: Pt transferred from MICU to [facility]. S/p coil embolization of branch of R colic artery; pt has been hemodynamically stable since then with stable Hb.    MEDICATIONS  (STANDING):  atorvastatin 10 milliGRAM(s) Oral at bedtime  chlorhexidine 2% Cloths 1 Application(s) Topical daily  diltiazem    milliGRAM(s) Oral daily  finasteride 5 milliGRAM(s) Oral daily  metoprolol succinate ER 25 milliGRAM(s) Oral daily  montelukast 10 milliGRAM(s) Oral daily  pantoprazole  Injectable 40 milliGRAM(s) IV Push daily  tamsulosin 0.4 milliGRAM(s) Oral at bedtime    MEDICATIONS  (PRN):  albuterol    90 MICROgram(s) HFA Inhaler 2 Puff(s) Inhalation every 6 hours PRN Shortness of Breath and/or Wheezing    I&O's Summary    25 Aug 2023 07:01  -  26 Aug 2023 07:00  --------------------------------------------------------  IN: 240 mL / OUT: 1000 mL / NET: -760 mL    PHYSICAL EXAM:    Vital Signs Last 24 Hrs  T(C): 36.6 (26 Aug 2023 09:00), Max: 36.7 (25 Aug 2023 20:00)  T(F): 97.8 (26 Aug 2023 09:00), Max: 98 (25 Aug 2023 20:00)  HR: 80 (26 Aug 2023 09:00) (75 - 88)  BP: 120/69 (26 Aug 2023 09:00) (116/68 - 154/93)  BP(mean): 95 (25 Aug 2023 20:00) (91 - 95)  RR: 18 (26 Aug 2023 09:00) (16 - 22)  SpO2: 97% (26 Aug 2023 09:00) (97% - 100%)    Parameters below as of 26 Aug 2023 09:00  Patient On (Oxygen Delivery Method): room air    CONSTITUTIONAL: Well groomed, no apparent distress  EYES: PERRLA and symmetric, EOMI, No conjunctival or scleral injection, non-icteric  ENMT: Oral mucosa with moist membranes. Normal dentition; no pharyngeal injection or exudates  NECK: Supple, symmetric and without tracheal deviation   RESP: No respiratory distress, no use of accessory muscles; CTA b/l, no WRR  CV: RRR, +S1S2, no MRG; no JVD; no peripheral edema  GI: Soft, NT, ND, no rebound, no guarding; no palpable masses  LYMPH: No cervical LAD or tenderness; no axillary LAD or tenderness; no inguinal LAD or tenderness  MSK: No digital clubbing or cyanosis, Normal ROM without pain, no spinal tenderness, normal muscle strength/tone  SKIN: Purpura at R cath site diminishing in size, non-tender. No rashes or ulcers noted  NEURO: CN II-XII intact; normal reflexes in upper and lower extremities, sensation intact in upper and lower extremities b/l to light touch   PSYCH: Appropriate insight/judgment; A+O x 3, mood and affect appropriate, recent/remote memory intact      LABS:                                   8.4    7.39  )-----------( 145      ( 26 Aug 2023 05:40 )             25.6   08-26    139  |  101  |  9   ----------------------------<  140<H>  3.3<L>   |  29  |  0.68    Ca    8.4      26 Aug 2023 05:40  Phos  3.6     08-26  Mg     2.00     08-26    TPro  5.5<L>  /  Alb  3.3  /  TBili  0.7  /  DBili  x   /  AST  14  /  ALT  10  /  AlkPhos  59  08-25    PT/INR - ( 25 Aug 2023 00:23 )   PT: 11.8 sec;   INR: 1.05 ratio    PTT - ( 25 Aug 2023 00:23 )  PTT:29.6 sec    Urinalysis Basic - ( 25 Aug 2023 00:23 )    Color: x / Appearance: x / SG: x / pH: x  Gluc: 104 mg/dL / Ketone: x  / Bili: x / Urobili: x   Blood: x / Protein: x / Nitrite: x   Leuk Esterase: x / RBC: x / WBC x   Sq Epi: x / Non Sq Epi: x / Bacteria: x

## 2023-08-26 NOTE — PROGRESS NOTE ADULT - PROBLEM SELECTOR PLAN 2
- Pt w/ Hb 8.2 this AM 8/22; 8.6 midnight 8/22, down from 10.2 upon ED arrival, ISO lower GI bleed w/ BRBPR; Hb not improving thus far after 5U pRBC, however only one small BM with dark blood today 8/22 (no large bloody BM since 20:00-21:00 8/21)  - MICU consulted 8/21 not a candidate and patient more stable since then from a hemodynamic perspective, not continuously bleeding  - MAP upper 80s-100s this AM, improved from 8/21  - Trend CBC Q8, transfuse if Hb <8. Hgb currently stable; s/p MICU Stay  S/p 9U pRBC, 3U Plasma, 1U Platelet, 1U Cryo  - MICU consulted 8/21 not a candidate and patient more stable since then from a hemodynamic perspective, not continuously bleeding    -qdaily CBCs - Hgb currently stable; s/p MICU Stay  - S/p 9U pRBC, 3U Plasma, 1U Platelet, 1U Cryo  - Daily CBC

## 2023-08-26 NOTE — PROGRESS NOTE ADULT - SUBJECTIVE AND OBJECTIVE BOX
Cardiovascular Disease Progress Note    Overnight events: No acute events overnight. no new cardiac sx  Otherwise review of systems negative    Objective Findings:  T(C): 36.5 (08-26-23 @ 05:52), Max: 36.7 (08-25-23 @ 12:00)  HR: 81 (08-26-23 @ 05:52) (75 - 89)  BP: 116/68 (08-26-23 @ 05:52) (116/68 - 154/93)  RR: 18 (08-26-23 @ 05:52) (16 - 22)  SpO2: 98% (08-26-23 @ 05:52) (98% - 100%)  Wt(kg): --  Daily     Daily       Physical Exam:  Gen: NAD  HEENT: EOMI  CV: RRR, normal S1 + S2, no m/r/g  Lungs: CTAB  Abd: soft, non-tender  Ext: No edema    Telemetry:    Laboratory Data:                        8.4    7.39  )-----------( 145      ( 26 Aug 2023 05:40 )             25.6     08-26    139  |  101  |  9   ----------------------------<  140<H>  3.3<L>   |  29  |  0.68    Ca    8.4      26 Aug 2023 05:40  Phos  3.6     08-26  Mg     2.00     08-26    TPro  5.5<L>  /  Alb  3.3  /  TBili  0.7  /  DBili  x   /  AST  14  /  ALT  10  /  AlkPhos  59  08-25    PT/INR - ( 25 Aug 2023 00:23 )   PT: 11.8 sec;   INR: 1.05 ratio         PTT - ( 25 Aug 2023 00:23 )  PTT:29.6 sec          Inpatient Medications:  MEDICATIONS  (STANDING):  atorvastatin 10 milliGRAM(s) Oral at bedtime  chlorhexidine 2% Cloths 1 Application(s) Topical daily  diltiazem    milliGRAM(s) Oral daily  finasteride 5 milliGRAM(s) Oral daily  metoprolol succinate ER 25 milliGRAM(s) Oral daily  montelukast 10 milliGRAM(s) Oral daily  pantoprazole  Injectable 40 milliGRAM(s) IV Push daily  potassium chloride   Powder 20 milliEquivalent(s) Oral every 4 hours  tamsulosin 0.4 milliGRAM(s) Oral at bedtime      Assessment:  73y Male with history of recurrent diverticular bleeding s/p multiple colonoscopies (no interventions) and prior +NM bleeding scan (5/2023 with active bleeding in cecum/proximal colon, no intervention), prostate cancer s/p radiation c/b radiation-associated vascular ectasia, internal hemorrhoids, HTN, asthma, recently diagnosed afib who is s/p ablation and implantation of a Watchman device on 8/14/23, discharged on 12 week course of  Plavix/ASA. He presents  with complaints recurrent BRBPR      Recs:  cardiac stable  s/p watchman implant on 8/14/23, antiplatelet meds on hold due to acute gi bleed. f/u eps recs  s/p afib/afl ablation, currently in nsr. cont to monitor   cw beta blockers for nsvt  transfuse prn. monitor cbc closely  f/u gi and surgery recs  s/p ir embolization, cont to monitor h/h closely  bowel regimen  will follow         Over 25 minutes spent on total encounter; more than 50% of the visit was spent counseling and/or coordinating care by the attending physician.      Isaac Calderón MD   Cardiovascular Disease  [phone]

## 2023-08-26 NOTE — PROVIDER CONTACT NOTE (OTHER) - SITUATION
notified by ZUCHEM that patient had 3 beats at 1:46am , patient stable and assymptomatic at this time
4 beats vtach

## 2023-08-26 NOTE — PROGRESS NOTE ADULT - PROBLEM SELECTOR PLAN 4
- Pt in sinus rhythm at this time, 1w s/p ablation and Watchman procedure  - Continue diltiazem 120 mg qd and metroprolol 25 mg qd per rate control   - EP evaluated, recs above  - Continuous telemetry.

## 2023-08-26 NOTE — PROGRESS NOTE ADULT - PROBLEM SELECTOR PLAN 6
- Pt w/ PMH HTN, became hypotensive ISO large diverticular bleed. BP improving this AM w/ MAP   - Continue to monitor and hold BP meds if systolic BP <100, diastolic BP <60.

## 2023-08-26 NOTE — PROGRESS NOTE ADULT - PROBLEM SELECTOR PLAN 8
- DVT PPX: Held ISO Bleed  - Diet: CLD  - Dispo: Pending medical management and decision to restart DAPT. - DVT PPX: Held  - Diet: DASH  - Dispo: Pending medical management and decision to restart DAPT. - DVT PPX: Held  - Diet: DASH  - Dispo: Pending PT eval and decision to restart DAPT.

## 2023-08-26 NOTE — PROGRESS NOTE ADULT - PROBLEM SELECTOR PLAN 3
- Patient is 1 week s/p ablation and Watchman placement w/ Dr. Gaurav Marie (8/14) and was started on ASA/Plavix at that time  - Holding for now ISO lower GI bleed  - Cards consulted regarding anti-coagulation management and risk vs benefit discussion; Dr. Marie evaluated at bedside, agreed with d/c of aspirin and plavix  - EP recs appreciated regarding restarting DAPT considering pt is a high bleeding risk and has had multiple large diverticular hemorrhages over past 4-5 months. - Patient is 1 week s/p ablation and Watchman placement w/ Dr. Gaurav Marie (8/14) and was started on ASA/Plavix at that time  - Holding for now ISO lower GI bleed  - Cards consulted regarding anti-coagulation management and risk vs benefit discussion; Dr. Marie evaluated at bedside, agreed with d/c of aspirin and plavix    EP recs appreciated regarding restarting DAPT considering pt is a high bleeding risk and has had multiple large diverticular hemorrhages over past 4-5 months. - Patient s/p ablation and Watchman placement w/ Dr. Gaurav Marie (8/14) and was started on ASA/Plavix at that time  - Holding for now ISO lower GI bleed  - Interdisciplinary recs appreciated regarding restarting DAPT considering pt is a high bleeding risk and has had multiple large diverticular hemorrhages over past 4-5 months; will consult GI

## 2023-08-26 NOTE — PROGRESS NOTE ADULT - PROBLEM SELECTOR PLAN 5
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   - Strong suspicion for diverticular hemorrhage considering similarity of this presentation compared to those in 5/2023  - GI consulted. 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   - Strong suspicion for diverticular hemorrhage considering similarity of this presentation compared to those in 5/2023  - Bleeding stabilized

## 2023-08-26 NOTE — PROGRESS NOTE ADULT - PROBLEM SELECTOR PLAN 1
- Pt w/ large bloody BM this morning, painless w/ symptoms of acute blood loss (weakness, lightheadedness, nausea) and appeared pale per wife. ISO recently starting anti-platelet medications 1 week ago. S/p 4 additional bloody BM in ED, ranging from bright red to maroon.   - Recurrent diverticular bleeds, most recently in 5/2023 when he had +NM scan w/ bleed in the cecum/proximal ascending colon; required 5U pRBC transfusion at this time  - GI consulted in ED; no interventions at this time, if he continues to bleed or becomes hemodynamically unstable will consider colonoscopy on Wed. 8/23  - Negative CTA 8/21; IR consulted, but no embolization indicated at this time 2/2 negative CTA  - S/p 1.5 L fluid, 5U pRBC, 1U plasma; Hb 8.2 8/22 AM, repeat CBC pending at 2PM   - Protonix 40 mg qd   - Gen. surgery consulted to make aware in case of potential colorectal intervention; discussed that surgery (colectomy) is a last resort following other workup and interventions   - Clear liquid diet for now. - Pt w/ large bloody BM this morning, painless w/ symptoms of acute blood loss (weakness, lightheadedness, nausea) and appeared pale per wife. ISO recently starting anti-platelet medications 1 week ago. S/p 4 additional bloody BM in ED, ranging from bright red to maroon.   - Recurrent diverticular bleeds, most recently in 5/2023 when he had +NM scan w/ bleed in the cecum/proximal ascending colon; required 5U pRBC transfusion at this time  - CTA 8/22; significant for arterial bleed; s/p Embolization  - s/p 9U pRBC, 3U plasma, 1 plt, 1 Cryo;  - Protonix 40 mg qd     - Appreciate Gen Surg/CRS recs; will continue to follow - Pt presented with BRBPR, Hx of recurrent diverticular bleeds, ISO newly started DAPT.   - CTA 8/22; significant for arterial bleed; s/p Embolization  - s/p 9U pRBC, 3U plasma, 1 plt, 1 Cryo  - Protonix 40 mg qd   - Pt w no additional bleeds since 8/22, however has not had BM  - Cardiology, EP, and GI recs appreciated regarding re-starting DAPT  - PT HDS, Hb 8.6 this AM.

## 2023-08-27 LAB
ALBUMIN SERPL ELPH-MCNC: 3.6 G/DL — SIGNIFICANT CHANGE UP (ref 3.3–5)
ALP SERPL-CCNC: 64 U/L — SIGNIFICANT CHANGE UP (ref 40–120)
ALT FLD-CCNC: 11 U/L — SIGNIFICANT CHANGE UP (ref 4–41)
ANION GAP SERPL CALC-SCNC: 11 MMOL/L — SIGNIFICANT CHANGE UP (ref 7–14)
AST SERPL-CCNC: 12 U/L — SIGNIFICANT CHANGE UP (ref 4–40)
BASOPHILS # BLD AUTO: 0.04 K/UL — SIGNIFICANT CHANGE UP (ref 0–0.2)
BASOPHILS NFR BLD AUTO: 0.5 % — SIGNIFICANT CHANGE UP (ref 0–2)
BILIRUB SERPL-MCNC: 0.7 MG/DL — SIGNIFICANT CHANGE UP (ref 0.2–1.2)
BLD GP AB SCN SERPL QL: NEGATIVE — SIGNIFICANT CHANGE UP
BUN SERPL-MCNC: 7 MG/DL — SIGNIFICANT CHANGE UP (ref 7–23)
CALCIUM SERPL-MCNC: 8.7 MG/DL — SIGNIFICANT CHANGE UP (ref 8.4–10.5)
CHLORIDE SERPL-SCNC: 101 MMOL/L — SIGNIFICANT CHANGE UP (ref 98–107)
CO2 SERPL-SCNC: 27 MMOL/L — SIGNIFICANT CHANGE UP (ref 22–31)
CREAT SERPL-MCNC: 0.71 MG/DL — SIGNIFICANT CHANGE UP (ref 0.5–1.3)
EGFR: 97 ML/MIN/1.73M2 — SIGNIFICANT CHANGE UP
EOSINOPHIL # BLD AUTO: 0.2 K/UL — SIGNIFICANT CHANGE UP (ref 0–0.5)
EOSINOPHIL NFR BLD AUTO: 2.6 % — SIGNIFICANT CHANGE UP (ref 0–6)
GLUCOSE SERPL-MCNC: 108 MG/DL — HIGH (ref 70–99)
HCT VFR BLD CALC: 26.3 % — LOW (ref 39–50)
HGB BLD-MCNC: 8.4 G/DL — LOW (ref 13–17)
IANC: 6.23 K/UL — SIGNIFICANT CHANGE UP (ref 1.8–7.4)
IMM GRANULOCYTES NFR BLD AUTO: 0.6 % — SIGNIFICANT CHANGE UP (ref 0–0.9)
LYMPHOCYTES # BLD AUTO: 0.58 K/UL — LOW (ref 1–3.3)
LYMPHOCYTES # BLD AUTO: 7.5 % — LOW (ref 13–44)
MAGNESIUM SERPL-MCNC: 2.1 MG/DL — SIGNIFICANT CHANGE UP (ref 1.6–2.6)
MCHC RBC-ENTMCNC: 28.4 PG — SIGNIFICANT CHANGE UP (ref 27–34)
MCHC RBC-ENTMCNC: 31.9 GM/DL — LOW (ref 32–36)
MCV RBC AUTO: 88.9 FL — SIGNIFICANT CHANGE UP (ref 80–100)
MONOCYTES # BLD AUTO: 0.65 K/UL — SIGNIFICANT CHANGE UP (ref 0–0.9)
MONOCYTES NFR BLD AUTO: 8.4 % — SIGNIFICANT CHANGE UP (ref 2–14)
NEUTROPHILS # BLD AUTO: 6.23 K/UL — SIGNIFICANT CHANGE UP (ref 1.8–7.4)
NEUTROPHILS NFR BLD AUTO: 80.4 % — HIGH (ref 43–77)
NRBC # BLD: 0 /100 WBCS — SIGNIFICANT CHANGE UP (ref 0–0)
NRBC # FLD: 0.02 K/UL — HIGH (ref 0–0)
PHOSPHATE SERPL-MCNC: 4.1 MG/DL — SIGNIFICANT CHANGE UP (ref 2.5–4.5)
PLATELET # BLD AUTO: 158 K/UL — SIGNIFICANT CHANGE UP (ref 150–400)
POTASSIUM SERPL-MCNC: 3.5 MMOL/L — SIGNIFICANT CHANGE UP (ref 3.5–5.3)
POTASSIUM SERPL-SCNC: 3.5 MMOL/L — SIGNIFICANT CHANGE UP (ref 3.5–5.3)
PROT SERPL-MCNC: 5.9 G/DL — LOW (ref 6–8.3)
RBC # BLD: 2.96 M/UL — LOW (ref 4.2–5.8)
RBC # FLD: 17.6 % — HIGH (ref 10.3–14.5)
RH IG SCN BLD-IMP: POSITIVE — SIGNIFICANT CHANGE UP
SODIUM SERPL-SCNC: 139 MMOL/L — SIGNIFICANT CHANGE UP (ref 135–145)
WBC # BLD: 7.75 K/UL — SIGNIFICANT CHANGE UP (ref 3.8–10.5)
WBC # FLD AUTO: 7.75 K/UL — SIGNIFICANT CHANGE UP (ref 3.8–10.5)

## 2023-08-27 PROCEDURE — 99232 SBSQ HOSP IP/OBS MODERATE 35: CPT | Mod: GC

## 2023-08-27 RX ORDER — POTASSIUM CHLORIDE 20 MEQ
40 PACKET (EA) ORAL ONCE
Refills: 0 | Status: COMPLETED | OUTPATIENT
Start: 2023-08-27 | End: 2023-08-27

## 2023-08-27 RX ADMIN — ATORVASTATIN CALCIUM 10 MILLIGRAM(S): 80 TABLET, FILM COATED ORAL at 22:12

## 2023-08-27 RX ADMIN — Medication 25 MILLIGRAM(S): at 05:28

## 2023-08-27 RX ADMIN — PANTOPRAZOLE SODIUM 40 MILLIGRAM(S): 20 TABLET, DELAYED RELEASE ORAL at 05:29

## 2023-08-27 RX ADMIN — Medication 120 MILLIGRAM(S): at 05:28

## 2023-08-27 RX ADMIN — CHLORHEXIDINE GLUCONATE 1 APPLICATION(S): 213 SOLUTION TOPICAL at 12:14

## 2023-08-27 RX ADMIN — MONTELUKAST 10 MILLIGRAM(S): 4 TABLET, CHEWABLE ORAL at 12:13

## 2023-08-27 RX ADMIN — FINASTERIDE 5 MILLIGRAM(S): 5 TABLET, FILM COATED ORAL at 12:13

## 2023-08-27 RX ADMIN — Medication 40 MILLIEQUIVALENT(S): at 12:13

## 2023-08-27 RX ADMIN — TAMSULOSIN HYDROCHLORIDE 0.4 MILLIGRAM(S): 0.4 CAPSULE ORAL at 22:12

## 2023-08-27 NOTE — PROGRESS NOTE ADULT - PROBLEM SELECTOR PLAN 1
- Pt presented with BRBPR, Hx of recurrent diverticular bleeds, ISO newly started DAPT.   - CTA 8/22; significant for arterial bleed; s/p Embolization  - s/p 9U pRBC, 3U plasma, 1 plt, 1 Cryo  - Protonix 40 mg qd   - Pt w no additional bleeds since 8/22, however has not had BM  - Cardiology, EP, and GI recs appreciated regarding re-starting DAPT  - PT HDS, Hb 8.6 this AM. - Pt presented with BRBPR, Hx of recurrent diverticular bleeds, ISO newly started DAPT.   - CTA 8/22; significant for arterial bleed; s/p Embolization  - s/p 9U pRBC, 3U plasma, 1 plt, 1 Cryo  - Protonix 40 mg qd   - Pt w no additional bleeds since 8/22  - Cardiology, EP, and GI recs appreciated regarding re-starting DAPT. Patient wants to discuss with Dr. Marie prior to starting any anti-platelet agent  - Hgb stable

## 2023-08-27 NOTE — PROGRESS NOTE ADULT - PROBLEM SELECTOR PLAN 3
- Patient s/p ablation and Watchman placement w/ Dr. Gaurav Marie (8/14) and was started on ASA/Plavix at that time  - Holding for now ISO lower GI bleed  - Interdisciplinary recs appreciated regarding restarting DAPT considering pt is a high bleeding risk and has had multiple large diverticular hemorrhages over past 4-5 months; will consult GI - Patient s/p ablation and Watchman placement w/ Dr. Gaurav Marie (8/14) and was started on ASA/Plavix at that time  - Holding for now ISO lower GI bleed  - Interdisciplinary recs appreciated regarding restarting DAPT considering pt is a high bleeding risk and has had multiple large diverticular hemorrhages over past 4-5 months

## 2023-08-27 NOTE — PHYSICAL THERAPY INITIAL EVALUATION ADULT - PERTINENT HX OF CURRENT PROBLEM, REHAB EVAL
Pt is a 73 year old male presenting with intermittent chest pain, frontal headache, and 3-4 additional loose bright red to maroon BMs associated with dizziness/lightheadedness. Pt found with low hemoglobin requiring multiple transfusion. CT Angio A/P significant for active bleeding in the cecum. Pt underwent IR embolization with resolution of bloody stool and stable hemoglobin.

## 2023-08-27 NOTE — PROGRESS NOTE ADULT - SUBJECTIVE AND OBJECTIVE BOX
PROGRESS NOTE:   Authored by: Marco A Stephenson M.D.   Internal Medicine PGY-2  Please Contact Via Teams    Patient is a 73y old  Male who presents with a chief complaint of Lower GI Bleed (26 Aug 2023 09:14)      SUBJECTIVE / OVERNIGHT EVENTS:  No acute events overnight.     Tele:    Brief Daily Plan:    MEDICATIONS  (STANDING):  aspirin enteric coated 81 milliGRAM(s) Oral daily  atorvastatin 10 milliGRAM(s) Oral at bedtime  chlorhexidine 2% Cloths 1 Application(s) Topical daily  diltiazem    milliGRAM(s) Oral daily  finasteride 5 milliGRAM(s) Oral daily  metoprolol succinate ER 25 milliGRAM(s) Oral daily  montelukast 10 milliGRAM(s) Oral daily  pantoprazole    Tablet 40 milliGRAM(s) Oral before breakfast  tamsulosin 0.4 milliGRAM(s) Oral at bedtime    MEDICATIONS  (PRN):  albuterol    90 MICROgram(s) HFA Inhaler 2 Puff(s) Inhalation every 6 hours PRN Shortness of Breath and/or Wheezing  polyethylene glycol 3350 17 Gram(s) Oral daily PRN Constipation      CAPILLARY BLOOD GLUCOSE        I&O's Summary    26 Aug 2023 07:01  -  27 Aug 2023 07:00  --------------------------------------------------------  IN: 600 mL / OUT: 1900 mL / NET: -1300 mL        PHYSICAL EXAM:  Vital Signs Last 24 Hrs  T(C): 36.6 (27 Aug 2023 05:25), Max: 36.9 (27 Aug 2023 02:00)  T(F): 97.8 (27 Aug 2023 05:25), Max: 98.4 (27 Aug 2023 02:00)  HR: 78 (27 Aug 2023 05:25) (78 - 86)  BP: 123/82 (27 Aug 2023 05:25) (108/79 - 145/82)  BP(mean): --  RR: 17 (27 Aug 2023 05:25) (17 - 18)  SpO2: 99% (27 Aug 2023 05:25) (97% - 100%)    Parameters below as of 27 Aug 2023 05:25  Patient On (Oxygen Delivery Method): room air        CONSTITUTIONAL: NAD, well-developed  RESPIRATORY: Normal respiratory effort; lungs are clear to auscultation bilaterally  CARDIOVASCULAR: Regular rate and rhythm, normal S1 and S2, no murmur/rub/gallop; No lower extremity edema; Peripheral pulses are 2+ bilaterally  ABDOMEN: Nontender to palpation, normoactive bowel sounds, no rebound/guarding; No hepatosplenomegaly  MUSCLOSKELETAL: no clubbing or cyanosis of digits; no joint swelling or tenderness to palpation  PSYCH: A+O to person, place, and time; affect appropriate    LABS:                        8.4    7.39  )-----------( 145      ( 26 Aug 2023 05:40 )             25.6     08-26    139  |  101  |  9   ----------------------------<  140<H>  3.3<L>   |  29  |  0.68    Ca    8.4      26 Aug 2023 05:40  Phos  3.6     08-26  Mg     2.00     08-26              MICRO:  Urinalysis Basic - ( 26 Aug 2023 05:40 )    Color: x / Appearance: x / SG: x / pH: x  Gluc: 140 mg/dL / Ketone: x  / Bili: x / Urobili: x   Blood: x / Protein: x / Nitrite: x   Leuk Esterase: x / RBC: x / WBC x   Sq Epi: x / Non Sq Epi: x / Bacteria: x          IMAGING: PROGRESS NOTE:   Authored by: Marco A Stephenson M.D.   Internal Medicine PGY-2  Please Contact Via Teams    Patient is a 73y old  Male who presents with a chief complaint of Lower GI Bleed (26 Aug 2023 09:14)      SUBJECTIVE / OVERNIGHT EVENTS:  No acute events overnight. Pt feeling well, annoyed about breakfast.     Brief Daily Plan:  - PT eval  - EP recs    Tele: PVCs, 1st degree AV block, 8 beats vtach    MEDICATIONS  (STANDING):  aspirin enteric coated 81 milliGRAM(s) Oral daily  atorvastatin 10 milliGRAM(s) Oral at bedtime  chlorhexidine 2% Cloths 1 Application(s) Topical daily  diltiazem    milliGRAM(s) Oral daily  finasteride 5 milliGRAM(s) Oral daily  metoprolol succinate ER 25 milliGRAM(s) Oral daily  montelukast 10 milliGRAM(s) Oral daily  pantoprazole    Tablet 40 milliGRAM(s) Oral before breakfast  tamsulosin 0.4 milliGRAM(s) Oral at bedtime    MEDICATIONS  (PRN):  albuterol    90 MICROgram(s) HFA Inhaler 2 Puff(s) Inhalation every 6 hours PRN Shortness of Breath and/or Wheezing  polyethylene glycol 3350 17 Gram(s) Oral daily PRN Constipation      CAPILLARY BLOOD GLUCOSE        I&O's Summary    26 Aug 2023 07:01  -  27 Aug 2023 07:00  --------------------------------------------------------  IN: 600 mL / OUT: 1900 mL / NET: -1300 mL        PHYSICAL EXAM:  Vital Signs Last 24 Hrs  T(C): 36.6 (27 Aug 2023 05:25), Max: 36.9 (27 Aug 2023 02:00)  T(F): 97.8 (27 Aug 2023 05:25), Max: 98.4 (27 Aug 2023 02:00)  HR: 78 (27 Aug 2023 05:25) (78 - 86)  BP: 123/82 (27 Aug 2023 05:25) (108/79 - 145/82)  BP(mean): --  RR: 17 (27 Aug 2023 05:25) (17 - 18)  SpO2: 99% (27 Aug 2023 05:25) (97% - 100%)    Parameters below as of 27 Aug 2023 05:25  Patient On (Oxygen Delivery Method): room air        CONSTITUTIONAL: NAD, well-developed  RESPIRATORY: Normal respiratory effort; lungs are clear to auscultation bilaterally  CARDIOVASCULAR: Regular rate and rhythm, normal S1 and S2, no murmurs  ABDOMEN: Nontender to palpation,   PSYCH: A+O to person, place, and time; affect appropriate    LABS:                        8.4    7.39  )-----------( 145      ( 26 Aug 2023 05:40 )             25.6     08-26    139  |  101  |  9   ----------------------------<  140<H>  3.3<L>   |  29  |  0.68    Ca    8.4      26 Aug 2023 05:40  Phos  3.6     08-26  Mg     2.00     08-26              MICRO:  Urinalysis Basic - ( 26 Aug 2023 05:40 )    Color: x / Appearance: x / SG: x / pH: x  Gluc: 140 mg/dL / Ketone: x  / Bili: x / Urobili: x   Blood: x / Protein: x / Nitrite: x   Leuk Esterase: x / RBC: x / WBC x   Sq Epi: x / Non Sq Epi: x / Bacteria: x

## 2023-08-27 NOTE — PROGRESS NOTE ADULT - PROBLEM SELECTOR PLAN 8
- DVT PPX: Held  - Diet: DASH  - Dispo: Pending PT eval and decision to restart DAPT. - DVT PPX: SCD  - Diet: DASH  - Dispo: Pending decision to restart DAPT. PT recs no skilled needs.

## 2023-08-27 NOTE — PROGRESS NOTE ADULT - PROBLEM SELECTOR PLAN 5
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   - Strong suspicion for diverticular hemorrhage considering similarity of this presentation compared to those in 5/2023  - Bleeding stabilized

## 2023-08-27 NOTE — PROGRESS NOTE ADULT - PROBLEM SELECTOR PLAN 4
- Pt in sinus rhythm at this time, 1w s/p ablation and Watchman procedure  - Continue diltiazem 120 mg qd and metroprolol 25 mg qd per rate control   - EP evaluated, recs above  - Continuous telemetry. - Pt in sinus rhythm at this time, 1w s/p ablation and Watchman procedure  - Continue diltiazem 120 mg qd and metroprolol succinate 25 mg qd per rate control   - Continuous telemetry.

## 2023-08-27 NOTE — PROGRESS NOTE ADULT - ASSESSMENT
73y Male with PMHx of recurrent diverticular bleeding s/p multiple colonoscopies demonstrating severe diverticulosis (no interventions) and prior +nuclear medicine bleeding scan (5/2023 with active bleeding in cecum/proximal colon, no intervention), prostate cancer s/p radiation c/b radiation-associated vascular ectasia, internal hemorrhoids, HTN, HLD, asthma, recently dx A.fib/A.flutter (in 5/2023) s/p Watchman procedure and initiation of A/c with Plavix/ASA, who initially presented on 8/21 with episode of BRBPR and dizziness/lightheadedness; he had additional episodes while workup ongoing and ultimately required intensive care. 9U pRBC, 3FFP, 1U Plt and 1 Cryo given. CTA with active bleeding in the cecum, now s/p coil embolization of branch of R colic artery. Pt improving since procedure with increasing Hb and no additional episodes of bleeding. Pt has not had BM since coil embolization on 8/22. Dispo pending PT recs and decision of whether to restart DAPT.   73y Male with PMHx of recurrent diverticular bleeding s/p multiple colonoscopies demonstrating severe diverticulosis (no interventions) and prior +nuclear medicine bleeding scan (5/2023 with active bleeding in cecum/proximal colon, no intervention), prostate cancer s/p radiation c/b radiation-associated vascular ectasia, internal hemorrhoids, HTN, HLD, asthma, recently dx A.fib/A.flutter (in 5/2023) s/p Watchman procedure and initiation of A/c with Plavix/ASA, who initially presented on 8/21 with episode of BRBPR and dizziness/lightheadedness; he had additional episodes while workup ongoing and ultimately required intensive care. 9U pRBC, 3FFP, 1U Plt and 1 Cryo given. CTA with active bleeding in the cecum, now s/p coil embolization of branch of R colic artery. Pt improving since procedure with increasing Hb and no additional episodes of bleeding. Dispo pending PT recs and decision of whether to restart DAPT.

## 2023-08-28 ENCOUNTER — TRANSCRIPTION ENCOUNTER (OUTPATIENT)
Age: 73
End: 2023-08-28

## 2023-08-28 VITALS
HEART RATE: 80 BPM | RESPIRATION RATE: 17 BRPM | OXYGEN SATURATION: 100 % | SYSTOLIC BLOOD PRESSURE: 114 MMHG | DIASTOLIC BLOOD PRESSURE: 67 MMHG | TEMPERATURE: 98 F

## 2023-08-28 LAB
ALBUMIN SERPL ELPH-MCNC: 3.4 G/DL — SIGNIFICANT CHANGE UP (ref 3.3–5)
ALP SERPL-CCNC: 65 U/L — SIGNIFICANT CHANGE UP (ref 40–120)
ALT FLD-CCNC: 10 U/L — SIGNIFICANT CHANGE UP (ref 4–41)
ANION GAP SERPL CALC-SCNC: 9 MMOL/L — SIGNIFICANT CHANGE UP (ref 7–14)
AST SERPL-CCNC: 13 U/L — SIGNIFICANT CHANGE UP (ref 4–40)
BASOPHILS # BLD AUTO: 0.04 K/UL — SIGNIFICANT CHANGE UP (ref 0–0.2)
BASOPHILS NFR BLD AUTO: 0.6 % — SIGNIFICANT CHANGE UP (ref 0–2)
BILIRUB SERPL-MCNC: 0.5 MG/DL — SIGNIFICANT CHANGE UP (ref 0.2–1.2)
BUN SERPL-MCNC: 11 MG/DL — SIGNIFICANT CHANGE UP (ref 7–23)
CALCIUM SERPL-MCNC: 8.5 MG/DL — SIGNIFICANT CHANGE UP (ref 8.4–10.5)
CHLORIDE SERPL-SCNC: 103 MMOL/L — SIGNIFICANT CHANGE UP (ref 98–107)
CO2 SERPL-SCNC: 28 MMOL/L — SIGNIFICANT CHANGE UP (ref 22–31)
CREAT SERPL-MCNC: 0.95 MG/DL — SIGNIFICANT CHANGE UP (ref 0.5–1.3)
EGFR: 85 ML/MIN/1.73M2 — SIGNIFICANT CHANGE UP
EOSINOPHIL # BLD AUTO: 0.2 K/UL — SIGNIFICANT CHANGE UP (ref 0–0.5)
EOSINOPHIL NFR BLD AUTO: 2.9 % — SIGNIFICANT CHANGE UP (ref 0–6)
GLUCOSE SERPL-MCNC: 115 MG/DL — HIGH (ref 70–99)
HCT VFR BLD CALC: 25.7 % — LOW (ref 39–50)
HGB BLD-MCNC: 8.1 G/DL — LOW (ref 13–17)
IANC: 5.45 K/UL — SIGNIFICANT CHANGE UP (ref 1.8–7.4)
IMM GRANULOCYTES NFR BLD AUTO: 0.4 % — SIGNIFICANT CHANGE UP (ref 0–0.9)
LYMPHOCYTES # BLD AUTO: 0.67 K/UL — LOW (ref 1–3.3)
LYMPHOCYTES # BLD AUTO: 9.6 % — LOW (ref 13–44)
MAGNESIUM SERPL-MCNC: 2.2 MG/DL — SIGNIFICANT CHANGE UP (ref 1.6–2.6)
MCHC RBC-ENTMCNC: 28.1 PG — SIGNIFICANT CHANGE UP (ref 27–34)
MCHC RBC-ENTMCNC: 31.5 GM/DL — LOW (ref 32–36)
MCV RBC AUTO: 89.2 FL — SIGNIFICANT CHANGE UP (ref 80–100)
MONOCYTES # BLD AUTO: 0.62 K/UL — SIGNIFICANT CHANGE UP (ref 0–0.9)
MONOCYTES NFR BLD AUTO: 8.8 % — SIGNIFICANT CHANGE UP (ref 2–14)
NEUTROPHILS # BLD AUTO: 5.45 K/UL — SIGNIFICANT CHANGE UP (ref 1.8–7.4)
NEUTROPHILS NFR BLD AUTO: 77.7 % — HIGH (ref 43–77)
NRBC # BLD: 0 /100 WBCS — SIGNIFICANT CHANGE UP (ref 0–0)
NRBC # FLD: 0 K/UL — SIGNIFICANT CHANGE UP (ref 0–0)
PHOSPHATE SERPL-MCNC: 4.4 MG/DL — SIGNIFICANT CHANGE UP (ref 2.5–4.5)
PLATELET # BLD AUTO: 175 K/UL — SIGNIFICANT CHANGE UP (ref 150–400)
POTASSIUM SERPL-MCNC: 4 MMOL/L — SIGNIFICANT CHANGE UP (ref 3.5–5.3)
POTASSIUM SERPL-SCNC: 4 MMOL/L — SIGNIFICANT CHANGE UP (ref 3.5–5.3)
PROT SERPL-MCNC: 5.8 G/DL — LOW (ref 6–8.3)
RBC # BLD: 2.88 M/UL — LOW (ref 4.2–5.8)
RBC # FLD: 17.3 % — HIGH (ref 10.3–14.5)
SODIUM SERPL-SCNC: 140 MMOL/L — SIGNIFICANT CHANGE UP (ref 135–145)
WBC # BLD: 7.01 K/UL — SIGNIFICANT CHANGE UP (ref 3.8–10.5)
WBC # FLD AUTO: 7.01 K/UL — SIGNIFICANT CHANGE UP (ref 3.8–10.5)

## 2023-08-28 PROCEDURE — 99232 SBSQ HOSP IP/OBS MODERATE 35: CPT

## 2023-08-28 PROCEDURE — 99231 SBSQ HOSP IP/OBS SF/LOW 25: CPT

## 2023-08-28 PROCEDURE — 99239 HOSP IP/OBS DSCHRG MGMT >30: CPT | Mod: GC

## 2023-08-28 RX ORDER — ASPIRIN/CALCIUM CARB/MAGNESIUM 324 MG
81 TABLET ORAL DAILY
Refills: 0 | Status: DISCONTINUED | OUTPATIENT
Start: 2023-08-28 | End: 2023-08-28

## 2023-08-28 RX ORDER — ASPIRIN/CALCIUM CARB/MAGNESIUM 324 MG
1 TABLET ORAL
Qty: 0 | Refills: 0 | DISCHARGE
Start: 2023-08-28

## 2023-08-28 RX ADMIN — PANTOPRAZOLE SODIUM 40 MILLIGRAM(S): 20 TABLET, DELAYED RELEASE ORAL at 05:38

## 2023-08-28 RX ADMIN — CHLORHEXIDINE GLUCONATE 1 APPLICATION(S): 213 SOLUTION TOPICAL at 10:31

## 2023-08-28 RX ADMIN — Medication 25 MILLIGRAM(S): at 05:38

## 2023-08-28 RX ADMIN — Medication 81 MILLIGRAM(S): at 10:31

## 2023-08-28 RX ADMIN — Medication 120 MILLIGRAM(S): at 05:38

## 2023-08-28 RX ADMIN — MONTELUKAST 10 MILLIGRAM(S): 4 TABLET, CHEWABLE ORAL at 10:30

## 2023-08-28 RX ADMIN — FINASTERIDE 5 MILLIGRAM(S): 5 TABLET, FILM COATED ORAL at 10:30

## 2023-08-28 NOTE — PROGRESS NOTE ADULT - PROBLEM SELECTOR PROBLEM 3
Presence of Watchman left atrial appendage closure device

## 2023-08-28 NOTE — PROGRESS NOTE ADULT - REASON FOR ADMISSION
Lower GI Bleed

## 2023-08-28 NOTE — PROGRESS NOTE ADULT - PROBLEM SELECTOR PLAN 8
History  Chief Complaint   Patient presents with   • Cough   • Wheezing     77-year-old white female just returned from a trip to [place] 5 days ago.  She was fine until 3 nights ago when she started coughing with a little wheeze.  She saw her doctor the next day 2 days ago and was told her lungs are absolutely clear and was told to take Mucinex and Zyrtec.  She is presenting today because she started with temperature of 100 last evening and her cough is gotten worse with increasing wheezing.  She takes medicines for blood pressure including amlodipine, losartan, metoprolol.  She also takes Eliquis for A. fib.  She has been triple vaccinated with Materna.  She did go and get a PCR test done yesterday and got the results back today which were negative for Covid          History reviewed. No pertinent past medical history.    No past surgical history on file.    History reviewed. No pertinent family history.    Social History     Tobacco Use   • Smoking status: Not on file   • Smokeless tobacco: Not on file   Substance Use Topics   • Alcohol use: Not on file   • Drug use: Not on file       Review of Systems   Constitutional: Positive for fever.   HENT: Positive for congestion.    Respiratory: Positive for cough and wheezing.    All other systems reviewed and are negative.      Physical Exam  ED Triage Vitals [12/04/21 0915]   Temp Heart Rate Resp BP SpO2   37.3 °C (99.2 °F) (!) 115 17 130/70 95 %      Temp Source Heart Rate Source Patient Position BP Location FiO2 (%) (Set)   Oral Monitor Sitting Left upper arm --       Physical Exam  Vitals reviewed.   Constitutional:       General: She is not in acute distress.     Appearance: Normal appearance. She is not ill-appearing, toxic-appearing or diaphoretic.   HENT:      Head: Normocephalic.      Right Ear: Tympanic membrane normal.      Left Ear: Tympanic membrane and ear canal normal.      Nose: Congestion present.      Mouth/Throat:      Mouth: Mucous membranes are  moist.   Eyes:      Pupils: Pupils are equal, round, and reactive to light.   Cardiovascular:      Rate and Rhythm: Normal rate. Rhythm irregular.      Pulses: Normal pulses.      Comments: No ankle swelling or neck vein distention  Pulmonary:      Effort: Pulmonary effort is normal.      Comments: There is diffuse upper airway wheezing in all lung fields when she takes a deep breath and  Exhales    No signs of consolidation.  Musculoskeletal:      Cervical back: Normal range of motion.   Neurological:      Mental Status: She is alert.           Procedures  Procedures    UC Course  Clinical Impressions as of 12/04/21 0943   Acute bronchitis, unspecified organism       MDM          The medicines your doctor told you are correct for you to continue to  take.  I am also going to order a prescription for an inhaler that I use for people with asthma and when they wheeze.  Also prescription for Tessalon Perles to help you with the tickle in the back of her throat.    I am ordering an antibiotic Z-Jesus Alberto at this moment however I recommend you holding off for at least the next day or 2 to see if the medicines that we are prescribing now help.  If you not feeling any improvement in the next 2 days double check with your family doctor to see if it is okay to take the Z-Jesus Alberto.      Flu test is negative     Anibal Holman, DO  12/04/21 0909     - DVT PPX: SCD  - Diet: DASH  - Dispo: Pending decision to restart DAPT. PT recs no skilled needs. - DVT PPX: SCD  - Diet: DASH  - Dispo: On ASA; no absolute contraindications to DC

## 2023-08-28 NOTE — DISCHARGE NOTE PROVIDER - NSDCMRMEDTOKEN_GEN_ALL_CORE_FT
Albuterol (Eqv-Proventil HFA) 90 mcg/inh inhalation aerosol: 2 puff(s) inhaled every 6 hours as needed  atorvastatin 10 mg oral tablet: 1 tab(s) orally once a day  DilTIAZem (Eqv-Cardizem CD) 120 mg/24 hours oral capsule, extended release: 1 cap(s) orally once a day  finasteride 5 mg oral tablet: 1 tab(s) orally once a day  metoprolol succinate 25 mg oral tablet, extended release: 1 tab(s) orally once a day  montelukast 10 mg oral tablet: 1 tab(s) orally once a day  pantoprazole 40 mg oral delayed release tablet: 1 tab(s) orally once a day before breakfast  tamsulosin 0.4 mg oral capsule: 1 cap(s) orally once a day   Albuterol (Eqv-Proventil HFA) 90 mcg/inh inhalation aerosol: 2 puff(s) inhaled every 6 hours as needed  aspirin 81 mg oral tablet, chewable: 1 tab(s) orally once a day  atorvastatin 10 mg oral tablet: 1 tab(s) orally once a day  DilTIAZem (Eqv-Cardizem CD) 120 mg/24 hours oral capsule, extended release: 1 cap(s) orally once a day  finasteride 5 mg oral tablet: 1 tab(s) orally once a day  metoprolol succinate 25 mg oral tablet, extended release: 1 tab(s) orally once a day  montelukast 10 mg oral tablet: 1 tab(s) orally once a day  pantoprazole 40 mg oral delayed release tablet: 1 tab(s) orally once a day before breakfast  tamsulosin 0.4 mg oral capsule: 1 cap(s) orally once a day

## 2023-08-28 NOTE — PROGRESS NOTE ADULT - PROBLEM SELECTOR PLAN 3
- Patient s/p ablation and Watchman placement w/ Dr. Gaurav Marie (8/14) and was started on ASA/Plavix at that time  - Holding for now ISO lower GI bleed  - Interdisciplinary recs appreciated regarding restarting DAPT considering pt is a high bleeding risk and has had multiple large diverticular hemorrhages over past 4-5 months - Patient s/p ablation and Watchman placement w/ Dr. Gaurav Marie (8/14) and was started on ASA/Plavix at that time  - Holding for now ISO lower GI bleed  - Interdisciplinary recs appreciated regarding restarting DAPT considering pt is a high bleeding risk and has had multiple large diverticular hemorrhages over past 4-5 months    - ASA 81qd  - Hold on Plavix

## 2023-08-28 NOTE — DISCHARGE NOTE PROVIDER - NSFOLLOWUPCLINICS_GEN_ALL_ED_FT
[facility]  Gastroenterology  [id number] [place] [address]  Phone: [phone]  Fax:   Follow Up Time: 2 weeks

## 2023-08-28 NOTE — PROGRESS NOTE ADULT - SUBJECTIVE AND OBJECTIVE BOX
Surgery Daily Progress Note    Subjective:   Patient seen at bedside this AM. Denies acute onset abdominal pain, N/V/D, fevers, chills, SOB, CP, lightheadedness. Denies any BRBPR and is having regular bowel movements.     24h Events:   - Overnight, no acute events    Objective:  Vital Signs  T(C): 36.8 (08-28 @ 05:30), Max: 36.9 (08-27 @ 09:30)  HR: 94 (08-28 @ 05:30) (76 - 94)  BP: 138/67 (08-28 @ 05:30) (97/56 - 155/79)  RR: 17 (08-28 @ 05:30) (16 - 18)  SpO2: 100% (08-28 @ 05:30) (96% - 100%)  08-27-23 @ 07:01  -  08-28-23 @ 07:00  --------------------------------------------------------  IN:  Total IN: 0 mL    OUT:    Voided (mL): 250 mL  Total OUT: 250 mL    Total NET: -250 mL          Physical Exam:  GEN: resting in bed comfortably in NAD  RESP: no increased WOB  ABD: soft, non-distended, non-tender without rebound tenderness or guarding  EXTR: warm, well-perfused without gross deformities; spontaneous movement in b/l U/L extrem  NEURO: AAOx4    Labs:                        8.1    7.01  )-----------( 175      ( 28 Aug 2023 05:38 )             25.7   08-28    140  |  103  |  11  ----------------------------<  115<H>  4.0   |  28  |  0.95    Ca    8.5      28 Aug 2023 05:38  Phos  4.4     08-28  Mg     2.20     08-28    TPro  5.8<L>  /  Alb  3.4  /  TBili  0.5  /  DBili  x   /  AST  13  /  ALT  10  /  AlkPhos  65  08-28    CAPILLARY BLOOD GLUCOSE          Medications:   MEDICATIONS  (STANDING):  atorvastatin 10 milliGRAM(s) Oral at bedtime  chlorhexidine 2% Cloths 1 Application(s) Topical daily  diltiazem    milliGRAM(s) Oral daily  finasteride 5 milliGRAM(s) Oral daily  metoprolol succinate ER 25 milliGRAM(s) Oral daily  montelukast 10 milliGRAM(s) Oral daily  pantoprazole    Tablet 40 milliGRAM(s) Oral before breakfast  tamsulosin 0.4 milliGRAM(s) Oral at bedtime    MEDICATIONS  (PRN):  albuterol    90 MICROgram(s) HFA Inhaler 2 Puff(s) Inhalation every 6 hours PRN Shortness of Breath and/or Wheezing  polyethylene glycol 3350 17 Gram(s) Oral daily PRN Constipation      Imaging:

## 2023-08-28 NOTE — PROGRESS NOTE ADULT - PROBLEM SELECTOR PLAN 4
- Pt in sinus rhythm at this time, 1w s/p ablation and Watchman procedure  - Continue diltiazem 120 mg qd and metroprolol succinate 25 mg qd per rate control   - Continuous telemetry.

## 2023-08-28 NOTE — DISCHARGE NOTE PROVIDER - HOSPITAL COURSE
Mr. Leung is a 74 YO man with PMH of recurrent diverticular bleeding s/p multiple colonoscopies (no interventions) and prior +NM bleeding scan (5/2023 with active bleeding in cecum/proximal colon, no intervention), prostate cancer s/p radiation c/b radiation-associated vascular ectasia, internal hemorrhoids, HTN, asthma, recently dx afib s/p ablation started Plavix/ASA 1 week PTA who now presents with complaints recurrent painless BRBPR.     Patient had one episode of bloody red blood early morning, but given new AC medication and history he became concerned so he presented to the ED. Pt does report nausea intially w/ some dizziness, now improved. Denies CP, sob, vomiting, abdominal pain, hematuria, hemoptysis, hematemesis or syncope.       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    In the ED, HDS w/ borderline tachycardia. GI consulted in the ED, but no plan for urgent intervention. CTA abdomen negative for active bleed, therefore no intervention per IR. Cardiology evaluated patient and agreed with discontinuation of aspirin and Plavix. Surgery also evaluated patient, and as patient was hemodynamically stable, felt that surgical intervention would be reserved only for life saving measures in the emergency setting. Baseline hemoglobin was 13, in ED, hgb noted to be 10. Patient has since received 6 units of PRBCs, however hgb has further dropped to 7.6.     MICU re-consulted for further evaluation and possible need for ICU given multiple large bloody BMs, and further drop in hemoglobin.     At time of discharge, patient was stable for DC to home and further outpatient monitoring.    MAJOR MEDICAL CHANGES  - Please follow up with your PCP: Dr. Riki Desai in 1-2 weeks for management of your general health  - Please follow up with Dr. Calderón in 1-2 weeks for management of your cardiac health  - Please follow up with Dr. Marie in 10/3 for followup regarding your watchman device  - Please continue to take Aspirin 81mg daily  - Please stop taking Plavix    - Mr. Leung is a 72 YO man with PMH of recurrent diverticular bleeding s/p multiple colonoscopies (no interventions) and prior +NM bleeding scan (5/2023 with active bleeding in cecum/proximal colon, no intervention), prostate cancer s/p radiation c/b radiation-associated vascular ectasia, internal hemorrhoids, HTN, asthma, recently dx afib s/p ablation started Plavix/ASA 1 week PTA who now presents with complaints recurrent painless BRBPR.     In the ED, HDS w/ borderline tachycardia. GI consulted in the ED, but no plan for urgent intervention. CTA abdomen negative for active bleed, therefore no intervention per IR. Cardiology evaluated patient and agreed with discontinuation of aspirin and Plavix. Surgery also evaluated patient, and as patient was hemodynamically stable, felt that surgical intervention would be reserved only for life saving measures in the emergency setting. Baseline hemoglobin was 13. Patient had repeated BRBPR, requiring multiple transfusions, eventually transferred to MICU. Patient had CTA which showed extravasation of R Colonic artery; IR performed coil embolization which stabilized bleed. Patient received 9U pRBC, 3U FFP, 1U Plt, 1 Cryo.     Patient's HGB stabilized and was transferred to medical floors.    Upon discussion with Dr. Calderón and Dr. Marie as well as the patient, he was started on ASA without any Plavix.     At time of discharge, patient was stable for DC to home and further outpatient monitoring.    MAJOR MEDICAL CHANGES  - Please follow up with your PCP: Dr. Riki Desai in 1-2 weeks for management of your general health  - Please follow up with Dr. Calderón in 1-2 weeks for management of your cardiac health  - Please follow up with Dr. Marie in 10/3 for followup regarding your watchman device  - Please continue to take Aspirin 81mg daily  - Please stop taking Plavix Mr. Leung is a 72 YO man with PMH of recurrent diverticular bleeding s/p multiple colonoscopies (no interventions) and prior +NM bleeding scan (5/2023 with active bleeding in cecum/proximal colon, no intervention), prostate cancer s/p radiation c/b radiation-associated vascular ectasia, internal hemorrhoids, HTN, asthma, recently dx afib s/p ablation started Plavix/ASA 1 week PTA who now presents with complaints recurrent painless BRBPR.     In the ED, HDS w/ borderline tachycardia. GI consulted in the ED, but no plan for urgent intervention. CTA abdomen negative for active bleed, therefore no intervention per IR. Cardiology evaluated patient and agreed with discontinuation of aspirin and Plavix. Surgery also evaluated patient, and as patient was hemodynamically stable, felt that surgical intervention would be reserved only for life saving measures in the emergency setting. Baseline hemoglobin was 13. Patient had repeated BRBPR, requiring multiple transfusions, eventually transferred to MICU. Patient had CTA which showed extravasation of R Colonic artery; IR performed coil embolization which stabilized bleed. Patient received 9U pRBC, 3U FFP, 1U Plt, 1 Cryo in total during this admission.     Patient's HGB stabilized and was transferred to medical floors.    Upon discussion with Dr. Calderón and Dr. Marie as well as the patient, he was started on ASA without any Plavix.     At time of discharge, patient was stable for DC to home and further outpatient monitoring.    MAJOR MEDICAL CHANGES  - Please follow up with your PCP: Dr. Riki Desai in 1-2 weeks for management of your general health  - Please follow up with Dr. Calderón in 1-2 weeks for management of your cardiac health  - Please follow up with Dr. Marie in 10/3 for followup regarding your watchman device  - Please continue to take Aspirin 81mg daily  - Please stop taking Plavix Mr. Leung is a 72 YO man with PMH of recurrent diverticular bleeding s/p multiple colonoscopies (no interventions) and prior +NM bleeding scan (5/2023 with active bleeding in cecum/proximal colon, no intervention), prostate cancer s/p radiation c/b radiation-associated vascular ectasia, internal hemorrhoids, HTN, asthma, recently dx afib s/p ablation started Plavix/ASA 1 week PTA who now presents with complaints recurrent painless BRBPR.     In the ED, HDS w/ borderline tachycardia. GI consulted in the ED, but no plan for urgent intervention. CTA abdomen negative for active bleed, therefore no intervention per IR. Cardiology evaluated patient and agreed with discontinuation of aspirin and Plavix. Surgery also evaluated patient, and as patient was hemodynamically stable, felt that surgical intervention would be reserved only for life saving measures in the emergency setting. Baseline hemoglobin was 13. Patient had repeated BRBPR, requiring multiple transfusions, eventually transferred to MICU. Patient had CTA which showed extravasation of R Colonic artery; IR performed coil embolization which stabilized bleed. Patient received 9U pRBC, 3U FFP, 1U Plt, 1 Cryo in total during this admission.     Patient's HGB stabilized and was transferred to medical floors.    Upon discussion with Dr. Calderón and Dr. Marie as well as the patient, he was started on ASA without any Plavix.     At time of discharge, patient was stable for DC to home and further outpatient monitoring.    MAJOR MEDICAL CHANGES  - Please follow up with your PCP: Dr. Riki Desai in 1-2 weeks for management of your general health  - Please follow up with Dr. Calderón in 1-2 weeks for management of your cardiac health  - Please follow up with Dr. Marie in 10/3 for followup regarding your watchman device  - Please continue to take Aspirin 81mg daily  - Please continue to take Protonix daily before breakfast as long as you are taking the ASA  - Please stop taking Plavix

## 2023-08-28 NOTE — DISCHARGE NOTE NURSING/CASE MANAGEMENT/SOCIAL WORK - PATIENT PORTAL LINK FT
You can access the FollowMyHealth Patient Portal offered by [facility] by registering at the following website: [email or web address]. By joining [facility]’s FollowMyHealth portal, you will also be able to view your health information using other applications (apps) compatible with our system.

## 2023-08-28 NOTE — DISCHARGE NOTE PROVIDER - CARE PROVIDER_API CALL
Riki Desai  Pulmonary Disease  [address] [place] [address]  Phone: [phone]  Fax: [phone]  Established Patient  Follow Up Time: 1 week

## 2023-08-28 NOTE — PROGRESS NOTE ADULT - SUBJECTIVE AND OBJECTIVE BOX
Cardiovascular Disease Progress Note    Overnight events: No acute events overnight.  no new cardiac sx  Otherwise review of systems negative    Objective Findings:  T(C): 36.8 (08-28-23 @ 05:30), Max: 36.9 (08-27-23 @ 09:30)  HR: 94 (08-28-23 @ 05:30) (76 - 94)  BP: 138/67 (08-28-23 @ 05:30) (97/56 - 155/79)  RR: 17 (08-28-23 @ 05:30) (16 - 18)  SpO2: 100% (08-28-23 @ 05:30) (96% - 100%)  Wt(kg): --  Daily     Daily       Physical Exam:  Gen: NAD  HEENT: EOMI  CV: RRR, normal S1 + S2, no m/r/g  Lungs: CTAB  Abd: soft, non-tender  Ext: No edema    Telemetry:    Laboratory Data:                        8.1    7.01  )-----------( 175      ( 28 Aug 2023 05:38 )             25.7     08-28    140  |  103  |  11  ----------------------------<  115<H>  4.0   |  28  |  0.95    Ca    8.5      28 Aug 2023 05:38  Phos  4.4     08-28  Mg     2.20     08-28    TPro  5.8<L>  /  Alb  3.4  /  TBili  0.5  /  DBili  x   /  AST  13  /  ALT  10  /  AlkPhos  65  08-28              Inpatient Medications:  MEDICATIONS  (STANDING):  atorvastatin 10 milliGRAM(s) Oral at bedtime  chlorhexidine 2% Cloths 1 Application(s) Topical daily  diltiazem    milliGRAM(s) Oral daily  finasteride 5 milliGRAM(s) Oral daily  metoprolol succinate ER 25 milliGRAM(s) Oral daily  montelukast 10 milliGRAM(s) Oral daily  pantoprazole    Tablet 40 milliGRAM(s) Oral before breakfast  tamsulosin 0.4 milliGRAM(s) Oral at bedtime      Assessment:  73y Male with history of recurrent diverticular bleeding s/p multiple colonoscopies (no interventions) and prior +NM bleeding scan (5/2023 with active bleeding in cecum/proximal colon, no intervention), prostate cancer s/p radiation c/b radiation-associated vascular ectasia, internal hemorrhoids, HTN, asthma, recently diagnosed afib who is s/p ablation and implantation of a Watchman device on 8/14/23, discharged on 12 week course of  Plavix/ASA. He presents  with complaints recurrent BRBPR      Recs:  cardiac stable  s/p watchman implant on 8/14/23, antiplatelet meds initially on hold due to acute gi bleed. f/u eps recs --> okay to resume asa 81mg qd  s/p afib/afl ablation, currently in nsr. cont to monitor   cw beta blockers for nsvt  transfuse prn. monitor cbc closely  f/u gi and surgery recs  s/p ir embolization, cont to monitor h/h closely  bowel regimen  will follow   dcp      Over 25 minutes spent on total encounter; more than 50% of the visit was spent counseling and/or coordinating care by the attending physician.      Isaac Calderón MD   Cardiovascular Disease  [phone]

## 2023-08-28 NOTE — DISCHARGE NOTE PROVIDER - NSDCCPTREATMENT_GEN_ALL_CORE_FT
PRINCIPAL PROCEDURE  Procedure: Embolization of colonic vessel  Findings and Treatment: Provided by Anesthesia Department  1% Lidocaine  Left common femoral artery access was obtained.  Mesenteric angio was performed, with contrast extravasation seen in branch of right colic artery. Coil embolization was performed.  No complications  Mild  Left, Common Femoral        SECONDARY PROCEDURE  Procedure: CTA abdomen w/w/o contrast  Findings and Treatment:   < end of copied text >  BOWEL: New intraluminal extravasation of contrast in the cecum consistent   with acute gastrointestinal bleeding (4-72). Diverticulosis without   diverticulitis. No bowel obstruction. Appendix is normal. Small hiatal   hernia.  PERITONEUM: No ascites.  VESSELS: Atherosclerotic changes.  RETROPERITONEUM/LYMPH NODES: No lymphadenopathy.  ABDOMINAL WALL: Unchanged subcutaneous fat stranding overlying the right   common femoral vein, likely related to recent embolization. Small   bilateral fat-containing inguinal hernias, right larger than left. Small   fat-containing umbilical hernia.  BONES: Degenerative changes.  IMPRESSION:  Active bleeding in the cecum. It is noted that embolization had been   performed by the time of this dictation and is reported separately.  Preliminary findings discussed by Ayah Soto DO with RITA Bah at   8/22/23 at 6:46 PM with read back.< from: CT Angio Abdomen and Pelvis w/ IV Cont (08.22.23 @ 18:35) >

## 2023-08-28 NOTE — PROGRESS NOTE ADULT - PROVIDER SPECIALTY LIST ADULT
Cardiology
Colorectal Surgery
Intervent Radiology
Surgery
Electrophysiology
Electrophysiology
Internal Medicine
Anesthesia
Cardiology
Critical Care
Electrophysiology
Electrophysiology
Surgery
Colorectal Surgery
Colorectal Surgery
Critical Care
Electrophysiology
Gastroenterology
Gastroenterology
Internal Medicine
MICU
Internal Medicine
Internal Medicine

## 2023-08-28 NOTE — PROGRESS NOTE ADULT - SUBJECTIVE AND OBJECTIVE BOX
INCOMPLETE NOTE    José Manuel Meka | PGY2| Pager: [place] [phone]) -- ROBERT ([phone])  Interval Events:    REVIEW OF SYSTEMS:  CONSTITUTIONAL: No weakness, fevers or chills  EYES/ENT: No visual changes;  No vertigo or throat pain   NECK: No pain or stiffness  RESPIRATORY: No cough, wheezing, hemoptysis; No shortness of breath  CARDIOVASCULAR: No chest pain or palpitations  GASTROINTESTINAL: No abdominal or epigastric pain. No nausea, vomiting, or hematemesis; No diarrhea or constipation. No melena or hematochezia.  GENITOURINARY: No dysuria, frequency or hematuria  NEUROLOGICAL: No numbness or weakness  SKIN: No itching, burning, rashes, or lesions   All other review of systems is negative unless indicated above.    OBJECTIVE:  ICU Vital Signs Last 24 Hrs  T(C): 36.8 (28 Aug 2023 05:30), Max: 36.9 (27 Aug 2023 09:30)  T(F): 98.3 (28 Aug 2023 05:30), Max: 98.5 (27 Aug 2023 09:30)  HR: 94 (28 Aug 2023 05:30) (76 - 94)  BP: 138/67 (28 Aug 2023 05:30) (97/56 - 155/79)  BP(mean): 83 (28 Aug 2023 05:30) (83 - 83)  ABP: --  ABP(mean): --  RR: 17 (28 Aug 2023 05:30) (16 - 18)  SpO2: 100% (28 Aug 2023 05:30) (96% - 100%)    O2 Parameters below as of 28 Aug 2023 05:30  Patient On (Oxygen Delivery Method): room air              08-26 @ 07:01 - 08-27 @ 07:00  --------------------------------------------------------  IN: 600 mL / OUT: 1900 mL / NET: -1300 mL    08-27 @ 07:01 - 08-28 @ 05:59  --------------------------------------------------------  IN: 780 mL / OUT: 250 mL / NET: 530 mL      CAPILLARY BLOOD GLUCOSE          PHYSICAL EXAM:  General: WN/WD NAD  Neurology: A&Ox3, nonfocal, [place] x 4  Eyes: PERRLA/ EOMI, Gross vision intact  ENT/Neck: Neck supple, trachea midline, No JVD, Gross hearing intact  Respiratory: CTA B/L, No wheezing, rales, rhonchi  CV: RRR, +S1/S2, -S3/S4, no murmurs, rubs or gallops  Abdominal: Soft, NT, ND +BS, No HSM  MSK: 5/5 strength UE/LE bilaterally  Extremities: No edema, 2+ peripheral pulses  Skin: No Rashes, Hematoma, Ecchymosis  Incisions:   Tubes:    HOSPITAL MEDICATIONS:  MEDICATIONS  (STANDING):  atorvastatin 10 milliGRAM(s) Oral at bedtime  chlorhexidine 2% Cloths 1 Application(s) Topical daily  diltiazem    milliGRAM(s) Oral daily  finasteride 5 milliGRAM(s) Oral daily  metoprolol succinate ER 25 milliGRAM(s) Oral daily  montelukast 10 milliGRAM(s) Oral daily  pantoprazole    Tablet 40 milliGRAM(s) Oral before breakfast  tamsulosin 0.4 milliGRAM(s) Oral at bedtime    MEDICATIONS  (PRN):  albuterol    90 MICROgram(s) HFA Inhaler 2 Puff(s) Inhalation every 6 hours PRN Shortness of Breath and/or Wheezing  polyethylene glycol 3350 17 Gram(s) Oral daily PRN Constipation      LABS:                        8.4    7.75  )-----------( 158      ( 27 Aug 2023 06:05 )             26.3     Hgb Trend: 8.4<--, 8.4<--, 9.2<--, 8.4<--, 8.6<--  08-27    139  |  101  |  7   ----------------------------<  108<H>  3.5   |  27  |  0.71    Ca    8.7      27 Aug 2023 06:05  Phos  4.1     08-27  Mg     2.10     08-27    TPro  5.9<L>  /  Alb  3.6  /  TBili  0.7  /  DBili  x   /  AST  12  /  ALT  11  /  AlkPhos  64  08-27    Creatinine Trend: 0.71<--, 0.68<--, 0.95<--, 0.84<--, 0.78<--, 0.87<--    Urinalysis Basic - ( 27 Aug 2023 06:05 )    Color: x / Appearance: x / SG: x / pH: x  Gluc: 108 mg/dL / Ketone: x  / Bili: x / Urobili: x   Blood: x / Protein: x / Nitrite: x   Leuk Esterase: x / RBC: x / WBC x   Sq Epi: x / Non Sq Epi: x / Bacteria: x            MICROBIOLOGY:          José Manuel Meka | PGY2| Pager: [place] [phone] -- ROBERT ([phone])  Interval Events: Patient was asymptomatic overnight; had BMs over the weekend with no signs of blood or melenotic stool. HD stable; 6 episodes of NSVT, but asymptomatic, telemetry showing NSR. Asking about discharge. Had conversation with Dr. Marie who was with the patient at the time and they both agreed to Aspirin monotherapy for the time being.    OBJECTIVE:  ICU Vital Signs Last 24 Hrs  T(C): 36.8 (28 Aug 2023 05:30), Max: 36.9 (27 Aug 2023 09:30)  T(F): 98.3 (28 Aug 2023 05:30), Max: 98.5 (27 Aug 2023 09:30)  HR: 94 (28 Aug 2023 05:30) (76 - 94)  BP: 138/67 (28 Aug 2023 05:30) (97/56 - 155/79)  BP(mean): 83 (28 Aug 2023 05:30) (83 - 83)  ABP: --  ABP(mean): --  RR: 17 (28 Aug 2023 05:30) (16 - 18)  SpO2: 100% (28 Aug 2023 05:30) (96% - 100%)    O2 Parameters below as of 28 Aug 2023 05:30  Patient On (Oxygen Delivery Method): room air              08-26 @ 07:01  -  08-27 @ 07:00  --------------------------------------------------------  IN: 600 mL / OUT: 1900 mL / NET: -1300 mL    08-27 @ 07:01  -  08-28 @ 05:59  --------------------------------------------------------  IN: 780 mL / OUT: 250 mL / NET: 530 mL      CAPILLARY BLOOD GLUCOSE          PHYSICAL EXAM:  General: WN/WD NAD  Neurology: A&Ox3, nonfocal, [place] x 4  Respiratory: CTA B/L, No wheezing, rales, rhonchi  CV: RRR, +S1/S2, -S3/S4, no murmurs, rubs or gallops  Abdominal: Soft, NT, ND +BS, No HSM  Extremities: No edema, 2+ peripheral pulses  Skin: No Rashes, Hematoma, Ecchymosis      HOSPITAL MEDICATIONS:  MEDICATIONS  (STANDING):  atorvastatin 10 milliGRAM(s) Oral at bedtime  chlorhexidine 2% Cloths 1 Application(s) Topical daily  diltiazem    milliGRAM(s) Oral daily  finasteride 5 milliGRAM(s) Oral daily  metoprolol succinate ER 25 milliGRAM(s) Oral daily  montelukast 10 milliGRAM(s) Oral daily  pantoprazole    Tablet 40 milliGRAM(s) Oral before breakfast  tamsulosin 0.4 milliGRAM(s) Oral at bedtime    MEDICATIONS  (PRN):  albuterol    90 MICROgram(s) HFA Inhaler 2 Puff(s) Inhalation every 6 hours PRN Shortness of Breath and/or Wheezing  polyethylene glycol 3350 17 Gram(s) Oral daily PRN Constipation      LABS:                        8.4    7.75  )-----------( 158      ( 27 Aug 2023 06:05 )             26.3     Hgb Trend: 8.4<--, 8.4<--, 9.2<--, 8.4<--, 8.6<--  08-27    139  |  101  |  7   ----------------------------<  108<H>  3.5   |  27  |  0.71    Ca    8.7      27 Aug 2023 06:05  Phos  4.1     08-27  Mg     2.10     08-27    TPro  5.9<L>  /  Alb  3.6  /  TBili  0.7  /  DBili  x   /  AST  12  /  ALT  11  /  AlkPhos  64  08-27    Creatinine Trend: 0.71<--, 0.68<--, 0.95<--, 0.84<--, 0.78<--, 0.87<--    Urinalysis Basic - ( 27 Aug 2023 06:05 )    Color: x / Appearance: x / SG: x / pH: x  Gluc: 108 mg/dL / Ketone: x  / Bili: x / Urobili: x   Blood: x / Protein: x / Nitrite: x   Leuk Esterase: x / RBC: x / WBC x   Sq Epi: x / Non Sq Epi: x / Bacteria: x            MICROBIOLOGY:

## 2023-08-28 NOTE — PROGRESS NOTE ADULT - PROBLEM SELECTOR PLAN 1
- Pt presented with BRBPR, Hx of recurrent diverticular bleeds, ISO newly started DAPT.   - CTA 8/22; significant for arterial bleed; s/p Embolization  - s/p 9U pRBC, 3U plasma, 1 plt, 1 Cryo  - Protonix 40 mg qd   - Pt w no additional bleeds since 8/22  - Cardiology, EP, and GI recs appreciated regarding re-starting DAPT. Patient wants to discuss with Dr. Marie prior to starting any anti-platelet agent  - Hgb stable

## 2023-08-28 NOTE — PROGRESS NOTE ADULT - TIME BILLING
Evaluation, chart review, care coordination, treatment planning
Evaluation, chart review, care coordination.

## 2023-08-28 NOTE — PROGRESS NOTE ADULT - ASSESSMENT
73y Male with PMHx of recurrent diverticular bleeding s/p multiple colonoscopies demonstrating severe diverticulosis (no interventions) and prior +nuclear medicine bleeding scan (5/2023 with active bleeding in cecum/proximal colon, no intervention), prostate cancer s/p radiation c/b radiation-associated vascular ectasia, internal hemorrhoids, HTN, HLD, asthma, recently dx A.fib/A.flutter (in 5/2023) s/p Watchman procedure and initiation of A/c with Plavix/ASA, who initially presented on 8/21 with episode of BRBPR and dizziness/lightheadedness; he had additional episodes while workup ongoing and ultimately required intensive care. 9U pRBC, 3FFP, 1U Plt and 1 Cryo given. CTA with active bleeding in the cecum, now s/p coil embolization of branch of R colic artery. Pt improving since procedure with increasing Hb and no additional episodes of bleeding. Dispo pending PT recs and decision of whether to restart DAPT. 73y Male with PMHx of recurrent diverticular bleeding s/p multiple colonoscopies demonstrating severe diverticulosis (no interventions) and prior +nuclear medicine bleeding scan (5/2023 with active bleeding in cecum/proximal colon, no intervention), prostate cancer s/p radiation c/b radiation-associated vascular ectasia, internal hemorrhoids, HTN, HLD, asthma, recently dx A.fib/A.flutter (in 5/2023) s/p Watchman procedure and initiation of A/c with Plavix/ASA, who initially presented on 8/21 with episode of BRBPR and dizziness/lightheadedness; he had additional episodes while workup ongoing and ultimately required intensive care. 9U pRBC, 3FFP, 1U Plt and 1 Cryo given. CTA with active bleeding in the cecum, now s/p coil embolization of branch of R colic artery; showing improvement with no further episodes of BRBPR and Hgb has been stable. ready for DC on asa monotherapy

## 2023-08-28 NOTE — PROGRESS NOTE ADULT - PROBLEM SELECTOR PLAN 7
- Atorvastatin 10 mg bedtime
- Atorvastatin 10 mg bedtime.

## 2023-08-28 NOTE — DISCHARGE NOTE PROVIDER - NSDCCPCAREPLAN_GEN_ALL_CORE_FT
PRINCIPAL DISCHARGE DIAGNOSIS  Diagnosis: GIB (gastrointestinal bleeding)  Assessment and Plan of Treatment: You were initially admitted for severe blood loss due to bleeding from one of your colonic arteries. You were given large volumes of blood in order to keep your blood levels and your blood pressure elevated. You were evaluated with a CT scan of your abdomen which identified the target lesion and Interventional Radiology performed a coil embolectomy of your Right Colonic artery which helped to stop the bleeding.  Please continue to eat a high fiber diet to ensure you do not strain during bowel movements. Please continue to take your protonix 40mg medication prior to breakfast daily to minimze the chances of future GI bleeds.  Please return to the ED if you have any further episodes of bright red bloody bowel movements.  Please follow up with your PCP in 1-2 weeks for further management of your general health.      SECONDARY DISCHARGE DIAGNOSES  Diagnosis: Presence of Watchman left atrial appendage closure device  Assessment and Plan of Treatment: You recently received a watchman device in order to prevent strokes due to AFib/AFlutter. After this procedure, you are typically advised to have some blood thinning medication to help prevent clots in the post-operative period. Due to your bleeding episodes, and discussions with Dr. Calderón and Dr. Marie, the medical team has decided to only pursue therapy with aspirin and to hold off on further plavix to prevent further bleeding events.  Please follow up with Dr. Marie on 10/3 for further management of your watchman device  Please follow up with Dr. Calderón in 1-2 weeks for further management of your cardiac health     PRINCIPAL DISCHARGE DIAGNOSIS  Diagnosis: GIB (gastrointestinal bleeding)  Assessment and Plan of Treatment: You were initially admitted for severe blood loss due to bleeding from one of your colonic arteries. You were given large volumes of blood in order to keep your blood levels and your blood pressure elevated. You were evaluated with a CT scan of your abdomen which identified the target lesion and Interventional Radiology performed a coil embolectomy of your Right Colonic artery which helped to stop the bleeding.  Please continue to eat a high fiber diet to ensure you do not strain during bowel movements. Please continue to take your protonix 40mg medication prior to breakfast daily to minimze the chances of future GI bleeds.  Please return to the ED if you have any further episodes of bright red bloody bowel movements.  Please follow up with your PCP in 1-2 weeks for further management of your general health.   Please follow up with a Gastroenterolotist in 2 weeks for further monitoring of your GI bleeds. If you cannot find your own GI doctor, the [facility] GI physicians at [place] are listed in the followup.      SECONDARY DISCHARGE DIAGNOSES  Diagnosis: Presence of Watchman left atrial appendage closure device  Assessment and Plan of Treatment: You recently received a watchman device in order to prevent strokes due to AFib/AFlutter. After this procedure, you are typically advised to have some blood thinning medication to help prevent clots in the post-operative period. Due to your bleeding episodes, and discussions with Dr. Calderón and Dr. Marie, the medical team has decided to only pursue therapy with aspirin and to hold off on further plavix to prevent further bleeding events.  Please follow up with Dr. Marie on 10/3 for further management of your watchman device  Please follow up with Dr. Calderón in 1-2 weeks for further management of your cardiac health

## 2023-08-28 NOTE — PROGRESS NOTE ADULT - ASSESSMENT
73y Male with PMHx of prostate cancer s/p radiation c/b radiation-associated vascular ectasia, internal hemorrhoids, HTN, HLD, asthma, recently dx A.fib/A.flutter (in 5/2023) s/p Watchman procedure s/p resumption of the Plavix/ASA 1 week prior. He also has a long history of recurrent diverticular bleeding s/p multiple colonoscopies demonstrating severe diverticulosis (no interventions) and prior +nuclear medicine bleeding scan (5/2023 with active bleeding in cecum/proximal colon, no intervention) and he presents with recurrent BRBPR.  S/P IR embolization on 8/22, no longer experiencing blood per rectum.    PLAN:  -Monitor H/H   -Recommend repeat colonoscopy in the future post-discharge   -Continue to monitor abdominal exam, notify surgery for any changes/ worsening exam   - discharge when appropriate per primary team  -Discussed with Dr. Swanson Team, [id number]

## 2023-08-28 NOTE — PROGRESS NOTE ADULT - NS ATTEND OPT1 GEN_ALL_CORE
I independently performed the documented:
I independently performed the documented:
I attest my time as attending is greater than 50% of the total combined time spent on qualifying patient care activities by the PA/NP and attending.
I independently performed the documented:
I independently performed the documented:

## 2023-08-28 NOTE — DISCHARGE NOTE NURSING/CASE MANAGEMENT/SOCIAL WORK - NSDCVIVACCINE_GEN_ALL_CORE_FT
Tdap; 08-May-2023 07:17; Cedrick Coronel); [employer]; [id number] (Exp. Date: 04-Mar-2025); IntraMuscular; Deltoid Left.; 0.5 milliLiter(s); VIS (VIS Published: 09-May-2013, VIS Presented: 08-May-2023);

## 2023-08-28 NOTE — PROGRESS NOTE ADULT - ASSESSMENT
73M with recurrent diverticular bleeding, s/p multiple colonoscopies but no intervention, +NM bleeding scan with active bleeding in cecum and prox colon, prostate CA with RT, HTN, asthma and recently diagnosed afib and now s/p afib ablation with Watchman placement on 8/14/23. Started on DAPT post procedure per protocol. Patient presented with LGIB requiring multiple transfusions and now s/p IR embolization of R colic artery. DAPT was on hold now restarted on aspirin      Plan:   - continue to monitor off A/C. Will need GI clearance if plan to resume.  - continue Toprol 25 mg po QDaily and Cardizem  mg QD if BP can tolerate  - maintain K > 4.0, Mg > 2.0  - dispo per primary team

## 2023-08-28 NOTE — PROGRESS NOTE ADULT - ATTENDING COMMENTS
73M with pancolonic diverticulosis presents with hematochezia c/w diverticular bleed.    Now s/p CTA with active bleed w/ embolization by IR. When seen this morning comfortable with nontender abdomen, BP 130s/60s, HR 80s. Denied having any BMs since embolization (10 hours).     - trend H&H  - monitor BMs  - please alert GI if c/f re-bleed
Agree with E&M above    no re-bleed
Date of service: 8/24/23    Agree with E&M as outlined above
73M with pancolonic diverticulosis presents with hematochezia c/w diverticular bleed.     Unfortunately had multiple episodes of  bloody bowel movements this late afternoon associated with dizziness and tachycardia; CTA was planned but when transport arrived had an additional BM. Not responding adequately to transfusion. Being transferred to ICU setting.    - if continuing to bleed and HDUS would obtain CTA and consult IR; if CTA is unrevealing or IR unable to intervene make NPO at MN and start prepping for colonoscopy with 4L golytely (d/w patient's wife). C-scope, if needed, would be done at bedside later tomorrow. CTA (or bleeding scan) may help localize which region of colon bleed is originating from, which would facilitate endoscopic intervention.
Agree with above E&M as outlined above
as above
Patient discussed with EP earlier this am - will restart ASA 81mg and do PPI as per GI recs.  discharge planning and coordination ~ 45mins.
Acute blood loss anemia d/t lower GI bleed, s/p embolization of R colic artery by IR, required a total of 9 units PRBC, no further bleeding and Hgb now stable, monitor CBC   Chronic Afib s/p Watchman's procedure, c/w Metoprolol and Diltiazem, discussed risk/benefits of restarting DAPT w/ patient but he does not want to restart meds until he speaks w/ his primary cardiologist and EP   Hypovolemic shock resolved, briefly required pressors in ICU, now back on Metoprolol/Cardizem, monitor BP  Hypokalemia, supplement K prn
Recurrent episodes of bloody bowel movements with associated lightheadedness and dizziness    MAP this am > 65  hgb 8.2    GIB in setting of known diverticular disease with recent initiation of DAPT following Afib ablation and wathcman procedure: with ongoing bloody bm's and dropping hgb despite PRBC and fluctuating BP; continue to monitor h/h, hold DAPT, EP, Cardiology, GI, Colorectal surgery following. MICU to re-assess for transfer to higher level of monitoring.
Acute blood loss anemia d/t lower GI bleed, s/p embolization of R colic artery by IR, required a total of 9 units PRBC, no further bleeding, monitor CBC   Chronic Afib s/p Watchman's procedure, c/w Metoprolol and Diltiazem, restart ASA, f/up with GI about restarting Plavix   Hypovolemic shock resolved, briefly required pressors in ICU, now restarted Metoprolol/Cardizem, monitor BP  Hypokalemia, supplement K

## 2023-08-28 NOTE — PROGRESS NOTE ADULT - SUBJECTIVE AND OBJECTIVE BOX
Patient is seen and examined. Patient denies any chest pain, SOB, palpitations or dizziness.     PAST MEDICAL & SURGICAL HISTORY:  Hypertension    Hyperlipidemia    GIB (gastrointestinal bleeding)    Prostate cancer  s/p radiation therapy    Gout    Diverticulitis    Asthma    Atrial flutter    Diverticulosis    Presence of Watchman left atrial appendage closure device    History of hemorrhoidectomy    H/O total knee replacement, right        MEDICATIONS  (STANDING):  aspirin  chewable 81 milliGRAM(s) Oral daily  atorvastatin 10 milliGRAM(s) Oral at bedtime  chlorhexidine 2% Cloths 1 Application(s) Topical daily  diltiazem    milliGRAM(s) Oral daily  finasteride 5 milliGRAM(s) Oral daily  metoprolol succinate ER 25 milliGRAM(s) Oral daily  montelukast 10 milliGRAM(s) Oral daily  pantoprazole    Tablet 40 milliGRAM(s) Oral before breakfast  tamsulosin 0.4 milliGRAM(s) Oral at bedtime    MEDICATIONS  (PRN):  albuterol    90 MICROgram(s) HFA Inhaler 2 Puff(s) Inhalation every 6 hours PRN Shortness of Breath and/or Wheezing  polyethylene glycol 3350 17 Gram(s) Oral daily PRN Constipation      Vital Signs Last 24 Hrs  T(C): 36.9 (28 Aug 2023 10:54), Max: 36.9 (28 Aug 2023 01:00)  T(F): 98.5 (28 Aug 2023 10:54), Max: 98.5 (28 Aug 2023 01:00)  HR: 80 (28 Aug 2023 10:54) (76 - 94)  BP: 114/67 (28 Aug 2023 10:54) (97/56 - 155/79)  BP(mean): 83 (28 Aug 2023 05:30) (83 - 83)  RR: 17 (28 Aug 2023 10:54) (16 - 18)  SpO2: 100% (28 Aug 2023 10:54) (96% - 100%)    Parameters below as of 28 Aug 2023 05:30  Patient On (Oxygen Delivery Method): room air    INTERPRETATION OF TELEMETRY: Sinus rhythm with frequent APCs, PAT and HR 70s    LABS:                        8.1    7.01  )-----------( 175      ( 28 Aug 2023 05:38 )             25.7     08-28    140  |  103  |  11  ----------------------------<  115<H>  4.0   |  28  |  0.95    Ca    8.5      28 Aug 2023 05:38  Phos  4.4     08-28  Mg     2.20     08-28    TPro  5.8<L>  /  Alb  3.4  /  TBili  0.5  /  DBili  x   /  AST  13  /  ALT  10  /  AlkPhos  65  08-28    Urinalysis Basic - ( 28 Aug 2023 05:38 )    Color: x / Appearance: x / SG: x / pH: x  Gluc: 115 mg/dL / Ketone: x  / Bili: x / Urobili: x   Blood: x / Protein: x / Nitrite: x   Leuk Esterase: x / RBC: x / WBC x   Sq Epi: x / Non Sq Epi: x / Bacteria: x      I&O's Summary    27 Aug 2023 07:01  -  28 Aug 2023 07:00  --------------------------------------------------------  IN: 780 mL / OUT: 250 mL / NET: 530 mL      PHYSICAL EXAM:    GENERAL: In no apparent distress, well nourished, and hydrated.  HEART: Regular rate and rhythm; No murmurs, rubs, or gallops.  PULMONARY: Clear to auscultation and percussion.  No rales, wheezing, or rhonchi bilaterally.  ABDOMEN: Soft, Nontender, Nondistended; Bowel sounds present  EXTREMITIES:  2+ Peripheral Pulses, No clubbing, cyanosis, or edema

## 2023-08-28 NOTE — PROGRESS NOTE ADULT - NS ATTEND AMEND GEN_ALL_CORE FT
73M with recurrent diverticular bleeding, s/p multiple colonoscopies but no intervention, +NM bleeding scan with active bleeding in cecum and prox colon, prostate CA with RT, HTN, asthma and recently diagnosed afib and now s/p afib ablation with Watchman placement on 8/14/23. Started on DAPT post procedure per protocol. Patient presented with LGIB requiring multiple transfusions and now s/p IR embolization of R colic artery. DAPT was on hold now restarted on aspirin. Cont to monitor.
73M with recurrent diverticular bleeding, s/p multiple colonoscopies but no intervention, +NM bleeding scan with active bleeding in cecum and prox colon, prostate CA with RT, HTN, asthma and recently diagnosed afib and now s/p afib ablation with Watchman placement on 8/14/23. Started on DAPT post procedure per protocol. Patient presents with LGIB requiring multiple transfusions and now s/p IR embolization of R colic artery. DAPT on hold. Continue to monitor off A/C. Will need GI clearance.  If H/H remains stable and GI is in agreement, would prefer to restart ASA 81 mg and Plavix 75 mg.  Continue Toprol 25 mg po QD.
May resume ASA and clopidogrel tomorrow if H/H stable and if GI agrees.
73y Male with history of recurrent diverticular bleeding s/p multiple colonoscopies (no interventions) and prior +NM bleeding scan (5/2023 with active bleeding in cecum/proximal colon, no intervention), prostate cancer s/p radiation c/b radiation-associated vascular ectasia, internal hemorrhoids, HTN, asthma, recently diagnosed afib who is s/p ablation and implantation of a Watchman device on 8/14/23 and immediately placed on DAPT (Plavix/ASA). He presented  with active lower GI bleed with hypotension associated with mild LLQ abdominal pain and intermittent chest discomfort, s/p 3 units PRBC's. Patient underwent embolization last night in IR. Tolerated the procedure well. No further bleeding.   EKG and telemetry w/ sinus rhythm 80's bpm with occasional PVC's.  No evidence of AFib/Flutter. Off anticoagulation. Plan: Continue to hold ASA and Plavix. Keep K+ > 4.0 and Mg > 2.0. Continue Cardizem CD 120mg daily and metoprolol succinate 25mg daily for rate control when BP permits  Telemetry monitoring.
73y Male with history of recurrent diverticular bleeding s/p multiple colonoscopies (no interventions) and prior +NM bleeding scan (5/2023 with active bleeding in cecum/proximal colon, no intervention), prostate cancer s/p radiation c/b radiation-associated vascular ectasia, internal hemorrhoids, HTN, asthma, recently diagnosed afib who is s/p ablation and implantation of a Watchman device on 8/14/23 and immediately placed on DAPT (Plavix/ASA). He now presents  with active lower GI bleed with hypotension. mild LLQ abdominal pain and intermittent chest discomfort, s/p 3 units PRBC's. EKG and telemetry w/ sinus tachycardia 90's to 109 bpm with occasional PVC's.  No evidence of AFib/Flutter. Surgical consult: no plan for surgery at this time but will continue monitor. GI consulted in the ED, but no plan for urgent intervention. ASA and Plavix discontinued.  Continue Cardizem CD 120mg daily and metoprolol succinate 25mg daily for rate control. Telemetry monitoring.

## 2023-08-28 NOTE — DISCHARGE NOTE PROVIDER - NSDCFUSCHEDAPPT_GEN_ALL_CORE_FT
Gaurav Marie  [facility]  [facility] [phone]  Scheduled Appointment: 10/03/2023

## 2023-08-28 NOTE — DISCHARGE NOTE NURSING/CASE MANAGEMENT/SOCIAL WORK - NSDCPEFALRISK_GEN_ALL_CORE
For information on Fall & Injury Prevention, visit: [email or web address] OR  [email or web address] OR  https://www.cdc.gov/steadi/patient.html

## 2023-09-29 ENCOUNTER — OUTPATIENT (OUTPATIENT)
Dept: OUTPATIENT SERVICES | Facility: HOSPITAL | Age: 73
LOS: 1 days | End: 2023-09-29

## 2023-09-29 DIAGNOSIS — Z98.890 OTHER SPECIFIED POSTPROCEDURAL STATES: Chronic | ICD-10-CM

## 2023-09-29 DIAGNOSIS — Z96.651 PRESENCE OF RIGHT ARTIFICIAL KNEE JOINT: Chronic | ICD-10-CM

## 2023-09-29 PROBLEM — K57.90 DIVERTICULOSIS OF INTESTINE, PART UNSPECIFIED, WITHOUT PERFORATION OR ABSCESS WITHOUT BLEEDING: Chronic | Status: ACTIVE | Noted: 2023-08-21

## 2023-09-29 PROBLEM — Z95.818 PRESENCE OF OTHER CARDIAC IMPLANTS AND GRAFTS: Chronic | Status: ACTIVE | Noted: 2023-08-21

## 2023-09-29 RX ORDER — ALBUTEROL 90 UG/1
2 AEROSOL, METERED ORAL
Refills: 0 | DISCHARGE

## 2023-10-03 ENCOUNTER — NON-APPOINTMENT (OUTPATIENT)
Age: 73
End: 2023-10-03

## 2023-10-03 ENCOUNTER — APPOINTMENT (OUTPATIENT)
Dept: ELECTROPHYSIOLOGY | Facility: CLINIC | Age: 73
End: 2023-10-03
Payer: MEDICARE

## 2023-10-03 VITALS
WEIGHT: 221 LBS | BODY MASS INDEX: 31.64 KG/M2 | HEIGHT: 70 IN | DIASTOLIC BLOOD PRESSURE: 61 MMHG | SYSTOLIC BLOOD PRESSURE: 101 MMHG | OXYGEN SATURATION: 95 % | HEART RATE: 92 BPM

## 2023-10-03 DIAGNOSIS — Z98.890 OTHER SPECIFIED POSTPROCEDURAL STATES: ICD-10-CM

## 2023-10-03 DIAGNOSIS — I48.92 UNSPECIFIED ATRIAL FLUTTER: ICD-10-CM

## 2023-10-03 DIAGNOSIS — Z86.79 OTHER SPECIFIED POSTPROCEDURAL STATES: ICD-10-CM

## 2023-10-03 DIAGNOSIS — E78.5 HYPERLIPIDEMIA, UNSPECIFIED: ICD-10-CM

## 2023-10-03 PROCEDURE — 93000 ELECTROCARDIOGRAM COMPLETE: CPT

## 2023-10-03 PROCEDURE — 99213 OFFICE O/P EST LOW 20 MIN: CPT

## 2023-10-03 RX ORDER — DILTIAZEM HYDROCHLORIDE 120 MG/1
120 CAPSULE, EXTENDED RELEASE ORAL DAILY
Refills: 0 | Status: ACTIVE | COMMUNITY
Start: 2023-05-30

## 2023-10-03 RX ORDER — METOPROLOL SUCCINATE 25 MG/1
25 TABLET, EXTENDED RELEASE ORAL
Qty: 90 | Refills: 1 | Status: ACTIVE | COMMUNITY
Start: 2023-10-03

## 2023-10-11 DIAGNOSIS — I48.92 UNSPECIFIED ATRIAL FLUTTER: ICD-10-CM

## 2023-12-14 NOTE — H&P PST ADULT - AS BP NONINV METHOD
Please take 1/2 bottle of magnesium citrate and wait 2 hours with 1 bottle of water.  If no bowel movement at that time, please take the rest of the medication when you get home.     electronic

## 2024-01-15 ENCOUNTER — INPATIENT (INPATIENT)
Facility: HOSPITAL | Age: 74
LOS: 3 days | Discharge: HOME | DRG: 315 | End: 2024-01-19
Attending: GENERAL PRACTICE | Admitting: GENERAL PRACTICE
Payer: MEDICARE

## 2024-01-15 VITALS
DIASTOLIC BLOOD PRESSURE: 68 MMHG | RESPIRATION RATE: 20 BRPM | TEMPERATURE: 98 F | OXYGEN SATURATION: 95 % | HEART RATE: 105 BPM | SYSTOLIC BLOOD PRESSURE: 119 MMHG | WEIGHT: 209 LBS | HEIGHT: 71 IN

## 2024-01-15 DIAGNOSIS — Z29.9 ENCOUNTER FOR PROPHYLACTIC MEASURES, UNSPECIFIED: ICD-10-CM

## 2024-01-15 DIAGNOSIS — Z98.890 OTHER SPECIFIED POSTPROCEDURAL STATES: Chronic | ICD-10-CM

## 2024-01-15 DIAGNOSIS — I10 ESSENTIAL (PRIMARY) HYPERTENSION: ICD-10-CM

## 2024-01-15 DIAGNOSIS — I48.20 CHRONIC ATRIAL FIBRILLATION, UNSPECIFIED: ICD-10-CM

## 2024-01-15 DIAGNOSIS — J45.909 UNSPECIFIED ASTHMA, UNCOMPLICATED: ICD-10-CM

## 2024-01-15 DIAGNOSIS — R07.81 PLEURODYNIA: ICD-10-CM

## 2024-01-15 DIAGNOSIS — I31.39 OTHER PERICARDIAL EFFUSION (NONINFLAMMATORY): ICD-10-CM

## 2024-01-15 DIAGNOSIS — Z96.651 PRESENCE OF RIGHT ARTIFICIAL KNEE JOINT: Chronic | ICD-10-CM

## 2024-01-15 LAB
ALBUMIN SERPL ELPH-MCNC: 3.8 G/DL — SIGNIFICANT CHANGE UP (ref 3.3–5)
ALP SERPL-CCNC: 69 U/L — SIGNIFICANT CHANGE UP (ref 40–120)
ALT FLD-CCNC: 12 U/L — SIGNIFICANT CHANGE UP (ref 10–45)
ANION GAP SERPL CALC-SCNC: 11 MMOL/L — SIGNIFICANT CHANGE UP (ref 5–17)
APTT BLD: 41.3 SEC — HIGH (ref 24.5–35.6)
AST SERPL-CCNC: 12 U/L — SIGNIFICANT CHANGE UP (ref 10–40)
BASOPHILS # BLD AUTO: 0.05 K/UL — SIGNIFICANT CHANGE UP (ref 0–0.2)
BASOPHILS NFR BLD AUTO: 0.5 % — SIGNIFICANT CHANGE UP (ref 0–2)
BILIRUB SERPL-MCNC: 0.8 MG/DL — SIGNIFICANT CHANGE UP (ref 0.2–1.2)
BUN SERPL-MCNC: 13 MG/DL — SIGNIFICANT CHANGE UP (ref 7–23)
CALCIUM SERPL-MCNC: 9.3 MG/DL — SIGNIFICANT CHANGE UP (ref 8.4–10.5)
CHLORIDE SERPL-SCNC: 97 MMOL/L — SIGNIFICANT CHANGE UP (ref 96–108)
CO2 SERPL-SCNC: 28 MMOL/L — SIGNIFICANT CHANGE UP (ref 22–31)
CREAT SERPL-MCNC: 0.9 MG/DL — SIGNIFICANT CHANGE UP (ref 0.5–1.3)
EGFR: 90 ML/MIN/1.73M2 — SIGNIFICANT CHANGE UP
EOSINOPHIL # BLD AUTO: 0.03 K/UL — SIGNIFICANT CHANGE UP (ref 0–0.5)
EOSINOPHIL NFR BLD AUTO: 0.3 % — SIGNIFICANT CHANGE UP (ref 0–6)
GLUCOSE SERPL-MCNC: 165 MG/DL — HIGH (ref 70–99)
HCT VFR BLD CALC: 33.4 % — LOW (ref 39–50)
HGB BLD-MCNC: 10.5 G/DL — LOW (ref 13–17)
IMM GRANULOCYTES NFR BLD AUTO: 0.4 % — SIGNIFICANT CHANGE UP (ref 0–0.9)
INR BLD: 1.27 RATIO — HIGH (ref 0.85–1.18)
LYMPHOCYTES # BLD AUTO: 0.53 K/UL — LOW (ref 1–3.3)
LYMPHOCYTES # BLD AUTO: 5.1 % — LOW (ref 13–44)
MCHC RBC-ENTMCNC: 30.6 PG — SIGNIFICANT CHANGE UP (ref 27–34)
MCHC RBC-ENTMCNC: 31.4 GM/DL — LOW (ref 32–36)
MCV RBC AUTO: 97.4 FL — SIGNIFICANT CHANGE UP (ref 80–100)
MONOCYTES # BLD AUTO: 0.95 K/UL — HIGH (ref 0–0.9)
MONOCYTES NFR BLD AUTO: 9.1 % — SIGNIFICANT CHANGE UP (ref 2–14)
NEUTROPHILS # BLD AUTO: 8.87 K/UL — HIGH (ref 1.8–7.4)
NEUTROPHILS NFR BLD AUTO: 84.6 % — HIGH (ref 43–77)
NRBC # BLD: 0 /100 WBCS — SIGNIFICANT CHANGE UP (ref 0–0)
PLATELET # BLD AUTO: 257 K/UL — SIGNIFICANT CHANGE UP (ref 150–400)
POTASSIUM SERPL-MCNC: 3.6 MMOL/L — SIGNIFICANT CHANGE UP (ref 3.5–5.3)
POTASSIUM SERPL-SCNC: 3.6 MMOL/L — SIGNIFICANT CHANGE UP (ref 3.5–5.3)
PROT SERPL-MCNC: 7.3 G/DL — SIGNIFICANT CHANGE UP (ref 6–8.3)
PROTHROM AB SERPL-ACNC: 13.9 SEC — HIGH (ref 9.5–13)
RAPID RVP RESULT: SIGNIFICANT CHANGE UP
RBC # BLD: 3.43 M/UL — LOW (ref 4.2–5.8)
RBC # FLD: 14.6 % — HIGH (ref 10.3–14.5)
SARS-COV-2 RNA SPEC QL NAA+PROBE: SIGNIFICANT CHANGE UP
SODIUM SERPL-SCNC: 136 MMOL/L — SIGNIFICANT CHANGE UP (ref 135–145)
TROPONIN T, HIGH SENSITIVITY RESULT: 27 NG/L — SIGNIFICANT CHANGE UP (ref 0–51)
TROPONIN T, HIGH SENSITIVITY RESULT: 28 NG/L — SIGNIFICANT CHANGE UP (ref 0–51)
WBC # BLD: 10.47 K/UL — SIGNIFICANT CHANGE UP (ref 3.8–10.5)
WBC # FLD AUTO: 10.47 K/UL — SIGNIFICANT CHANGE UP (ref 3.8–10.5)

## 2024-01-15 PROCEDURE — 71275 CT ANGIOGRAPHY CHEST: CPT | Mod: 26,MA

## 2024-01-15 PROCEDURE — 99285 EMERGENCY DEPT VISIT HI MDM: CPT

## 2024-01-15 PROCEDURE — 71045 X-RAY EXAM CHEST 1 VIEW: CPT | Mod: 26

## 2024-01-15 PROCEDURE — 99223 1ST HOSP IP/OBS HIGH 75: CPT

## 2024-01-15 PROCEDURE — 74174 CTA ABD&PLVS W/CONTRAST: CPT | Mod: 26,MA

## 2024-01-15 RX ORDER — HEPARIN SODIUM 5000 [USP'U]/ML
5000 INJECTION INTRAVENOUS; SUBCUTANEOUS EVERY 8 HOURS
Refills: 0 | Status: DISCONTINUED | OUTPATIENT
Start: 2024-01-15 | End: 2024-01-19

## 2024-01-15 RX ORDER — POLYETHYLENE GLYCOL 3350 17 G/17G
17 POWDER, FOR SOLUTION ORAL DAILY
Refills: 0 | Status: DISCONTINUED | OUTPATIENT
Start: 2024-01-15 | End: 2024-01-19

## 2024-01-15 RX ORDER — FINASTERIDE 5 MG/1
5 TABLET, FILM COATED ORAL DAILY
Refills: 0 | Status: DISCONTINUED | OUTPATIENT
Start: 2024-01-15 | End: 2024-01-19

## 2024-01-15 RX ORDER — MORPHINE SULFATE 50 MG/1
2 CAPSULE, EXTENDED RELEASE ORAL EVERY 6 HOURS
Refills: 0 | Status: DISCONTINUED | OUTPATIENT
Start: 2024-01-15 | End: 2024-01-19

## 2024-01-15 RX ORDER — DILTIAZEM HCL 120 MG
120 CAPSULE, EXT RELEASE 24 HR ORAL DAILY
Refills: 0 | Status: DISCONTINUED | OUTPATIENT
Start: 2024-01-15 | End: 2024-01-19

## 2024-01-15 RX ORDER — FERROUS SULFATE 325(65) MG
325 TABLET ORAL DAILY
Refills: 0 | Status: DISCONTINUED | OUTPATIENT
Start: 2024-01-15 | End: 2024-01-19

## 2024-01-15 RX ORDER — TAMSULOSIN HYDROCHLORIDE 0.4 MG/1
0.4 CAPSULE ORAL AT BEDTIME
Refills: 0 | Status: DISCONTINUED | OUTPATIENT
Start: 2024-01-15 | End: 2024-01-19

## 2024-01-15 RX ORDER — ACETAMINOPHEN 500 MG
650 TABLET ORAL EVERY 6 HOURS
Refills: 0 | Status: DISCONTINUED | OUTPATIENT
Start: 2024-01-15 | End: 2024-01-19

## 2024-01-15 RX ORDER — ASPIRIN/CALCIUM CARB/MAGNESIUM 324 MG
81 TABLET ORAL DAILY
Refills: 0 | Status: DISCONTINUED | OUTPATIENT
Start: 2024-01-15 | End: 2024-01-19

## 2024-01-15 RX ORDER — ATORVASTATIN CALCIUM 80 MG/1
80 TABLET, FILM COATED ORAL AT BEDTIME
Refills: 0 | Status: DISCONTINUED | OUTPATIENT
Start: 2024-01-15 | End: 2024-01-19

## 2024-01-15 RX ORDER — METOPROLOL TARTRATE 50 MG
25 TABLET ORAL DAILY
Refills: 0 | Status: DISCONTINUED | OUTPATIENT
Start: 2024-01-15 | End: 2024-01-17

## 2024-01-15 RX ORDER — MONTELUKAST 4 MG/1
10 TABLET, CHEWABLE ORAL DAILY
Refills: 0 | Status: DISCONTINUED | OUTPATIENT
Start: 2024-01-15 | End: 2024-01-19

## 2024-01-15 RX ORDER — FUROSEMIDE 40 MG
20 TABLET ORAL DAILY
Refills: 0 | Status: DISCONTINUED | OUTPATIENT
Start: 2024-01-15 | End: 2024-01-16

## 2024-01-15 RX ADMIN — TAMSULOSIN HYDROCHLORIDE 0.4 MILLIGRAM(S): 0.4 CAPSULE ORAL at 22:13

## 2024-01-15 RX ADMIN — ATORVASTATIN CALCIUM 80 MILLIGRAM(S): 80 TABLET, FILM COATED ORAL at 22:13

## 2024-01-15 NOTE — ED PROVIDER NOTE - CHILD ABUSE FACILITY
[facility]

## 2024-01-15 NOTE — H&P ADULT - NSHPLABSRESULTS_GEN_ALL_CORE
personally reviewed by me:  c< from: CT Angio Abdomen and Pelvis w/ IV Cont (01.15.24 @ 17:38) >    IMPRESSION:  Cardiomegaly with large pericardial effusion.  No aortic dissection. Mildly dilated aortic root and mid ascending aortic   segment.  No pulmonary embolism.< from: Xray Chest 1 View- PORTABLE-Urgent (01.15.24 @ 16:50) >        INTERPRETATION:  small bilateral pleural effusions, left greater than   right, with mild adjacent passive atelectasis.    < end of copied text >    < from: APRIL W or WO Ultrasound Enhancing Agent (09.29.23 @ 08:34) >     1. Left ventricular systolic function is normal.   2. Normal right ventricular cavity size and systolic function.   3. No pericardial effusion seen.    < end of copied text >    EKG: afib @ 106, no st changes, no q's

## 2024-01-15 NOTE — H&P ADULT - PROBLEM SELECTOR PLAN 1
CTA significant for cardiomegaly with large pericardial effusion.  Negative for aortic dissection/vte.  EKG with afib but no acute st changes and no evidence of pericarditis.  Trop normal but BNP elevated at 2056.  I spoke with Dr Calderón regarding the CTA finding of large pericardial effusion.  He noted small to moderate effusion on TTE in office.  As he is hemodynamically stable recommends repeating TTE for now.  Of note prior TTE 9/2023 was normal-no cardiomegaly and no pericardial effusion noted.  Also had outpatient nuclear stress concerning for lateral ischemia, considering cath tomorrow.  -repeat TTE  -check TSH/rvp  -monitor on tele  -tylenol/morphine prn pain  -continue home asa/statin/bb  -repeat troponin  -f/u cards recs  -possible cath in am-no need for hep gtt per cards  -increase statin to 80mg lipitor

## 2024-01-15 NOTE — ED PROVIDER NOTE - CLINICAL SUMMARY MEDICAL DECISION MAKING FREE TEXT BOX
73M w/ PMHx of recurrent diverticular bleeding s/p embolization, prostate cancer s/p radiation c/b radiation-associated vascular ectasia, internal hemorrhoids, HTN, HLD, asthma, recently dx A.fib/A.flutter (in 5/2023) s/p Watchman procedure p/f worsening b/l chest pain and SOB for 3 days. Will obtain labs, CTA aorta + CTA chest to r/o PE vs dissection. Plan to admit to Dr. Luu.

## 2024-01-15 NOTE — ED PROVIDER NOTE - OBJECTIVE STATEMENT
73M w/ PMHx of recurrent diverticular bleeding s/p embolization, prostate cancer s/p radiation c/b radiation-associated vascular ectasia, internal hemorrhoids, HTN, HLD, asthma, recently dx A.fib/A.flutter (in 5/2023) s/p Watchman procedure p/f worsening b/l chest pain and SOB for 3 days. Pt stated CP started after doing physical exercise. Pain worse with inspiration and laying flat; also endorsing a productive cough (whitish cough) w/ constipation since Saturday. Denies fever, chills, nausea, vomiting.

## 2024-01-15 NOTE — ED PROVIDER NOTE - NS ED ROS FT
REVIEW OF SYSTEMS:    CONSTITUTIONAL:  No weakness, fevers or chills  EYES/ENT:  No visual changes;  No vertigo or throat pain   NECK:  No pain or stiffness  RESPIRATORY: productive cough   CARDIOVASCULAR:  + pleuritic chest pain   GASTROINTESTINAL:  No abdominal or epigastric pain. No nausea, vomiting, or hematemesis; No diarrhea or constipation. No melena or hematochezia.  GENITOURINARY:  No dysuria, frequency or hematuria  MUSCULOSKELETAL:  FROM all extremities, normal strength, No calf tenderness  NEUROLOGICAL:  No numbness or weakness  SKIN:  No itching, rashes

## 2024-01-15 NOTE — ED ADULT TRIAGE NOTE - GLASGOW COMA SCALE: SCORE, MLM
15
For information on Fall & Injury Prevention, visit: [email or web address] OR  [email or web address] OR  https://www.cdc.gov/steadi/patient.html

## 2024-01-15 NOTE — ED ADULT NURSE NOTE - NSFALLUNIVINTERV_ED_ALL_ED
Bed/Stretcher in lowest position, wheels locked, appropriate side rails in place/Call bell, personal items and telephone in reach/Instruct patient to call for assistance before getting out of bed/chair/stretcher/Non-slip footwear applied when patient is off stretcher/[place] to call system/Physically safe environment - no spills, clutter or unnecessary equipment/Purposeful proactive rounding/Room/bathroom lighting operational, light cord in reach Bed/Stretcher in lowest position, wheels locked, appropriate side rails in place/Call bell, personal items and telephone in reach/Instruct patient to call for assistance before getting out of bed/chair/stretcher/Non-slip footwear applied when patient is off stretcher/[place] to call system/Physically safe environment - no spills, clutter or unnecessary equipment/Purposeful proactive rounding/Room/bathroom lighting operational, light cord in reach Bed/Stretcher in lowest position, wheels locked, appropriate side rails in place/Call bell, personal items and telephone in reach/Instruct patient to call for assistance before getting out of bed/chair/stretcher/Non-slip footwear applied when patient is off stretcher/[place] to call system/Physically safe environment - no spills, clutter or unnecessary equipment/Purposeful proactive rounding/Room/bathroom lighting operational, light cord in reach Bed/Stretcher in lowest position, wheels locked, appropriate side rails in place/Call bell, personal items and telephone in reach/Instruct patient to call for assistance before getting out of bed/chair/stretcher/Non-slip footwear applied when patient is off stretcher/[place] to call system/Physically safe environment - no spills, clutter or unnecessary equipment/Purposeful proactive rounding/Room/bathroom lighting operational, light cord in reach

## 2024-01-15 NOTE — ED PROVIDER NOTE - PHYSICAL EXAMINATION
LOS:     VITALS:   T(C): 36.6 (01-15-24 @ 14:48), Max: 36.6 (01-15-24 @ 14:48)  HR: 105 (01-15-24 @ 14:48) (105 - 105)  BP: 119/68 (01-15-24 @ 14:48) (119/68 - 119/68)  RR: 20 (01-15-24 @ 14:48) (20 - 20)  SpO2: 95% (01-15-24 @ 14:48) (95% - 95%)    GENERAL: NAD, lying in bed comfortably  HEAD:  Atraumatic, Normocephalic  EYES: EOMI, PERRLA, conjunctiva and sclera clear  ENT: Moist mucous membranes  NECK: Supple, No JVD  CHEST/LUNG: Clear to auscultation bilaterally; No rales, rhonchi, wheezing, or rubs. Unlabored respirations  HEART: Irregular rate and rhythm; No murmurs, rubs, or gallops  ABDOMEN: BSx4; Soft, nontender, nondistended  EXTREMITIES:  2+ Peripheral Pulses, brisk capillary refill. No clubbing, cyanosis, or edema  NERVOUS SYSTEM:  A&Ox3, no focal deficits   SKIN: No rashes or lesions

## 2024-01-15 NOTE — H&P ADULT - NSHPSOCIALHISTORY_GEN_ALL_CORE
denies etoh/tob/ivdu  retired   lives at home with wife  no assistive equipment  no HHA    does not know parents medical history

## 2024-01-15 NOTE — ED ADULT NURSE REASSESSMENT NOTE - NS ED NURSE REASSESS COMMENT FT1
received report from FARTUN Dudley @[id number]. pt appears comfortable resting in stretcher with appropriate side rails up for safety. CCM in place. pt is A&Ox3, breathing even and unlabored on room air, VSS, NAD noted at this time. ambulatory independent with steady gait. Patient admitted to [facility]. Admission band applied to patient utilizing two patient identifiers. Patient notified and updated on plan of care. pending bed assignment.

## 2024-01-15 NOTE — H&P ADULT - COMMENTS
Gen: no night sweats/fever/weight change  Neuro: no syncope  Psych: no anxiety/depression  ENT: no dysphagia/odynophagia  Resp: +sob.  Mild cough  CV: per HPI  Abd: no n/v/abd pain.  + constipation  : no dysuria/hesitancy  MSK: chronic left shoulder pain  endo: no hot/cold intolerance  Heme: no epistaxis/hematuria/melena/brbpr

## 2024-01-15 NOTE — H&P ADULT - HISTORY OF PRESENT ILLNESS
74yo M pmh prostate cancer s/p radiation, afib/flutter (5/2023) s/p watchman not on AC due to recurrent diverticular bleed s/p embolization, HTN, HL, asthma (mild) was in his usual state of health exercising at gym (weights & bike) monday-friday, woke up with chest pain Saturday morning that he attributed to muscle strain from the gym the day before.  Noted it was worse with lying flat, felt it across his shoulders but it eventually settled in his central chest described as a 'sticking pain' that was worse with deep breaths.  Denies radiation to neck/arms.  Denies n/v.  Does say it was associated with SOB/dypsnea.  He went to his cardiologist Isaac Calderón this morning who noted he had a TTE with 50% EF with new small pericardial effusion and a + stress test with lateral ischemia.  He sent him to the ED for further eval.

## 2024-01-15 NOTE — ED PROVIDER NOTE - NSICDXPASTMEDICALHX_GEN_ALL_CORE_FT
PAST MEDICAL HISTORY:  Asthma     Atrial flutter     Diverticulitis     Diverticulosis     GIB (gastrointestinal bleeding)     Gout     Hyperlipidemia     Hypertension     Presence of Watchman left atrial appendage closure device     Prostate cancer s/p radiation therapy

## 2024-01-15 NOTE — H&P ADULT - ASSESSMENT
72yo M pmh prostate cancer s/p radiation, afib/flutter (5/2023) s/p watchman not on AC due to recurrent diverticular bleed s/p embolization, HTN, HL, asthma (mild) admitted with pleuritic chest pain and abnormal outpatient stress test with new pericardial effusion, hemodynamically stable and ruled out for ACS and CTA negative for VTE/dissection but shows large pericardial effusion, pending repeat TTE and possible cath tomorrow.

## 2024-01-15 NOTE — ED PROVIDER NOTE - ATTENDING CONTRIBUTION TO CARE
This is a 73-year-old man who has a history of hypertension, hyperlipidemia and prior diverticular bleed with questionable radiation proctitis as well as paroxysmal A-fib status post ablation and Watchman device presents with bilateral chest pain and shortness of breath for 3 days patient states that is worse with deep breaths and laying flat.  Patient was seen by Dr. Isaac Calderón in the office today was found to have an echo with 50 to 55% with a small pericardial effusion that is new.  Patient also had a pharmacologic nuclear stress test that showed lateral ischemia.  Dr. Calderón requesting patient to be admitted to Dr. Power patient will possibly require cardiac cath and treatment for possible pericarditis recommending CTA to evaluate for PE and dissection.  Patient denies any fevers no chills reports some mild nasal congestion.  He has no nausea no vomiting.   last bowel movement was 3 to 4 days ago. Patient has clear lungs soft abdomen  no lower leg edema, no leg pain no calf pain aaox3, posterior pharynx w/ no exudate, speaking in full sentances nonlabored respirations  EKG performed on 1/15/2024 at 2:41 PM reveals A-fib RVR with occasional premature ventricular complexes.  Rate of 106 QRS of 98 QTc of 475 patient's axis is normal.  Patient does not appear grossly volume overloaded on exam.  He will have labs EKG and admission to the hospital for further management of his symptoms. posisble acs, vs pericarditis less likely PE/aortic dissection.

## 2024-01-15 NOTE — ED ADULT NURSE NOTE - OBJECTIVE STATEMENT
73 year old male presented to ED with c/o of intermittent chest pain and shortness of breath x3 days. hx HTN, HLD, and prior diverticular bleed with questionable radiation proctitis as well as paroxysmal A-fib status post ablation and Watchman device. pt referred to ED by MD and will possibly require cardiac cath and treatment for possible pericarditis recommending CTA to evaluate for PE and dissection. pt denies CP, SOB, nausea/vomiting, numbness/tingling, fever, cough, chills, dizziness, headache, blurred vision, neuro intact. pt a&ox3, lung sounds clear, heart rate regular, abdomen soft nontender nondistended to palp. skin intact. IV RAC 20G and patent. Will continue to monitor and assess while offering support and reassurance.

## 2024-01-15 NOTE — H&P ADULT - PROBLEM SELECTOR PLAN 3
benidsvasc2, not on AC due to h/o prior bleeds  s/p watchman  continue dilt/metop for rate control  f/u cards recs

## 2024-01-15 NOTE — ED ADULT TRIAGE NOTE - CHIEF COMPLAINT QUOTE
SOB x 3 days   sent in by MD for admission for "cardiac cath and treatment for possible pericarditis..r/o PE/dissection"

## 2024-01-16 LAB
ANION GAP SERPL CALC-SCNC: 9 MMOL/L — SIGNIFICANT CHANGE UP (ref 5–17)
BUN SERPL-MCNC: 16 MG/DL — SIGNIFICANT CHANGE UP (ref 7–23)
CALCIUM SERPL-MCNC: 8.6 MG/DL — SIGNIFICANT CHANGE UP (ref 8.4–10.5)
CHLORIDE SERPL-SCNC: 102 MMOL/L — SIGNIFICANT CHANGE UP (ref 96–108)
CO2 SERPL-SCNC: 27 MMOL/L — SIGNIFICANT CHANGE UP (ref 22–31)
CREAT SERPL-MCNC: 0.92 MG/DL — SIGNIFICANT CHANGE UP (ref 0.5–1.3)
CRP SERPL-MCNC: 143 MG/L — HIGH (ref 0–4)
EGFR: 88 ML/MIN/1.73M2 — SIGNIFICANT CHANGE UP
ERYTHROCYTE [SEDIMENTATION RATE] IN BLOOD: 55 MM/HR — HIGH (ref 0–20)
GLUCOSE SERPL-MCNC: 116 MG/DL — HIGH (ref 70–99)
HCT VFR BLD CALC: 31.3 % — LOW (ref 39–50)
HGB BLD-MCNC: 9.7 G/DL — LOW (ref 13–17)
MAGNESIUM SERPL-MCNC: 2.3 MG/DL — SIGNIFICANT CHANGE UP (ref 1.6–2.6)
MCHC RBC-ENTMCNC: 30.5 PG — SIGNIFICANT CHANGE UP (ref 27–34)
MCHC RBC-ENTMCNC: 31 GM/DL — LOW (ref 32–36)
MCV RBC AUTO: 98.4 FL — SIGNIFICANT CHANGE UP (ref 80–100)
NRBC # BLD: 0 /100 WBCS — SIGNIFICANT CHANGE UP (ref 0–0)
PHOSPHATE SERPL-MCNC: 3 MG/DL — SIGNIFICANT CHANGE UP (ref 2.5–4.5)
PLATELET # BLD AUTO: 235 K/UL — SIGNIFICANT CHANGE UP (ref 150–400)
POTASSIUM SERPL-MCNC: 3.7 MMOL/L — SIGNIFICANT CHANGE UP (ref 3.5–5.3)
POTASSIUM SERPL-SCNC: 3.7 MMOL/L — SIGNIFICANT CHANGE UP (ref 3.5–5.3)
RBC # BLD: 3.18 M/UL — LOW (ref 4.2–5.8)
RBC # FLD: 14.6 % — HIGH (ref 10.3–14.5)
SODIUM SERPL-SCNC: 138 MMOL/L — SIGNIFICANT CHANGE UP (ref 135–145)
TROPONIN T, HIGH SENSITIVITY RESULT: 25 NG/L — SIGNIFICANT CHANGE UP (ref 0–51)
TROPONIN T, HIGH SENSITIVITY RESULT: 28 NG/L — SIGNIFICANT CHANGE UP (ref 0–51)
TSH SERPL-MCNC: 2.2 UIU/ML — SIGNIFICANT CHANGE UP (ref 0.27–4.2)
WBC # BLD: 7.91 K/UL — SIGNIFICANT CHANGE UP (ref 3.8–10.5)
WBC # FLD AUTO: 7.91 K/UL — SIGNIFICANT CHANGE UP (ref 3.8–10.5)

## 2024-01-16 PROCEDURE — 93306 TTE W/DOPPLER COMPLETE: CPT | Mod: 26

## 2024-01-16 RX ORDER — FUROSEMIDE 40 MG
40 TABLET ORAL
Refills: 0 | Status: DISCONTINUED | OUTPATIENT
Start: 2024-01-16 | End: 2024-01-16

## 2024-01-16 RX ORDER — COLCHICINE 0.6 MG
0.6 TABLET ORAL ONCE
Refills: 0 | Status: COMPLETED | OUTPATIENT
Start: 2024-01-16 | End: 2024-01-16

## 2024-01-16 RX ORDER — PANTOPRAZOLE SODIUM 20 MG/1
40 TABLET, DELAYED RELEASE ORAL
Refills: 0 | Status: DISCONTINUED | OUTPATIENT
Start: 2024-01-16 | End: 2024-01-19

## 2024-01-16 RX ORDER — COLCHICINE 0.6 MG
0.6 TABLET ORAL DAILY
Refills: 0 | Status: DISCONTINUED | OUTPATIENT
Start: 2024-01-16 | End: 2024-01-17

## 2024-01-16 RX ORDER — FUROSEMIDE 40 MG
40 TABLET ORAL DAILY
Refills: 0 | Status: DISCONTINUED | OUTPATIENT
Start: 2024-01-16 | End: 2024-01-17

## 2024-01-16 RX ORDER — POTASSIUM CHLORIDE 20 MEQ
40 PACKET (EA) ORAL ONCE
Refills: 0 | Status: COMPLETED | OUTPATIENT
Start: 2024-01-16 | End: 2024-01-16

## 2024-01-16 RX ADMIN — Medication 325 MILLIGRAM(S): at 12:15

## 2024-01-16 RX ADMIN — FINASTERIDE 5 MILLIGRAM(S): 5 TABLET, FILM COATED ORAL at 12:33

## 2024-01-16 RX ADMIN — MONTELUKAST 10 MILLIGRAM(S): 4 TABLET, CHEWABLE ORAL at 12:15

## 2024-01-16 RX ADMIN — Medication 120 MILLIGRAM(S): at 05:15

## 2024-01-16 RX ADMIN — Medication 0.6 MILLIGRAM(S): at 21:16

## 2024-01-16 RX ADMIN — Medication 40 MILLIEQUIVALENT(S): at 21:17

## 2024-01-16 RX ADMIN — ATORVASTATIN CALCIUM 80 MILLIGRAM(S): 80 TABLET, FILM COATED ORAL at 21:16

## 2024-01-16 RX ADMIN — Medication 25 MILLIGRAM(S): at 05:15

## 2024-01-16 RX ADMIN — TAMSULOSIN HYDROCHLORIDE 0.4 MILLIGRAM(S): 0.4 CAPSULE ORAL at 21:16

## 2024-01-16 RX ADMIN — Medication 40 MILLIGRAM(S): at 12:16

## 2024-01-16 RX ADMIN — Medication 81 MILLIGRAM(S): at 12:15

## 2024-01-16 RX ADMIN — Medication 20 MILLIGRAM(S): at 05:15

## 2024-01-16 RX ADMIN — POLYETHYLENE GLYCOL 3350 17 GRAM(S): 17 POWDER, FOR SOLUTION ORAL at 12:16

## 2024-01-16 NOTE — PROGRESS NOTE ADULT - ASSESSMENT
_________________________________________________________________________________________  ========>>  M E D I C A L   A T T E N D I N G    F O L L O W  U P  N O T E  <<=========  -----------------------------------------------------------------------------------------------------    - Patient seen and examined by me earlier today.   - In summary,  LENNOX CARTER is a 73y year old man admitted with chest pain   - Patient today overall doing ok, comfortable, eating OK. patient still with some chest discomfort, mostly with movement and deep breathing      ==================>> REVIEW OF SYSTEM <<=================    GEN: no fever, no chills, no pain  RESP: no SOB at rest , no cough, no sputum  CVS: chest pain as above , no palpitations  GI: no abdominal pain, no nausea, no constipation, no diarrhea  : no dysuria, no frequency  Neuro: no headache, no dizziness    ==================>> PHYSICAL EXAM <<=================    GEN: A&O X 3 , NAD , comfortable, pleasant, calm , sitting on bed  HEENT: NCAT, PERRL, MMM, hearing intact  CVS: S1S2 , irregular , No M/R/G appreciated  PULM: CTA B/L,  no W/R/R appreciated  ABD.: soft. non tender, non distended,  bowel sounds present  Extrem: intact pulses , no edema           ( Note written / Date of service 01-16-24 ( This is certified to be the same as "ENTERED" date above ( for billing purposes)))    ==================>> MEDICATIONS <<====================    MEDICATIONS  (STANDING):  aspirin  chewable 81 milliGRAM(s) Oral daily  atorvastatin 80 milliGRAM(s) Oral at bedtime  colchicine 0.6 milliGRAM(s) Oral daily  diltiazem    milliGRAM(s) Oral daily  ferrous    sulfate 325 milliGRAM(s) Oral daily  finasteride 5 milliGRAM(s) Oral daily  furosemide   Injectable 40 milliGRAM(s) IV Push daily  heparin   Injectable 5000 Unit(s) SubCutaneous every 8 hours  metoprolol succinate ER 25 milliGRAM(s) Oral daily  montelukast 10 milliGRAM(s) Oral daily  pantoprazole    Tablet 40 milliGRAM(s) Oral before breakfast  polyethylene glycol 3350 17 Gram(s) Oral daily  tamsulosin 0.4 milliGRAM(s) Oral at bedtime    MEDICATIONS  (PRN):  acetaminophen     Tablet .. 650 milliGRAM(s) Oral every 6 hours PRN Moderate Pain (4 - 6)  morphine  - Injectable 2 milliGRAM(s) IV Push every 6 hours PRN Severe Pain (7 - 10)    ___________  Active diet:  Diet, Regular:   DASH/TLC Sodium & Cholesterol Restricted (DASH)  ___________________    ==================>> VITAL SIGNS <<==================    T(C): 36.7 (01-16-24 @ 12:25), Max: 37.4 (01-15-24 @ 20:01)  HR: 88 (01-16-24 @ 12:25) (77 - 107)  BP: 146/84 (01-16-24 @ 12:25) (119/68 - 146/88)  BP(mean): 107 (01-15-24 @ 20:01)  RR: 18 (01-16-24 @ 12:25) (18 - 20)  SpO2: 98% (01-16-24 @ 12:25) (95% - 99%)      ==================>> LAB AND IMAGING <<==================                        9.7    7.91  )-----------( 235      ( 16 Jan 2024 06:19 )             31.3        01-16    138  |  102  |  16  ----------------------------<  116<H>  3.7   |  27  |  0.92    Ca    8.6      16 Jan 2024 03:01  Phos  3.0     01-16  Mg     2.3     01-16    TPro  7.3  /  Alb  3.8  /  TBili  0.8  /  DBili  x   /  AST  12  /  ALT  12  /  AlkPhos  69  01-15    PT/INR - ( 15 Jose 2024 16:03 )   PT: 13.9 sec;   INR: 1.27 ratio    PTT - ( 15 Jose 2024 16:03 )  PTT:41.3 sec               TSH:      2.20   (01-16-24)           ~~ High Sensitivity Troponin  ~~  25  <<--, 28  <<--, 27  <<--, 28  <<--    Pro-BNP: 2056 (01-15-24 @ 16:03)    < from: CT Angio Abdomen and Pelvis w/ IV Cont (01.15.24 @ 17:38) >  IMPRESSION:  Cardiomegaly with large pericardial effusion.  No aortic dissection. Mildly dilated aortic root and mid ascending aortic   segment.  No pulmonary embolism.  < end of copied text >    echo pending  ___________________________________________________________________________________  ===============>>  A S S E S S M E N T   A N D   P L A N <<===============  ------------------------------------------------------------------------------------------    · Assessment	  72yo M pmh prostate cancer s/p radiation, afib/flutter (5/2023) s/p watchman not on AC due to recurrent diverticular bleed s/p embolization, HTN, HL, asthma (mild) admitted with pleuritic chest pain and abnormal outpatient stress test with new pericardial effusion, hemodynamically stable and ruled out for ACS and CTA negative for VTE/dissection but shows large pericardial effusion, pending repeat TTE and possible cath tomorrow.      Problem/Plan - 1:  ·  Problem: Pleuritic chest pain.   ·  Plan: CTA significant for cardiomegaly with large pericardial effusion.  Negative for aortic dissection/vte.  EKG with afib but no acute st changes and no evidence of pericarditis.  Trop normal but BNP elevated at 2056.  I spoke with Dr Calderón regarding the CTA finding of large pericardial effusion.  He noted small to moderate effusion on TTE in office.  As he is hemodynamically stable recommends repeating TTE for now.  Of note prior TTE 9/2023 was normal-no cardiomegaly and no pericardial effusion noted.  Also had outpatient nuclear stress concerning for lateral ischemia, plan for cath   -repeat TTE  -TSH/rvp : normal / negative   -monitor on tele  -tylenol/morphine prn pain  -continue home asa/statin/bb  -repeat troponin  -f/u cards recs >> colchicine   -cath being planned  -increase statin to 80mg lipitor.  - diuresis as needed  - ESR / CRP  - may need Rhum evaluation ( discussed with rheum fellow... to reconsult as needed pos cardiac workup)     Problem/Plan - 2:  ·  Problem: Pericardial effusion.   ·  Plan: as per problem 1  repeat TTE.  as above    Problem/Plan - 3:  ·  Problem: Chronic atrial fibrillation.   ·  Plan: chadsvasc2, not on AC due to h/o prior bleeds  s/p watchman  continue dilt/metop for rate control  f/u cards recs.    Problem/Plan - 4:  ·  Problem: Hypertension.   ·  Plan: continue home meds.    Problem/Plan - 5:  ·  Problem: Asthma.   ·  Plan: mild, reports not requiring albuterol at home.    Problem/Plan - 6:  ·  Problem: Preventive measure.   ·  Plan: mod risk for vte by improve score  sqh  incentive spirometry    --------------------------------------------  Case discussed with patient, Nurse Practitioner, RN, cardio, Rheum   Education given on findings and plan of care  ___________________________  H. JONAS Luu.  Pager: [phone]       _________________________________________________________________________________________  ========>>  M E D I C A L   A T T E N D I N G    F O L L O W  U P  N O T E  <<=========  -----------------------------------------------------------------------------------------------------    - Patient seen and examined by me earlier today.   - In summary,  LENNOX CARTER is a 73y year old man admitted with chest pain   - Patient today overall doing ok, comfortable, eating OK. patient still with some chest discomfort, mostly with movement and deep breathing      ==================>> REVIEW OF SYSTEM <<=================    GEN: no fever, no chills, no pain  RESP: no SOB at rest , no cough, no sputum  CVS: chest pain as above , no palpitations  GI: no abdominal pain, no nausea, no constipation, no diarrhea  : no dysuria, no frequency  Neuro: no headache, no dizziness    ==================>> PHYSICAL EXAM <<=================    GEN: A&O X 3 , NAD , comfortable, pleasant, calm , sitting on bed  HEENT: NCAT, PERRL, MMM, hearing intact  CVS: S1S2 , irregular , No M/R/G appreciated  PULM: CTA B/L,  no W/R/R appreciated  ABD.: soft. non tender, non distended,  bowel sounds present  Extrem: intact pulses , no edema           ( Note written / Date of service 01-16-24 ( This is certified to be the same as "ENTERED" date above ( for billing purposes)))    ==================>> MEDICATIONS <<====================    MEDICATIONS  (STANDING):  aspirin  chewable 81 milliGRAM(s) Oral daily  atorvastatin 80 milliGRAM(s) Oral at bedtime  colchicine 0.6 milliGRAM(s) Oral daily  diltiazem    milliGRAM(s) Oral daily  ferrous    sulfate 325 milliGRAM(s) Oral daily  finasteride 5 milliGRAM(s) Oral daily  furosemide   Injectable 40 milliGRAM(s) IV Push daily  heparin   Injectable 5000 Unit(s) SubCutaneous every 8 hours  metoprolol succinate ER 25 milliGRAM(s) Oral daily  montelukast 10 milliGRAM(s) Oral daily  pantoprazole    Tablet 40 milliGRAM(s) Oral before breakfast  polyethylene glycol 3350 17 Gram(s) Oral daily  tamsulosin 0.4 milliGRAM(s) Oral at bedtime    MEDICATIONS  (PRN):  acetaminophen     Tablet .. 650 milliGRAM(s) Oral every 6 hours PRN Moderate Pain (4 - 6)  morphine  - Injectable 2 milliGRAM(s) IV Push every 6 hours PRN Severe Pain (7 - 10)    ___________  Active diet:  Diet, Regular:   DASH/TLC Sodium & Cholesterol Restricted (DASH)  ___________________    ==================>> VITAL SIGNS <<==================    T(C): 36.7 (01-16-24 @ 12:25), Max: 37.4 (01-15-24 @ 20:01)  HR: 88 (01-16-24 @ 12:25) (77 - 107)  BP: 146/84 (01-16-24 @ 12:25) (119/68 - 146/88)  BP(mean): 107 (01-15-24 @ 20:01)  RR: 18 (01-16-24 @ 12:25) (18 - 20)  SpO2: 98% (01-16-24 @ 12:25) (95% - 99%)      ==================>> LAB AND IMAGING <<==================                        9.7    7.91  )-----------( 235      ( 16 Jan 2024 06:19 )             31.3        01-16    138  |  102  |  16  ----------------------------<  116<H>  3.7   |  27  |  0.92    Ca    8.6      16 Jan 2024 03:01  Phos  3.0     01-16  Mg     2.3     01-16    TPro  7.3  /  Alb  3.8  /  TBili  0.8  /  DBili  x   /  AST  12  /  ALT  12  /  AlkPhos  69  01-15    PT/INR - ( 15 Jose 2024 16:03 )   PT: 13.9 sec;   INR: 1.27 ratio    PTT - ( 15 Jose 2024 16:03 )  PTT:41.3 sec               TSH:      2.20   (01-16-24)           ~~ High Sensitivity Troponin  ~~  25  <<--, 28  <<--, 27  <<--, 28  <<--    Pro-BNP: 2056 (01-15-24 @ 16:03)    < from: CT Angio Abdomen and Pelvis w/ IV Cont (01.15.24 @ 17:38) >  IMPRESSION:  Cardiomegaly with large pericardial effusion.  No aortic dissection. Mildly dilated aortic root and mid ascending aortic   segment.  No pulmonary embolism.  < end of copied text >    echo pending  ___________________________________________________________________________________  ===============>>  A S S E S S M E N T   A N D   P L A N <<===============  ------------------------------------------------------------------------------------------    · Assessment	  74yo M pmh prostate cancer s/p radiation, afib/flutter (5/2023) s/p watchman not on AC due to recurrent diverticular bleed s/p embolization, HTN, HL, asthma (mild) admitted with pleuritic chest pain and abnormal outpatient stress test with new pericardial effusion, hemodynamically stable and ruled out for ACS and CTA negative for VTE/dissection but shows large pericardial effusion, pending repeat TTE and possible cath tomorrow.      Problem/Plan - 1:  ·  Problem: Pleuritic chest pain.   ·  Plan: CTA significant for cardiomegaly with large pericardial effusion.  Negative for aortic dissection/vte.  EKG with afib but no acute st changes and no evidence of pericarditis.  Trop normal but BNP elevated at 2056.  I spoke with Dr Calderón regarding the CTA finding of large pericardial effusion.  He noted small to moderate effusion on TTE in office.  As he is hemodynamically stable recommends repeating TTE for now.  Of note prior TTE 9/2023 was normal-no cardiomegaly and no pericardial effusion noted.  Also had outpatient nuclear stress concerning for lateral ischemia, plan for cath   -repeat TTE  -TSH/rvp : normal / negative   -monitor on tele  -tylenol/morphine prn pain  -continue home asa/statin/bb  -repeat troponin  -f/u cards recs >> colchicine   -cath being planned  -increase statin to 80mg lipitor.  - diuresis as needed  - ESR / CRP  - may need Rhum evaluation ( discussed with rheum fellow... to reconsult as needed pos cardiac workup)     Problem/Plan - 2:  ·  Problem: Pericardial effusion.   ·  Plan: as per problem 1  repeat TTE.  as above    Problem/Plan - 3:  ·  Problem: Chronic atrial fibrillation.   ·  Plan: chadsvasc2, not on AC due to h/o prior bleeds  s/p watchman  continue dilt/metop for rate control  f/u cards recs.    Problem/Plan - 4:  ·  Problem: Hypertension.   ·  Plan: continue home meds.    Problem/Plan - 5:  ·  Problem: Asthma.   ·  Plan: mild, reports not requiring albuterol at home.    Problem/Plan - 6:  ·  Problem: Preventive measure.   ·  Plan: mod risk for vte by improve score  sqh  incentive spirometry    --------------------------------------------  Case discussed with patient, Nurse Practitioner, RN, cardio, Rheum   Education given on findings and plan of care  ___________________________  H. JONAS Luu.  Pager: [phone]       _________________________________________________________________________________________  ========>>  M E D I C A L   A T T E N D I N G    F O L L O W  U P  N O T E  <<=========  -----------------------------------------------------------------------------------------------------    - Patient seen and examined by me earlier today.   - In summary,  LENNOX CARTER is a 73y year old man admitted with chest pain   - Patient today overall doing ok, comfortable, eating OK. patient still with some chest discomfort, mostly with movement and deep breathing      ==================>> REVIEW OF SYSTEM <<=================    GEN: no fever, no chills, no pain  RESP: no SOB at rest , no cough, no sputum  CVS: chest pain as above , no palpitations  GI: no abdominal pain, no nausea, + constipation>> getting miralax   : no dysuria, no frequency  Neuro: no headache, no dizziness    ==================>> PHYSICAL EXAM <<=================    GEN: A&O X 3 , NAD , comfortable, pleasant, calm , sitting on bed  HEENT: NCAT, PERRL, MMM, hearing intact  CVS: S1S2 , irregular , No M/R/G appreciated  PULM: CTA B/L,  no W/R/R appreciated  ABD.: soft. non tender, non distended,  bowel sounds present  Extrem: intact pulses , no edema           ( Note written / Date of service 01-16-24 ( This is certified to be the same as "ENTERED" date above ( for billing purposes)))    ==================>> MEDICATIONS <<====================    MEDICATIONS  (STANDING):  aspirin  chewable 81 milliGRAM(s) Oral daily  atorvastatin 80 milliGRAM(s) Oral at bedtime  colchicine 0.6 milliGRAM(s) Oral daily  diltiazem    milliGRAM(s) Oral daily  ferrous    sulfate 325 milliGRAM(s) Oral daily  finasteride 5 milliGRAM(s) Oral daily  furosemide   Injectable 40 milliGRAM(s) IV Push daily  heparin   Injectable 5000 Unit(s) SubCutaneous every 8 hours  metoprolol succinate ER 25 milliGRAM(s) Oral daily  montelukast 10 milliGRAM(s) Oral daily  pantoprazole    Tablet 40 milliGRAM(s) Oral before breakfast  polyethylene glycol 3350 17 Gram(s) Oral daily  tamsulosin 0.4 milliGRAM(s) Oral at bedtime    MEDICATIONS  (PRN):  acetaminophen     Tablet .. 650 milliGRAM(s) Oral every 6 hours PRN Moderate Pain (4 - 6)  morphine  - Injectable 2 milliGRAM(s) IV Push every 6 hours PRN Severe Pain (7 - 10)    ___________  Active diet:  Diet, Regular:   DASH/TLC Sodium & Cholesterol Restricted (DASH)  ___________________    ==================>> VITAL SIGNS <<==================    T(C): 36.7 (01-16-24 @ 12:25), Max: 37.4 (01-15-24 @ 20:01)  HR: 88 (01-16-24 @ 12:25) (77 - 107)  BP: 146/84 (01-16-24 @ 12:25) (119/68 - 146/88)  BP(mean): 107 (01-15-24 @ 20:01)  RR: 18 (01-16-24 @ 12:25) (18 - 20)  SpO2: 98% (01-16-24 @ 12:25) (95% - 99%)      ==================>> LAB AND IMAGING <<==================                        9.7    7.91  )-----------( 235      ( 16 Jan 2024 06:19 )             31.3        01-16    138  |  102  |  16  ----------------------------<  116<H>  3.7   |  27  |  0.92    Ca    8.6      16 Jan 2024 03:01  Phos  3.0     01-16  Mg     2.3     01-16    TPro  7.3  /  Alb  3.8  /  TBili  0.8  /  DBili  x   /  AST  12  /  ALT  12  /  AlkPhos  69  01-15    PT/INR - ( 15 Jose 2024 16:03 )   PT: 13.9 sec;   INR: 1.27 ratio    PTT - ( 15 Jose 2024 16:03 )  PTT:41.3 sec               TSH:      2.20   (01-16-24)           ~~ High Sensitivity Troponin  ~~  25  <<--, 28  <<--, 27  <<--, 28  <<--    Pro-BNP: 2056 (01-15-24 @ 16:03)    < from: CT Angio Abdomen and Pelvis w/ IV Cont (01.15.24 @ 17:38) >  IMPRESSION:  Cardiomegaly with large pericardial effusion.  No aortic dissection. Mildly dilated aortic root and mid ascending aortic   segment.  No pulmonary embolism.  < end of copied text >    echo pending  ___________________________________________________________________________________  ===============>>  A S S E S S M E N T   A N D   P L A N <<===============  ------------------------------------------------------------------------------------------    · Assessment	  72yo M pmh prostate cancer s/p radiation, afib/flutter (5/2023) s/p watchman not on AC due to recurrent diverticular bleed s/p embolization, HTN, HL, asthma (mild) admitted with pleuritic chest pain and abnormal outpatient stress test with new pericardial effusion, hemodynamically stable and ruled out for ACS and CTA negative for VTE/dissection but shows large pericardial effusion, pending repeat TTE and possible cath tomorrow.      Problem/Plan - 1:  ·  Problem: Pleuritic chest pain.   ·  Plan: CTA significant for cardiomegaly with large pericardial effusion.  Negative for aortic dissection/vte.  EKG with afib but no acute st changes and no evidence of pericarditis.  Trop normal but BNP elevated at 2056.  I spoke with Dr Calderón regarding the CTA finding of large pericardial effusion.  He noted small to moderate effusion on TTE in office.  As he is hemodynamically stable recommends repeating TTE for now.  Of note prior TTE 9/2023 was normal-no cardiomegaly and no pericardial effusion noted.  Also had outpatient nuclear stress concerning for lateral ischemia, plan for cath   -repeat TTE  -TSH/rvp : normal / negative   -monitor on tele  -tylenol/morphine prn pain  -continue home asa/statin/bb  -repeat troponin  -f/u cards recs >> colchicine   -cath being planned  -increase statin to 80mg lipitor.  - diuresis as needed  - ESR / CRP  - may need Rhum evaluation ( discussed with rheum fellow... to reconsult as needed pos cardiac workup)     Problem/Plan - 2:  ·  Problem: Pericardial effusion.   ·  Plan: as per problem 1  repeat TTE.  as above    Problem/Plan - 3:  ·  Problem: Chronic atrial fibrillation.   ·  Plan: chadsvasc2, not on AC due to h/o prior bleeds  s/p watchman  continue dilt/metop for rate control  f/u cards recs.    Problem/Plan - 4:  ·  Problem: Hypertension.   ·  Plan: continue home meds.    Problem/Plan - 5:  ·  Problem: Asthma.   ·  Plan: mild, reports not requiring albuterol at home.    Problem/Plan - 6:  ·  Problem: Preventive measure.   ·  Plan: mod risk for vte by improve score  sqh  incentive spirometry    --------------------------------------------  Case discussed with patient, Nurse Practitioner, RN, cardio, Rheum   Education given on findings and plan of care  ___________________________  H. JONAS Luu.  Pager: [phone]       _________________________________________________________________________________________  ========>>  M E D I C A L   A T T E N D I N G    F O L L O W  U P  N O T E  <<=========  -----------------------------------------------------------------------------------------------------    - Patient seen and examined by me earlier today.   - In summary,  LENNOX CARTER is a 73y year old man admitted with chest pain   - Patient today overall doing ok, comfortable, eating OK. patient still with some chest discomfort, mostly with movement and deep breathing      ==================>> REVIEW OF SYSTEM <<=================    GEN: no fever, no chills, no pain  RESP: no SOB at rest , no cough, no sputum  CVS: chest pain as above , no palpitations  GI: no abdominal pain, no nausea, + constipation>> getting miralax   : no dysuria, no frequency  Neuro: no headache, no dizziness    ==================>> PHYSICAL EXAM <<=================    GEN: A&O X 3 , NAD , comfortable, pleasant, calm , sitting on bed  HEENT: NCAT, PERRL, MMM, hearing intact  CVS: S1S2 , irregular , No M/R/G appreciated  PULM: CTA B/L,  no W/R/R appreciated  ABD.: soft. non tender, non distended,  bowel sounds present  Extrem: intact pulses , no edema           ( Note written / Date of service 01-16-24 ( This is certified to be the same as "ENTERED" date above ( for billing purposes)))    ==================>> MEDICATIONS <<====================    MEDICATIONS  (STANDING):  aspirin  chewable 81 milliGRAM(s) Oral daily  atorvastatin 80 milliGRAM(s) Oral at bedtime  colchicine 0.6 milliGRAM(s) Oral daily  diltiazem    milliGRAM(s) Oral daily  ferrous    sulfate 325 milliGRAM(s) Oral daily  finasteride 5 milliGRAM(s) Oral daily  furosemide   Injectable 40 milliGRAM(s) IV Push daily  heparin   Injectable 5000 Unit(s) SubCutaneous every 8 hours  metoprolol succinate ER 25 milliGRAM(s) Oral daily  montelukast 10 milliGRAM(s) Oral daily  pantoprazole    Tablet 40 milliGRAM(s) Oral before breakfast  polyethylene glycol 3350 17 Gram(s) Oral daily  tamsulosin 0.4 milliGRAM(s) Oral at bedtime    MEDICATIONS  (PRN):  acetaminophen     Tablet .. 650 milliGRAM(s) Oral every 6 hours PRN Moderate Pain (4 - 6)  morphine  - Injectable 2 milliGRAM(s) IV Push every 6 hours PRN Severe Pain (7 - 10)    ___________  Active diet:  Diet, Regular:   DASH/TLC Sodium & Cholesterol Restricted (DASH)  ___________________    ==================>> VITAL SIGNS <<==================    T(C): 36.7 (01-16-24 @ 12:25), Max: 37.4 (01-15-24 @ 20:01)  HR: 88 (01-16-24 @ 12:25) (77 - 107)  BP: 146/84 (01-16-24 @ 12:25) (119/68 - 146/88)  BP(mean): 107 (01-15-24 @ 20:01)  RR: 18 (01-16-24 @ 12:25) (18 - 20)  SpO2: 98% (01-16-24 @ 12:25) (95% - 99%)      ==================>> LAB AND IMAGING <<==================                        9.7    7.91  )-----------( 235      ( 16 Jan 2024 06:19 )             31.3        01-16    138  |  102  |  16  ----------------------------<  116<H>  3.7   |  27  |  0.92    Ca    8.6      16 Jan 2024 03:01  Phos  3.0     01-16  Mg     2.3     01-16    TPro  7.3  /  Alb  3.8  /  TBili  0.8  /  DBili  x   /  AST  12  /  ALT  12  /  AlkPhos  69  01-15    PT/INR - ( 15 Jose 2024 16:03 )   PT: 13.9 sec;   INR: 1.27 ratio    PTT - ( 15 Jose 2024 16:03 )  PTT:41.3 sec               TSH:      2.20   (01-16-24)           ~~ High Sensitivity Troponin  ~~  25  <<--, 28  <<--, 27  <<--, 28  <<--    Pro-BNP: 2056 (01-15-24 @ 16:03)    < from: CT Angio Abdomen and Pelvis w/ IV Cont (01.15.24 @ 17:38) >  IMPRESSION:  Cardiomegaly with large pericardial effusion.  No aortic dissection. Mildly dilated aortic root and mid ascending aortic   segment.  No pulmonary embolism.  < end of copied text >    echo pending  ___________________________________________________________________________________  ===============>>  A S S E S S M E N T   A N D   P L A N <<===============  ------------------------------------------------------------------------------------------    · Assessment	  74yo M pmh prostate cancer s/p radiation, afib/flutter (5/2023) s/p watchman not on AC due to recurrent diverticular bleed s/p embolization, HTN, HL, asthma (mild) admitted with pleuritic chest pain and abnormal outpatient stress test with new pericardial effusion, hemodynamically stable and ruled out for ACS and CTA negative for VTE/dissection but shows large pericardial effusion, pending repeat TTE and possible cath tomorrow.      Problem/Plan - 1:  ·  Problem: Pleuritic chest pain.   ·  Plan: CTA significant for cardiomegaly with large pericardial effusion.  Negative for aortic dissection/vte.  EKG with afib but no acute st changes and no evidence of pericarditis.  Trop normal but BNP elevated at 2056.  I spoke with Dr Calderón regarding the CTA finding of large pericardial effusion.  He noted small to moderate effusion on TTE in office.  As he is hemodynamically stable recommends repeating TTE for now.  Of note prior TTE 9/2023 was normal-no cardiomegaly and no pericardial effusion noted.  Also had outpatient nuclear stress concerning for lateral ischemia, plan for cath   -repeat TTE  -TSH/rvp : normal / negative   -monitor on tele  -tylenol/morphine prn pain  -continue home asa/statin/bb  -repeat troponin  -f/u cards recs >> colchicine   -cath being planned  -increase statin to 80mg lipitor.  - diuresis as needed  - ESR / CRP  - may need Rhum evaluation ( discussed with rheum fellow... to reconsult as needed pos cardiac workup)     Problem/Plan - 2:  ·  Problem: Pericardial effusion.   ·  Plan: as per problem 1  repeat TTE.  as above    Problem/Plan - 3:  ·  Problem: Chronic atrial fibrillation.   ·  Plan: chadsvasc2, not on AC due to h/o prior bleeds  s/p watchman  continue dilt/metop for rate control  f/u cards recs.    Problem/Plan - 4:  ·  Problem: Hypertension.   ·  Plan: continue home meds.    Problem/Plan - 5:  ·  Problem: Asthma.   ·  Plan: mild, reports not requiring albuterol at home.    Problem/Plan - 6:  ·  Problem: Preventive measure.   ·  Plan: mod risk for vte by improve score  sqh  incentive spirometry    --------------------------------------------  Case discussed with patient, Nurse Practitioner, RN, cardio, Rheum   Education given on findings and plan of care  ___________________________  H. JONAS Luu.  Pager: [phone]

## 2024-01-16 NOTE — PATIENT PROFILE ADULT - NSPROGENOTHERPROVIDER_GEN_A_NUR
none Ftsg Text: The defect edges were debeveled with a #15 scalpel blade.  Given the location of the defect, shape of the defect and the proximity to free margins a full thickness skin graft was deemed most appropriate.  Using a sterile surgical marker, the primary defect shape was transferred to the donor site. The area thus outlined was incised deep to adipose tissue with a #15 scalpel blade.  The harvested graft was then trimmed of adipose tissue until only dermis and epidermis was left.  The skin margins of the secondary defect were undermined to an appropriate distance in all directions utilizing iris scissors.  The secondary defect was closed with interrupted buried subcutaneous sutures.  The skin edges were then re-apposed with running  sutures.  The skin graft was then placed in the primary defect and oriented appropriately.

## 2024-01-16 NOTE — ED ADULT NURSE REASSESSMENT NOTE - NS ED NURSE REASSESS COMMENT FT1
Received patient from FARTUN Sinclair, patient at baseline mental status, able to make needs known, NAD, VSS, patient agreeable to plan of care, pending bed assignment, comfort and safety provided.

## 2024-01-16 NOTE — CONSULT NOTE ADULT - SUBJECTIVE AND OBJECTIVE BOX
CHIEF COMPLAINT: cp    HISTORY OF PRESENT ILLNESS:  74yo M pmh prostate cancer s/p radiation, afib/flutter (5/2023) s/p watchman not on AC due to recurrent diverticular bleed s/p embolization, HTN, HL, asthma (mild) was in his usual state of health exercising at gym (weights & bike) monday-friday, woke up with chest pain Saturday morning that he attributed to muscle strain from the gym the day before.  Noted it was worse with lying flat, felt it across his shoulders but it eventually settled in his central chest described as a 'sticking pain' that was worse with deep breaths.  Denies radiation to neck/arms.  Denies n/v.  Does say it was associated with SOB/dypsnea.  He went to his cardiologist Isaac Calderón this morning who noted he had a TTE with 50% EF with new small pericardial effusion and a + stress test with lateral ischemia.  He sent him to the ED for further eval.    Allergies    No Known Allergies    Intolerances    	    MEDICATIONS:  aspirin  chewable 81 milliGRAM(s) Oral daily  diltiazem    milliGRAM(s) Oral daily  furosemide    Tablet 20 milliGRAM(s) Oral daily  furosemide   Injectable 40 milliGRAM(s) IV Push two times a day  heparin   Injectable 5000 Unit(s) SubCutaneous every 8 hours  metoprolol succinate ER 25 milliGRAM(s) Oral daily      montelukast 10 milliGRAM(s) Oral daily    acetaminophen     Tablet .. 650 milliGRAM(s) Oral every 6 hours PRN  morphine  - Injectable 2 milliGRAM(s) IV Push every 6 hours PRN    polyethylene glycol 3350 17 Gram(s) Oral daily    atorvastatin 80 milliGRAM(s) Oral at bedtime  colchicine 0.6 milliGRAM(s) Oral daily  finasteride 5 milliGRAM(s) Oral daily    ferrous    sulfate 325 milliGRAM(s) Oral daily  tamsulosin 0.4 milliGRAM(s) Oral at bedtime      PAST MEDICAL & SURGICAL HISTORY:  Hypertension      Hyperlipidemia      GIB (gastrointestinal bleeding)      Prostate cancer  s/p radiation therapy      Gout      Diverticulitis      Asthma      Atrial flutter      Diverticulosis      Presence of Watchman left atrial appendage closure device      History of hemorrhoidectomy      H/O total knee replacement, right          FAMILY HISTORY:      SOCIAL HISTORY:    non smoker. indep in adl    REVIEW OF SYSTEMS:  See HPI, otherwise complete 10 point review of systems negative    [ ] All others negative	    PHYSICAL EXAM:  T(C): 37.3 (01-16-24 @ 05:10), Max: 37.4 (01-15-24 @ 20:01)  HR: 107 (01-16-24 @ 05:10) (77 - 107)  BP: 122/81 (01-16-24 @ 05:10) (119/68 - 146/88)  RR: 18 (01-16-24 @ 05:10) (18 - 20)  SpO2: 98% (01-16-24 @ 05:10) (95% - 99%)  Wt(kg): --  I&O's Summary      Appearance: No Acute Distress	  HEENT:  Normal oral mucosa, PERRL, EOMI	  Cardiovascular: Normal S1 S2, No JVD, No murmurs/rubs/gallops  Respiratory: Lungs clear to auscultation bilaterally  Gastrointestinal:  Soft, Non-tender, + BS	  Skin: No rashes, No ecchymoses, No cyanosis	  Neurologic: Non-focal  Extremities: No clubbing, cyanosis or edema  Vascular: Peripheral pulses palpable 2+ bilaterally  Psychiatry: A & O x 3, Mood & affect appropriate    Laboratory Data:	 	    CBC Full  -  ( 16 Jan 2024 06:19 )  WBC Count : 7.91 K/uL  Hemoglobin : 9.7 g/dL  Hematocrit : 31.3 %  Platelet Count - Automated : 235 K/uL  Mean Cell Volume : 98.4 fl  Mean Cell Hemoglobin : 30.5 pg  Mean Cell Hemoglobin Concentration : 31.0 gm/dL  Auto Neutrophil # : x  Auto Lymphocyte # : x  Auto Monocyte # : x  Auto Eosinophil # : x  Auto Basophil # : x  Auto Neutrophil % : x  Auto Lymphocyte % : x  Auto Monocyte % : x  Auto Eosinophil % : x  Auto Basophil % : x    01-16    138  |  102  |  16  ----------------------------<  116<H>  3.7   |  27  |  0.92  01-15    136  |  97  |  13  ----------------------------<  165<H>  3.6   |  28  |  0.90    Ca    8.6      16 Jan 2024 03:01  Ca    9.3      15 Jose 2024 16:03  Phos  3.0     01-16  Mg     2.3     01-16  Mg     2.3     01-15    TPro  7.3  /  Alb  3.8  /  TBili  0.8  /  DBili  x   /  AST  12  /  ALT  12  /  AlkPhos  69  01-15      proBNP:   Lipid Profile:   HgA1c:   TSH:       CARDIAC MARKERS:            Interpretation of Telemetry: 	    ECG:  	  RADIOLOGY:  OTHER: 	    PREVIOUS DIAGNOSTIC TESTING:    [ ] Echocardiogram:  [ ] Catheterization:  [ ] Stress Test:  	    Assessment:  74yo M pmh prostate cancer s/p radiation, afib/flutter (5/2023) s/p watchman not on AC due to recurrent diverticular bleed s/p embolization, HTN, HL, asthma (mild) was in his usual state of health exercising at gym (weights & bike) monday-friday, woke up with chest pain Saturday morning that he attributed to muscle strain from the gym the day before. found to gave new pericardial effusion and ischemia on outpatient nuclear stress test    Recs:  cardiac stable  appears vol up --> cw lasix 40mg iv bid  pericardial effusion + pericarditis --> currently HD stable. repeat echo, obtain esr/crp, start colchicine 0.6mg qd and uptitrate to bid if tolerates. will evaluate for need for possible drainage  chest pain, + pharm nst, no e/o acs. cw antiplatelet, statin and antianginals -- plan for LHC on this admission  paf/flutter s/p watchman -> cw rate control meds. cont to hold AC  tele monitoring  will follow           Greater than 60 minutes spent on total encounter; more than 50% of the visit was spent counseling and/or coordinating care by the attending physician.   	  Isaac Calderón MD   Cardiovascular Diseases  [phone]     CHIEF COMPLAINT: cp    HISTORY OF PRESENT ILLNESS:  72yo M pmh prostate cancer s/p radiation, afib/flutter (5/2023) s/p watchman not on AC due to recurrent diverticular bleed s/p embolization, HTN, HL, asthma (mild) was in his usual state of health exercising at gym (weights & bike) monday-friday, woke up with chest pain Saturday morning that he attributed to muscle strain from the gym the day before.  Noted it was worse with lying flat, felt it across his shoulders but it eventually settled in his central chest described as a 'sticking pain' that was worse with deep breaths.  Denies radiation to neck/arms.  Denies n/v.  Does say it was associated with SOB/dypsnea.  He went to his cardiologist Isaac Calderón this morning who noted he had a TTE with 50% EF with new small pericardial effusion and a + stress test with lateral ischemia.  He sent him to the ED for further eval.    Allergies    No Known Allergies    Intolerances    	    MEDICATIONS:  aspirin  chewable 81 milliGRAM(s) Oral daily  diltiazem    milliGRAM(s) Oral daily  furosemide    Tablet 20 milliGRAM(s) Oral daily  furosemide   Injectable 40 milliGRAM(s) IV Push two times a day  heparin   Injectable 5000 Unit(s) SubCutaneous every 8 hours  metoprolol succinate ER 25 milliGRAM(s) Oral daily      montelukast 10 milliGRAM(s) Oral daily    acetaminophen     Tablet .. 650 milliGRAM(s) Oral every 6 hours PRN  morphine  - Injectable 2 milliGRAM(s) IV Push every 6 hours PRN    polyethylene glycol 3350 17 Gram(s) Oral daily    atorvastatin 80 milliGRAM(s) Oral at bedtime  colchicine 0.6 milliGRAM(s) Oral daily  finasteride 5 milliGRAM(s) Oral daily    ferrous    sulfate 325 milliGRAM(s) Oral daily  tamsulosin 0.4 milliGRAM(s) Oral at bedtime      PAST MEDICAL & SURGICAL HISTORY:  Hypertension      Hyperlipidemia      GIB (gastrointestinal bleeding)      Prostate cancer  s/p radiation therapy      Gout      Diverticulitis      Asthma      Atrial flutter      Diverticulosis      Presence of Watchman left atrial appendage closure device      History of hemorrhoidectomy      H/O total knee replacement, right          FAMILY HISTORY:      SOCIAL HISTORY:    non smoker. indep in adl    REVIEW OF SYSTEMS:  See HPI, otherwise complete 10 point review of systems negative    [ ] All others negative	    PHYSICAL EXAM:  T(C): 37.3 (01-16-24 @ 05:10), Max: 37.4 (01-15-24 @ 20:01)  HR: 107 (01-16-24 @ 05:10) (77 - 107)  BP: 122/81 (01-16-24 @ 05:10) (119/68 - 146/88)  RR: 18 (01-16-24 @ 05:10) (18 - 20)  SpO2: 98% (01-16-24 @ 05:10) (95% - 99%)  Wt(kg): --  I&O's Summary      Appearance: No Acute Distress	  HEENT:  Normal oral mucosa, PERRL, EOMI	  Cardiovascular: Normal S1 S2, No JVD, No murmurs/rubs/gallops  Respiratory: Lungs clear to auscultation bilaterally  Gastrointestinal:  Soft, Non-tender, + BS	  Skin: No rashes, No ecchymoses, No cyanosis	  Neurologic: Non-focal  Extremities: No clubbing, cyanosis or edema  Vascular: Peripheral pulses palpable 2+ bilaterally  Psychiatry: A & O x 3, Mood & affect appropriate    Laboratory Data:	 	    CBC Full  -  ( 16 Jan 2024 06:19 )  WBC Count : 7.91 K/uL  Hemoglobin : 9.7 g/dL  Hematocrit : 31.3 %  Platelet Count - Automated : 235 K/uL  Mean Cell Volume : 98.4 fl  Mean Cell Hemoglobin : 30.5 pg  Mean Cell Hemoglobin Concentration : 31.0 gm/dL  Auto Neutrophil # : x  Auto Lymphocyte # : x  Auto Monocyte # : x  Auto Eosinophil # : x  Auto Basophil # : x  Auto Neutrophil % : x  Auto Lymphocyte % : x  Auto Monocyte % : x  Auto Eosinophil % : x  Auto Basophil % : x    01-16    138  |  102  |  16  ----------------------------<  116<H>  3.7   |  27  |  0.92  01-15    136  |  97  |  13  ----------------------------<  165<H>  3.6   |  28  |  0.90    Ca    8.6      16 Jan 2024 03:01  Ca    9.3      15 Jose 2024 16:03  Phos  3.0     01-16  Mg     2.3     01-16  Mg     2.3     01-15    TPro  7.3  /  Alb  3.8  /  TBili  0.8  /  DBili  x   /  AST  12  /  ALT  12  /  AlkPhos  69  01-15      proBNP:   Lipid Profile:   HgA1c:   TSH:       CARDIAC MARKERS:            Interpretation of Telemetry: 	    ECG:  	  RADIOLOGY:  OTHER: 	    PREVIOUS DIAGNOSTIC TESTING:    [ ] Echocardiogram:  [ ] Catheterization:  [ ] Stress Test:  	    Assessment:  72yo M pmh prostate cancer s/p radiation, afib/flutter (5/2023) s/p watchman not on AC due to recurrent diverticular bleed s/p embolization, HTN, HL, asthma (mild) was in his usual state of health exercising at gym (weights & bike) monday-friday, woke up with chest pain Saturday morning that he attributed to muscle strain from the gym the day before. found to gave new pericardial effusion and ischemia on outpatient nuclear stress test    Recs:  cardiac stable  appears vol up --> cw lasix 40mg iv bid  pericardial effusion + pericarditis --> currently HD stable. repeat echo, obtain esr/crp, start colchicine 0.6mg qd and uptitrate to bid if tolerates. will evaluate for need for possible drainage  chest pain, + pharm nst, no e/o acs. cw antiplatelet, statin and antianginals -- plan for LHC on this admission  paf/flutter s/p watchman -> cw rate control meds. cont to hold AC  tele monitoring  will follow           Greater than 60 minutes spent on total encounter; more than 50% of the visit was spent counseling and/or coordinating care by the attending physician.   	  Isaac Calderón MD   Cardiovascular Diseases  [phone]     CHIEF COMPLAINT: cp    HISTORY OF PRESENT ILLNESS:  74yo M pmh prostate cancer s/p radiation, afib/flutter (5/2023) s/p watchman not on AC due to recurrent diverticular bleed s/p embolization, HTN, HL, asthma (mild) was in his usual state of health exercising at gym (weights & bike) monday-friday, woke up with chest pain Saturday morning that he attributed to muscle strain from the gym the day before.  Noted it was worse with lying flat, felt it across his shoulders but it eventually settled in his central chest described as a 'sticking pain' that was worse with deep breaths.  Denies radiation to neck/arms.  Denies n/v.  Does say it was associated with SOB/dypsnea.  He went to his cardiologist Isaac Calderón this morning who noted he had a TTE with 50% EF with new small pericardial effusion and a + stress test with lateral ischemia.  He sent him to the ED for further eval.    Allergies    No Known Allergies    Intolerances    	    MEDICATIONS:  aspirin  chewable 81 milliGRAM(s) Oral daily  diltiazem    milliGRAM(s) Oral daily  furosemide    Tablet 20 milliGRAM(s) Oral daily  furosemide   Injectable 40 milliGRAM(s) IV Push two times a day  heparin   Injectable 5000 Unit(s) SubCutaneous every 8 hours  metoprolol succinate ER 25 milliGRAM(s) Oral daily      montelukast 10 milliGRAM(s) Oral daily    acetaminophen     Tablet .. 650 milliGRAM(s) Oral every 6 hours PRN  morphine  - Injectable 2 milliGRAM(s) IV Push every 6 hours PRN    polyethylene glycol 3350 17 Gram(s) Oral daily    atorvastatin 80 milliGRAM(s) Oral at bedtime  colchicine 0.6 milliGRAM(s) Oral daily  finasteride 5 milliGRAM(s) Oral daily    ferrous    sulfate 325 milliGRAM(s) Oral daily  tamsulosin 0.4 milliGRAM(s) Oral at bedtime      PAST MEDICAL & SURGICAL HISTORY:  Hypertension      Hyperlipidemia      GIB (gastrointestinal bleeding)      Prostate cancer  s/p radiation therapy      Gout      Diverticulitis      Asthma      Atrial flutter      Diverticulosis      Presence of Watchman left atrial appendage closure device      History of hemorrhoidectomy      H/O total knee replacement, right          FAMILY HISTORY:      SOCIAL HISTORY:    non smoker. indep in adl    REVIEW OF SYSTEMS:  See HPI, otherwise complete 10 point review of systems negative    [ ] All others negative	    PHYSICAL EXAM:  T(C): 37.3 (01-16-24 @ 05:10), Max: 37.4 (01-15-24 @ 20:01)  HR: 107 (01-16-24 @ 05:10) (77 - 107)  BP: 122/81 (01-16-24 @ 05:10) (119/68 - 146/88)  RR: 18 (01-16-24 @ 05:10) (18 - 20)  SpO2: 98% (01-16-24 @ 05:10) (95% - 99%)  Wt(kg): --  I&O's Summary      Appearance: No Acute Distress	  HEENT:  Normal oral mucosa, PERRL, EOMI	  Cardiovascular: Normal S1 S2, No JVD, No murmurs/rubs/gallops  Respiratory: Lungs clear to auscultation bilaterally  Gastrointestinal:  Soft, Non-tender, + BS	  Skin: No rashes, No ecchymoses, No cyanosis	  Neurologic: Non-focal  Extremities: No clubbing, cyanosis or edema  Vascular: Peripheral pulses palpable 2+ bilaterally  Psychiatry: A & O x 3, Mood & affect appropriate    Laboratory Data:	 	    CBC Full  -  ( 16 Jan 2024 06:19 )  WBC Count : 7.91 K/uL  Hemoglobin : 9.7 g/dL  Hematocrit : 31.3 %  Platelet Count - Automated : 235 K/uL  Mean Cell Volume : 98.4 fl  Mean Cell Hemoglobin : 30.5 pg  Mean Cell Hemoglobin Concentration : 31.0 gm/dL  Auto Neutrophil # : x  Auto Lymphocyte # : x  Auto Monocyte # : x  Auto Eosinophil # : x  Auto Basophil # : x  Auto Neutrophil % : x  Auto Lymphocyte % : x  Auto Monocyte % : x  Auto Eosinophil % : x  Auto Basophil % : x    01-16    138  |  102  |  16  ----------------------------<  116<H>  3.7   |  27  |  0.92  01-15    136  |  97  |  13  ----------------------------<  165<H>  3.6   |  28  |  0.90    Ca    8.6      16 Jan 2024 03:01  Ca    9.3      15 Jose 2024 16:03  Phos  3.0     01-16  Mg     2.3     01-16  Mg     2.3     01-15    TPro  7.3  /  Alb  3.8  /  TBili  0.8  /  DBili  x   /  AST  12  /  ALT  12  /  AlkPhos  69  01-15      proBNP:   Lipid Profile:   HgA1c:   TSH:       CARDIAC MARKERS:            Interpretation of Telemetry: 	    ECG:  	  RADIOLOGY:  OTHER: 	    PREVIOUS DIAGNOSTIC TESTING:    [ ] Echocardiogram:  [ ] Catheterization:  [ ] Stress Test:  	    Assessment:  74yo M pmh prostate cancer s/p radiation, afib/flutter (5/2023) s/p watchman not on AC due to recurrent diverticular bleed s/p embolization, HTN, HL, asthma (mild) was in his usual state of health exercising at gym (weights & bike) monday-friday, woke up with chest pain Saturday morning that he attributed to muscle strain from the gym the day before. found to gave new pericardial effusion and ischemia on outpatient nuclear stress test    Recs:  cardiac stable  appears vol up --> cw lasix 40mg iv qd. monitor vol status closely as patient preload dependent in setting of pericardial effusion  pericardial effusion + pericarditis --> currently HD stable. repeat echo, obtain esr/crp, start colchicine 0.6mg qd and uptitrate to bid if tolerates. will evaluate for need for possible drainage  chest pain, + pharm nst, no e/o acs. cw antiplatelet, statin and antianginals -- plan for LHC on this admission  paf/flutter s/p watchman -> cw rate control meds. cont to hold AC  tele monitoring  will follow           Greater than 60 minutes spent on total encounter; more than 50% of the visit was spent counseling and/or coordinating care by the attending physician.   	  Isaac Calderón MD   Cardiovascular Diseases  [phone]     CHIEF COMPLAINT: cp    HISTORY OF PRESENT ILLNESS:  72yo M pmh prostate cancer s/p radiation, afib/flutter (5/2023) s/p watchman not on AC due to recurrent diverticular bleed s/p embolization, HTN, HL, asthma (mild) was in his usual state of health exercising at gym (weights & bike) monday-friday, woke up with chest pain Saturday morning that he attributed to muscle strain from the gym the day before.  Noted it was worse with lying flat, felt it across his shoulders but it eventually settled in his central chest described as a 'sticking pain' that was worse with deep breaths.  Denies radiation to neck/arms.  Denies n/v.  Does say it was associated with SOB/dypsnea.  He went to his cardiologist Isaac Calderón this morning who noted he had a TTE with 50% EF with new small pericardial effusion and a + stress test with lateral ischemia.  He sent him to the ED for further eval.    Allergies    No Known Allergies    Intolerances    	    MEDICATIONS:  aspirin  chewable 81 milliGRAM(s) Oral daily  diltiazem    milliGRAM(s) Oral daily  furosemide    Tablet 20 milliGRAM(s) Oral daily  furosemide   Injectable 40 milliGRAM(s) IV Push two times a day  heparin   Injectable 5000 Unit(s) SubCutaneous every 8 hours  metoprolol succinate ER 25 milliGRAM(s) Oral daily      montelukast 10 milliGRAM(s) Oral daily    acetaminophen     Tablet .. 650 milliGRAM(s) Oral every 6 hours PRN  morphine  - Injectable 2 milliGRAM(s) IV Push every 6 hours PRN    polyethylene glycol 3350 17 Gram(s) Oral daily    atorvastatin 80 milliGRAM(s) Oral at bedtime  colchicine 0.6 milliGRAM(s) Oral daily  finasteride 5 milliGRAM(s) Oral daily    ferrous    sulfate 325 milliGRAM(s) Oral daily  tamsulosin 0.4 milliGRAM(s) Oral at bedtime      PAST MEDICAL & SURGICAL HISTORY:  Hypertension      Hyperlipidemia      GIB (gastrointestinal bleeding)      Prostate cancer  s/p radiation therapy      Gout      Diverticulitis      Asthma      Atrial flutter      Diverticulosis      Presence of Watchman left atrial appendage closure device      History of hemorrhoidectomy      H/O total knee replacement, right          FAMILY HISTORY:      SOCIAL HISTORY:    non smoker. indep in adl    REVIEW OF SYSTEMS:  See HPI, otherwise complete 10 point review of systems negative    [ ] All others negative	    PHYSICAL EXAM:  T(C): 37.3 (01-16-24 @ 05:10), Max: 37.4 (01-15-24 @ 20:01)  HR: 107 (01-16-24 @ 05:10) (77 - 107)  BP: 122/81 (01-16-24 @ 05:10) (119/68 - 146/88)  RR: 18 (01-16-24 @ 05:10) (18 - 20)  SpO2: 98% (01-16-24 @ 05:10) (95% - 99%)  Wt(kg): --  I&O's Summary      Appearance: No Acute Distress	  HEENT:  Normal oral mucosa, PERRL, EOMI	  Cardiovascular: Normal S1 S2, No JVD, No murmurs/rubs/gallops  Respiratory: Lungs clear to auscultation bilaterally  Gastrointestinal:  Soft, Non-tender, + BS	  Skin: No rashes, No ecchymoses, No cyanosis	  Neurologic: Non-focal  Extremities: No clubbing, cyanosis or edema  Vascular: Peripheral pulses palpable 2+ bilaterally  Psychiatry: A & O x 3, Mood & affect appropriate    Laboratory Data:	 	    CBC Full  -  ( 16 Jan 2024 06:19 )  WBC Count : 7.91 K/uL  Hemoglobin : 9.7 g/dL  Hematocrit : 31.3 %  Platelet Count - Automated : 235 K/uL  Mean Cell Volume : 98.4 fl  Mean Cell Hemoglobin : 30.5 pg  Mean Cell Hemoglobin Concentration : 31.0 gm/dL  Auto Neutrophil # : x  Auto Lymphocyte # : x  Auto Monocyte # : x  Auto Eosinophil # : x  Auto Basophil # : x  Auto Neutrophil % : x  Auto Lymphocyte % : x  Auto Monocyte % : x  Auto Eosinophil % : x  Auto Basophil % : x    01-16    138  |  102  |  16  ----------------------------<  116<H>  3.7   |  27  |  0.92  01-15    136  |  97  |  13  ----------------------------<  165<H>  3.6   |  28  |  0.90    Ca    8.6      16 Jan 2024 03:01  Ca    9.3      15 Jose 2024 16:03  Phos  3.0     01-16  Mg     2.3     01-16  Mg     2.3     01-15    TPro  7.3  /  Alb  3.8  /  TBili  0.8  /  DBili  x   /  AST  12  /  ALT  12  /  AlkPhos  69  01-15      proBNP:   Lipid Profile:   HgA1c:   TSH:       CARDIAC MARKERS:            Interpretation of Telemetry: 	    ECG:  	  RADIOLOGY:  OTHER: 	    PREVIOUS DIAGNOSTIC TESTING:    [ ] Echocardiogram:  [ ] Catheterization:  [ ] Stress Test:  	    Assessment:  72yo M pmh prostate cancer s/p radiation, afib/flutter (5/2023) s/p watchman not on AC due to recurrent diverticular bleed s/p embolization, HTN, HL, asthma (mild) was in his usual state of health exercising at gym (weights & bike) monday-friday, woke up with chest pain Saturday morning that he attributed to muscle strain from the gym the day before. found to gave new pericardial effusion and ischemia on outpatient nuclear stress test    Recs:  cardiac stable  appears vol up --> cw lasix 40mg iv qd. monitor vol status closely as patient preload dependent in setting of pericardial effusion  pericardial effusion + pericarditis --> currently HD stable. repeat echo, obtain esr/crp, start colchicine 0.6mg qd and uptitrate to bid if tolerates. will evaluate for need for possible drainage  chest pain, + pharm nst, no e/o acs. cw antiplatelet, statin and antianginals -- plan for LHC on this admission  paf/flutter s/p watchman -> cw rate control meds. cont to hold AC  tele monitoring  will follow           Greater than 60 minutes spent on total encounter; more than 50% of the visit was spent counseling and/or coordinating care by the attending physician.   	  Isaac Calderón MD   Cardiovascular Diseases  [phone]

## 2024-01-17 LAB
ALBUMIN SERPL ELPH-MCNC: 3.2 G/DL — LOW (ref 3.3–5)
ALP SERPL-CCNC: 65 U/L — SIGNIFICANT CHANGE UP (ref 40–120)
ALT FLD-CCNC: 8 U/L — LOW (ref 10–45)
ANION GAP SERPL CALC-SCNC: 10 MMOL/L — SIGNIFICANT CHANGE UP (ref 5–17)
AST SERPL-CCNC: 13 U/L — SIGNIFICANT CHANGE UP (ref 10–40)
BILIRUB SERPL-MCNC: 0.4 MG/DL — SIGNIFICANT CHANGE UP (ref 0.2–1.2)
BUN SERPL-MCNC: 17 MG/DL — SIGNIFICANT CHANGE UP (ref 7–23)
CALCIUM SERPL-MCNC: 8.9 MG/DL — SIGNIFICANT CHANGE UP (ref 8.4–10.5)
CHLORIDE SERPL-SCNC: 102 MMOL/L — SIGNIFICANT CHANGE UP (ref 96–108)
CO2 SERPL-SCNC: 28 MMOL/L — SIGNIFICANT CHANGE UP (ref 22–31)
CREAT SERPL-MCNC: 0.87 MG/DL — SIGNIFICANT CHANGE UP (ref 0.5–1.3)
EGFR: 91 ML/MIN/1.73M2 — SIGNIFICANT CHANGE UP
GLUCOSE SERPL-MCNC: 102 MG/DL — HIGH (ref 70–99)
MAGNESIUM SERPL-MCNC: 2.1 MG/DL — SIGNIFICANT CHANGE UP (ref 1.6–2.6)
POTASSIUM SERPL-MCNC: 3.7 MMOL/L — SIGNIFICANT CHANGE UP (ref 3.5–5.3)
POTASSIUM SERPL-SCNC: 3.7 MMOL/L — SIGNIFICANT CHANGE UP (ref 3.5–5.3)
PROT SERPL-MCNC: 6.5 G/DL — SIGNIFICANT CHANGE UP (ref 6–8.3)
SODIUM SERPL-SCNC: 140 MMOL/L — SIGNIFICANT CHANGE UP (ref 135–145)

## 2024-01-17 PROCEDURE — 75574 CT ANGIO HRT W/3D IMAGE: CPT | Mod: 26

## 2024-01-17 RX ORDER — METOPROLOL TARTRATE 50 MG
50 TABLET ORAL DAILY
Refills: 0 | Status: DISCONTINUED | OUTPATIENT
Start: 2024-01-17 | End: 2024-01-19

## 2024-01-17 RX ORDER — METOPROLOL TARTRATE 50 MG
25 TABLET ORAL ONCE
Refills: 0 | Status: COMPLETED | OUTPATIENT
Start: 2024-01-17 | End: 2024-01-17

## 2024-01-17 RX ORDER — CHLORHEXIDINE GLUCONATE 213 G/1000ML
1 SOLUTION TOPICAL DAILY
Refills: 0 | Status: DISCONTINUED | OUTPATIENT
Start: 2024-01-17 | End: 2024-01-19

## 2024-01-17 RX ORDER — FUROSEMIDE 40 MG
40 TABLET ORAL DAILY
Refills: 0 | Status: DISCONTINUED | OUTPATIENT
Start: 2024-01-17 | End: 2024-01-19

## 2024-01-17 RX ORDER — COLCHICINE 0.6 MG
0.6 TABLET ORAL
Refills: 0 | Status: DISCONTINUED | OUTPATIENT
Start: 2024-01-17 | End: 2024-01-19

## 2024-01-17 RX ADMIN — FINASTERIDE 5 MILLIGRAM(S): 5 TABLET, FILM COATED ORAL at 11:43

## 2024-01-17 RX ADMIN — Medication 25 MILLIGRAM(S): at 05:17

## 2024-01-17 RX ADMIN — Medication 0.6 MILLIGRAM(S): at 17:34

## 2024-01-17 RX ADMIN — MONTELUKAST 10 MILLIGRAM(S): 4 TABLET, CHEWABLE ORAL at 11:43

## 2024-01-17 RX ADMIN — PANTOPRAZOLE SODIUM 40 MILLIGRAM(S): 20 TABLET, DELAYED RELEASE ORAL at 05:17

## 2024-01-17 RX ADMIN — Medication 50 MILLIGRAM(S): at 10:20

## 2024-01-17 RX ADMIN — ATORVASTATIN CALCIUM 80 MILLIGRAM(S): 80 TABLET, FILM COATED ORAL at 21:18

## 2024-01-17 RX ADMIN — Medication 81 MILLIGRAM(S): at 11:43

## 2024-01-17 RX ADMIN — TAMSULOSIN HYDROCHLORIDE 0.4 MILLIGRAM(S): 0.4 CAPSULE ORAL at 21:17

## 2024-01-17 RX ADMIN — Medication 25 MILLIGRAM(S): at 13:37

## 2024-01-17 RX ADMIN — Medication 325 MILLIGRAM(S): at 11:43

## 2024-01-17 RX ADMIN — Medication 120 MILLIGRAM(S): at 05:16

## 2024-01-17 RX ADMIN — Medication 40 MILLIGRAM(S): at 05:17

## 2024-01-17 RX ADMIN — CHLORHEXIDINE GLUCONATE 1 APPLICATION(S): 213 SOLUTION TOPICAL at 17:36

## 2024-01-17 NOTE — PROGRESS NOTE ADULT - ASSESSMENT
_________________________________________________________________________________________  ========>>  M E D I C A L   A T T E N D I N G    F O L L O W  U P  N O T E  <<=========  -----------------------------------------------------------------------------------------------------    - Patient seen and examined by me earlier today.   - In summary,  LENNOX CARTER is a 73y year old man admitted with chest pain   - Patient today overall doing ok, comfortable, eating OK. patient still with some chest discomfort, mostly with movement and deep breathing      per cardio note: patient declined cath, does not want stent due to severe diverticular bleed before on antiplatelets / AC ( patient told me the same)       ==================>> REVIEW OF SYSTEM <<=================    GEN: no fever, no chills, no pain  RESP: no SOB at rest , no cough, no sputum  CVS: chest pain as above , no palpitations  GI: no abdominal pain, no nausea, + constipation>> getting miralax   : no dysuria, no frequency  Neuro: no headache, no dizziness    ==================>> PHYSICAL EXAM <<=================    GEN: A&O X 3 , NAD , comfortable, pleasant, calm , sitting on bed  HEENT: NCAT, PERRL, MMM, hearing intact  CVS: S1S2 , irregular , No M/R/G appreciated  PULM: CTA B/L,  no W/R/R appreciated  ABD.: soft. non tender, non distended,  bowel sounds present  Extrem: intact pulses , no edema        ( Note written / Date of service 01-17-24 ( This is certified to be the same as "ENTERED" date above ( for billing purposes)))    ==================>> MEDICATIONS <<====================    aspirin  chewable 81 milliGRAM(s) Oral daily  atorvastatin 80 milliGRAM(s) Oral at bedtime  colchicine 0.6 milliGRAM(s) Oral two times a day  diltiazem    milliGRAM(s) Oral daily  ferrous    sulfate 325 milliGRAM(s) Oral daily  finasteride 5 milliGRAM(s) Oral daily  furosemide    Tablet 40 milliGRAM(s) Oral daily  heparin   Injectable 5000 Unit(s) SubCutaneous every 8 hours  metoprolol succinate ER 50 milliGRAM(s) Oral daily  montelukast 10 milliGRAM(s) Oral daily  pantoprazole    Tablet 40 milliGRAM(s) Oral before breakfast  polyethylene glycol 3350 17 Gram(s) Oral daily  tamsulosin 0.4 milliGRAM(s) Oral at bedtime    MEDICATIONS  (PRN):  acetaminophen     Tablet .. 650 milliGRAM(s) Oral every 6 hours PRN Moderate Pain (4 - 6)  morphine  - Injectable 2 milliGRAM(s) IV Push every 6 hours PRN Severe Pain (7 - 10)    ___________  Active diet:  Diet, Regular:   DASH/TLC Sodium & Cholesterol Restricted (DASH)  ___________________    ==================>> VITAL SIGNS <<==================    Height (cm): 180.3  Weight (kg): 94.8  BMI (kg/m2): 29.2  Vital Signs Last 24 HrsT(C): 36.8 (01-17-24 @ 04:20)  T(F): 98.3 (01-17-24 @ 04:20), Max: 98.3 (01-16-24 @ 21:19)  HR: 75 (01-17-24 @ 10:22) (75 - 108)  BP: 110/68 (01-17-24 @ 10:22)  RR: 18 (01-17-24 @ 04:20) (18 - 18)  SpO2: 97% (01-17-24 @ 04:20) (95% - 98%)       ==================>> LAB AND IMAGING <<==================                        9.7    7.91  )-----------( 235      ( 16 Jan 2024 06:19 )             31.3        01-17    140  |  102  |  17  ----------------------------<  102<H>  3.7   |  28  |  0.87    Ca    8.9      17 Jan 2024 06:52  Phos  3.0     01-16  Mg     2.1     01-17    TPro  6.5  /  Alb  3.2<L>  /  TBili  0.4  /  DBili  x   /  AST  13  /  ALT  8<L>  /  AlkPhos  65  01-17    WBC count:   7.91 <<== ,  10.47 <<==   Hemoglobin:   9.7 <<==,  10.5 <<==  platelets:  235 <==, 257 <==    Creatinine:  0.87  <<==, 0.92  <<==, 0.90  <<==  Sodium:   140  <==, 138  <==, 136  <==       AST:          13(01-17) <== , 12(01-15) <==      ALT:        8(01-17)  <== , 12(01-15)  <==      AP:        65(01-17)  <=, 69(01-15)  <=     Bili:        0.4(01-17)  <=, 0.8(01-15)  <=    ~~ High Sensitivity Troponin  ~~  25  <<--, 28  <<--, 27  <<--, 28  <<--    Pro-BNP: 2056 (01-15-24 @ 16:03)      < from: CT Angio Abdomen and Pelvis w/ IV Cont (01.15.24 @ 17:38) >  IMPRESSION:  Cardiomegaly with large pericardial effusion.  No aortic dissection. Mildly dilated aortic root and mid ascending aortic   segment.  No pulmonary embolism.  < end of copied text >    < from: TTE W or WO Ultrasound Enhancing Agent (01.16.24 @ 14:35) >  CONCLUSIONS:    1. Left ventricular cavity is normal. Left ventricular wall thickness is normal. Left ventricular systolic function is mildly decreased with an ejection fraction of 50 % by Rogers's method of disks. There are no regional wall motion abnormalities seen.   2. Basal and mid inferior wall is abnormal.   3. There is mild (grade 1) left ventricular diastolic dysfunction, with normal filling pressure.   4. Normal right ventricular cavity size, wall thickness, and normal systolic function.   5. Aortic root at the sinuses of Valsalva is dilated, measuring 4.00 cm (indexed 1.88 cm/m²).   6. There is effacement of the sinus of valsalva. Conside dedicated aorta CT.   7. Moderate mitral regurgitation.   8. Mild aortic regurgitation.   9. Moderate pericardial effusion noted adjacent to the posterior left ventricle and large pericardial effusion noted adjacent to the right atrium with no evidence of hemodynamiccompromise: respiratory variation across the mitral valve E wave is not greater than 30%, no early diastolic inversion of the right ventricle and no diastolic collapse of right atrium, exceding 1/3 of the cardiac cycle.  10. No prior echocardiogram is available for comparison.  < end of copied text >  ___________________________________________________________________________________  ===============>>  A S S E S S M E N T   A N D   P L A N <<===============  ------------------------------------------------------------------------------------------    · Assessment	  72yo M pmh prostate cancer s/p radiation, afib/flutter (5/2023) s/p watchman not on AC due to recurrent diverticular bleed s/p embolization, HTN, HL, asthma (mild) admitted with pleuritic chest pain and abnormal outpatient stress test with new pericardial effusion, hemodynamically stable and ruled out for ACS and CTA negative for VTE/dissection but shows large pericardial effusion, pending repeat TTE and possible cath tomorrow.      Problem/Plan - 1:  ·  Problem: Pleuritic chest pain.   ·  Plan: CTA significant for cardiomegaly with large pericardial effusion.  Negative for aortic dissection/vte.  EKG with afib but no acute st changes and no evidence of pericarditis.  Trop normal but BNP elevated at 2056.  I spoke with Dr Calderón regarding the CTA finding of large pericardial effusion.  He noted small to moderate effusion on TTE in office.    repeat TTE as above with moderate effusion   outpatient nuclear stress concerning for lateral ischemia,   plan was for cath but now is for CT cor angio  -monitor on tele  -tylenol/morphine prn pain  -continue home asa/statin/bb  -on colchicine   -statin to 80mg lipitor.  - diuresis as needed  - ESR / CRP elevated   - may need Rhum evaluation ( discussed with rheum fellow... to reconsult as needed pos cardiac workup)     Problem/Plan - 2:  ·  Problem: Pericardial effusion.   ·  Plan: as per problem 1  repeat TTE. as above  diuretics  colchicine for pericarditis     Problem/Plan - 3:  ·  Problem: Chronic atrial fibrillation.   ·  Plan: chadsvasc2, not on AC due to h/o prior bleeds  s/p watchman  continue dilt/metop for rate control  f/u cards recs.    Problem/Plan - 4:  ·  Problem: Hypertension.   ·  Plan: continue home meds.    Problem/Plan - 5:  ·  Problem: Asthma.   ·  Plan: mild, reports not requiring albuterol at home.    Problem/Plan - 6:  ·  Problem: Preventive measure.   ·  Plan: mod risk for vte by improve score  sqh  incentive spirometry    --------------------------------------------  Case discussed with patient,   Education given on findings and plan of care  ___________________________  H. JONAS Luu.  Pager: [phone]

## 2024-01-18 LAB
ANION GAP SERPL CALC-SCNC: 13 MMOL/L — SIGNIFICANT CHANGE UP (ref 5–17)
BUN SERPL-MCNC: 17 MG/DL — SIGNIFICANT CHANGE UP (ref 7–23)
CALCIUM SERPL-MCNC: 9.5 MG/DL — SIGNIFICANT CHANGE UP (ref 8.4–10.5)
CHLORIDE SERPL-SCNC: 101 MMOL/L — SIGNIFICANT CHANGE UP (ref 96–108)
CO2 SERPL-SCNC: 25 MMOL/L — SIGNIFICANT CHANGE UP (ref 22–31)
CREAT SERPL-MCNC: 0.8 MG/DL — SIGNIFICANT CHANGE UP (ref 0.5–1.3)
EGFR: 93 ML/MIN/1.73M2 — SIGNIFICANT CHANGE UP
GLUCOSE SERPL-MCNC: 101 MG/DL — HIGH (ref 70–99)
HCT VFR BLD CALC: 31.2 % — LOW (ref 39–50)
HGB BLD-MCNC: 9.8 G/DL — LOW (ref 13–17)
MCHC RBC-ENTMCNC: 29.8 PG — SIGNIFICANT CHANGE UP (ref 27–34)
MCHC RBC-ENTMCNC: 31.4 GM/DL — LOW (ref 32–36)
MCV RBC AUTO: 94.8 FL — SIGNIFICANT CHANGE UP (ref 80–100)
MRSA PCR RESULT.: SIGNIFICANT CHANGE UP
NRBC # BLD: 0 /100 WBCS — SIGNIFICANT CHANGE UP (ref 0–0)
PLATELET # BLD AUTO: 271 K/UL — SIGNIFICANT CHANGE UP (ref 150–400)
POTASSIUM SERPL-MCNC: 3.7 MMOL/L — SIGNIFICANT CHANGE UP (ref 3.5–5.3)
POTASSIUM SERPL-SCNC: 3.7 MMOL/L — SIGNIFICANT CHANGE UP (ref 3.5–5.3)
RBC # BLD: 3.29 M/UL — LOW (ref 4.2–5.8)
RBC # FLD: 14.9 % — HIGH (ref 10.3–14.5)
S AUREUS DNA NOSE QL NAA+PROBE: SIGNIFICANT CHANGE UP
SODIUM SERPL-SCNC: 139 MMOL/L — SIGNIFICANT CHANGE UP (ref 135–145)
WBC # BLD: 6.3 K/UL — SIGNIFICANT CHANGE UP (ref 3.8–10.5)
WBC # FLD AUTO: 6.3 K/UL — SIGNIFICANT CHANGE UP (ref 3.8–10.5)

## 2024-01-18 RX ADMIN — Medication 325 MILLIGRAM(S): at 11:36

## 2024-01-18 RX ADMIN — TAMSULOSIN HYDROCHLORIDE 0.4 MILLIGRAM(S): 0.4 CAPSULE ORAL at 21:23

## 2024-01-18 RX ADMIN — PANTOPRAZOLE SODIUM 40 MILLIGRAM(S): 20 TABLET, DELAYED RELEASE ORAL at 07:19

## 2024-01-18 RX ADMIN — FINASTERIDE 5 MILLIGRAM(S): 5 TABLET, FILM COATED ORAL at 11:36

## 2024-01-18 RX ADMIN — Medication 0.6 MILLIGRAM(S): at 17:08

## 2024-01-18 RX ADMIN — Medication 40 MILLIGRAM(S): at 05:30

## 2024-01-18 RX ADMIN — Medication 120 MILLIGRAM(S): at 05:30

## 2024-01-18 RX ADMIN — ATORVASTATIN CALCIUM 80 MILLIGRAM(S): 80 TABLET, FILM COATED ORAL at 21:36

## 2024-01-18 RX ADMIN — Medication 81 MILLIGRAM(S): at 11:38

## 2024-01-18 RX ADMIN — Medication 0.6 MILLIGRAM(S): at 05:30

## 2024-01-18 RX ADMIN — CHLORHEXIDINE GLUCONATE 1 APPLICATION(S): 213 SOLUTION TOPICAL at 11:35

## 2024-01-18 RX ADMIN — MONTELUKAST 10 MILLIGRAM(S): 4 TABLET, CHEWABLE ORAL at 11:36

## 2024-01-18 RX ADMIN — POLYETHYLENE GLYCOL 3350 17 GRAM(S): 17 POWDER, FOR SOLUTION ORAL at 11:35

## 2024-01-18 RX ADMIN — Medication 50 MILLIGRAM(S): at 05:31

## 2024-01-18 NOTE — PROGRESS NOTE ADULT - ASSESSMENT
_________________________________________________________________________________________  ========>>  M E D I C A L   A T T E N D I N G    F O L L O W  U P  N O T E  <<=========  -----------------------------------------------------------------------------------------------------    - Patient seen and examined by me earlier today.   - In summary,  LENNOX CARTER is a 73y year old man admitted with chest pain   - Patient today overall doing ok, comfortable, eating OK. patient still with some chest discomfort, mostly with movement and deep breathing      per cardio note: patient declined cath, does not want stent due to severe diverticular bleed before on antiplatelets / AC ( patient told me the same)       ==================>> REVIEW OF SYSTEM <<=================    GEN: no fever, no chills, no pain  RESP: no SOB at rest , no cough, no sputum  CVS: chest pain as above , no palpitations  GI: no abdominal pain, no nausea, + constipation>> getting miralax   : no dysuria, no frequency  Neuro: no headache, no dizziness    ==================>> PHYSICAL EXAM <<=================    GEN: A&O X 3 , NAD , comfortable, pleasant, calm , sitting on bed  HEENT: NCAT, PERRL, MMM, hearing intact  CVS: S1S2 , irregular , No M/R/G appreciated  PULM: CTA B/L,  no W/R/R appreciated  ABD.: soft. non tender, non distended,  bowel sounds present  Extrem: intact pulses , no edema          ( Note written / Date of service 01-18-24 ( This is certified to be the same as "ENTERED" date above ( for billing purposes)))    ==================>> MEDICATIONS <<====================    aspirin  chewable 81 milliGRAM(s) Oral daily  atorvastatin 80 milliGRAM(s) Oral at bedtime  chlorhexidine 2% Cloths 1 Application(s) Topical daily  colchicine 0.6 milliGRAM(s) Oral two times a day  diltiazem    milliGRAM(s) Oral daily  ferrous    sulfate 325 milliGRAM(s) Oral daily  finasteride 5 milliGRAM(s) Oral daily  furosemide    Tablet 40 milliGRAM(s) Oral daily  heparin   Injectable 5000 Unit(s) SubCutaneous every 8 hours  metoprolol succinate ER 50 milliGRAM(s) Oral daily  montelukast 10 milliGRAM(s) Oral daily  pantoprazole    Tablet 40 milliGRAM(s) Oral before breakfast  polyethylene glycol 3350 17 Gram(s) Oral daily  tamsulosin 0.4 milliGRAM(s) Oral at bedtime    MEDICATIONS  (PRN):  acetaminophen     Tablet .. 650 milliGRAM(s) Oral every 6 hours PRN Moderate Pain (4 - 6)  morphine  - Injectable 2 milliGRAM(s) IV Push every 6 hours PRN Severe Pain (7 - 10)    ___________  Active diet:  Diet, Regular:   DASH/TLC Sodium & Cholesterol Restricted (DASH)  ___________________    ==================>> VITAL SIGNS <<==================    Vital Signs Last 24 HrsT(C): 37 (01-18-24 @ 11:08)  T(F): 98.6 (01-18-24 @ 11:08), Max: 98.9 (01-18-24 @ 04:31)  HR: 69 (01-18-24 @ 16:22) (69 - 79)  BP: 148/89 (01-18-24 @ 16:22)  RR: 18 (01-18-24 @ 16:22) (18 - 18)  SpO2: 97% (01-18-24 @ 16:22) (95% - 98%)      CAPILLARY BLOOD GLUCOSE         ==================>> LAB AND IMAGING <<==================                        9.8    6.30  )-----------( 271      ( 18 Jan 2024 07:13 )             31.2        01-18    139  |  101  |  17  ----------------------------<  101<H>  3.7   |  25  |  0.80    Ca    9.5      18 Jan 2024 07:13  Mg     2.1     01-17    TPro  6.5  /  Alb  3.2<L>  /  TBili  0.4  /  DBili  x   /  AST  13  /  ALT  8<L>  /  AlkPhos  65  01-17    WBC count:   6.30 <<== ,  7.91 <<== ,  10.47 <<==   Hemoglobin:   9.8 <<==,  9.7 <<==,  10.5 <<==  platelets:  271 <==, 235 <==, 257 <==    Creatinine:  0.80  <<==, 0.87  <<==, 0.92  <<==, 0.90  <<==  Sodium:   139  <==, 140  <==, 138  <==, 136  <==       AST:          13(01-17) <== , 12(01-15) <==      ALT:        8(01-17)  <== , 12(01-15)  <==      AP:        65(01-17)  <=, 69(01-15)  <=     Bili:        0.4(01-17)  <=, 0.8(01-15)  <=    ____________________________    M I C R O B I O L O G Y :      SARS-CoV-2: NotDetec (01-15-24 @ 20:02)      < from: CT Angio Abdomen and Pelvis w/ IV Cont (01.15.24 @ 17:38) >  IMPRESSION:  Cardiomegaly with large pericardial effusion.  No aortic dissection. Mildly dilated aortic root and mid ascending aortic   segment.  No pulmonary embolism.  < end of copied text >    < from: TTE W or WO Ultrasound Enhancing Agent (01.16.24 @ 14:35) >  CONCLUSIONS:    1. Left ventricular cavity is normal. Left ventricular wall thickness is normal. Left ventricular systolic function is mildly decreased with an ejection fraction of 50 % by Rogers's method of disks. There are no regional wall motion abnormalities seen.   2. Basal and mid inferior wall is abnormal.   3. There is mild (grade 1) left ventricular diastolic dysfunction, with normal filling pressure.   4. Normal right ventricular cavity size, wall thickness, and normal systolic function.   5. Aortic root at the sinuses of Valsalva is dilated, measuring 4.00 cm (indexed 1.88 cm/m²).   6. There is effacement of the sinus of valsalva. Conside dedicated aorta CT.   7. Moderate mitral regurgitation.   8. Mild aortic regurgitation.   9. Moderate pericardial effusion noted adjacent to the posterior left ventricle and large pericardial effusion noted adjacent to the right atrium with no evidence of hemodynamiccompromise: respiratory variation across the mitral valve E wave is not greater than 30%, no early diastolic inversion of the right ventricle and no diastolic collapse of right atrium, exceding 1/3 of the cardiac cycle.  10. No prior echocardiogram is available for comparison.  < end of copied text >  ___________________________________________________________________________________  ===============>>  A S S E S S M E N T   A N D   P L A N <<===============  ------------------------------------------------------------------------------------------    · Assessment	  72yo M pmh prostate cancer s/p radiation, afib/flutter (5/2023) s/p watchman not on AC due to recurrent diverticular bleed s/p embolization, HTN, HL, asthma (mild) admitted with pleuritic chest pain and abnormal outpatient stress test with new pericardial effusion, hemodynamically stable and ruled out for ACS and CTA negative for VTE/dissection but shows large pericardial effusion, pending repeat TTE and possible cath tomorrow.      Problem/Plan - 1:  ·  Problem: Pleuritic chest pain.   ·  Plan: CTA significant for cardiomegaly with large pericardial effusion.  Negative for aortic dissection/vte.  EKG with afib but no acute st changes and no evidence of pericarditis.  Trop normal but BNP elevated at 2056.  I spoke with Dr Calderón regarding the CTA finding of large pericardial effusion.  He noted small to moderate effusion on TTE in office.    repeat TTE as above with moderate effusion   outpatient nuclear stress concerning for lateral ischemia,   plan was for cath but now is for CT cor angio  -monitor on tele  -tylenol/morphine prn pain  -continue home asa/statin/bb  -on colchicine   -statin to 80mg lipitor.  - diuresis as needed  - ESR / CRP elevated   - may need Rhum evaluation ( discussed with rheum fellow... to reconsult as needed pos cardiac workup)     Problem/Plan - 2:  ·  Problem: Pericardial effusion.   ·  Plan: as per problem 1  repeat TTE. as above  diuretics  colchicine for pericarditis     Problem/Plan - 3:  ·  Problem: Chronic atrial fibrillation.   ·  Plan: chadsvasc2, not on AC due to h/o prior bleeds  s/p watchman  continue dilt/metop for rate control  f/u cards recs.    Problem/Plan - 4:  ·  Problem: Hypertension.   ·  Plan: continue home meds.    Problem/Plan - 5:  ·  Problem: Asthma.   ·  Plan: mild, reports not requiring albuterol at home.    Problem/Plan - 6:  ·  Problem: Preventive measure.   ·  Plan: mod risk for vte by improve score  sqh  incentive spirometry    --------------------------------------------  Case discussed with patient,   Education given on findings and plan of care  ___________________________  HAleks Luu D.O.  Pager: [phone]       _________________________________________________________________________________________  ========>>  M E D I C A L   A T T E N D I N G    F O L L O W  U P  N O T E  <<=========  -----------------------------------------------------------------------------------------------------    - Patient seen and examined by me earlier today.   - In summary,  LENNOX CARTER is a 73y year old man admitted with chest pain   - Patient today overall doing ok, comfortable, eating OK. patient still with some occasional chest discomfort, mostly with movement and deep breathing          ==================>> REVIEW OF SYSTEM <<=================    GEN: no fever, no chills, As above  RESP: no SOB at rest , no cough, no sputum  CVS: chest pain as above , no palpitations  GI: no abdominal pain, no nausea  : no dysuria, no frequency  Neuro: no headache, no dizziness    ==================>> PHYSICAL EXAM <<=================    GEN: A&O X 3 , NAD , comfortable, pleasant, calm , sitting on bed  HEENT: NCAT, PERRL, MMM, hearing intact  CVS: S1S2 , irregular , No M/R/G appreciated  PULM: CTA B/L,  no W/R/R appreciated  ABD.: soft. non tender, non distended,  bowel sounds present  Extrem: intact pulses , no edema          ( Note written / Date of service 01-18-24 ( This is certified to be the same as "ENTERED" date above ( for billing purposes)))    ==================>> MEDICATIONS <<====================    aspirin  chewable 81 milliGRAM(s) Oral daily  atorvastatin 80 milliGRAM(s) Oral at bedtime  chlorhexidine 2% Cloths 1 Application(s) Topical daily  colchicine 0.6 milliGRAM(s) Oral two times a day  diltiazem    milliGRAM(s) Oral daily  ferrous    sulfate 325 milliGRAM(s) Oral daily  finasteride 5 milliGRAM(s) Oral daily  furosemide    Tablet 40 milliGRAM(s) Oral daily  heparin   Injectable 5000 Unit(s) SubCutaneous every 8 hours  metoprolol succinate ER 50 milliGRAM(s) Oral daily  montelukast 10 milliGRAM(s) Oral daily  pantoprazole    Tablet 40 milliGRAM(s) Oral before breakfast  polyethylene glycol 3350 17 Gram(s) Oral daily  tamsulosin 0.4 milliGRAM(s) Oral at bedtime    MEDICATIONS  (PRN):  acetaminophen     Tablet .. 650 milliGRAM(s) Oral every 6 hours PRN Moderate Pain (4 - 6)  morphine  - Injectable 2 milliGRAM(s) IV Push every 6 hours PRN Severe Pain (7 - 10)    ___________  Active diet:  Diet, Regular:   DASH/TLC Sodium & Cholesterol Restricted (DASH)  ___________________    ==================>> VITAL SIGNS <<==================    Vital Signs Last 24 HrsT(C): 37 (01-18-24 @ 11:08)  T(F): 98.6 (01-18-24 @ 11:08), Max: 98.9 (01-18-24 @ 04:31)  HR: 69 (01-18-24 @ 16:22) (69 - 79)  BP: 148/89 (01-18-24 @ 16:22)  RR: 18 (01-18-24 @ 16:22) (18 - 18)  SpO2: 97% (01-18-24 @ 16:22) (95% - 98%)       ==================>> LAB AND IMAGING <<==================                        9.8    6.30  )-----------( 271      ( 18 Jan 2024 07:13 )             31.2        01-18    139  |  101  |  17  ----------------------------<  101<H>  3.7   |  25  |  0.80    Ca    9.5      18 Jan 2024 07:13  Mg     2.1     01-17    TPro  6.5  /  Alb  3.2<L>  /  TBili  0.4  /  DBili  x   /  AST  13  /  ALT  8<L>  /  AlkPhos  65  01-17    WBC count:   6.30 <<== ,  7.91 <<== ,  10.47 <<==   Hemoglobin:   9.8 <<==,  9.7 <<==,  10.5 <<==  platelets:  271 <==, 235 <==, 257 <==    Creatinine:  0.80  <<==, 0.87  <<==, 0.92  <<==, 0.90  <<==  Sodium:   139  <==, 140  <==, 138  <==, 136  <==       AST:          13(01-17) <== , 12(01-15) <==      ALT:        8(01-17)  <== , 12(01-15)  <==      AP:        65(01-17)  <=, 69(01-15)  <=     Bili:        0.4(01-17)  <=, 0.8(01-15)  <=      < from: CT Angio Heart and Coronaries w/ IV Cont (01.17.24 @ 15:56) >  IMPRESSION:  Suboptimal image quality.  Coronary artery disease as reported above. Additional alternative coronary artery evaluation may be considered if clinically indicated.  Severe coronary artery calcium (Agatston score 485) as delineated above.  Moderate pericardial effusion with a suggestion of enhancement. No significant change in size compared to computed tomography 1.15 2024.  Dilated aortic root measuring up to 44.6 mm at the sinuses of Valsalva.  Dilated main pulmonary artery.  Status-post left atrial appendage occlusion device. Contrast within and   distal to the occlusion device on arterial-phase imaging.  Mild asymmetric septal hypertrophy with somewhat prominent crypts and   trabeculations. Consider hypertrophic cardiomyopathy and other cardiomyopathic processes.  < end of copied text >      < from: CT Angio Abdomen and Pelvis w/ IV Cont (01.15.24 @ 17:38) >  IMPRESSION:  Cardiomegaly with large pericardial effusion.  No aortic dissection. Mildly dilated aortic root and mid ascending aortic segment.  No pulmonary embolism.  < end of copied text >    < from: TTE W or WO Ultrasound Enhancing Agent (01.16.24 @ 14:35) >  CONCLUSIONS:    1. Left ventricular cavity is normal. Left ventricular wall thickness is normal. Left ventricular systolic function is mildly decreased with an ejection fraction of 50 % by Rogers's method of disks. There are no regional wall motion abnormalities seen.   2. Basal and mid inferior wall is abnormal.   3. There is mild (grade 1) left ventricular diastolic dysfunction, with normal filling pressure.   4. Normal right ventricular cavity size, wall thickness, and normal systolic function.   5. Aortic root at the sinuses of Valsalva is dilated, measuring 4.00 cm (indexed 1.88 cm/m²).   6. There is effacement of the sinus of valsalva. Conside dedicated aorta CT.   7. Moderate mitral regurgitation.   8. Mild aortic regurgitation.   9. Moderate pericardial effusion noted adjacent to the posterior left ventricle and large pericardial effusion noted adjacent to the right atrium with no evidence of hemodynamiccompromise: respiratory variation across the mitral valve E wave is not greater than 30%, no early diastolic inversion of the right ventricle and no diastolic collapse of right atrium, exceding 1/3 of the cardiac cycle.  10. No prior echocardiogram is available for comparison.  < end of copied text >  ___________________________________________________________________________________  ===============>>  A S S E S S M E N T   A N D   P L A N <<===============  ------------------------------------------------------------------------------------------    · Assessment	  72yo M pmh prostate cancer s/p radiation, afib/flutter (5/2023) s/p watchman not on AC due to recurrent diverticular bleed s/p embolization, HTN, HL, asthma (mild) admitted with pleuritic chest pain and abnormal outpatient stress test with new pericardial effusion, hemodynamically stable and ruled out for ACS and CTA negative for VTE/dissection but shows large pericardial effusion.      Problem/Plan - 1:  ·  Problem: Pleuritic chest pain.   ·  Plan: CTA significant for cardiomegaly with large pericardial effusion.  Negative for aortic dissection/vte.  EKG with afib but no acute st changes and no evidence of pericarditis.  Trop normal but BNP elevated at 2056.  I spoke with Dr Calderón regarding the CTA finding of large pericardial effusion.  He noted small to moderate effusion on TTE in office.    repeat TTE as above with moderate effusion   outpatient nuclear stress concerning for lateral ischemia,   plan was for cath but now Declined CT cor angio , As above: decision for cardiac angiogram to be made by patient and Cardiology  -monitor on tele  -tylenol/morphine prn pain  -continue home asa/statin/bb  -on colchicine   -statin to 80mg lipitor.  - diuresis as needed  - ESR / CRP elevated   - may need Rhum evaluation ( discussed with rheum fellow... to reconsult as needed pos cardiac workup)   >> Repeat echocardiogram tomorrow to Evaluate for stability of pericardial effusion    Problem/Plan - 2:  ·  Problem: Pericardial effusion.   as above  colchicine for pericarditis     Problem/Plan - 3:  ·  Problem: Chronic atrial fibrillation.   ·  Plan: chadsvasc2, not on AC due to h/o prior bleeds  s/p watchman ( Appreciated cardiology discussion on findings On the CAT scan)  continue dilt/metop for rate control    Problem/Plan - 4:  ·  Problem: Hypertension.   ·  Plan: continue home meds.    Problem/Plan - 5:  ·  Problem: Asthma.   ·  Plan: mild, reports not requiring albuterol at home.    Problem/Plan - 6:  ·  Problem: Preventive measure.   ·  Plan: mod risk for vte by improve score  sqh  incentive spirometry    --------------------------------------------  Case discussed with patient, Family at bedside  Education given on findings and plan of care  ___________________________  H. JONAS Luu.  Pager: [phone]

## 2024-01-19 ENCOUNTER — TRANSCRIPTION ENCOUNTER (OUTPATIENT)
Age: 74
End: 2024-01-19

## 2024-01-19 VITALS
OXYGEN SATURATION: 96 % | SYSTOLIC BLOOD PRESSURE: 147 MMHG | HEART RATE: 71 BPM | DIASTOLIC BLOOD PRESSURE: 88 MMHG | TEMPERATURE: 98 F | RESPIRATION RATE: 18 BRPM

## 2024-01-19 PROCEDURE — 85730 THROMBOPLASTIN TIME PARTIAL: CPT

## 2024-01-19 PROCEDURE — 99285 EMERGENCY DEPT VISIT HI MDM: CPT | Mod: 25

## 2024-01-19 PROCEDURE — 71045 X-RAY EXAM CHEST 1 VIEW: CPT

## 2024-01-19 PROCEDURE — 80053 COMPREHEN METABOLIC PANEL: CPT

## 2024-01-19 PROCEDURE — 85652 RBC SED RATE AUTOMATED: CPT

## 2024-01-19 PROCEDURE — 36415 COLL VENOUS BLD VENIPUNCTURE: CPT

## 2024-01-19 PROCEDURE — 86140 C-REACTIVE PROTEIN: CPT

## 2024-01-19 PROCEDURE — 87640 STAPH A DNA AMP PROBE: CPT

## 2024-01-19 PROCEDURE — 93321 DOPPLER ECHO F-UP/LMTD STD: CPT

## 2024-01-19 PROCEDURE — 85610 PROTHROMBIN TIME: CPT

## 2024-01-19 PROCEDURE — 83735 ASSAY OF MAGNESIUM: CPT

## 2024-01-19 PROCEDURE — 71275 CT ANGIOGRAPHY CHEST: CPT | Mod: MA

## 2024-01-19 PROCEDURE — 93306 TTE W/DOPPLER COMPLETE: CPT

## 2024-01-19 PROCEDURE — 80048 BASIC METABOLIC PNL TOTAL CA: CPT

## 2024-01-19 PROCEDURE — 84484 ASSAY OF TROPONIN QUANT: CPT

## 2024-01-19 PROCEDURE — 85025 COMPLETE CBC W/AUTO DIFF WBC: CPT

## 2024-01-19 PROCEDURE — 74174 CTA ABD&PLVS W/CONTRAST: CPT | Mod: MA

## 2024-01-19 PROCEDURE — 84443 ASSAY THYROID STIM HORMONE: CPT

## 2024-01-19 PROCEDURE — 87641 MR-STAPH DNA AMP PROBE: CPT

## 2024-01-19 PROCEDURE — 85027 COMPLETE CBC AUTOMATED: CPT

## 2024-01-19 PROCEDURE — 93005 ELECTROCARDIOGRAM TRACING: CPT

## 2024-01-19 PROCEDURE — 93308 TTE F-UP OR LMTD: CPT | Mod: 26

## 2024-01-19 PROCEDURE — 83880 ASSAY OF NATRIURETIC PEPTIDE: CPT

## 2024-01-19 PROCEDURE — 0225U NFCT DS DNA&RNA 21 SARSCOV2: CPT

## 2024-01-19 PROCEDURE — 93321 DOPPLER ECHO F-UP/LMTD STD: CPT | Mod: 26

## 2024-01-19 PROCEDURE — 75574 CT ANGIO HRT W/3D IMAGE: CPT

## 2024-01-19 PROCEDURE — 84100 ASSAY OF PHOSPHORUS: CPT

## 2024-01-19 PROCEDURE — 93308 TTE F-UP OR LMTD: CPT

## 2024-01-19 RX ORDER — METOPROLOL TARTRATE 50 MG
1 TABLET ORAL
Refills: 0 | DISCHARGE

## 2024-01-19 RX ORDER — METOPROLOL TARTRATE 50 MG
1 TABLET ORAL
Qty: 30 | Refills: 0
Start: 2024-01-19 | End: 2024-02-17

## 2024-01-19 RX ORDER — ATORVASTATIN CALCIUM 80 MG/1
1 TABLET, FILM COATED ORAL
Refills: 0 | DISCHARGE

## 2024-01-19 RX ORDER — POTASSIUM CHLORIDE 20 MEQ
1 PACKET (EA) ORAL
Refills: 0 | DISCHARGE

## 2024-01-19 RX ORDER — COLCHICINE 0.6 MG
1 TABLET ORAL
Qty: 60 | Refills: 0
Start: 2024-01-19 | End: 2024-02-17

## 2024-01-19 RX ORDER — FUROSEMIDE 40 MG
1 TABLET ORAL
Refills: 0 | DISCHARGE

## 2024-01-19 RX ORDER — POLYETHYLENE GLYCOL 3350 17 G/17G
17 POWDER, FOR SOLUTION ORAL
Qty: 0 | Refills: 0 | DISCHARGE
Start: 2024-01-19

## 2024-01-19 RX ORDER — ACETAMINOPHEN 500 MG
2 TABLET ORAL
Qty: 0 | Refills: 0 | DISCHARGE
Start: 2024-01-19

## 2024-01-19 RX ORDER — ATORVASTATIN CALCIUM 80 MG/1
1 TABLET, FILM COATED ORAL
Qty: 30 | Refills: 0
Start: 2024-01-19 | End: 2024-02-17

## 2024-01-19 RX ORDER — FUROSEMIDE 40 MG
1 TABLET ORAL
Qty: 30 | Refills: 0
Start: 2024-01-19 | End: 2024-02-17

## 2024-01-19 RX ADMIN — Medication 0.6 MILLIGRAM(S): at 05:15

## 2024-01-19 RX ADMIN — Medication 120 MILLIGRAM(S): at 05:14

## 2024-01-19 RX ADMIN — Medication 325 MILLIGRAM(S): at 12:05

## 2024-01-19 RX ADMIN — POLYETHYLENE GLYCOL 3350 17 GRAM(S): 17 POWDER, FOR SOLUTION ORAL at 12:06

## 2024-01-19 RX ADMIN — FINASTERIDE 5 MILLIGRAM(S): 5 TABLET, FILM COATED ORAL at 12:05

## 2024-01-19 RX ADMIN — PANTOPRAZOLE SODIUM 40 MILLIGRAM(S): 20 TABLET, DELAYED RELEASE ORAL at 05:14

## 2024-01-19 RX ADMIN — MONTELUKAST 10 MILLIGRAM(S): 4 TABLET, CHEWABLE ORAL at 12:05

## 2024-01-19 RX ADMIN — Medication 81 MILLIGRAM(S): at 12:05

## 2024-01-19 RX ADMIN — Medication 40 MILLIGRAM(S): at 05:14

## 2024-01-19 RX ADMIN — Medication 50 MILLIGRAM(S): at 05:18

## 2024-01-19 RX ADMIN — CHLORHEXIDINE GLUCONATE 1 APPLICATION(S): 213 SOLUTION TOPICAL at 12:07

## 2024-01-19 NOTE — DISCHARGE NOTE PROVIDER - HOSPITAL COURSE
Hospital Course:    72yo M pmh prostate cancer s/p radiation, afib/flutter (5/2023) s/p watchman not on AC due to recurrent diverticular bleed s/p embolization, HTN, HL, asthma (mild) admitted with pleuritic chest pain and abnormal outpatient stress test with new pericardial effusion, hemodynamically stable and ruled out for ACS and CTA negative for VTE/dissection but shows large pericardial effusion. CTA significant for cardiomegaly with large pericardial effusion.  Negative for aortic dissection/vte.  EKG with afib but no acute st changes and no evidence of pericarditis.  Trop normal but BNP elevated at 2056.  Cards consulted. Repeat TTE with moderate effusion. Chest pains likely in setting of pericarditis. Started on Colchicine. TTE repeated on 1/19 and TTE stable from prior: reviewed by cardiology and cleared for d/c. Mild ischemia on op nst; patient hesitant re proceeding with LHC given prior GI bleeds on antiplatelet agents --> s/p CCTA with non obs cad. cw asa, statin and antianginals (? JELENA = to consider cardiac mri in future).    Medically cleared for d/c home per Dr. Luu.    Important Medication Changes and Reason: Colchicine, Atorvastatin, Metoprolol succinate, Lasix    Active or Pending Issues Requiring Follow-up: ian, PCP    Advanced Directives:   [X] Full code  [ ] DNR  [ ] Hospice    Discharge Diagnoses:  Abnormal stress test  Pericarditis  Pericardial effusion  Afib

## 2024-01-19 NOTE — DISCHARGE NOTE NURSING/CASE MANAGEMENT/SOCIAL WORK - PATIENT PORTAL LINK FT
You can access the FollowMyHealth Patient Portal offered by [facility] by registering at the following website: [email or web address]. By joining [facility]’s FollowMyHealth portal, you will also be able to view your health information using other applications (apps) compatible with our system.

## 2024-01-19 NOTE — DISCHARGE NOTE PROVIDER - NSDCCPCAREPLAN_GEN_ALL_CORE_FT
PRINCIPAL DISCHARGE DIAGNOSIS  Diagnosis: Pleuritic chest pain  Assessment and Plan of Treatment: Continue with Colchicine.  You were admitted with concern for pericarditis.  You had an echocardiogram that showed a moderate pericardial effusion. The repeat echocardiogram done on 1/19 was stable.  Please follow up with Dr. Calderón and your primary care doctor.  Your lasix was also increased.      SECONDARY DISCHARGE DIAGNOSES  Diagnosis: Hypertension  Assessment and Plan of Treatment: Take medications for your blood pressure as recommended.  Eat a heart healthy diet that is low in saturated fats and salt, and includes whole grains, fruits, vegetables and lean protein   Exercise regularly (consult with your physician or cardiologist first); maintain a heart healthy weight.   If you smoke - quit (A resource to help you stop smoking is the [facility] for Tobacco Control – phone number [phone].). Continue to follow with your primary physician or cardiologist.   Seek medical help for dizziness, Lightheadedness, Blurry vision, Headache, Chest pain, Shortness of breath  Follow up with your medical doctor to establish long term blood pressure treatment goals.    Diagnosis: Asthma  Assessment and Plan of Treatment: Continue with Montelukast.    Diagnosis: Chronic atrial fibrillation  Assessment and Plan of Treatment: Atrial fibrillation is the most common heart rhythm problem.  The condition puts you at risk for has stroke and heart attack  It helps if you control your blood pressure, not drink more than 1-2 alcohol drinks per day, cut down on caffeine, getting treatment for over active thyroid gland, and get regular exercise  Call your doctor if you feel your heart racing or beating unusually, chest tightness or pain, lightheaded, faint, shortness of breath especially with exercise  It is important to take your heart medication as prescribed.

## 2024-01-19 NOTE — PRE-OP CHECKLIST - BP NONINVASIVE SYSTOLIC (MM HG)
Mom was notified that PCP agrees with recommendation. Mom verbalized understanding and is on her way to ER now.    Melisa Corado RN  [facility]     112

## 2024-01-19 NOTE — PROGRESS NOTE ADULT - ASSESSMENT
M E D I C A L   A T T E N D I N G    F O L L O W    U P   N O T E  (01-19-24 )                                     ------------------------------------------------------------------------------------------------    patient evaluated by me, case discussed with team, chart, medications, and physical exam reviewed, labs / tests  and vitals reviewed by me, as bellow.   Patient is stable for discharge today.   Patient to follow up with  cardio  See discharge document for full note (discussed with ACP).  [Greater than 35 min spent for these services. ]          ( Note written / Date of service 01-19-24 ( This is certified to be the same as "ENTERED" date above ( for billing purposes)))    ==================>> MEDICATIONS <<====================    aspirin  chewable 81 milliGRAM(s) Oral daily  atorvastatin 80 milliGRAM(s) Oral at bedtime  chlorhexidine 2% Cloths 1 Application(s) Topical daily  colchicine 0.6 milliGRAM(s) Oral two times a day  diltiazem    milliGRAM(s) Oral daily  ferrous    sulfate 325 milliGRAM(s) Oral daily  finasteride 5 milliGRAM(s) Oral daily  furosemide    Tablet 40 milliGRAM(s) Oral daily  heparin   Injectable 5000 Unit(s) SubCutaneous every 8 hours  metoprolol succinate ER 50 milliGRAM(s) Oral daily  montelukast 10 milliGRAM(s) Oral daily  pantoprazole    Tablet 40 milliGRAM(s) Oral before breakfast  polyethylene glycol 3350 17 Gram(s) Oral daily  tamsulosin 0.4 milliGRAM(s) Oral at bedtime    MEDICATIONS  (PRN):  acetaminophen     Tablet .. 650 milliGRAM(s) Oral every 6 hours PRN Moderate Pain (4 - 6)  morphine  - Injectable 2 milliGRAM(s) IV Push every 6 hours PRN Severe Pain (7 - 10)    ___________  Active diet:  Diet, Regular:   DASH/TLC Sodium & Cholesterol Restricted (DASH)  ___________________    ==================>> VITAL SIGNS <<==================    T(C): 36.8 (01-19-24 @ 12:04), Max: 36.8 (01-18-24 @ 20:05)  HR: 71 (01-19-24 @ 12:04) (69 - 79)  BP: 147/88 (01-19-24 @ 12:04) (138/88 - 148/89)  BP(mean): --  RR: 18 (01-19-24 @ 12:04) (18 - 18)  SpO2: 96% (01-19-24 @ 12:04) (96% - 97%)       I&O's Summary    18 Jan 2024 07:01  -  19 Jan 2024 07:00  --------------------------------------------------------  IN: 840 mL / OUT: 0 mL / NET: 840 mL    19 Jan 2024 07:01  -  19 Jan 2024 14:30  --------------------------------------------------------  IN: 300 mL / OUT: 0 mL / NET: 300 mL       ==================>> LAB AND IMAGING <<==================                        9.8    6.30  )-----------( 271      ( 18 Jan 2024 07:13 )             31.2        01-18    139  |  101  |  17  ----------------------------<  101<H>  3.7   |  25  |  0.80    Ca    9.5      18 Jan 2024 07:13           TSH:      2.20   (01-16-24)             WBC count:   6.30 <<== ,  7.91 <<== ,  10.47 <<==   Hemoglobin:   9.8 <<==,  9.7 <<==,  10.5 <<==  platelets:  271 <==, 235 <==, 257 <==    Creatinine:  0.80  <<==, 0.87  <<==, 0.92  <<==, 0.90  <<==  Sodium:   139  <==, 140  <==, 138  <==, 136  <==       AST:          13(01-17) <== , 12(01-15) <==      ALT:        8(01-17)  <== , 12(01-15)  <==      AP:        65(01-17)  <=, 69(01-15)  <=     Bili:        0.4(01-17)  <=, 0.8(01-15)  <=         < from: TTE Limited W or WO Ultrasound Enhancing Agent (01.19.24 @ 08:31) >  CONCLUSIONS:   1. This effusion is mostly posterior and laterl to the left ventricle, and a small pocket adjacent to the right atrium. There is respiratory variation of the tricuspid valve inflow but not in the mitral. There are no other signs of hemodynamic compromise.   2. Compared to the transthoracic echocardiogram performed on 1/16/2024, no change.   3. Moderate localized pericardial effusion noted adjacent to the left ventricle.  < end of copied text >       ( Note written / Date of service 01-19-24 ( This is certified to be the same as "ENTERED" date above ( for billing Purposes )))

## 2024-01-19 NOTE — DISCHARGE NOTE PROVIDER - CARE PROVIDER_API CALL
Isaac Calderón  Cardiovascular Disease  [address] [place] [id number]  Phone: [phone]  Fax: [phone]  Follow Up Time: 1 week    Riki Desai  Pulmonary Disease  [address] [place] [address]  Phone: [phone]  Fax: [phone]  Follow Up Time: 1 week

## 2024-01-19 NOTE — DISCHARGE NOTE PROVIDER - NSDCFUADDAPPT_GEN_ALL_CORE_FT
APPTS ARE READY TO BE MADE: [X] YES    Best Family or Patient Contact (if needed):    Additional Information about above appointments (if needed):    1:   2:   3:     Other comments or requests:    APPTS ARE READY TO BE MADE: [X] YES    Best Family or Patient Contact (if needed):    Additional Information about above appointments (if needed):    1:   2:   3:     Other comments or requests:     Patient informed us they already have secured a follow up appointment with Dr. Hunter which is not visible on [facility]. Patient is scheduled on 1/26/2024 and did not disclose time of appointment.    Provided patient with provider referral information, however patient prefers to schedule the appointments on their own.

## 2024-01-19 NOTE — PROGRESS NOTE ADULT - SUBJECTIVE AND OBJECTIVE BOX
Cardiovascular Disease Progress Note    Overnight events: No acute events overnight.  no new cardiac sx  Otherwise review of systems negative    Objective Findings:  T(C): 37.2 (01-18-24 @ 04:31), Max: 37.2 (01-17-24 @ 11:45)  HR: 76 (01-18-24 @ 04:31) (63 - 86)  BP: 139/91 (01-18-24 @ 04:31) (110/68 - 140/87)  RR: 18 (01-18-24 @ 04:31) (18 - 18)  SpO2: 98% (01-18-24 @ 04:31) (96% - 98%)  Wt(kg): --  Daily     Daily       Physical Exam:  Gen: NAD  HEENT: EOMI  CV: RRR, normal S1 + S2, no m/r/g  Lungs: CTAB  Abd: soft, non-tender  Ext: No edema    Telemetry:    Laboratory Data:    01-17    140  |  102  |  17  ----------------------------<  102<H>  3.7   |  28  |  0.87    Ca    8.9      17 Jan 2024 06:52  Mg     2.1     01-17    TPro  6.5  /  Alb  3.2<L>  /  TBili  0.4  /  DBili  x   /  AST  13  /  ALT  8<L>  /  AlkPhos  65  01-17              Inpatient Medications:  MEDICATIONS  (STANDING):  aspirin  chewable 81 milliGRAM(s) Oral daily  atorvastatin 80 milliGRAM(s) Oral at bedtime  chlorhexidine 2% Cloths 1 Application(s) Topical daily  colchicine 0.6 milliGRAM(s) Oral two times a day  diltiazem    milliGRAM(s) Oral daily  ferrous    sulfate 325 milliGRAM(s) Oral daily  finasteride 5 milliGRAM(s) Oral daily  furosemide    Tablet 40 milliGRAM(s) Oral daily  heparin   Injectable 5000 Unit(s) SubCutaneous every 8 hours  metoprolol succinate ER 50 milliGRAM(s) Oral daily  montelukast 10 milliGRAM(s) Oral daily  pantoprazole    Tablet 40 milliGRAM(s) Oral before breakfast  polyethylene glycol 3350 17 Gram(s) Oral daily  tamsulosin 0.4 milliGRAM(s) Oral at bedtime        Left-dominant coronary circulation.    The LM coronary artery has mild (<30%) noncalcified plaque.    The LAD coronary artery has diffuse mild (30-49%) noncalcified and   calcified plaque in the proximal segment and diffuse probably mild   (30-49%) noncalcified and calcified plaque in the mid segment. The mid to   distal vessel is small. The first diagonal branch has mild (30-49%)   noncalcified and calcified plaque.    The large LCX coronary artery has diffuse mild (30-49%) noncalcified and   calcified plaque in the proximal segment and diffuse mild (30-49%)   noncalcified and calcified plaque in the visualized distal segment.   Noncalcified and calcified plaque is present in a small high obtuse   marginal (OM) branch. The large second OM branch has diffuse probably   (30-49%) noncalcified and calcified plaque. The large second OM branch   appears to supply the apical septum. The terminal LCX, left   posterolateral (LPL) branch and left posterior descending artery (PDA)   are not well visualized.    The relatively small RCA has mild (30-49%) noncalcified plaque in the   proximal segment. The mid to distal vessel is not well visualized. The   RCA appears to terminate as a right ventricular branch.    Aorta:  No thoracic aortic dissection noted in the visualized thoracic aorta.    The aortic root measures up to 44.6 mm at the sinuses of Valsalva in   maximum cusp to cusp (left to right coronary cusp) dimension (double   oblique short axis plane in mid diastole).    Mild noncalcified and calcified plaque is present in the imaged aorta.    Pulmonary artery:  The main pulmonary artery is dilated.    Pericardium:  There is a moderate pericardial effusion with a suggestion of pericardial   enhancement (on delayed-phase imaging).    Chambers:  The left ventricle demonstrates mild asymmetric hypertrophy with somewhat   prominent crypts and trabeculations.    There is biatrial enlargement.    There is a suspected patent foramen ovale with a small volume of left to   right flow of contrast agent.    An occlusion device is noted within the left atrial appendage. Contrast   is noted within and distal to the occlusion device on arterial-phase   imaging.    Valves:  Mild aortic valve calcification noted.    The mitral valve leaflets are mildly calcified and thickened.    Non-cardiac:  Evaluation of the imaged portions of the lungs demonstrate left lower   lung subsegmental areas of atelectasis within the lingula and the left   lower lobe slightly progressed since January 15, 2024. Trace right   pleural effusion as on the prior study with mild right lower lobe   adjacent dependent subsegmental atelectasis with mild interval   progression. 3 mm right middle lobe pulmonary nodule is unchanged since   abdominal CT of May 8, 2023. Degenerative changes of the spine.   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 Small   hiatal hernia.    IMPRESSION:  Suboptimal image quality.    Coronary artery disease as reported above. Additional alternative   coronary artery evaluation may be considered if clinically indicated.    Severe coronary artery calcium (Agatston score 485) as delineated above.    Moderate pericardial effusion with a suggestion of enhancement. No   significant change in size compared to computed tomography 1.15 2024.    Dilated aortic root measuring up to 44.6 mm at the sinuses of Valsalva.    Dilated main pulmonary artery.    Status-post left atrial appendage occlusion device. Contrast within and   distal to the occlusion device on arterial-phase imaging.    Mild asymmetric septal hypertrophy with somewhat prominent crypts and   trabeculations. Consider hypertrophic cardiomyopathy and other   cardiomyopathic processes.        Assessment:  74yo M pmh prostate cancer s/p radiation, afib/flutter (5/2023) s/p watchman not on AC due to recurrent diverticular bleed s/p embolization, HTN, HL, asthma (mild) was in his usual state of health exercising at gym (weights & bike) monday-friday, woke up with chest pain Saturday morning that he attributed to muscle strain from the gym the day before. found to gave new pericardial effusion and ischemia on outpatient nuclear stress test    Recs:  -cardiac stable  -vol status stable, cw lasix 40mg po qd; patient preload dependent in setting of pericardial effusion  -acute pericarditis c/b moderate to large pericardial effusion. unclear etiology -> currently HD stable.  elevated esr and crp noted. rheum consult suggested (but apparently rejected). cw colchicine to 0.6mg bid, repeat echo limited tomm  to monitor for stability. if effusion progresses will evaluate for drainage  -chest pains likely in setting of pericarditis. mild ischemia on op nst; patient hesitant re proceeding with LHC given prior GI bleeds on antiplatelet agents --> s/p CCTA with non obs cad. cw asa, statin and antianginals (? JELENA = to consider cardiac mri in future)  -paf/flutter s/p watchman -> cw rate control meds. cont to hold AC. cw toprol to 50mg. discussed CT findings with Dr Marie = watchman previously evaluated on APRIL and okay to have small leak (was 0.25cm)  -tele monitoring      Over 25 minutes spent on total encounter; more than 50% of the visit was spent counseling and/or coordinating care by the attending physician.      Isaac Calderón MD   Cardiovascular Disease  [phone]
Cardiovascular Disease Progress Note    Overnight events: No acute events overnight.  no new cardiac sx  Otherwise review of systems negative    Objective Findings:  T(C): 36.4 (01-19-24 @ 04:43), Max: 37 (01-18-24 @ 11:08)  HR: 76 (01-19-24 @ 04:43) (69 - 79)  BP: 138/88 (01-19-24 @ 04:43) (118/77 - 148/89)  RR: 18 (01-19-24 @ 04:43) (18 - 18)  SpO2: 96% (01-19-24 @ 04:43) (95% - 97%)  Wt(kg): --  Daily     Daily       Physical Exam:  Gen: NAD  HEENT: EOMI  CV: RRR, normal S1 + S2, no m/r/g  Lungs: CTAB  Abd: soft, non-tender  Ext: No edema    Telemetry:    Laboratory Data:                        9.8    6.30  )-----------( 271      ( 18 Jan 2024 07:13 )             31.2     01-18    139  |  101  |  17  ----------------------------<  101<H>  3.7   |  25  |  0.80    Ca    9.5      18 Jan 2024 07:13                Inpatient Medications:  MEDICATIONS  (STANDING):  aspirin  chewable 81 milliGRAM(s) Oral daily  atorvastatin 80 milliGRAM(s) Oral at bedtime  chlorhexidine 2% Cloths 1 Application(s) Topical daily  colchicine 0.6 milliGRAM(s) Oral two times a day  diltiazem    milliGRAM(s) Oral daily  ferrous    sulfate 325 milliGRAM(s) Oral daily  finasteride 5 milliGRAM(s) Oral daily  furosemide    Tablet 40 milliGRAM(s) Oral daily  heparin   Injectable 5000 Unit(s) SubCutaneous every 8 hours  metoprolol succinate ER 50 milliGRAM(s) Oral daily  montelukast 10 milliGRAM(s) Oral daily  pantoprazole    Tablet 40 milliGRAM(s) Oral before breakfast  polyethylene glycol 3350 17 Gram(s) Oral daily  tamsulosin 0.4 milliGRAM(s) Oral at bedtime      Assessment:  74yo M pmh prostate cancer s/p radiation, afib/flutter (5/2023) s/p watchman not on AC due to recurrent diverticular bleed s/p embolization, HTN, HL, asthma (mild) was in his usual state of health exercising at gym (weights & bike) monday-friday, woke up with chest pain Saturday morning that he attributed to muscle strain from the gym the day before. found to gave new pericardial effusion and ischemia on outpatient nuclear stress test    Recs:  -cardiac stable  -vol status stable, cw lasix 40mg po qd; patient preload dependent in setting of pericardial effusion  -acute pericarditis c/b moderate to large pericardial effusion. unclear etiology -> currently HD stable.  elevated esr and crp noted. rheum consult suggested (but apparently rejected). cw colchicine to 0.6mg bid x 3 mo, repeat echo limited today to monitor for stability. if effusion progresses will evaluate for drainage  -chest pains likely in setting of pericarditis. mild ischemia on op nst; patient hesitant re proceeding with LHC given prior GI bleeds on antiplatelet agents --> s/p CCTA with non obs cad. cw asa, statin and antianginals (? JELENA = to consider cardiac mri in future)  -paf/flutter s/p watchman -> cw rate control meds. cont to hold AC. cw toprol to 50mg. discussed CT findings with Dr Marie = watchman previously evaluated on APRIL and okay to have small leak (was 0.25cm)  -tele monitoring  -dcp if effusion stable          Over 25 minutes spent on total encounter; more than 50% of the visit was spent counseling and/or coordinating care by the attending physician.      Isaac Calderón MD   Cardiovascular Disease  [phone]
Cardiovascular Disease Progress Note    Overnight events: No acute events overnight.  no new cardiac sx  Otherwise review of systems negative    Objective Findings:  T(C): 36.8 (01-17-24 @ 04:20), Max: 36.9 (01-16-24 @ 07:58)  HR: 94 (01-17-24 @ 04:20) (82 - 108)  BP: 143/84 (01-17-24 @ 04:20) (125/78 - 150/90)  RR: 18 (01-17-24 @ 04:20) (18 - 18)  SpO2: 97% (01-17-24 @ 04:20) (95% - 98%)  Wt(kg): --  Daily     Daily       Physical Exam:  Gen: NAD  HEENT: EOMI  CV: RRR, normal S1 + S2, no m/r/g  Lungs: CTAB  Abd: soft, non-tender  Ext: No edema    Telemetry:    Laboratory Data:                        9.7    7.91  )-----------( 235      ( 16 Jan 2024 06:19 )             31.3     01-16    138  |  102  |  16  ----------------------------<  116<H>  3.7   |  27  |  0.92    Ca    8.6      16 Jan 2024 03:01  Phos  3.0     01-16  Mg     2.3     01-16    TPro  7.3  /  Alb  3.8  /  TBili  0.8  /  DBili  x   /  AST  12  /  ALT  12  /  AlkPhos  69  01-15    PT/INR - ( 15 Jose 2024 16:03 )   PT: 13.9 sec;   INR: 1.27 ratio         PTT - ( 15 Jose 2024 16:03 )  PTT:41.3 sec          Inpatient Medications:  MEDICATIONS  (STANDING):  aspirin  chewable 81 milliGRAM(s) Oral daily  atorvastatin 80 milliGRAM(s) Oral at bedtime  colchicine 0.6 milliGRAM(s) Oral daily  diltiazem    milliGRAM(s) Oral daily  ferrous    sulfate 325 milliGRAM(s) Oral daily  finasteride 5 milliGRAM(s) Oral daily  furosemide   Injectable 40 milliGRAM(s) IV Push daily  heparin   Injectable 5000 Unit(s) SubCutaneous every 8 hours  metoprolol succinate ER 25 milliGRAM(s) Oral daily  montelukast 10 milliGRAM(s) Oral daily  pantoprazole    Tablet 40 milliGRAM(s) Oral before breakfast  polyethylene glycol 3350 17 Gram(s) Oral daily  tamsulosin 0.4 milliGRAM(s) Oral at bedtime        CONCLUSIONS:      1. Left ventricular cavity is normal. Left ventricular wall thickness is normal. Left ventricular systolic function is mildly decreased with an ejection fraction of 50 % by Rogers's method of disks. There are no regional wall motion abnormalities seen.   2. Basal and mid inferior wall is abnormal.   3. There is mild (grade 1) left ventricular diastolic dysfunction, with normal filling pressure.   4. Normal right ventricular cavity size, wall thickness, and normal systolic function.   5. Aortic root at the sinuses of Valsalva is dilated, measuring 4.00 cm (indexed 1.88 cm/m²).   6. There is effacement of the sinus of valsalva. Conside dedicated aorta CT.   7. Moderate mitral regurgitation.   8. Mild aortic regurgitation.   9. Moderate pericardial effusion noted adjacent to the posterior left ventricle and large pericardial effusion noted adjacent to the right atrium with no evidence of hemodynamic compromise: respiratory variation across the mitral valve E wave is not greater than 30%, no early diastolic inversion of the right ventricle and no diastolic collapse of right atrium, exceding 1/3 of the cardiac cycle.  10. No prior echocardiogram is available for comparison.      Assessment:  74yo M pmh prostate cancer s/p radiation, afib/flutter (5/2023) s/p watchman not on AC due to recurrent diverticular bleed s/p embolization, HTN, HL, asthma (mild) was in his usual state of health exercising at gym (weights & bike) monday-friday, woke up with chest pain Saturday morning that he attributed to muscle strain from the gym the day before. found to gave new pericardial effusion and ischemia on outpatient nuclear stress test    Recs:  -cardiac stable  -vol status stable, dc iv lasix and start lasix 40mg po qd; patient preload dependent in setting of pericardial effusion  -acute pericarditis c/b moderate to large pericardial effusion. unclear etiology -> currently HD stable.  elevated esr and crp noted. rheum consult suggested (but apparently rejected). inc colchicine to 0.6mg bid, repeat echo in 48 hours to monitor for stability. if effusion progresses will evaluate for drainage  -chest pains likely in setting of pericarditis. mild ischemia on op nst; patient hesitant re proceeding with [facility] given prior GI bleeds on antiplatelet agents --> obtain CCTA. cw asa, statin and antianginals  -paf/flutter s/p watchman -> cw rate control meds. cont to hold AC. inc toprol to 50mg  -tele monitoring          Over 35 minutes spent on total encounter; more than 50% of the visit was spent counseling and/or coordinating care by the attending physician.      Isaac Calderón MD   Cardiovascular Disease  [phone]

## 2024-01-19 NOTE — DISCHARGE NOTE PROVIDER - NSDCMRMEDTOKEN_GEN_ALL_CORE_FT
acetaminophen 325 mg oral tablet: 2 tab(s) orally every 6 hours As needed Moderate Pain (4 - 6)  Albuterol (Eqv-ProAir HFA) 90 mcg/inh inhalation aerosol: 2 puff(s) inhaled every 4 to 6 hours as needed for  shortness of breath and/or wheezing  aspirin 81 mg oral tablet, chewable: 1 tab(s) orally once a day  atorvastatin 80 mg oral tablet: 1 tab(s) orally once a day (at bedtime)  colchicine 0.6 mg oral tablet: 1 tab(s) orally 2 times a day  DilTIAZem (Eqv-Cardizem CD) 120 mg/24 hours oral capsule, extended release: 1 cap(s) orally once a day  ferrous sulfate 200 mg (65 mg elemental iron) oral tablet: 1 tab(s) orally once a day  finasteride 5 mg oral tablet: 1 tab(s) orally once a day  furosemide 40 mg oral tablet: 1 tab(s) orally once a day  metoprolol succinate 50 mg oral tablet, extended release: 1 tab(s) orally once a day  montelukast 10 mg oral tablet: 1 tab(s) orally once a day  polyethylene glycol 3350 oral powder for reconstitution: 17 gram(s) orally once a day  tamsulosin 0.4 mg oral capsule: 1 cap(s) orally once a day   acetaminophen 325 mg oral tablet: 2 tab(s) orally every 6 hours As needed Moderate Pain (4 - 6)  Albuterol (Eqv-ProAir HFA) 90 mcg/inh inhalation aerosol: 2 puff(s) inhaled every 4 to 6 hours as needed for  shortness of breath and/or wheezing  apixaban 2.5 mg oral tablet: 1 tab(s) orally every 12 hours  atorvastatin 80 mg oral tablet: 1 tab(s) orally once a day (at bedtime)  colchicine 0.6 mg oral tablet: 1 tab(s) orally 2 times a day  DilTIAZem (Eqv-Cardizem CD) 120 mg/24 hours oral capsule, extended release: 1 cap(s) orally once a day  dorzolamide-timolol 2.23%-0.68% (2%-0.5% base) ophthalmic solution: 1 drop(s) in the left eye 2 times a day  finasteride 5 mg oral tablet: 1 tab(s) orally once a day  furosemide 40 mg oral tablet: 1 tab(s) orally once a day  K-Tab 20 mEq oral tablet, extended release: 1 tab(s) orally once a day  MethylPREDNISolone Dose Pack 4 mg oral tablet: orally on day 3  metoprolol succinate 50 mg oral tablet, extended release: 1 tab(s) orally once a day  montelukast 10 mg oral tablet: 1 tab(s) orally once a day  outpatient physical therapy: 3 times a week MDD: 1  pantoprazole 40 mg oral delayed release tablet: 1 tab(s) orally once a day (before a meal)  polyethylene glycol 3350 oral powder for reconstitution: 17 gram(s) orally once a day  tamsulosin 0.4 mg oral capsule: 1 cap(s) orally once a day

## 2024-01-19 NOTE — DISCHARGE NOTE PROVIDER - PROVIDER TOKENS
PROVIDER:[TOKEN:[[id number]],FOLLOWUP:[1 week]],PROVIDER:[TOKEN:[[id number]],FOLLOWUP:[1 week]]

## 2024-01-19 NOTE — DISCHARGE NOTE NURSING/CASE MANAGEMENT/SOCIAL WORK - NSDCPEFALRISK_GEN_ALL_CORE
For information on Fall & Injury Prevention, visit: [email or web address] OR  [email or web address] OR  https://www.cdc.gov/steadi/patient.html

## 2024-01-19 NOTE — DISCHARGE NOTE PROVIDER - NSDCCAREPROVSEEN_GEN_ALL_CORE_FT
Sara Luu Migueorbemily Delshje  Hoorbemily Delshje Camarenaorbod Deljulisa Calderón  [facility] Medicine      [ Greater than 35 min spent for discharge services.   MARCELO Luu ]       ( Note written / Date of service is the same as last day of patient stay  in the hospital ( for billing purposes)))

## 2024-01-23 NOTE — CHART NOTE - NSCHARTNOTEFT_GEN_A_CORE
Medicine PA Note    Pt is medically cleared for discharge today as per Dr. Luu. Dr. Calderón cardiology reviewed repeat TTE done this AM and cleared pt for d/c home. Advised pt to f/u with his PCP and his cardiologist.    Yamini Culp PA-C  [id number]
Patient was outreached but did not answer. A voicemail was left for the patient to return our call.

## 2024-01-29 ENCOUNTER — INPATIENT (INPATIENT)
Facility: HOSPITAL | Age: 74
LOS: 6 days | Discharge: HOME | End: 2024-02-05
Attending: GENERAL PRACTICE | Admitting: GENERAL PRACTICE
Payer: MEDICARE

## 2024-01-29 VITALS
RESPIRATION RATE: 19 BRPM | HEIGHT: 71 IN | OXYGEN SATURATION: 97 % | HEART RATE: 85 BPM | DIASTOLIC BLOOD PRESSURE: 90 MMHG | SYSTOLIC BLOOD PRESSURE: 154 MMHG | TEMPERATURE: 98 F

## 2024-01-29 DIAGNOSIS — Z98.890 OTHER SPECIFIED POSTPROCEDURAL STATES: Chronic | ICD-10-CM

## 2024-01-29 DIAGNOSIS — Z96.651 PRESENCE OF RIGHT ARTIFICIAL KNEE JOINT: Chronic | ICD-10-CM

## 2024-01-29 DIAGNOSIS — R29.810 FACIAL WEAKNESS: ICD-10-CM

## 2024-01-29 LAB
ALBUMIN SERPL ELPH-MCNC: 3.8 G/DL — SIGNIFICANT CHANGE UP (ref 3.3–5)
ALP SERPL-CCNC: 92 U/L — SIGNIFICANT CHANGE UP (ref 40–120)
ALT FLD-CCNC: 17 U/L — SIGNIFICANT CHANGE UP (ref 4–41)
ANION GAP SERPL CALC-SCNC: 10 MMOL/L — SIGNIFICANT CHANGE UP (ref 7–14)
APTT BLD: 35.6 SEC — SIGNIFICANT CHANGE UP (ref 24.5–35.6)
AST SERPL-CCNC: 20 U/L — SIGNIFICANT CHANGE UP (ref 4–40)
BASOPHILS # BLD AUTO: 0.03 K/UL — SIGNIFICANT CHANGE UP (ref 0–0.2)
BASOPHILS NFR BLD AUTO: 0.3 % — SIGNIFICANT CHANGE UP (ref 0–2)
BILIRUB SERPL-MCNC: 0.4 MG/DL — SIGNIFICANT CHANGE UP (ref 0.2–1.2)
BUN SERPL-MCNC: 17 MG/DL — SIGNIFICANT CHANGE UP (ref 7–23)
CALCIUM SERPL-MCNC: 9.7 MG/DL — SIGNIFICANT CHANGE UP (ref 8.4–10.5)
CHLORIDE SERPL-SCNC: 98 MMOL/L — SIGNIFICANT CHANGE UP (ref 98–107)
CO2 SERPL-SCNC: 35 MMOL/L — HIGH (ref 22–31)
CREAT SERPL-MCNC: 0.86 MG/DL — SIGNIFICANT CHANGE UP (ref 0.5–1.3)
EGFR: 91 ML/MIN/1.73M2 — SIGNIFICANT CHANGE UP
EOSINOPHIL # BLD AUTO: 0.01 K/UL — SIGNIFICANT CHANGE UP (ref 0–0.5)
EOSINOPHIL NFR BLD AUTO: 0.1 % — SIGNIFICANT CHANGE UP (ref 0–6)
GLUCOSE SERPL-MCNC: 120 MG/DL — HIGH (ref 70–99)
HCT VFR BLD CALC: 34.8 % — LOW (ref 39–50)
HGB BLD-MCNC: 11 G/DL — LOW (ref 13–17)
IANC: 9.43 K/UL — HIGH (ref 1.8–7.4)
IMM GRANULOCYTES NFR BLD AUTO: 0.5 % — SIGNIFICANT CHANGE UP (ref 0–0.9)
INR BLD: 1.09 RATIO — SIGNIFICANT CHANGE UP (ref 0.85–1.18)
LYMPHOCYTES # BLD AUTO: 0.81 K/UL — LOW (ref 1–3.3)
LYMPHOCYTES # BLD AUTO: 7.2 % — LOW (ref 13–44)
MCHC RBC-ENTMCNC: 28.9 PG — SIGNIFICANT CHANGE UP (ref 27–34)
MCHC RBC-ENTMCNC: 31.6 GM/DL — LOW (ref 32–36)
MCV RBC AUTO: 91.3 FL — SIGNIFICANT CHANGE UP (ref 80–100)
MONOCYTES # BLD AUTO: 0.93 K/UL — HIGH (ref 0–0.9)
MONOCYTES NFR BLD AUTO: 8.3 % — SIGNIFICANT CHANGE UP (ref 2–14)
NEUTROPHILS # BLD AUTO: 9.43 K/UL — HIGH (ref 1.8–7.4)
NEUTROPHILS NFR BLD AUTO: 83.6 % — HIGH (ref 43–77)
NRBC # BLD: 0 /100 WBCS — SIGNIFICANT CHANGE UP (ref 0–0)
NRBC # FLD: 0 K/UL — SIGNIFICANT CHANGE UP (ref 0–0)
PLATELET # BLD AUTO: 267 K/UL — SIGNIFICANT CHANGE UP (ref 150–400)
POTASSIUM SERPL-MCNC: 3.9 MMOL/L — SIGNIFICANT CHANGE UP (ref 3.5–5.3)
POTASSIUM SERPL-SCNC: 3.9 MMOL/L — SIGNIFICANT CHANGE UP (ref 3.5–5.3)
PROT SERPL-MCNC: 7.4 G/DL — SIGNIFICANT CHANGE UP (ref 6–8.3)
PROTHROM AB SERPL-ACNC: 12.2 SEC — SIGNIFICANT CHANGE UP (ref 9.5–13)
RBC # BLD: 3.81 M/UL — LOW (ref 4.2–5.8)
RBC # FLD: 14.6 % — HIGH (ref 10.3–14.5)
SODIUM SERPL-SCNC: 143 MMOL/L — SIGNIFICANT CHANGE UP (ref 135–145)
TROPONIN T, HIGH SENSITIVITY RESULT: 33 NG/L — SIGNIFICANT CHANGE UP
WBC # BLD: 11.27 K/UL — HIGH (ref 3.8–10.5)
WBC # FLD AUTO: 11.27 K/UL — HIGH (ref 3.8–10.5)

## 2024-01-29 PROCEDURE — 99223 1ST HOSP IP/OBS HIGH 75: CPT

## 2024-01-29 PROCEDURE — 99285 EMERGENCY DEPT VISIT HI MDM: CPT

## 2024-01-29 PROCEDURE — 70498 CT ANGIOGRAPHY NECK: CPT | Mod: 26,MA

## 2024-01-29 PROCEDURE — 70496 CT ANGIOGRAPHY HEAD: CPT | Mod: 26,MA

## 2024-01-29 PROCEDURE — 70450 CT HEAD/BRAIN W/O DYE: CPT | Mod: 26,MA,XU

## 2024-01-29 PROCEDURE — 0042T: CPT | Mod: MA

## 2024-01-29 RX ORDER — POLYETHYLENE GLYCOL 3350 17 G/17G
17 POWDER, FOR SOLUTION ORAL DAILY
Refills: 0 | Status: DISCONTINUED | OUTPATIENT
Start: 2024-01-29 | End: 2024-02-05

## 2024-01-29 RX ORDER — HEPARIN SODIUM 5000 [USP'U]/ML
5000 INJECTION INTRAVENOUS; SUBCUTANEOUS EVERY 8 HOURS
Refills: 0 | Status: DISCONTINUED | OUTPATIENT
Start: 2024-01-29 | End: 2024-02-01

## 2024-01-29 RX ORDER — DORZOLAMIDE HYDROCHLORIDE TIMOLOL MALEATE 20; 5 MG/ML; MG/ML
1 SOLUTION/ DROPS OPHTHALMIC
Refills: 0 | DISCHARGE

## 2024-01-29 RX ORDER — COLCHICINE 0.6 MG
0.6 TABLET ORAL
Refills: 0 | Status: DISCONTINUED | OUTPATIENT
Start: 2024-01-29 | End: 2024-02-05

## 2024-01-29 RX ORDER — TAMSULOSIN HYDROCHLORIDE 0.4 MG/1
1 CAPSULE ORAL
Refills: 0 | DISCHARGE

## 2024-01-29 RX ORDER — MONTELUKAST 4 MG/1
1 TABLET, CHEWABLE ORAL
Refills: 0 | DISCHARGE

## 2024-01-29 RX ORDER — TAMSULOSIN HYDROCHLORIDE 0.4 MG/1
0.4 CAPSULE ORAL AT BEDTIME
Refills: 0 | Status: DISCONTINUED | OUTPATIENT
Start: 2024-01-29 | End: 2024-02-05

## 2024-01-29 RX ORDER — FINASTERIDE 5 MG/1
5 TABLET, FILM COATED ORAL DAILY
Refills: 0 | Status: DISCONTINUED | OUTPATIENT
Start: 2024-01-29 | End: 2024-02-05

## 2024-01-29 RX ORDER — DORZOLAMIDE HYDROCHLORIDE TIMOLOL MALEATE 20; 5 MG/ML; MG/ML
1 SOLUTION/ DROPS OPHTHALMIC
Refills: 0 | Status: DISCONTINUED | OUTPATIENT
Start: 2024-01-29 | End: 2024-02-05

## 2024-01-29 RX ORDER — ATORVASTATIN CALCIUM 80 MG/1
80 TABLET, FILM COATED ORAL AT BEDTIME
Refills: 0 | Status: DISCONTINUED | OUTPATIENT
Start: 2024-01-29 | End: 2024-02-05

## 2024-01-29 RX ORDER — MONTELUKAST 4 MG/1
10 TABLET, CHEWABLE ORAL DAILY
Refills: 0 | Status: DISCONTINUED | OUTPATIENT
Start: 2024-01-29 | End: 2024-02-05

## 2024-01-29 RX ORDER — DILTIAZEM HCL 120 MG
120 CAPSULE, EXT RELEASE 24 HR ORAL DAILY
Refills: 0 | Status: DISCONTINUED | OUTPATIENT
Start: 2024-01-29 | End: 2024-01-30

## 2024-01-29 RX ORDER — PANTOPRAZOLE SODIUM 20 MG/1
40 TABLET, DELAYED RELEASE ORAL
Refills: 0 | Status: DISCONTINUED | OUTPATIENT
Start: 2024-01-29 | End: 2024-02-05

## 2024-01-29 RX ORDER — FINASTERIDE 5 MG/1
1 TABLET, FILM COATED ORAL
Refills: 0 | DISCHARGE

## 2024-01-29 RX ORDER — METOPROLOL TARTRATE 50 MG
50 TABLET ORAL DAILY
Refills: 0 | Status: DISCONTINUED | OUTPATIENT
Start: 2024-01-29 | End: 2024-01-30

## 2024-01-29 RX ORDER — ASPIRIN/CALCIUM CARB/MAGNESIUM 324 MG
81 TABLET ORAL DAILY
Refills: 0 | Status: DISCONTINUED | OUTPATIENT
Start: 2024-01-30 | End: 2024-02-02

## 2024-01-29 RX ORDER — ALBUTEROL 90 UG/1
2 AEROSOL, METERED ORAL EVERY 6 HOURS
Refills: 0 | Status: DISCONTINUED | OUTPATIENT
Start: 2024-01-29 | End: 2024-02-05

## 2024-01-29 RX ORDER — FUROSEMIDE 40 MG
40 TABLET ORAL DAILY
Refills: 0 | Status: DISCONTINUED | OUTPATIENT
Start: 2024-01-29 | End: 2024-02-05

## 2024-01-29 RX ORDER — LANOLIN ALCOHOL/MO/W.PET/CERES
3 CREAM (GRAM) TOPICAL AT BEDTIME
Refills: 0 | Status: DISCONTINUED | OUTPATIENT
Start: 2024-01-29 | End: 2024-02-05

## 2024-01-29 RX ORDER — ALBUTEROL 90 UG/1
2 AEROSOL, METERED ORAL
Refills: 0 | DISCHARGE

## 2024-01-29 RX ORDER — ONDANSETRON 8 MG/1
4 TABLET, FILM COATED ORAL EVERY 8 HOURS
Refills: 0 | Status: DISCONTINUED | OUTPATIENT
Start: 2024-01-29 | End: 2024-02-05

## 2024-01-29 RX ORDER — DILTIAZEM HCL 120 MG
1 CAPSULE, EXT RELEASE 24 HR ORAL
Refills: 0 | DISCHARGE

## 2024-01-29 RX ORDER — ACETAMINOPHEN 500 MG
650 TABLET ORAL EVERY 6 HOURS
Refills: 0 | Status: DISCONTINUED | OUTPATIENT
Start: 2024-01-29 | End: 2024-02-05

## 2024-01-29 RX ADMIN — Medication 0.6 MILLIGRAM(S): at 23:51

## 2024-01-29 RX ADMIN — PANTOPRAZOLE SODIUM 40 MILLIGRAM(S): 20 TABLET, DELAYED RELEASE ORAL at 23:51

## 2024-01-29 NOTE — H&P ADULT - PROBLEM SELECTOR PLAN 3
-f/u repeat TTE to eval effusion. No evidence of tamponade on exam  -c/w colchicine and complete medrol pack prescribed by cardiologist   -c/w lasix  -VS q4h

## 2024-01-29 NOTE — ED ADULT NURSE NOTE - NSFALLRISKINTERV_ED_ALL_ED

## 2024-01-29 NOTE — CONSULT NOTE ADULT - TIME BILLING
73-year-old ? handed gentleman evaluated at [facility] on 1/30/2024 with dizziness.  History and exam as above, except currently visual fields are full.  ROS otherwise negative.  CT head (1/29/2024) to my eye showed a vague but probable acute, small right cerebellar infarct, probably in the SCA distribution.  CTA neck (1/29/2024) showed moderate calcified plaque in the proximal left ICA with mild stenosis.  CTA head (1/29/2024) to my eye showed mild calcified plaque in the carotid siphons bilaterally; a left fetal PCA.  CTP (1/29/2024).  To my eye, CBF <30% = 0 cc; Tmax >6 seconds = 27 cc in the right SCA distribution, corresponding to the acute infarct on CT; there was also a small region of hypoperfused tissue (not a core infarct) in the right PCA distribution, involving the occipital region    Impression.  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  He has had multiple GI hemorrhages in the past, including while being anticoagulated short-term after the Watchman was placed, and is now currently only on aspirin.  The explanation for his ischemic stroke despite the Watchman is uncertain, whether due to a high burden of AF, or whether there is a problem with the Watchman itself, including possibly a thrombus or that the device is not fully occluding the left atrial appendage.  Further workup will depend on whether management would be changed, since as far as I can tell, he will not be a candidate for anticoagulation under any circumstances due to his repeated GI bleeding.  Suggest.  Ask cardiology about the usefulness of a APRIL, i.e. would this some finding realistically ; if yes, then consider APRIL; if no, then from neurovascular standpoint, no role for further workup, continue aspirin, and then from neurovascular standpoint, cleared for discharge; PT/OT/speech therapy.

## 2024-01-29 NOTE — H&P ADULT - ASSESSMENT
73 year old male with past medical history of HTN, HLD, prostate cancer (s/p XRT), asthma, diverticulosis, with multiple GI bleeds requiring multiple units of blood and  s/p IR embolization,  afib, s/p watchman procedure due to contraindication to oral anticoagulant therapy presents to the ED as a code stroke for dizziness, dysarthria, and a facial droop.

## 2024-01-29 NOTE — ED ADULT NURSE NOTE - OBJECTIVE STATEMENT
Stroke Nurse: 72 yo male slurred speech, difficulty ambulating, and right-sided facial droop that began at yusra 15:00 today. PMHx: watchman device, atrial flutter, HTN, GIB. Report given to primary RN Taryn

## 2024-01-29 NOTE — STROKE CODE NOTE - NIH STROKE SCALE DATE
Muscle Spasm  A muscle spasm is a sudden tightening of the muscle you cant control. This may be caused by strain, overworking the muscle, or injury. It can also be caused by dehydration, electrolyte imbalance, diabetes, alcohol use, and certain medicines. If it goes on long enough the muscle spasm causes pain. Common areas for muscle spasm are the legs, neck, and back.  Home care  · Heat, massage, and stretching will help relax muscle spasm.  · When the spasm is in your arm or leg, stretch the muscle passively. To do this, have someone bend or straighten the joint above or below the muscle until you feel the stretch on the sore muscle. You can stretch the muscle actively by moving the affected body part. This will stretch the muscle that is in spasm. For example, if the spasm is in your calf, bend the ankle so your toes point upward toward your knee. This will stretch your calf muscle.  · You may use over-the-counter pain medicine to control pain, unless another medicine was prescribed. If you have chronic liver or kidney disease or ever had a stomach ulcer or GI bleeding, talk with your healthcare provider before using these medicines.  Follow-up care  Follow up with your healthcare provider, or as advised.    When to seek medical advice  Call your healthcare provider right away if any of the following occur:  · Fingers or toes become swollen, cold, blue, numb, or tingly  · You develop weakness in the affected arm or leg  · Pain increases and is not controlled by the above measures  Date Last Reviewed: 11/21/2015  © 2520-2498 [employer]. [address] [place] [address], [place] [address]. All rights reserved. This information is not intended as a substitute for professional medical care. Always follow your healthcare professional's instructions.         29-Jan-2024 17:11

## 2024-01-29 NOTE — ED ADULT NURSE NOTE - PRO INTERPRETER NEED 2
Adult English Exclude Pending Lab, Cardiology, and Radiology orders from printing on the Patient's Discharge Instructions, due to Privacy Concerns.

## 2024-01-29 NOTE — ED PROVIDER NOTE - NS ED ROS FT
Constitutional: No fever. no chills.  Eyes: No visual changes, eye pain or redness.   HEENT: No throat pain, hearing changes, ear pain, nasal pain. No nose bleeding.  CV: No chest pain, no palpitations  Resp: No shortness of breath, no cough  GI: No abdominal pain. No nausea. no  vomiting. No diarrhea. No constipation.   : No dysuria, hematuria. no urinary frequency, no urinary urgency.  MSK: No musculoskeletal pain  Skin: No rash, lesions, no bruises  Neuro: No headache. No numbness or tingling. No weakness. +dizziness. + Facial droop  Allergy/Immunology: no allergy to medicine or food.

## 2024-01-29 NOTE — H&P ADULT - NSHPPHYSICALEXAM_GEN_ALL_CORE
GENERAL APPEARANCE: Well developed, alert and cooperative. NAD.   HEENT:  PERRL, EOMI. External auditory canals normal, hearing grossly intact.  NECK: Neck supple, non-tender   CARDIAC: Normal S1 and S2. No S3, S4 or murmurs. Rhythm is irregularly irregular  LUNGS: Clear to auscultation and percussion without rales, rhonchi, wheezing or diminished breath sounds.  ABDOMEN: Positive bowel sounds. Soft, nondistended, nontender. No guarding or rebound.   MUSCULOSKELETAL: ROM intact spine and extremities. No joint erythema or tenderness.   EXTREMITIES: No significant deformity or joint abnormality. No edema. Peripheral pulses intact.   NEUROLOGICAL: Mild right facial droop. No dysarthria. CN II-XII intact. Strength and sensation symmetric and intact throughout.   SKIN: Skin normal color, texture and turgor with no lesions or eruptions.  PSYCHIATRIC: AOx3.Normal affect and behavior.

## 2024-01-29 NOTE — H&P ADULT - NSHPLABSRESULTS_GEN_ALL_CORE
"              After Visit Summary   10/27/2017    Uday Garcia    MRN: [id number]           Patient Information     Date Of Birth          1957        Visit Information        Provider Department      10/27/2017 3:30 PM Zi Dooley MD [facility]        Today's Diagnoses     Chronic rhinitis, unspecified type        Essential hypertension        Hyperlipidemia LDL goal <130           Follow-ups after your visit        Who to contact     If you have questions or need follow up information about today's clinic visit or your schedule please contact [facility] directly at [phone].  Normal or non-critical lab and imaging results will be communicated to you by MyChart, letter or phone within 4 business days after the clinic has received the results. If you do not hear from us within 7 days, please contact the clinic through [employer] or phone. If you have a critical or abnormal lab result, we will notify you by phone as soon as possible.  Submit refill requests through "[employer] or call your pharmacy and they will forward the refill request to us. Please allow 3 business days for your refill to be completed.          Additional Information About Your Visit        MyChart Information     "[employer] lets you send messages to your doctor, view your test results, renew your prescriptions, schedule appointments and more. To sign up, go to [email or web address] . Click on \"Log in\" on the left side of the screen, which will take you to the Welcome page. Then click on \"Sign up Now\" on the right side of the page.     You will be asked to enter the access code listed below, as well as some personal information. Please follow the directions to create your username and password.     Your access code is: [email or web address]  Expires: 2018  9:36 AM     Your access code will  in 90 days. If you need help or a new code, please call your [place] clinic or [phone].   "      Care EveryWhere ID     This is your Care EveryWhere ID. This could be used by other organizations to access your [facility] medical records  [id number]        Your Vitals Were     Pulse Temperature Pulse Oximetry BMI (Body Mass Index)          70 97.9  F (36.6  C) (Oral) 98% 23.34 kg/m2         Blood Pressure from Last 3 Encounters:   10/27/17 114/66   08/11/16 118/64   06/05/15 112/66    Weight from Last 3 Encounters:   10/27/17 172 lb 1.6 oz (78.1 kg)   08/11/16 174 lb 6.4 oz (79.1 kg)   06/05/15 179 lb (81.2 kg)              We Performed the Following     Basic metabolic panel  (Ca, Cl, CO2, Creat, Gluc, K, Na, BUN)          Today's Medication Changes          These changes are accurate as of: 10/27/17 11:59 PM.  If you have any questions, ask your nurse or doctor.               These medicines have changed or have updated prescriptions.        Dose/Directions    amLODIPine 10 MG tablet   Commonly known as:  NORVASC   This may have changed:  See the new instructions.   Used for:  Essential hypertension   Changed by:  Zi Dooley MD        Dose:  10 mg   Take 1 tablet (10 mg) by mouth daily   Quantity:  90 tablet   Refills:  3       lisinopril 10 MG tablet   Commonly known as:  PRINIVIL/ZESTRIL   This may have changed:  See the new instructions.   Used for:  Essential hypertension   Changed by:  Zi Dooley MD        Dose:  10 mg   Take 1 tablet (10 mg) by mouth daily   Quantity:  90 tablet   Refills:  3       simvastatin 20 MG tablet   Commonly known as:  ZOCOR   This may have changed:  See the new instructions.   Used for:  Hyperlipidemia LDL goal <130   Changed by:  Zi Dooley MD        Dose:  20 mg   Take 1 tablet (20 mg) by mouth At Bedtime   Quantity:  90 tablet   Refills:  3            Where to get your medicines      These medications were sent to [id number] - [place], [place] - [address] AT [facility] [address], [facility]  [address]    Hours:  24-hours Phone:  [phone]     amLODIPine 10 MG tablet    fluticasone 50 MCG/ACT spray    lisinopril 10 MG tablet    simvastatin 20 MG tablet                Primary Care Provider Office Phone # Fax #    Zi Henry Dooley [phone] [address] [place] [id number]        Equal Access to Services     JKAE ALONZO : Hadii aad ku hadasho Soomaali, waaxda luqadaha, qaybta kaalmada adeegyada, waxay idiin hayaan adeeg kharash la'aan ah. So [facility] [phone].    ATENCIÓN: Si habla español, tiene a villa disposición servicios gratuitos de asistencia lingüística. Llame al [phone].    We comply with applicable federal civil rights laws and [place] laws. We do not discriminate on the basis of race, color, national origin, age, disability, sex, sexual orientation, or gender identity.            Thank you!     Thank you for choosing [facility]  for your care. Our goal is always to provide you with excellent care. Hearing back from our patients is one way we can continue to improve our services. Please take a few minutes to complete the written survey that you may receive in the mail after your visit with us. Thank you!             Your Updated Medication List - Protect others around you: Learn how to safely use, store and throw away your medicines at www.disposemymeds.org.          This list is accurate as of: 10/27/17 11:59 PM.  Always use your most recent med list.                   Brand Name Dispense Instructions for use Diagnosis    amLODIPine 10 MG tablet    NORVASC    90 tablet    Take 1 tablet (10 mg) by mouth daily    Essential hypertension       aspirin 81 MG tablet     0    take one tablet daily with food    Unspecified essential hypertension       cholecalciferol 1000 UNIT tablet    vitamin D3    100 tablet    Take 1 tablet (1,000 Units) by mouth daily        fluticasone 50 MCG/ACT spray    FLONASE    48 g    Spray 2 sprays into  both nostrils daily    Chronic rhinitis, unspecified type       lisinopril 10 MG tablet    PRINIVIL/ZESTRIL    90 tablet    Take 1 tablet (10 mg) by mouth daily    Essential hypertension       simvastatin 20 MG tablet    ZOCOR    90 tablet    Take 1 tablet (20 mg) by mouth At Bedtime    Hyperlipidemia LDL goal <130          (01-29 @ 17:10)                      11.0  11.27 )-----------( 267                 34.8    Neutrophils = 9.43 (83.6%)  Lymphocytes = 0.81 (7.2%)  Eosinophils = 0.01 (0.1%)  Basophils = 0.03 (0.3%)  Monocytes = 0.93 (8.3%)  Bands = --%    01-29    143  |  98  |  17  ----------------------------<  120<H>  3.9   |  35<H>  |  0.86    Ca    9.7      29 Jan 2024 17:10    TPro  7.4  /  Alb  3.8  /  TBili  0.4  /  DBili  x   /  AST  20  /  ALT  17  /  AlkPhos  92  01-29    ( 29 Jan 2024 17:10 )   PT: 12.2 sec;   INR: 1.09 ratio;       PTT:35.6 sec      Urinalysis Basic - ( 29 Jan 2024 17:10 )    Color: x / Appearance: x / SG: x / pH: x  Gluc: 120 mg/dL / Ketone: x  / Bili: x / Urobili: x   Blood: x / Protein: x / Nitrite: x   Leuk Esterase: x / RBC: x / WBC x   Sq Epi: x / Non Sq Epi: x / Bacteria: x    < from: CT Angio Neck Stroke Protocol w/ IV Cont (01.29.24 @ 17:59) >    IMPRESSION:    CT HEAD:  1. No acute intracranial hemorrhage. Wedge-shaped decreased attenuation   raising concern for an acute to subacute right cerebellar infarct..  2. Question prominent sulcus versus small old right cerebellar infarct.  3. Significant residual paranasal sinus opacification, which nonetheless,   is somewhat improved compared to prior dated 5/8/2023. Correlate   clinically for evidence of persistent acute on chronic sinusitis    Findings were discussed with RITA Linn of neurology 1/29/2024 5:20   PM by Dr. Behr Ventura with read back confirmation.    CT PERFUSION:  Technical limitations: Somewhat limited by patient motion in all 3 planes   during image acquisition.    Core infarction: 0 ml  Penumbra / tissue at risk for active ischemia: 27 mL involving the right   cerebellar and right posterior temporal occipital brain parenchyma.    CTA HEAD:  1. Mild stenosis involving the cavernous andsupraclinoid portions of the   bilateral internal carotid arteries.  2. Hypoplastic left P1 segment with a relative fetal origin of the left   posterior cerebral artery. A prominent right posterior communicating   artery is noted.  3. No evidence of aneurysm. Tiny aneurysms can be beyond the resolution   of CTA technique.    CTA NECK:  1. Moderate deposition of calcified plaque along the proximal aspect of   the left internal carotid artery with mild stenosis in this location.  2. No significant stenosis in association with the right common carotid   artery or right internal carotid artery.  3. Bilateral vertebral arteries are patent.    < end of copied text >    Labs personally reviewed  Imaging reviewed  EKG: pending

## 2024-01-29 NOTE — H&P ADULT - NSHPREVIEWOFSYSTEMS_GEN_ALL_CORE
CONSTITUTIONAL:  No weight loss, fever, chills, weakness or fatigue.  HEENT:  Eyes:  No visual loss, blurred vision, double vision or yellow sclerae. Ears, Nose, Throat:  No hearing loss, sneezing, congestion, runny nose or sore throat.  SKIN:  No rash or itching.  CARDIOVASCULAR:  No chest pain, chest pressure or chest discomfort. No palpitations or edema.  RESPIRATORY:  No shortness of breath, cough or sputum.  GASTROINTESTINAL:  No anorexia, nausea, vomiting or diarrhea. No abdominal pain or blood.  GENITOURINARY:  Denies hematuria, dysuria.   NEUROLOGICAL:  +dizziness +facial droop +dysarthria No headache, syncope, paralysis, ataxia, numbness or tingling in the extremities. No change in bowel or bladder control.  MUSCULOSKELETAL:  No muscle, back pain, joint pain or stiffness.  PSYCHIATRIC:  No history of depression or anxiety.  ENDOCRINOLOGIC:  No reports of sweating, cold or heat intolerance. No polyuria or polydipsia.

## 2024-01-29 NOTE — ED PROVIDER NOTE - PROGRESS NOTE DETAILS
Patient found to have R cerebellar infarction on CT, NIHSS 2 however patient has contraindications to tpa given hx of GIB. Neurology recs for MRI, discuss with cardiology whether patient could receive plavix instead of ASA. Cardiology can be consulted inpatient.     Nidhi Johnson MD, PGY3

## 2024-01-29 NOTE — ED PROVIDER NOTE - OBJECTIVE STATEMENT
73-year-old male past medical history prostate cancer status post radiation A-fib status post Watchman (not on coagulation due to recurrent diverticular bleeding) hypertension, edema, mild asthma, ?  Glaucoma, recent admission for pericarditis presents to ED for right facial droop, difficulty with speech, difficulty with walking/unstable gait.  Patient last known well 1 PM.  Approximately 2 PM had onset of symptoms.  Per wife facial droop new.  He denies any acute vision changes.  He denies any headache, neck pain, chest pain abdominal pain.  Denies any numbness or weakness.  He denies any fevers, chills, nausea, vomiting.  Denies any dysuria hematuria any normal bowel movements.  Denies any urinary continence.  No similar prior episodes.  Patient is on aspirin but not any other antiplatelet or anticoagulation.  Code stroke called given acute onset of symptoms.

## 2024-01-29 NOTE — CONSULT NOTE ADULT - ASSESSMENT
73 year old male with past medical history of HTN, HLD, prostate cancer (s/p XRT), asthma, diverticulosis, with multiple GI bleeds requiring multiple units of blood and  s/p IR embolization,  afib, s/p watchman procedure due to contraindication to oral anticoagulant therapy presents to the ED as a code stroke for dizziness, dysarthria, and a facial droop.  Per patient and his wife at bedside he was eating lunch today around 1pm (LKN 1/29/24 at 13:00) and then around 2pm he got up to wash the dishes and started to feel dizzy which he described as room spinning.  He was saying that maybe he felt the dizziness a little earlier than 1pm today but is unsure.  Also noticed with dysarthria prior to coming into the ED but on exam now has resolved, although voice sounds hoarse, but per patient and wife that is his baseline.  Has history of afib and was on AC before but required multiple units of blood for bleeds, so watchman was placed 11/23 per family and he was on AC for 1 week after procedure and had another GI bleed requiring 10 units of blood, so AC was discontinued and he was continued on aspirin alone.  Has been on DAPT in the past with aspirin and plavix and also had GI bleeding.  Has not had any further GI bleeding while being on aspirin alone.  Was recently admitted at [facility] from 1/15-1/19 for a pericardial effusion.  Denies any recent illnesses, fevers, chills, nausea, vomiting, or headaches.      LKW: 1/29/24 at 13:00  NIHSS:  2  Baseline MRS: 1   Not a tenecteplase candidate due to history of GIB requiring transfusions and IR embolization   Not a thrombectomy candidate due to no LVO on CTA.      Impression:  Right facial palsy, left superior quadrantopia, resolved dysarthria due to right brain dysfunction due to acute ischemic stroke in r cerebellum and pneumbra in right posterior temporal occipital brain parenchyma, mechanism consistent with cardioembolism from afib.      Recommendations:  [] Cardiology consult regarding AP therapy  [] Aspirin response test if able to send   [] Continue home aspirin 81mg daily  [] Atorvastatin 80 mg daily titrated per LDL < 70  [] HgbA1C, fasting lipid panel, CBC, CMP, coag panel, troponin  [] MRI brain w/o con  [] TTE   [] APRIL   [] telemetry to check for arrhythmia  [] Baseline EKG   [] Tight glucose control (long-term goal HgbA1c < 6%)  [] Stroke education and counseling  [] Neuro-checks and VS q4h  [] Permissive HTN up to 220/120 for 24-48h from symptom onset  [] Dysphagia screen. If fails, speech/swallow eval  [] aspiration and fall precautions  [] STAT CT head non-contrast for change in neuro exam.   [] PT/ OT evaluations   [] DVT ppx per primary team     Discussed with stroke fellow Dr. Del Valle and under supervision of attending Dr. Libman regarding decision against candidacy for tenecteplase/ thrombectomy. Will be formally staffed on morning rounds with attending. Recommendations will be complete once signed by attending.    Please call with questions: [phone]

## 2024-01-29 NOTE — ED ADULT NURSE REASSESSMENT NOTE - NS ED NURSE REASSESS COMMENT FT1
Pt received in Trauma C in no acute distress. Pt denies headache, dizziness, blurry vision, numbness, tingling. No facial droop noted, able to talk in full sentences, follow commands. Pt denies chest pain, SOB. Pending bed assignment. Pt received in Trauma A in no acute distress. Pt denies headache, dizziness, blurry vision, numbness, tingling. No facial droop noted, able to talk in full sentences, follow commands. Pt denies chest pain, SOB. Pending bed assignment.

## 2024-01-29 NOTE — H&P ADULT - HISTORY OF PRESENT ILLNESS
73 year old male with past medical history of HTN, HLD, prostate cancer (s/p XRT), asthma, diverticulosis, with multiple GI bleeds requiring multiple units of blood and  s/p IR embolization,  afib, s/p watchman procedure due to contraindication to oral anticoagulant therapy presents to the ED as a code stroke for dizziness, dysarthria, and a facial droop.  Per patient and his wife at bedside he was eating lunch today around 1pm (LKN 1/29/24 at 13:00) and then around 2pm he got up to wash the dishes and started to feel dizzy which he described as room spinning.  He was saying that maybe he felt the dizziness a little earlier than 1pm today but is unsure.  Also noticed with dysarthria prior to coming into the ED but on exam now has resolved, although voice sounds hoarse, but per patient and wife that is his baseline.  Has history of afib and was on AC before but required multiple units of blood for bleeds, so watchman was placed 11/23 per family and he was on AC for 1 week after procedure and had another GI bleed requiring 10 units of blood, so AC was discontinued and he was continued on aspirin alone.  Has been on DAPT in the past with aspirin and plavix and also had GI bleeding.  Has not had any further GI bleeding while being on aspirin alone.  Was recently admitted at [facility] from 1/15-1/19 for a pericardial effusion. Started on colchicine and recently medrol dose pack with improvement in chest pain. Denies any chest pain, SOB or WILLIS at present. Reports recent followup with Dr. Calderón and noted with stable pericardial effusion. Currently with improvement in facial droop.Denies dizziness, fevers, nausea, vomiting, or headaches.

## 2024-01-29 NOTE — ED PROVIDER NOTE - CLINICAL SUMMARY MEDICAL DECISION MAKING FREE TEXT BOX
Patient last normal 1 PM with right-sided facial droop, gait instability, dizziness, slow to speak.  Speech clear and understandable but wife reports is slower than usual.  NIH stroke scale 1-2.  Patient with history of GI bleed and therefore only on aspirin not on anticoagulation.  Given low stroke scale and risk for serious GI bleed discussed with neuro tPA not recommended.  Plan: Labs, CT/CTA, reassess code stroke called neurology at bedside.  CT demonstrating acute to subacute cerebellar infarct.  Plan to admit.

## 2024-01-29 NOTE — ED PROVIDER NOTE - PHYSICAL EXAMINATION
GEN: no acute respiratory distress. nontoxic, speaking comfortably in full sentences, ambulating Unsteady gait.  HEENT: NCAT. Mild right facial droop. PERRL 4mm, EOMI,MMM, oropharynx wnl.  Neck: no JVD, trachea midline, no lymphadenopathy, FROM  CV: RRR. +S1S2, no murmur. 2+ pulses in 4 extremities, cap refill <2 sec  Chest: CTA B/l. no wheezing, rales, rhonchi. no retractions. good air movement.   ABD: +BS, soft, non distended, non tender.   : no cva or suprapubic tenderness  MSK: No clubbing, cyanosis, edema. FROM of all extremities. no tenderness to palpation. No midline or paraspinal tenderness.   Neuro: AAOX3.  Other than right facial droop CN 2-12 intact; Sensation intact, motor 5/5 throughout. intact. no ataxia  SKIN: No erythema, lesions or rash

## 2024-01-29 NOTE — H&P ADULT - PROBLEM SELECTOR PLAN 1
-Pt presents with acute right facial droop, dysarthria, dizziness. Now mostly resolved. CT head with acute-subacute infarct   -likely in the setting of afib, not on anticoagulation due to history of massive GIB  -appreciate neuro recs  -c/w aspirin. Continue to hold additional anti-platelet therapy given history  -atorvastatin 80 pending lipid profile  -MRI brain ordered, TTE w/ bubble study  -Neuro-checks and VS q4h  - Permissive HTN up to 220/120 for 24-48h from symptom onset  -aspiration and fall precautions  -STAT CT head non-contrast for change in neuro exam.   - PT/ OT evaluation

## 2024-01-29 NOTE — H&P ADULT - PROBLEM SELECTOR PLAN 2
-s/p watchman procedure  -c/w aspirin. Hold a/c given history of GIB. Started on PPI. Cardiology/EP consult in am for further management of afib  -hold diltiazem given need for permissive HTN. Can start metoprolol 1/30 13:00 as 24hs after initial symptoms started unless pt develops rapid rate  -monitor on tele

## 2024-01-29 NOTE — ED ADULT TRIAGE NOTE - CHIEF COMPLAINT QUOTE
Pt arrives in wheelchair to triage c/o slurred speech, difficulty ambulating, and right-sided facial droop that began at yusra 15:00 today. PMHx: watchman device, atrial flutter, HTN, GIB. MD Ayala contacted for code stroke.

## 2024-01-30 DIAGNOSIS — I48.91 UNSPECIFIED ATRIAL FIBRILLATION: ICD-10-CM

## 2024-01-30 DIAGNOSIS — Z86.79 PERSONAL HISTORY OF OTHER DISEASES OF THE CIRCULATORY SYSTEM: ICD-10-CM

## 2024-01-30 DIAGNOSIS — I63.9 CEREBRAL INFARCTION, UNSPECIFIED: ICD-10-CM

## 2024-01-30 DIAGNOSIS — I10 ESSENTIAL (PRIMARY) HYPERTENSION: ICD-10-CM

## 2024-01-30 DIAGNOSIS — Z29.9 ENCOUNTER FOR PROPHYLACTIC MEASURES, UNSPECIFIED: ICD-10-CM

## 2024-01-30 LAB
A1C WITH ESTIMATED AVERAGE GLUCOSE RESULT: 4.7 % — SIGNIFICANT CHANGE UP (ref 4–5.6)
ANION GAP SERPL CALC-SCNC: 10 MMOL/L — SIGNIFICANT CHANGE UP (ref 7–14)
BUN SERPL-MCNC: 18 MG/DL — SIGNIFICANT CHANGE UP (ref 7–23)
CALCIUM SERPL-MCNC: 9.3 MG/DL — SIGNIFICANT CHANGE UP (ref 8.4–10.5)
CHLORIDE SERPL-SCNC: 100 MMOL/L — SIGNIFICANT CHANGE UP (ref 98–107)
CHOLEST SERPL-MCNC: 117 MG/DL — SIGNIFICANT CHANGE UP
CO2 SERPL-SCNC: 30 MMOL/L — SIGNIFICANT CHANGE UP (ref 22–31)
CREAT SERPL-MCNC: 0.8 MG/DL — SIGNIFICANT CHANGE UP (ref 0.5–1.3)
EGFR: 93 ML/MIN/1.73M2 — SIGNIFICANT CHANGE UP
ESTIMATED AVERAGE GLUCOSE: 88 — SIGNIFICANT CHANGE UP
GLUCOSE SERPL-MCNC: 98 MG/DL — SIGNIFICANT CHANGE UP (ref 70–99)
HCT VFR BLD CALC: 34.7 % — LOW (ref 39–50)
HDLC SERPL-MCNC: 37 MG/DL — LOW
HGB BLD-MCNC: 10.8 G/DL — LOW (ref 13–17)
LIPID PNL WITH DIRECT LDL SERPL: 65 MG/DL — SIGNIFICANT CHANGE UP
MCHC RBC-ENTMCNC: 28.6 PG — SIGNIFICANT CHANGE UP (ref 27–34)
MCHC RBC-ENTMCNC: 31.1 GM/DL — LOW (ref 32–36)
MCV RBC AUTO: 91.8 FL — SIGNIFICANT CHANGE UP (ref 80–100)
NON HDL CHOLESTEROL: 80 MG/DL — SIGNIFICANT CHANGE UP
NRBC # BLD: 0 /100 WBCS — SIGNIFICANT CHANGE UP (ref 0–0)
NRBC # FLD: 0 K/UL — SIGNIFICANT CHANGE UP (ref 0–0)
PLATELET # BLD AUTO: 264 K/UL — SIGNIFICANT CHANGE UP (ref 150–400)
POTASSIUM SERPL-MCNC: 3.7 MMOL/L — SIGNIFICANT CHANGE UP (ref 3.5–5.3)
POTASSIUM SERPL-SCNC: 3.7 MMOL/L — SIGNIFICANT CHANGE UP (ref 3.5–5.3)
RBC # BLD: 3.78 M/UL — LOW (ref 4.2–5.8)
RBC # FLD: 14.8 % — HIGH (ref 10.3–14.5)
SODIUM SERPL-SCNC: 140 MMOL/L — SIGNIFICANT CHANGE UP (ref 135–145)
TRIGL SERPL-MCNC: 74 MG/DL — SIGNIFICANT CHANGE UP
WBC # BLD: 9.52 K/UL — SIGNIFICANT CHANGE UP (ref 3.8–10.5)
WBC # FLD AUTO: 9.52 K/UL — SIGNIFICANT CHANGE UP (ref 3.8–10.5)

## 2024-01-30 PROCEDURE — 99223 1ST HOSP IP/OBS HIGH 75: CPT

## 2024-01-30 PROCEDURE — 99222 1ST HOSP IP/OBS MODERATE 55: CPT

## 2024-01-30 PROCEDURE — 93306 TTE W/DOPPLER COMPLETE: CPT | Mod: 26

## 2024-01-30 RX ORDER — DILTIAZEM HCL 120 MG
120 CAPSULE, EXT RELEASE 24 HR ORAL DAILY
Refills: 0 | Status: DISCONTINUED | OUTPATIENT
Start: 2024-01-30 | End: 2024-02-05

## 2024-01-30 RX ORDER — METOPROLOL TARTRATE 50 MG
50 TABLET ORAL
Refills: 0 | Status: DISCONTINUED | OUTPATIENT
Start: 2024-01-30 | End: 2024-02-05

## 2024-01-30 RX ADMIN — ATORVASTATIN CALCIUM 80 MILLIGRAM(S): 80 TABLET, FILM COATED ORAL at 21:11

## 2024-01-30 RX ADMIN — Medication 0.6 MILLIGRAM(S): at 05:35

## 2024-01-30 RX ADMIN — Medication 4 MILLIGRAM(S): at 13:06

## 2024-01-30 RX ADMIN — POLYETHYLENE GLYCOL 3350 17 GRAM(S): 17 POWDER, FOR SOLUTION ORAL at 13:05

## 2024-01-30 RX ADMIN — Medication 50 MILLIGRAM(S): at 13:06

## 2024-01-30 RX ADMIN — Medication 81 MILLIGRAM(S): at 13:05

## 2024-01-30 RX ADMIN — FINASTERIDE 5 MILLIGRAM(S): 5 TABLET, FILM COATED ORAL at 13:05

## 2024-01-30 RX ADMIN — Medication 120 MILLIGRAM(S): at 20:08

## 2024-01-30 RX ADMIN — Medication 4 MILLIGRAM(S): at 21:11

## 2024-01-30 RX ADMIN — DORZOLAMIDE HYDROCHLORIDE TIMOLOL MALEATE 1 DROP(S): 20; 5 SOLUTION/ DROPS OPHTHALMIC at 05:35

## 2024-01-30 RX ADMIN — MONTELUKAST 10 MILLIGRAM(S): 4 TABLET, CHEWABLE ORAL at 13:05

## 2024-01-30 RX ADMIN — DORZOLAMIDE HYDROCHLORIDE TIMOLOL MALEATE 1 DROP(S): 20; 5 SOLUTION/ DROPS OPHTHALMIC at 17:07

## 2024-01-30 RX ADMIN — Medication 0.6 MILLIGRAM(S): at 17:06

## 2024-01-30 RX ADMIN — Medication 4 MILLIGRAM(S): at 08:52

## 2024-01-30 RX ADMIN — Medication 40 MILLIGRAM(S): at 05:35

## 2024-01-30 RX ADMIN — TAMSULOSIN HYDROCHLORIDE 0.4 MILLIGRAM(S): 0.4 CAPSULE ORAL at 21:11

## 2024-01-30 RX ADMIN — PANTOPRAZOLE SODIUM 40 MILLIGRAM(S): 20 TABLET, DELAYED RELEASE ORAL at 05:35

## 2024-01-30 NOTE — CONSULT NOTE ADULT - SUBJECTIVE AND OBJECTIVE BOX
CHIEF COMPLAINT: cva    HISTORY OF PRESENT ILLNESS:  73 year old male with past medical history of HTN, HLD, prostate cancer (s/p XRT), asthma, diverticulosis, with multiple GI bleeds requiring multiple units of blood and  s/p IR embolization,  afib, s/p watchman procedure due to contraindication to oral anticoagulant therapy presents to the ED as a code stroke for dizziness, dysarthria, and a facial droop.  Per patient and his wife at bedside he was eating lunch today around 1pm (LKN 1/29/24 at 13:00) and then around 2pm he got up to wash the dishes and started to feel dizzy which he described as room spinning.  He was saying that maybe he felt the dizziness a little earlier than 1pm today but is unsure.  Also noticed with dysarthria prior to coming into the ED but on exam now has resolved, although voice sounds hoarse, but per patient and wife that is his baseline.  Has history of afib and was on AC before but required multiple units of blood for bleeds, so watchman was placed 11/23 per family and he was on AC for 1 week after procedure and had another GI bleed requiring 10 units of blood, so AC was discontinued and he was continued on aspirin alone.  Has been on DAPT in the past with aspirin and plavix and also had GI bleeding.  Has not had any further GI bleeding while being on aspirin alone.  Was recently admitted at [facility] from 1/15-1/19 for a pericardial effusion. Started on colchicine and recently medrol dose pack with improvement in chest pain. Denies any chest pain, SOB or WILLIS at present. Reports recent followup with Dr. Calderón and noted with stable pericardial effusion. Currently with improvement in facial droop.Denies dizziness, fevers, nausea, vomiting, or headaches.     Allergies    No Known Allergies    Intolerances    	    MEDICATIONS:  aspirin enteric coated 81 milliGRAM(s) Oral daily  furosemide    Tablet 40 milliGRAM(s) Oral daily  heparin   Injectable 5000 Unit(s) SubCutaneous every 8 hours  metoprolol succinate ER 50 milliGRAM(s) Oral <User Schedule>      albuterol    90 MICROgram(s) HFA Inhaler 2 Puff(s) Inhalation every 6 hours PRN  montelukast 10 milliGRAM(s) Oral daily    acetaminophen     Tablet .. 650 milliGRAM(s) Oral every 6 hours PRN  melatonin 3 milliGRAM(s) Oral at bedtime PRN  ondansetron Injectable 4 milliGRAM(s) IV Push every 8 hours PRN    aluminum hydroxide/magnesium hydroxide/simethicone Suspension 30 milliLiter(s) Oral every 4 hours PRN  pantoprazole    Tablet 40 milliGRAM(s) Oral before breakfast  polyethylene glycol 3350 17 Gram(s) Oral daily    atorvastatin 80 milliGRAM(s) Oral at bedtime  colchicine 0.6 milliGRAM(s) Oral two times a day  finasteride 5 milliGRAM(s) Oral daily  methylPREDNISolone 4 milliGRAM(s) Oral before breakfast  methylPREDNISolone 4 milliGRAM(s) Oral before lunch  methylPREDNISolone 4 milliGRAM(s) Oral at bedtime    dorzolamide 2%/timolol 0.5% Ophthalmic Solution 1 Drop(s) Left EYE two times a day  tamsulosin 0.4 milliGRAM(s) Oral at bedtime      PAST MEDICAL & SURGICAL HISTORY:  Hypertension      Hyperlipidemia      GIB (gastrointestinal bleeding)      Prostate cancer  s/p radiation therapy      Gout      Diverticulitis      Asthma      Atrial flutter      Diverticulosis      Presence of Watchman left atrial appendage closure device      History of hemorrhoidectomy      H/O total knee replacement, right          FAMILY HISTORY:  No pertinent family history in first degree relatives        SOCIAL HISTORY:    non smoker. indep in adl      REVIEW OF SYSTEMS:  See HPI, otherwise complete 10 point review of systems negative    [ ] All others negative	      PHYSICAL EXAM:  T(C): 36.6 (01-30-24 @ 06:39), Max: 37 (01-29-24 @ 22:45)  HR: 81 (01-30-24 @ 06:39) (81 - 94)  BP: 159/101 (01-30-24 @ 06:39) (131/100 - 163/103)  RR: 18 (01-30-24 @ 06:39) (16 - 20)  SpO2: 94% (01-30-24 @ 06:39) (94% - 100%)  Wt(kg): --  I&O's Summary      Appearance: No Acute Distress	  HEENT:  Normal oral mucosa, PERRL, EOMI	  Cardiovascular: Normal S1 S2, No JVD, No murmurs/rubs/gallops  Respiratory: Lungs clear to auscultation bilaterally  Gastrointestinal:  Soft, Non-tender, + BS	  Skin: No rashes, No ecchymoses, No cyanosis	  Neurologic: Non-focal  Extremities: No clubbing, cyanosis or edema  Vascular: Peripheral pulses palpable 2+ bilaterally  Psychiatry: A & O x 3, Mood & affect appropriate    Laboratory Data:	 	    CBC Full  -  ( 30 Jan 2024 05:40 )  WBC Count : 9.52 K/uL  Hemoglobin : 10.8 g/dL  Hematocrit : 34.7 %  Platelet Count - Automated : 264 K/uL  Mean Cell Volume : 91.8 fL  Mean Cell Hemoglobin : 28.6 pg  Mean Cell Hemoglobin Concentration : 31.1 gm/dL  Auto Neutrophil # : x  Auto Lymphocyte # : x  Auto Monocyte # : x  Auto Eosinophil # : x  Auto Basophil # : x  Auto Neutrophil % : x  Auto Lymphocyte % : x  Auto Monocyte % : x  Auto Eosinophil % : x  Auto Basophil % : x    01-30    140  |  100  |  18  ----------------------------<  98  3.7   |  30  |  0.80  01-29    143  |  98  |  17  ----------------------------<  120<H>  3.9   |  35<H>  |  0.86    Ca    9.3      30 Jan 2024 05:40  Ca    9.7      29 Jan 2024 17:10    TPro  7.4  /  Alb  3.8  /  TBili  0.4  /  DBili  x   /  AST  20  /  ALT  17  /  AlkPhos  92  01-29      proBNP:   Lipid Profile:   HgA1c:   TSH:       CARDIAC MARKERS:            Interpretation of Telemetry: 	    ECG:  	  RADIOLOGY:  OTHER: 	    PREVIOUS DIAGNOSTIC TESTING:    [ ] Echocardiogram:  [ ] Catheterization:  [ ] Stress Test:  	    Assessment:  73 year old male with past medical history of HTN, HLD, prostate cancer (s/p XRT), asthma, diverticulosis, with multiple GI bleeds requiring multiple units of blood and  s/p IR embolization,  afib, s/p watchman procedure due to contraindication to oral anticoagulant therapy presents to the ED as a code stroke for dizziness, dysarthria, and a facial droop. Was recently admitted at [facility] from 1/15-1/19 for a pericardial effusion. Started on colchicine and recently medrol dose pack with improvement in chest pain.    Recs:  cardiac stable  no e/o acs or chf  cw lasix and kcl supplementation  cw colchicine 0.6mg bid x 3 mo and resume steroid taper  neurology f/u  obtain echo  eps following, s/p watchman  tele monitoring  antiplatelet meds per neuro if not otherwise contraindicated given recent gi bleed            Greater than 60 minutes spent on total encounter; more than 50% of the visit was spent counseling and/or coordinating care by the attending physician.   	  Isaac Calderón MD   Cardiovascular Diseases  [phone]

## 2024-01-30 NOTE — PHYSICAL THERAPY INITIAL EVALUATION ADULT - ADDITIONAL COMMENTS
Pt left seated at edge of bed with provider at bedside in NAD, all lines intact, call bell in reach, and HR 94 bpm.

## 2024-01-30 NOTE — CHART NOTE - NSCHARTNOTEFT_GEN_A_CORE
MRI safety sheet completed at bedside with patient; noted to have Watchman Device placed 8/2023 and Right knee replacement in 2022. Will discuss with MRI department for safety. Form is placed in the chart.

## 2024-01-30 NOTE — CONSULT NOTE ADULT - SUBJECTIVE AND OBJECTIVE BOX
CHIEF COMPLAINT: Acute CVA    HISTORY OF PRESENT ILLNESS: 73 year old male with past medical history of HTN, HLD, prostate cancer (s/p XRT), asthma, diverticulosis, with multiple GI bleeds requiring multiple units of blood and  s/p IR embolization,  AFib s/p watchman procedure due to PO AC contraindication presents to the ED as a code stroke for dizziness, dysarthria, and a facial droop.  Of note, pt with history of afib s/p watchman on 8/14/2023 c/b GI bleed requiring 10 units of blood on PO AC which was discontinued and continued on aspirin alone.  Pt reports no further bleeds on current regiment. Pt was recently admitted at [facility] from 1/15-1/19 for a pericardial effusion. Started on colchicine and recently medrol dose pack with improvement in chest pain. Pt suspected to have acute cerebellar stroke with improving facial droop and dysarthria since arrival. EP consulted for evaluation.     ALLERGIES:  No Known Allergies    HOME MEDICATIONS:  Colchicine 0.6mg BID  Atorvastatin 80mg qhs  Toprol XL 50mg daily  Lasix 40mg daily  ASA 81mg daily  Montelukast 10mg daily  Tamsulosin 0.4mg daily  Finasteride 5mg daily  Albuterol HFA PRN  Diltiazem 120mg daily    MEDICATIONS:  aspirin enteric coated 81 milliGRAM(s) Oral daily  furosemide    Tablet 40 milliGRAM(s) Oral daily  heparin   Injectable 5000 Unit(s) SubCutaneous every 8 hours  metoprolol succinate ER 50 milliGRAM(s) Oral <User Schedule>  albuterol    90 MICROgram(s) HFA Inhaler 2 Puff(s) Inhalation every 6 hours PRN  montelukast 10 milliGRAM(s) Oral daily  acetaminophen     Tablet .. 650 milliGRAM(s) Oral every 6 hours PRN  melatonin 3 milliGRAM(s) Oral at bedtime PRN  ondansetron Injectable 4 milliGRAM(s) IV Push every 8 hours PRN  aluminum hydroxide/magnesium hydroxide/simethicone Suspension 30 milliLiter(s) Oral every 4 hours PRN  pantoprazole    Tablet 40 milliGRAM(s) Oral before breakfast  polyethylene glycol 3350 17 Gram(s) Oral daily  atorvastatin 80 milliGRAM(s) Oral at bedtime  colchicine 0.6 milliGRAM(s) Oral two times a day  finasteride 5 milliGRAM(s) Oral daily  methylPREDNISolone 4 milliGRAM(s) Oral before lunch  methylPREDNISolone 4 milliGRAM(s) Oral at bedtime  methylPREDNISolone 4 milliGRAM(s) Oral before breakfast  predniSONE   Tablet 30 milliGRAM(s) Oral daily  dorzolamide 2%/timolol 0.5% Ophthalmic Solution 1 Drop(s) Left EYE two times a day  tamsulosin 0.4 milliGRAM(s) Oral at bedtime    PAST MEDICAL & SURGICAL HISTORY:  Hypertension  Hyperlipidemia  GIB (gastrointestinal bleeding)  Prostate cancer s/p radiation therapy  Gout  Diverticulitis  Asthma  Atrial flutter/Atrial Fibrillation  Diverticulosis    S/P YANG Watchman   History of hemorrhoidectomy  H/O total knee replacement, right    FAMILY HISTORY:  No pertinent family history in first degree relatives    SOCIAL HISTORY:    [X]Non-smoker  [X] Alcohol - denies    REVIEW OF SYSTEMS:  See HPI. Otherwise, 10 point ROS done and otherwise negative.    PHYSICAL EXAM:  T(C): 36.9 (01-30-24 @ 10:26), Max: 37 (01-29-24 @ 22:45)  HR: 100 (01-30-24 @ 10:26) (81 - 100)  BP: 146/86 (01-30-24 @ 10:26) (131/100 - 163/103)  RR: 18 (01-30-24 @ 10:26) (16 - 20)  SpO2: 97% (01-30-24 @ 10:26) (94% - 100%)  Wt(kg): --  I&O's Summary    Appearance: Alert. NAD	  Cardiovascular: +S1S2 irregular no m/g/r  Respiratory: CTA B/L	  Psychiatry: A & O x 3, Mood & affect appropriate  Gastrointestinal:  Soft, NT. ND., + BS	  Skin: No rashes	  Extremities: No edema BLE  Vascular: Peripheral pulses palpable 2+ bilaterally    LABS:	 	    CBC Full  -  ( 30 Jan 2024 05:40 )  WBC Count : 9.52 K/uL  Hemoglobin : 10.8 g/dL  Hematocrit : 34.7 %  Platelet Count - Automated : 264 K/uL  Mean Cell Volume : 91.8 fL  Mean Cell Hemoglobin : 28.6 pg  Mean Cell Hemoglobin Concentration : 31.1 gm/dL  Auto Neutrophil # : x  Auto Lymphocyte # : x  Auto Monocyte # : x  Auto Eosinophil # : x  Auto Basophil # : x  Auto Neutrophil % : x  Auto Lymphocyte % : x  Auto Monocyte % : x  Auto Eosinophil % : x  Auto Basophil % : x    01-30    140  |  100  |  18  ----------------------------<  98  3.7   |  30  |  0.80  01-29    143  |  98  |  17  ----------------------------<  120<H>  3.9   |  35<H>  |  0.86    Ca    9.3      30 Jan 2024 05:40  Ca    9.7      29 Jan 2024 17:10    TPro  7.4  /  Alb  3.8  /  TBili  0.4  /  DBili  x   /  AST  20  /  ALT  17  /  AlkPhos  92  01-29    TELEMETRY: SR with frequent PVCs, 70-90s  	    ECG:  	        Previous Imaging:  TRANSTHORACIC ECHOCARDIOGRAM REPORT  ________________________________________________________________________________                                      _______    Pt. Name:       LENNOX CARTER Study Date:    1/19/2024  MRN:            [id number]    YOB: 1950  Accession #:    [id number]     Age:           73 years  Account#:       [id number]  Gender:        M  Heart Rate:     63 bpm        Height:        70.00 in (177.80 cm)  Rhythm:         sinus rhythm  Weight:        209.00 lb (94.80 kg)  Blood Pressure: 138/88 mmHg   BSA/BMI:       2.13 m² / 29.99 kg/m²  ________________________________________________________________________________________  Referring Physician:    [id number] Sara Luu  Interpreting Physician: Rosita Lombardo  Primary Sonographer:    Tariq Molina RDCS    CPT:               ECHO TTE W/O CON F/U [facility] - 78141.m;LIMITED SPECTRAL - 50812.m  Indication(s):     Other pericardial effusion (noninflammatory) - I31.39  Procedure:         Limited transthoracic echocardiogram.  Ordering Location: [facility]  Admission Status:  Inpatient  Study Information: Image quality for this study is good.    _______________________________________________________________________________________     CONCLUSIONS:      1. This effusion is mostly posterior and laterl to the left ventricle, and a small pocket adjacent to the right atrium. There is respiratory variation of the tricuspid valve inflow but not in the mitral. There are no other signs of hemodynamic compromise.   2. Compared to the transthoracic echocardiogram performed on 1/16/2024, no change.   3. Moderate localized pericardial effusion noted adjacent to the left ventricle.    ________________________________________________________________________________________  FINDINGS:     Pericardium:  There is a moderate localized pericardial effusion noted adjacent to the left ventricle. This effusion is mostly posterior and laterl to the left ventricle, and a small pocket adjacent to the right atrium. There is respiratory variation of the tricuspid valve inflow but not in the mitral. There are no other signs of hemodynamic compromise.     Systemic Veins:  The inferior vena cava is dilated measuring 2.20 cm in diameter, (dilated >2.1cm) with normal inspiratory collapse (normal >50%) consistent with mildly elevated right atrial pressure (~8, range 5-10mmHg).  ____________________________________________________________________  QUANTITATIVE DATA:     Tricuspid Valve Measurements:     RA Pressure: 8mmHg    ________________________________________________________________________________________  Electronically signed on 1/19/2024 at 9:21:29 AM by Rosita Lombardo     *** Final ***

## 2024-01-30 NOTE — CONSULT NOTE ADULT - ASSESSMENT
73 year old male with past medical history of HTN, HLD, prostate cancer (s/p XRT), asthma, diverticulosis, with multiple GI bleeds requiring multiple units of blood and  s/p IR embolization,  AFib s/p watchman procedure due to PO AC contraindication presents to the ED as a code stroke for dizziness, dysarthria, and a facial droop.  Of note, pt with history of afib s/p watchman on 8/14/2023 c/b GI bleed requiring 10 units of blood on PO AC which was discontinued and continued on aspirin alone.  Pt reports no further bleeds on current regiment. Pt was recently admitted at [facility] from 1/15-1/19 for a pericardial effusion. Started on colchicine and recently medrol dose pack with improvement in chest pain. Pt suspected to have acute cerebellar stroke with improving facial droop and dysarthria since arrival. EP consulted for evaluation.     1. Acute Cerebellar Stroke  2. AF s/p Watchman    - Continue to monitor on tele while inpatient  - Obtain EKG STAT  - TSH and Mg ordered; Keep K>4.0 and Mg>2.0, replete as necessary  - Pt with extensive hx of GI bleedings, PO AC previously not tolerated s/p watchman. Continue ASA   - Continue Metoprolol Succinate 50mg daily  - Obtain TTE  - MR Head pending  - Will discuss with EP attending      ASHLI Roberts PA-C  [id number]

## 2024-01-30 NOTE — ED ADULT NURSE REASSESSMENT NOTE - NS ED NURSE REASSESS COMMENT FT1
Pt medicated as per eMAR. Pt in no acute distress. Denies chest pain, SOB. RR even and unlabored. Pending bed assignment.

## 2024-01-30 NOTE — PROVIDER CONTACT NOTE (OTHER) - ACTION/TREATMENT ORDERED:
No further orders at this time.
ACP Gagan Medel notified, gave ok to administer diltizam CD 120mg tonight as patient is NPO after midnight for TTE 1/31. BP monitoring and neuro assessment Q4 maintained, VS as documented.

## 2024-01-30 NOTE — ED ADULT NURSE REASSESSMENT NOTE - GENERAL PATIENT STATE
comfortable appearance
comfortable appearance
comfortable appearance/resting/sleeping
comfortable appearance

## 2024-01-30 NOTE — PHYSICAL THERAPY INITIAL EVALUATION ADULT - ACTIVE RANGE OF MOTION EXAMINATION, REHAB EVAL
volodymyr. upper extremity Active ROM was WNL (within normal limits)/bilateral lower extremity Active ROM was WNL (within normal limits)

## 2024-01-30 NOTE — OCCUPATIONAL THERAPY INITIAL EVALUATION ADULT - GENERAL OBSERVATIONS, REHAB EVAL
Upon entry, patient semi-supine in stretcher in ED, patient agreeable to OT eval, cleared for OT evaluation as per RN. Patient left semi-supine in bed, call bell in reach, all lines/tubes intact, vitals stable, NAD, RN made aware.

## 2024-01-30 NOTE — PROVIDER CONTACT NOTE (OTHER) - ASSESSMENT
No acute distress noted, patient asymptomatic.
patient admitted to 5N at shift change. 6 beats of VT on tele. /111,  - AF on tele, 97% on RA, temp 98.3. neuros as documented, patient denies c/o chest pain, SOB, dizziness. no acute distress noted. Pt NPO at midnight for TTE 1/31.

## 2024-01-30 NOTE — PHYSICAL THERAPY INITIAL EVALUATION ADULT - PERTINENT HX OF CURRENT PROBLEM, REHAB EVAL
Patient is a 73 year old male, PMH stated below, presents with facial droop, dizziness, and dysarthria; CT head: Mild stenosis involving the cavernous and supraclinoid portions of the bilateral internal carotid arteries. Hypoplastic left P1 segment with a relative fetal origin of the left posterior cerebral artery. A prominent right posterior communicating artery is noted.

## 2024-01-30 NOTE — PATIENT PROFILE ADULT - TRANSPORTATION
no
Detail Level: Zone
Plan: ***Well Controlled***\\n\\n- Continue SulfaCleanse BID. Refill sent to [facility].\\n- Continue Winlevi BID regionally. \\n- Continue Doxycycline 20mg BID.\\n- Continue Spironolactone 50mg QD (intolerance at the higher dosage of 75mg (too low BP).\\n-Reinitiate Tazorac QHS \\n\\n-D/C Amzeeq & Aczone
Plan: ***Well Controlled***\\nReports less responsiveness as of recent; a few missed doses \\nNo active inflammation today\\n\\nContinue methotrexate 6 tablets weekly and folic acid on non-methotrexate days.

## 2024-01-30 NOTE — CHART NOTE - NSCHARTNOTEFT_GEN_A_CORE
TTE results reviewed with Dr. Luu; Likely PFO presents. Given stroke and concern for watchman malfunction/leak, advised to plan for APRIL. Patient ordered to be NPO p MN.

## 2024-01-30 NOTE — CONSULT NOTE ADULT - NS ATTEND AMEND GEN_ALL_CORE FT
73 year old male with past medical history of HTN, HLD, prostate cancer (s/p XRT), asthma, diverticulosis, with multiple GI bleeds requiring multiple units of blood and  s/p IR embolization,  AFib s/p watchman procedure due to PO AC contraindication presents to the ED as a code stroke for dizziness, dysarthria, and a facial droop.  Of note, pt with history of afib s/p watchman on 8/14/2023 c/b GI bleed requiring 10 units of blood on PO AC which was discontinued and continued on aspirin alone.  Pt reports no further bleeds on current regiment. Pt was recently admitted at [facility] from 1/15-1/19 for a pericardial effusion. Started on colchicine and recently medrol dose pack with improvement in chest pain. Pt suspected to have acute cerebellar stroke with improving facial droop and dysarthria since arrival. EP consulted for evaluation. Obtain EKG STAT. TSH and Mg ordered; Keep K>4.0 and Mg>2.0, replete as necessary. Pt with extensive hx of GI bleedings, PO AC previously not tolerated s/p Watchman. Continue ASA. Continue Metoprolol Succinate 50mg daily. Obtain TTE. MR Head pending. Echo showed PFO (patient could have paradoxical embolism.

## 2024-01-30 NOTE — PATIENT PROFILE ADULT - FALL HARM RISK - HARM RISK INTERVENTIONS
Assistance with ambulation/Communicate Risk of Fall with Harm to all staff/Monitor gait and stability/Reinforce activity limits and safety measures with patient and family/Tailored Fall Risk Interventions/Visual Cue: Yellow wristband and red socks/Bed in lowest position, wheels locked, appropriate side rails in place/Call bell, personal items and telephone in reach/Instruct patient to call for assistance before getting out of bed or chair/Non-slip footwear when patient is out of bed/[place] to call system/Physically safe environment - no spills, clutter or unnecessary equipment/Purposeful Proactive Rounding/Room/bathroom lighting operational, light cord in reach

## 2024-01-30 NOTE — PROVIDER CONTACT NOTE (OTHER) - BACKGROUND
72 y/o male, PMHx HTN, HLD, atrial fibrillation, h/o pericarditis, CVA. Presented to ED on 1/29 as code stroke for dizziness, dysarthria, and facial droop.

## 2024-01-30 NOTE — OCCUPATIONAL THERAPY INITIAL EVALUATION ADULT - PERTINENT HX OF CURRENT PROBLEM, REHAB EVAL
Patient is a 73 year old male presents as r/o CVA for dizziness, dysarthria, and a facial droop. Was recently admitted at [facility] from 1/15-1/19 for a pericardial effusion.

## 2024-01-30 NOTE — PROGRESS NOTE ADULT - ASSESSMENT
_________________________________________________________________________________________  ========>>  M E D I C A L   A T T E N D I N G    F O L L O W  U P  N O T E  <<=========  -----------------------------------------------------------------------------------------------------    - Patient seen and examined by me earlier today.   - In summary,  LENNOX CARTER is a 73y year old man admitted with stroke   - Patient today overall doing ok, comfortable, eating OK.     patient overall improved.. all neurological complaints and symptoms are fully resolving.. patient ambulating well, eating well, no facial changes...      BP  has been elevated today per RN     ==================>> REVIEW OF SYSTEM <<=================    GEN: no fever, no chills, no pain  RESP: no SOB, no cough, no sputum  CVS: no chest pain, no palpitations  GI: no abdominal pain, no nausea, no constipation, no diarrhea  : no dysuria, no frequency  Neuro: no headache, no dizziness    ==================>> PHYSICAL EXAM <<=================    GEN: A&O X 3 , NAD , comfortable, pleasant, calm .. sitting at edge of bed..   HEENT: NCAT, PERRL, MMM, hearing intact  CVS: S1S2 , Irregular , No M/R/G appreciated  PULM: CTA B/L,  no W/R/R appreciated  ABD.: soft. non tender, non distended,  bowel sounds present  Extrem: intact pulses , no edema           ( Note written / Date of service 01-30-24 ( This is certified to be the same as "ENTERED" date above ( for billing purposes)))    ==================>> MEDICATIONS <<====================    MEDICATIONS  (STANDING):  aspirin enteric coated 81 milliGRAM(s) Oral daily  atorvastatin 80 milliGRAM(s) Oral at bedtime  colchicine 0.6 milliGRAM(s) Oral two times a day  dorzolamide 2%/timolol 0.5% Ophthalmic Solution 1 Drop(s) Left EYE two times a day  finasteride 5 milliGRAM(s) Oral daily  furosemide    Tablet 40 milliGRAM(s) Oral daily  heparin   Injectable 5000 Unit(s) SubCutaneous every 8 hours  methylPREDNISolone 4 milliGRAM(s) Oral at bedtime  methylPREDNISolone 4 milliGRAM(s) Oral before breakfast  metoprolol succinate ER 50 milliGRAM(s) Oral <User Schedule>  montelukast 10 milliGRAM(s) Oral daily  pantoprazole    Tablet 40 milliGRAM(s) Oral before breakfast  polyethylene glycol 3350 17 Gram(s) Oral daily  predniSONE   Tablet 30 milliGRAM(s) Oral daily  tamsulosin 0.4 milliGRAM(s) Oral at bedtime    MEDICATIONS  (PRN):  acetaminophen     Tablet .. 650 milliGRAM(s) Oral every 6 hours PRN Temp greater or equal to 38C (100.4F), Mild Pain (1 - 3)  albuterol    90 MICROgram(s) HFA Inhaler 2 Puff(s) Inhalation every 6 hours PRN for shortness of breath and/or wheezing  aluminum hydroxide/magnesium hydroxide/simethicone Suspension 30 milliLiter(s) Oral every 4 hours PRN Dyspepsia  melatonin 3 milliGRAM(s) Oral at bedtime PRN Insomnia  ondansetron Injectable 4 milliGRAM(s) IV Push every 8 hours PRN Nausea and/or Vomiting    ___________  Active diet:  Diet, NPO after Midnight:      NPO Start Date: 30-Jan-2024,   NPO Start Time: 23:59  Diet, Regular:   DASH/TLC Sodium & Cholesterol Restricted (DASH)  ___________________    ==================>> VITAL SIGNS <<==================    T(C): 36.5 (01-30-24 @ 16:20), Max: 37 (01-29-24 @ 22:45)  HR: 87 (01-30-24 @ 16:20) (81 - 100)  BP: 148/113 (01-30-24 @ 16:20) (131/100 - 170/106)  RR: 18 (01-30-24 @ 16:20) (16 - 20)  SpO2: 99% (01-30-24 @ 16:20) (94% - 100%)     CAPILLARY BLOOD GLUCOSE  122 (29 Jan 2024 17:10)       ==================>> LAB AND IMAGING <<==================                        10.8   9.52  )-----------( 264      ( 30 Jan 2024 05:40 )             34.7        01-30    140  |  100  |  18  ----------------------------<  98  3.7   |  30  |  0.80    Ca    9.3      30 Jan 2024 05:40    TPro  7.4  /  Alb  3.8  /  TBili  0.4  /  DBili  x   /  AST  20  /  ALT  17  /  AlkPhos  92  01-29    PT/INR - ( 29 Jan 2024 17:10 )   PT: 12.2 sec;   INR: 1.09 ratio    PTT - ( 29 Jan 2024 17:10 )  PTT:35.6 sec               TSH:      2.20   (01-16-24)           Lipid profile:  (01-30-24)     Total: 117     LDL  : (p)     HDL  :37     TG   :74     HgA1C:   (01-30-24)          (01-30-24)      4.7      < from: TTE W or WO Ultrasound Enhancing Agent (01.30.24 @ 09:15) >  FINDINGS:  Interatrial Septum:  Agitated saline injection reveals bubbles in the left heart, consistent with a patent foramen ovale.  Pericardium:  There is a trace pericardial effusion.  < end of copied text >    < from: CT Angio Neck Stroke Protocol w/ IV Cont (01.29.24 @ 17:59) >  IMPRESSION:  CT HEAD:  1. No acute intracranial hemorrhage. Wedge-shaped decreased attenuation   raising concern for an acute to subacute right cerebellar infarct..  2. Question prominent sulcus versus small old right cerebellar infarct.  3. Significant residual paranasal sinus opacification, which nonetheless,   is somewhat improved compared to prior dated 5/8/2023. Correlate   clinically for evidence of persistent acute on chronic sinusitis  Findings were discussed with RITA Linn of neurology 1/29/2024 5:20  PM by Dr. Behr Ventura with read back confirmation.    CT PERFUSION:  Technical limitations: Somewhat limited by patient motion in all 3 planes   during image acquisition.  Core infarction: 0 ml  Penumbra / tissue at risk for active ischemia: 27 mL involving the right   cerebellar and right posterior temporal occipital brain parenchyma.    CTA HEAD:  1. Mild stenosis involving the cavernous andsupraclinoid portions of the   bilateral internal carotid arteries.  2. Hypoplastic left P1 segment with a relative fetal origin of the left   posterior cerebral artery. A prominent right posterior communicating   artery is noted.  3. No evidence of aneurysm. Tiny aneurysms can be beyond the resolution   of CTA technique.    CTA NECK:  1. Moderate deposition of calcified plaque along the proximal aspect of   the left internal carotid artery with mild stenosis in this location.  2. No significant stenosis in association with the right common carotid   artery or right internal carotid artery.  3. Bilateral vertebral arteries are patent.  < end of copied text >  ___________________________________________________________________________________  ===============>>  A S S E S S M E N T   A N D   P L A N <<===============  ------------------------------------------------------------------------------------------    · Assessment	  73 year old male with past medical history of HTN, HLD, prostate cancer (s/p XRT), asthma, diverticulosis, with multiple GI bleeds requiring multiple units of blood and  s/p IR embolization,  afib, s/p watchman procedure due to contraindication to oral anticoagulant therapy presents to the ED as a code stroke for dizziness, dysarthria, and a facial droop.    Problem/Plan - 1:  ·  Problem: CVA (cerebrovascular accident).   ·  Plan: -Pt presents with acute right facial droop, dysarthria, dizziness. Now mostly resolved. CT head with acute-subacute infarct   -likely in the setting of afib, not on anticoagulation due to history of massive GIB ( however patient post watchman )   -appreciate neuro recs  -c/w aspirin. Continue to hold additional anti-platelet therapy given history  -atorvastatin 80 pending lipid profile  -MRI brain ordered, pending  - TTE w/ bubble study as above >> APRIL ordered per discussed with cardio >> to follow   -Neuro-checks and VS q4h  - Permissive HTN up to 220/120 for 24-48h from symptom onset  >> will reorder home medications for tomorrow       monitor and adjust as needed   -aspiration and fall precautions  - PT appreciated >> outpatient PT     Problem/Plan - 2:  ·  Problem: Atrial fibrillation.   ·  Plan: -s/p watchman procedure  -c/w aspirin. Hold a/c given history of GIB. Started on PPI.     Cardiology/EP on board, appreciated   - Continue Current medications otherwise and monitor.   -monitor on tele.    Problem/Plan - 3:  ·  Problem: H/O pericarditis.   ·  repeat TTE noted with little effusion  -c/w colchicine and complete medrol pack prescribed by cardiologist   -c/w lasix    Problem/Plan - 4:  ·  Problem: Hypertension.   - going back on home medications  - monitor closely and adjust as needed  - cardio on board     Problem/Plan - 5:  ·  Problem: Need for prophylactic measure.   ·  Plan: dvt ppx: hep sq.     --------------------------------------------  Case discussed with patient, wife, PA, RN, cardio..   Education given on findings and plan of care  ___________________________  H. JONAS Luu.  Pager: [phone]

## 2024-01-31 LAB
ANION GAP SERPL CALC-SCNC: 12 MMOL/L — SIGNIFICANT CHANGE UP (ref 7–14)
BUN SERPL-MCNC: 22 MG/DL — SIGNIFICANT CHANGE UP (ref 7–23)
CALCIUM SERPL-MCNC: 9.1 MG/DL — SIGNIFICANT CHANGE UP (ref 8.4–10.5)
CHLORIDE SERPL-SCNC: 98 MMOL/L — SIGNIFICANT CHANGE UP (ref 98–107)
CO2 SERPL-SCNC: 29 MMOL/L — SIGNIFICANT CHANGE UP (ref 22–31)
CREAT SERPL-MCNC: 0.9 MG/DL — SIGNIFICANT CHANGE UP (ref 0.5–1.3)
EGFR: 90 ML/MIN/1.73M2 — SIGNIFICANT CHANGE UP
GLUCOSE SERPL-MCNC: 108 MG/DL — HIGH (ref 70–99)
HCT VFR BLD CALC: 33.6 % — LOW (ref 39–50)
HGB BLD-MCNC: 10.9 G/DL — LOW (ref 13–17)
MAGNESIUM SERPL-MCNC: 2.1 MG/DL — SIGNIFICANT CHANGE UP (ref 1.6–2.6)
MCHC RBC-ENTMCNC: 28.9 PG — SIGNIFICANT CHANGE UP (ref 27–34)
MCHC RBC-ENTMCNC: 32.4 GM/DL — SIGNIFICANT CHANGE UP (ref 32–36)
MCV RBC AUTO: 89.1 FL — SIGNIFICANT CHANGE UP (ref 80–100)
NRBC # BLD: 0 /100 WBCS — SIGNIFICANT CHANGE UP (ref 0–0)
NRBC # FLD: 0 K/UL — SIGNIFICANT CHANGE UP (ref 0–0)
PHOSPHATE SERPL-MCNC: 4.4 MG/DL — SIGNIFICANT CHANGE UP (ref 2.5–4.5)
PLATELET # BLD AUTO: 260 K/UL — SIGNIFICANT CHANGE UP (ref 150–400)
POTASSIUM SERPL-MCNC: 3.4 MMOL/L — LOW (ref 3.5–5.3)
POTASSIUM SERPL-SCNC: 3.4 MMOL/L — LOW (ref 3.5–5.3)
RBC # BLD: 3.77 M/UL — LOW (ref 4.2–5.8)
RBC # FLD: 14.6 % — HIGH (ref 10.3–14.5)
SODIUM SERPL-SCNC: 139 MMOL/L — SIGNIFICANT CHANGE UP (ref 135–145)
WBC # BLD: 8.16 K/UL — SIGNIFICANT CHANGE UP (ref 3.8–10.5)
WBC # FLD AUTO: 8.16 K/UL — SIGNIFICANT CHANGE UP (ref 3.8–10.5)

## 2024-01-31 RX ORDER — POTASSIUM CHLORIDE 20 MEQ
10 PACKET (EA) ORAL
Refills: 0 | Status: COMPLETED | OUTPATIENT
Start: 2024-01-31 | End: 2024-01-31

## 2024-01-31 RX ORDER — POTASSIUM CHLORIDE 20 MEQ
40 PACKET (EA) ORAL ONCE
Refills: 0 | Status: COMPLETED | OUTPATIENT
Start: 2024-01-31 | End: 2024-02-02

## 2024-01-31 RX ADMIN — Medication 4 MILLIGRAM(S): at 09:05

## 2024-01-31 RX ADMIN — Medication 100 MILLIEQUIVALENT(S): at 07:50

## 2024-01-31 RX ADMIN — DORZOLAMIDE HYDROCHLORIDE TIMOLOL MALEATE 1 DROP(S): 20; 5 SOLUTION/ DROPS OPHTHALMIC at 06:42

## 2024-01-31 RX ADMIN — MONTELUKAST 10 MILLIGRAM(S): 4 TABLET, CHEWABLE ORAL at 12:08

## 2024-01-31 RX ADMIN — ATORVASTATIN CALCIUM 80 MILLIGRAM(S): 80 TABLET, FILM COATED ORAL at 21:23

## 2024-01-31 RX ADMIN — Medication 50 MILLIGRAM(S): at 12:35

## 2024-01-31 RX ADMIN — FINASTERIDE 5 MILLIGRAM(S): 5 TABLET, FILM COATED ORAL at 12:08

## 2024-01-31 RX ADMIN — Medication 100 MILLIEQUIVALENT(S): at 08:47

## 2024-01-31 RX ADMIN — DORZOLAMIDE HYDROCHLORIDE TIMOLOL MALEATE 1 DROP(S): 20; 5 SOLUTION/ DROPS OPHTHALMIC at 17:35

## 2024-01-31 RX ADMIN — Medication 0.6 MILLIGRAM(S): at 17:35

## 2024-01-31 RX ADMIN — Medication 120 MILLIGRAM(S): at 17:35

## 2024-01-31 RX ADMIN — TAMSULOSIN HYDROCHLORIDE 0.4 MILLIGRAM(S): 0.4 CAPSULE ORAL at 21:23

## 2024-01-31 RX ADMIN — Medication 81 MILLIGRAM(S): at 12:07

## 2024-01-31 RX ADMIN — Medication 100 MILLIEQUIVALENT(S): at 06:36

## 2024-01-31 RX ADMIN — Medication 4 MILLIGRAM(S): at 21:23

## 2024-01-31 NOTE — CHART NOTE - NSCHARTNOTEFT_GEN_A_CORE
73 year old male with past medical history of HTN, HLD, prostate cancer (s/p XRT), asthma, diverticulosis, with multiple GI bleeds requiring multiple units of blood and  s/p IR embolization,  AFib s/p watchman procedure due to PO AC contraindication presented to the ED as a code stroke for dizziness, dysarthria, and a facial droop.  Of note, pt with history of afib s/p watchman on 8/14/2023 c/b GI bleed requiring 10 units of blood on PO AC which was discontinued and continued on aspirin alone.  Pt reports no further bleeds on current regiment. Pt was recently admitted at [facility] from 1/15-1/19 for a pericardial effusion. Started on colchicine and recently medrol dose pack with improvement in chest pain. Pt suspected to have acute cerebellar stroke with improving facial droop and dysarthria since arrival. EP consulted for evaluation. TSH 2.20 uIU/mL on 1/16/24. < from: TTE Limited W or WO Ultrasound Enhancing Agent (01.19.24 @ 08:31) with pericardial effusion which is mostly posterior and lateral to the left ventricle, and a small pocket adjacent to the right atrium. There is respiratory variation of the tricuspid valve inflow but not in the mitral. There are no other signs of hemodynamic compromise. Compared to the transthoracic echocardiogram performed on 1/16/2024, no change. Left ventricular systolic function is mildly decreased with an ejection fraction of 50 %. Telemetry shows   sinus hrythm with PVCs and APCs. HR 70s-90s    1. Acute Cerebellar Stroke  2. AF s/p Watchman    - Continue to monitor on tele while inpatient  - Keep K>4.0 and Mg>2.0, replete as necessary  - Pt with extensive hx of GI bleedings, PO AC previously not tolerated s/p watchman.   - Continue ASA if not contraindicated   - Continue Metoprolol Succinate 50mg daily  - MR Head pending  - No EP interventions required at this time. EP will sign off and please call back with any questions 73 year old male with past medical history of HTN, HLD, prostate cancer (s/p XRT), asthma, diverticulosis, with multiple GI bleeds requiring multiple units of blood and  s/p IR embolization,  AFib s/p watchman procedure due to PO AC contraindication presented to the ED as a code stroke for dizziness, dysarthria, and a facial droop.  Of note, pt with history of afib s/p watchman on 8/14/2023 c/b GI bleed requiring 10 units of blood on PO AC which was discontinued and continued on aspirin alone.  Pt reports no further bleeds on current regiment. Pt was recently admitted at [facility] from 1/15-1/19 for a pericardial effusion. Started on colchicine and recently medrol dose pack with improvement in chest pain. Pt suspected to have acute cerebellar stroke with improving facial droop and dysarthria since arrival. EP consulted for evaluation. TSH 2.20 uIU/mL on 1/16/24. < from: TTE Limited W or WO Ultrasound Enhancing Agent (01.19.24 @ 08:31) with pericardial effusion which is mostly posterior and lateral to the left ventricle, and a small pocket adjacent to the right atrium. There is respiratory variation of the tricuspid valve inflow but not in the mitral. There are no other signs of hemodynamic compromise. Compared to the transthoracic echocardiogram performed on 1/16/2024, no change. Left ventricular systolic function is mildly decreased with an ejection fraction of 50 %. Telemetry shows   sinus hrythm with PVCs and APCs. HR 70s-90s    1. Acute Cerebellar Stroke  2. AF s/p Watchman    - Continue to monitor on tele while inpatient  - Keep K>4.0 and Mg>2.0, replete as necessary  - Pt with extensive hx of GI bleedings, PO AC previously not tolerated s/p watchman.   - Continue ASA if not contraindicated   - Continue Metoprolol Succinate 50mg daily  - MR Head pending  - CTA to confirm Watchman placement   - No EP interventions required at this time. EP will sign off and please call back with any questions

## 2024-01-31 NOTE — PROGRESS NOTE ADULT - ASSESSMENT
_________________________________________________________________________________________  ========>>  M E D I C A L   A T T E N D I N G    F O L L O W  U P  N O T E  <<=========  -----------------------------------------------------------------------------------------------------    - Patient seen and examined by me earlier today.   - In summary,  LENNOX CARTER is a 73y year old man admitted with stroke   - Patient today overall doing ok, comfortable, eating OK.     patient overall improved.. all neurological complaints and symptoms are fully resolving.. patient ambulating well, eating well, no facial changes...      BP  has been elevated today per RN     ==================>> REVIEW OF SYSTEM <<=================    GEN: no fever, no chills, no pain  RESP: no SOB, no cough, no sputum  CVS: no chest pain, no palpitations  GI: no abdominal pain, no nausea, no constipation, no diarrhea  : no dysuria, no frequency  Neuro: no headache, no dizziness    ==================>> PHYSICAL EXAM <<=================    GEN: A&O X 3 , NAD , comfortable, pleasant, calm .. sitting at edge of bed..   HEENT: NCAT, PERRL, MMM, hearing intact  CVS: S1S2 , Irregular , No M/R/G appreciated  PULM: CTA B/L,  no W/R/R appreciated  ABD.: soft. non tender, non distended,  bowel sounds present  Extrem: intact pulses , no edema         ( Note written / Date of service 01-31-24 ( This is certified to be the same as "ENTERED" date above ( for billing purposes)))    ==================>> MEDICATIONS <<====================    aspirin enteric coated 81 milliGRAM(s) Oral daily  atorvastatin 80 milliGRAM(s) Oral at bedtime  colchicine 0.6 milliGRAM(s) Oral two times a day  diltiazem    milliGRAM(s) Oral daily  dorzolamide 2%/timolol 0.5% Ophthalmic Solution 1 Drop(s) Left EYE two times a day  finasteride 5 milliGRAM(s) Oral daily  furosemide    Tablet 40 milliGRAM(s) Oral daily  heparin   Injectable 5000 Unit(s) SubCutaneous every 8 hours  methylPREDNISolone 4 milliGRAM(s) Oral before breakfast  methylPREDNISolone 4 milliGRAM(s) Oral at bedtime  metoprolol succinate ER 50 milliGRAM(s) Oral <User Schedule>  montelukast 10 milliGRAM(s) Oral daily  pantoprazole    Tablet 40 milliGRAM(s) Oral before breakfast  polyethylene glycol 3350 17 Gram(s) Oral daily  potassium chloride   Powder 40 milliEquivalent(s) Oral once  tamsulosin 0.4 milliGRAM(s) Oral at bedtime    MEDICATIONS  (PRN):  acetaminophen     Tablet .. 650 milliGRAM(s) Oral every 6 hours PRN Temp greater or equal to 38C (100.4F), Mild Pain (1 - 3)  albuterol    90 MICROgram(s) HFA Inhaler 2 Puff(s) Inhalation every 6 hours PRN for shortness of breath and/or wheezing  aluminum hydroxide/magnesium hydroxide/simethicone Suspension 30 milliLiter(s) Oral every 4 hours PRN Dyspepsia  melatonin 3 milliGRAM(s) Oral at bedtime PRN Insomnia  ondansetron Injectable 4 milliGRAM(s) IV Push every 8 hours PRN Nausea and/or Vomiting    ___________  Active diet:  Diet, Regular:   DASH/TLC Sodium & Cholesterol Restricted (DASH)  ___________________    ==================>> VITAL SIGNS <<==================  Height (cm): 180.3  Weight (kg): 97.5  BMI (kg/m2): 30  Vital Signs Last 24 HrsT(C): 36.4 (01-31-24 @ 17:30)  T(F): 97.6 (01-31-24 @ 17:30), Max: 98.4 (01-31-24 @ 03:30)  HR: 79 (01-31-24 @ 17:30) (72 - 96)  BP: 138/85 (01-31-24 @ 17:30)  RR: 18 (01-31-24 @ 17:30) (16 - 18)  SpO2: 99% (01-31-24 @ 17:30) (97% - 100%)      CAPILLARY BLOOD GLUCOSE         ==================>> LAB AND IMAGING <<==================                        10.9   8.16  )-----------( 260      ( 31 Jan 2024 03:00 )             33.6        01-31    139  |  98  |  22  ----------------------------<  108<H>  3.4<L>   |  29  |  0.90    Ca    9.1      31 Jan 2024 03:00  Phos  4.4     01-31  Mg     2.10     01-31      WBC count:   8.16 <<== ,  9.52 <<== ,  11.27 <<==   Hemoglobin:   10.9 <<==,  10.8 <<==,  11.0 <<==  platelets:  260 <==, 264 <==, 267 <==    Creatinine:  0.90  <<==, 0.80  <<==, 0.86  <<==  Sodium:   139  <==, 140  <==, 143  <==       AST:          20(01-29) <== , 13(01-17) <==      ALT:        17(01-29)  <== , 8(01-17)  <==      AP:        92(01-29)  <=, 65(01-17)  <=     Bili:        0.4(01-29)  <=, 0.4(01-17)  <=    ____________________________    M I C R O B I O L O G Y :      SARS-CoV-2: NotDetec (01-15-24 @ 20:02)      < from: TTE W or WO Ultrasound Enhancing Agent (01.30.24 @ 09:15) >  FINDINGS:  Interatrial Septum:  Agitated saline injection reveals bubbles in the left heart, consistent with a patent foramen ovale.  Pericardium:  There is a trace pericardial effusion.  < end of copied text >    < from: CT Angio Neck Stroke Protocol w/ IV Cont (01.29.24 @ 17:59) >  IMPRESSION:  CT HEAD:  1. No acute intracranial hemorrhage. Wedge-shaped decreased attenuation   raising concern for an acute to subacute right cerebellar infarct..  2. Question prominent sulcus versus small old right cerebellar infarct.  3. Significant residual paranasal sinus opacification, which nonetheless,   is somewhat improved compared to prior dated 5/8/2023. Correlate   clinically for evidence of persistent acute on chronic sinusitis  Findings were discussed with RITA Linn of neurology 1/29/2024 5:20  PM by Dr. Behr Ventura with read back confirmation.    CT PERFUSION:  Technical limitations: Somewhat limited by patient motion in all 3 planes   during image acquisition.  Core infarction: 0 ml  Penumbra / tissue at risk for active ischemia: 27 mL involving the right   cerebellar and right posterior temporal occipital brain parenchyma.    CTA HEAD:  1. Mild stenosis involving the cavernous andsupraclinoid portions of the   bilateral internal carotid arteries.  2. Hypoplastic left P1 segment with a relative fetal origin of the left   posterior cerebral artery. A prominent right posterior communicating   artery is noted.  3. No evidence of aneurysm. Tiny aneurysms can be beyond the resolution   of CTA technique.    CTA NECK:  1. Moderate deposition of calcified plaque along the proximal aspect of   the left internal carotid artery with mild stenosis in this location.  2. No significant stenosis in association with the right common carotid   artery or right internal carotid artery.  3. Bilateral vertebral arteries are patent.  < end of copied text >  ___________________________________________________________________________________  ===============>>  A S S E S S M E N T   A N D   P L A N <<===============  ------------------------------------------------------------------------------------------    · Assessment	  73 year old male with past medical history of HTN, HLD, prostate cancer (s/p XRT), asthma, diverticulosis, with multiple GI bleeds requiring multiple units of blood and  s/p IR embolization,  afib, s/p watchman procedure due to contraindication to oral anticoagulant therapy presents to the ED as a code stroke for dizziness, dysarthria, and a facial droop.    Problem/Plan - 1:  ·  Problem: CVA (cerebrovascular accident).   ·  Plan: -Pt presents with acute right facial droop, dysarthria, dizziness. Now mostly resolved. CT head with acute-subacute infarct   -likely in the setting of afib, not on anticoagulation due to history of massive GIB ( however patient post watchman )   -appreciate neuro recs  -c/w aspirin. Continue to hold additional anti-platelet therapy given history  -atorvastatin 80 pending lipid profile  -MRI brain ordered, pending  - TTE w/ bubble study as above >> APRIL ordered per discussed with cardio >> to follow   -Neuro-checks and VS q4h  - Permissive HTN up to 220/120 for 24-48h from symptom onset  >> will reorder home medications for tomorrow       monitor and adjust as needed   -aspiration and fall precautions  - PT appreciated >> outpatient PT     Problem/Plan - 2:  ·  Problem: Atrial fibrillation.   ·  Plan: -s/p watchman procedure  -c/w aspirin. Hold a/c given history of GIB. Started on PPI.     Cardiology/EP on board, appreciated   - Continue Current medications otherwise and monitor.   -monitor on tele.    Problem/Plan - 3:  ·  Problem: H/O pericarditis.   ·  repeat TTE noted with little effusion  -c/w colchicine and complete medrol pack prescribed by cardiologist   -c/w lasix    Problem/Plan - 4:  ·  Problem: Hypertension.   - going back on home medications  - monitor closely and adjust as needed  - cardio on board     Problem/Plan - 5:  ·  Problem: Need for prophylactic measure.   ·  Plan: dvt ppx: hep sq.     --------------------------------------------  Case discussed with patient, wife, PA, RN, cardio..   Education given on findings and plan of care  ___________________________  H. JONAS Luu.  Pager: [phone]     _________________________________________________________________________________________  ========>>  M E D I C A L   A T T E N D I N G    F O L L O W  U P  N O T E  <<=========  -----------------------------------------------------------------------------------------------------    - Patient seen and examined by me earlier today.   - In summary,  LENNOX CARTER is a 73y year old man admitted with stroke   - Patient today overall doing ok, comfortable, eating OK.     patient overall improved.. all neurological complaints resolved.. patient ambulating well, eating well, no facial changes...    ==================>> REVIEW OF SYSTEM <<=================    GEN: no fever, no chills, no pain  RESP: no SOB, no cough, no sputum  CVS: no chest pain, no palpitations  GI: no abdominal pain, no nausea,   : no dysuria, no frequency  Neuro: no headache, no dizziness    ==================>> PHYSICAL EXAM <<=================    GEN: A&O X 3 , NAD , comfortable, pleasant, calm .. ambulating independently from bathroom   HEENT: NCAT, PERRL, MMM, hearing intact  CVS: S1S2 , Irregular , No M/R/G appreciated  PULM: CTA B/L,  no W/R/R appreciated  ABD.: soft. non tender, non distended  Extrem: intact pulses , no edema         ( Note written / Date of service 01-31-24 ( This is certified to be the same as "ENTERED" date above ( for billing purposes)))    ==================>> MEDICATIONS <<====================    aspirin enteric coated 81 milliGRAM(s) Oral daily  atorvastatin 80 milliGRAM(s) Oral at bedtime  colchicine 0.6 milliGRAM(s) Oral two times a day  diltiazem    milliGRAM(s) Oral daily  dorzolamide 2%/timolol 0.5% Ophthalmic Solution 1 Drop(s) Left EYE two times a day  finasteride 5 milliGRAM(s) Oral daily  furosemide    Tablet 40 milliGRAM(s) Oral daily  heparin   Injectable 5000 Unit(s) SubCutaneous every 8 hours  methylPREDNISolone 4 milliGRAM(s) Oral before breakfast  methylPREDNISolone 4 milliGRAM(s) Oral at bedtime  metoprolol succinate ER 50 milliGRAM(s) Oral <User Schedule>  montelukast 10 milliGRAM(s) Oral daily  pantoprazole    Tablet 40 milliGRAM(s) Oral before breakfast  polyethylene glycol 3350 17 Gram(s) Oral daily  potassium chloride   Powder 40 milliEquivalent(s) Oral once  tamsulosin 0.4 milliGRAM(s) Oral at bedtime    MEDICATIONS  (PRN):  acetaminophen     Tablet .. 650 milliGRAM(s) Oral every 6 hours PRN Temp greater or equal to 38C (100.4F), Mild Pain (1 - 3)  albuterol    90 MICROgram(s) HFA Inhaler 2 Puff(s) Inhalation every 6 hours PRN for shortness of breath and/or wheezing  aluminum hydroxide/magnesium hydroxide/simethicone Suspension 30 milliLiter(s) Oral every 4 hours PRN Dyspepsia  melatonin 3 milliGRAM(s) Oral at bedtime PRN Insomnia  ondansetron Injectable 4 milliGRAM(s) IV Push every 8 hours PRN Nausea and/or Vomiting    ___________  Active diet:  Diet, Regular:   DASH/TLC Sodium & Cholesterol Restricted (DASH)  ___________________    ==================>> VITAL SIGNS <<==================  Height (cm): 180.3  Weight (kg): 97.5  BMI (kg/m2): 30  Vital Signs Last 24 HrsT(C): 36.4 (01-31-24 @ 17:30)  T(F): 97.6 (01-31-24 @ 17:30), Max: 98.4 (01-31-24 @ 03:30)  HR: 79 (01-31-24 @ 17:30) (72 - 96)  BP: 138/85 (01-31-24 @ 17:30)  RR: 18 (01-31-24 @ 17:30) (16 - 18)  SpO2: 99% (01-31-24 @ 17:30) (97% - 100%)       ==================>> LAB AND IMAGING <<==================                        10.9   8.16  )-----------( 260      ( 31 Jan 2024 03:00 )             33.6        01-31    139  |  98  |  22  ----------------------------<  108<H>  3.4<L>   |  29  |  0.90    Ca    9.1      31 Jan 2024 03:00  Phos  4.4     01-31  Mg     2.10     01-31    WBC count:   8.16 <<== ,  9.52 <<== ,  11.27 <<==   Hemoglobin:   10.9 <<==,  10.8 <<==,  11.0 <<==  platelets:  260 <==, 264 <==, 267 <==    Creatinine:  0.90  <<==, 0.80  <<==, 0.86  <<==  Sodium:   139  <==, 140  <==, 143  <==       AST:          20(01-29) <== , 13(01-17) <==      ALT:        17(01-29)  <== , 8(01-17)  <==      AP:        92(01-29)  <=, 65(01-17)  <=     Bili:        0.4(01-29)  <=, 0.4(01-17)  <=      < from: TTE W or WO Ultrasound Enhancing Agent (01.30.24 @ 09:15) >  FINDINGS:  Interatrial Septum:  Agitated saline injection reveals bubbles in the left heart, consistent with a patent foramen ovale.  Pericardium:  There is a trace pericardial effusion.  < end of copied text >    < from: CT Angio Neck Stroke Protocol w/ IV Cont (01.29.24 @ 17:59) >  IMPRESSION:  CT HEAD:  1. No acute intracranial hemorrhage. Wedge-shaped decreased attenuation   raising concern for an acute to subacute right cerebellar infarct..  2. Question prominent sulcus versus small old right cerebellar infarct.  3. Significant residual paranasal sinus opacification, which nonetheless,   is somewhat improved compared to prior dated 5/8/2023. Correlate   clinically for evidence of persistent acute on chronic sinusitis  Findings were discussed with RITA Linn of neurology 1/29/2024 5:20  PM by Dr. Behr Ventura with read back confirmation.    CT PERFUSION:  Technical limitations: Somewhat limited by patient motion in all 3 planes   during image acquisition.  Core infarction: 0 ml  Penumbra / tissue at risk for active ischemia: 27 mL involving the right   cerebellar and right posterior temporal occipital brain parenchyma.    CTA HEAD:  1. Mild stenosis involving the cavernous andsupraclinoid portions of the   bilateral internal carotid arteries.  2. Hypoplastic left P1 segment with a relative fetal origin of the left   posterior cerebral artery. A prominent right posterior communicating   artery is noted.  3. No evidence of aneurysm. Tiny aneurysms can be beyond the resolution   of CTA technique.    CTA NECK:  1. Moderate deposition of calcified plaque along the proximal aspect of   the left internal carotid artery with mild stenosis in this location.  2. No significant stenosis in association with the right common carotid   artery or right internal carotid artery.  3. Bilateral vertebral arteries are patent.  < end of copied text >  ___________________________________________________________________________________  ===============>>  A S S E S S M E N T   A N D   P L A N <<===============  ------------------------------------------------------------------------------------------    · Assessment	  73 year old male with past medical history of HTN, HLD, prostate cancer (s/p XRT), asthma, diverticulosis, with multiple GI bleeds requiring multiple units of blood and  s/p IR embolization,  afib, s/p watchman procedure due to contraindication to oral anticoagulant therapy presents to the ED as a code stroke for dizziness, dysarthria, and a facial droop.    Problem/Plan - 1:  ·  Problem: CVA (cerebrovascular accident).   ·  Plan: -Pt presents with acute right facial droop, dysarthria, dizziness. Now mostly resolved. CT head with acute-subacute infarct   -likely in the setting of afib, not on anticoagulation due to history of massive GIB ( however patient post watchman )   -appreciate neuro recs  -c/w aspirin. Continue to hold additional anti-platelet therapy given history  -atorvastatin 80 pending lipid profile  -MRI brain ordered, pending  - TTE w/ bubble study as above >> APRIL ordered per discussed with cardio >> to follow   - EP appreciated : CTA to evaluation Watchman position   -Neuro-checks and VS q4h  - Permissive HTN up to 220/120 for 24-48h from symptom onset  >> back on home medications and better / stable   -aspiration and fall precautions  - PT appreciated >> outpatient PT     Problem/Plan - 2:  ·  Problem: Atrial fibrillation.   ·  Plan: -s/p watchman procedure  -c/w aspirin. Hold a/c given history of GIB. Started on PPI.     Cardiology/EP on board, appreciated   - Continue Current medications otherwise and monitor.   -monitor on tele.    Problem/Plan - 3:  ·  Problem: H/O pericarditis.   ·  repeat TTE noted with little effusion  -c/w colchicine and complete medrol pack prescribed by cardiologist   -c/w lasix    Problem/Plan - 4:  ·  Problem: Hypertension.   - going back on home medications  - monitor closely and adjust as needed  - cardio on board     Problem/Plan - 5:  ·  Problem: Need for prophylactic measure.   ·  Plan: dvt ppx: hep sq.     --------------------------------------------  Case discussed with patient,   Education given on findings and plan of care  ___________________________  MARCELO Luu D.O.  Pager: [phone]

## 2024-02-01 LAB
ANION GAP SERPL CALC-SCNC: 8 MMOL/L — SIGNIFICANT CHANGE UP (ref 7–14)
APTT BLD: 35 SEC — SIGNIFICANT CHANGE UP (ref 24.5–35.6)
BUN SERPL-MCNC: 22 MG/DL — SIGNIFICANT CHANGE UP (ref 7–23)
CALCIUM SERPL-MCNC: 9.2 MG/DL — SIGNIFICANT CHANGE UP (ref 8.4–10.5)
CHLORIDE SERPL-SCNC: 100 MMOL/L — SIGNIFICANT CHANGE UP (ref 98–107)
CO2 SERPL-SCNC: 33 MMOL/L — HIGH (ref 22–31)
CREAT SERPL-MCNC: 0.83 MG/DL — SIGNIFICANT CHANGE UP (ref 0.5–1.3)
EGFR: 92 ML/MIN/1.73M2 — SIGNIFICANT CHANGE UP
GLUCOSE SERPL-MCNC: 131 MG/DL — HIGH (ref 70–99)
HCT VFR BLD CALC: 39.8 % — SIGNIFICANT CHANGE UP (ref 39–50)
HGB BLD-MCNC: 12.3 G/DL — LOW (ref 13–17)
MAGNESIUM SERPL-MCNC: 2.4 MG/DL — SIGNIFICANT CHANGE UP (ref 1.6–2.6)
MCHC RBC-ENTMCNC: 28.1 PG — SIGNIFICANT CHANGE UP (ref 27–34)
MCHC RBC-ENTMCNC: 30.9 GM/DL — LOW (ref 32–36)
MCV RBC AUTO: 91.1 FL — SIGNIFICANT CHANGE UP (ref 80–100)
NRBC # BLD: 0 /100 WBCS — SIGNIFICANT CHANGE UP (ref 0–0)
NRBC # FLD: 0 K/UL — SIGNIFICANT CHANGE UP (ref 0–0)
PHOSPHATE SERPL-MCNC: 3.7 MG/DL — SIGNIFICANT CHANGE UP (ref 2.5–4.5)
PLATELET # BLD AUTO: 288 K/UL — SIGNIFICANT CHANGE UP (ref 150–400)
POTASSIUM SERPL-MCNC: 3.9 MMOL/L — SIGNIFICANT CHANGE UP (ref 3.5–5.3)
POTASSIUM SERPL-SCNC: 3.9 MMOL/L — SIGNIFICANT CHANGE UP (ref 3.5–5.3)
RBC # BLD: 4.37 M/UL — SIGNIFICANT CHANGE UP (ref 4.2–5.8)
RBC # FLD: 14.6 % — HIGH (ref 10.3–14.5)
SODIUM SERPL-SCNC: 141 MMOL/L — SIGNIFICANT CHANGE UP (ref 135–145)
WBC # BLD: 8.83 K/UL — SIGNIFICANT CHANGE UP (ref 3.8–10.5)
WBC # FLD AUTO: 8.83 K/UL — SIGNIFICANT CHANGE UP (ref 3.8–10.5)

## 2024-02-01 PROCEDURE — 99233 SBSQ HOSP IP/OBS HIGH 50: CPT

## 2024-02-01 PROCEDURE — 99223 1ST HOSP IP/OBS HIGH 75: CPT | Mod: GC

## 2024-02-01 PROCEDURE — 93970 EXTREMITY STUDY: CPT | Mod: 26

## 2024-02-01 PROCEDURE — 70551 MRI BRAIN STEM W/O DYE: CPT | Mod: 26

## 2024-02-01 RX ORDER — HEPARIN SODIUM 5000 [USP'U]/ML
INJECTION INTRAVENOUS; SUBCUTANEOUS
Qty: 25000 | Refills: 0 | Status: DISCONTINUED | OUTPATIENT
Start: 2024-02-01 | End: 2024-02-02

## 2024-02-01 RX ADMIN — POLYETHYLENE GLYCOL 3350 17 GRAM(S): 17 POWDER, FOR SOLUTION ORAL at 15:01

## 2024-02-01 RX ADMIN — Medication 0.6 MILLIGRAM(S): at 05:13

## 2024-02-01 RX ADMIN — PANTOPRAZOLE SODIUM 40 MILLIGRAM(S): 20 TABLET, DELAYED RELEASE ORAL at 05:13

## 2024-02-01 RX ADMIN — Medication 0.6 MILLIGRAM(S): at 17:55

## 2024-02-01 RX ADMIN — Medication 50 MILLIGRAM(S): at 12:51

## 2024-02-01 RX ADMIN — HEPARIN SODIUM 1800 UNIT(S)/HR: 5000 INJECTION INTRAVENOUS; SUBCUTANEOUS at 19:57

## 2024-02-01 RX ADMIN — Medication 4 MILLIGRAM(S): at 08:36

## 2024-02-01 RX ADMIN — Medication 40 MILLIGRAM(S): at 05:13

## 2024-02-01 RX ADMIN — Medication 81 MILLIGRAM(S): at 15:01

## 2024-02-01 RX ADMIN — MONTELUKAST 10 MILLIGRAM(S): 4 TABLET, CHEWABLE ORAL at 15:01

## 2024-02-01 RX ADMIN — ATORVASTATIN CALCIUM 80 MILLIGRAM(S): 80 TABLET, FILM COATED ORAL at 20:43

## 2024-02-01 RX ADMIN — DORZOLAMIDE HYDROCHLORIDE TIMOLOL MALEATE 1 DROP(S): 20; 5 SOLUTION/ DROPS OPHTHALMIC at 05:12

## 2024-02-01 RX ADMIN — FINASTERIDE 5 MILLIGRAM(S): 5 TABLET, FILM COATED ORAL at 15:01

## 2024-02-01 RX ADMIN — TAMSULOSIN HYDROCHLORIDE 0.4 MILLIGRAM(S): 0.4 CAPSULE ORAL at 20:43

## 2024-02-01 RX ADMIN — Medication 120 MILLIGRAM(S): at 05:13

## 2024-02-01 RX ADMIN — DORZOLAMIDE HYDROCHLORIDE TIMOLOL MALEATE 1 DROP(S): 20; 5 SOLUTION/ DROPS OPHTHALMIC at 17:55

## 2024-02-01 NOTE — PROGRESS NOTE ADULT - ASSESSMENT
73 year old male with past medical history of HTN, HLD, prostate cancer (s/p XRT), asthma, diverticulosis, with multiple GI bleeds requiring multiple units of blood and  s/p IR embolization,  afib, s/p watchman procedure due to contraindication to oral anticoagulant therapy presents to the ED as a code stroke for dizziness, dysarthria, and a facial droop.  Per patient and his wife at bedside he was eating lunch around 1pm (LKN 1/29/24 at 13:00) and then around 2pm he got up to wash the dishes and started to feel dizzy which he described as room spinning.  He was saying that maybe he felt the dizziness a little earlier than 1pm today but is unsure.  Also noticed with dysarthria prior to coming into the ED but on exam now has resolved, although voice sounds hoarse, but per patient and wife that is his baseline.  Has history of afib and was on AC before but required multiple units of blood for bleeds, so watchman was placed 11/23 per family and he was on AC for 1 week after procedure and had another GI bleed requiring 10 units of blood, so AC was discontinued and he was continued on aspirin alone.  Has been on DAPT in the past with aspirin and plavix and also had GI bleeding.  Has not had any further GI bleeding while being on aspirin alone.  Was recently admitted at [facility] from 1/15-1/19 for a pericardial effusion.  Denies any recent illnesses, fevers, chills, nausea, vomiting, or headaches. Not a tenecteplase candidate due to history of GIB requiring transfusions and IR embolization. Not a thrombectomy candidate due to no LVO on CTA.      Impression: Resolved Right facial palsy, left superior quadrantopia, dysarthria due to brainstem and possibly occipital lobe dysfunction found with acute R cerebellar infarct. Mechanism consistent with cardioembolism from afib. Care complicated as patient is off a/c in setting of prior significant GIB requiring multiple units pRBC and embolization and has watchman device.     Recommendations:  [x] Continue aspirin 81mg daily for now  [x] Atorvastatin 80 mg daily titrated per LDL < 70  [x] HgbA1C 4.7, LDL65    [x] MRI brain w/o con done and reviewed with patient  [x] TTE 1/30/24: Interatrial Septum: Agitated saline injection reveals bubbles in the left heart, consistent with a patent foramen ovale.  [ ] APRIL vs CT for watchman placement   [ ] Check lower extremity for DVT given PFO. Unclear at this time given age if PFO is clinically relevant. However if he has DVT in LE may need to re-evaluate therapy.   [ ] Ask GI if half dose a/c such as eliquis 2.5BID can be considered  [x] Cardiology consult recs appreciated      [] Tight glucose control (long-term goal HgbA1c < 6%)  [] Stroke education and counseling  [] Neuro-checks and VS q4h  [] Permissive HTN up to 220/120 for 24-48h from symptom onset  [] Dysphagia screen. If fails, speech/swallow eval  [] aspiration and fall precautions  [] STAT CT head non-contrast for change in neuro exam.   [] PT/ OT evaluations   [] DVT ppx per primary team     Patient was seen by neuro attending. Recommendations above in agreement with neuro attending at time of note documentation and any resident documentation updates. Reviewed relevant neurologic imaging and findings with patient and primary team medicine attending. Discussed patient care with primary team attending, patient and in agreement. Delay in patient note entry/ updates to note due to patient care. Recommendations finalized/addended once signed by attending.  73 year old male with past medical history of HTN, HLD, prostate cancer (s/p XRT), asthma, diverticulosis, with multiple GI bleeds requiring multiple units of blood and  s/p IR embolization,  afib, s/p watchman procedure due to contraindication to oral anticoagulant therapy presents to the ED as a code stroke for dizziness, dysarthria, and a facial droop.  Per patient and his wife at bedside he was eating lunch around 1pm (LKN 1/29/24 at 13:00) and then around 2pm he got up to wash the dishes and started to feel dizzy which he described as room spinning.  He was saying that maybe he felt the dizziness a little earlier than 1pm today but is unsure.  Also noticed with dysarthria prior to coming into the ED but on exam now has resolved, although voice sounds hoarse, but per patient and wife that is his baseline.  Has history of afib and was on AC before but required multiple units of blood for bleeds, so watchman was placed 11/23 per family and he was on AC for 1 week after procedure and had another GI bleed requiring 10 units of blood, so AC was discontinued and he was continued on aspirin alone.  Has been on DAPT in the past with aspirin and plavix and also had GI bleeding.  Has not had any further GI bleeding while being on aspirin alone.  Was recently admitted at [facility] from 1/15-1/19 for a pericardial effusion.  Denies any recent illnesses, fevers, chills, nausea, vomiting, or headaches. Not a tenecteplase candidate due to history of GIB requiring transfusions and IR embolization. Not a thrombectomy candidate due to no LVO on CTA.      Impression: Resolved Right facial palsy, left superior quadrantopia, dysarthria due to brainstem and possibly occipital lobe dysfunction found with acute R cerebellar infarct. Mechanism consistent with cardioembolism from afib. Care complicated as patient is off a/c in setting of prior significant GIB requiring multiple units pRBC and embolization and has Watchman device.     Recommendations:  [x] Continue aspirin 81mg daily for now  [x] Atorvastatin 80 mg daily titrated per LDL < 70  [x] HgbA1C 4.7, LDL65    [x] MRI brain w/o con done and reviewed with patient  [x] TTE 1/30/24: Interatrial Septum: Agitated saline injection reveals bubbles in the left heart, consistent with a patent foramen ovale.  [ ] APRIL vs CT for watchman placement   [ ] Check lower extremity for DVT given PFO. Unclear at this time given age if PFO is clinically relevant. However if he has DVT in LE may need to re-evaluate therapy, including possible PFO closure   [ ] Ask GI and cardiology if half dose a/c such as eliquis 2.5 BID can be considered  [x] Cardiology consult recs appreciated      [] Tight glucose control (long-term goal HgbA1c < 6%)  [] Stroke education and counseling  [] Neuro-checks and VS q4h  [] Permissive HTN up to 220/120 for 24-48h from symptom onset  [] Dysphagia screen. If fails, speech/swallow eval  [] aspiration and fall precautions  [] STAT CT head non-contrast for change in neuro exam.   [] PT/ OT evaluations   [] DVT ppx per primary team     Patient was seen by neuro attending. Recommendations above in agreement with neuro attending at time of note documentation and any resident documentation updates. Reviewed relevant neurologic imaging and findings with patient and primary team medicine attending. Discussed patient care with primary team attending, patient and in agreement. Delay in patient note entry/ updates to note due to patient care. Recommendations finalized/addended once signed by attending.

## 2024-02-01 NOTE — CHART NOTE - NSCHARTNOTEFT_GEN_A_CORE
73 year old male with past medical history of HTN, HLD, prostate cancer (s/p XRT), asthma, diverticulosis, with multiple GI bleeds requiring multiple units of blood and  s/p IR embolization,  AFib s/p watchman procedure due to PO AC contraindication presented to the ED as a code stroke for dizziness, dysarthria, and a facial droop.  Of note, pt with history of afib s/p watchman on 8/14/2023 c/b GI bleed requiring 10 units of blood on PO AC which was discontinued and continued on aspirin alone.  Pt reports no further bleeds on current regiment. Pt was recently admitted at [facility] from 1/15-1/19 for a pericardial effusion. Started on colchicine and recently medrol dose pack with improvement in chest pain. Pt suspected to have acute cerebellar stroke with improving facial droop and dysarthria since arrival. EP consulted for evaluation. TSH 2.20 uIU/mL on 1/16/24. < from: TTE Limited W or WO Ultrasound Enhancing Agent (01.19.24 @ 08:31) with pericardial effusion which is mostly posterior and lateral to the left ventricle, and a small pocket adjacent to the right atrium. There is respiratory variation of the tricuspid valve inflow but not in the mitral. There are no other signs of hemodynamic compromise. Compared to the transthoracic echocardiogram performed on 1/16/2024, no change. Left ventricular systolic function is mildly decreased with an ejection fraction of 50 %. Telemetry shows   sinus hrythm with PVCs and APCs. HR 70s-90s    1. Acute Cerebellar Stroke  2. AF s/p Watchman    - Continue to monitor on tele while inpatient  - Keep K>4.0 and Mg>2.0, replete as necessary  - Pt with extensive hx of GI bleedings, PO AC previously not tolerated s/p watchman.   - Continue ASA if not contraindicated   - Continue Metoprolol Succinate 50mg daily  - MR Head done  - CTA to confirm Watchman placement   - No EP interventions required at this time. EP will sign off and please call back with any questions.

## 2024-02-01 NOTE — CHART NOTE - NSCHARTNOTEFT_GEN_A_CORE
Patient found to have multiple bilateral, acute DVTs and started on heparin gtt by primary medicine team. Discussed w/ GI team who cleared for AC. Please discontinue ASA 81 mg while on anticoagulation, as there is no additional benefit to concomitant therapy and better to minimize risk of hemorrhagic transformation given acute ischemic stroke.     RoPE Score: ~3  Michele Classification: Probable     Impression: Resolved Right facial palsy, left superior quadrantopia, dysarthria due to brainstem and possibly occipital lobe dysfunction found with acute R cerebellar infarct. Mechanism consistent with cardioembolism from competing causes. Patient has a history of atrial fibrillation s/p Watchman Device - must confirm proper placement and rule out device related thrombosis. At this time it is unclear whether the PFO is symptomatic, but the suspicion is raised for paradoxical embolus given the multiple, acute DVTs.    Recommendations:     [] At this time, suggest continuing cardiac workup to rule out device related thrombosis and confirm placement of Watchman. If negative, benefits and risks of long term anticoagulation should be assessed. If patient is able to tolerate anticoagulation in the long term then perhaps an elective PFO closure can be considered in the future. If patient is not a good candidate for anticoagulation for the long term, then likely IVC filter will need to be considered and possibly PFO closure based on suspicion from cardiology as well.   [] No objection to heparin gtt from neurovascular standpoint  [] Discontinue ASA 81 mg while on anticoagulation  [] STAT rCTH if decline in neuro exam   [] Continue to follow recommendations from cardiology, gastroenterology; appreciate care   [] Otherwise, please see recent progress note for remainder of recommendations.     Case discussed with stroke attending, Dr. Libman.    Please call with questions: [phone] Patient found to have multiple bilateral, acute DVTs and started on heparin gtt by primary medicine team. Discussed w/ GI team who cleared for AC. Please discontinue ASA 81 mg while on anticoagulation, as there is no additional benefit to concomitant therapy and better to minimize risk of hemorrhagic transformation given acute ischemic stroke.     RoPE Score: ~3  Michele Classification: Probable     Impression: Resolved Right facial palsy, left superior quadrantopia, dysarthria due to brainstem and possibly occipital lobe dysfunction found with acute R cerebellar infarct. Mechanism consistent with cardioembolism from competing causes. Patient has a history of atrial fibrillation s/p Watchman Device - must confirm proper placement and rule out device related thrombosis. At this time it is unclear whether the PFO is symptomatic, but the suspicion is raised for paradoxical embolus given the multiple, acute DVTs.    Recommendations:     [] At this time, suggest continuing cardiac workup to rule out device related thrombosis and confirm placement of Watchman. If negative, benefits and risks of long term anticoagulation should be assessed. If patient is able to tolerate anticoagulation in the long term then perhaps an elective PFO closure can be considered in the future. If patient is not a good candidate for anticoagulation for the long term, then likely an IVC filter will need to be considered and earlier PFO closure may be warranted. Would appreciate input from cardiology as well.   [] No objection to heparin gtt from neurovascular standpoint  [] Discontinue ASA 81 mg while on anticoagulation  [] STAT rCTH if decline in neuro exam   [] Continue to follow recommendations from cardiology, gastroenterology; appreciate care   [] Otherwise, please see recent progress note for remainder of recommendations.     Case discussed with stroke attending, Dr. Libman.    Please call with questions: [phone] Patient found to have multiple bilateral, acute DVTs and started on heparin gtt by primary medicine team. Discussed w/ GI team who cleared for AC. Please discontinue ASA 81 mg while on anticoagulation, as there is no additional benefit to concomitant therapy and better to minimize risk of hemorrhagic transformation given acute ischemic stroke.     RoPE Score: ~3  Michele Classification: Probable     Impression: Resolved Right facial palsy, left superior quadrantopia, dysarthria due to brainstem and possibly occipital lobe dysfunction found with acute R cerebellar infarct. Mechanism consistent with cardioembolism from competing causes. Patient has a history of atrial fibrillation s/p Watchman Device - must confirm proper placement and rule out device related thrombosis. At this time it is unclear whether the PFO is symptomatic, but the suspicion is raised for paradoxical embolus given the multiple, acute DVTs.    Recommendations:     [] At this time, suggest continuing cardiac workup to rule out device related thrombosis and confirm placement of Watchman. If negative, benefits and risks of long term anticoagulation should be assessed. If patient is able to tolerate anticoagulation in the long term then perhaps an elective PFO closure can be considered in the future. If patient is not a good candidate for anticoagulation for the long term, then likely an IVC filter will need to be considered and earlier PFO closure may be warranted. Would appreciate input from cardiology as well.   [] No objection to heparin gtt from neurovascular standpoint  [] Discontinue ASA 81 mg while on anticoagulation  [] STAT rCTH if decline in neuro exam   [] Continue to follow recommendations from cardiology, gastroenterology; appreciate care   [] Otherwise, please see recent progress note for remainder of recommendations.     Case discussed with stroke attending, Dr. Libman.    Please call with questions: [phone]    Stroke attending. Agree with above

## 2024-02-01 NOTE — CONSULT NOTE ADULT - ASSESSMENT
73M with hx of prostate cancer (s/p XRT) c/b RAVE, asthma, diverticulosis c/b recurrent diverticular bleeding (x3; last episode 8/2023 requiring 10u pRBC, s/p right colic artery embolization, AF s/p watchman admitted with new CVA    Impression  #CVA, cerebellar infarct; concern for AF induced embolic CVA vs. 2/2 PFO pending [facility] Duplex  #hx of recurrent diverticular bleeding, s/p embolization 8/2023; no further episodes of recurrent bleeding but also never retrialed on DAPT or AC since embolization  - 3 lifetime episodes of diverticular bleeding requiring hosptialization/transfusion. Pt s/p Watchman for new AF; procedure performed 8/2023 followed by DAPT c/b recurrent diverticular bleeding requiring 10u pRBC followed by embolization of right colic artery. Pt not retrialed on AC    #AF s/p Watchman 8/2023  - Hgb ranging 10-12 without any overt GI bleeding  - colonoscopy 5/2023 with severe diverticulosis and subtle RAVE    Recommendations  - risk/benefit 73M with hx of prostate cancer (s/p XRT) c/b RAVE, asthma, diverticulosis c/b recurrent diverticular bleeding (x3; last episode 8/2023 requiring 10u pRBC, s/p right colic artery embolization, AF s/p watchman admitted with new CVA    Impression  #CVA, cerebellar infarct; concern for AF induced embolic CVA vs. 2/2 PFO pending [facility] Duplex  #hx of recurrent diverticular bleeding, s/p embolization 8/2023; no further episodes of recurrent bleeding but also never retrialed on DAPT or AC since embolization  - 3 lifetime episodes of diverticular bleeding requiring hosptialization/transfusion. Pt s/p Watchman for new AF; procedure performed 8/2023 followed by DAPT c/b recurrent diverticular bleeding requiring 10u pRBC followed by embolization of right colic artery. Pt not retrialed on AC    #AF s/p Watchman 8/2023  - Hgb ranging 10-12 without any overt GI bleeding  - colonoscopy 5/2023 with severe diverticulosis and subtle RAVE    Recommendations  - risk/benefit discussion of starting AC by primary and neurology teams. While patient has not had any recurrent bleeding since embolization, diverticular bleeding may occur at any point especially in the setting of severe diverticulosis and likely will be exacerbated and more severe by AC use. However, given CVA and concern for recurrent CVA without AC, benefits likely outweigh risks of AC at this time. Discussed at length with patient and wife at bedside  - if AC is started, would recommend trial with heparin gtt and trend Hgb    Note and recommendations are incomplete until reviewed and attested by attending.    Zora Butler MD  GI/Hepatology Fellow, PGY4  Teams preferred (7AM to 5PM); after 5PM, call GI fellow on call    NEW CONSULTS:  Please email [email or web address]

## 2024-02-01 NOTE — PROGRESS NOTE ADULT - SUBJECTIVE AND OBJECTIVE BOX
Cardiovascular Disease Progress Note    Overnight events: No acute events overnight.  + dysarthria. no new cardiac sx  Otherwise review of systems negative    Objective Findings:  T(C): 36.4 (02-01-24 @ 05:10), Max: 36.8 (01-31-24 @ 12:14)  HR: 83 (02-01-24 @ 05:10) (75 - 88)  BP: 145/88 (02-01-24 @ 05:10) (120/89 - 145/88)  RR: 17 (02-01-24 @ 05:10) (17 - 19)  SpO2: 100% (02-01-24 @ 05:10) (97% - 100%)  Wt(kg): --  Daily     Daily       Physical Exam:  Gen: NAD  HEENT: EOMI  CV: RRR, normal S1 + S2, no m/r/g  Lungs: CTAB  Abd: soft, non-tender  Ext: No edema    Telemetry:    Laboratory Data:                        10.9   8.16  )-----------( 260      ( 31 Jan 2024 03:00 )             33.6     01-31    139  |  98  |  22  ----------------------------<  108<H>  3.4<L>   |  29  |  0.90    Ca    9.1      31 Jan 2024 03:00  Phos  4.4     01-31  Mg     2.10     01-31                Inpatient Medications:  MEDICATIONS  (STANDING):  aspirin enteric coated 81 milliGRAM(s) Oral daily  atorvastatin 80 milliGRAM(s) Oral at bedtime  colchicine 0.6 milliGRAM(s) Oral two times a day  diltiazem    milliGRAM(s) Oral daily  dorzolamide 2%/timolol 0.5% Ophthalmic Solution 1 Drop(s) Left EYE two times a day  finasteride 5 milliGRAM(s) Oral daily  furosemide    Tablet 40 milliGRAM(s) Oral daily  heparin   Injectable 5000 Unit(s) SubCutaneous every 8 hours  methylPREDNISolone 4 milliGRAM(s) Oral before breakfast  metoprolol succinate ER 50 milliGRAM(s) Oral <User Schedule>  montelukast 10 milliGRAM(s) Oral daily  pantoprazole    Tablet 40 milliGRAM(s) Oral before breakfast  polyethylene glycol 3350 17 Gram(s) Oral daily  potassium chloride   Powder 40 milliEquivalent(s) Oral once  tamsulosin 0.4 milliGRAM(s) Oral at bedtime      Assessment:  73 year old male with past medical history of HTN, HLD, prostate cancer (s/p XRT), asthma, diverticulosis, with multiple GI bleeds requiring multiple units of blood and  s/p IR embolization,  afib, s/p watchman procedure due to contraindication to oral anticoagulant therapy presents to the ED as a code stroke for dizziness, dysarthria, and a facial droop. Was recently admitted at [facility] from 1/15-1/19 for a pericardial effusion. Started on colchicine and recently medrol dose pack with improvement in chest pain.    Recs:  cardiac stable  no e/o acs or chf  cw lasix and kcl supplementation  cw colchicine 0.6mg bid x 3 mo and complete steroid taper  neurology f/u  s/p echo, pericardial effusion significantly resolved  eps following, s/p watchman. pending imaging with CT or APRIL to evaluate watchman placement  tele monitoring  antiplatelet meds per neuro if not otherwise contraindicated given recent gi bleed          Over 25 minutes spent on total encounter; more than 50% of the visit was spent counseling and/or coordinating care by the attending physician.      Isaac Calderón MD   Cardiovascular Disease  [phone]

## 2024-02-01 NOTE — CONSULT NOTE ADULT - ATTENDING COMMENTS
# Diverticulosis c/b recurrent diverticular bleeding, most recently in 8/2023 at which time patient required 10U pRBC and IR embolization  # Afib s/p Watchman   # New CVA  # Prostate cancer s/p XRT with possible mild RAVE on prior endoscopic evaluation    Patient has a history of recurrent LGIB attributed to his diffuse severe diverticulosis. Though mild RAVE was noted on prior endoscopic evaluation, low suspicion for bleeding from this source in the past. Based on the available literature, the risk of recurrent bleeding in a patient with prior diverticular bleeding can be up to ~50% within 1-2 years, which can certainly be exacerbated in the setting of low dose ASA and/or anticoagulation. Given his prior history of diverticular bleeding and his extensive pancolonic diverticulosis, the patient certainly is at risk of recurrent bleeding in the future. I discussed with him that although his risk is increased, it is unfortunately impossible to predict if or when recurrent bleeding may occur. Given new acute DVT and recent CVA, the immediate benefits of reinitiation of A/C may outweigh benefits. Will ultimately defer r/b/a to primary team and patient's wishes. If A/C is initiated in the future, may be beneficial to choose an agent that can be more easily reversed if bleeding were to recur.

## 2024-02-01 NOTE — PROGRESS NOTE ADULT - SUBJECTIVE AND OBJECTIVE BOX
Neurology Progress Note    SUBJECTIVE/OBJECTIVE/INTERVAL EVENTS: Patient seen and examined at bedside by neuro attending and again later by me (resident). Patient feels generally well and otherwise baseline. Denies any new/ worsening neurologic symptoms and his prior symptoms for dizziness, dysarthria, facial droop have resolved.     MRI was done and reviewed with him.      MEDICATIONS  (STANDING):  aspirin enteric coated 81 milliGRAM(s) Oral daily  atorvastatin 80 milliGRAM(s) Oral at bedtime  colchicine 0.6 milliGRAM(s) Oral two times a day  diltiazem    milliGRAM(s) Oral daily  dorzolamide 2%/timolol 0.5% Ophthalmic Solution 1 Drop(s) Left EYE two times a day  finasteride 5 milliGRAM(s) Oral daily  furosemide    Tablet 40 milliGRAM(s) Oral daily  heparin   Injectable 5000 Unit(s) SubCutaneous every 8 hours  metoprolol succinate ER 50 milliGRAM(s) Oral <User Schedule>  montelukast 10 milliGRAM(s) Oral daily  pantoprazole    Tablet 40 milliGRAM(s) Oral before breakfast  polyethylene glycol 3350 17 Gram(s) Oral daily  potassium chloride   Powder 40 milliEquivalent(s) Oral once  tamsulosin 0.4 milliGRAM(s) Oral at bedtime    MEDICATIONS  (PRN):  acetaminophen     Tablet .. 650 milliGRAM(s) Oral every 6 hours PRN Temp greater or equal to 38C (100.4F), Mild Pain (1 - 3)  albuterol    90 MICROgram(s) HFA Inhaler 2 Puff(s) Inhalation every 6 hours PRN for shortness of breath and/or wheezing  aluminum hydroxide/magnesium hydroxide/simethicone Suspension 30 milliLiter(s) Oral every 4 hours PRN Dyspepsia  melatonin 3 milliGRAM(s) Oral at bedtime PRN Insomnia  ondansetron Injectable 4 milliGRAM(s) IV Push every 8 hours PRN Nausea and/or Vomiting    VITALS & EXAMINATION:  Vital Signs Last 24 Hrs  T(C): 37.1 (01 Feb 2024 13:52), Max: 37.1 (01 Feb 2024 13:52)  T(F): 98.7 (01 Feb 2024 13:52), Max: 98.7 (01 Feb 2024 13:52)  HR: 66 (01 Feb 2024 13:52) (66 - 88)  BP: 126/86 (01 Feb 2024 13:52) (126/86 - 145/88)  BP(mean): --  RR: 18 (01 Feb 2024 13:52) (17 - 19)  SpO2: 96% (01 Feb 2024 13:52) (96% - 100%)    Parameters below as of 01 Feb 2024 13:52  Patient On (Oxygen Delivery Method): room air    General:  Constitutional: Male, appears stated age, in no apparent distress including pain  Head: Normocephalic & Atraumatic.  Respiratory: Breathing comfortably.  Extremities: No cyanosis, clubbing, or edema    Neurological:  MS: Eyes open, awake, alert, oriented to person, place, situation, time. Follows all commands. IDs objects.   Language: Speech is clear, hoarse (per family and patient baseline) fluent with good repetition & comprehension.  CNs: VFF EOMI no nystagmus. V1-3 intact to LT b/l. No gina facial palsy. Tongue midline, normal movements, no atrophy.   Motor: Normal muscle bulk & tone. No noticeable tremor. No UE/LE drifts b/l.                Deltoid	Biceps	Triceps	   R	   5	   5	    5	 5	  		 	  L	   5	   5	    5	 5	  		    	H-Flex	K-Flex	K-Ext	D-Flex	P-Flex  R	  5	   5	  5	  5	   5		   L	  5	   5	  5	  5	   5		   Sensation: Intact to LT b/l throughout.   Cortical: Extinction on DSS (neglect): none  Coordination: No dysmetria to FTN/HTS b/l.    Gait: Cautious.       LABORATORY:  CBC                       12.3   8.83  )-----------( 288      ( 01 Feb 2024 07:40 )             39.8     Chem 02-01    141  |  100  |  22  ----------------------------<  131<H>  3.9   |  33<H>  |  0.83    Ca    9.2      01 Feb 2024 07:40  Phos  3.7     02-01  Mg     2.40     02-01      LFTs   Coagulopathy   Lipid Panel 01-30 Chol 117 LDL -- HDL 37<L> Trig 74  A1c   Cardiac enzymes     U/A Urinalysis Basic - ( 01 Feb 2024 07:40 )    Color: x / Appearance: x / SG: x / pH: x  Gluc: 131 mg/dL / Ketone: x  / Bili: x / Urobili: x   Blood: x / Protein: x / Nitrite: x   Leuk Esterase: x / RBC: x / WBC x   Sq Epi: x / Non Sq Epi: x / Bacteria: x    CSF  Immunological  Other    STUDIES & IMAGING: (EEG, CT, MR, U/S, TTE/APRIL):    < from: MR Head No Cont (02.01.24 @ 10:14) >    ACC: [id number] EXAM:  MR BRAIN   ORDERED BY: ASHLY HARRELL     PROCEDURE DATE:  02/01/2024          INTERPRETATION:  STUDY: MR BRAIN WITHOUT CONTRAST.    CLINICAL INDICATION: Stroke.    TECHNIQUE: Multiplanar, multisequence MR imaging of the brain was   performed.    COMPARISON: CT head 1/29/2024    FINDINGS:  Moderate wedge-shaped region of FLAIR hyperintense signal abnormality   with corresponding restricted diffusion involving the superior medial   right cerebellar hemisphere consistent with an acute infarction. There is   no associated acute intraparenchymal hemorrhage.     The ventricles, sulci and cisternal spaces are mildly prominent and   consistent with generalize volume loss  . There are mild to moderate   patchy areas of T2  hyperintensity within the periventricular and   subcortical white matter which are non specific and may be related to   chronic microvascular ischemic changes. Left caudate, bilateral thalami   and right basal ganglia chronic lacunar infarcts.There is no evidence of   mass, or intracranial hemorrhage. There is no extra axial collection. No   midline shift or other significant mass effect is noted.    There is normal flow-void within major cranial vessels suggestive of   their patency.    Prior left cataract surgery. Opacification of the maxillary sinuses and   sphenoid sinuses. The mastoid air cells are predominantly clear.    IMPRESSION:  Moderate size right cerebellar acute infarction. No acute intracranial   hemorrhage.  Moderate chronic microvascular ischemic changes, generalized parenchymal   volume loss and multifocal chronic lacunar infarcts as detailed above.    --- End of Report ---    NETO SNOW MD; Attending Radiologist  This document has been electronically signed. Feb 1 2024 10:43AM    < end of copied text >      CT HEAD:  1. No acute intracranial hemorrhage. Wedge-shaped decreased attenuation   raising concern for an acute to subacute right cerebellar infarct.  2. Question prominent sulcus versus small old right cerebellar infarct.  3. Significant residual paranasal sinus opacification, which nonetheless,   is somewhat improved compared to prior dated 5/8/2023. Correlate   clinically for evidence of persistent acute on chronic sinusitis    CT PERFUSION:  Technical limitations: Somewhat limited by patient motion in all 3 planes   during image acquisition.    Core infarction: 0 ml  Penumbra / tissue at risk for active ischemia: 27 mL involving the right   cerebellar and right posterior temporal occipital brain parenchyma.    CTA HEAD:  1. Mild stenosis involving the cavernous and supraclinoid portions of the   bilateral internal carotid arteries.  2. Hypoplastic left P1 segment with a relative fetal origin of the left   posterior cerebral artery. A prominent right posterior communicating   artery is noted.  3. No evidence of aneurysm. Tiny aneurysms can be beyond the resolution   of CTA technique.    CTA NECK:  1. Moderate deposition of calcified plaque along the proximal aspect of   the left internal carotid artery with mild stenosis in this location.  2. No significant stenosis in association with the right common carotid   artery or right internal carotid artery.  3. Bilateral vertebral arteries are patent.

## 2024-02-01 NOTE — CONSULT NOTE ADULT - SUBJECTIVE AND OBJECTIVE BOX
Chief Complaint:  Patient is a 73y old  Male who presents with a chief complaint of facial droop (01 Feb 2024 14:11)    HPI:  73M with hx of prostate cancer (s/p XRT) c/b RAVE, asthma, diverticulosis c/b recurrent diverticular bleeding (x3; last episode 8/2023 requiring 10u pRBC, s/p right colic artery embolization, AF s/p watchman procedure due to recurrent bleeding presented to ED as a code stroke for dizziness, dysarthria, and a facial droop with imaging notable for acute cerebellar infarction. GI consulted for AC clearance due to concern for AF triggered embolic CVA although undergoing additional workup to rule out DVT as cause of CVA in setting of PFO. Pt with 3 lifetime episodes of diverticular bleeding requiring hosptialization/transfusion. Pt s/p Watchman for new AF; procedure performed 8/2023 followed by DAPT c/b recurrent diverticular bleeding requiring 10u pRBC followed by embolization of right colic artery. Pt not retrialed on AC; no further bleeding since procedure.      Allergies:  No Known Allergies      Home Medications:    Hospital Medications:  acetaminophen     Tablet .. 650 milliGRAM(s) Oral every 6 hours PRN  albuterol    90 MICROgram(s) HFA Inhaler 2 Puff(s) Inhalation every 6 hours PRN  aluminum hydroxide/magnesium hydroxide/simethicone Suspension 30 milliLiter(s) Oral every 4 hours PRN  aspirin enteric coated 81 milliGRAM(s) Oral daily  atorvastatin 80 milliGRAM(s) Oral at bedtime  colchicine 0.6 milliGRAM(s) Oral two times a day  diltiazem    milliGRAM(s) Oral daily  dorzolamide 2%/timolol 0.5% Ophthalmic Solution 1 Drop(s) Left EYE two times a day  finasteride 5 milliGRAM(s) Oral daily  furosemide    Tablet 40 milliGRAM(s) Oral daily  heparin   Injectable 5000 Unit(s) SubCutaneous every 8 hours  melatonin 3 milliGRAM(s) Oral at bedtime PRN  metoprolol succinate ER 50 milliGRAM(s) Oral <User Schedule>  montelukast 10 milliGRAM(s) Oral daily  ondansetron Injectable 4 milliGRAM(s) IV Push every 8 hours PRN  pantoprazole    Tablet 40 milliGRAM(s) Oral before breakfast  polyethylene glycol 3350 17 Gram(s) Oral daily  potassium chloride   Powder 40 milliEquivalent(s) Oral once  tamsulosin 0.4 milliGRAM(s) Oral at bedtime      PMHX/PSHX:  Hypertension    Hyperlipidemia    GIB (gastrointestinal bleeding)    Prostate cancer    Gout    Diverticulitis    Asthma    Atrial flutter    Diverticulosis    Presence of Watchman left atrial appendage closure device    History of hemorrhoidectomy    H/O total knee replacement, right        Family history:  No pertinent family history in first degree relatives    No pertinent family history in first degree relatives        Denies family history of colon cancer/polyps, stomach cancer/polyps, pancreatic cancer/masses, liver cancer/disease, ovarian cancer and endometrial cancer.    Social History:     Tob: Denies  EtOH: Denies  Illicit Drugs: Denies    ROS:     General:  No wt loss, fevers, chills  ENT:  No dysphagia  CV:  No pain, palpitations  Pulm:  No dyspnea, cough  GI:  No pain, No nausea, No vomiting, No diarrhea, No constipation, No rectal bleeding, No tarry stools, No dysphagia,  Muscle:  No pain, weakness  Neuro:  No weakness  Heme:  No ecchymosis  Skin:  No rash    PHYSICAL EXAM:     GENERAL:  No acute distress  HEENT:  no scleral icterus  CHEST:  no accessory muscle use  HEART:  Regular rate and rhythm  ABDOMEN:  Soft, non-tender, non-distended  EXTREMITIES: No edema  SKIN:  No rash/ecchymoses  NEURO:  Alert and oriented x 3    Vital Signs:  Vital Signs Last 24 Hrs  T(C): 37.1 (01 Feb 2024 13:52), Max: 37.1 (01 Feb 2024 13:52)  T(F): 98.7 (01 Feb 2024 13:52), Max: 98.7 (01 Feb 2024 13:52)  HR: 66 (01 Feb 2024 13:52) (66 - 83)  BP: 126/86 (01 Feb 2024 13:52) (126/86 - 145/88)  BP(mean): --  RR: 18 (01 Feb 2024 13:52) (17 - 19)  SpO2: 96% (01 Feb 2024 13:52) (96% - 100%)    Parameters below as of 01 Feb 2024 13:52  Patient On (Oxygen Delivery Method): room air      Daily     Daily     LABS:                        12.3   8.83  )-----------( 288      ( 01 Feb 2024 07:40 )             39.8     Mean Cell Volume: 91.1 fL (02-01-24 @ 07:40)    02-01    141  |  100  |  22  ----------------------------<  131<H>  3.9   |  33<H>  |  0.83    Ca    9.2      01 Feb 2024 07:40  Phos  3.7     02-01  Mg     2.40     02-01          Urinalysis Basic - ( 01 Feb 2024 07:40 )    Color: x / Appearance: x / SG: x / pH: x  Gluc: 131 mg/dL / Ketone: x  / Bili: x / Urobili: x   Blood: x / Protein: x / Nitrite: x   Leuk Esterase: x / RBC: x / WBC x   Sq Epi: x / Non Sq Epi: x / Bacteria: x                              12.3   8.83  )-----------( 288      ( 01 Feb 2024 07:40 )             39.8                         10.9   8.16  )-----------( 260      ( 31 Jan 2024 03:00 )             33.6                         10.8   9.52  )-----------( 264      ( 30 Jan 2024 05:40 )             34.7                         11.0   11.27 )-----------( 267      ( 29 Jan 2024 17:10 )             34.8   Chief Complaint:  Patient is a 73y old  Male who presents with a chief complaint of facial droop (01 Feb 2024 14:11)    HPI:  73M with hx of prostate cancer (s/p XRT) c/b RAVE, asthma, diverticulosis c/b recurrent diverticular bleeding (x3; last episode 8/2023 requiring 10u pRBC, s/p right colic artery embolization, AF s/p watchman procedure due to recurrent bleeding presented to ED as a code stroke for dizziness, dysarthria, and a facial droop with imaging notable for acute cerebellar infarction. GI consulted for AC clearance due to concern for AF triggered embolic CVA although undergoing additional workup to rule out DVT as cause of CVA in setting of PFO. Pt with 3 lifetime episodes of diverticular bleeding requiring hosptialization/transfusion. Pt s/p Watchman for new AF; procedure performed 8/2023 followed by DAPT c/b recurrent diverticular bleeding requiring 10u pRBC followed by embolization of right colic artery. Pt not retrialed on AC; no further bleeding since procedure.    Allergies:  No Known Allergies    Home Medications:    Hospital Medications:  acetaminophen     Tablet .. 650 milliGRAM(s) Oral every 6 hours PRN  albuterol    90 MICROgram(s) HFA Inhaler 2 Puff(s) Inhalation every 6 hours PRN  aluminum hydroxide/magnesium hydroxide/simethicone Suspension 30 milliLiter(s) Oral every 4 hours PRN  aspirin enteric coated 81 milliGRAM(s) Oral daily  atorvastatin 80 milliGRAM(s) Oral at bedtime  colchicine 0.6 milliGRAM(s) Oral two times a day  diltiazem    milliGRAM(s) Oral daily  dorzolamide 2%/timolol 0.5% Ophthalmic Solution 1 Drop(s) Left EYE two times a day  finasteride 5 milliGRAM(s) Oral daily  furosemide    Tablet 40 milliGRAM(s) Oral daily  heparin   Injectable 5000 Unit(s) SubCutaneous every 8 hours  melatonin 3 milliGRAM(s) Oral at bedtime PRN  metoprolol succinate ER 50 milliGRAM(s) Oral <User Schedule>  montelukast 10 milliGRAM(s) Oral daily  ondansetron Injectable 4 milliGRAM(s) IV Push every 8 hours PRN  pantoprazole    Tablet 40 milliGRAM(s) Oral before breakfast  polyethylene glycol 3350 17 Gram(s) Oral daily  potassium chloride   Powder 40 milliEquivalent(s) Oral once  tamsulosin 0.4 milliGRAM(s) Oral at bedtime      PMHX/PSHX:  Hypertension    Hyperlipidemia    GIB (gastrointestinal bleeding)    Prostate cancer    Gout    Diverticulitis    Asthma    Atrial flutter    Diverticulosis    Presence of Watchman left atrial appendage closure device    History of hemorrhoidectomy    H/O total knee replacement, right        Family history:  No pertinent family history in first degree relatives    No pertinent family history in first degree relatives        Denies family history of colon cancer/polyps, stomach cancer/polyps, pancreatic cancer/masses, liver cancer/disease, ovarian cancer and endometrial cancer.    Social History:     Tob: Denies  EtOH: Denies  Illicit Drugs: Denies    ROS:     General:  No wt loss, fevers, chills  ENT:  No dysphagia  CV:  No pain, palpitations  Pulm:  No dyspnea, cough  GI:  No pain, No nausea, No vomiting, No diarrhea, No constipation, No rectal bleeding, No tarry stools, No dysphagia,  Muscle:  No pain, weakness  Neuro:  No weakness  Heme:  No ecchymosis  Skin:  No rash    PHYSICAL EXAM:     GENERAL:  No acute distress  HEENT:  no scleral icterus  CHEST:  no accessory muscle use  HEART:  Regular rate and rhythm  ABDOMEN:  Soft, non-tender, non-distended  EXTREMITIES: No edema  SKIN:  No rash/ecchymoses  NEURO:  Alert and oriented x 3    Vital Signs:  Vital Signs Last 24 Hrs  T(C): 37.1 (01 Feb 2024 13:52), Max: 37.1 (01 Feb 2024 13:52)  T(F): 98.7 (01 Feb 2024 13:52), Max: 98.7 (01 Feb 2024 13:52)  HR: 66 (01 Feb 2024 13:52) (66 - 83)  BP: 126/86 (01 Feb 2024 13:52) (126/86 - 145/88)  BP(mean): --  RR: 18 (01 Feb 2024 13:52) (17 - 19)  SpO2: 96% (01 Feb 2024 13:52) (96% - 100%)    Parameters below as of 01 Feb 2024 13:52  Patient On (Oxygen Delivery Method): room air      Daily     Daily     LABS:                        12.3   8.83  )-----------( 288      ( 01 Feb 2024 07:40 )             39.8     Mean Cell Volume: 91.1 fL (02-01-24 @ 07:40)    02-01    141  |  100  |  22  ----------------------------<  131<H>  3.9   |  33<H>  |  0.83    Ca    9.2      01 Feb 2024 07:40  Phos  3.7     02-01  Mg     2.40     02-01          Urinalysis Basic - ( 01 Feb 2024 07:40 )    Color: x / Appearance: x / SG: x / pH: x  Gluc: 131 mg/dL / Ketone: x  / Bili: x / Urobili: x   Blood: x / Protein: x / Nitrite: x   Leuk Esterase: x / RBC: x / WBC x   Sq Epi: x / Non Sq Epi: x / Bacteria: x                              12.3   8.83  )-----------( 288      ( 01 Feb 2024 07:40 )             39.8                         10.9   8.16  )-----------( 260      ( 31 Jan 2024 03:00 )             33.6                         10.8   9.52  )-----------( 264      ( 30 Jan 2024 05:40 )             34.7                         11.0   11.27 )-----------( 267      ( 29 Jan 2024 17:10 )             34.8   Chief Complaint:  Patient is a 73y old  Male who presents with a chief complaint of facial droop (01 Feb 2024 14:11)    HPI:  73M with hx of prostate cancer (s/p XRT) c/b RAVE, asthma, diverticulosis c/b recurrent diverticular bleeding (x3; last episode 8/2023 requiring 10u pRBC, s/p right colic artery embolization, AF s/p watchman procedure due to recurrent bleeding presented to ED as a code stroke for dizziness, dysarthria, and a facial droop with imaging notable for acute cerebellar infarction. GI consulted for AC clearance due to concern for AF triggered embolic CVA although undergoing additional workup to rule out DVT as cause of CVA in setting of PFO. Pt with 3 lifetime episodes of diverticular bleeding requiring hosptialization/transfusion. Pt s/p Watchman for new AF; procedure performed 8/2023 followed by DAPT c/b recurrent diverticular bleeding requiring 10u pRBC followed by embolization of right colic artery. Pt not retrialed on AC; no further bleeding since procedure.    Allergies:  No Known Allergies    Home Medications:    Hospital Medications:  acetaminophen     Tablet .. 650 milliGRAM(s) Oral every 6 hours PRN  albuterol    90 MICROgram(s) HFA Inhaler 2 Puff(s) Inhalation every 6 hours PRN  aluminum hydroxide/magnesium hydroxide/simethicone Suspension 30 milliLiter(s) Oral every 4 hours PRN  aspirin enteric coated 81 milliGRAM(s) Oral daily  atorvastatin 80 milliGRAM(s) Oral at bedtime  colchicine 0.6 milliGRAM(s) Oral two times a day  diltiazem    milliGRAM(s) Oral daily  dorzolamide 2%/timolol 0.5% Ophthalmic Solution 1 Drop(s) Left EYE two times a day  finasteride 5 milliGRAM(s) Oral daily  furosemide    Tablet 40 milliGRAM(s) Oral daily  heparin   Injectable 5000 Unit(s) SubCutaneous every 8 hours  melatonin 3 milliGRAM(s) Oral at bedtime PRN  metoprolol succinate ER 50 milliGRAM(s) Oral <User Schedule>  montelukast 10 milliGRAM(s) Oral daily  ondansetron Injectable 4 milliGRAM(s) IV Push every 8 hours PRN  pantoprazole    Tablet 40 milliGRAM(s) Oral before breakfast  polyethylene glycol 3350 17 Gram(s) Oral daily  potassium chloride   Powder 40 milliEquivalent(s) Oral once  tamsulosin 0.4 milliGRAM(s) Oral at bedtime      PMHX/PSHX:  Hypertension    Hyperlipidemia    GIB (gastrointestinal bleeding)    Prostate cancer    Gout    Diverticulitis    Asthma    Atrial flutter    Diverticulosis    Presence of Watchman left atrial appendage closure device    History of hemorrhoidectomy    H/O total knee replacement, right      ROS: As per HPI  PHYSICAL EXAM:     GENERAL:  No acute distress  HEENT:  no scleral icterus  CHEST:  no accessory muscle use  HEART:  Regular rate and rhythm  ABDOMEN:  Soft, non-tender, non-distended  EXTREMITIES: No edema  SKIN:  No rash/ecchymoses  NEURO:  Alert and oriented x 3    Vital Signs:  Vital Signs Last 24 Hrs  T(C): 37.1 (01 Feb 2024 13:52), Max: 37.1 (01 Feb 2024 13:52)  T(F): 98.7 (01 Feb 2024 13:52), Max: 98.7 (01 Feb 2024 13:52)  HR: 66 (01 Feb 2024 13:52) (66 - 83)  BP: 126/86 (01 Feb 2024 13:52) (126/86 - 145/88)  BP(mean): --  RR: 18 (01 Feb 2024 13:52) (17 - 19)  SpO2: 96% (01 Feb 2024 13:52) (96% - 100%)    Parameters below as of 01 Feb 2024 13:52  Patient On (Oxygen Delivery Method): room air      Daily     Daily     LABS:                        12.3   8.83  )-----------( 288      ( 01 Feb 2024 07:40 )             39.8     Mean Cell Volume: 91.1 fL (02-01-24 @ 07:40)    02-01    141  |  100  |  22  ----------------------------<  131<H>  3.9   |  33<H>  |  0.83    Ca    9.2      01 Feb 2024 07:40  Phos  3.7     02-01  Mg     2.40     02-01          Urinalysis Basic - ( 01 Feb 2024 07:40 )    Color: x / Appearance: x / SG: x / pH: x  Gluc: 131 mg/dL / Ketone: x  / Bili: x / Urobili: x   Blood: x / Protein: x / Nitrite: x   Leuk Esterase: x / RBC: x / WBC x   Sq Epi: x / Non Sq Epi: x / Bacteria: x                              12.3   8.83  )-----------( 288      ( 01 Feb 2024 07:40 )             39.8                         10.9   8.16  )-----------( 260      ( 31 Jan 2024 03:00 )             33.6                         10.8   9.52  )-----------( 264      ( 30 Jan 2024 05:40 )             34.7                         11.0   11.27 )-----------( 267      ( 29 Jan 2024 17:10 )             34.8

## 2024-02-01 NOTE — PROGRESS NOTE ADULT - ASSESSMENT
_________________________________________________________________________________________  ========>>  M E D I C A L   A T T E N D I N G    F O L L O W  U P  N O T E  <<=========  -----------------------------------------------------------------------------------------------------    - Patient seen and examined by me earlier today.   - In summary,  LENNOX CARTER is a 73y year old man admitted with stroke   - Patient today overall doing ok, comfortable, eating OK.     patient overall improved.. all neurological complaints resolved.. patient ambulating well, eating well, no facial changes...    ==================>> REVIEW OF SYSTEM <<=================    GEN: no fever, no chills, no pain  RESP: no SOB, no cough, no sputum  CVS: no chest pain, no palpitations  GI: no abdominal pain, no nausea,   : no dysuria, no frequency  Neuro: no headache, no dizziness    ==================>> PHYSICAL EXAM <<=================    GEN: A&O X 3 , NAD , comfortable, pleasant, calm .. ambulating independently   HEENT: NCAT, PERRL, MMM, hearing intact  CVS: S1S2 , Irregular , No M/R/G appreciated  PULM: CTA B/L,  no W/R/R appreciated  ABD.: soft. non tender, non distended  Extrem: intact pulses , no edema        ( Note written / Date of service 02-01-24 ( This is certified to be the same as "ENTERED" date above ( for billing purposes)))    ==================>> MEDICATIONS <<====================    aspirin enteric coated 81 milliGRAM(s) Oral daily  atorvastatin 80 milliGRAM(s) Oral at bedtime  colchicine 0.6 milliGRAM(s) Oral two times a day  diltiazem    milliGRAM(s) Oral daily  dorzolamide 2%/timolol 0.5% Ophthalmic Solution 1 Drop(s) Left EYE two times a day  finasteride 5 milliGRAM(s) Oral daily  furosemide    Tablet 40 milliGRAM(s) Oral daily  heparin   Injectable 5000 Unit(s) SubCutaneous every 8 hours  metoprolol succinate ER 50 milliGRAM(s) Oral <User Schedule>  montelukast 10 milliGRAM(s) Oral daily  pantoprazole    Tablet 40 milliGRAM(s) Oral before breakfast  polyethylene glycol 3350 17 Gram(s) Oral daily  potassium chloride   Powder 40 milliEquivalent(s) Oral once  tamsulosin 0.4 milliGRAM(s) Oral at bedtime    MEDICATIONS  (PRN):  acetaminophen     Tablet .. 650 milliGRAM(s) Oral every 6 hours PRN Temp greater or equal to 38C (100.4F), Mild Pain (1 - 3)  albuterol    90 MICROgram(s) HFA Inhaler 2 Puff(s) Inhalation every 6 hours PRN for shortness of breath and/or wheezing  aluminum hydroxide/magnesium hydroxide/simethicone Suspension 30 milliLiter(s) Oral every 4 hours PRN Dyspepsia  melatonin 3 milliGRAM(s) Oral at bedtime PRN Insomnia  ondansetron Injectable 4 milliGRAM(s) IV Push every 8 hours PRN Nausea and/or Vomiting    ___________  Active diet:  Diet, Regular:   DASH/TLC Sodium & Cholesterol Restricted (DASH)  ___________________    ==================>> VITAL SIGNS <<==================  Height (cm): 180.3  Weight (kg): 97.5  BMI (kg/m2): 30  Vital Signs Last 24 HrsT(C): 37.1 (02-01-24 @ 13:52)  T(F): 98.7 (02-01-24 @ 13:52), Max: 98.7 (02-01-24 @ 13:52)  HR: 66 (02-01-24 @ 13:52) (66 - 88)  BP: 126/86 (02-01-24 @ 13:52)  RR: 18 (02-01-24 @ 13:52) (17 - 19)  SpO2: 96% (02-01-24 @ 13:52) (96% - 100%)       ==================>> LAB AND IMAGING <<==================                        12.3   8.83  )-----------( 288      ( 01 Feb 2024 07:40 )             39.8        02-01    141  |  100  |  22  ----------------------------<  131<H>  3.9   |  33<H>  |  0.83    Ca    9.2      01 Feb 2024 07:40  Phos  3.7     02-01  Mg     2.40     02-01      WBC count:   8.83 <<== ,  8.16 <<== ,  9.52 <<== ,  11.27 <<==   Hemoglobin:   12.3 <<==,  10.9 <<==,  10.8 <<==,  11.0 <<==  platelets:  288 <==, 260 <==, 264 <==, 267 <==    Creatinine:  0.83  <<==, 0.90  <<==, 0.80  <<==, 0.86  <<==  Sodium:   141  <==, 139  <==, 140  <==, 143  <==    pending CT heart   pending  APRIL      < from: MR Head No Cont (02.01.24 @ 10:14) >  IMPRESSION:  Moderate size right cerebellar acute infarction. No acute intracranial  hemorrhage.  Moderate chronic microvascular ischemic changes, generalized parenchymal   volume loss and multifocal chronic lacunar infarcts as detailed above.  < end of copied text >    < from: TTE W or WO Ultrasound Enhancing Agent (01.30.24 @ 09:15) >  FINDINGS:  Interatrial Septum:  Agitated saline injection reveals bubbles in the left heart, consistent with a patent foramen ovale.  Pericardium:  There is a trace pericardial effusion.  < end of copied text >    < from: CT Angio Neck Stroke Protocol w/ IV Cont (01.29.24 @ 17:59) >  IMPRESSION:  CT HEAD:  1. No acute intracranial hemorrhage. Wedge-shaped decreased attenuation   raising concern for an acute to subacute right cerebellar infarct..  2. Question prominent sulcus versus small old right cerebellar infarct.  3. Significant residual paranasal sinus opacification, which nonetheless,   is somewhat improved compared to prior dated 5/8/2023. Correlate   clinically for evidence of persistent acute on chronic sinusitis  Findings were discussed with RITA Linn of neurology 1/29/2024 5:20  PM by Dr. Behr Ventura with read back confirmation.    CT PERFUSION:  Technical limitations: Somewhat limited by patient motion in all 3 planes   during image acquisition.  Core infarction: 0 ml  Penumbra / tissue at risk for active ischemia: 27 mL involving the right   cerebellar and right posterior temporal occipital brain parenchyma.    CTA HEAD:  1. Mild stenosis involving the cavernous andsupraclinoid portions of the   bilateral internal carotid arteries.  2. Hypoplastic left P1 segment with a relative fetal origin of the left   posterior cerebral artery. A prominent right posterior communicating   artery is noted.  3. No evidence of aneurysm. Tiny aneurysms can be beyond the resolution   of CTA technique.    CTA NECK:  1. Moderate deposition of calcified plaque along the proximal aspect of   the left internal carotid artery with mild stenosis in this location.  2. No significant stenosis in association with the right common carotid   artery or right internal carotid artery.  3. Bilateral vertebral arteries are patent.  < end of copied text >  ___________________________________________________________________________________  ===============>>  A S S E S S M E N T   A N D   P L A N <<===============  ------------------------------------------------------------------------------------------    · Assessment	  73 year old male with past medical history of HTN, HLD, prostate cancer (s/p XRT), asthma, diverticulosis, with multiple GI bleeds requiring multiple units of blood and  s/p IR embolization,  afib, s/p watchman procedure due to contraindication to oral anticoagulant therapy presents to the ED as a code stroke for dizziness, dysarthria, and a facial droop.    Problem/Plan - 1:  ·  Problem: CVA (cerebrovascular accident).   ·  Plan: -Pt presents with acute right facial droop, dysarthria, dizziness. Now mostly resolved. CT head with acute-subacute infarct   -likely in the setting of afib, not on anticoagulation due to history of massive GIB ( however patient post watchman )   -appreciate neuro recs and discussed today post MRI        suspect embolic, related to AF >> requesting GI input for possible use of low dose eliquis > called..   -c/w aspirin for now  / statin  - TTE w/ bubble study as above >> APRIL ordered per discussed with cardio >> to follow   - EP appreciated : CTA to evaluation Watchman position   - PT appreciated >> outpatient PT     Problem/Plan - 2:  ·  Problem: Atrial fibrillation.   ·  Plan: -s/p watchman procedure  -c/w aspirin.   otherwise as above     Cardiology/EP on board, appreciated   - Continue Current medications otherwise and monitor.   -monitor on tele.    Problem/Plan - 3:  ·  Problem: H/O pericarditis.   ·  repeat TTE noted with little effusion  -c/w colchicine and complete medrol pack prescribed by cardiologist   -c/w lasix    Problem/Plan - 4:  ·  Problem: Hypertension.   - going back on home medications  - monitor closely and adjust as needed  - cardio on board     Problem/Plan - 5:  ·  Problem: Need for prophylactic measure.   ·  Plan: dvt ppx: hep sq.     --------------------------------------------  Case discussed with patient, neuro, ACP..   Education given on findings and plan of care  ___________________________  H. JONAS Luu.  Pager: [phone]

## 2024-02-01 NOTE — CHART NOTE - NSCHARTNOTEFT_GEN_A_CORE
Bilateral lower extremity duplex ordered per neurology recommendation to evaluate for DVT.   Results as below:       1. Acute, non-occlusive deep vein thrombosis is visualized in the right posterior tibial vein at proximal calf.   2. Acute, occlusive deep vein thrombosis is visualized in the right gastrocnemius vein.   3. Acute, occlusive deep vein thrombosis is visualized in the left peroneal vein at mid calf.    GI consulted earlier to give recommendations regarding use of AC. Recommendation of starting with heparin gtt and monitoring Hgb noted.     ACP Discussed duplex results and GI fellow recs with medicine attending. Per attending, okay to start heparin gtt and monitor Hgb for now.     Will sign out to night ACP to follow up any further recs from GI and to monitor for bleeding.

## 2024-02-02 LAB
ANION GAP SERPL CALC-SCNC: 12 MMOL/L — SIGNIFICANT CHANGE UP (ref 7–14)
APTT BLD: 75.1 SEC — HIGH (ref 24.5–35.6)
APTT BLD: >200 SEC — CRITICAL HIGH (ref 24.5–35.6)
APTT BLD: >200 SEC — CRITICAL HIGH (ref 24.5–35.6)
BUN SERPL-MCNC: 19 MG/DL — SIGNIFICANT CHANGE UP (ref 7–23)
CALCIUM SERPL-MCNC: 8.9 MG/DL — SIGNIFICANT CHANGE UP (ref 8.4–10.5)
CHLORIDE SERPL-SCNC: 100 MMOL/L — SIGNIFICANT CHANGE UP (ref 98–107)
CO2 SERPL-SCNC: 28 MMOL/L — SIGNIFICANT CHANGE UP (ref 22–31)
CREAT SERPL-MCNC: 0.79 MG/DL — SIGNIFICANT CHANGE UP (ref 0.5–1.3)
EGFR: 94 ML/MIN/1.73M2 — SIGNIFICANT CHANGE UP
GLUCOSE SERPL-MCNC: 137 MG/DL — HIGH (ref 70–99)
HCT VFR BLD CALC: 35.9 % — LOW (ref 39–50)
HGB BLD-MCNC: 11.3 G/DL — LOW (ref 13–17)
MAGNESIUM SERPL-MCNC: 2.2 MG/DL — SIGNIFICANT CHANGE UP (ref 1.6–2.6)
MCHC RBC-ENTMCNC: 28 PG — SIGNIFICANT CHANGE UP (ref 27–34)
MCHC RBC-ENTMCNC: 31.5 GM/DL — LOW (ref 32–36)
MCV RBC AUTO: 89.1 FL — SIGNIFICANT CHANGE UP (ref 80–100)
NRBC # BLD: 0 /100 WBCS — SIGNIFICANT CHANGE UP (ref 0–0)
NRBC # FLD: 0 K/UL — SIGNIFICANT CHANGE UP (ref 0–0)
PHOSPHATE SERPL-MCNC: 3.7 MG/DL — SIGNIFICANT CHANGE UP (ref 2.5–4.5)
PLATELET # BLD AUTO: 251 K/UL — SIGNIFICANT CHANGE UP (ref 150–400)
POTASSIUM SERPL-MCNC: 3.6 MMOL/L — SIGNIFICANT CHANGE UP (ref 3.5–5.3)
POTASSIUM SERPL-SCNC: 3.6 MMOL/L — SIGNIFICANT CHANGE UP (ref 3.5–5.3)
RBC # BLD: 4.03 M/UL — LOW (ref 4.2–5.8)
RBC # FLD: 14.8 % — HIGH (ref 10.3–14.5)
SODIUM SERPL-SCNC: 140 MMOL/L — SIGNIFICANT CHANGE UP (ref 135–145)
WBC # BLD: 8.42 K/UL — SIGNIFICANT CHANGE UP (ref 3.8–10.5)
WBC # FLD AUTO: 8.42 K/UL — SIGNIFICANT CHANGE UP (ref 3.8–10.5)

## 2024-02-02 PROCEDURE — 99232 SBSQ HOSP IP/OBS MODERATE 35: CPT | Mod: GC

## 2024-02-02 PROCEDURE — 99233 SBSQ HOSP IP/OBS HIGH 50: CPT

## 2024-02-02 PROCEDURE — 71275 CT ANGIOGRAPHY CHEST: CPT | Mod: 26

## 2024-02-02 RX ORDER — HEPARIN SODIUM 5000 [USP'U]/ML
1200 INJECTION INTRAVENOUS; SUBCUTANEOUS
Qty: 25000 | Refills: 0 | Status: DISCONTINUED | OUTPATIENT
Start: 2024-02-02 | End: 2024-02-03

## 2024-02-02 RX ADMIN — MONTELUKAST 10 MILLIGRAM(S): 4 TABLET, CHEWABLE ORAL at 12:38

## 2024-02-02 RX ADMIN — HEPARIN SODIUM UNIT(S)/HR: 5000 INJECTION INTRAVENOUS; SUBCUTANEOUS at 14:33

## 2024-02-02 RX ADMIN — DORZOLAMIDE HYDROCHLORIDE TIMOLOL MALEATE 1 DROP(S): 20; 5 SOLUTION/ DROPS OPHTHALMIC at 17:45

## 2024-02-02 RX ADMIN — ATORVASTATIN CALCIUM 80 MILLIGRAM(S): 80 TABLET, FILM COATED ORAL at 21:09

## 2024-02-02 RX ADMIN — Medication 120 MILLIGRAM(S): at 04:47

## 2024-02-02 RX ADMIN — Medication 0.6 MILLIGRAM(S): at 04:47

## 2024-02-02 RX ADMIN — HEPARIN SODIUM 1500 UNIT(S)/HR: 5000 INJECTION INTRAVENOUS; SUBCUTANEOUS at 04:43

## 2024-02-02 RX ADMIN — HEPARIN SODIUM 0 UNIT(S)/HR: 5000 INJECTION INTRAVENOUS; SUBCUTANEOUS at 12:40

## 2024-02-02 RX ADMIN — DORZOLAMIDE HYDROCHLORIDE TIMOLOL MALEATE 1 DROP(S): 20; 5 SOLUTION/ DROPS OPHTHALMIC at 04:47

## 2024-02-02 RX ADMIN — TAMSULOSIN HYDROCHLORIDE 0.4 MILLIGRAM(S): 0.4 CAPSULE ORAL at 21:09

## 2024-02-02 RX ADMIN — POLYETHYLENE GLYCOL 3350 17 GRAM(S): 17 POWDER, FOR SOLUTION ORAL at 12:38

## 2024-02-02 RX ADMIN — Medication 50 MILLIGRAM(S): at 12:38

## 2024-02-02 RX ADMIN — PANTOPRAZOLE SODIUM 40 MILLIGRAM(S): 20 TABLET, DELAYED RELEASE ORAL at 04:47

## 2024-02-02 RX ADMIN — HEPARIN SODIUM 0 UNIT(S)/HR: 5000 INJECTION INTRAVENOUS; SUBCUTANEOUS at 03:39

## 2024-02-02 RX ADMIN — HEPARIN SODIUM 1200 UNIT(S)/HR: 5000 INJECTION INTRAVENOUS; SUBCUTANEOUS at 22:10

## 2024-02-02 RX ADMIN — Medication 40 MILLIEQUIVALENT(S): at 06:46

## 2024-02-02 RX ADMIN — FINASTERIDE 5 MILLIGRAM(S): 5 TABLET, FILM COATED ORAL at 12:38

## 2024-02-02 RX ADMIN — HEPARIN SODIUM UNIT(S)/HR: 5000 INJECTION INTRAVENOUS; SUBCUTANEOUS at 19:39

## 2024-02-02 RX ADMIN — Medication 0.6 MILLIGRAM(S): at 17:45

## 2024-02-02 RX ADMIN — HEPARIN SODIUM 1500 UNIT(S)/HR: 5000 INJECTION INTRAVENOUS; SUBCUTANEOUS at 07:16

## 2024-02-02 RX ADMIN — HEPARIN SODIUM 0 UNIT(S)/HR: 5000 INJECTION INTRAVENOUS; SUBCUTANEOUS at 13:53

## 2024-02-02 RX ADMIN — Medication 40 MILLIGRAM(S): at 04:48

## 2024-02-02 NOTE — PROGRESS NOTE ADULT - SUBJECTIVE AND OBJECTIVE BOX
INCOMPLETE     INTERVAL HISTORY:     PAST MEDICAL & SURGICAL HISTORY:  Hypertension      Hyperlipidemia      GIB (gastrointestinal bleeding)      Prostate cancer  s/p radiation therapy      Gout      Diverticulitis      Asthma      Atrial flutter      Diverticulosis      Presence of Watchman left atrial appendage closure device      History of hemorrhoidectomy      H/O total knee replacement, right        FAMILY HISTORY:  No pertinent family history in first degree relatives      SOCIAL HISTORY:   T/E/D:   Occupation:   Lives with:     MEDICATIONS (HOME):  Home Medications:  acetaminophen 325 mg oral tablet: 2 tab(s) orally every 6 hours As needed Moderate Pain (4 - 6) (29 Jan 2024 22:26)  Albuterol (Eqv-ProAir HFA) 90 mcg/inh inhalation aerosol: 2 puff(s) inhaled every 4 to 6 hours as needed for  shortness of breath and/or wheezing (29 Jan 2024 22:26)  aspirin 81 mg oral tablet, chewable: 1 tab(s) orally once a day (29 Jan 2024 22:26)  atorvastatin 20 mg oral tablet: 1 tab(s) orally once a day (29 Jan 2024 22:26)  DilTIAZem (Eqv-Cardizem CD) 120 mg/24 hours oral capsule, extended release: 1 cap(s) orally once a day (29 Jan 2024 22:26)  dorzolamide-timolol 2.23%-0.68% (2%-0.5% base) ophthalmic solution: 1 drop(s) in the left eye 2 times a day (29 Jan 2024 22:30)  ferrous sulfate 200 mg (65 mg elemental iron) oral tablet: 1 tab(s) orally once a day (29 Jan 2024 22:26)  finasteride 5 mg oral tablet: 1 tab(s) orally once a day (29 Jan 2024 22:26)  MethylPREDNISolone Dose Pack 4 mg oral tablet: orally on day 3 (29 Jan 2024 22:26)  montelukast 10 mg oral tablet: 1 tab(s) orally once a day (29 Jan 2024 22:26)  polyethylene glycol 3350 oral powder for reconstitution: 17 gram(s) orally once a day (29 Jan 2024 22:26)  tamsulosin 0.4 mg oral capsule: 1 cap(s) orally once a day (29 Jan 2024 22:26)    MEDICATIONS  (STANDING):  atorvastatin 80 milliGRAM(s) Oral at bedtime  colchicine 0.6 milliGRAM(s) Oral two times a day  diltiazem    milliGRAM(s) Oral daily  dorzolamide 2%/timolol 0.5% Ophthalmic Solution 1 Drop(s) Left EYE two times a day  finasteride 5 milliGRAM(s) Oral daily  furosemide    Tablet 40 milliGRAM(s) Oral daily  heparin  Infusion.  Unit(s)/Hr (18 mL/Hr) IV Continuous <Continuous>  metoprolol succinate ER 50 milliGRAM(s) Oral <User Schedule>  montelukast 10 milliGRAM(s) Oral daily  pantoprazole    Tablet 40 milliGRAM(s) Oral before breakfast  polyethylene glycol 3350 17 Gram(s) Oral daily  tamsulosin 0.4 milliGRAM(s) Oral at bedtime    MEDICATIONS  (PRN):  acetaminophen     Tablet .. 650 milliGRAM(s) Oral every 6 hours PRN Temp greater or equal to 38C (100.4F), Mild Pain (1 - 3)  albuterol    90 MICROgram(s) HFA Inhaler 2 Puff(s) Inhalation every 6 hours PRN for shortness of breath and/or wheezing  aluminum hydroxide/magnesium hydroxide/simethicone Suspension 30 milliLiter(s) Oral every 4 hours PRN Dyspepsia  melatonin 3 milliGRAM(s) Oral at bedtime PRN Insomnia  ondansetron Injectable 4 milliGRAM(s) IV Push every 8 hours PRN Nausea and/or Vomiting    ALLERGIES/INTOLERANCES:  Allergies  No Known Allergies    Intolerances    VITALS & EXAMINATION:  Vital Signs Last 24 Hrs  T(C): 36.4 (02 Feb 2024 04:45), Max: 37.1 (01 Feb 2024 13:52)  T(F): 97.5 (02 Feb 2024 04:45), Max: 98.7 (01 Feb 2024 13:52)  HR: 78 (02 Feb 2024 13:23) (66 - 83)  BP: 123/81 (02 Feb 2024 13:23) (123/81 - 149/89)  BP(mean): --  RR: 18 (02 Feb 2024 13:23) (17 - 18)  SpO2: 99% (02 Feb 2024 13:23) (96% - 99%)    Parameters below as of 02 Feb 2024 13:23  Patient On (Oxygen Delivery Method): room air        General:  Constitutional: Male, appears stated age, in no apparent distress including pain  Head: Normocephalic & Atraumatic.  Respiratory: Breathing comfortably.  Extremities: No cyanosis, clubbing, or edema    Neurological:  MS: Awake, alert, oriented to person, place, situation, time. Follows all commands.    Language: Speech is clear, fluent with good repetition & comprehension.    CNs: PERRL (R = 3mm, L = 3mm). VFF. EOMI no nystagmus. V1-3 intact to LT b/l. No facial asymmetry b/l, full eye closure strength b/l. Hearing grossly normal (rubbing fingers) b/l. Tongue midline, normal movements, no atrophy.     Motor: Normal muscle bulk & tone. No noticeable tremor. No pronator drift.              Deltoid	Biceps	Triceps	   R	         5	             5	              5	 5	  		 	  L	         5	             5	              5	 5	  		    	H-Flex	K-Flex	K-Ext	D-Flex	P-Flex  R	    5	            5	           5	            5	           5		   L	    5	            5	           5	            5	           5		     Sensation: Intact to LT b/l throughout.     Cortical: Extinction on DSS (neglect): none    Reflexes:              Biceps(C5)       BR(C6)     Triceps(C7)               Patellar(L4)    Achilles(S1)    Plantar Resp  R	              2	          2	             2		                              2		    2		      Down   L	              2	          2	             2		                              2		    2		      Down     Coordination: intact rapid-alt movements. No dysmetria to FTN/HTS    Gait:     LABORATORY:  CBC                       11.3   8.42  )-----------( 251      ( 02 Feb 2024 02:42 )             35.9     Chem 02-02    140  |  100  |  19  ----------------------------<  137<H>  3.6   |  28  |  0.79    Ca    8.9      02 Feb 2024 02:42  Phos  3.7     02-02  Mg     2.20     02-02      LFTs   Coagulopathy PTT - ( 02 Feb 2024 11:42 )  PTT:>200.0 sec  Lipid Panel 01-30 Chol 117 LDL -- HDL 37<L> Trig 74  A1c   Cardiac enzymes     U/A Urinalysis Basic - ( 02 Feb 2024 02:42 )    Color: x / Appearance: x / SG: x / pH: x  Gluc: 137 mg/dL / Ketone: x  / Bili: x / Urobili: x   Blood: x / Protein: x / Nitrite: x   Leuk Esterase: x / RBC: x / WBC x   Sq Epi: x / Non Sq Epi: x / Bacteria: x      CSF  Immunological  Other    STUDIES & IMAGING:  Studies (EKG, EEG, EMG, etc):     Radiology (XR, CT, MR, U/S, TTE/APRIL): Neurology Progress Note    INTERVAL HISTORY: Patient seen and examined at bedside by neuro attending with stroke team. Patient feels generally well and otherwise baseline. Denies any new/ worsening neurologic symptoms and his prior symptoms for dizziness, dysarthria, facial droop have remained resolved. Yesterday patient was found to have multiple acute DVTs and started on heparin gtt. Pt denies GIB today.     PAST MEDICAL & SURGICAL HISTORY:  Hypertension    Hyperlipidemia    GIB (gastrointestinal bleeding)    Prostate cancer  s/p radiation therapy    Gout    Diverticulitis    Asthma    Atrial flutter    Diverticulosis    Presence of Watchman left atrial appendage closure device    History of hemorrhoidectomy    H/O total knee replacement, right    FAMILY HISTORY:  No pertinent family history in first degree relatives    MEDICATIONS (HOME):  Home Medications:  acetaminophen 325 mg oral tablet: 2 tab(s) orally every 6 hours As needed Moderate Pain (4 - 6) (29 Jan 2024 22:26)  Albuterol (Eqv-ProAir HFA) 90 mcg/inh inhalation aerosol: 2 puff(s) inhaled every 4 to 6 hours as needed for  shortness of breath and/or wheezing (29 Jan 2024 22:26)  aspirin 81 mg oral tablet, chewable: 1 tab(s) orally once a day (29 Jan 2024 22:26)  atorvastatin 20 mg oral tablet: 1 tab(s) orally once a day (29 Jan 2024 22:26)  DilTIAZem (Eqv-Cardizem CD) 120 mg/24 hours oral capsule, extended release: 1 cap(s) orally once a day (29 Jan 2024 22:26)  dorzolamide-timolol 2.23%-0.68% (2%-0.5% base) ophthalmic solution: 1 drop(s) in the left eye 2 times a day (29 Jan 2024 22:30)  ferrous sulfate 200 mg (65 mg elemental iron) oral tablet: 1 tab(s) orally once a day (29 Jan 2024 22:26)  finasteride 5 mg oral tablet: 1 tab(s) orally once a day (29 Jan 2024 22:26)  MethylPREDNISolone Dose Pack 4 mg oral tablet: orally on day 3 (29 Jan 2024 22:26)  montelukast 10 mg oral tablet: 1 tab(s) orally once a day (29 Jan 2024 22:26)  polyethylene glycol 3350 oral powder for reconstitution: 17 gram(s) orally once a day (29 Jan 2024 22:26)  tamsulosin 0.4 mg oral capsule: 1 cap(s) orally once a day (29 Jan 2024 22:26)    MEDICATIONS  (STANDING):  atorvastatin 80 milliGRAM(s) Oral at bedtime  colchicine 0.6 milliGRAM(s) Oral two times a day  diltiazem    milliGRAM(s) Oral daily  dorzolamide 2%/timolol 0.5% Ophthalmic Solution 1 Drop(s) Left EYE two times a day  finasteride 5 milliGRAM(s) Oral daily  furosemide    Tablet 40 milliGRAM(s) Oral daily  heparin  Infusion.  Unit(s)/Hr (18 mL/Hr) IV Continuous <Continuous>  metoprolol succinate ER 50 milliGRAM(s) Oral <User Schedule>  montelukast 10 milliGRAM(s) Oral daily  pantoprazole    Tablet 40 milliGRAM(s) Oral before breakfast  polyethylene glycol 3350 17 Gram(s) Oral daily  tamsulosin 0.4 milliGRAM(s) Oral at bedtime    MEDICATIONS  (PRN):  acetaminophen     Tablet .. 650 milliGRAM(s) Oral every 6 hours PRN Temp greater or equal to 38C (100.4F), Mild Pain (1 - 3)  albuterol    90 MICROgram(s) HFA Inhaler 2 Puff(s) Inhalation every 6 hours PRN for shortness of breath and/or wheezing  aluminum hydroxide/magnesium hydroxide/simethicone Suspension 30 milliLiter(s) Oral every 4 hours PRN Dyspepsia  melatonin 3 milliGRAM(s) Oral at bedtime PRN Insomnia  ondansetron Injectable 4 milliGRAM(s) IV Push every 8 hours PRN Nausea and/or Vomiting    ALLERGIES/INTOLERANCES:  Allergies  No Known Allergies    Intolerances    VITALS & EXAMINATION:  Vital Signs Last 24 Hrs  T(C): 36.4 (02 Feb 2024 04:45), Max: 37.1 (01 Feb 2024 13:52)  T(F): 97.5 (02 Feb 2024 04:45), Max: 98.7 (01 Feb 2024 13:52)  HR: 78 (02 Feb 2024 13:23) (66 - 83)  BP: 123/81 (02 Feb 2024 13:23) (123/81 - 149/89)  BP(mean): --  RR: 18 (02 Feb 2024 13:23) (17 - 18)  SpO2: 99% (02 Feb 2024 13:23) (96% - 99%)    Parameters below as of 02 Feb 2024 13:23  Patient On (Oxygen Delivery Method): room air    General:  Constitutional: Male, appears stated age, in no apparent distress including pain  Head: Normocephalic & Atraumatic.  Respiratory: Breathing comfortably.  Extremities: No cyanosis, clubbing, or edema    Neurological:  MS: Eyes open, awake, alert, oriented to person, place, situation, time. Follows all commands. IDs objects.   Language: Speech is clear, hoarse (per family and patient baseline) fluent with good repetition & comprehension.  CNs: VFF EOMI no nystagmus. V1-3 intact to LT b/l. No gina facial palsy. Tongue midline, normal movements, no atrophy.   Motor: Normal muscle bulk & tone. No noticeable tremor. No UE/LE drifts b/l.                Deltoid	Biceps	Triceps	   R	   5	   5	    5	 5	  		 	  L	   5	   5	    5	 5	  		    	H-Flex	K-Flex	K-Ext	D-Flex	P-Flex  R	  5	   5	  5	  5	   5		   L	  5	   5	  5	  5	   5		   Sensation: Intact to LT b/l throughout.   Cortical: Extinction on DSS (neglect): none  Coordination: No dysmetria to FTN/HTS b/l.    Gait: Cautious.       Gait:     LABORATORY:  CBC                       11.3   8.42  )-----------( 251      ( 02 Feb 2024 02:42 )             35.9     Chem 02-02    140  |  100  |  19  ----------------------------<  137<H>  3.6   |  28  |  0.79    Ca    8.9      02 Feb 2024 02:42  Phos  3.7     02-02  Mg     2.20     02-02      LFTs   Coagulopathy PTT - ( 02 Feb 2024 11:42 )  PTT:>200.0 sec  Lipid Panel 01-30 Chol 117 LDL -- HDL 37<L> Trig 74  A1c   Cardiac enzymes     U/A Urinalysis Basic - ( 02 Feb 2024 02:42 )    Color: x / Appearance: x / SG: x / pH: x  Gluc: 137 mg/dL / Ketone: x  / Bili: x / Urobili: x   Blood: x / Protein: x / Nitrite: x   Leuk Esterase: x / RBC: x / WBC x   Sq Epi: x / Non Sq Epi: x / Bacteria: x      CSF  Immunological  Other    STUDIES & IMAGING:  Studies (EKG, EEG, EMG, etc):     Radiology (XR, CT, MR, U/S, TTE/APRIL):      < from: MR Head No Cont (02.01.24 @ 10:14) >    ACC: [id number] EXAM:  MR BRAIN   ORDERED BY: ASHLY HARRELL     PROCEDURE DATE:  02/01/2024          INTERPRETATION:  STUDY: MR BRAIN WITHOUT CONTRAST.    CLINICAL INDICATION: Stroke.    TECHNIQUE: Multiplanar, multisequence MR imaging of the brain was   performed.    COMPARISON: CT head 1/29/2024    FINDINGS:  Moderate wedge-shaped region of FLAIR hyperintense signal abnormality   with corresponding restricted diffusion involving the superior medial   right cerebellar hemisphere consistent with an acute infarction. There is   no associated acute intraparenchymal hemorrhage.     The ventricles, sulci and cisternal spaces are mildly prominent and   consistent with generalize volume loss  . There are mild to moderate   patchy areas of T2  hyperintensity within the periventricular and   subcortical white matter which are non specific and may be related to   chronic microvascular ischemic changes. Left caudate, bilateral thalami   and right basal ganglia chronic lacunar infarcts.There is no evidence of   mass, or intracranial hemorrhage. There is no extra axial collection. No   midline shift or other significant mass effect is noted.    There is normal flow-void within major cranial vessels suggestive of   their patency.    Prior left cataract surgery. Opacification of the maxillary sinuses and   sphenoid sinuses. The mastoid air cells are predominantly clear.    IMPRESSION:  Moderate size right cerebellar acute infarction. No acute intracranial   hemorrhage.  Moderate chronic microvascular ischemic changes, generalized parenchymal   volume loss and multifocal chronic lacunar infarcts as detailed above.    --- End of Report ---    NETO SNOW MD; Attending Radiologist  This document has been electronically signed. Feb 1 2024 10:43AM    < end of copied text >      CT HEAD:  1. No acute intracranial hemorrhage. Wedge-shaped decreased attenuation   raising concern for an acute to subacute right cerebellar infarct.  2. Question prominent sulcus versus small old right cerebellar infarct.  3. Significant residual paranasal sinus opacification, which nonetheless,   is somewhat improved compared to prior dated 5/8/2023. Correlate   clinically for evidence of persistent acute on chronic sinusitis    CT PERFUSION:  Technical limitations: Somewhat limited by patient motion in all 3 planes   during image acquisition.    Core infarction: 0 ml  Penumbra / tissue at risk for active ischemia: 27 mL involving the right   cerebellar and right posterior temporal occipital brain parenchyma.    CTA HEAD:  1. Mild stenosis involving the cavernous and supraclinoid portions of the   bilateral internal carotid arteries.  2. Hypoplastic left P1 segment with a relative fetal origin of the left   posterior cerebral artery. A prominent right posterior communicating   artery is noted.  3. No evidence of aneurysm. Tiny aneurysms can be beyond the resolution   of CTA technique.    CTA NECK:  1. Moderate deposition of calcified plaque along the proximal aspect of   the left internal carotid artery with mild stenosis in this location.  2. No significant stenosis in association with the right common carotid   artery or right internal carotid artery.  3. Bilateral vertebral arteries are patent.    < from: MR Head No Cont (02.01.24 @ 10:14) >    ACC: [id number] EXAM:  MR BRAIN   ORDERED BY: ASHLY HARRELL     PROCEDURE DATE:  02/01/2024    INTERPRETATION:  STUDY: MR BRAIN WITHOUT CONTRAST.    CLINICAL INDICATION: Stroke.    TECHNIQUE: Multiplanar, multisequence MR imaging of the brain was   performed.    COMPARISON: CT head 1/29/2024    FINDINGS:  Moderate wedge-shaped region of FLAIR hyperintense signal abnormality   with corresponding restricted diffusion involving the superior medial   right cerebellar hemisphere consistent with an acute infarction. There is   no associated acute intraparenchymal hemorrhage.     The ventricles, sulci and cisternal spaces are mildly prominent and   consistent with generalize volume loss  . There are mild to moderate   patchy areas of T2  hyperintensity within the periventricular and   subcortical white matter which are non specific and may be related to   chronic microvascular ischemic changes. Left caudate, bilateral thalami   and right basal ganglia chronic lacunar infarcts.There is no evidence of   mass, or intracranial hemorrhage. There is no extra axial collection. No   midline shift or other significant mass effect is noted.    There is normal flow-void within major cranial vessels suggestive of   their patency.    Prior left cataract surgery. Opacification of the maxillary sinuses and   sphenoid sinuses. The mastoid air cells are predominantly clear.    IMPRESSION:  Moderate size right cerebellar acute infarction. No acute intracranial   hemorrhage.  Moderate chronic microvascular ischemic changes, generalized parenchymal   volume loss and multifocal chronic lacunar infarcts as detailed above.    --- End of Report ---

## 2024-02-02 NOTE — PROGRESS NOTE ADULT - SUBJECTIVE AND OBJECTIVE BOX
Cardiovascular Disease Progress Note    Overnight events: No acute events overnight.  no new cardiac sx  Otherwise review of systems negative    Objective Findings:  T(C): 36.4 (02-02-24 @ 04:45), Max: 37.1 (02-01-24 @ 13:52)  HR: 83 (02-02-24 @ 04:45) (66 - 83)  BP: 149/89 (02-02-24 @ 04:45) (126/86 - 149/89)  RR: 18 (02-02-24 @ 04:45) (17 - 18)  SpO2: 99% (02-02-24 @ 04:45) (96% - 99%)  Wt(kg): --  Daily     Daily       Physical Exam:  Gen: NAD  HEENT: EOMI  CV: RRR, normal S1 + S2, no m/r/g  Lungs: CTAB  Abd: soft, non-tender  Ext: No edema    Telemetry:    Laboratory Data:                        11.3   8.42  )-----------( 251      ( 02 Feb 2024 02:42 )             35.9     02-02    140  |  100  |  19  ----------------------------<  137<H>  3.6   |  28  |  0.79    Ca    8.9      02 Feb 2024 02:42  Phos  3.7     02-02  Mg     2.20     02-02      PTT - ( 02 Feb 2024 02:42 )  PTT:>200.0 sec          Inpatient Medications:  MEDICATIONS  (STANDING):  atorvastatin 80 milliGRAM(s) Oral at bedtime  colchicine 0.6 milliGRAM(s) Oral two times a day  diltiazem    milliGRAM(s) Oral daily  dorzolamide 2%/timolol 0.5% Ophthalmic Solution 1 Drop(s) Left EYE two times a day  finasteride 5 milliGRAM(s) Oral daily  furosemide    Tablet 40 milliGRAM(s) Oral daily  heparin  Infusion.  Unit(s)/Hr (18 mL/Hr) IV Continuous <Continuous>  metoprolol succinate ER 50 milliGRAM(s) Oral <User Schedule>  montelukast 10 milliGRAM(s) Oral daily  pantoprazole    Tablet 40 milliGRAM(s) Oral before breakfast  polyethylene glycol 3350 17 Gram(s) Oral daily  tamsulosin 0.4 milliGRAM(s) Oral at bedtime      Assessment:  73 year old male with past medical history of HTN, HLD, prostate cancer (s/p XRT), asthma, diverticulosis, with multiple GI bleeds requiring multiple units of blood and  s/p IR embolization,  afib, s/p watchman procedure due to contraindication to oral anticoagulant therapy presents to the ED as a code stroke for dizziness, dysarthria, and a facial droop. Was recently admitted at [facility] from 1/15-1/19 for a pericardial effusion. Started on colchicine and recently medrol dose pack with improvement in chest pain.    Recs:  cardiac stable  no e/o acs or chf  cw lasix and kcl supplementation  cw colchicine 0.6mg bid x 3 mo and complete steroid taper  neurology f/u  le yunier duple x+ DVT, likely etiology of acute CVA --> cw hep gtt, vascular following for possible IVC filter  s/p echo, pericardial effusion significantly resolved  eps following, s/p watchman. pending additional imaging with CT or APRIL to evaluate watchman placement (will defer to EP if necessary given new dx of DVT)  tele monitoring  antiplatelet meds per neuro if not otherwise contraindicated given recent gi bleed          Over 25 minutes spent on total encounter; more than 50% of the visit was spent counseling and/or coordinating care by the attending physician.      Isaac Calderón MD   Cardiovascular Disease  [phone]

## 2024-02-02 NOTE — PROGRESS NOTE ADULT - SUBJECTIVE AND OBJECTIVE BOX
Chief Complaint:  Patient is a 73y old  Male who presents with a chief complaint of facial droop (02 Feb 2024 07:40)    Interval Events:   started on hep gtt; no BM since starting hep gtt; no bleeding    Allergies:  No Known Allergies        Hospital Medications:  acetaminophen     Tablet .. 650 milliGRAM(s) Oral every 6 hours PRN  albuterol    90 MICROgram(s) HFA Inhaler 2 Puff(s) Inhalation every 6 hours PRN  aluminum hydroxide/magnesium hydroxide/simethicone Suspension 30 milliLiter(s) Oral every 4 hours PRN  atorvastatin 80 milliGRAM(s) Oral at bedtime  colchicine 0.6 milliGRAM(s) Oral two times a day  diltiazem    milliGRAM(s) Oral daily  dorzolamide 2%/timolol 0.5% Ophthalmic Solution 1 Drop(s) Left EYE two times a day  finasteride 5 milliGRAM(s) Oral daily  furosemide    Tablet 40 milliGRAM(s) Oral daily  heparin  Infusion.  Unit(s)/Hr IV Continuous <Continuous>  melatonin 3 milliGRAM(s) Oral at bedtime PRN  metoprolol succinate ER 50 milliGRAM(s) Oral <User Schedule>  montelukast 10 milliGRAM(s) Oral daily  ondansetron Injectable 4 milliGRAM(s) IV Push every 8 hours PRN  pantoprazole    Tablet 40 milliGRAM(s) Oral before breakfast  polyethylene glycol 3350 17 Gram(s) Oral daily  tamsulosin 0.4 milliGRAM(s) Oral at bedtime      PMHX/PSHX:  Hypertension    Hyperlipidemia    GIB (gastrointestinal bleeding)    Prostate cancer    Gout    Diverticulitis    Asthma    Atrial flutter    Diverticulosis    Presence of Watchman left atrial appendage closure device    History of hemorrhoidectomy    H/O total knee replacement, right        Family history:  No pertinent family history in first degree relatives    No pertinent family history in first degree relatives        PHYSICAL EXAM:   Vital Signs:  Vital Signs Last 24 Hrs  T(C): 36.4 (02 Feb 2024 04:45), Max: 37.1 (01 Feb 2024 13:52)  T(F): 97.5 (02 Feb 2024 04:45), Max: 98.7 (01 Feb 2024 13:52)  HR: 83 (02 Feb 2024 04:45) (66 - 83)  BP: 149/89 (02 Feb 2024 04:45) (126/86 - 149/89)  BP(mean): --  RR: 18 (02 Feb 2024 04:45) (17 - 18)  SpO2: 99% (02 Feb 2024 04:45) (96% - 99%)    Parameters below as of 02 Feb 2024 04:45  Patient On (Oxygen Delivery Method): room air      Daily     Daily     GENERAL:  No acute distress  HEENT:  no scleral icterus  CHEST:  no accessory muscle use  HEART:  Regular rate and rhythm  ABDOMEN:  Soft, non-tender, non-distended  EXTREMITIES:  No edema  SKIN:  No rash/ecchymoses  NEURO:  Alert and oriented x 3    LABS:                        11.3   8.42  )-----------( 251      ( 02 Feb 2024 02:42 )             35.9     Mean Cell Volume: 89.1 fL (02-02-24 @ 02:42)    02-02    140  |  100  |  19  ----------------------------<  137<H>  3.6   |  28  |  0.79    Ca    8.9      02 Feb 2024 02:42  Phos  3.7     02-02  Mg     2.20     02-02        PTT - ( 02 Feb 2024 02:42 )  PTT:>200.0 sec  Urinalysis Basic - ( 02 Feb 2024 02:42 )    Color: x / Appearance: x / SG: x / pH: x  Gluc: 137 mg/dL / Ketone: x  / Bili: x / Urobili: x   Blood: x / Protein: x / Nitrite: x   Leuk Esterase: x / RBC: x / WBC x   Sq Epi: x / Non Sq Epi: x / Bacteria: x                              11.3   8.42  )-----------( 251      ( 02 Feb 2024 02:42 )             35.9                         12.3   8.83  )-----------( 288      ( 01 Feb 2024 07:40 )             39.8                         10.9   8.16  )-----------( 260      ( 31 Jan 2024 03:00 )             33.6 Interval Events:   started on hep gtt; no BM since starting hep gtt; no bleeding    Allergies:  No Known Allergies    Hospital Medications:  acetaminophen     Tablet .. 650 milliGRAM(s) Oral every 6 hours PRN  albuterol    90 MICROgram(s) HFA Inhaler 2 Puff(s) Inhalation every 6 hours PRN  aluminum hydroxide/magnesium hydroxide/simethicone Suspension 30 milliLiter(s) Oral every 4 hours PRN  atorvastatin 80 milliGRAM(s) Oral at bedtime  colchicine 0.6 milliGRAM(s) Oral two times a day  diltiazem    milliGRAM(s) Oral daily  dorzolamide 2%/timolol 0.5% Ophthalmic Solution 1 Drop(s) Left EYE two times a day  finasteride 5 milliGRAM(s) Oral daily  furosemide    Tablet 40 milliGRAM(s) Oral daily  heparin  Infusion.  Unit(s)/Hr IV Continuous <Continuous>  melatonin 3 milliGRAM(s) Oral at bedtime PRN  metoprolol succinate ER 50 milliGRAM(s) Oral <User Schedule>  montelukast 10 milliGRAM(s) Oral daily  ondansetron Injectable 4 milliGRAM(s) IV Push every 8 hours PRN  pantoprazole    Tablet 40 milliGRAM(s) Oral before breakfast  polyethylene glycol 3350 17 Gram(s) Oral daily  tamsulosin 0.4 milliGRAM(s) Oral at bedtime      PMHX/PSHX:  Hypertension    Hyperlipidemia    GIB (gastrointestinal bleeding)    Prostate cancer    Gout    Diverticulitis    Asthma    Atrial flutter    Diverticulosis    Presence of Watchman left atrial appendage closure device    History of hemorrhoidectomy    H/O total knee replacement, right    PHYSICAL EXAM:   Vital Signs:  Vital Signs Last 24 Hrs  T(C): 36.4 (02 Feb 2024 04:45), Max: 37.1 (01 Feb 2024 13:52)  T(F): 97.5 (02 Feb 2024 04:45), Max: 98.7 (01 Feb 2024 13:52)  HR: 83 (02 Feb 2024 04:45) (66 - 83)  BP: 149/89 (02 Feb 2024 04:45) (126/86 - 149/89)  BP(mean): --  RR: 18 (02 Feb 2024 04:45) (17 - 18)  SpO2: 99% (02 Feb 2024 04:45) (96% - 99%)    Parameters below as of 02 Feb 2024 04:45  Patient On (Oxygen Delivery Method): room air      Daily     Daily     GENERAL:  No acute distress  HEENT:  no scleral icterus  CHEST:  no accessory muscle use  HEART:  Regular rate and rhythm  ABDOMEN:  Soft, non-tender, non-distended  EXTREMITIES:  No edema  SKIN:  No rash/ecchymoses  NEURO:  Alert and oriented x 3    LABS:                        11.3   8.42  )-----------( 251      ( 02 Feb 2024 02:42 )             35.9     Mean Cell Volume: 89.1 fL (02-02-24 @ 02:42)    02-02    140  |  100  |  19  ----------------------------<  137<H>  3.6   |  28  |  0.79    Ca    8.9      02 Feb 2024 02:42  Phos  3.7     02-02  Mg     2.20     02-02        PTT - ( 02 Feb 2024 02:42 )  PTT:>200.0 sec  Urinalysis Basic - ( 02 Feb 2024 02:42 )    Color: x / Appearance: x / SG: x / pH: x  Gluc: 137 mg/dL / Ketone: x  / Bili: x / Urobili: x   Blood: x / Protein: x / Nitrite: x   Leuk Esterase: x / RBC: x / WBC x   Sq Epi: x / Non Sq Epi: x / Bacteria: x                              11.3   8.42  )-----------( 251      ( 02 Feb 2024 02:42 )             35.9                         12.3   8.83  )-----------( 288      ( 01 Feb 2024 07:40 )             39.8                         10.9   8.16  )-----------( 260      ( 31 Jan 2024 03:00 )             33.6

## 2024-02-02 NOTE — PROVIDER CONTACT NOTE (CRITICAL VALUE NOTIFICATION) - BACKGROUND
Patient admitted for dizziness, slurred speech, facial droop, CODE stroke in ED. PMH HTN, aflutter, HTN, HLD, GERD, gout, asthma. S/p watchman procedure.

## 2024-02-02 NOTE — PROGRESS NOTE ADULT - ASSESSMENT
_________________________________________________________________________________________  ========>>  M E D I C A L   A T T E N D I N G    F O L L O W  U P  N O T E  <<=========  -----------------------------------------------------------------------------------------------------    - Patient seen and examined by me earlier today.   - In summary,  LENNOX CARTER is a 73y year old man admitted with stroke   - Patient today overall doing ok, comfortable, eating OK.     patient overall improved.. all neurological complaints resolved.. patient ambulating well, eating well, no facial changes...    ==================>> REVIEW OF SYSTEM <<=================    GEN: no fever, no chills, no pain  RESP: no SOB, no cough, no sputum  CVS: no chest pain, no palpitations  GI: no abdominal pain, no nausea,   : no dysuria, no frequency  Neuro: no headache, no dizziness    ==================>> PHYSICAL EXAM <<=================    GEN: A&O X 3 , NAD , comfortable, pleasant, calm .. ambulating independently   HEENT: NCAT, PERRL, MMM, hearing intact  CVS: S1S2 , Irregular , No M/R/G appreciated  PULM: CTA B/L,  no W/R/R appreciated  ABD.: soft. non tender, non distended  Extrem: intact pulses , no edema         ( Note written / Date of service 02-02-24 ( This is certified to be the same as "ENTERED" date above ( for billing purposes)))    ==================>> MEDICATIONS <<====================    atorvastatin 80 milliGRAM(s) Oral at bedtime  colchicine 0.6 milliGRAM(s) Oral two times a day  diltiazem    milliGRAM(s) Oral daily  dorzolamide 2%/timolol 0.5% Ophthalmic Solution 1 Drop(s) Left EYE two times a day  finasteride 5 milliGRAM(s) Oral daily  furosemide    Tablet 40 milliGRAM(s) Oral daily  heparin  Infusion.  Unit(s)/Hr IV Continuous <Continuous>  metoprolol succinate ER 50 milliGRAM(s) Oral <User Schedule>  montelukast 10 milliGRAM(s) Oral daily  pantoprazole    Tablet 40 milliGRAM(s) Oral before breakfast  polyethylene glycol 3350 17 Gram(s) Oral daily  tamsulosin 0.4 milliGRAM(s) Oral at bedtime    MEDICATIONS  (PRN):  acetaminophen     Tablet .. 650 milliGRAM(s) Oral every 6 hours PRN Temp greater or equal to 38C (100.4F), Mild Pain (1 - 3)  albuterol    90 MICROgram(s) HFA Inhaler 2 Puff(s) Inhalation every 6 hours PRN for shortness of breath and/or wheezing  aluminum hydroxide/magnesium hydroxide/simethicone Suspension 30 milliLiter(s) Oral every 4 hours PRN Dyspepsia  melatonin 3 milliGRAM(s) Oral at bedtime PRN Insomnia  ondansetron Injectable 4 milliGRAM(s) IV Push every 8 hours PRN Nausea and/or Vomiting    ___________  Active diet:  Diet, Regular:   DASH/TLC Sodium & Cholesterol Restricted (DASH)  ___________________    ==================>> VITAL SIGNS <<==================  Height (cm): 180.3  Vital Signs Last 24 HrsT(C): 36.4 (02-02-24 @ 04:45)  T(F): 97.5 (02-02-24 @ 04:45), Max: 98.1 (02-01-24 @ 19:31)  HR: 78 (02-02-24 @ 13:23) (78 - 83)  BP: 123/81 (02-02-24 @ 13:23)  RR: 18 (02-02-24 @ 13:23) (17 - 18)  SpO2: 99% (02-02-24 @ 13:23) (96% - 99%)      CAPILLARY BLOOD GLUCOSE         ==================>> LAB AND IMAGING <<==================                        11.3   8.42  )-----------( 251      ( 02 Feb 2024 02:42 )             35.9        02-02    140  |  100  |  19  ----------------------------<  137<H>  3.6   |  28  |  0.79    Ca    8.9      02 Feb 2024 02:42  Phos  3.7     02-02  Mg     2.20     02-02      WBC count:   8.42 <<== ,  8.83 <<== ,  8.16 <<== ,  9.52 <<== ,  11.27 <<==   Hemoglobin:   11.3 <<==,  12.3 <<==,  10.9 <<==,  10.8 <<==,  11.0 <<==  platelets:  251 <==, 288 <==, 260 <==, 264 <==, 267 <==    Creatinine:  0.79  <<==, 0.83  <<==, 0.90  <<==, 0.80  <<==, 0.86  <<==  Sodium:   140  <==, 141  <==, 139  <==, 140  <==, 143  <==      < from: CT Angio Chest TAVR DAYANA w/wo IV Cont (02.02.24 @ 11:10) >  IMPRESSION:    There is a watchman device within left atrial appendage which is not   sealed. Contrast within and distal to the occlusion device on arterial   and delayed phase imaging. No left atrial appendage thrombus.    < end of copied text >    APRIL not needed as discussed    < from: MR Head No Cont (02.01.24 @ 10:14) >  IMPRESSION:  Moderate size right cerebellar acute infarction. No acute intracranial  hemorrhage.  Moderate chronic microvascular ischemic changes, generalized parenchymal   volume loss and multifocal chronic lacunar infarcts as detailed above.  < end of copied text >    < from: TTE W or WO Ultrasound Enhancing Agent (01.30.24 @ 09:15) >  FINDINGS:  Interatrial Septum:  Agitated saline injection reveals bubbles in the left heart, consistent with a patent foramen ovale.  Pericardium:  There is a trace pericardial effusion.  < end of copied text >    < from: CT Angio Neck Stroke Protocol w/ IV Cont (01.29.24 @ 17:59) >  IMPRESSION:  CT HEAD:  1. No acute intracranial hemorrhage. Wedge-shaped decreased attenuation   raising concern for an acute to subacute right cerebellar infarct..  2. Question prominent sulcus versus small old right cerebellar infarct.  3. Significant residual paranasal sinus opacification, which nonetheless,   is somewhat improved compared to prior dated 5/8/2023. Correlate   clinically for evidence of persistent acute on chronic sinusitis  Findings were discussed with RITA Linn of neurology 1/29/2024 5:20  PM by Dr. Behr Ventura with read back confirmation.    CT PERFUSION:  Technical limitations: Somewhat limited by patient motion in all 3 planes   during image acquisition.  Core infarction: 0 ml  Penumbra / tissue at risk for active ischemia: 27 mL involving the right   cerebellar and right posterior temporal occipital brain parenchyma.    CTA HEAD:  1. Mild stenosis involving the cavernous andsupraclinoid portions of the   bilateral internal carotid arteries.  2. Hypoplastic left P1 segment with a relative fetal origin of the left   posterior cerebral artery. A prominent right posterior communicating   artery is noted.  3. No evidence of aneurysm. Tiny aneurysms can be beyond the resolution   of CTA technique.    CTA NECK:  1. Moderate deposition of calcified plaque along the proximal aspect of   the left internal carotid artery with mild stenosis in this location.  2. No significant stenosis in association with the right common carotid   artery or right internal carotid artery.  3. Bilateral vertebral arteries are patent.  < end of copied text >  ___________________________________________________________________________________  ===============>>  A S S E S S M E N T   A N D   P L A N <<===============  ------------------------------------------------------------------------------------------    · Assessment	  73 year old male with past medical history of HTN, HLD, prostate cancer (s/p XRT), asthma, diverticulosis, with multiple GI bleeds requiring multiple units of blood and  s/p IR embolization,  afib, s/p watchman procedure due to contraindication to oral anticoagulant therapy presents to the ED as a code stroke for dizziness, dysarthria, and a facial droop.    Problem/Plan - 1:  ·  Problem: CVA (cerebrovascular accident).   ·  Plan: -Pt presents with acute right facial droop, dysarthria, dizziness. Now mostly resolved. CT head with acute-subacute infarct   -likely in the setting of afib, not on anticoagulation due to history of massive GIB ( however patient post watchman )   -appreciate neuro recs and discussed today post MRI        suspect embolic, related to AF >> requesting GI input for possible use of low dose eliquis > called..   -c/w aspirin for now  / statin  - TTE w/ bubble study as above >> APRIL ordered per discussed with cardio >> to follow   - EP appreciated : CTA to evaluation Watchman position   - PT appreciated >> outpatient PT     Problem/Plan - 2:  ·  Problem: Atrial fibrillation.   ·  Plan: -s/p watchman procedure  -c/w aspirin.   otherwise as above     Cardiology/EP on board, appreciated   - Continue Current medications otherwise and monitor.   -monitor on tele.    Problem/Plan - 3:  ·  Problem: H/O pericarditis.   ·  repeat TTE noted with little effusion  -c/w colchicine and complete medrol pack prescribed by cardiologist   -c/w lasix    Problem/Plan - 4:  ·  Problem: Hypertension.   - going back on home medications  - monitor closely and adjust as needed  - cardio on board     Problem/Plan - 5:  ·  Problem: Need for prophylactic measure.   ·  Plan: dvt ppx: hep sq.     --------------------------------------------  Case discussed with patient, neuro, ACP..   Education given on findings and plan of care  ___________________________  H. JONAS Luu.  Pager: [phone]     _________________________________________________________________________________________  ========>>  M E D I C A L   A T T E N D I N G    F O L L O W  U P  N O T E  <<=========  -----------------------------------------------------------------------------------------------------    - Patient seen and examined by me earlier today.   - In summary,  LENNOX CARTER is a 73y year old man admitted with stroke   - Patient today overall doing ok, comfortable, eating OK.        >> There was a lot of discussions yesterday afternoon: patient with PFO, DVT study was done to evaluate and was positive for acute DVTs, gastroenterology consultants for evaluation for and risk-benefit discussion for Anticoagulation, as noted.     It was decided that the patient had had embolization of diverticular bleeding the past and has not had any Bleeding recently, so patient was started back on anticoagulation with heparin drip yesterday And so far has been tolerating.  >>> Plan is to continue monitoring on heparin drip, if patient tolerates would likely switch to Eliquis, and if not tolerating/it starts to bleed will need IR evaluation for IVC filter.    Patient updated and is aware of the above, and in agreement.    ==================>> REVIEW OF SYSTEM <<=================    GEN: no fever, no chills, no pain  RESP: no SOB, no cough, no sputum  CVS: no chest pain, no palpitations  GI: no abdominal pain, no nausea,   : no dysuria, no frequency  Neuro: no headache, no dizziness    ==================>> PHYSICAL EXAM <<=================    GEN: A&O X 3 , NAD , comfortable, pleasant, calm .. ambulating independently   HEENT: NCAT, PERRL, MMM, hearing intact  CVS: S1S2 , Irregular , No M/R/G appreciated  PULM: CTA B/L,  no W/R/R appreciated  ABD.: soft. non tender, non distended  Extrem: intact pulses , no edema         ( Note written / Date of service 02-02-24 ( This is certified to be the same as "ENTERED" date above ( for billing purposes)))    ==================>> MEDICATIONS <<====================    atorvastatin 80 milliGRAM(s) Oral at bedtime  colchicine 0.6 milliGRAM(s) Oral two times a day  diltiazem    milliGRAM(s) Oral daily  dorzolamide 2%/timolol 0.5% Ophthalmic Solution 1 Drop(s) Left EYE two times a day  finasteride 5 milliGRAM(s) Oral daily  furosemide    Tablet 40 milliGRAM(s) Oral daily  heparin  Infusion.  Unit(s)/Hr IV Continuous <Continuous>  metoprolol succinate ER 50 milliGRAM(s) Oral <User Schedule>  montelukast 10 milliGRAM(s) Oral daily  pantoprazole    Tablet 40 milliGRAM(s) Oral before breakfast  polyethylene glycol 3350 17 Gram(s) Oral daily  tamsulosin 0.4 milliGRAM(s) Oral at bedtime    MEDICATIONS  (PRN):  acetaminophen     Tablet .. 650 milliGRAM(s) Oral every 6 hours PRN Temp greater or equal to 38C (100.4F), Mild Pain (1 - 3)  albuterol    90 MICROgram(s) HFA Inhaler 2 Puff(s) Inhalation every 6 hours PRN for shortness of breath and/or wheezing  aluminum hydroxide/magnesium hydroxide/simethicone Suspension 30 milliLiter(s) Oral every 4 hours PRN Dyspepsia  melatonin 3 milliGRAM(s) Oral at bedtime PRN Insomnia  ondansetron Injectable 4 milliGRAM(s) IV Push every 8 hours PRN Nausea and/or Vomiting    ___________  Active diet:  Diet, Regular:   DASH/TLC Sodium & Cholesterol Restricted (DASH)  ___________________    ==================>> VITAL SIGNS <<==================  Height (cm): 180.3  Vital Signs Last 24 HrsT(C): 36.4 (02-02-24 @ 04:45)  T(F): 97.5 (02-02-24 @ 04:45), Max: 98.1 (02-01-24 @ 19:31)  HR: 78 (02-02-24 @ 13:23) (78 - 83)  BP: 123/81 (02-02-24 @ 13:23)  RR: 18 (02-02-24 @ 13:23) (17 - 18)  SpO2: 99% (02-02-24 @ 13:23) (96% - 99%)         ==================>> LAB AND IMAGING <<==================                        11.3   8.42  )-----------( 251      ( 02 Feb 2024 02:42 )             35.9        02-02    140  |  100  |  19  ----------------------------<  137<H>  3.6   |  28  |  0.79    Ca    8.9      02 Feb 2024 02:42  Phos  3.7     02-02  Mg     2.20     02-02      WBC count:   8.42 <<== ,  8.83 <<== ,  8.16 <<== ,  9.52 <<== ,  11.27 <<==   Hemoglobin:   11.3 <<==,  12.3 <<==,  10.9 <<==,  10.8 <<==,  11.0 <<==  platelets:  251 <==, 288 <==, 260 <==, 264 <==, 267 <==    Creatinine:  0.79  <<==, 0.83  <<==, 0.90  <<==, 0.80  <<==, 0.86  <<==  Sodium:   140  <==, 141  <==, 139  <==, 140  <==, 143  <==      < from: VA Duplex Venous Lower Ext Complete, Bilateral (02.01.24 @ 15:23) >  CONCLUSIONS:    1. Acute, non-occlusive deep vein thrombosis is visualized in the right posterior tibial vein at proximal calf.   2. Acute, occlusive deep vein thrombosis is visualized in the right gastrocnemius vein.  < end of copied text >      < from: CT Angio Chest TAVR DAYANA w/wo IV Cont (02.02.24 @ 11:10) >  IMPRESSION:  There is a watchman device within left atrial appendage which is not   sealed. Contrast within and distal to the occlusion device on arterial   and delayed phase imaging. No left atrial appendage thrombus.  < end of copied text >    APRIL not needed as discussed With neurology, EP    < from: MR Head No Cont (02.01.24 @ 10:14) >  IMPRESSION:  Moderate size right cerebellar acute infarction. No acute intracranial  hemorrhage.  Moderate chronic microvascular ischemic changes, generalized parenchymal   volume loss and multifocal chronic lacunar infarcts as detailed above.  < end of copied text >    < from: TTE W or WO Ultrasound Enhancing Agent (01.30.24 @ 09:15) >  FINDINGS:  Interatrial Septum:  Agitated saline injection reveals bubbles in the left heart, consistent with a patent foramen ovale.  Pericardium:  There is a trace pericardial effusion.  < end of copied text >    < from: CT Angio Neck Stroke Protocol w/ IV Cont (01.29.24 @ 17:59) >  IMPRESSION:  CT HEAD:  1. No acute intracranial hemorrhage. Wedge-shaped decreased attenuation   raising concern for an acute to subacute right cerebellar infarct..  2. Question prominent sulcus versus small old right cerebellar infarct.  3. Significant residual paranasal sinus opacification, which nonetheless,   is somewhat improved compared to prior dated 5/8/2023. Correlate   clinically for evidence of persistent acute on chronic sinusitis  Findings were discussed with RITA Linn of neurology 1/29/2024 5:20  PM by Dr. Behr Ventura with read back confirmation.    CT PERFUSION:  Technical limitations: Somewhat limited by patient motion in all 3 planes   during image acquisition.  Core infarction: 0 ml  Penumbra / tissue at risk for active ischemia: 27 mL involving the right   cerebellar and right posterior temporal occipital brain parenchyma.    CTA HEAD:  1. Mild stenosis involving the cavernous andsupraclinoid portions of the   bilateral internal carotid arteries.  2. Hypoplastic left P1 segment with a relative fetal origin of the left   posterior cerebral artery. A prominent right posterior communicating   artery is noted.  3. No evidence of aneurysm. Tiny aneurysms can be beyond the resolution   of CTA technique.    CTA NECK:  1. Moderate deposition of calcified plaque along the proximal aspect of   the left internal carotid artery with mild stenosis in this location.  2. No significant stenosis in association with the right common carotid   artery or right internal carotid artery.  3. Bilateral vertebral arteries are patent.  < end of copied text >  ___________________________________________________________________________________  ===============>>  A S S E S S M E N T   A N D   P L A N <<===============  ------------------------------------------------------------------------------------------    · Assessment	  73 year old male with past medical history of HTN, HLD, prostate cancer (s/p XRT), asthma, diverticulosis, with multiple GI bleeds requiring multiple units of blood and  s/p IR embolization,  afib, s/p watchman procedure due to contraindication to oral anticoagulant therapy presents to the ED as a code stroke for dizziness, dysarthria, and a facial droop.    Problem/Plan - 1:  ·  Problem: Acute CVA (cerebrovascular accident).   ·  Plan: -Pt presents with acute right facial droop, dysarthria, dizziness. Now mostly resolved. CT head with acute-subacute infarct   -likely in the setting of afib, not on anticoagulation due to history of massive GIB ( however patient post watchman )   -appreciate neuro recs and discussed today post MRI        suspect embolic, related to DVT and PFO   - TTE w/ bubble study Showing PFO   - acute DVTs as noted above  - EP appreciated : CTA to evaluation Watchman position >> As above, with known leak  - Patient on heparin drip, tolerating so far >>> Monitor closely for any G.I. bleeding  >>> Plan is to continue monitoring on heparin drip, if patient tolerates would likely switch to Eliquis, and if not tolerating/it starts to bleed will need IR evaluation for IVC filter.    Patient updated and is aware of the above, and in agreement.    Problem/Plan - 2:  ·  Problem: Atrial fibrillation.   ·  Plan: -s/p watchman procedure  -c/w aspirin.   otherwise as above     Cardiology/EP on board, appreciated   - Continue Current medications otherwise and monitor.   -monitor on tele.    Problem/Plan - 3:  ·  Problem: H/O pericarditis.   ·  repeat TTE noted with little effusion  -c/w colchicine and complete medrol pack prescribed by cardiologist   -c/w lasix    Problem/Plan - 4:  ·  Problem: Hypertension.   - going back on home medications  - monitor closely and adjust as needed  - cardio on board     Problem/Plan - 5:  ·  Problem: Need for prophylactic measure.   ·  Plan: dvt ppx: hep sq.     --------------------------------------------  Case discussed with patient, ACP.. cardio team..   Education given on findings and plan of care  ___________________________  HAleks Luu D.O.  Pager: [phone]

## 2024-02-02 NOTE — PROGRESS NOTE ADULT - ASSESSMENT
INCOMPLETE     RoPE Score: ~3  Michele Classification: Probable     Impression: Resolved Right facial palsy, left superior quadrantopia, dysarthria due to brainstem and possibly occipital lobe dysfunction found with acute R cerebellar infarct. Mechanism consistent with cardioembolism from competing causes. Patient has a history of atrial fibrillation s/p Watchman Device - must confirm proper placement and rule out device related thrombosis. At this time it is unclear whether the PFO is symptomatic, but the suspicion is raised for paradoxical embolus given the multiple, acute DVTs.     [] At this time, suggest continuing cardiac workup to rule out device related thrombosis and confirm placement of Watchman. If negative, benefits and risks of long term anticoagulation should be assessed. If patient is able to tolerate anticoagulation in the long term then perhaps an elective PFO closure can be considered in the future. If patient is not a good candidate for anticoagulation for the long term, then likely an IVC filter will need to be considered and earlier PFO closure may be warranted. Would appreciate input from cardiology as well.   [] No objection to heparin gtt from neurovascular standpoint  [] Discontinue ASA 81 mg while on anticoagulation  [] STAT rCTH if decline in neuro exam   [] Continue to follow recommendations from cardiology, gastroenterology; appreciate care   [] APRIL vs CT for watchman placement   [] Tight glucose control (long-term goal HgbA1c < 6%)  [] Stroke education and counseling  [] Neuro-checks and VS q4h  [] Normotension   [] STAT CT head non-contrast for change in neuro exam  [] PT/ OT - outpatient PT   [] Patient should follow up with Stroke NP, Kira Marino or Kelin Umaña, in clinic at [address] [place] [address]. Please email [email or web address] w/ basic PHI.     Patient case discussed and seen with stroke attending, Dr. Libman.    Please call with questions: [phone]  73 year old male with past medical history of HTN, HLD, prostate cancer (s/p XRT), asthma, diverticulosis, with multiple GI bleeds requiring multiple units of blood and  s/p IR embolization,  afib, s/p watchman procedure due to contraindication to oral anticoagulant therapy presents to the ED as a code stroke for dizziness, dysarthria, and a facial droop.  Per patient and his wife at bedside he was eating lunch around 1pm (LKN 1/29/24 at 13:00) and then around 2pm he got up to wash the dishes and started to feel dizzy which he described as room spinning.  He was saying that maybe he felt the dizziness a little earlier than 1pm today but is unsure.  Also noticed with dysarthria prior to coming into the ED but on exam now has resolved, although voice sounds hoarse, but per patient and wife that is his baseline.  Has history of afib and was on AC before but required multiple units of blood for bleeds, so watchman was placed 11/23 per family and he was on AC for 1 week after procedure and had another GI bleed requiring 10 units of blood, so AC was discontinued and he was continued on aspirin alone.  Has been on DAPT in the past with aspirin and plavix and also had GI bleeding.  Has not had any further GI bleeding while being on aspirin alone.  Was recently admitted at [facility] from 1/15-1/19 for a pericardial effusion.  Denies any recent illnesses, fevers, chills, nausea, vomiting, or headaches. Not a tenecteplase candidate due to history of GIB requiring transfusions and IR embolization. Not a thrombectomy candidate due to no LVO on CTA.  MRI brain w/o contrast shows R SCA infarct. Found of Venous dopplers to have b/l acute DVTs. Pending cardiac imaging to check watchman device. Started on heparin gtt cleared by GI for tx of DVTs.     RoPE Score: ~3  Michele Classification: Probable     Impression: Resolved Right facial palsy, left superior quadrantopia, dysarthria due to brainstem and possibly occipital lobe dysfunction found with acute R cerebellar infarct. Mechanism consistent with cardioembolism from competing causes. Patient has a history of atrial fibrillation s/p Watchman Device - must confirm proper placement and rule out device related thrombosis. At this time it is unclear whether the PFO is symptomatic, but the suspicion is raised for paradoxical embolus given the multiple, acute DVTs.     [] At this time, suggest continuing cardiac workup to rule out device related thrombosis and confirm placement of Watchman. If negative, benefits and risks of long term anticoagulation should be assessed. If patient is able to tolerate anticoagulation in the long term then perhaps an elective PFO closure can be considered in the future. If patient is not a good candidate for anticoagulation for the long term, then likely an IVC filter will need to be considered and earlier PFO closure may be warranted. Would appreciate input from cardiology as well.   [] No objection to heparin gtt from neurovascular standpoint  [] Discontinue ASA 81 mg while on anticoagulation  [] STAT rCTH if decline in neuro exam   [] Continue to follow recommendations from cardiology, gastroenterology; appreciate care   [] APRIL vs CT for watchman placement   [] Tight glucose control (long-term goal HgbA1c < 6%)  [] Stroke education and counseling  [] Neuro-checks and VS q4h  [] Normotension   [] STAT CT head non-contrast for change in neuro exam  [] PT/ OT - outpatient PT   [] Patient should follow up with Stroke NP, Kira Marino or Kelin Umaña, in clinic at [address] [place] [address]. Please email [email or web address] w/ basic PHI.     Patient case discussed and seen with stroke attending, Dr. Libman.    Please call with questions: [phone]  73 year old male with past medical history of HTN, HLD, prostate cancer (s/p XRT), asthma, diverticulosis, with multiple GI bleeds requiring multiple units of blood and  s/p IR embolization,  afib, s/p watchman procedure due to contraindication to oral anticoagulant therapy presents to the ED as a code stroke for dizziness, dysarthria, and a facial droop.  Per patient and his wife at bedside he was eating lunch around 1pm (LKN 1/29/24 at 13:00) and then around 2pm he got up to wash the dishes and started to feel dizzy which he described as room spinning.  He was saying that maybe he felt the dizziness a little earlier than 1pm today but is unsure.  Also noticed with dysarthria prior to coming into the ED but on exam now has resolved, although voice sounds hoarse, but per patient and wife that is his baseline.  Has history of afib and was on AC before but required multiple units of blood for bleeds, so watchman was placed 11/23 per family and he was on AC for 1 week after procedure and had another GI bleed requiring 10 units of blood, so AC was discontinued and he was continued on aspirin alone.  Has been on DAPT in the past with aspirin and plavix and also had GI bleeding.  Has not had any further GI bleeding while being on aspirin alone.  Was recently admitted at [facility] from 1/15-1/19 for a pericardial effusion.  Denies any recent illnesses, fevers, chills, nausea, vomiting, or headaches. Not a tenecteplase candidate due to history of GIB requiring transfusions and IR embolization. Not a thrombectomy candidate due to no LVO on CTA.  MRI brain w/o contrast shows R SCA infarct. Found of Venous dopplers to have b/l acute DVTs. Pending cardiac imaging to check watchman device. Started on heparin gtt risk stratified by GI/ medicine for tx of DVTs.     RoPE Score: ~3  Michele Classification: Probable     Impression: Resolved Right facial palsy, left superior quadrantopia, dysarthria due to brainstem and possibly occipital lobe dysfunction found with acute R cerebellar infarct. Mechanism consistent with cardioembolism from competing causes. Patient has a history of atrial fibrillation s/p Watchman Device - must confirm proper placement and rule out device related thrombosis. At this time it is unclear whether the PFO is symptomatic, but the suspicion is raised for paradoxical embolus given the multiple, acute DVTs.     [] At this time, suggest continuing cardiac workup to rule out device related thrombosis and confirm placement of Watchman. If negative, benefits and risks of long term anticoagulation should be assessed. If patient is able to tolerate anticoagulation in the long term then perhaps an elective PFO closure can be considered in the future. If patient is not a good candidate for anticoagulation for the long term, then likely an IVC filter will need to be considered and earlier PFO closure may be warranted. Would appreciate input from cardiology as well.   [] No objection to heparin gtt from neurovascular standpoint  [] Discontinue ASA 81 mg while on anticoagulation  [] STAT rCTH if decline in neuro exam   [] Continue to follow recommendations from cardiology, gastroenterology; appreciate care   [] APRIL vs CT for watchman placement   [] Tight glucose control (long-term goal HgbA1c < 6%)  [] Stroke education and counseling  [] Neuro-checks and VS q4h  [] Normotension   [] STAT CT head non-contrast for change in neuro exam  [] PT/ OT - outpatient PT   [] Patient should follow up with Stroke NP, Kira Marino or Kelin Umaña, in clinic at [address] [place] [address]. Please email [email or web address] w/ basic PHI.     Please call with questions: [phone]     Patient was seen w/ neuro attending Dr Libman and stroke team. Recommendations above in agreement with neuro attending at time of note documentation and any resident documentation updates. Reviewed relevant neurologic imaging and findings with patient and primary team medicine attending. Discussed patient care with primary team attending, patient and in agreement. Delay in patient note entry/ updates to note due to patient care. Recommendations finalized/addended once signed by attending.

## 2024-02-02 NOTE — PROGRESS NOTE ADULT - ATTENDING COMMENTS
No overt bleeding reported. Heparin drip had been started by primary team, noted to be supratherapeutic with PTT > 200 this AM. Would monitor for recurrent overt bleeding given history of diverticulosis c/b bleeding in the past. If bleeding were to occur, given extensive diverticular disease, would suggest IR consultation for potential repeat embolization.

## 2024-02-02 NOTE — PROGRESS NOTE ADULT - ASSESSMENT
73M with hx of prostate cancer (s/p XRT) c/b RAVE, asthma, diverticulosis c/b recurrent diverticular bleeding (x3; last episode 8/2023 requiring 10u pRBC, s/p right colic artery embolization, AF s/p watchman admitted with new CVA    Impression  #CVA, cerebellar infarct; concern for AF induced embolic CVA vs. 2/2 PFO VA Duplex positive for DVT  #hx of recurrent diverticular bleeding, s/p embolization 8/2023; no further episodes of recurrent bleeding but also never retrialed on DAPT or AC since embolization  - 3 lifetime episodes of diverticular bleeding requiring hosptialization/transfusion. Pt s/p Watchman for new AF; procedure performed 8/2023 followed by DAPT c/b recurrent diverticular bleeding requiring 10u pRBC followed by embolization of right colic artery. Pt not retrialed on AC    #AF s/p Watchman 8/2023  - Hgb ranging 10-12 without any overt GI bleeding  - colonoscopy 5/2023 with severe diverticulosis and subtle RAVE    Recommendations  - started on heparin gtt  - strict PTT monitoring to prevent supratherapeutic levels  - monitor for signs of overt bleeding  - GI will sign off. Please call with questions or bleeding noted    Note and recommendations are incomplete until reviewed and attested by attending.    Zora Butler MD  GI/Hepatology Fellow, PGY4  Teams preferred (7AM to 5PM); after 5PM, call GI fellow on call    NEW CONSULTS:  Please email [email or web address] OR [email or web address]    Note and recommendations are incomplete until reviewed and attested by attending.    Zora Butler MD  GI/Hepatology Fellow, PGY4  Teams preferred (7AM to 5PM); after 5PM, call GI fellow on call    NEW CONSULTS:  Please email [email or web address] OR [email or web address]

## 2024-02-03 LAB
ANION GAP SERPL CALC-SCNC: 10 MMOL/L — SIGNIFICANT CHANGE UP (ref 7–14)
APTT BLD: 160.7 SEC — SIGNIFICANT CHANGE UP (ref 24.5–35.6)
APTT BLD: 98.4 SEC — HIGH (ref 24.5–35.6)
BUN SERPL-MCNC: 18 MG/DL — SIGNIFICANT CHANGE UP (ref 7–23)
CALCIUM SERPL-MCNC: 8.7 MG/DL — SIGNIFICANT CHANGE UP (ref 8.4–10.5)
CHLORIDE SERPL-SCNC: 102 MMOL/L — SIGNIFICANT CHANGE UP (ref 98–107)
CO2 SERPL-SCNC: 28 MMOL/L — SIGNIFICANT CHANGE UP (ref 22–31)
CREAT SERPL-MCNC: 0.8 MG/DL — SIGNIFICANT CHANGE UP (ref 0.5–1.3)
EGFR: 93 ML/MIN/1.73M2 — SIGNIFICANT CHANGE UP
GLUCOSE SERPL-MCNC: 111 MG/DL — HIGH (ref 70–99)
HCT VFR BLD CALC: 35.1 % — LOW (ref 39–50)
HGB BLD-MCNC: 11.2 G/DL — LOW (ref 13–17)
MAGNESIUM SERPL-MCNC: 2.1 MG/DL — SIGNIFICANT CHANGE UP (ref 1.6–2.6)
MCHC RBC-ENTMCNC: 28.7 PG — SIGNIFICANT CHANGE UP (ref 27–34)
MCHC RBC-ENTMCNC: 31.9 GM/DL — LOW (ref 32–36)
MCV RBC AUTO: 90 FL — SIGNIFICANT CHANGE UP (ref 80–100)
NRBC # BLD: 0 /100 WBCS — SIGNIFICANT CHANGE UP (ref 0–0)
NRBC # FLD: 0 K/UL — SIGNIFICANT CHANGE UP (ref 0–0)
PHOSPHATE SERPL-MCNC: 4.1 MG/DL — SIGNIFICANT CHANGE UP (ref 2.5–4.5)
PLATELET # BLD AUTO: 243 K/UL — SIGNIFICANT CHANGE UP (ref 150–400)
POTASSIUM SERPL-MCNC: 3.5 MMOL/L — SIGNIFICANT CHANGE UP (ref 3.5–5.3)
POTASSIUM SERPL-SCNC: 3.5 MMOL/L — SIGNIFICANT CHANGE UP (ref 3.5–5.3)
RBC # BLD: 3.9 M/UL — LOW (ref 4.2–5.8)
RBC # FLD: 14.8 % — HIGH (ref 10.3–14.5)
SODIUM SERPL-SCNC: 140 MMOL/L — SIGNIFICANT CHANGE UP (ref 135–145)
WBC # BLD: 6.1 K/UL — SIGNIFICANT CHANGE UP (ref 3.8–10.5)
WBC # FLD AUTO: 6.1 K/UL — SIGNIFICANT CHANGE UP (ref 3.8–10.5)

## 2024-02-03 RX ORDER — APIXABAN 2.5 MG/1
1 TABLET, FILM COATED ORAL
Qty: 60 | Refills: 0
Start: 2024-02-03 | End: 2024-03-03

## 2024-02-03 RX ORDER — APIXABAN 2.5 MG/1
2.5 TABLET, FILM COATED ORAL EVERY 12 HOURS
Refills: 0 | Status: DISCONTINUED | OUTPATIENT
Start: 2024-02-03 | End: 2024-02-05

## 2024-02-03 RX ADMIN — HEPARIN SODIUM 900 UNIT(S)/HR: 5000 INJECTION INTRAVENOUS; SUBCUTANEOUS at 14:18

## 2024-02-03 RX ADMIN — DORZOLAMIDE HYDROCHLORIDE TIMOLOL MALEATE 1 DROP(S): 20; 5 SOLUTION/ DROPS OPHTHALMIC at 17:28

## 2024-02-03 RX ADMIN — PANTOPRAZOLE SODIUM 40 MILLIGRAM(S): 20 TABLET, DELAYED RELEASE ORAL at 04:29

## 2024-02-03 RX ADMIN — HEPARIN SODIUM 1200 UNIT(S)/HR: 5000 INJECTION INTRAVENOUS; SUBCUTANEOUS at 05:52

## 2024-02-03 RX ADMIN — Medication 50 MILLIGRAM(S): at 12:40

## 2024-02-03 RX ADMIN — Medication 40 MILLIGRAM(S): at 04:29

## 2024-02-03 RX ADMIN — ATORVASTATIN CALCIUM 80 MILLIGRAM(S): 80 TABLET, FILM COATED ORAL at 21:16

## 2024-02-03 RX ADMIN — MONTELUKAST 10 MILLIGRAM(S): 4 TABLET, CHEWABLE ORAL at 12:40

## 2024-02-03 RX ADMIN — APIXABAN 2.5 MILLIGRAM(S): 2.5 TABLET, FILM COATED ORAL at 17:27

## 2024-02-03 RX ADMIN — POLYETHYLENE GLYCOL 3350 17 GRAM(S): 17 POWDER, FOR SOLUTION ORAL at 12:40

## 2024-02-03 RX ADMIN — TAMSULOSIN HYDROCHLORIDE 0.4 MILLIGRAM(S): 0.4 CAPSULE ORAL at 21:17

## 2024-02-03 RX ADMIN — Medication 0.6 MILLIGRAM(S): at 04:29

## 2024-02-03 RX ADMIN — DORZOLAMIDE HYDROCHLORIDE TIMOLOL MALEATE 1 DROP(S): 20; 5 SOLUTION/ DROPS OPHTHALMIC at 04:29

## 2024-02-03 RX ADMIN — Medication 0.6 MILLIGRAM(S): at 17:28

## 2024-02-03 RX ADMIN — HEPARIN SODIUM 0 UNIT(S)/HR: 5000 INJECTION INTRAVENOUS; SUBCUTANEOUS at 12:58

## 2024-02-03 RX ADMIN — HEPARIN SODIUM 1200 UNIT(S)/HR: 5000 INJECTION INTRAVENOUS; SUBCUTANEOUS at 07:16

## 2024-02-03 RX ADMIN — FINASTERIDE 5 MILLIGRAM(S): 5 TABLET, FILM COATED ORAL at 12:40

## 2024-02-03 RX ADMIN — Medication 120 MILLIGRAM(S): at 04:29

## 2024-02-03 NOTE — PROVIDER CONTACT NOTE (CRITICAL VALUE NOTIFICATION) - ASSESSMENT
Pt stable. No s/s of bleeding noted.
aPtt greater than 200. Pt does not have any s/s of bleeding. Pt denies chest pain/SOB pain or discomfort at this time.
Patient A&Ox4, VS as documented, afib on tele. Patient denies chest pain and SOB. Heparin running at 15 mL/hr. Patient denies any stomach pain, changes in BM color. No s/s of bleeding noted.

## 2024-02-03 NOTE — PROGRESS NOTE ADULT - SUBJECTIVE AND OBJECTIVE BOX
Cardiovascular Disease Progress Note    Overnight events: No acute events overnight.  no new cardiac sx  Otherwise review of systems negative    Objective Findings:  T(C): 36.7 (02-03-24 @ 04:20), Max: 36.7 (02-03-24 @ 04:20)  HR: 67 (02-03-24 @ 04:20) (67 - 78)  BP: 133/90 (02-03-24 @ 04:20) (123/81 - 139/96)  RR: 17 (02-03-24 @ 04:20) (17 - 18)  SpO2: 99% (02-03-24 @ 04:20) (99% - 99%)  Wt(kg): --  Daily     Daily       Physical Exam:  Gen: NAD  HEENT: EOMI  CV: RRR, normal S1 + S2, no m/r/g  Lungs: CTAB  Abd: soft, non-tender  Ext: No edema    Telemetry:    Laboratory Data:                        11.2   6.10  )-----------( 243      ( 03 Feb 2024 04:30 )             35.1     02-03    140  |  102  |  18  ----------------------------<  111<H>  3.5   |  28  |  0.80    Ca    8.7      03 Feb 2024 04:30  Phos  4.1     02-03  Mg     2.10     02-03      PTT - ( 03 Feb 2024 04:30 )  PTT:98.4 sec          Inpatient Medications:  MEDICATIONS  (STANDING):  atorvastatin 80 milliGRAM(s) Oral at bedtime  colchicine 0.6 milliGRAM(s) Oral two times a day  diltiazem    milliGRAM(s) Oral daily  dorzolamide 2%/timolol 0.5% Ophthalmic Solution 1 Drop(s) Left EYE two times a day  finasteride 5 milliGRAM(s) Oral daily  furosemide    Tablet 40 milliGRAM(s) Oral daily  heparin  Infusion. 1200 Unit(s)/Hr (12 mL/Hr) IV Continuous <Continuous>  metoprolol succinate ER 50 milliGRAM(s) Oral <User Schedule>  montelukast 10 milliGRAM(s) Oral daily  pantoprazole    Tablet 40 milliGRAM(s) Oral before breakfast  polyethylene glycol 3350 17 Gram(s) Oral daily  tamsulosin 0.4 milliGRAM(s) Oral at bedtime      Assessment:  73 year old male with past medical history of HTN, HLD, prostate cancer (s/p XRT), asthma, diverticulosis, with multiple GI bleeds requiring multiple units of blood and  s/p IR embolization,  afib, s/p watchman procedure due to contraindication to oral anticoagulant therapy presents to the ED as a code stroke for dizziness, dysarthria, and a facial droop. Was recently admitted at [facility] from 1/15-1/19 for a pericardial effusion. Started on colchicine and recently medrol dose pack with improvement in chest pain.    Recs:  cardiac stable  no e/o acs or chf  cw lasix and kcl supplementation  cw colchicine 0.6mg bid x 3 mo and complete steroid taper  neurology f/u  le yunier duplex+ DVT and persistent atrial fibrillation, likely etiology of acute CVA --> cw hep gtt, vascular following for possible IVC filter. suggest transition to eliquis 2.5mg bid  s/p echo, pericardial effusion significantly resolved  eps following, s/p watchman. s/p CTA suggestive of possible leak  tele monitoring          Over 25 minutes spent on total encounter; more than 50% of the visit was spent counseling and/or coordinating care by the attending physician.      Isaac Calderón MD   Cardiovascular Disease  [phone]

## 2024-02-03 NOTE — PROVIDER CONTACT NOTE (CRITICAL VALUE NOTIFICATION) - SITUATION
aPtt greater than 200. Pt does not have any s/s of bleeding. Pt denies chest pain/SOB pain or discomfort at this time.
Pt on heparin gtt, ptt 160.7
aPTT greater than 200

## 2024-02-03 NOTE — PROGRESS NOTE ADULT - ASSESSMENT
_________________________________________________________________________________________  ========>>  M E D I C A L   A T T E N D I N G    F O L L O W  U P  N O T E  <<=========  -----------------------------------------------------------------------------------------------------    - Patient seen and examined by me earlier today.   - In summary,  LENNOX CARTER is a 73y year old man admitted with stroke   - Patient today overall doing ok, comfortable, eating OK.        >> There was a lot of discussions yesterday afternoon: patient with PFO, DVT study was done to evaluate and was positive for acute DVTs, gastroenterology consultants for evaluation for and risk-benefit discussion for Anticoagulation, as noted.     It was decided that the patient had had embolization of diverticular bleeding the past and has not had any Bleeding recently, so patient was started back on anticoagulation with heparin drip yesterday And so far has been tolerating.  >>> Plan is to continue monitoring on heparin drip, if patient tolerates would likely switch to Eliquis, and if not tolerating/it starts to bleed will need IR evaluation for IVC filter.    Patient updated and is aware of the above, and in agreement.    ==================>> REVIEW OF SYSTEM <<=================    GEN: no fever, no chills, no pain  RESP: no SOB, no cough, no sputum  CVS: no chest pain, no palpitations  GI: no abdominal pain, no nausea,   : no dysuria, no frequency  Neuro: no headache, no dizziness    ==================>> PHYSICAL EXAM <<=================    GEN: A&O X 3 , NAD , comfortable, pleasant, calm .. ambulating independently   HEENT: NCAT, PERRL, MMM, hearing intact  CVS: S1S2 , Irregular , No M/R/G appreciated  PULM: CTA B/L,  no W/R/R appreciated  ABD.: soft. non tender, non distended  Extrem: intact pulses , no edema          ( Note written / Date of service 02-03-24 ( This is certified to be the same as "ENTERED" date above ( for billing purposes)))    ==================>> MEDICATIONS <<====================    atorvastatin 80 milliGRAM(s) Oral at bedtime  colchicine 0.6 milliGRAM(s) Oral two times a day  diltiazem    milliGRAM(s) Oral daily  dorzolamide 2%/timolol 0.5% Ophthalmic Solution 1 Drop(s) Left EYE two times a day  finasteride 5 milliGRAM(s) Oral daily  furosemide    Tablet 40 milliGRAM(s) Oral daily  heparin  Infusion. 1200 Unit(s)/Hr IV Continuous <Continuous>  metoprolol succinate ER 50 milliGRAM(s) Oral <User Schedule>  montelukast 10 milliGRAM(s) Oral daily  pantoprazole    Tablet 40 milliGRAM(s) Oral before breakfast  polyethylene glycol 3350 17 Gram(s) Oral daily  tamsulosin 0.4 milliGRAM(s) Oral at bedtime    MEDICATIONS  (PRN):  acetaminophen     Tablet .. 650 milliGRAM(s) Oral every 6 hours PRN Temp greater or equal to 38C (100.4F), Mild Pain (1 - 3)  albuterol    90 MICROgram(s) HFA Inhaler 2 Puff(s) Inhalation every 6 hours PRN for shortness of breath and/or wheezing  aluminum hydroxide/magnesium hydroxide/simethicone Suspension 30 milliLiter(s) Oral every 4 hours PRN Dyspepsia  melatonin 3 milliGRAM(s) Oral at bedtime PRN Insomnia  ondansetron Injectable 4 milliGRAM(s) IV Push every 8 hours PRN Nausea and/or Vomiting    ___________  Active diet:  Diet, Regular:   DASH/TLC Sodium & Cholesterol Restricted (DASH)  ___________________    ==================>> VITAL SIGNS <<==================    Vital Signs Last 24 HrsT(C): 36.4 (02-03-24 @ 12:37)  T(F): 97.6 (02-03-24 @ 12:37), Max: 98 (02-03-24 @ 04:20)  HR: 69 (02-03-24 @ 12:37) (67 - 74)  BP: 113/75 (02-03-24 @ 12:37)  RR: 17 (02-03-24 @ 12:37) (17 - 18)  SpO2: 97% (02-03-24 @ 12:37) (97% - 99%)      CAPILLARY BLOOD GLUCOSE         ==================>> LAB AND IMAGING <<==================                        11.2   6.10  )-----------( 243      ( 03 Feb 2024 04:30 )             35.1        02-03    140  |  102  |  18  ----------------------------<  111<H>  3.5   |  28  |  0.80    Ca    8.7      03 Feb 2024 04:30  Phos  4.1     02-03  Mg     2.10     02-03      WBC count:   6.10 <<== ,  8.42 <<== ,  8.83 <<== ,  8.16 <<== ,  9.52 <<== ,  11.27 <<==   Hemoglobin:   11.2 <<==,  11.3 <<==,  12.3 <<==,  10.9 <<==,  10.8 <<==,  11.0 <<==  platelets:  243 <==, 251 <==, 288 <==, 260 <==, 264 <==, 267 <==    Creatinine:  0.80  <<==, 0.79  <<==, 0.83  <<==, 0.90  <<==, 0.80  <<==, 0.86  <<==  Sodium:   140  <==, 140  <==, 141  <==, 139  <==, 140  <==, 143  <==       AST:          20(01-29) <==      ALT:        17(01-29)  <==      AP:        92(01-29)  <=     Bili:        0.4(01-29)  <=    ____________________________    M I C R O B I O L O G Y :      SARS-CoV-2: NotDetec (01-15-24 @ 20:02)      < from: VA Duplex Venous Lower Ext Complete, Bilateral (02.01.24 @ 15:23) >  CONCLUSIONS:    1. Acute, non-occlusive deep vein thrombosis is visualized in the right posterior tibial vein at proximal calf.   2. Acute, occlusive deep vein thrombosis is visualized in the right gastrocnemius vein.  < end of copied text >      < from: CT Angio Chest TAVR DAYANA w/wo IV Cont (02.02.24 @ 11:10) >  IMPRESSION:  There is a watchman device within left atrial appendage which is not   sealed. Contrast within and distal to the occlusion device on arterial   and delayed phase imaging. No left atrial appendage thrombus.  < end of copied text >    APRIL not needed as discussed With neurology, EP    < from: MR Head No Cont (02.01.24 @ 10:14) >  IMPRESSION:  Moderate size right cerebellar acute infarction. No acute intracranial  hemorrhage.  Moderate chronic microvascular ischemic changes, generalized parenchymal   volume loss and multifocal chronic lacunar infarcts as detailed above.  < end of copied text >    < from: TTE W or WO Ultrasound Enhancing Agent (01.30.24 @ 09:15) >  FINDINGS:  Interatrial Septum:  Agitated saline injection reveals bubbles in the left heart, consistent with a patent foramen ovale.  Pericardium:  There is a trace pericardial effusion.  < end of copied text >    < from: CT Angio Neck Stroke Protocol w/ IV Cont (01.29.24 @ 17:59) >  IMPRESSION:  CT HEAD:  1. No acute intracranial hemorrhage. Wedge-shaped decreased attenuation   raising concern for an acute to subacute right cerebellar infarct..  2. Question prominent sulcus versus small old right cerebellar infarct.  3. Significant residual paranasal sinus opacification, which nonetheless,   is somewhat improved compared to prior dated 5/8/2023. Correlate   clinically for evidence of persistent acute on chronic sinusitis  Findings were discussed with RITA Linn of neurology 1/29/2024 5:20  PM by Dr. Behr Ventura with read back confirmation.    CT PERFUSION:  Technical limitations: Somewhat limited by patient motion in all 3 planes   during image acquisition.  Core infarction: 0 ml  Penumbra / tissue at risk for active ischemia: 27 mL involving the right   cerebellar and right posterior temporal occipital brain parenchyma.    CTA HEAD:  1. Mild stenosis involving the cavernous andsupraclinoid portions of the   bilateral internal carotid arteries.  2. Hypoplastic left P1 segment with a relative fetal origin of the left   posterior cerebral artery. A prominent right posterior communicating   artery is noted.  3. No evidence of aneurysm. Tiny aneurysms can be beyond the resolution   of CTA technique.    CTA NECK:  1. Moderate deposition of calcified plaque along the proximal aspect of   the left internal carotid artery with mild stenosis in this location.  2. No significant stenosis in association with the right common carotid   artery or right internal carotid artery.  3. Bilateral vertebral arteries are patent.  < end of copied text >  ___________________________________________________________________________________  ===============>>  A S S E S S M E N T   A N D   P L A N <<===============  ------------------------------------------------------------------------------------------    · Assessment	  73 year old male with past medical history of HTN, HLD, prostate cancer (s/p XRT), asthma, diverticulosis, with multiple GI bleeds requiring multiple units of blood and  s/p IR embolization,  afib, s/p watchman procedure due to contraindication to oral anticoagulant therapy presents to the ED as a code stroke for dizziness, dysarthria, and a facial droop.    Problem/Plan - 1:  ·  Problem: Acute CVA (cerebrovascular accident).   ·  Plan: -Pt presents with acute right facial droop, dysarthria, dizziness. Now mostly resolved. CT head with acute-subacute infarct   -likely in the setting of afib, not on anticoagulation due to history of massive GIB ( however patient post watchman )   -appreciate neuro recs and discussed today post MRI        suspect embolic, related to DVT and PFO   - TTE w/ bubble study Showing PFO   - acute DVTs as noted above  - EP appreciated : CTA to evaluation Watchman position >> As above, with known leak  - Patient on heparin drip, tolerating so far >>> Monitor closely for any G.I. bleeding  >>> Plan is to continue monitoring on heparin drip, if patient tolerates would likely switch to Eliquis, and if not tolerating/it starts to bleed will need IR evaluation for IVC filter.    Patient updated and is aware of the above, and in agreement.    Problem/Plan - 2:  ·  Problem: Atrial fibrillation.   ·  Plan: -s/p watchman procedure  -c/w aspirin.   otherwise as above     Cardiology/EP on board, appreciated   - Continue Current medications otherwise and monitor.   -monitor on tele.    Problem/Plan - 3:  ·  Problem: H/O pericarditis.   ·  repeat TTE noted with little effusion  -c/w colchicine and complete medrol pack prescribed by cardiologist   -c/w lasix    Problem/Plan - 4:  ·  Problem: Hypertension.   - going back on home medications  - monitor closely and adjust as needed  - cardio on board     Problem/Plan - 5:  ·  Problem: Need for prophylactic measure.   ·  Plan: dvt ppx: hep sq.     --------------------------------------------  Case discussed with patient, ACP.. cardio team..   Education given on findings and plan of care  ___________________________  H. JONAS Luu.  Pager: [phone]     _________________________________________________________________________________________  ========>>  M E D I C A L   A T T E N D I N G    F O L L O W  U P  N O T E  <<=========  -----------------------------------------------------------------------------------------------------    - Patient seen and examined by me earlier today.   - In summary,  LENNOX CARTER is a 73y year old man admitted with stroke   - Patient today overall doing ok, comfortable, eating OK.        >> There was a lot of discussions >>  patient with PFO, DVT study was done to evaluate and was positive for acute DVTs, gastroenterology consultants for evaluation for and risk-benefit discussion for Anticoagulation, as noted.     It was decided that the patient had had embolization of diverticular bleeding the past and has not had any Bleeding recently, so patient was started back on anticoagulation with heparin drip yesterday And so far has been tolerating.  >>> Plan is to continue monitoring on heparin drip, if patient tolerates would likely switch to Eliquis, and if not tolerating/it starts to bleed will need IR evaluation for IVC filter.    Patient updated and is aware of the above, and in agreement.    >> per further discussion with specialists, as patient tolerating heparin drip >> switching today to low dose eliquis, no more aspirin recommended ...  >> monitor      ==================>> REVIEW OF SYSTEM <<=================    GEN: no fever, no chills, no pain  RESP: no SOB, no cough, no sputum  CVS: no chest pain, no palpitations  GI: no abdominal pain, no nausea,   : no dysuria, no frequency  Neuro: no headache, no dizziness    ==================>> PHYSICAL EXAM <<=================    GEN: A&O X 3 , NAD , comfortable, pleasant, calm .. ambulating independently   HEENT: NCAT, PERRL, MMM, hearing intact  CVS: S1S2 , Irregular , No M/R/G appreciated  PULM: CTA B/L,  no W/R/R appreciated  ABD.: soft. non tender, non distended  Extrem: intact pulses , no edema          ( Note written / Date of service 02-03-24 ( This is certified to be the same as "ENTERED" date above ( for billing purposes)))    ==================>> MEDICATIONS <<====================    atorvastatin 80 milliGRAM(s) Oral at bedtime  colchicine 0.6 milliGRAM(s) Oral two times a day  diltiazem    milliGRAM(s) Oral daily  dorzolamide 2%/timolol 0.5% Ophthalmic Solution 1 Drop(s) Left EYE two times a day  finasteride 5 milliGRAM(s) Oral daily  furosemide    Tablet 40 milliGRAM(s) Oral daily  heparin  Infusion. 1200 Unit(s)/Hr IV Continuous <Continuous>  metoprolol succinate ER 50 milliGRAM(s) Oral <User Schedule>  montelukast 10 milliGRAM(s) Oral daily  pantoprazole    Tablet 40 milliGRAM(s) Oral before breakfast  polyethylene glycol 3350 17 Gram(s) Oral daily  tamsulosin 0.4 milliGRAM(s) Oral at bedtime    MEDICATIONS  (PRN):  acetaminophen     Tablet .. 650 milliGRAM(s) Oral every 6 hours PRN Temp greater or equal to 38C (100.4F), Mild Pain (1 - 3)  albuterol    90 MICROgram(s) HFA Inhaler 2 Puff(s) Inhalation every 6 hours PRN for shortness of breath and/or wheezing  aluminum hydroxide/magnesium hydroxide/simethicone Suspension 30 milliLiter(s) Oral every 4 hours PRN Dyspepsia  melatonin 3 milliGRAM(s) Oral at bedtime PRN Insomnia  ondansetron Injectable 4 milliGRAM(s) IV Push every 8 hours PRN Nausea and/or Vomiting    ___________  Active diet:  Diet, Regular:   DASH/TLC Sodium & Cholesterol Restricted (DASH)  ___________________    ==================>> VITAL SIGNS <<==================    Vital Signs Last 24 HrsT(C): 36.4 (02-03-24 @ 12:37)  T(F): 97.6 (02-03-24 @ 12:37), Max: 98 (02-03-24 @ 04:20)  HR: 69 (02-03-24 @ 12:37) (67 - 74)  BP: 113/75 (02-03-24 @ 12:37)  RR: 17 (02-03-24 @ 12:37) (17 - 18)  SpO2: 97% (02-03-24 @ 12:37) (97% - 99%)       ==================>> LAB AND IMAGING <<==================                        11.2   6.10  )-----------( 243      ( 03 Feb 2024 04:30 )             35.1        02-03    140  |  102  |  18  ----------------------------<  111<H>  3.5   |  28  |  0.80    Ca    8.7      03 Feb 2024 04:30  Phos  4.1     02-03  Mg     2.10     02-03    WBC count:   6.10 <<== ,  8.42 <<== ,  8.83 <<== ,  8.16 <<== ,  9.52 <<== ,  11.27 <<==   Hemoglobin:   11.2 <<==,  11.3 <<==,  12.3 <<==,  10.9 <<==,  10.8 <<==,  11.0 <<==  platelets:  243 <==, 251 <==, 288 <==, 260 <==, 264 <==, 267 <==    Creatinine:  0.80  <<==, 0.79  <<==, 0.83  <<==, 0.90  <<==, 0.80  <<==, 0.86  <<==  Sodium:   140  <==, 140  <==, 141  <==, 139  <==, 140  <==, 143  <==      < from: VA Duplex Venous Lower Ext Complete, Bilateral (02.01.24 @ 15:23) >  CONCLUSIONS:    1. Acute, non-occlusive deep vein thrombosis is visualized in the right posterior tibial vein at proximal calf.   2. Acute, occlusive deep vein thrombosis is visualized in the right gastrocnemius vein.  < end of copied text >      < from: CT Angio Chest TAVR DAYANA w/wo IV Cont (02.02.24 @ 11:10) >  IMPRESSION:  There is a watchman device within left atrial appendage which is not   sealed. Contrast within and distal to the occlusion device on arterial   and delayed phase imaging. No left atrial appendage thrombus.  < end of copied text >    APRIL not needed as discussed With neurology, EP    < from: MR Head No Cont (02.01.24 @ 10:14) >  IMPRESSION:  Moderate size right cerebellar acute infarction. No acute intracranial  hemorrhage.  Moderate chronic microvascular ischemic changes, generalized parenchymal   volume loss and multifocal chronic lacunar infarcts as detailed above.  < end of copied text >    < from: TTE W or WO Ultrasound Enhancing Agent (01.30.24 @ 09:15) >  FINDINGS:  Interatrial Septum:  Agitated saline injection reveals bubbles in the left heart, consistent with a patent foramen ovale.  Pericardium:  There is a trace pericardial effusion.  < end of copied text >    < from: CT Angio Neck Stroke Protocol w/ IV Cont (01.29.24 @ 17:59) >  IMPRESSION:  CT HEAD:  1. No acute intracranial hemorrhage. Wedge-shaped decreased attenuation   raising concern for an acute to subacute right cerebellar infarct..  2. Question prominent sulcus versus small old right cerebellar infarct.  3. Significant residual paranasal sinus opacification, which nonetheless,   is somewhat improved compared to prior dated 5/8/2023. Correlate   clinically for evidence of persistent acute on chronic sinusitis  Findings were discussed with RITA Linn of neurology 1/29/2024 5:20  PM by Dr. Behr Ventura with read back confirmation.    CT PERFUSION:  Technical limitations: Somewhat limited by patient motion in all 3 planes   during image acquisition.  Core infarction: 0 ml  Penumbra / tissue at risk for active ischemia: 27 mL involving the right   cerebellar and right posterior temporal occipital brain parenchyma.    CTA HEAD:  1. Mild stenosis involving the cavernous andsupraclinoid portions of the   bilateral internal carotid arteries.  2. Hypoplastic left P1 segment with a relative fetal origin of the left   posterior cerebral artery. A prominent right posterior communicating   artery is noted.  3. No evidence of aneurysm. Tiny aneurysms can be beyond the resolution   of CTA technique.    CTA NECK:  1. Moderate deposition of calcified plaque along the proximal aspect of   the left internal carotid artery with mild stenosis in this location.  2. No significant stenosis in association with the right common carotid   artery or right internal carotid artery.  3. Bilateral vertebral arteries are patent.  < end of copied text >  ___________________________________________________________________________________  ===============>>  A S S E S S M E N T   A N D   P L A N <<===============  ------------------------------------------------------------------------------------------    · Assessment	  73 year old male with past medical history of HTN, HLD, prostate cancer (s/p XRT), asthma, diverticulosis, with multiple GI bleeds requiring multiple units of blood and  s/p IR embolization,  afib, s/p watchman procedure due to contraindication to oral anticoagulant therapy presents to the ED as a code stroke for dizziness, dysarthria, and a facial droop.    Problem/Plan - 1:  ·  Problem: Acute CVA (cerebrovascular accident).   ·  Plan: -Pt presents with acute right facial droop, dysarthria, dizziness. Now mostly resolved. CT head with acute-subacute infarct   -likely in the setting of afib, not on anticoagulation due to history of massive GIB ( however patient post watchman )   -appreciate neuro recs and discussed today post MRI        suspect embolic, related to DVT and PFO   - TTE w/ bubble study Showing PFO   - acute DVTs as noted above  - EP appreciated : CTA to evaluation Watchman position >> As above, with known leak  - Patient on heparin drip, tolerating so far >>> Monitor closely for any G.I. bleeding  >>> patient was on heparin drip, and tolerated with no reports of bleeding( had normal BM) >> to switch to low dose Eliquis, and if not tolerating/it starts to bleed will need IR evaluation for IVC filter.    Patient updated and is aware of the above, and in agreement.    Problem/Plan - 2:  ·  Problem: Atrial fibrillation.   ·  Plan: -s/p watchman procedure  -c/w aspirin.   otherwise as above     Cardiology/EP on board, appreciated      per prior discussions, small degree of leak aorund watchman is not so unusual and not clinically sifnicant   - Continue Current medications otherwise and monitor.   -monitor on tele.    Problem/Plan - 3:  ·  Problem: H/O pericarditis.   ·  repeat TTE noted with little effusion  -c/w colchicine and complete medrol pack prescribed by cardiologist   -c/w lasix    Problem/Plan - 4:  ·  Problem: Hypertension.   - going back on home medications  - monitor closely and adjust as needed  - cardio on board     Problem/Plan - 5:  ·  Problem: Need for prophylactic measure.   ·  Plan: dvt ppx: hep sq.     --------------------------------------------  Case discussed with patient, ACP.. cardio team..   Education given on findings and plan of care  ___________________________  H. JONAS Luu.  Pager: [phone]

## 2024-02-03 NOTE — PROVIDER CONTACT NOTE (CRITICAL VALUE NOTIFICATION) - ACTION/TREATMENT ORDERED:
ACP notified. Heparin nomogram followed as ordered.
ACP made aware. Followed heparin monogram, paused for 1 hour and will resume at lower rate.
Adjust Heparin gtt according to nomogram and repeat PTT in 6h.

## 2024-02-04 LAB
APTT BLD: 36.9 SEC — HIGH (ref 24.5–35.6)
HCT VFR BLD CALC: 37 % — LOW (ref 39–50)
HGB BLD-MCNC: 11.4 G/DL — LOW (ref 13–17)
MCHC RBC-ENTMCNC: 27.9 PG — SIGNIFICANT CHANGE UP (ref 27–34)
MCHC RBC-ENTMCNC: 30.8 GM/DL — LOW (ref 32–36)
MCV RBC AUTO: 90.5 FL — SIGNIFICANT CHANGE UP (ref 80–100)
NRBC # BLD: 0 /100 WBCS — SIGNIFICANT CHANGE UP (ref 0–0)
NRBC # FLD: 0 K/UL — SIGNIFICANT CHANGE UP (ref 0–0)
PLATELET # BLD AUTO: 239 K/UL — SIGNIFICANT CHANGE UP (ref 150–400)
RBC # BLD: 4.09 M/UL — LOW (ref 4.2–5.8)
RBC # FLD: 14.9 % — HIGH (ref 10.3–14.5)
WBC # BLD: 6.47 K/UL — SIGNIFICANT CHANGE UP (ref 3.8–10.5)
WBC # FLD AUTO: 6.47 K/UL — SIGNIFICANT CHANGE UP (ref 3.8–10.5)

## 2024-02-04 RX ADMIN — PANTOPRAZOLE SODIUM 40 MILLIGRAM(S): 20 TABLET, DELAYED RELEASE ORAL at 05:13

## 2024-02-04 RX ADMIN — MONTELUKAST 10 MILLIGRAM(S): 4 TABLET, CHEWABLE ORAL at 11:54

## 2024-02-04 RX ADMIN — DORZOLAMIDE HYDROCHLORIDE TIMOLOL MALEATE 1 DROP(S): 20; 5 SOLUTION/ DROPS OPHTHALMIC at 05:13

## 2024-02-04 RX ADMIN — APIXABAN 2.5 MILLIGRAM(S): 2.5 TABLET, FILM COATED ORAL at 05:13

## 2024-02-04 RX ADMIN — TAMSULOSIN HYDROCHLORIDE 0.4 MILLIGRAM(S): 0.4 CAPSULE ORAL at 21:26

## 2024-02-04 RX ADMIN — FINASTERIDE 5 MILLIGRAM(S): 5 TABLET, FILM COATED ORAL at 11:54

## 2024-02-04 RX ADMIN — DORZOLAMIDE HYDROCHLORIDE TIMOLOL MALEATE 1 DROP(S): 20; 5 SOLUTION/ DROPS OPHTHALMIC at 17:09

## 2024-02-04 RX ADMIN — Medication 0.6 MILLIGRAM(S): at 17:08

## 2024-02-04 RX ADMIN — Medication 40 MILLIGRAM(S): at 05:13

## 2024-02-04 RX ADMIN — Medication 120 MILLIGRAM(S): at 05:13

## 2024-02-04 RX ADMIN — Medication 0.6 MILLIGRAM(S): at 05:13

## 2024-02-04 RX ADMIN — Medication 50 MILLIGRAM(S): at 11:54

## 2024-02-04 RX ADMIN — POLYETHYLENE GLYCOL 3350 17 GRAM(S): 17 POWDER, FOR SOLUTION ORAL at 11:53

## 2024-02-04 RX ADMIN — APIXABAN 2.5 MILLIGRAM(S): 2.5 TABLET, FILM COATED ORAL at 17:08

## 2024-02-04 RX ADMIN — ATORVASTATIN CALCIUM 80 MILLIGRAM(S): 80 TABLET, FILM COATED ORAL at 21:26

## 2024-02-04 NOTE — PROGRESS NOTE ADULT - ASSESSMENT
_________________________________________________________________________________________  ========>>  M E D I C A L   A T T E N D I N G    F O L L O W  U P  N O T E  <<=========  -----------------------------------------------------------------------------------------------------    - Patient seen and examined by me earlier today.   - In summary,  LENNOX CARTER is a 73y year old man admitted with stroke   - Patient today overall doing ok, comfortable, eating OK.        >> There was a lot of discussions >>  patient with PFO, DVT study was done to evaluate and was positive for acute DVTs, gastroenterology consultants for evaluation for and risk-benefit discussion for Anticoagulation, as noted.     It was decided that the patient had had embolization of diverticular bleeding the past and has not had any Bleeding recently, so patient was started back on anticoagulation with heparin drip yesterday And so far has been tolerating.  >>> Plan is to continue monitoring on heparin drip, if patient tolerates would likely switch to Eliquis, and if not tolerating/it starts to bleed will need IR evaluation for IVC filter.    Patient updated and is aware of the above, and in agreement.    >> per further discussion with specialists, as patient tolerating heparin drip >> switching today to low dose eliquis, no more aspirin recommended ...  >> monitor      ==================>> REVIEW OF SYSTEM <<=================    GEN: no fever, no chills, no pain  RESP: no SOB, no cough, no sputum  CVS: no chest pain, no palpitations  GI: no abdominal pain, no nausea,   : no dysuria, no frequency  Neuro: no headache, no dizziness    ==================>> PHYSICAL EXAM <<=================    GEN: A&O X 3 , NAD , comfortable, pleasant, calm .. ambulating independently   HEENT: NCAT, PERRL, MMM, hearing intact  CVS: S1S2 , Irregular , No M/R/G appreciated  PULM: CTA B/L,  no W/R/R appreciated  ABD.: soft. non tender, non distended  Extrem: intact pulses , no edema             ( Note written / Date of service 02-04-24 ( This is certified to be the same as "ENTERED" date above ( for billing purposes)))    ==================>> MEDICATIONS <<====================    apixaban 2.5 milliGRAM(s) Oral every 12 hours  atorvastatin 80 milliGRAM(s) Oral at bedtime  colchicine 0.6 milliGRAM(s) Oral two times a day  diltiazem    milliGRAM(s) Oral daily  dorzolamide 2%/timolol 0.5% Ophthalmic Solution 1 Drop(s) Left EYE two times a day  finasteride 5 milliGRAM(s) Oral daily  furosemide    Tablet 40 milliGRAM(s) Oral daily  metoprolol succinate ER 50 milliGRAM(s) Oral <User Schedule>  montelukast 10 milliGRAM(s) Oral daily  pantoprazole    Tablet 40 milliGRAM(s) Oral before breakfast  polyethylene glycol 3350 17 Gram(s) Oral daily  tamsulosin 0.4 milliGRAM(s) Oral at bedtime    MEDICATIONS  (PRN):  acetaminophen     Tablet .. 650 milliGRAM(s) Oral every 6 hours PRN Temp greater or equal to 38C (100.4F), Mild Pain (1 - 3)  albuterol    90 MICROgram(s) HFA Inhaler 2 Puff(s) Inhalation every 6 hours PRN for shortness of breath and/or wheezing  aluminum hydroxide/magnesium hydroxide/simethicone Suspension 30 milliLiter(s) Oral every 4 hours PRN Dyspepsia  melatonin 3 milliGRAM(s) Oral at bedtime PRN Insomnia  ondansetron Injectable 4 milliGRAM(s) IV Push every 8 hours PRN Nausea and/or Vomiting    ___________  Active diet:  Diet, Regular:   DASH/TLC Sodium & Cholesterol Restricted (DASH)  ___________________    ==================>> VITAL SIGNS <<==================     Vital Signs Last 24 HrsT(C): 37 (02-04-24 @ 11:50)  T(F): 98.6 (02-04-24 @ 11:50), Max: 98.8 (02-03-24 @ 20:54)  HR: 83 (02-04-24 @ 11:50) (77 - 83)  BP: 119/86 (02-04-24 @ 11:50)  RR: 18 (02-04-24 @ 11:50) (18 - 18)  SpO2: 100% (02-04-24 @ 11:50) (96% - 100%)      CAPILLARY BLOOD GLUCOSE         ==================>> LAB AND IMAGING <<==================                        11.4   6.47  )-----------( 239      ( 04 Feb 2024 05:08 )             37.0        02-03    140  |  102  |  18  ----------------------------<  111<H>  3.5   |  28  |  0.80    Ca    8.7      03 Feb 2024 04:30  Phos  4.1     02-03  Mg     2.10     02-03          < from: VA Duplex Venous Lower Ext Complete, Bilateral (02.01.24 @ 15:23) >  CONCLUSIONS:    1. Acute, non-occlusive deep vein thrombosis is visualized in the right posterior tibial vein at proximal calf.   2. Acute, occlusive deep vein thrombosis is visualized in the right gastrocnemius vein.  < end of copied text >      < from: CT Angio Chest TAVR DAYANA w/wo IV Cont (02.02.24 @ 11:10) >  IMPRESSION:  There is a watchman device within left atrial appendage which is not   sealed. Contrast within and distal to the occlusion device on arterial   and delayed phase imaging. No left atrial appendage thrombus.  < end of copied text >    APRIL not needed as discussed With neurology, EP    < from: MR Head No Cont (02.01.24 @ 10:14) >  IMPRESSION:  Moderate size right cerebellar acute infarction. No acute intracranial  hemorrhage.  Moderate chronic microvascular ischemic changes, generalized parenchymal   volume loss and multifocal chronic lacunar infarcts as detailed above.  < end of copied text >    < from: TTE W or WO Ultrasound Enhancing Agent (01.30.24 @ 09:15) >  FINDINGS:  Interatrial Septum:  Agitated saline injection reveals bubbles in the left heart, consistent with a patent foramen ovale.  Pericardium:  There is a trace pericardial effusion.  < end of copied text >    < from: CT Angio Neck Stroke Protocol w/ IV Cont (01.29.24 @ 17:59) >  IMPRESSION:  CT HEAD:  1. No acute intracranial hemorrhage. Wedge-shaped decreased attenuation   raising concern for an acute to subacute right cerebellar infarct..  2. Question prominent sulcus versus small old right cerebellar infarct.  3. Significant residual paranasal sinus opacification, which nonetheless,   is somewhat improved compared to prior dated 5/8/2023. Correlate   clinically for evidence of persistent acute on chronic sinusitis  Findings were discussed with RITA Linn of neurology 1/29/2024 5:20  PM by Dr. Behr Ventura with read back confirmation.    CT PERFUSION:  Technical limitations: Somewhat limited by patient motion in all 3 planes   during image acquisition.  Core infarction: 0 ml  Penumbra / tissue at risk for active ischemia: 27 mL involving the right   cerebellar and right posterior temporal occipital brain parenchyma.    CTA HEAD:  1. Mild stenosis involving the cavernous andsupraclinoid portions of the   bilateral internal carotid arteries.  2. Hypoplastic left P1 segment with a relative fetal origin of the left   posterior cerebral artery. A prominent right posterior communicating   artery is noted.  3. No evidence of aneurysm. Tiny aneurysms can be beyond the resolution   of CTA technique.    CTA NECK:  1. Moderate deposition of calcified plaque along the proximal aspect of   the left internal carotid artery with mild stenosis in this location.  2. No significant stenosis in association with the right common carotid   artery or right internal carotid artery.  3. Bilateral vertebral arteries are patent.  < end of copied text >  ___________________________________________________________________________________  ===============>>  A S S E S S M E N T   A N D   P L A N <<===============  ------------------------------------------------------------------------------------------    · Assessment	  73 year old male with past medical history of HTN, HLD, prostate cancer (s/p XRT), asthma, diverticulosis, with multiple GI bleeds requiring multiple units of blood and  s/p IR embolization,  afib, s/p watchman procedure due to contraindication to oral anticoagulant therapy presents to the ED as a code stroke for dizziness, dysarthria, and a facial droop.    Problem/Plan - 1:  ·  Problem: Acute CVA (cerebrovascular accident).   ·  Plan: -Pt presents with acute right facial droop, dysarthria, dizziness. Now mostly resolved. CT head with acute-subacute infarct   -likely in the setting of afib, not on anticoagulation due to history of massive GIB ( however patient post watchman )   -appreciate neuro recs and discussed today post MRI        suspect embolic, related to DVT and PFO   - TTE w/ bubble study Showing PFO   - acute DVTs as noted above  - EP appreciated : CTA to evaluation Watchman position >> As above, with known leak  - Patient on heparin drip, tolerating so far >>> Monitor closely for any G.I. bleeding  >>> patient was on heparin drip, and tolerated with no reports of bleeding( had normal BM) >> to switch to low dose Eliquis, and if not tolerating/it starts to bleed will need IR evaluation for IVC filter.    Patient updated and is aware of the above, and in agreement.    Problem/Plan - 2:  ·  Problem: Atrial fibrillation.   ·  Plan: -s/p watchman procedure  -c/w aspirin.   otherwise as above     Cardiology/EP on board, appreciated      per prior discussions, small degree of leak aorund watchman is not so unusual and not clinically sifnicant   - Continue Current medications otherwise and monitor.   -monitor on tele.    Problem/Plan - 3:  ·  Problem: H/O pericarditis.   ·  repeat TTE noted with little effusion  -c/w colchicine and complete medrol pack prescribed by cardiologist   -c/w lasix    Problem/Plan - 4:  ·  Problem: Hypertension.   - going back on home medications  - monitor closely and adjust as needed  - cardio on board     Problem/Plan - 5:  ·  Problem: Need for prophylactic measure.   ·  Plan: dvt ppx: hep sq.     --------------------------------------------  Case discussed with patient, ACP.. cardio team..   Education given on findings and plan of care  ___________________________  H. JONAS Luu.  Pager: [phone]     _________________________________________________________________________________________  ========>>  M E D I C A L   A T T E N D I N G    F O L L O W  U P  N O T E  <<=========  -----------------------------------------------------------------------------------------------------    - Patient seen and examined by me earlier today.   - In summary,  LENNOX CARTER is a 73y year old man admitted with stroke   - Patient today overall doing ok, comfortable, eating OK.     Patient has been On Eliquis And so far tolerating okay, no bleeding reported  patient prefers to go home in the morning    ==================>> REVIEW OF SYSTEM <<=================    GEN: no fever, no chills, no pain  RESP: no SOB, no cough, no sputum  CVS: no chest pain, no palpitations  GI: no abdominal pain, no nausea,   : no dysuria, no frequency  Neuro: no headache, no dizziness    ==================>> PHYSICAL EXAM <<=================    GEN: A&O X 3 , NAD , comfortable, pleasant, calm .. ambulating independently   HEENT: NCAT, PERRL, MMM, hearing intact  CVS: S1S2 , Irregular , No M/R/G appreciated  PULM: CTA B/L,  no W/R/R appreciated  ABD.: soft. non tender, non distended  Extrem: intact pulses , no edema             ( Note written / Date of service 02-04-24 ( This is certified to be the same as "ENTERED" date above ( for billing purposes)))    ==================>> MEDICATIONS <<====================    apixaban 2.5 milliGRAM(s) Oral every 12 hours  atorvastatin 80 milliGRAM(s) Oral at bedtime  colchicine 0.6 milliGRAM(s) Oral two times a day  diltiazem    milliGRAM(s) Oral daily  dorzolamide 2%/timolol 0.5% Ophthalmic Solution 1 Drop(s) Left EYE two times a day  finasteride 5 milliGRAM(s) Oral daily  furosemide    Tablet 40 milliGRAM(s) Oral daily  metoprolol succinate ER 50 milliGRAM(s) Oral <User Schedule>  montelukast 10 milliGRAM(s) Oral daily  pantoprazole    Tablet 40 milliGRAM(s) Oral before breakfast  polyethylene glycol 3350 17 Gram(s) Oral daily  tamsulosin 0.4 milliGRAM(s) Oral at bedtime    MEDICATIONS  (PRN):  acetaminophen     Tablet .. 650 milliGRAM(s) Oral every 6 hours PRN Temp greater or equal to 38C (100.4F), Mild Pain (1 - 3)  albuterol    90 MICROgram(s) HFA Inhaler 2 Puff(s) Inhalation every 6 hours PRN for shortness of breath and/or wheezing  aluminum hydroxide/magnesium hydroxide/simethicone Suspension 30 milliLiter(s) Oral every 4 hours PRN Dyspepsia  melatonin 3 milliGRAM(s) Oral at bedtime PRN Insomnia  ondansetron Injectable 4 milliGRAM(s) IV Push every 8 hours PRN Nausea and/or Vomiting    ___________  Active diet:  Diet, Regular:   DASH/TLC Sodium & Cholesterol Restricted (DASH)  ___________________    ==================>> VITAL SIGNS <<==================     Vital Signs Last 24 HrsT(C): 37 (02-04-24 @ 11:50)  T(F): 98.6 (02-04-24 @ 11:50), Max: 98.8 (02-03-24 @ 20:54)  HR: 83 (02-04-24 @ 11:50) (77 - 83)  BP: 119/86 (02-04-24 @ 11:50)  RR: 18 (02-04-24 @ 11:50) (18 - 18)  SpO2: 100% (02-04-24 @ 11:50) (96% - 100%)       ==================>> LAB AND IMAGING <<==================                        11.4   6.47  )-----------( 239      ( 04 Feb 2024 05:08 )             37.0        02-03    140  |  102  |  18  ----------------------------<  111<H>  3.5   |  28  |  0.80    Ca    8.7      03 Feb 2024 04:30  Phos  4.1     02-03  Mg     2.10     02-03    Hemoglobin:   11.4 <<==,  11.2 <<==,  11.3 <<==,  12.3 <<==,  10.9 <<==      < from: VA Duplex Venous Lower Ext Complete, Bilateral (02.01.24 @ 15:23) >  CONCLUSIONS:    1. Acute, non-occlusive deep vein thrombosis is visualized in the right posterior tibial vein at proximal calf.   2. Acute, occlusive deep vein thrombosis is visualized in the right gastrocnemius vein.  < end of copied text >      < from: CT Angio Chest TAVR DAYANA w/wo IV Cont (02.02.24 @ 11:10) >  IMPRESSION:  There is a watchman device within left atrial appendage which is not   sealed. Contrast within and distal to the occlusion device on arterial   and delayed phase imaging. No left atrial appendage thrombus.  < end of copied text >    APRIL not needed as discussed With neurology, EP    < from: MR Head No Cont (02.01.24 @ 10:14) >  IMPRESSION:  Moderate size right cerebellar acute infarction. No acute intracranial  hemorrhage.  Moderate chronic microvascular ischemic changes, generalized parenchymal   volume loss and multifocal chronic lacunar infarcts as detailed above.  < end of copied text >    < from: TTE W or WO Ultrasound Enhancing Agent (01.30.24 @ 09:15) >  FINDINGS:  Interatrial Septum:  Agitated saline injection reveals bubbles in the left heart, consistent with a patent foramen ovale.  Pericardium:  There is a trace pericardial effusion.  < end of copied text >    < from: CT Angio Neck Stroke Protocol w/ IV Cont (01.29.24 @ 17:59) >  IMPRESSION:  CT HEAD:  1. No acute intracranial hemorrhage. Wedge-shaped decreased attenuation   raising concern for an acute to subacute right cerebellar infarct..  2. Question prominent sulcus versus small old right cerebellar infarct.  3. Significant residual paranasal sinus opacification, which nonetheless,   is somewhat improved compared to prior dated 5/8/2023. Correlate   clinically for evidence of persistent acute on chronic sinusitis  Findings were discussed with RITA Linn of neurology 1/29/2024 5:20  PM by Dr. Behr Ventura with read back confirmation.    CT PERFUSION:  Technical limitations: Somewhat limited by patient motion in all 3 planes   during image acquisition.  Core infarction: 0 ml  Penumbra / tissue at risk for active ischemia: 27 mL involving the right   cerebellar and right posterior temporal occipital brain parenchyma.    CTA HEAD:  1. Mild stenosis involving the cavernous andsupraclinoid portions of the   bilateral internal carotid arteries.  2. Hypoplastic left P1 segment with a relative fetal origin of the left   posterior cerebral artery. A prominent right posterior communicating   artery is noted.  3. No evidence of aneurysm. Tiny aneurysms can be beyond the resolution   of CTA technique.    CTA NECK:  1. Moderate deposition of calcified plaque along the proximal aspect of   the left internal carotid artery with mild stenosis in this location.  2. No significant stenosis in association with the right common carotid   artery or right internal carotid artery.  3. Bilateral vertebral arteries are patent.  < end of copied text >  ___________________________________________________________________________________  ===============>>  A S S E S S M E N T   A N D   P L A N <<===============  ------------------------------------------------------------------------------------------    · Assessment	  73 year old male with past medical history of HTN, HLD, prostate cancer (s/p XRT), asthma, diverticulosis, with multiple GI bleeds requiring multiple units of blood and  s/p IR embolization,  afib, s/p watchman procedure due to contraindication to oral anticoagulant therapy presents to the ED as a code stroke for dizziness, dysarthria, and a facial droop.    Problem/Plan - 1:  ·  Problem: Acute CVA (cerebrovascular accident).   ·  Plan: -Pt presents with acute right facial droop, dysarthria, dizziness. Now mostly resolved. CT head with acute-subacute infarct   -likely in the setting of afib, not on anticoagulation due to history of massive GIB ( however patient post watchman )   -appreciate neuro recs and discussed today post MRI        suspect embolic, related to DVT and PFO   - TTE w/ bubble study Showing PFO   - acute DVTs as noted above  - EP appreciated : CTA to evaluation Watchman position >> As above, with known leak  - Patient on heparin drip, tolerating so far >>> Monitor closely for any G.I. bleeding  >>> patient was on heparin drip, and tolerated with no reports of bleeding( had normal BM) >> has been on Eliquis, and ok...  ( if not tolerating/it starts to bleed will need IR evaluation for IVC filter.)    Problem/Plan - 2:  ·  Problem: Atrial fibrillation.   ·  Plan: -s/p watchman procedure  -c/w aspirin.   otherwise as above     Cardiology/EP on board, appreciated      per prior discussions, small degree of leak aorund watchman is not so unusual and not clinically sifnicant   - Continue Current medications otherwise and monitor.   -monitor on tele.    Problem/Plan - 3:  ·  Problem: H/O pericarditis.   ·  repeat TTE noted with little effusion  -c/w colchicine and complete medrol pack prescribed by cardiologist   -c/w lasix    Problem/Plan - 4:  ·  Problem: Hypertension.   - going back on home medications  - monitor closely and adjust as needed  - cardio on board     Problem/Plan - 5:  ·  Problem: Need for prophylactic measure.   ·  Plan: dvt ppx: hep sq.     Discharge planning home, in the morning if remains stable    already discussed with patient regarding close follow-up of blood counts as outpatient    --------------------------------------------  Case discussed with patient,   Education given on findings and plan of care  ___________________________  HAleks Luu D.O.  Pager: [phone]

## 2024-02-05 ENCOUNTER — TRANSCRIPTION ENCOUNTER (OUTPATIENT)
Age: 74
End: 2024-02-05

## 2024-02-05 VITALS
DIASTOLIC BLOOD PRESSURE: 95 MMHG | HEART RATE: 86 BPM | RESPIRATION RATE: 18 BRPM | SYSTOLIC BLOOD PRESSURE: 137 MMHG | TEMPERATURE: 98 F | OXYGEN SATURATION: 100 %

## 2024-02-05 LAB
APTT BLD: 77.3 SEC — HIGH (ref 24.5–35.6)
HCT VFR BLD CALC: 38 % — LOW (ref 39–50)
HGB BLD-MCNC: 12.1 G/DL — LOW (ref 13–17)
MCHC RBC-ENTMCNC: 28.3 PG — SIGNIFICANT CHANGE UP (ref 27–34)
MCHC RBC-ENTMCNC: 31.8 GM/DL — LOW (ref 32–36)
MCV RBC AUTO: 89 FL — SIGNIFICANT CHANGE UP (ref 80–100)
NRBC # BLD: 0 /100 WBCS — SIGNIFICANT CHANGE UP (ref 0–0)
NRBC # FLD: 0 K/UL — SIGNIFICANT CHANGE UP (ref 0–0)
PLATELET # BLD AUTO: 221 K/UL — SIGNIFICANT CHANGE UP (ref 150–400)
RBC # BLD: 4.27 M/UL — SIGNIFICANT CHANGE UP (ref 4.2–5.8)
RBC # FLD: 14.6 % — HIGH (ref 10.3–14.5)
WBC # BLD: 6.04 K/UL — SIGNIFICANT CHANGE UP (ref 3.8–10.5)
WBC # FLD AUTO: 6.04 K/UL — SIGNIFICANT CHANGE UP (ref 3.8–10.5)

## 2024-02-05 RX ORDER — APIXABAN 2.5 MG/1
1 TABLET, FILM COATED ORAL
Qty: 60 | Refills: 0
Start: 2024-02-05 | End: 2024-03-05

## 2024-02-05 RX ORDER — ATORVASTATIN CALCIUM 80 MG/1
1 TABLET, FILM COATED ORAL
Qty: 30 | Refills: 0
Start: 2024-02-05 | End: 2024-03-05

## 2024-02-05 RX ORDER — POTASSIUM CHLORIDE 20 MEQ
1 PACKET (EA) ORAL
Qty: 14 | Refills: 0
Start: 2024-02-05 | End: 2024-02-18

## 2024-02-05 RX ORDER — PANTOPRAZOLE SODIUM 20 MG/1
1 TABLET, DELAYED RELEASE ORAL
Qty: 30 | Refills: 0
Start: 2024-02-05 | End: 2024-03-05

## 2024-02-05 RX ORDER — FERROUS SULFATE 325(65) MG
1 TABLET ORAL
Refills: 0 | DISCHARGE

## 2024-02-05 RX ORDER — ATORVASTATIN CALCIUM 80 MG/1
1 TABLET, FILM COATED ORAL
Refills: 0 | DISCHARGE

## 2024-02-05 RX ADMIN — DORZOLAMIDE HYDROCHLORIDE TIMOLOL MALEATE 1 DROP(S): 20; 5 SOLUTION/ DROPS OPHTHALMIC at 04:56

## 2024-02-05 RX ADMIN — APIXABAN 2.5 MILLIGRAM(S): 2.5 TABLET, FILM COATED ORAL at 04:56

## 2024-02-05 RX ADMIN — FINASTERIDE 5 MILLIGRAM(S): 5 TABLET, FILM COATED ORAL at 13:19

## 2024-02-05 RX ADMIN — Medication 120 MILLIGRAM(S): at 04:56

## 2024-02-05 RX ADMIN — Medication 40 MILLIGRAM(S): at 04:55

## 2024-02-05 RX ADMIN — PANTOPRAZOLE SODIUM 40 MILLIGRAM(S): 20 TABLET, DELAYED RELEASE ORAL at 04:56

## 2024-02-05 RX ADMIN — Medication 50 MILLIGRAM(S): at 13:21

## 2024-02-05 RX ADMIN — Medication 0.6 MILLIGRAM(S): at 04:56

## 2024-02-05 RX ADMIN — MONTELUKAST 10 MILLIGRAM(S): 4 TABLET, CHEWABLE ORAL at 13:19

## 2024-02-05 NOTE — DIETITIAN INITIAL EVALUATION ADULT - PERTINENT LABORATORY DATA
02-03 Na 140 mmol/L Glu 111 mg/dL<H> K+ 3.5 mmol/L Cr 0.80 mg/dL BUN 18 mg/dL Phos 4.1 mg/dL      A1C with Estimated Average Glucose Result: 4.7 % (01-30-24 @ 05:40)  A1C with Estimated Average Glucose Result: 4.6 % (08-23-23 @ 05:24)

## 2024-02-05 NOTE — PROGRESS NOTE ADULT - SUBJECTIVE AND OBJECTIVE BOX
Cardiovascular Disease Progress Note    Overnight events: No acute events overnight.  no new cardiac sx  Otherwise review of systems negative    Objective Findings:  T(C): 36.9 (02-05-24 @ 04:50), Max: 37 (02-04-24 @ 11:50)  HR: 89 (02-05-24 @ 04:50) (78 - 89)  BP: 146/90 (02-05-24 @ 04:50) (119/86 - 146/90)  RR: 18 (02-05-24 @ 04:50) (18 - 18)  SpO2: 97% (02-05-24 @ 04:50) (97% - 100%)  Wt(kg): --  Daily     Daily       Physical Exam:  Gen: NAD  HEENT: EOMI  CV: RRR, normal S1 + S2, no m/r/g  Lungs: CTAB  Abd: soft, non-tender  Ext: No edema    Telemetry:    Laboratory Data:                        11.4   6.47  )-----------( 239      ( 04 Feb 2024 05:08 )             37.0           PTT - ( 04 Feb 2024 05:08 )  PTT:36.9 sec          Inpatient Medications:  MEDICATIONS  (STANDING):  apixaban 2.5 milliGRAM(s) Oral every 12 hours  atorvastatin 80 milliGRAM(s) Oral at bedtime  colchicine 0.6 milliGRAM(s) Oral two times a day  diltiazem    milliGRAM(s) Oral daily  dorzolamide 2%/timolol 0.5% Ophthalmic Solution 1 Drop(s) Left EYE two times a day  finasteride 5 milliGRAM(s) Oral daily  furosemide    Tablet 40 milliGRAM(s) Oral daily  metoprolol succinate ER 50 milliGRAM(s) Oral <User Schedule>  montelukast 10 milliGRAM(s) Oral daily  pantoprazole    Tablet 40 milliGRAM(s) Oral before breakfast  polyethylene glycol 3350 17 Gram(s) Oral daily  tamsulosin 0.4 milliGRAM(s) Oral at bedtime      Assessment:  73 year old male with past medical history of HTN, HLD, prostate cancer (s/p XRT), asthma, diverticulosis, with multiple GI bleeds requiring multiple units of blood and  s/p IR embolization,  afib, s/p watchman procedure due to contraindication to oral anticoagulant therapy presents to the ED as a code stroke for dizziness, dysarthria, and a facial droop. Was recently admitted at [facility] from 1/15-1/19 for a pericardial effusion. Started on colchicine and recently medrol dose pack with improvement in chest pain.    Recs:  cardiac stable  no e/o acs or chf  cw lasix and kcl supplementation  cw colchicine 0.6mg bid x 3 mo and complete steroid taper  neurology f/u  le yunier duplex+ DVT and persistent atrial fibrillation, likely etiology of acute CVA --> cw eliquis 2.5mg bid (possible ivc filter if cant tolerate AC)  s/p echo, pericardial effusion significantly resolved  eps following, s/p watchman. s/p CTA suggestive of possible leak  tele monitoring            Over 25 minutes spent on total encounter; more than 50% of the visit was spent counseling and/or coordinating care by the attending physician.      Isaac Calderón MD   Cardiovascular Disease  [phone]

## 2024-02-05 NOTE — DISCHARGE NOTE PROVIDER - NSDCCPCAREPLAN_GEN_ALL_CORE_FT
PRINCIPAL DISCHARGE DIAGNOSIS  Diagnosis: Facial droop  Assessment and Plan of Treatment: Acute CVA (cerebrovascular accident).   acute right facial droop, dysarthria, dizziness. Now mostly resolved. CT head with acute-subacute infarct   -likely in the setting of afib, not on anticoagulation due to history of massive GIB   -neurology consulted -suspect embolic, related to DVT and PFO   - TTE w/ bubble study Showing PFO   - acute DVTs as noted above  - EP following   - started on eliquis   please follow up with cardiology, neurology, EP call for appointment within 1 week         SECONDARY DISCHARGE DIAGNOSES  Diagnosis: History of being hospitalized  Assessment and Plan of Treatment: Atrial fibrillation.   -s/p watchman procedure  -c/w aspirin.      - per prior discussions, small degree of leak around watchman is not so unusual and not clinically significant   H/O pericarditis.   ·  repeat TTE noted with little effusion  -c/w colchicine and complete medrol pack prescribed by cardiologist   -c/w lasix  Hypertension.   - going back on home medications  -cardiolgoy recommends for discharge starting potassium 20mg daily

## 2024-02-05 NOTE — DISCHARGE NOTE PROVIDER - NSDCCAREPROVSEEN_GEN_ALL_CORE_FT
Sara Luu Hoorbod Delshadfar  Hoorbod Delshadfar  Hoorbod Delshadfar  Christiane Blount  User ADM  Isaac Calderón  Team [facility] Medicine ACP  Team [facility] Cardiac Electrophysiolgy  Team [facility] Stroke Team      [ Greater than 35 min spent for discharge services.   MARCELO Luu ]       ( Note written / Date of service is the same as last day of patient stay  in the hospital ( for billing purposes)))

## 2024-02-05 NOTE — DIETITIAN INITIAL EVALUATION ADULT - PERTINENT MEDS FT
MEDICATIONS  (STANDING):  apixaban 2.5 milliGRAM(s) Oral every 12 hours  atorvastatin 80 milliGRAM(s) Oral at bedtime  colchicine 0.6 milliGRAM(s) Oral two times a day  diltiazem    milliGRAM(s) Oral daily  dorzolamide 2%/timolol 0.5% Ophthalmic Solution 1 Drop(s) Left EYE two times a day  finasteride 5 milliGRAM(s) Oral daily  furosemide    Tablet 40 milliGRAM(s) Oral daily  metoprolol succinate ER 50 milliGRAM(s) Oral <User Schedule>  montelukast 10 milliGRAM(s) Oral daily  pantoprazole    Tablet 40 milliGRAM(s) Oral before breakfast  polyethylene glycol 3350 17 Gram(s) Oral daily  tamsulosin 0.4 milliGRAM(s) Oral at bedtime    MEDICATIONS  (PRN):  acetaminophen     Tablet .. 650 milliGRAM(s) Oral every 6 hours PRN Temp greater or equal to 38C (100.4F), Mild Pain (1 - 3)  albuterol    90 MICROgram(s) HFA Inhaler 2 Puff(s) Inhalation every 6 hours PRN for shortness of breath and/or wheezing  aluminum hydroxide/magnesium hydroxide/simethicone Suspension 30 milliLiter(s) Oral every 4 hours PRN Dyspepsia  melatonin 3 milliGRAM(s) Oral at bedtime PRN Insomnia  ondansetron Injectable 4 milliGRAM(s) IV Push every 8 hours PRN Nausea and/or Vomiting

## 2024-02-05 NOTE — DISCHARGE NOTE NURSING/CASE MANAGEMENT/SOCIAL WORK - NSDCPEFALRISK_GEN_ALL_CORE
For information on Fall & Injury Prevention, visit: [email or web address] OR  [email or web address] OR  https://www.cdc.gov/steadi/patient.html

## 2024-02-05 NOTE — DIETITIAN INITIAL EVALUATION ADULT - ORAL INTAKE PTA/DIET HISTORY
Patient reports height of 5'11", usual body weight 209lbs. Patient and wife take turn in preparing meals at home, denies any difficulty obtaining food/grocery PTA. Patient does not follow any special diet, has no known food allergies.

## 2024-02-05 NOTE — DIETITIAN INITIAL EVALUATION ADULT - OTHER INFO
73 year old male with past medical history of HTN, HLD, prostate cancer (s/p XRT), asthma, diverticulosis, with multiple GI bleeds requiring multiple units of blood and  s/p IR embolization,  afib, s/p watchman procedure due to contraindication to oral anticoagulant therapy presents to the ED as a code stroke for dizziness, dysarthria, and a facial droop, per chart.     Patient reports good appetite during hospital stay. Per RN flow sheet, patient is consuming % of meals. Patient denies any difficulty chewing or swallowing, any GI distress (N/V/D/C) during visit. Reports last bowel movement 2/4/2024. Patient is on bowel regimen. Current weight: 97.5kg (1/29, per chart). Per Beti MENJIVAR, weight history: 101.6kg(5/3/2023). Noted patient has non-sig weight loss of -4.1kg/-4%BW loss x 8 months. Noted patient is on furosemide, could cause weight change. Continue to monitor weight trend. RD provided nutrition education on heart healthy diet during visit, encouraged patient to avoid food high in saturated/trans fat, sodium. Patient is receptive to the information provided.

## 2024-02-05 NOTE — DISCHARGE NOTE PROVIDER - NSDCFUADDAPPT_GEN_ALL_CORE_FT
follow up with Stroke NP, Kira Marino or Kelin Umaña, in clinic at [address] [place] [address]. call for appointment

## 2024-02-05 NOTE — DISCHARGE NOTE PROVIDER - HOSPITAL COURSE
73 year old male with past medical history of HTN, HLD, prostate cancer (s/p XRT), asthma, diverticulosis, with multiple GI bleeds requiring multiple units of blood and  s/p IR embolization,  afib, s/p watchman procedure due to contraindication to oral anticoagulant therapy presents to the ED as a code stroke for dizziness, dysarthria, and a facial droop.     Problem/Plan - 1:  ·  Problem: Acute CVA (cerebrovascular accident).   ·  Plan: -Pt presents with acute right facial droop, dysarthria, dizziness. Now mostly resolved. CT head with acute-subacute infarct   -likely in the setting of afib, not on anticoagulation due to history of massive GIB ( however patient post watchman )   -appreciate neuro recs and discussed today post MRI        suspect embolic, related to DVT and PFO   - TTE w/ bubble study Showing PFO   - acute DVTs as noted above  - EP appreciated : CTA to evaluation Watchman position >> As above, with known leak  - started on eliquis     Atrial fibrillation.   ·  Plan: -s/p watchman procedure  -c/w aspirin.      Cardiology/EP on board, appreciated      per prior discussions, small degree of leak around watchman is not so unusual and not clinically significant        Problem/Plan - 3:  ·  Problem: H/O pericarditis.   ·  repeat TTE noted with little effusion  -c/w colchicine and complete medrol pack prescribed by cardiologist   -c/w lasix     Problem/Plan - 4:  ·  Problem: Hypertension.   - going back on home medications       Problem/Plan - 5:  ·  Problem: Need for prophylactic measure.   ·  Plan: dvt ppx: hep sq.       -cardiology recommends for discharge starting potassium 20mg daily         already discussed with patient regarding close follow-up of blood counts as outpatient     73 year old male with past medical history of HTN, HLD, prostate cancer (s/p XRT), asthma, diverticulosis, with multiple GI bleeds requiring multiple units of blood and  s/p IR embolization,  afib, s/p watchman procedure due to contraindication to oral anticoagulant therapy presents to the ED as a code stroke for dizziness, dysarthria, and a facial droop.    Summery of hospital course:  Patient was seen and evaluated and followed by multiple specialists throughout the stay and treated medically with improvement.  Patient was otherwise stable and had no other complications. See chart for further details.  Patient was discharged to home in stable condition to follow up with primary doctor as outpatient for follow up and further care.

## 2024-02-05 NOTE — PROGRESS NOTE ADULT - PROVIDER SPECIALTY LIST ADULT
Cardiology
Internal Medicine
Neurology
Internal Medicine
Internal Medicine
Neurology
Cardiology
Gastroenterology
Internal Medicine
Internal Medicine

## 2024-02-05 NOTE — DISCHARGE NOTE NURSING/CASE MANAGEMENT/SOCIAL WORK - PATIENT PORTAL LINK FT
You can access the FollowMyHealth Patient Portal offered by [facility] by registering at the following website: [email or web address]. By joining [employer] FollowMyHealth portal, you will also be able to view your health information using other applications (apps) compatible with our system.

## 2024-02-05 NOTE — DISCHARGE NOTE PROVIDER - NSDCMRMEDTOKEN_GEN_ALL_CORE_FT
acetaminophen 325 mg oral tablet: 2 tab(s) orally every 6 hours As needed Moderate Pain (4 - 6)  Albuterol (Eqv-ProAir HFA) 90 mcg/inh inhalation aerosol: 2 puff(s) inhaled every 4 to 6 hours as needed for  shortness of breath and/or wheezing  apixaban 2.5 mg oral tablet: 1 tab(s) orally every 12 hours  atorvastatin 80 mg oral tablet: 1 tab(s) orally once a day (at bedtime)  colchicine 0.6 mg oral tablet: 1 tab(s) orally 2 times a day  DilTIAZem (Eqv-Cardizem CD) 120 mg/24 hours oral capsule, extended release: 1 cap(s) orally once a day  dorzolamide-timolol 2.23%-0.68% (2%-0.5% base) ophthalmic solution: 1 drop(s) in the left eye 2 times a day  finasteride 5 mg oral tablet: 1 tab(s) orally once a day  furosemide 40 mg oral tablet: 1 tab(s) orally once a day  K-Tab 20 mEq oral tablet, extended release: 1 tab(s) orally once a day  MethylPREDNISolone Dose Pack 4 mg oral tablet: orally on day 3  metoprolol succinate 50 mg oral tablet, extended release: 1 tab(s) orally once a day  montelukast 10 mg oral tablet: 1 tab(s) orally once a day  outpatient physical therapy: 3 times a week MDD: 1  pantoprazole 40 mg oral delayed release tablet: 1 tab(s) orally once a day (before a meal)  polyethylene glycol 3350 oral powder for reconstitution: 17 gram(s) orally once a day  tamsulosin 0.4 mg oral capsule: 1 cap(s) orally once a day

## 2024-02-05 NOTE — DISCHARGE NOTE NURSING/CASE MANAGEMENT/SOCIAL WORK - NSDCVIVACCINE_GEN_ALL_CORE_FT
Tdap; 08-May-2023 07:17; Cedrick Coronel); [employer]; [id number] (Exp. Date: 04-Mar-2025); IntraMuscular; Deltoid Left.; 0.5 milliLiter(s); VIS (VIS Published: 09-May-2013, VIS Presented: 08-May-2023);

## 2024-02-05 NOTE — PROGRESS NOTE ADULT - ASSESSMENT
M E D I C A L   A T T E N D I N G    F O L L O W    U P   N O T E  (02-05-24 )                                     ------------------------------------------------------------------------------------------------    patient evaluated by me, case discussed with team, chart, medications, and physical exam reviewed, labs / tests  and vitals reviewed by me, as bellow.   Patient is stable for discharge today. patient overall doing well, tolerating medications, no bleeding reported Hgl stable .. to follow up closely as outpatient as discussed   Patient to follow up with  PMD / cardio   See discharge document for full note (discussed with ACP).  [Greater than 35 min spent for these services. ]          ( Note written / Date of service 02-05-24 ( This is certified to be the same as "ENTERED" date above ( for billing purposes)))    ==================>> MEDICATIONS <<====================    apixaban 2.5 milliGRAM(s) Oral every 12 hours  atorvastatin 80 milliGRAM(s) Oral at bedtime  colchicine 0.6 milliGRAM(s) Oral two times a day  diltiazem    milliGRAM(s) Oral daily  dorzolamide 2%/timolol 0.5% Ophthalmic Solution 1 Drop(s) Left EYE two times a day  finasteride 5 milliGRAM(s) Oral daily  furosemide    Tablet 40 milliGRAM(s) Oral daily  metoprolol succinate ER 50 milliGRAM(s) Oral <User Schedule>  montelukast 10 milliGRAM(s) Oral daily  pantoprazole    Tablet 40 milliGRAM(s) Oral before breakfast  polyethylene glycol 3350 17 Gram(s) Oral daily  tamsulosin 0.4 milliGRAM(s) Oral at bedtime    MEDICATIONS  (PRN):  acetaminophen     Tablet .. 650 milliGRAM(s) Oral every 6 hours PRN Temp greater or equal to 38C (100.4F), Mild Pain (1 - 3)  albuterol    90 MICROgram(s) HFA Inhaler 2 Puff(s) Inhalation every 6 hours PRN for shortness of breath and/or wheezing  aluminum hydroxide/magnesium hydroxide/simethicone Suspension 30 milliLiter(s) Oral every 4 hours PRN Dyspepsia  melatonin 3 milliGRAM(s) Oral at bedtime PRN Insomnia  ondansetron Injectable 4 milliGRAM(s) IV Push every 8 hours PRN Nausea and/or Vomiting    ___________  Active diet:  Diet, Regular:   DASH/TLC Sodium & Cholesterol Restricted (DASH)  ___________________    ==================>> VITAL SIGNS <<==================    T(C): 36.7 (02-05-24 @ 12:34), Max: 37 (02-04-24 @ 20:44)  HR: 86 (02-05-24 @ 12:34) (78 - 89)  BP: 137/95 (02-05-24 @ 12:34) (137/95 - 146/90)  BP(mean): --  RR: 18 (02-05-24 @ 12:34) (18 - 18)  SpO2: 100% (02-05-24 @ 12:34) (97% - 100%)        ==================>> LAB AND IMAGING <<==================                        12.1   6.04  )-----------( 221      ( 05 Feb 2024 07:30 )             38.0              PTT - ( 05 Feb 2024 07:30 )  PTT:77.3 sec                   TSH:      2.20   (01-16-24)           Lipid profile:  (01-30-24)     Total: 117     LDL  : (p)     HDL  :37     TG   :74     HgA1C:   (01-30-24)          (01-30-24)      4.7  WBC count:   6.04 <<== ,  6.47 <<== ,  6.10 <<== ,  8.42 <<== ,  8.83 <<==   Hemoglobin:   12.1 <<==,  11.4 <<==,  11.2 <<==,  11.3 <<==,  12.3 <<==  platelets:  221 <==, 239 <==, 243 <==, 251 <==, 288 <==, 260 <==    Creatinine:  0.80  <<==, 0.79  <<==, 0.83  <<==, 0.90  <<==  Sodium:   140  <==, 140  <==, 141  <==, 139  <==        ( Note written / Date of service 02-05-24 ( This is certified to be the same as "ENTERED" date above ( for billing Purposes )))

## 2024-02-05 NOTE — DISCHARGE NOTE PROVIDER - NSFOLLOWUPCLINICS_GEN_ALL_ED_FT
[facility] Gastroenterology  Gastroenterology  [address] [place] [address] [address]  [place] [address]  Phone: [phone]  Fax:

## 2024-02-05 NOTE — DISCHARGE NOTE PROVIDER - CARE PROVIDER_API CALL
Riki Desai  Pulmonary Disease  [address] [place] [address]  Phone: [phone]  Fax: [phone]  Follow Up Time:     Christophe Calderón  Cardiovascular Disease  [address] [place] [id number]  Phone: [phone]  Fax: [phone]  Follow Up Time:     Kelin Umaña  NP in [address]  [place] [address]  Phone: [phone]  Fax: [phone]  Follow Up Time:     Gaurav Marie  Cardiovascular Disease  [address] [place] [address], [facility] [address]  [place] [address]  Phone: [phone]  Fax: [phone]  Follow Up Time:

## 2024-02-05 NOTE — DISCHARGE NOTE PROVIDER - CARE PROVIDERS DIRECT ADDRESSES
,DirectAddress_Unknown,DirectAddress_Unknown,[email or web address]

## 2024-02-06 NOTE — PHARMACOTHERAPY INTERVENTION NOTE - COMMENTS
Discharge medications reviewed with the patient over the phone. Current medication schedule was discussed in detail including: medication name, indication, dose, administration times, treatment duration, side effects, and special instructions. Educated patient on apixaban including the purpose and administration. Discussed adverse effects in detail and when to seek medical attention (blood in stool, vomit, etc.). Patient reports he did not  the atorvastatin or KCl from the pharmacy because he already had a supply at home. Questions and concerns were answered and addressed. Patient demonstrated understanding.     New medications: apixaban    Roula BernardD, [facility]  Clinical Pharmacy Specialist  [id number]

## 2024-04-06 ENCOUNTER — EMERGENCY (EMERGENCY)
Facility: HOSPITAL | Age: 74
LOS: 1 days | Discharge: HOME | End: 2024-04-06
Attending: EMERGENCY MEDICINE | Admitting: EMERGENCY MEDICINE
Payer: COMMERCIAL

## 2024-04-06 VITALS
DIASTOLIC BLOOD PRESSURE: 82 MMHG | SYSTOLIC BLOOD PRESSURE: 132 MMHG | RESPIRATION RATE: 16 BRPM | HEART RATE: 76 BPM | OXYGEN SATURATION: 100 % | TEMPERATURE: 97 F

## 2024-04-06 VITALS
DIASTOLIC BLOOD PRESSURE: 85 MMHG | SYSTOLIC BLOOD PRESSURE: 130 MMHG | RESPIRATION RATE: 16 BRPM | OXYGEN SATURATION: 95 % | TEMPERATURE: 98 F | HEART RATE: 72 BPM

## 2024-04-06 DIAGNOSIS — Z96.651 PRESENCE OF RIGHT ARTIFICIAL KNEE JOINT: Chronic | ICD-10-CM

## 2024-04-06 DIAGNOSIS — Z98.890 OTHER SPECIFIED POSTPROCEDURAL STATES: Chronic | ICD-10-CM

## 2024-04-06 PROCEDURE — 73030 X-RAY EXAM OF SHOULDER: CPT | Mod: 26,LT

## 2024-04-06 PROCEDURE — 99284 EMERGENCY DEPT VISIT MOD MDM: CPT

## 2024-04-06 PROCEDURE — 73562 X-RAY EXAM OF KNEE 3: CPT | Mod: 26,LT

## 2024-04-06 RX ORDER — ACETAMINOPHEN 500 MG
650 TABLET ORAL ONCE
Refills: 0 | Status: COMPLETED | OUTPATIENT
Start: 2024-04-06 | End: 2024-04-06

## 2024-04-06 RX ADMIN — Medication 650 MILLIGRAM(S): at 17:16

## 2024-04-06 NOTE — ED PROVIDER NOTE - PATIENT PORTAL LINK FT
You can access the FollowMyHealth Patient Portal offered by [facility] by registering at the following website: [email or web address]. By joining [employer]’s FollowMyHealth portal, you will also be able to view your health information using other applications (apps) compatible with our system.
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
## 2024-04-06 NOTE — ED PROVIDER NOTE - PROGRESS NOTE DETAILS
[facility] PGY5: imaging non actionable. Patient w/ improvement in symptoms. VSS. Strict return precautions given. Will f/u w/ PCP.
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
## 2024-04-06 NOTE — ED PROVIDER NOTE - NSFOLLOWUPINSTRUCTIONS_ED_ALL_ED_FT
Acute Knee Pain, Adult  Acute knee pain is sudden and may be caused by damage, swelling, or irritation of the muscles and tissues that support the knee. Pain may result from:  A fall.  An injury to the knee from twisting motions.  A hit to the knee.  Infection.  Acute knee pain may go away on its own with time and rest. If it does not, your health care provider may order tests to find the cause of the pain. These may include:  Imaging tests, such as an X-ray, MRI, CT scan, or ultrasound.  Joint aspiration. In this test, fluid is removed from the knee and evaluated.  Arthroscopy. In this test, a lighted tube is inserted into the knee and an image is projected onto a TV screen.  Biopsy. In this test, a sample of tissue is removed from the body and studied under a microscope.  Follow these instructions at home:  If you have a knee sleeve or brace:      Wear the knee sleeve or brace as told by your health care provider. Remove it only as told by your health care provider.  Loosen it if your toes tingle, become numb, or turn cold and blue.  Keep it clean.  If the knee sleeve or brace is not waterproof:  Do not let it get wet.  Cover it with a watertight covering when you take a bath or shower.  Activity    Rest your knee.  Do not do things that cause pain or make pain worse.  Avoid high-impact activities or exercises, such as running, jumping rope, or doing jumping jacks.  Work with a physical therapist to make a safe exercise program, as recommended by your health care provider. Do exercises as told by your physical therapist.  Managing pain, stiffness, and swelling      If directed, put ice on the affected knee. To do this:  If you have a removable knee sleeve or brace, remove it as told by your health care provider.  Put ice in a plastic bag.  Place a towel between your skin and the bag.  Leave the ice on for 20 minutes, 2–3 times a day.  Remove the ice if your skin turns bright red. This is very important. If you cannot feel pain, heat, or cold, you have a greater risk of damage to the area.  If directed, use an elastic bandage to put pressure (compression) on your injured knee. This may control swelling, give support, and help with discomfort.  Raise (elevate) your knee above the level of your heart while you are sitting or lying down.  Sleep with a pillow under your knee.  General instructions    Take over-the-counter and prescription medicines only as told by your health care provider.  Do not use any products that contain nicotine or tobacco, such as cigarettes, e-cigarettes, and chewing tobacco. If you need help quitting, ask your health care provider.  If you are overweight, work with your health care provider and a dietitian to set a weight-loss goal that is healthy and reasonable for you. Extra weight can put pressure on your knee.  Pay attention to any changes in your symptoms.  Keep all follow-up visits. This is important.  Contact a health care provider if:  Your knee pain continues, changes, or gets worse.  You have a fever along with knee pain.  Your knee feels warm to the touch or is red.  Your knee ryan or locks up.  Get help right away if:  Your knee swells, and the swelling becomes worse.  You cannot move your knee.  You have severe pain in your knee that cannot be managed with pain medicine.  Summary  Acute knee pain can be caused by a fall, an injury, an infection, or damage, swelling, or irritation of the tissues that support your knee.  Your health care provider may perform tests to find out the cause of the pain.  Pay attention to any changes in your symptoms. Relieve your pain with rest, medicines, light activity, and the use of ice.  Get help right away if your knee swells, you cannot move your knee, or you have severe pain that cannot be managed with medicine.  This information is not intended to replace advice given to you by your health care provider. Make sure you discuss any questions you have with your health care provider.
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
## 2024-04-09 NOTE — ED PROVIDER NOTE - HIV OFFER
Received a call from     She is following up on C30 request that was faxed in Februrary?    Also following up on her restrictions?  She is at work but having a hard time working    Requesting a return call [phone]  
Sent to [place]   
Previously Declined (within the last year)

## 2024-05-14 NOTE — DISCHARGE NOTE PROVIDER - NSDCCAREPROVSEEN_GEN_ALL_CORE_FT
Subjective     Dev CASPER Jr. is a 6 y.o. male who presents with Fever (Cough, headache, body aches, sores back of throat, X 2 days )            Dev is a 6 y.o. male who presents to urgent care with fever, cough, sore throat and bodyaches.  Patient currently on day #3 of symptoms.  No SOB/wheezing.  Patient was sent home from school today due to a fever. Mom gave him tylenol approx. 45 mins ago.        Review of Systems   Constitutional:  Positive for chills, fever and malaise/fatigue.   HENT:  Positive for congestion and sore throat. Negative for ear pain.    Respiratory:  Positive for cough.    Musculoskeletal:  Positive for myalgias.   All other systems reviewed and are negative.             Objective     Pulse 125   Temp (!) 38.4 °C (101.1 °F) (Temporal)   Resp 20   Wt 23.8 kg (52 lb 6.4 oz)   SpO2 95%      Physical Exam  Vitals reviewed.   Constitutional:       General: He is not in acute distress.     Appearance: Normal appearance. He is not toxic-appearing.   HENT:      Head: Normocephalic and atraumatic.      Right Ear: Ear canal and external ear normal. No drainage. A PE tube is present.      Left Ear: Ear canal and external ear normal. No drainage. A PE tube is present.      Nose: Nose normal.      Mouth/Throat:      Lips: Pink.      Mouth: Mucous membranes are moist.      Pharynx: Oropharynx is clear. Uvula midline. Posterior oropharyngeal erythema present. No oropharyngeal exudate.   Eyes:      Extraocular Movements: Extraocular movements intact.      Conjunctiva/sclera: Conjunctivae normal.      Pupils: Pupils are equal, round, and reactive to light.   Cardiovascular:      Rate and Rhythm: Normal rate and regular rhythm.   Pulmonary:      Effort: Pulmonary effort is normal.      Breath sounds: Normal breath sounds.   Skin:     General: Skin is warm and dry.   Neurological:      Mental Status: He is alert.                             Assessment & Plan          1. Viral URI with cough  -  Day #3 of symptoms. Declined viral testing.  - Febrile in clinic. Temp of 101.1F. Systemic symptom.    2. Sore throat  - POCT CEPHEID GROUP A STREP - PCR   - Collected.  Results will be available via [facility]IA.  Patient's mother will be notified of any positive results and patient will be started on appropriate antibiotic therapy at that time if indicated.        Differential diagnoses, supportive care measures (rest, importance of oral hydration, OTC Tylenol/ibuprofen, nasal saline rinses, humidified air) and indications for immediate follow-up discussed with patients mother. Pathogenesis of diagnosis discussed including typical length and natural progression.      Instructed to return to urgent care or nearest emergency department if symptoms fail to improve, for any change in condition, further concerns, or new concerning symptoms.    Patients mother states understanding and agrees with the plan of care and discharge instructions.                   [facility] Team 2; Gastroenterology; Interventional Radiology  [facility] Team 2; Gastroenterology; Interventional Radiology   Sandra Butler

## 2024-05-21 ENCOUNTER — APPOINTMENT (OUTPATIENT)
Dept: ELECTROPHYSIOLOGY | Facility: CLINIC | Age: 74
End: 2024-05-21

## 2024-06-11 ENCOUNTER — APPOINTMENT (OUTPATIENT)
Dept: ELECTROPHYSIOLOGY | Facility: CLINIC | Age: 74
End: 2024-06-11
Payer: MEDICARE

## 2024-06-11 ENCOUNTER — NON-APPOINTMENT (OUTPATIENT)
Age: 74
End: 2024-06-11

## 2024-06-11 VITALS
HEART RATE: 74 BPM | HEIGHT: 70 IN | WEIGHT: 205 LBS | OXYGEN SATURATION: 95 % | SYSTOLIC BLOOD PRESSURE: 129 MMHG | DIASTOLIC BLOOD PRESSURE: 75 MMHG | BODY MASS INDEX: 29.35 KG/M2

## 2024-06-11 DIAGNOSIS — Z98.890 OTHER SPECIFIED POSTPROCEDURAL STATES: ICD-10-CM

## 2024-06-11 DIAGNOSIS — Z86.79 OTHER SPECIFIED POSTPROCEDURAL STATES: ICD-10-CM

## 2024-06-11 DIAGNOSIS — I10 ESSENTIAL (PRIMARY) HYPERTENSION: ICD-10-CM

## 2024-06-11 PROCEDURE — 93000 ELECTROCARDIOGRAM COMPLETE: CPT

## 2024-06-11 PROCEDURE — 99213 OFFICE O/P EST LOW 20 MIN: CPT

## 2024-06-11 PROCEDURE — G2211 COMPLEX E/M VISIT ADD ON: CPT

## 2024-06-11 RX ORDER — APIXABAN 2.5 MG/1
2.5 TABLET, FILM COATED ORAL
Qty: 60 | Refills: 3 | Status: ACTIVE | COMMUNITY
Start: 2024-06-11

## 2024-06-11 RX ORDER — KRILL/OM-3/DHA/EPA/PHOSPHO/AST 1000-230MG
81 CAPSULE ORAL
Qty: 30 | Refills: 0 | Status: DISCONTINUED | COMMUNITY
Start: 2023-10-03 | End: 2024-06-11
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
## 2024-06-24 NOTE — ED ADULT NURSE NOTE - AS PAIN REST
Patient's  called in today to cancel his wife's appointments as she has passed away this past Saturday 6/22. Wanted to send an FYI to both parties to make you aware.  
0 (no pain/absence of nonverbal indicators of pain)

## 2024-08-09 ENCOUNTER — TRANSCRIPTION ENCOUNTER (OUTPATIENT)
Age: 74
End: 2024-08-09

## 2024-08-09 ENCOUNTER — RESULT REVIEW (OUTPATIENT)
Age: 74
End: 2024-08-09

## 2024-08-09 ENCOUNTER — OUTPATIENT (OUTPATIENT)
Dept: OUTPATIENT SERVICES | Facility: HOSPITAL | Age: 74
LOS: 1 days | End: 2024-08-09
Payer: COMMERCIAL

## 2024-08-09 VITALS
SYSTOLIC BLOOD PRESSURE: 115 MMHG | OXYGEN SATURATION: 97 % | RESPIRATION RATE: 11 BRPM | DIASTOLIC BLOOD PRESSURE: 71 MMHG | HEART RATE: 73 BPM

## 2024-08-09 VITALS
DIASTOLIC BLOOD PRESSURE: 85 MMHG | SYSTOLIC BLOOD PRESSURE: 144 MMHG | HEIGHT: 71 IN | HEART RATE: 77 BPM | WEIGHT: 205.03 LBS | TEMPERATURE: 98 F | RESPIRATION RATE: 16 BRPM | OXYGEN SATURATION: 95 %

## 2024-08-09 DIAGNOSIS — Z98.890 OTHER SPECIFIED POSTPROCEDURAL STATES: Chronic | ICD-10-CM

## 2024-08-09 DIAGNOSIS — Z96.651 PRESENCE OF RIGHT ARTIFICIAL KNEE JOINT: Chronic | ICD-10-CM

## 2024-08-09 DIAGNOSIS — I48.92 UNSPECIFIED ATRIAL FLUTTER: ICD-10-CM

## 2024-08-09 LAB
BASE EXCESS BLDV CALC-SCNC: 9.1 MMOL/L — HIGH (ref -2–3)
CA-I SERPL-SCNC: 1.21 MMOL/L — SIGNIFICANT CHANGE UP (ref 1.15–1.33)
CO2 BLDV-SCNC: 36.3 MMOL/L — HIGH (ref 22–26)
GAS PNL BLDV: 139 MMOL/L — SIGNIFICANT CHANGE UP (ref 136–145)
GLUCOSE BLDV-MCNC: 112 MG/DL — HIGH (ref 70–99)
HCO3 BLDV-SCNC: 35 MMOL/L — HIGH (ref 22–29)
HCT VFR BLDA CALC: 44 % — SIGNIFICANT CHANGE UP
HGB BLD CALC-MCNC: 14.5 G/DL — SIGNIFICANT CHANGE UP (ref 12.6–17.4)
LACTATE BLDV-MCNC: 0.6 MMOL/L — SIGNIFICANT CHANGE UP (ref 0.5–2)
PCO2 BLDV: 50 MMHG — SIGNIFICANT CHANGE UP (ref 42–55)
PH BLDV: 7.45 — HIGH (ref 7.32–7.43)
PO2 BLDV: <33 MMHG — SIGNIFICANT CHANGE UP (ref 25–45)
POTASSIUM BLDV-SCNC: 3.5 MMOL/L — SIGNIFICANT CHANGE UP (ref 3.5–5.1)
SAO2 % BLDV: 40 % — LOW (ref 67–88)

## 2024-08-09 PROCEDURE — 93320 DOPPLER ECHO COMPLETE: CPT | Mod: 26,GC

## 2024-08-09 PROCEDURE — 93325 DOPPLER ECHO COLOR FLOW MAPG: CPT | Mod: 26,GC

## 2024-08-09 PROCEDURE — 93306 TTE W/DOPPLER COMPLETE: CPT | Mod: 26

## 2024-08-09 PROCEDURE — 93312 ECHO TRANSESOPHAGEAL: CPT | Mod: 26

## 2024-08-09 PROCEDURE — 76376 3D RENDER W/INTRP POSTPROCES: CPT | Mod: 26

## 2024-08-09 RX ORDER — FUROSEMIDE 10 MG/ML
1 INJECTION, SOLUTION INTRAVENOUS
Refills: 0 | DISCHARGE

## 2024-08-09 RX ORDER — METOPROLOL TARTRATE 100 MG
1 TABLET ORAL
Refills: 0 | DISCHARGE

## 2024-08-09 RX ORDER — DEXTROSE MONOHYDRATE, SODIUM CHLORIDE, SODIUM LACTATE, CALCIUM CHLORIDE, MAGNESIUM CHLORIDE 1.5; 538; 448; 18.4; 5.08 G/100ML; MG/100ML; MG/100ML; MG/100ML; MG/100ML
1000 SOLUTION INTRAPERITONEAL
Refills: 0 | Status: ACTIVE | OUTPATIENT
Start: 2024-08-09 | End: 2025-07-08

## 2024-08-09 RX ORDER — APIXABAN 5 MG/1
1 TABLET, FILM COATED ORAL
Refills: 0 | DISCHARGE

## 2024-08-09 RX ORDER — ATORVASTATIN CALCIUM 40 MG/1
1 TABLET, FILM COATED ORAL
Refills: 0 | DISCHARGE

## 2024-08-09 RX ORDER — POTASSIUM CHLORIDE 1500 MG/1
20 TABLET, EXTENDED RELEASE ORAL
Refills: 0 | DISCHARGE

## 2024-08-09 RX ORDER — PANTOPRAZOLE SODIUM 20 MG/1
1 TABLET, DELAYED RELEASE ORAL
Refills: 0 | DISCHARGE

## 2024-08-09 RX ORDER — BACTERIOSTATIC SODIUM CHLORIDE 0.9 %
3 VIAL (ML) INJECTION EVERY 8 HOURS
Refills: 0 | Status: ACTIVE | OUTPATIENT
Start: 2024-08-09 | End: 2025-07-08

## 2024-08-09 RX ORDER — BUDESONIDE AND FORMOTEROL FUMARATE DIHYDRATE 80; 4.5 UG/1; UG/1
2 AEROSOL RESPIRATORY (INHALATION)
Refills: 0 | DISCHARGE

## 2024-08-09 RX ORDER — COLCHICINE 0.6 MG/1
1 TABLET, FILM COATED ORAL
Refills: 0 | DISCHARGE

## 2024-08-09 NOTE — ASU DISCHARGE PLAN (ADULT/PEDIATRIC) - CARE PROVIDER_API CALL
Gaurav Marie  Cardiovascular Disease  [address] [place] [address], [address]  [place] [address]  Phone: [phone]  Fax: [phone]  Follow Up Time:

## 2024-08-09 NOTE — H&P CARDIOLOGY - PRESSURE ULCER(S)
Office received referral for Dr FIELDS from [facility] for [facility]. Dr Suarez reviewed imaging and okay for patient to be scheduled with any provider. LM for patient to call back.   no

## 2024-08-09 NOTE — ASU DISCHARGE PLAN (ADULT/PEDIATRIC) - ASU DC SPECIAL INSTRUCTIONSFT
DIET:  - Do not eat or drink for 2 hours post procedure, unless instructed by your nurse.  - Once 2 hours have passed, start with a sip of water. If tolerated (without coughing, nausea, vomiting, difficulty swallowing), you may advance to the same diet you were on prior to your procedure. If you do not tolerate water, please wait an additional 30 minutes and repeat the process.  - A mild sore throat is expected. If you are unable to swallow water, experience severe nausea, vomiting, coughing, bleeding, or fever, do not take anything else by mouth. Please call the office at [phone] immediately and ask to speak to a cardiologist. If you are unable to reach the office, please proceed to the nearest emergency room.    ACTIVITY:  For the next 24 hours:  - Do not drive or operate heavy machinery.  - Do not drink any alcoholic beverages.  - Do not make any important decisions or sign legal documents.  - Resume normal activity 24 hours post procedural completion.    MEDICATION:   - Take your medications as explained (see attached medication reconciliation on discharge paperwork).    ADDITIONAL INSTRUCTIONS:  - Call your doctor to make or confirm your follow up appointment.  - If you are unable to get in contact with your doctor, you may contact the Interventional Recovery Suite at [phone].  - Please reference your discharge instructions for any questions or concerns.

## 2024-08-09 NOTE — H&P CARDIOLOGY - NSICDXPASTSURGICALHX_GEN_ALL_CORE_FT
PAST SURGICAL HISTORY:  H/O total knee replacement, right     History of hemorrhoidectomy      PAST SURGICAL HISTORY:  H/O eye surgery     H/O total knee replacement, right     History of hemorrhoidectomy

## 2024-08-09 NOTE — H&P CARDIOLOGY - NSICDXPASTMEDICALHX_GEN_ALL_CORE_FT
PAST MEDICAL HISTORY:  Asthma     Atrial flutter     Diverticulitis     Diverticulosis     GIB (gastrointestinal bleeding)     Gout     Hyperlipidemia     Hypertension     Presence of Watchman left atrial appendage closure device     Prostate cancer s/p radiation therapy     PAST MEDICAL HISTORY:  Asthma     Atrial flutter     Diverticulitis     Diverticulosis     GIB (gastrointestinal bleeding)     Gout     H/O pericarditis     History of transient ischemic attack (TIA)     Hyperlipidemia     Hypertension     Presence of Watchman left atrial appendage closure device     Prostate cancer s/p radiation therapy

## 2024-08-09 NOTE — CHART NOTE - NSCHARTNOTEFT_GEN_A_CORE
Type of Procedure: APRIL (Transesophageal echocardiogram)  Licensed independent practitioner: Landon Lopez MD  Assistant: None  Estimated blood loss: None  Specimen removed: None  Preoperative Dx: Watchman leak  Postoperative Dx: Same  Complications: None  Anesthesia type: Sedation by the attending anesthesiologist    Findings:     Limited APRIL  Persistent Watchman leak similar to previous APRIL    Full report to follow    Landon Lopez MD  Chief, Cardiology  [facility]

## 2024-08-09 NOTE — ASU DISCHARGE PLAN (ADULT/PEDIATRIC) - NS MD DC FALL RISK RISK
For information on Fall & Injury Prevention, visit: [email or web address] OR  [email or web address] OR  https://www.cdc.gov/steadi/patient.html

## 2024-08-09 NOTE — H&P CARDIOLOGY - HISTORY OF PRESENT ILLNESS
74 y.o. male presents today for elective APRIL, s/p Watchman device implant.    74 y.o. male presents today for elective APRIL, s/p Watchman device implant. The patient denies chest pain, SOB, palpitations, dizziness, presyncope, syncope,  headache, visual disturbances, CVA, PE, DVT, NIA, abdominal pain, N/V/D/C, hematochezia, melena, dysuria, hematuria, fever, chills.    NPO Date and Time - 8/8/24 17:00  Anticoagulation Date and Time - 8/9/24 in AM  Previous Endoscopy?  NO  Hx of TIA - Yes  Sleep Apnea?  NO  Dentures? NO  Loose Teeth? NO      Patient Denies:  Esophageal pathology  Active or recent upper gastrointestinal (GI) bleed  History of GI surgery  History of Esophageal surgery  History of Hope's esophagus  Tenuous cardiorespiratory status  Cervical spine arthritis with reduced range of motion or atlantoaxial joint disease. History of radiation to head, neck, or mediastinum  Severe thrombocytopenia

## 2024-08-09 NOTE — ASU PATIENT PROFILE, ADULT - AS SC BRADEN MOISTURE
Endoscopic procedure was done in [place] and the procedure report can be found under the media tab.     (4) rarely moist

## 2024-08-13 ENCOUNTER — APPOINTMENT (OUTPATIENT)
Dept: ELECTROPHYSIOLOGY | Facility: CLINIC | Age: 74
End: 2024-08-13
Payer: MEDICARE

## 2024-08-13 ENCOUNTER — NON-APPOINTMENT (OUTPATIENT)
Age: 74
End: 2024-08-13

## 2024-08-13 VITALS
BODY MASS INDEX: 29.69 KG/M2 | DIASTOLIC BLOOD PRESSURE: 85 MMHG | WEIGHT: 207.4 LBS | HEART RATE: 68 BPM | SYSTOLIC BLOOD PRESSURE: 125 MMHG | OXYGEN SATURATION: 97 % | HEIGHT: 70 IN

## 2024-08-13 DIAGNOSIS — Z95.818 PRESENCE OF OTHER CARDIAC IMPLANTS AND GRAFTS: ICD-10-CM

## 2024-08-13 PROCEDURE — 99213 OFFICE O/P EST LOW 20 MIN: CPT

## 2024-08-13 PROCEDURE — 93000 ELECTROCARDIOGRAM COMPLETE: CPT

## 2024-08-13 NOTE — HISTORY OF PRESENT ILLNESS
[FreeTextEntry1] : Mr. LENNOX CARTER is a 74 year man with past medical history of HTN, HLD, prostate cancer (s/p XRT), asthma, diverticulosis with last admission (5/3 - 5/6) for diverticular bleed last week requiring 5units PRBCs, found Atrial flutter S/P CVA, now S/P  Afib ablation and CTI ablation, s/p watchman procedure due to contraindication to oral anticoagulant therapy for AFL on 8/14/23 who presents here for follow up visit. Patient found to have ishmael-device leak and is now here to discussion options.

## 2024-08-13 NOTE — CARDIOLOGY SUMMARY
[de-identified] : 6/11/24 NSR with 1 st degree AVB [de-identified] : 8/9/2024: CONCLUSIONS:   1. Limited APRIL performed to evaluate Watchman Device.  2. A Watchman closure device is present in the left atrial appendage. Leak present: 3.3 mm seen at 91 degrees; 2.7 mm seen at 100 degrees.  3. Compared to the transesophageal echocardiogram performed on 9/29/2023, there have been no significant interval changes.

## 2024-08-13 NOTE — CARDIOLOGY SUMMARY
[de-identified] : 6/11/24 NSR with 1 st degree AVB [de-identified] : 8/9/2024: CONCLUSIONS:   1. Limited APRIL performed to evaluate Watchman Device.  2. A Watchman closure device is present in the left atrial appendage. Leak present: 3.3 mm seen at 91 degrees; 2.7 mm seen at 100 degrees.  3. Compared to the transesophageal echocardiogram performed on 9/29/2023, there have been no significant interval changes.

## 2024-08-13 NOTE — DISCUSSION/SUMMARY
[FreeTextEntry1] : IMPRESSIONS:  1. AFL/AFIB/GIB: S/P CVA:  now s/p  Afib ablation and CTI ablation, s/p Watchman procedure due to contraindication to oral anticoagulant therapy for AFL on 8/14/23. EKG performed today to assess for conduction disease reveals NSR with 1st degree AVB He is doing well post procedure. APRIL post Watchman showed mild leaks. He will continue metoprolol and Eliquis due to CVA.   CHADSVASC score =3. Most recent APRIL with 3.3mm leak still present. Will pursue closure of Watchman device using coils. Will discuss with IR.   2. HTN: resume oral antihypertensives as prescribed. Encouraged heart healthy diet, sodium restriction, and weight loss. Continue regular f/u with Cardiologist for further HTN management.  3. HLD: resume statin therapy as prescribed and regular f/u with Cardiologist for routine lipid monitoring and management.  Resume routine f/u with Cardiologist and RTO as needed or for any new/worsening symptoms. [EKG obtained to assist in diagnosis and management of assessed problem(s)] : EKG obtained to assist in diagnosis and management of assessed problem(s)

## 2024-08-15 NOTE — ED ADULT NURSE REASSESSMENT NOTE - HEART RATE (BEATS/MIN)
92
85
92
81
Bed/Stretcher in lowest position, wheels locked, appropriate side rails in place/Call bell, personal items and telephone in reach/Instruct patient to call for assistance before getting out of bed/chair/stretcher/Non-slip footwear applied when patient is off stretcher/[place] to call system/Physically safe environment - no spills, clutter or unnecessary equipment/Purposeful proactive rounding/Room/bathroom lighting operational, light cord in reach

## 2024-08-20 ENCOUNTER — NON-APPOINTMENT (OUTPATIENT)
Age: 74
End: 2024-08-20

## 2024-08-21 PROBLEM — Z86.79 PERSONAL HISTORY OF OTHER DISEASES OF THE CIRCULATORY SYSTEM: Chronic | Status: ACTIVE | Noted: 2024-08-09

## 2024-08-21 PROBLEM — Z86.73 PERSONAL HISTORY OF TRANSIENT ISCHEMIC ATTACK (TIA), AND CEREBRAL INFARCTION WITHOUT RESIDUAL DEFICITS: Chronic | Status: ACTIVE | Noted: 2024-08-09

## 2024-09-10 ENCOUNTER — APPOINTMENT (OUTPATIENT)
Dept: GASTROENTEROLOGY | Facility: CLINIC | Age: 74
End: 2024-09-10
Payer: MEDICARE

## 2024-09-10 VITALS
HEART RATE: 77 BPM | DIASTOLIC BLOOD PRESSURE: 97 MMHG | SYSTOLIC BLOOD PRESSURE: 154 MMHG | BODY MASS INDEX: 29.63 KG/M2 | TEMPERATURE: 97.3 F | WEIGHT: 207 LBS | HEIGHT: 70 IN | OXYGEN SATURATION: 94 %

## 2024-09-10 DIAGNOSIS — Z95.818 PRESENCE OF OTHER CARDIAC IMPLANTS AND GRAFTS: ICD-10-CM

## 2024-09-10 DIAGNOSIS — R10.9 UNSPECIFIED ABDOMINAL PAIN: ICD-10-CM

## 2024-09-10 DIAGNOSIS — C61 MALIGNANT NEOPLASM OF PROSTATE: ICD-10-CM

## 2024-09-10 DIAGNOSIS — K92.2 GASTROINTESTINAL HEMORRHAGE, UNSPECIFIED: ICD-10-CM

## 2024-09-10 DIAGNOSIS — K57.90 DIVERTICULOSIS OF INTESTINE, PART UNSPECIFIED, W/OUT PERFORATION OR ABSCESS W/OUT BLEEDING: ICD-10-CM

## 2024-09-10 DIAGNOSIS — R14.0 ABDOMINAL DISTENSION (GASEOUS): ICD-10-CM

## 2024-09-10 DIAGNOSIS — Z87.898 PERSONAL HISTORY OF OTHER SPECIFIED CONDITIONS: ICD-10-CM

## 2024-09-10 PROCEDURE — 99214 OFFICE O/P EST MOD 30 MIN: CPT

## 2024-09-10 NOTE — HISTORY OF PRESENT ILLNESS
[FreeTextEntry1] : Patient is a 74-year-old gentleman referred by Dr. Calderón.  Patient has had several bouts of lower GI bleeding.  He had an episode in May and again in August.  He had significant bleeding that required transfusions.  Angiogram revealed extensive diverticular disease with bleeding from the cecum which was treated with embolization.  Patient has not had any further bleeding.  Last month he had a bout of abdominal pain with 5 to 6 days of diarrhea but without any rectal bleeding.  He does take Eliquis 2.5 mg twice a day.  He does have a history of a TIA, irregular heartbeat, and had a Watchman procedure in 8/2023 with some PARAG device leak.  He is being monitored by his cardiologist and electrophysiologist. Patient also has a history of prostate cancer many years ago which was treated with radiation therapy. His last blood work from 7/18 revealed H/H15.4/48, , platelets 177k, BUNs/creatinine 16/1.11, LFTs were normal CAT angio scan in 8/2023 revealed gallstones and some sludge, there was active bleeding in the cecum which was embolized.  Hospital Course:  Discharge Date 05-Feb-2024  Admission Date 29-Jan-2024 19:30  Reason for Admission facial droop  Hospital Course   73 year old male with past medical history of HTN, HLD, prostate cancer (s/p  XRT), asthma, diverticulosis, with multiple GI bleeds requiring multiple units  of blood and  s/p IR embolization,  afib, s/p watchman procedure due to  contraindication to oral anticoagulant therapy presents to the ED as a code  stroke for dizziness, dysarthria, and a facial droop.

## 2024-09-10 NOTE — REVIEW OF SYSTEMS
[As Noted in HPI] : as noted in HPI [Negative] : Endocrine [de-identified] : CVA vs TIA [de-identified] : On Eliquis

## 2024-09-10 NOTE — REASON FOR VISIT
[Consultation] : a consultation visit [FreeTextEntry1] : Hx of lower GI bleeding, Hx of prostate cancer, s/p Watchman procedure

## 2024-09-10 NOTE — ASSESSMENT
[FreeTextEntry1] : Patient with history of diverticulosis and GI bleeding episodes.  He had bleeding from the cecum on 1 occasion and had embolization.  He has required transfusions in the past.  He had a Watchman procedure done so he can be taken off anticoagulants but there is a PARAG device leak. He had a bout of abdominal pain and diarrhea but without bleeding several weeks ago.  This was most likely due to to gastroenteritis. He does have a history of gallstones/sludge. He has a history of H. pylori gastritis which was treated in the past. Patient will continue to follow-up with his cardiologist and EPS.  FOBT will be sent to the lab.  He will be seen in follow-up in 6 weeks.

## 2024-09-10 NOTE — PHYSICAL EXAM
[Alert] : alert [Normal Voice/Communication] : normal voice/communication [Healthy Appearing] : healthy appearing [No Acute Distress] : no acute distress [Sclera] : the sclera and conjunctiva were normal [Hearing Threshold Finger Rub Not [place]] : hearing was normal [Normal Lips/Gums] : the lips and gums were normal [Oropharynx] : the oropharynx was normal [Normal Appearance] : the appearance of the neck was normal [No Neck Mass] : no neck mass was observed [No Respiratory Distress] : no respiratory distress [No Acc Muscle Use] : no accessory muscle use [Respiration, Rhythm And Depth] : normal respiratory rhythm and effort [Auscultation Breath Sounds / Voice Sounds] : lungs were clear to auscultation bilaterally [Normal S1, S2] : normal S1 and S2 [Murmurs] : no murmurs [Bowel Sounds] : normal bowel sounds [Abdomen Tenderness] : non-tender [No Masses] : no abdominal mass palpated [Abdomen Soft] : soft [] : no hepatosplenomegaly [Oriented To Time, Place, And Person] : oriented to person, place, and time [de-identified] : Irregular heart rhythm

## 2024-09-15 LAB — HEMOCCULT STL QL IA: NEGATIVE

## 2024-10-08 ENCOUNTER — INPATIENT (INPATIENT)
Facility: HOSPITAL | Age: 74
LOS: 1 days | Discharge: HOME | End: 2024-10-10
Attending: INTERNAL MEDICINE | Admitting: INTERNAL MEDICINE
Payer: MEDICARE

## 2024-10-08 VITALS
HEIGHT: 71 IN | HEART RATE: 57 BPM | DIASTOLIC BLOOD PRESSURE: 53 MMHG | RESPIRATION RATE: 18 BRPM | OXYGEN SATURATION: 98 % | SYSTOLIC BLOOD PRESSURE: 78 MMHG | WEIGHT: 205.03 LBS | TEMPERATURE: 95 F

## 2024-10-08 DIAGNOSIS — K92.1 MELENA: ICD-10-CM

## 2024-10-08 DIAGNOSIS — K92.2 GASTROINTESTINAL HEMORRHAGE, UNSPECIFIED: ICD-10-CM

## 2024-10-08 DIAGNOSIS — Z98.890 OTHER SPECIFIED POSTPROCEDURAL STATES: Chronic | ICD-10-CM

## 2024-10-08 DIAGNOSIS — Z29.9 ENCOUNTER FOR PROPHYLACTIC MEASURES, UNSPECIFIED: ICD-10-CM

## 2024-10-08 DIAGNOSIS — Z96.651 PRESENCE OF RIGHT ARTIFICIAL KNEE JOINT: Chronic | ICD-10-CM

## 2024-10-08 DIAGNOSIS — N40.0 BENIGN PROSTATIC HYPERPLASIA WITHOUT LOWER URINARY TRACT SYMPTOMS: ICD-10-CM

## 2024-10-08 DIAGNOSIS — M10.9 GOUT, UNSPECIFIED: ICD-10-CM

## 2024-10-08 DIAGNOSIS — I48.91 UNSPECIFIED ATRIAL FIBRILLATION: ICD-10-CM

## 2024-10-08 DIAGNOSIS — E78.5 HYPERLIPIDEMIA, UNSPECIFIED: ICD-10-CM

## 2024-10-08 DIAGNOSIS — J45.909 UNSPECIFIED ASTHMA, UNCOMPLICATED: ICD-10-CM

## 2024-10-08 LAB
ALBUMIN SERPL ELPH-MCNC: 3.8 G/DL — SIGNIFICANT CHANGE UP (ref 3.3–5)
ALP SERPL-CCNC: 85 U/L — SIGNIFICANT CHANGE UP (ref 40–120)
ALT FLD-CCNC: 15 U/L — SIGNIFICANT CHANGE UP (ref 4–41)
ANION GAP SERPL CALC-SCNC: 11 MMOL/L — SIGNIFICANT CHANGE UP (ref 7–14)
APTT BLD: 34.7 SEC — SIGNIFICANT CHANGE UP (ref 24.5–35.6)
AST SERPL-CCNC: 19 U/L — SIGNIFICANT CHANGE UP (ref 4–40)
BASOPHILS # BLD AUTO: 0.06 K/UL — SIGNIFICANT CHANGE UP (ref 0–0.2)
BASOPHILS NFR BLD AUTO: 0.6 % — SIGNIFICANT CHANGE UP (ref 0–2)
BILIRUB SERPL-MCNC: 0.8 MG/DL — SIGNIFICANT CHANGE UP (ref 0.2–1.2)
BLD GP AB SCN SERPL QL: NEGATIVE — SIGNIFICANT CHANGE UP
BLOOD GAS VENOUS COMPREHENSIVE RESULT: SIGNIFICANT CHANGE UP
BUN SERPL-MCNC: 29 MG/DL — HIGH (ref 7–23)
CALCIUM SERPL-MCNC: 9.1 MG/DL — SIGNIFICANT CHANGE UP (ref 8.4–10.5)
CHLORIDE SERPL-SCNC: 107 MMOL/L — SIGNIFICANT CHANGE UP (ref 98–107)
CO2 SERPL-SCNC: 26 MMOL/L — SIGNIFICANT CHANGE UP (ref 22–31)
CREAT SERPL-MCNC: 1.14 MG/DL — SIGNIFICANT CHANGE UP (ref 0.5–1.3)
EGFR: 67 ML/MIN/1.73M2 — SIGNIFICANT CHANGE UP
EOSINOPHIL # BLD AUTO: 0.12 K/UL — SIGNIFICANT CHANGE UP (ref 0–0.5)
EOSINOPHIL NFR BLD AUTO: 1.1 % — SIGNIFICANT CHANGE UP (ref 0–6)
GLUCOSE SERPL-MCNC: 154 MG/DL — HIGH (ref 70–99)
HCT VFR BLD CALC: 35.1 % — LOW (ref 39–50)
HCT VFR BLD CALC: 37.3 % — LOW (ref 39–50)
HGB BLD-MCNC: 11.9 G/DL — LOW (ref 13–17)
HGB BLD-MCNC: 12.3 G/DL — LOW (ref 13–17)
IANC: 8.57 K/UL — HIGH (ref 1.8–7.4)
IMM GRANULOCYTES NFR BLD AUTO: 0.8 % — SIGNIFICANT CHANGE UP (ref 0–0.9)
INR BLD: 1.45 RATIO — HIGH (ref 0.85–1.16)
LYMPHOCYTES # BLD AUTO: 0.95 K/UL — LOW (ref 1–3.3)
LYMPHOCYTES # BLD AUTO: 9 % — LOW (ref 13–44)
MCHC RBC-ENTMCNC: 32.1 PG — SIGNIFICANT CHANGE UP (ref 27–34)
MCHC RBC-ENTMCNC: 33 GM/DL — SIGNIFICANT CHANGE UP (ref 32–36)
MCV RBC AUTO: 97.4 FL — SIGNIFICANT CHANGE UP (ref 80–100)
MONOCYTES # BLD AUTO: 0.81 K/UL — SIGNIFICANT CHANGE UP (ref 0–0.9)
MONOCYTES NFR BLD AUTO: 7.6 % — SIGNIFICANT CHANGE UP (ref 2–14)
NEUTROPHILS # BLD AUTO: 8.57 K/UL — HIGH (ref 1.8–7.4)
NEUTROPHILS NFR BLD AUTO: 80.9 % — HIGH (ref 43–77)
NRBC # BLD: 0 /100 WBCS — SIGNIFICANT CHANGE UP (ref 0–0)
NRBC # FLD: 0 K/UL — SIGNIFICANT CHANGE UP (ref 0–0)
PLATELET # BLD AUTO: 153 K/UL — SIGNIFICANT CHANGE UP (ref 150–400)
POTASSIUM SERPL-MCNC: 4.2 MMOL/L — SIGNIFICANT CHANGE UP (ref 3.5–5.3)
POTASSIUM SERPL-SCNC: 4.2 MMOL/L — SIGNIFICANT CHANGE UP (ref 3.5–5.3)
PROT SERPL-MCNC: 6.4 G/DL — SIGNIFICANT CHANGE UP (ref 6–8.3)
PROTHROM AB SERPL-ACNC: 17.2 SEC — HIGH (ref 9.9–13.4)
RBC # BLD: 3.83 M/UL — LOW (ref 4.2–5.8)
RBC # FLD: 12 % — SIGNIFICANT CHANGE UP (ref 10.3–14.5)
RH IG SCN BLD-IMP: POSITIVE — SIGNIFICANT CHANGE UP
SODIUM SERPL-SCNC: 144 MMOL/L — SIGNIFICANT CHANGE UP (ref 135–145)
WBC # BLD: 10.6 K/UL — HIGH (ref 3.8–10.5)
WBC # FLD AUTO: 10.6 K/UL — HIGH (ref 3.8–10.5)

## 2024-10-08 PROCEDURE — 99232 SBSQ HOSP IP/OBS MODERATE 35: CPT

## 2024-10-08 PROCEDURE — 74174 CTA ABD&PLVS W/CONTRAST: CPT | Mod: 26,MC

## 2024-10-08 PROCEDURE — 99223 1ST HOSP IP/OBS HIGH 75: CPT | Mod: GC

## 2024-10-08 PROCEDURE — 99223 1ST HOSP IP/OBS HIGH 75: CPT

## 2024-10-08 PROCEDURE — 99291 CRITICAL CARE FIRST HOUR: CPT

## 2024-10-08 RX ORDER — ALBUTEROL 90 MCG
2 AEROSOL (GRAM) INHALATION EVERY 4 HOURS
Refills: 0 | Status: DISCONTINUED | OUTPATIENT
Start: 2024-10-08 | End: 2024-10-10

## 2024-10-08 RX ORDER — ACETAMINOPHEN 325 MG
1000 TABLET ORAL ONCE
Refills: 0 | Status: COMPLETED | OUTPATIENT
Start: 2024-10-08 | End: 2024-10-08

## 2024-10-08 RX ORDER — ATORVASTATIN CALCIUM 10 MG/1
10 TABLET, FILM COATED ORAL AT BEDTIME
Refills: 0 | Status: DISCONTINUED | OUTPATIENT
Start: 2024-10-08 | End: 2024-10-10

## 2024-10-08 RX ORDER — FINASTERIDE 5 MG/1
5 TABLET, FILM COATED ORAL DAILY
Refills: 0 | Status: DISCONTINUED | OUTPATIENT
Start: 2024-10-08 | End: 2024-10-10

## 2024-10-08 RX ORDER — DILTIAZEM HCL 300 MG
120 CAPSULE, EXTENDED RELEASE 24HR ORAL DAILY
Refills: 0 | Status: DISCONTINUED | OUTPATIENT
Start: 2024-10-08 | End: 2024-10-08

## 2024-10-08 RX ORDER — DILTIAZEM HCL 300 MG
120 CAPSULE, EXTENDED RELEASE 24HR ORAL DAILY
Refills: 0 | Status: DISCONTINUED | OUTPATIENT
Start: 2024-10-08 | End: 2024-10-10

## 2024-10-08 RX ORDER — FLUTICASONE PROPION/SALMETEROL 100-50 MCG
1 BLISTER, WITH INHALATION DEVICE INHALATION
Refills: 0 | Status: DISCONTINUED | OUTPATIENT
Start: 2024-10-08 | End: 2024-10-10

## 2024-10-08 RX ORDER — PANTOPRAZOLE SODIUM 40 MG/1
40 TABLET, DELAYED RELEASE ORAL
Refills: 0 | Status: DISCONTINUED | OUTPATIENT
Start: 2024-10-08 | End: 2024-10-10

## 2024-10-08 RX ORDER — METOPROLOL TARTRATE 50 MG
25 TABLET ORAL DAILY
Refills: 0 | Status: DISCONTINUED | OUTPATIENT
Start: 2024-10-08 | End: 2024-10-10

## 2024-10-08 RX ORDER — MONTELUKAST SODIUM 10 MG/1
10 TABLET, FILM COATED ORAL DAILY
Refills: 0 | Status: DISCONTINUED | OUTPATIENT
Start: 2024-10-08 | End: 2024-10-10

## 2024-10-08 RX ADMIN — Medication 400 MILLIGRAM(S): at 10:01

## 2024-10-08 RX ADMIN — ATORVASTATIN CALCIUM 10 MILLIGRAM(S): 10 TABLET, FILM COATED ORAL at 22:25

## 2024-10-08 NOTE — PATIENT PROFILE ADULT - FALL HARM RISK - HARM RISK INTERVENTIONS

## 2024-10-08 NOTE — H&P ADULT - NSHPPHYSICALEXAM_GEN_ALL_CORE
General: NAD, calm, cooperative, normal habitus   Neuro: awake, alert, oriented to person/place/time, spontaneous movements   HEENT: NC/AT, moist mucous membranes, no conjunctival pallor, PERRLA b/l   Chest: nonTTP   Heart: S1/S2, irregular rhythym, normal rate   Lungs: CTA b/l, nonlabored breathing   Abd: soft, nonTTP, nondistended, negative murphys sign, no rebound/guarding/rigidity   Ext: warm to touch, no pretibial edema, active strength intact   Pulses: dorsalis pedis 2+ b/l, radial 2+ b/l   Lines/tubes/drains: none

## 2024-10-08 NOTE — ED PROVIDER NOTE - PROGRESS NOTE DETAILS
Patient had a large bloody bowel movement while being in the ED.  Patient is hypotensive.  Plan to do a CTA to eval for active bleed.  Emergent 1 unit of PRBC is ordered and called for. Pramod PGY3: Patient reassessed at bedside. Reports that he has had no subsequent bowel movements since the one at arrival. Asymptomatic. Holding off on second transfusion as vitals have improved.

## 2024-10-08 NOTE — CONSULT NOTE ADULT - SUBJECTIVE AND OBJECTIVE BOX
Patient Source: patient and Chart    HPI:  This is a 74 year man with pmhx of HTN, HLD, prostate cancer (s/p XRT), asthma, hx of diverticulosis with diverticular bleed last week requiring 5units PRBCs, CVA, Atrial flutter s/p Afib ablation and CTI ablation, s/p watchman procedure due to contraindication to oral anticoagulant therapy for AFL on 8/14/23 and found to have ishmael-device leak who presented to [facility] for large bloody bowel movement and dizziness.    Patient stated that he was in his usual stated of health. At 6 am today, patient stated that he noticed his flatulence smelled weird [facility] "brick [facility]" [facility]. Patient stated that prior to the event he took all his morning medication which included Eliquis and lasix. Patient stated that he felt dizziness and nauseous after BM. Patient also admitted to finger tip numbness and noticed that it was cold to the touch. Patient denied emesis. Patient denied fever, chills, diaphoresis, HA, cold like symptoms  (sore throat, cough, conjunctivitis runny nose), CP, palpitation, orthopnea, SOB, abdominal pain, and hematuria.    ED course was significant for BP 78/53mmHg, HR 57BPM, RR 18/min spo2 98% on RA. Labs were significant for WBC 10.60, H/H 12.3/37.3, PLT  153, Na 144, K 4.2, BUN 29, sCr 1.14, AST 19, ALT 15, Alk phos 85, INT 1.45, and PT 17.2. In the ED, patient was given 1unit of PRBC. CT showed no evidence of active GI bleed but diffuse colonic diverticulosis. EP was consulted given that he was planned for pursue closure of Watchman device using coils. APRIL post Watchman showed mild leaks. He was continue metoprolol and Eliquis due to CVA and a CHADSVASC score of 3. His most recent APRIL (8/9/2024) showed a with 3.3mm leak still present.     PAST MEDICAL & SURGICAL HISTORY:  Hypertension  Hyperlipidemia  GIB (gastrointestinal bleeding)  Prostate cancer s/p radiation therapy  Gout  Diverticulitis  Asthma  Atrial flutter  Dverticulosis  Presence of Watchman left atrial appendage closure device  H/O pericarditis  History of transient ischemic attack (TIA)  History of hemorrhoidectomy  H/O total knee replacement, right  H/O eye surgery      MEDICATIONS  (STANDING):    MEDICATIONS  (PRN):      FAMILY HISTORY:  Brother with Heart transplant, CVA and DM    SOCIAL HISTORY:  CIGARETTES: Denied  ALCOHOL: denied   ILLICIT DRUG USES: denied    REVIEW OF SYSTEMS:  CONSTITUTIONAL: No fever, weight loss, chills, shakes, or fatigue  EYES: No eye pain, visual disturbances, or discharge  ENMT:  No difficulty hearing, tinnitus, vertigo; No sinus or throat pain  RESPIRATORY: No cough, wheezing, hemoptysis, or shortness of breath  CARDIOVASCULAR: No chest pain, dyspnea, palpitations, syncope, paroxysmal nocturnal dyspnea, orthopnea, or arm or leg swelling  GASTROINTESTINAL: per HPI  GENITOURINARY: No dysuria, nocturia, hematuria, or urinary incontinence  NEUROLOGICAL: No headaches, memory loss, slurred speech, limb weakness, loss of strength, numbness, or tremors  MUSCULOSKELETAL: No joint pain or swelling, muscle, back, or extremity pain        Vital Signs Last 24 Hrs  T(C): 36.5 (08 Oct 2024 11:00), Max: 37.2 (08 Oct 2024 09:35)  T(F): 97.7 (08 Oct 2024 11:00), Max: 98.9 (08 Oct 2024 09:35)  HR: 65 (08 Oct 2024 13:17) (57 - 73)  BP: 111/72 (08 Oct 2024 13:17) (76/53 - 111/72)  BP(mean): --  RR: 17 (08 Oct 2024 11:00) (17 - 18)  SpO2: 100% (08 Oct 2024 11:00) (98% - 100%)    Parameters below as of 08 Oct 2024 11:00  Patient On (Oxygen Delivery Method): room air        PHYSICAL EXAM:  GENERAL: Well appearing, speaking in full sentence, in NAD  HEART: S1S2 RRR  PULMONARY:CTABL anteriorly, normal respiratory effort  ABDOMEN: Bowel sounds present, soft, NDNT  EXTREMITIES:  Warm, well -perfused, no pedal edema, distal pulses present  NEUROLOGICAL:AOx3     INTERPRETATION OF TELEMETRY: SR with 1st degree AV block and PAC HR 60-80s    ECG: SR with 1st degree AV block and PAC HR80 SC interval 214ms      I&O's Detail      LABS:                        12.3   10.60 )-----------( 153      ( 08 Oct 2024 09:47 )             37.3     10-08    144  |  107  |  29[H]  ----------------------------<  154[H]  4.2   |  26  |  1.14    Ca    9.1      08 Oct 2024 10:09    TPro  6.4  /  Alb  3.8  /  TBili  0.8  /  DBili  x   /  AST  19  /  ALT  15  /  AlkPhos  85  10-08        PT/INR - ( 08 Oct 2024 09:47 )   PT: 17.2 sec;   INR: 1.45 ratio         PTT - ( 08 Oct 2024 09:47 )  PTT:34.7 sec  Urinalysis Basic - ( 08 Oct 2024 10:09 )    Color: x / Appearance: x / SG: x / pH: x  Gluc: 154 mg/dL / Ketone: x  / Bili: x / Urobili: x   Blood: x / Protein: x / Nitrite: x   Leuk Esterase: x / RBC: x / WBC x   Sq Epi: x / Non Sq Epi: x / Bacteria: x      BNP  I&O's Detail    Daily Height in cm: 180.34 (08 Oct 2024 09:17)    Daily     RADIOLOGY & ADDITIONAL STUDIES:    < from: CT Angio Abdomen and Pelvis w/ IV Cont (10.08.24 @ 11:33) >  IMPRESSION:  No evidence of active GI bleed.    Diffuse colonic diverticulosis.    < end of copied text >    < from: APRIL W or WO Ultrasound Enhancing Agent (08.09.24 @ 07:32) >  ____________________________________________________________________________________     CONCLUSIONS:      1. Limited APRIL performed to evaluate Watchman Device.   2. A Watchman closure device is present in the left atrial appendage. Leak present: 3.3 mm seen at 91 degrees; 2.7 mm seen at 100 degrees.   3. Compared to the transesophageal echocardiogram performed on 9/29/2023, there have been no significant interval changes.    ________________________________________________________________________________________  FINDINGS:     Left Atrium:  There appears to be a Watchman closure device present in the left atrial appendage. The device is positioned normally. There is residual flow observed. Leak present: 3.3 mm seen at 91 degrees; 2.7 mm seen at 100 degrees.  ________________________________________________________________________________________  Electronically signed on 8/9/2024 at 10:17:37 AM by Landon Lopez M.D.      < end of copied text >    This is a 74 year man with pmhx of HTN, HLD, prostate cancer (s/p XRT), asthma, hx of diverticulosis with diverticular bleed last week requiring 5units PRBCs, CVA, Atrial flutter s/p Afib ablation and CTI ablation, s/p watchman procedure due to contraindication to oral anticoagulant therapy for AFL on 8/14/23 and found to have ishmael-device leak and was planned for for pursue closure of Watchman device using coils who presented to [facility] for large bloody bowel movement and dizziness s/p 1 unit of PRBC. Patient is currently hemodynamically stable.    Plan:  - Continuous telemetric monitoring while in the hospital  - Monitor electrolytes and replete K to 4 and Mg to 2  - Monitor H/H  - Consult GI  - Resume Eliquis 2.5mg po BID when cleared by GI/primary team. Patient has a [id number] score of 3  - Continue home rate controlling medication: Metoprolol succinate 25mg po qd and Diltiazem CD 120mg po qd     Shridevi Kiersten PAC  Patient to be staffed with attending. Please await attending addendum Patient Source: patient and Chart    HPI:  This is a 74 year man with pmhx of HTN, HLD, prostate cancer (s/p XRT), asthma, hx of diverticulosis with diverticular bleed last week requiring 5units PRBCs, CVA, Atrial flutter s/p Afib ablation and CTI ablation, s/p watchman procedure due to contraindication to oral anticoagulant therapy for AFL on 8/14/23 and found to have ishmael-device leak who presented to [facility] for large bloody bowel movement and dizziness.    Patient stated that he was in his usual stated of health. At 6 am today, patient stated that he noticed his flatulence smelled weird and decided to [facility] where he had a large "brick red" BM. Patient stated that prior to the event he took all his morning medication which included Eliquis and lasix. Patient stated that he felt dizziness and nauseous after BM. Patient also admitted to finger tip numbness and noticed that it was cold to the touch. Patient denied emesis. Patient denied fever, chills, diaphoresis, HA, cold like symptoms  (sore throat, cough, conjunctivitis runny nose), CP, palpitation, orthopnea, SOB, abdominal pain, and hematuria.    ED course was significant for BP 78/53mmHg, HR 57BPM, RR 18/min spo2 98% on RA. Labs were significant for WBC 10.60, H/H 12.3/37.3, PLT  153, Na 144, K 4.2, BUN 29, sCr 1.14, AST 19, ALT 15, Alk phos 85, INT 1.45, and PT 17.2. While in the ED, patient had another episode with large amount, BRBRP per nurse. In the ED, patient was given 1unit of PRBC. CT showed no evidence of active GI bleed but diffuse colonic diverticulosis. EP was consulted given that he was planned for pursue closure of Watchman device using coils. APRIL post Watchman showed mild leaks. He was continue metoprolol and Eliquis due to CVA and a CHADSVASC score of 3. His most recent APRIL (8/9/2024) showed a with 3.3mm leak still present. GI was also consulted.    PAST MEDICAL & SURGICAL HISTORY:  Hypertension  Hyperlipidemia  GIB (gastrointestinal bleeding)  Prostate cancer s/p radiation therapy  Gout  Diverticulitis  Asthma  Atrial flutter  Dverticulosis  Presence of Watchman left atrial appendage closure device  H/O pericarditis  History of transient ischemic attack (TIA)  History of hemorrhoidectomy  H/O total knee replacement, right  H/O eye surgery      MEDICATIONS  (STANDING):    MEDICATIONS  (PRN):      FAMILY HISTORY:  Brother with Heart transplant, CVA and DM    SOCIAL HISTORY:  CIGARETTES: Denied  ALCOHOL: denied   ILLICIT DRUG USES: denied    REVIEW OF SYSTEMS:  CONSTITUTIONAL: No fever, weight loss, chills, shakes, or fatigue  EYES: No eye pain, visual disturbances, or discharge  ENMT:  No difficulty hearing, tinnitus, vertigo; No sinus or throat pain  RESPIRATORY: No cough, wheezing, hemoptysis, or shortness of breath  CARDIOVASCULAR: No chest pain, dyspnea, palpitations, syncope, paroxysmal nocturnal dyspnea, orthopnea, or arm or leg swelling  GASTROINTESTINAL: per HPI  GENITOURINARY: No dysuria, nocturia, hematuria, or urinary incontinence  NEUROLOGICAL: No headaches, memory loss, slurred speech, limb weakness, loss of strength, numbness, or tremors  MUSCULOSKELETAL: No joint pain or swelling, muscle, back, or extremity pain        Vital Signs Last 24 Hrs  T(C): 36.5 (08 Oct 2024 11:00), Max: 37.2 (08 Oct 2024 09:35)  T(F): 97.7 (08 Oct 2024 11:00), Max: 98.9 (08 Oct 2024 09:35)  HR: 65 (08 Oct 2024 13:17) (57 - 73)  BP: 111/72 (08 Oct 2024 13:17) (76/53 - 111/72)  BP(mean): --  RR: 17 (08 Oct 2024 11:00) (17 - 18)  SpO2: 100% (08 Oct 2024 11:00) (98% - 100%)    Parameters below as of 08 Oct 2024 11:00  Patient On (Oxygen Delivery Method): room air        PHYSICAL EXAM:  GENERAL: Well appearing, speaking in full sentence, in NAD  HEART: S1S2 RRR  PULMONARY:CTABL anteriorly, normal respiratory effort  ABDOMEN: Bowel sounds present, soft, NDNT  EXTREMITIES:  Warm, well -perfused, no pedal edema, distal pulses present  NEUROLOGICAL:AOx3     INTERPRETATION OF TELEMETRY: SR with 1st degree AV block and PAC HR 60-80s    ECG: SR with 1st degree AV block and PAC HR80 NJ interval 214ms      I&O's Detail      LABS:                        12.3   10.60 )-----------( 153      ( 08 Oct 2024 09:47 )             37.3     10-08    144  |  107  |  29[H]  ----------------------------<  154[H]  4.2   |  26  |  1.14    Ca    9.1      08 Oct 2024 10:09    TPro  6.4  /  Alb  3.8  /  TBili  0.8  /  DBili  x   /  AST  19  /  ALT  15  /  AlkPhos  85  10-08        PT/INR - ( 08 Oct 2024 09:47 )   PT: 17.2 sec;   INR: 1.45 ratio         PTT - ( 08 Oct 2024 09:47 )  PTT:34.7 sec  Urinalysis Basic - ( 08 Oct 2024 10:09 )    Color: x / Appearance: x / SG: x / pH: x  Gluc: 154 mg/dL / Ketone: x  / Bili: x / Urobili: x   Blood: x / Protein: x / Nitrite: x   Leuk Esterase: x / RBC: x / WBC x   Sq Epi: x / Non Sq Epi: x / Bacteria: x      BNP  I&O's Detail    Daily Height in cm: 180.34 (08 Oct 2024 09:17)    Daily     RADIOLOGY & ADDITIONAL STUDIES:    < from: CT Angio Abdomen and Pelvis w/ IV Cont (10.08.24 @ 11:33) >  IMPRESSION:  No evidence of active GI bleed.    Diffuse colonic diverticulosis.    < end of copied text >    < from: APRIL W or WO Ultrasound Enhancing Agent (08.09.24 @ 07:32) >  ____________________________________________________________________________________     CONCLUSIONS:      1. Limited APRIL performed to evaluate Watchman Device.   2. A Watchman closure device is present in the left atrial appendage. Leak present: 3.3 mm seen at 91 degrees; 2.7 mm seen at 100 degrees.   3. Compared to the transesophageal echocardiogram performed on 9/29/2023, there have been no significant interval changes.    ________________________________________________________________________________________  FINDINGS:     Left Atrium:  There appears to be a Watchman closure device present in the left atrial appendage. The device is positioned normally. There is residual flow observed. Leak present: 3.3 mm seen at 91 degrees; 2.7 mm seen at 100 degrees.  ________________________________________________________________________________________  Electronically signed on 8/9/2024 at 10:17:37 AM by Landon Lopez M.D.      < end of copied text >    This is a 74 year man with pmhx of HTN, HLD, prostate cancer (s/p XRT), asthma, hx of diverticulosis with diverticular bleed last week requiring 5units PRBCs, CVA, Atrial flutter s/p Afib ablation and CTI ablation, s/p watchman procedure due to contraindication to oral anticoagulant therapy for AFL on 8/14/23 and found to have ishmael-device leak and was planned for for pursue closure of Watchman device using coils who presented to [facility] for large bloody bowel movement and dizziness s/p 1 unit of PRBC. Patient is currently hemodynamically stable.    Plan:  - Continuous telemetric monitoring while in the hospital  - Monitor electrolytes and replete K to 4 and Mg to 2  - Monitor H/H  - Consult GI  - Resume Eliquis 2.5mg po BID when cleared by GI/primary team. Patient has a [id number] score of 3  - Continue home rate controlling medication: Metoprolol succinate 25mg po qd and Diltiazem CD 120mg po qd     Kim Burch PAC  Patient to be staffed with attending. Please await attending addendum

## 2024-10-08 NOTE — H&P ADULT - PROBLEM SELECTOR PLAN 1
Patient with history of multiple episodes and extensive transfusion needs of bloody stool with recent reassuring colonoscopy for malignancy r/o here with recurrent symptomatic hematochezia. Hemodynamically stable. Resolution of further bloody stool and lightheadness. Minimal evidence for recent bleeding. Clinically improving.     -C/w Hb/Hct q8 monitor for bleed   -Initiate pantoprazole 40 mg IV BID for stress ulcer ppx   -Hold home apixaban 2.5 mg po BID   -Per GI, will defer CSY at this time due to recent one performed   -Transfusion goal Hb >7 Patient with history of multiple episodes and extensive transfusion needs of bloody stool with recent reassuring colonoscopy for malignancy r/o here with recurrent symptomatic hematochezia. Hemodynamically stable. Resolution of further bloody stool and lightheadness. Minimal evidence for recent bleeding. Clinically improving.     -C/w Hb/Hct q8 monitor for bleed   -Initiate pantoprazole 40 mg IV BID  -Hold home apixaban 2.5 mg po BID   -Per GI, will defer CSY at this time due to recent one performed   -Transfusion goal Hb >7

## 2024-10-08 NOTE — ED ADULT NURSE REASSESSMENT NOTE - NS ED NURSE REASSESS COMMENT FT1
Pt resting on stretcher, NAD noted. CT completed. Endorsed small, localized area of itching below IV site afterward. MD Leung aware. To be re-assessed before 2nd unit of pRBC. Frequent monitoring in place.

## 2024-10-08 NOTE — ED ADULT NURSE NOTE - NS ED NURSE RECORD ANOTHER HT AND WT
Body Location Override (Optional - Billing Will Still Be Based On Selected Body Map Location If Applicable): mid upper forehead Detail Level: Detailed Depth Of Biopsy: dermis Was A Bandage Applied: Yes Size Of Lesion In Cm: 0.7 X Size Of Lesion In Cm: 0.5 Accession #: [id number] Anticipated Plan (Based On Presumed Biopsy Results): Cyst like papule with suspicious pink papule at base- Biopsy Type: H and E Biopsy Method: Dermablade Anesthesia Type: 2% lidocaine without epinephrine Anesthesia Volume In Cc: 1 Additional Anesthesia Volume In Cc (Will Not Render If 0): 0 Hemostasis: Drysol Wound Care: Petrolatum Dressing: bandage Destruction After The Procedure: No Type Of Destruction Used: Electrodesiccation and Curettage Curettage Text: The wound bed was treated with curettage lightly Cryotherapy Text: The wound bed was treated with cryotherapy after the biopsy was performed. Electrodesiccation Text: The wound bed was treated with electrodesiccation after the biopsy was performed. Electrodesiccation And Curettage Text: The wound bed was treated with electrodesiccation and curettage after the biopsy was performed. Silver Nitrate Text: The wound bed was treated with silver nitrate after the biopsy was performed. Consent: Verbal consent was obtained and risks were reviewed including but not limited to scarring, infection, bleeding, scabbing, incomplete removal, nerve damage and allergy to anesthesia. Yes Post-Care Instructions: I reviewed with the patient in detail post-care instructions. Patient advised to clean surgical site twice daily with gentle soap and water. Apply Vaseline or plain petroleum jelly to wound site at least 2x/day and cover with a Bandaid for the first several days. Avoid swimming in lakes, pools or use of hot tubes while site is healing. Call office with signs and symptoms of infections including, worsening redness, warmth at surgical site, pain or drainage. Notification Instructions: Patient will be notified of biopsy results. However, patient instructed to call the office if not contacted within 2 weeks. Billing Type: Third-Party Bill Information: Selecting Yes will display possible errors in your note based on the variables you have selected. This validation is only offered as a suggestion for you. PLEASE NOTE THAT THE VALIDATION TEXT WILL BE REMOVED WHEN YOU FINALIZE YOUR NOTE. IF YOU WANT TO FAX A PRELIMINARY NOTE YOU WILL NEED TO TOGGLE THIS TO 'NO' IF YOU DO NOT WANT IT IN YOUR FAXED NOTE. Body Location Override (Optional - Billing Will Still Be Based On Selected Body Map Location If Applicable): left second finger Accession #: [id number] Anticipated Plan (Based On Presumed Biopsy Results): Excision vs. ED&C Body Location Override (Optional - Billing Will Still Be Based On Selected Body Map Location If Applicable): right lower leg Accession #: [id number] Anticipated Plan (Based On Presumed Biopsy Results): ED&C vs. Excision if SCC

## 2024-10-08 NOTE — H&P ADULT - PROBLEM SELECTOR PLAN 2
Patient with Afib s/p watchman procedure with recent evaluation for its functioning, found to have ishmael-device leak. Had recent TTE outpatient with LVEF 45-50% with mildly depressed LV systlic function and global LV hypokinesis with severely dilated LA. Not in RVR. Will monitor.     -Cardiology following  -C/w with home metoprlol succinate 25 mg po qd   -C/w diltizaem 120 mg po qd

## 2024-10-08 NOTE — H&P ADULT - PROBLEM SELECTOR PLAN 7
Code: Full   DVT ppx: hold due to recent bleed   Diet: CLD and advance as tolerated   Dispo: likely home

## 2024-10-08 NOTE — CONSULT NOTE ADULT - NS ATTEND AMEND GEN_ALL_CORE FT
Recurrent GIB. No EP intervention on this admission. Can follow up with Dr Marie in the office. EP to sign off.

## 2024-10-08 NOTE — ED PROVIDER NOTE - ATTENDING CONTRIBUTION TO CARE
74-year-old male with extensive past medical history including A-fib on Eliquis, multiple rectal bleeds in the past coming in with rectal bleeding that started this morning.  Reports he had a large bowel movement that was dark/bloody with blood clots.  Feeling lightheaded.  No chest pain, difficulty breathing.  No fevers, chills.  Patient is nontoxic-appearing.  No distress.  Abdomen is soft with tenderness to palpation in left lower quadrant. ddx include but not limited to gi bleed - AC, diverticular bleed, anemia. Plan to admit    The patient's condition is critical. Management options were put in place to ensure further deterioration does not occur. In addition to the usual care provided, I have spent additional time with this patient through but not limited to the following: additional documentation  reviewing test results,  discussing with consultants,  discussing with patient / patient's family prognosis and course of care,  reassessment of patient's status and response to interventions.

## 2024-10-08 NOTE — H&P ADULT - PROBLEM SELECTOR PROBLEM 1
Peer to peer has been completed pt approved for [facility] from 8- thru 8-. Next review date is 8-. Auth. I.D. # [id number]. YASMIN PS NP Ty Roman and informed him that pt can be discharged to [facility] if ready for discharge. CM will need CHUCKY completed by RN and NP. If pt is discharged after hours please complete the following. ... Call report to [phone]  Fax completed AVS with both CHUCKY on the AVS and any written Rx [phone]. Joelle Sorensen [phone] and call family. Hematochezia

## 2024-10-08 NOTE — H&P ADULT - PROBLEM SELECTOR PLAN 5
-C/w symbicort 160 mcg -4.5 mcg inhaler 2 puffs qd   -C/w montelukast 10 mg po qd   -C/w albuterol 90 mcg 2 puffs q4 PRN

## 2024-10-08 NOTE — CONSULT NOTE ADULT - NS ATTEND AMEND GEN_ALL_CORE FT
73 yo M pmh cva, prostate ca s/p xrt c/b rave (mild), h/o diverticular bleed (multiple, last 8/2023) s/p IR embolization, Afib s/o watchmann c/b peridevice leak now on a/c, presents with acute GIB suspected to recurrent diverticular bleed given clots, volume.  Already improving with decreasing output.  took a/c this AM.  was tachycardic on admit, s/p xfusion with improved vitals.    Will plan for conservative management at this time  Role for colonoscopy largely diagnostic unless rebleeding occurs - as such, will hold off  clear liquid diet okay  q8-q12 cbc  monitor bm  GI to follow

## 2024-10-08 NOTE — H&P ADULT - NSICDXPASTMEDICALHX_GEN_ALL_CORE_FT
PAST MEDICAL HISTORY:  Asthma     Atrial flutter     Diverticulitis     Diverticulosis     GIB (gastrointestinal bleeding)     Gout     H/O pericarditis     History of transient ischemic attack (TIA)     Hyperlipidemia     Hypertension     Presence of Watchman left atrial appendage closure device     Prostate cancer s/p radiation therapy

## 2024-10-08 NOTE — ED ADULT NURSE NOTE - NSFALLRISKINTERV_ED_ALL_ED

## 2024-10-08 NOTE — ED PROVIDER NOTE - CLINICAL SUMMARY MEDICAL DECISION MAKING FREE TEXT BOX
74-year-old male with extensive past medical history including A-fib on Eliquis, multiple rectal bleeds in the past coming in with rectal bleeding that started this morning.  Reports he had a large bowel movement that was dark/bloody with blood clots.  Feeling lightheaded.  No chest pain, difficulty breathing.  No fevers, chills.  Patient is nontoxic-appearing.  No distress.  Abdomen is soft with tenderness to palpation in left lower quadrant. ddx include but not limited to gi bleed - AC, diverticular bleed, anemia. Plan to admit

## 2024-10-08 NOTE — H&P ADULT - HISTORY OF PRESENT ILLNESS
GONSALO is a 78 y M with a PMH of diverticulosis, hernia, hemroids (over 10y since last bothered him), and bloody bowel movement in 2023 p/w dark red blood mixed in with stool.    Patient reports issues began this morning at 6am after he had his daily cup of water. While on the toilet patient reports odd odored flatulance with dark colored stool in toilet. Patient decided to go to  the ED but ended up having 4 more bowel movements of similar presentation accompanied by lightheadedness prior to leaving his home. During the car ride patient did not have any hematochezia, but did report feelng light headed. Patient denies any fainting during ordeal.    At the ED patient had another episode of dark red hematochezia. since then up to 4pm (time of ), there have been no episodes  GONSALO is a 78 y M nonsmoker with a PMH of diverticulosis, hernia, hemroids (over 10y since last bothered him), and bloody bowel movement in 2023 p/w dark red blood mixed in with stool.    Patient reports issues began this morning at 6am after he had his daily cup of water. While on the toilet patient reports odd odored flatulence with dark colored stool in toilet. Patient decided to go to  the ED but ended up having 4 more bowel movements of similar presentation accompanied by lightheadedness prior to leaving his home. During the car ride patient did not have any hematochezia, but did report feeling light headed. Patient denies any fainting during ordeal.    At the ED patient had another episode of dark red hematochezia. since then up to 4pm (time of  admission), there have been no episodes     P 78M with history of diverticulosis s/p embolization, hemorrhoids s/p hemorrhoidectomy Afib s/p Watchman's procedure on apixaban, TIA, gout, HDL, HTN, prostate cancer s/p RT, and asthma presents to [facility]ED for dark bloody stool x 1 day.     Per chart review, patient presented with bp 78/53, HR 57, RR 18, Temp 94.7 F, SpO2 RA 98%. Due to active bleed, patient required emergent transfusion of 1UpRBC and got acetaminophen 1000 mg IV x 1 with good effect. Hb/Hct was 12.3/37.3. CTAP showed diffuse colonic diverticulosis with no active bleeding.     Patient reports that this AM, awoke at 6AM and had daily cup of hot water. Went to the bathroom and had a specific odd odor flatulence that patient recognizes when he may have a bloody stool and noticed dark streaks of blood mixed in with stool. Had several more episodes of bloody stool until feeling lightheadedness, however did not have LOC nor fall. Denies NSAIDs/aspirin use. Endorses taking all medicines, including apixaban. Reports having had some leakage with the Watchman procedure recently and that it plans to be corrected in upcoming weeks. Denies any other diarrhea/constipation, changes in urinary habits, recent hemorrhoids, melena,

## 2024-10-08 NOTE — ED ADULT NURSE REASSESSMENT NOTE - NS ED NURSE REASSESS COMMENT FT1
First unit of PRBC started per order.  Pt resting comfortably. Awake and alert. A&OX4. pt on cardia monitor. NSR. Resp even and unlabored. Denies CP, SOB, N/V, palpitations. Educated on s/s of transfusion rxn. Consent in chart. Call bell in reach.

## 2024-10-08 NOTE — CONSULT NOTE ADULT - SUBJECTIVE AND OBJECTIVE BOX
Chief Complaint:  Patient is a 74y old  Male who presents with a chief complaint of gib    HPI: 74 year man with pmhx of HTN, HLD, prostate cancer (s/p XRT) c/b RAVE, asthma, diverticulosis c/b recurrent diverticular bleeding (last episode 5/2023, prior hx of embolization R colic artery 8/2023 required 10u prbc at this time), AF sp Watchman 8/2023 found to have ishmael device leak, and hx of CVA p/w hematochezia.    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 pt seen and examined. states was in [facility] then this morning had episode of rectal bleeding mix of dark red and brb w/ clots, w/ blood filling toilet bowl water, had multiple episodes at home so came to ED, states typical of usual tic bleeds, in ED also w/ recurrent episode ~5hrs prior- per nursing large amount, brbpr. no further episodes since this AM. has been on eliquis. [facility] saw dr irizarry last wk and was doing well, ob neg. denies f/c, n/v/hematemesis, abd pain, diarrhea, melena. last egd/colon 5/2023 as above. no prior abd sx. meds- no ap, nsaids. fhx nc. denies toxic habits.    In ED initially hypothermic to 94.7 and hypotensive to 78/53  upon eval bp 124/87, afebrile, hr 70s  initial hgb 12.3 (at baseline) sp 1u prbc given large episode of hematochezia in ED --> 11.9  bun/cr ratio 25, lactate wnl  cta ap neg for active gib, showing tics  echo ef 45-50, mod AR, pulm artery sys pressure 32    Allergies:  No Known Allergies      PMHX/PSHX:  Hypertension    Hyperlipidemia    GIB (gastrointestinal bleeding)    Prostate cancer    Gout    Diverticulitis    Asthma    Atrial flutter    Diverticulosis    Presence of Watchman left atrial appendage closure device    H/O pericarditis    History of transient ischemic attack (TIA)    History of hemorrhoidectomy    H/O total knee replacement, right    H/O eye surgery        Family history:  No pertinent family history in first degree relatives    Social History: as above    Home Medications: pertinent meds reviewed w/ pt      Pertinent ROS as per HPI      Vital Signs:  Vital Signs Last 24 Hrs  T(C): 36.5 (08 Oct 2024 11:00), Max: 37.2 (08 Oct 2024 09:35)  T(F): 97.7 (08 Oct 2024 11:00), Max: 98.9 (08 Oct 2024 09:35)  HR: 65 (08 Oct 2024 13:17) (57 - 73)  BP: 111/72 (08 Oct 2024 13:17) (76/53 - 111/72)  BP(mean): --  RR: 17 (08 Oct 2024 11:00) (17 - 18)  SpO2: 100% (08 Oct 2024 11:00) (98% - 100%)    Parameters below as of 08 Oct 2024 11:00  Patient On (Oxygen Delivery Method): room air      Daily Height in cm: 180.34 (08 Oct 2024 09:17)    Daily     LABS:                        11.9   x     )-----------( x        ( 08 Oct 2024 14:38 )             35.1     Mean Cell Volume: 97.4 fL (10-08-24 @ 09:47)    10-08    144  |  107  |  29[H]  ----------------------------<  154[H]  4.2   |  26  |  1.14    Ca    9.1      08 Oct 2024 10:09    TPro  6.4  /  Alb  3.8  /  TBili  0.8  /  DBili  x   /  AST  19  /  ALT  15  /  AlkPhos  85  10-08    LIVER FUNCTIONS - ( 08 Oct 2024 10:09 )  Alb: 3.8 g/dL / Pro: 6.4 g/dL / ALK PHOS: 85 U/L / ALT: 15 U/L / AST: 19 U/L / GGT: x           PT/INR - ( 08 Oct 2024 09:47 )   PT: 17.2 sec;   INR: 1.45 ratio         PTT - ( 08 Oct 2024 09:47 )  PTT:34.7 sec  Urinalysis Basic - ( 08 Oct 2024 10:09 )    Color: x / Appearance: x / SG: x / pH: x  Gluc: 154 mg/dL / Ketone: x  / Bili: x / Urobili: x   Blood: x / Protein: x / Nitrite: x   Leuk Esterase: x / RBC: x / WBC x   Sq Epi: x / Non Sq Epi: x / Bacteria: x                              11.9   x     )-----------( x        ( 08 Oct 2024 14:38 )             35.1                         12.3   10.60 )-----------( 153      ( 08 Oct 2024 09:47 )             37.3       PHYSICAL EXAM:   GENERAL: lying in bed, in no apparent distress  HEENT: NCAT  NECK: trachea midline  CHEST:  respirations even, non labored  ABDOMEN:  soft, non-tender, non-distended  EXTREMITIES: WWP  SKIN:  warm/dry, no visible rash   PSYCH: appropriate affect, A&O x 3  NEURO: no tremor noted     Imaging:    ACC: [id number] EXAM:  CT ANGIO ABD PELV (W)AW IC   ORDERED BY: ANNABEL MADRIGAL     PROCEDURE DATE:  10/08/2024          INTERPRETATION:  CLINICAL INFORMATION: Evaluate for GI bleed. Rectal   bleeding.    COMPARISON: CTA abdomen pelvis 1/15/2024.    CONTRAST/COMPLICATIONS:  IV Contrast: Omnipaque 350  90 cc administered   10 cc discarded  Oral Contrast: NONE  Complications: None reported at time of study completion    PROCEDURE:  CT of the Abdomen and Pelvis was performed.  Sagittal and coronal reformats were performed.    FINDINGS:  LOWER CHEST: Cardiomegaly.    LIVER: Within normal limits.  BILE DUCTS: Normal caliber.  GALLBLADDER: Within normal limits.  SPLEEN: Within normal limits.  PANCREAS: Within normal limits.  ADRENALS: Left adrenal adenoma, unchanged.  KIDNEYS/URETERS: No hydronephrosis. Left renal cysts. Right renal   cortical scarring.    BLADDER: Within normal limits.  REPRODUCTIVE ORGANS: Small prostate.    BOWEL: No bowel obstruction. Appendix is normal. Diffuse colonic   diverticulosis without acute diverticulitis. Small hiatal hernia. No   evidence of contrast extravasation.  PERITONEUM/RETROPERITONEUM: Within normal limits.  VESSELS: Atherosclerotic changes. Coil embolization material in the right   lower quadrant.  LYMPH NODES: No lymphadenopathy.  ABDOMINAL WALL: Fat-containing umbilical and right inguinal hernias.  BONES: Degenerative changes.    IMPRESSION:  No evidence of active GI bleed.    Diffuse colonic diverticulosis.    --- End of Report ---            CHRIS PADILLA MD; Attending Radiologist  This document has been electronically signed. Oct  8 2024 12:55PM

## 2024-10-08 NOTE — CONSULT NOTE ADULT - ASSESSMENT
74 year man with pmhx of HTN, HLD, prostate cancer (s/p XRT) c/b RAVE, asthma, diverticulosis c/b recurrent diverticular bleeding (last episode 5/2023, prior hx of embolization R colic artery 8/2023 required 10u prbc at this time), AF sp Watchman 8/2023 found to have ishmael device leak on AC, and hx of CVA p/w hematochezia. CTA AP neg for active gib    In ED initially hypothermic to 94.7 and hypotensive to 78/53, upon eval bps 124/87 at bedside, hr 70s. Labs: initial hgb 12.3 (at baseline) sp 1u prbc given episode of hematochezia in ED --> post transfusion 11.9, bun/cr ratio 25, lactate wnl    # acute hematochezia w/ associated hypotension  # hx of recurrent diverticular bleeds   # AF sp Watchman 8/2023 c/b ishmael device leak on AC (last dose 10/7 AM)  # hx of CVA   - p/w acute hematochezia w/ clots since this AM w/ hypotension (now improved sp prbc), has been on AC, last episode earlier this AM in ED, states symptoms typical of prior tic bleeds, suspect likely recurrent diverticular bleed (addtl ddx less likely but includes rave, brisk upper, etc)      Recommendations-  - okay for clears as tolerated  - maintain active T&S and adequate IV access  - trend cbc, transfuse for hgb >/= 7   - given presumed diverticular bleed, recent colonoscopy (thus lower suspicion for malignancy as etiology), and likely low therapeutic yield of colonoscopy iso extensive diverticular dz, would observe for now and hold AC if deemed acceptable risk as bleed may self resolve, however if pt w/ significant re-bleeding and amenable can consider repeat colonoscopy at that time  - if develops brisk gib with associated HD instability obtain repeat CTA AP and notify GI/IR  - rest of care as per primary team    dw gi attg    AMARA Guadarrama PA-C, RD, CDN  available on TEAMS M/T/TH/F from 7am - 4pm    after hours/weekends: non urgent issues --> email [email or web address], urgent issues --> contact on-call GI fellow at [phone]

## 2024-10-08 NOTE — H&P ADULT - NSICDXPASTSURGICALHX_GEN_ALL_CORE_FT
PAST SURGICAL HISTORY:  H/O eye surgery     H/O total knee replacement, right     History of hemorrhoidectomy

## 2024-10-08 NOTE — ED ADULT NURSE NOTE - OBJECTIVE STATEMENT
Aminata RN- Pt received to [facility]   , awake and alert, A&OX4, ambulatory with 1 assist at baseline. C/o of rectal bleeding. pt states  this started 3 days ago however got worse today. pt has a hx of having GI bleeds in the past., pt states he feels dizzy when standing and has abdominal pain . pt abdomen soft , non-tender and nondistended. pt had bowel movement in the room. pt noted to have bright red blood fill up 1 bedpan. MD made aware. emergent blood ordered. pt placed on cardiac monitor. NSR.  Respirations even and unlabored. Denies CP, SOB, , HA, dizziness, palpitations, blurry vision. 20G IV placed to  left AC and 18G IV placed to the right AC   . Bed in lowest position, call bell within reach. Safety maintained.

## 2024-10-08 NOTE — ED ADULT TRIAGE NOTE - CHIEF COMPLAINT QUOTE
c/o an episode of dark red blood per rectum this morning, endorses some abd pain and nausea, reports phx of GI bleeds in the past. Additional history of Afib on Eliquis, TIA.

## 2024-10-08 NOTE — H&P ADULT - ASSESSMENT
LC is a 78 y M nonsmoker with a PMH of diverticulosis, hernia, hemroids (over 10y since last bothered him), and bloody bowel movement in 2023 p/w dark red blood mixed in with stool accompanied by right sided abdominal pain.    Diffuse diverticula on CT abdomen, elevated WBC, and anemia point toward a possible diverticulitis, though bright red rather than dark red blood would be expected. This being said, colonic malignancy is a cannot miss diagnosis with high rates of diagnosis in males of this age group that must be ruled out. Patient history of atrial fibrilation may also point toward a possible cardio embolic cause of acute mesenteric/colonic ischemia. Angiodysplasia is also still on patient differential, and better fits with the patient's dark red hematochezia.    Plan:  # GI bleed  -EGD to assess for upper GI bleed  - colonoscopy if endoscopy shows no source of bleeding  - patient stool should also be cultured   - due to recent bleeding, should hold any anticoagulation therapy      78M with history of diverticulosis s/p embolization of R colic artery with high transfusion needs and Afib s/p watchman procedure with apixaban here for recurrent LGIB, most likely 2/2 diverticulosis vs less likely cardioembolic colonic ischemia vs angiodysplasia.

## 2024-10-09 LAB
ANION GAP SERPL CALC-SCNC: 10 MMOL/L — SIGNIFICANT CHANGE UP (ref 7–14)
BUN SERPL-MCNC: 21 MG/DL — SIGNIFICANT CHANGE UP (ref 7–23)
CALCIUM SERPL-MCNC: 9.1 MG/DL — SIGNIFICANT CHANGE UP (ref 8.4–10.5)
CHLORIDE SERPL-SCNC: 107 MMOL/L — SIGNIFICANT CHANGE UP (ref 98–107)
CO2 SERPL-SCNC: 26 MMOL/L — SIGNIFICANT CHANGE UP (ref 22–31)
CREAT SERPL-MCNC: 0.98 MG/DL — SIGNIFICANT CHANGE UP (ref 0.5–1.3)
EGFR: 81 ML/MIN/1.73M2 — SIGNIFICANT CHANGE UP
GLUCOSE SERPL-MCNC: 88 MG/DL — SIGNIFICANT CHANGE UP (ref 70–99)
HCT VFR BLD CALC: 32.1 % — LOW (ref 39–50)
HCT VFR BLD CALC: 32.9 % — LOW (ref 39–50)
HGB BLD-MCNC: 11.1 G/DL — LOW (ref 13–17)
HGB BLD-MCNC: 11.1 G/DL — LOW (ref 13–17)
MCHC RBC-ENTMCNC: 31.7 PG — SIGNIFICANT CHANGE UP (ref 27–34)
MCHC RBC-ENTMCNC: 32.6 PG — SIGNIFICANT CHANGE UP (ref 27–34)
MCHC RBC-ENTMCNC: 33.7 GM/DL — SIGNIFICANT CHANGE UP (ref 32–36)
MCHC RBC-ENTMCNC: 34.6 GM/DL — SIGNIFICANT CHANGE UP (ref 32–36)
MCV RBC AUTO: 94 FL — SIGNIFICANT CHANGE UP (ref 80–100)
MCV RBC AUTO: 94.4 FL — SIGNIFICANT CHANGE UP (ref 80–100)
NRBC # BLD: 0 /100 WBCS — SIGNIFICANT CHANGE UP (ref 0–0)
NRBC # BLD: 0 /100 WBCS — SIGNIFICANT CHANGE UP (ref 0–0)
NRBC # FLD: 0 K/UL — SIGNIFICANT CHANGE UP (ref 0–0)
NRBC # FLD: 0 K/UL — SIGNIFICANT CHANGE UP (ref 0–0)
PLATELET # BLD AUTO: 120 K/UL — LOW (ref 150–400)
PLATELET # BLD AUTO: 121 K/UL — LOW (ref 150–400)
POTASSIUM SERPL-MCNC: 3.3 MMOL/L — LOW (ref 3.5–5.3)
POTASSIUM SERPL-SCNC: 3.3 MMOL/L — LOW (ref 3.5–5.3)
RBC # BLD: 3.4 M/UL — LOW (ref 4.2–5.8)
RBC # BLD: 3.5 M/UL — LOW (ref 4.2–5.8)
RBC # FLD: 13.2 % — SIGNIFICANT CHANGE UP (ref 10.3–14.5)
RBC # FLD: 13.5 % — SIGNIFICANT CHANGE UP (ref 10.3–14.5)
SODIUM SERPL-SCNC: 143 MMOL/L — SIGNIFICANT CHANGE UP (ref 135–145)
WBC # BLD: 5.84 K/UL — SIGNIFICANT CHANGE UP (ref 3.8–10.5)
WBC # BLD: 6.03 K/UL — SIGNIFICANT CHANGE UP (ref 3.8–10.5)
WBC # FLD AUTO: 5.84 K/UL — SIGNIFICANT CHANGE UP (ref 3.8–10.5)
WBC # FLD AUTO: 6.03 K/UL — SIGNIFICANT CHANGE UP (ref 3.8–10.5)

## 2024-10-09 PROCEDURE — 99232 SBSQ HOSP IP/OBS MODERATE 35: CPT

## 2024-10-09 PROCEDURE — 99233 SBSQ HOSP IP/OBS HIGH 50: CPT | Mod: GC

## 2024-10-09 RX ORDER — APIXABAN 5 MG/1
2.5 TABLET, FILM COATED ORAL EVERY 12 HOURS
Refills: 0 | Status: DISCONTINUED | OUTPATIENT
Start: 2024-10-09 | End: 2024-10-10

## 2024-10-09 RX ORDER — FUROSEMIDE 10 MG/ML
40 INJECTION INTRAVENOUS DAILY
Refills: 0 | Status: DISCONTINUED | OUTPATIENT
Start: 2024-10-09 | End: 2024-10-10

## 2024-10-09 RX ADMIN — Medication 1 DOSE(S): at 22:44

## 2024-10-09 RX ADMIN — Medication 1 DOSE(S): at 05:46

## 2024-10-09 RX ADMIN — Medication 20 MILLIEQUIVALENT(S): at 11:42

## 2024-10-09 RX ADMIN — PANTOPRAZOLE SODIUM 40 MILLIGRAM(S): 40 TABLET, DELAYED RELEASE ORAL at 05:48

## 2024-10-09 RX ADMIN — PANTOPRAZOLE SODIUM 40 MILLIGRAM(S): 40 TABLET, DELAYED RELEASE ORAL at 17:42

## 2024-10-09 RX ADMIN — Medication 0.6 MILLIGRAM(S): at 11:42

## 2024-10-09 RX ADMIN — Medication 25 MILLIGRAM(S): at 05:47

## 2024-10-09 RX ADMIN — Medication 17 GRAM(S): at 11:40

## 2024-10-09 RX ADMIN — APIXABAN 2.5 MILLIGRAM(S): 5 TABLET, FILM COATED ORAL at 17:40

## 2024-10-09 RX ADMIN — ATORVASTATIN CALCIUM 10 MILLIGRAM(S): 10 TABLET, FILM COATED ORAL at 22:43

## 2024-10-09 RX ADMIN — FINASTERIDE 5 MILLIGRAM(S): 5 TABLET, FILM COATED ORAL at 11:42

## 2024-10-09 RX ADMIN — MONTELUKAST SODIUM 10 MILLIGRAM(S): 10 TABLET, FILM COATED ORAL at 11:42

## 2024-10-09 RX ADMIN — Medication 120 MILLIGRAM(S): at 05:47

## 2024-10-09 NOTE — PROGRESS NOTE ADULT - PROBLEM SELECTOR PLAN 2
Patient with Afib s/p watchman procedure with recent evaluation for its functioning, found to have ishmael-device leak. Had recent TTE outpatient with LVEF 45-50% with mildly depressed LV systlic function and global LV hypokinesis with severely dilated LA. Not in RVR. Will monitor.     -Cardiology following  -C/w with home metoprlol succinate 25 mg po qd   -C/w diltizaem 120 mg po qd Patient with Afib s/p watchman procedure with recent evaluation for its functioning, found to have ishmael-device leak. Had recent TTE outpatient with LVEF 45-50% with mildly depressed LV systlic function and global LV hypokinesis with severely dilated LA. Not in RVR. Will monitor.     -Cardiology following  -C/w with home metoprlol succinate 25 mg po qd   -C/w diltizaem 120 mg po qd  -C/w potassium chloride 20 mEq po qd

## 2024-10-09 NOTE — PROGRESS NOTE ADULT - ASSESSMENT
74 year man with pmhx of HTN, HLD, prostate cancer (s/p XRT) c/b RAVE, asthma, diverticulosis c/b recurrent diverticular bleeding (last episode 5/2023, prior hx of embolization R colic artery 8/2023 required 10u prbc at this time), AF sp Watchman 8/2023 found to have ishmael device leak on AC, and hx of CVA p/w hematochezia. CTA AP neg for active gib    In ED initially hypothermic to 94.7 and hypotensive to 78/53, upon eval bps 124/87 at bedside, hr 70s. Labs: initial hgb 12.3 (at baseline) sp 1u prbc given episode of hematochezia in ED --> post transfusion 11.9, bun/cr ratio 25, lactate wnl    # acute hematochezia w/ associated hypotension  # hx of recurrent diverticular bleeds   # AF sp Watchman 8/2023 c/b ishmael device leak on AC (last dose 10/7 AM)  # hx of CVA   - p/w acute hematochezia w/ clots until yesterday AM w/ hypotension (now improved sp prbc), has been on AC, suspect likely recurrent diverticular bleed (addtl ddx less likely but includes rave, brisk upper, etc).  - this AM, patient reports feeling well, no additional BM since yesterday AM. Hgb stable.     Recommendations:  - advance diet  - no GI contraindication to resume AC   - would monitor hgb for one more day while on AC. Patient may have additional old clots in the next several BMs but if patient's hgb downtrends and has any fresh red blood in BM, please call back GI. Will hold off inpatient colonoscopy at this time.   - maintain active T&S and adequate IV access  - trend cbc, transfuse for hgb >/= 7   - if develops brisk gib with associated HD instability obtain repeat CTA AP and notify GI/IR  - rest of care as per primary team  - GI will sign off at this time, however please call back with questions or should any worsening/persistent issues arise.  Please provide patient with Gastroenterology Clinic to confirm appointment; [phone] ([facility] at [address] [place] [address]) or [phone] ([facility] at [address] [place] [address] [facility] [place]) or [phone] ([facility] at [address] [place] [address]).    All recommendations are preliminary until attending attestation.     Arnie Dumont, PGY-4  Gastroenterology & Hepatology Fellow  Available on TEAMS  Long range pager #: [phone]  Short range pager#: [phone]    For non-urgent consults, please send email to [email or web address] (for [facility]) or [email or web address] (for [facility])

## 2024-10-09 NOTE — PROGRESS NOTE ADULT - PROBLEM SELECTOR PLAN 1
Patient with history of multiple episodes and extensive transfusion needs of bloody stool with recent reassuring colonoscopy for malignancy r/o here with recurrent symptomatic hematochezia. Hemodynamically stable. Resolution of further bloody stool and lightheadness. Minimal evidence for recent bleeding. Clinically improving.     -C/w Hb/Hct q8 monitor for bleed   -Initiate pantoprazole 40 mg IV BID  -Hold home apixaban 2.5 mg po BID   -Per GI, will defer CSY at this time due to recent one performed   -Transfusion goal Hb >7 #RESOLVED   Patient with history of multiple episodes of diverticular bleeds, extensive transfusion needs of bloody stool recently, known radiation changes in rectal region and diverticulosis,  with recent reassuring colonoscopy (2023) for malignancy here with recurrent symptomatic hematochezia. Hemodynamically stable. Resolution of further bloody stool and lightheadness. No evidence for recent bleeding. Clinically improving.     -C/w Hb/Hct q12 monitor for bleed   -C/w pantoprazole 40 mg IV BID  -Can restart apixaban 2.5 mg po BID   -Per GI, will defer CSY at this time due to reassuring 2023 CSY   -Transfusion goal Hb >7

## 2024-10-09 NOTE — CONSULT NOTE ADULT - SUBJECTIVE AND OBJECTIVE BOX
CHIEF COMPLAINT: melena    HISTORY OF PRESENT ILLNESS:  74 year man with pmhx of HTN, HLD, prostate cancer (s/p XRT), nonobstructive CAD, HFpEF, pericardial effusion in 2023, asthma, hx of diverticulosis with diverticular bleed last week requiring 5units PRBCs, CVA, Atrial flutter s/p Afib ablation and CTI ablation, s/p watchman procedure due to contraindication to oral anticoagulant therapy for AFL on 8/14/23 and found to have ishmael-device leak who presented to [facility] for large bloody bowel movement and dizziness.    Patient stated that he was in his usual stated of health. At 6 am today, patient stated that he noticed his flatulence smelled weird and decided to go to restroom where he had a large "brick red" BM. Patient stated that prior to the event he took all his morning medication which included Eliquis and lasix. Patient stated that he felt dizziness and nauseous after BM. Patient also admitted to finger tip numbness and noticed that it was cold to the touch. Patient denied emesis. Patient denied fever, chills, diaphoresis, HA, cold like symptoms  (sore throat, cough, conjunctivitis runny nose), CP, palpitation, orthopnea, SOB, abdominal pain, and hematuria.    ED course was significant for BP 78/53mmHg, HR 57BPM, RR 18/min spo2 98% on RA. Labs were significant for WBC 10.60, H/H 12.3/37.3, PLT  153, Na 144, K 4.2, BUN 29, sCr 1.14, AST 19, ALT 15, Alk phos 85, INT 1.45, and PT 17.2. While in the ED, patient had another episode with large amount, BRBRP per nurse. In the ED, patient was given 1unit of PRBC. CT showed no evidence of active GI bleed but diffuse colonic diverticulosis. EP was consulted given that he was planned for pursue closure of Watchman device using coils. APRIL post Watchman showed mild leaks. He was continue metoprolol and Eliquis due to CVA and a CHADSVASC score of 3. His most recent APRIL (8/9/2024) showed a with 3.3mm leak still present. GI was also consulted.      Allergies    No Known Allergies    Intolerances    	    MEDICATIONS:  diltiazem    milliGRAM(s) Oral daily  metoprolol succinate ER 25 milliGRAM(s) Oral daily      albuterol    90 MICROgram(s) HFA Inhaler 2 Puff(s) Inhalation every 4 hours PRN  fluticasone propionate/ salmeterol 250-50 MICROgram(s) Diskus 1 Dose(s) Inhalation two times a day  montelukast 10 milliGRAM(s) Oral daily      pantoprazole  Injectable 40 milliGRAM(s) IV Push two times a day    atorvastatin 10 milliGRAM(s) Oral at bedtime  finasteride 5 milliGRAM(s) Oral daily        PAST MEDICAL & SURGICAL HISTORY:  Hypertension      Hyperlipidemia      GIB (gastrointestinal bleeding)      Prostate cancer  s/p radiation therapy      Gout      Diverticulitis      Asthma      Atrial flutter      Diverticulosis      Presence of Watchman left atrial appendage closure device      H/O pericarditis      History of transient ischemic attack (TIA)      History of hemorrhoidectomy      H/O total knee replacement, right      H/O eye surgery          FAMILY HISTORY:  No pertinent family history in first degree relatives        SOCIAL HISTORY:    non smoker. indep in adl      REVIEW OF SYSTEMS:  See HPI, otherwise complete 10 point review of systems negative    [ ] All others negative	      PHYSICAL EXAM:  T(C): 36.3 (10-09-24 @ 05:21), Max: 37.2 (10-08-24 @ 09:35)  HR: 78 (10-09-24 @ 05:21) (57 - 78)  BP: 129/85 (10-09-24 @ 05:21) (76/53 - 129/85)  RR: 18 (10-09-24 @ 05:21) (17 - 19)  SpO2: 100% (10-09-24 @ 05:21) (97% - 100%)  Wt(kg): --  I&O's Summary    08 Oct 2024 07:01  -  09 Oct 2024 07:00  --------------------------------------------------------  IN: 300 mL / OUT: 450 mL / NET: -150 mL        Appearance: No Acute Distress	  HEENT:  Normal oral mucosa, PERRL, EOMI	  Cardiovascular: Normal S1 S2, No JVD, No murmurs/rubs/gallops  Respiratory: Lungs clear to auscultation bilaterally  Gastrointestinal:  Soft, Non-tender, + BS	  Skin: No rashes, No ecchymoses, No cyanosis	  Neurologic: Non-focal  Extremities: No clubbing, cyanosis or edema  Vascular: Peripheral pulses palpable 2+ bilaterally  Psychiatry: A & O x 3, Mood & affect appropriate    Laboratory Data:	 	    CBC Full  -  ( 08 Oct 2024 14:38 )  WBC Count : x  Hemoglobin : 11.9 g/dL  Hematocrit : 35.1 %  Platelet Count - Automated : x  Mean Cell Volume : x  Mean Cell Hemoglobin : x  Mean Cell Hemoglobin Concentration : x  Auto Neutrophil # : x  Auto Lymphocyte # : x  Auto Monocyte # : x  Auto Eosinophil # : x  Auto Basophil # : x  Auto Neutrophil % : x  Auto Lymphocyte % : x  Auto Monocyte % : x  Auto Eosinophil % : x  Auto Basophil % : x    10-08    144  |  107  |  29[H]  ----------------------------<  154[H]  4.2   |  26  |  1.14    Ca    9.1      08 Oct 2024 10:09    TPro  6.4  /  Alb  3.8  /  TBili  0.8  /  DBili  x   /  AST  19  /  ALT  15  /  AlkPhos  85  10-08      proBNP:   Lipid Profile:   HgA1c:   TSH:       CARDIAC MARKERS:            Interpretation of Telemetry: 	    ECG:  	  RADIOLOGY:  OTHER: 	    PREVIOUS DIAGNOSTIC TESTING:    [ ] Echocardiogram:  [ ] Catheterization:  [ ] Stress Test:  	    Assessment:  74 year man with pmhx of HTN, HLD, prostate cancer (s/p XRT), nonobstructive CAD, HFpEF, pericardial effusion in 2023, asthma, hx of diverticulosis with diverticular bleed last week requiring 5units PRBCs, CVA, Atrial flutter s/p Afib ablation and CTI ablation, s/p watchman procedure due to contraindication to oral anticoagulant therapy for AFL on 8/14/23 and found to have ishmael-device leak who presented to [facility] for large bloody bowel movement and dizziness.    Recs:  cardiac stable  no e/o acs or chf  resume diuretics to maintain euvolemic = lasix 40mg qd and kcl supplementation  cw colchicine for hx of pericarditis and gout  cw beta blockers and dilt for rate control and hx of nsvt  AC on hold. resume when able. pending eventual watchman repair/coiling  cw statin for cad  obtain le yunier duplex given hx of dvt  gi follow up        	      Greater than 60 minutes spent on total encounter; more than 50% of the visit was spent counseling and/or coordinating care by the attending physician.   	  Isaac Calderón MD   Cardiovascular Diseases  [phone]

## 2024-10-09 NOTE — PROGRESS NOTE ADULT - PROBLEM SELECTOR PLAN 7
Code: Full   DVT ppx: hold due to recent bleed   Diet: CLD and advance as tolerated   Dispo: likely home Code: Full   DVT ppx: hold due to recent bleed   Diet: Regular    Dispo: likely home

## 2024-10-10 ENCOUNTER — TRANSCRIPTION ENCOUNTER (OUTPATIENT)
Age: 74
End: 2024-10-10

## 2024-10-10 VITALS
TEMPERATURE: 98 F | RESPIRATION RATE: 17 BRPM | DIASTOLIC BLOOD PRESSURE: 72 MMHG | SYSTOLIC BLOOD PRESSURE: 111 MMHG | HEART RATE: 81 BPM | OXYGEN SATURATION: 99 %

## 2024-10-10 LAB
ANION GAP SERPL CALC-SCNC: 12 MMOL/L — SIGNIFICANT CHANGE UP (ref 7–14)
BUN SERPL-MCNC: 18 MG/DL — SIGNIFICANT CHANGE UP (ref 7–23)
CALCIUM SERPL-MCNC: 8.8 MG/DL — SIGNIFICANT CHANGE UP (ref 8.4–10.5)
CHLORIDE SERPL-SCNC: 106 MMOL/L — SIGNIFICANT CHANGE UP (ref 98–107)
CO2 SERPL-SCNC: 25 MMOL/L — SIGNIFICANT CHANGE UP (ref 22–31)
CREAT SERPL-MCNC: 0.9 MG/DL — SIGNIFICANT CHANGE UP (ref 0.5–1.3)
EGFR: 90 ML/MIN/1.73M2 — SIGNIFICANT CHANGE UP
GLUCOSE SERPL-MCNC: 97 MG/DL — SIGNIFICANT CHANGE UP (ref 70–99)
HCT VFR BLD CALC: 31.7 % — LOW (ref 39–50)
HGB BLD-MCNC: 10.6 G/DL — LOW (ref 13–17)
MCHC RBC-ENTMCNC: 31.7 PG — SIGNIFICANT CHANGE UP (ref 27–34)
MCHC RBC-ENTMCNC: 33.4 GM/DL — SIGNIFICANT CHANGE UP (ref 32–36)
MCV RBC AUTO: 94.9 FL — SIGNIFICANT CHANGE UP (ref 80–100)
NRBC # BLD: 0 /100 WBCS — SIGNIFICANT CHANGE UP (ref 0–0)
NRBC # FLD: 0 K/UL — SIGNIFICANT CHANGE UP (ref 0–0)
PLATELET # BLD AUTO: 114 K/UL — LOW (ref 150–400)
POTASSIUM SERPL-MCNC: 3.4 MMOL/L — LOW (ref 3.5–5.3)
POTASSIUM SERPL-SCNC: 3.4 MMOL/L — LOW (ref 3.5–5.3)
RBC # BLD: 3.34 M/UL — LOW (ref 4.2–5.8)
RBC # FLD: 13.2 % — SIGNIFICANT CHANGE UP (ref 10.3–14.5)
SODIUM SERPL-SCNC: 143 MMOL/L — SIGNIFICANT CHANGE UP (ref 135–145)
WBC # BLD: 5.76 K/UL — SIGNIFICANT CHANGE UP (ref 3.8–10.5)
WBC # FLD AUTO: 5.76 K/UL — SIGNIFICANT CHANGE UP (ref 3.8–10.5)

## 2024-10-10 PROCEDURE — 99239 HOSP IP/OBS DSCHRG MGMT >30: CPT | Mod: GC

## 2024-10-10 RX ADMIN — Medication 17 GRAM(S): at 11:09

## 2024-10-10 RX ADMIN — Medication 1 DOSE(S): at 08:46

## 2024-10-10 RX ADMIN — Medication 40 MILLIEQUIVALENT(S): at 09:41

## 2024-10-10 RX ADMIN — PANTOPRAZOLE SODIUM 40 MILLIGRAM(S): 40 TABLET, DELAYED RELEASE ORAL at 05:28

## 2024-10-10 RX ADMIN — FUROSEMIDE 40 MILLIGRAM(S): 10 INJECTION INTRAVENOUS at 05:28

## 2024-10-10 RX ADMIN — Medication 20 MILLIEQUIVALENT(S): at 11:09

## 2024-10-10 RX ADMIN — MONTELUKAST SODIUM 10 MILLIGRAM(S): 10 TABLET, FILM COATED ORAL at 11:09

## 2024-10-10 RX ADMIN — APIXABAN 2.5 MILLIGRAM(S): 5 TABLET, FILM COATED ORAL at 05:29

## 2024-10-10 RX ADMIN — Medication 25 MILLIGRAM(S): at 05:31

## 2024-10-10 RX ADMIN — Medication 0.6 MILLIGRAM(S): at 11:09

## 2024-10-10 RX ADMIN — Medication 120 MILLIGRAM(S): at 05:29

## 2024-10-10 RX ADMIN — FINASTERIDE 5 MILLIGRAM(S): 5 TABLET, FILM COATED ORAL at 11:09

## 2024-10-10 NOTE — DISCHARGE NOTE NURSING/CASE MANAGEMENT/SOCIAL WORK - PATIENT PORTAL LINK FT
You can access the FollowMyHealth Patient Portal offered by [facility] by registering at the following website: [email or web address]. By joining [facility]’s FollowMyHealth portal, you will also be able to view your health information using other applications (apps) compatible with our system.

## 2024-10-10 NOTE — PROGRESS NOTE ADULT - PROBLEM SELECTOR PLAN 5
-C/w symbicort 160 mcg -4.5 mcg inhaler 2 puffs qd   -C/w montelukast 10 mg po qd   -C/w albuterol 90 mcg 2 puffs q4 PRN
-C/w symbicort 160 mcg -4.5 mcg inhaler 2 puffs qd   -C/w montelukast 10 mg po qd   -C/w albuterol 90 mcg 2 puffs q4 PRN

## 2024-10-10 NOTE — DISCHARGE NOTE PROVIDER - NSDCACTIVITY_GEN_ALL_CORE
84F hx of bradycardia s/p PPM presenting with 2 weeks of dyspnea worse with exertion, cough with white sputum, intermittent substernal CP that arises from exertion. Also reports L-sided swelling (arm, breast and leg). Had DVT ultrasounds of LUE and bilateral LLE, which were negative. Also had a mammogram of L breast which showed findings c/f inflammatory carcinoma.  No restrictions

## 2024-10-10 NOTE — PHYSICAL THERAPY INITIAL EVALUATION ADULT - ADDITIONAL COMMENTS
Pt left seated at edge of bed in NAD, all lines intact, call bell in reach, SPO2 100%, and RN aware.

## 2024-10-10 NOTE — PHYSICAL THERAPY INITIAL EVALUATION ADULT - PERTINENT HX OF CURRENT PROBLEM, REHAB EVAL
Patient is a 74 year old male, PMH stated below, presents with history of diverticulosis s/p embolization of R colic artery with high transfusion needs and Afib s/p watchman procedure with apixaban here for recurrent LGIB, most likely 2/2 diverticulosis vs less likely cardioembolic colonic ischemia vs angiodysplasia.

## 2024-10-10 NOTE — PROGRESS NOTE ADULT - ATTENDING COMMENTS
no more bleeding from below  hgb stable  okay to advance diet to full and restart a/c  would monitor x 24 hours on a/c if possible  no further gi workup inpatient workup unless repeat bleeding (expect still some blood to be mixed with stool on next BM)
74 year old male with HTN, prostate Ca, asthma, diverticulosis with hx of diverticular bleeding, aflutter s/p ablation, watchman procedure (c/b leak, has remained on Eliquis), now presenting with BRBPR. Initially in the ED, hypothermic to 94.7, hypotension to SBP 70s. S/p 1 pRBC, 1g acetaminophen. Hg stable in 11s. Lactate normal. BP improved and since normothermic. CTA without sign of active bleed, +diverse colonic diverticulosis. Suspected most likely diverticular bleed. Appreciate GI and EP consultations. No plan for GI procedure at this time. Okay to resume AC per GI- has been resumed, no further signs of bleeding, no need for further transfusion. Patient asymptomatic. Discharge home today. Close return precautions advised. Close outpt follow up. DC planning 38 minutes. D/w HS team.
74 year old male with HTN, prostate Ca, asthma, diverticulosis with hx of diverticular bleeding, aflutter s/p ablation, watchman procedure (c/b leak, has remained on Eliquis), now presenting with BRBPR. Initially in the ED, hypothermic to 94.7, hypotension to SBP 70s. S/p 1 pRBC, 1g acetaminophen. Hg stable in 11s. Lactate normal. BP improved and now normothermic. CTA without sign of active bleed, +diverse colonic diverticulosis. Suspected most likely diverticular bleed. Appreciate GI and EP consultations. No plan for GI procedure at this time. Okay to resume AC per GI. Monitor BMs and Hg closely. Patient comfortable, no lightheadedness, chest pain, abd pain or bleeding. Wife updated at bedside. D/w HS team.

## 2024-10-10 NOTE — DISCHARGE NOTE NURSING/CASE MANAGEMENT/SOCIAL WORK - NSDCFUADDAPPT_GEN_ALL_CORE_FT
APPTS ARE READY TO BE MADE: [X] YES    Best Family or Patient Contact (if needed):  Valentina Leung (wife)     Additional Information about above appointments (if needed):    1: Please see your PCP within 1-2 wks to discuss your most recent hospitalization.   2: Please see the gastroenterologist to discuss further these recurrent bloody stool episodes. (scheduled appointment already)   3: Please see your cardiologist to discuss further how to manage the ishmael-device leak of the watchman device. (scheduled appointment already)     Other comments or requests: N/a

## 2024-10-10 NOTE — DISCHARGE NOTE PROVIDER - CARE PROVIDER_API CALL
Riki Desai  Pulmonary Disease  [address] [place] [address]  Phone: [phone]  Fax: [phone]  Established Patient  Follow Up Time:

## 2024-10-10 NOTE — PROGRESS NOTE ADULT - PROBLEM SELECTOR PLAN 1
#RESOLVED   Patient with history of multiple episodes of diverticular bleeds, extensive transfusion needs of bloody stool recently, known radiation changes in rectal region and diverticulosis,  with recent reassuring colonoscopy (2023) for malignancy here with recurrent symptomatic hematochezia. Hemodynamically stable. Resolution of further bloody stool and lightheadness. No evidence for recent bleeding. Clinically improving.     -C/w Hb/Hct qd monitor for bleed   -C/w pantoprazole 40 mg IV BID  -C/w apixaban 2.5 mg po BID   -Per GI, will defer CSY at this time due to reassuring 2023 CSY   -Transfusion goal Hb >7

## 2024-10-10 NOTE — DISCHARGE NOTE PROVIDER - NSDCFUADDAPPT_GEN_ALL_CORE_FT
APPTS ARE READY TO BE MADE: [X] YES    Best Family or Patient Contact (if needed):  Valentina Leung (wife)     Additional Information about above appointments (if needed):    1: Please see your PCP within 1-2 wks to discuss your most recent hospitalization.   2: Please see the gastroenterologist to discuss further these recurrent bloody stool episodes. (scheduled appointment already)   3: Please see your cardiologist to discuss further how to manage the ishmael-device leak of the watchman device. (scheduled appointment already)     Other comments or requests: N/a    APPTS ARE READY TO BE MADE: [X] YES    Best Family or Patient Contact (if needed):  Valentina Leung (wife)     Additional Information about above appointments (if needed):    1: Please see your PCP within 1-2 wks to discuss your most recent hospitalization.   2: Please see the gastroenterologist to discuss further these recurrent bloody stool episodes. (scheduled appointment already)   3: Please see your cardiologist to discuss further how to manage the ishmael-device leak of the watchman device. (scheduled appointment already)     Other comments or requests: N/a     Patient advised they did not want to proceed with scheduling appointments with the providers on their referrals. They will coordinate care on their own.

## 2024-10-10 NOTE — DISCHARGE NOTE NURSING/CASE MANAGEMENT/SOCIAL WORK - NSDCVIVACCINE_GEN_ALL_CORE_FT
Tdap; 08-May-2023 07:17; Cedrick Coronel); [employer]; [id number] (Exp. Date: 04-Mar-2025); IntraMuscular; Deltoid Left.; 0.5 milliLiter(s); VIS (VIS Published: 09-May-2013, VIS Presented: 08-May-2023);

## 2024-10-10 NOTE — PROGRESS NOTE ADULT - PROBLEM SELECTOR PLAN 6
-C/w finasteride 5 mg po qd   -C/w tamsulosin 0.4 mg po qd
-C/w finasteride 5 mg po qd   -C/w tamsulosin 0.4 mg po qd

## 2024-10-10 NOTE — DISCHARGE NOTE PROVIDER - NSDCCPCAREPLAN_GEN_ALL_CORE_FT
PRINCIPAL DISCHARGE DIAGNOSIS  Diagnosis: GI bleed  Assessment and Plan of Treatment: You were admitted because you were having a few episodes of bloody stool with lighteadedness and ultimately needing 1 transfusion of blood. Because you were recently treated for similar symptom and had a colonscopy a little over a year ago that showed us the diverticula and changes in the rectum associated with the radiation you recieved years ago from the prostate cancer, in addition to no evidence of neoplasm, we felt reassured that the bloody stool was from the diverticula and radiation changes. You did not bleed anymore in the hospital and did not require additional transfusions. If you develop bleeding again with lightheadedness, palpiations, or loss of concsciousness, please return to the ED.     PRINCIPAL DISCHARGE DIAGNOSIS  Diagnosis: GI bleed  Assessment and Plan of Treatment: You were admitted because you were having a few episodes of bloody stool with lighteadedness and ultimately needing 1 transfusion of blood. Because you were recently treated for similar symptom and had a colonscopy a little over a year ago that showed us the diverticula and changes in the rectum associated with the radiation you recieved years ago from the prostate cancer, in addition to no evidence of neoplasm, we felt reassured that the bloody stool was from the diverticula and radiation changes. You did not bleed anymore in the hospital and did not require additional transfusions. If you develop bleeding again with lightheadedness, palpiations, or loss of concsciousness, please return to the ED.  Please follow up with your primary doctor within 1 week to have repeat bloodwork to ensure your blood counts are stable. Continue to follow up with Cardiology, GI and the remainder of your medical providers.

## 2024-10-10 NOTE — PROGRESS NOTE ADULT - ASSESSMENT
78M with history of diverticulosis s/p embolization of R colic artery with high transfusion needs and Afib s/p watchman procedure with apixaban here for recurrent LGIB, most likely 2/2 diverticulosis vs less likely cardioembolic colonic ischemia vs angiodysplasia.  78M with history of prostate cancer s/p radiation therapy and  diverticulosis s/p embolization of R colic artery with high transfusion needs and Afib s/p watchman procedure with apixaban here for recurrent LGIB most likely 2/2 diverticular disease and radiation induced injury rectum.

## 2024-10-10 NOTE — DISCHARGE NOTE PROVIDER - HOSPITAL COURSE
HPI:  78M with history of diverticulosis s/p embolization, hemorrhoids s/p hemorrhoidectomy Afib s/p Watchman's procedure on apixaban, TIA, gout, HDL, HTN, prostate cancer s/p RT, and asthma presents to [facility]ED for dark bloody stool x 1 day.     Per chart review, patient presented with bp 78/53, HR 57, RR 18, Temp 94.7 F, SpO2 RA 98%. Due to active bleed, patient required emergent transfusion of 1UpRBC and got acetaminophen 1000 mg IV x 1 with good effect. Hb/Hct was 12.3/37.3. CTAP showed diffuse colonic diverticulosis with no active bleeding.     Patient reports that this AM, awoke at 6AM and had daily cup of hot water. Went to the bathroom and had a specific odd odor flatulence that patient recognizes when he may have a bloody stool and noticed dark streaks of blood mixed in with stool. Had several more episodes of bloody stool until feeling lightheadedness, however did not have LOC nor fall. Denies NSAIDs/aspirin use. Endorses taking all medicines, including apixaban. Reports having had some leakage with the Watchman procedure recently and that it plans to be corrected in upcoming weeks. Denies any other diarrhea/constipation, changes in urinary habits, recent hemorrhoids, melena,       (08 Oct 2024 16:12)    Hospital Course:    On the floor, patient remained hemodynamically stable without further episodes of hematochezia, lightheadedness. Because of the history of Afib and watchman procedure, the device was checked and there was believed to have some ishmael-device leakage which was monitored by cardiology here and determined to be followed up outpatient and not believed to contribute to hematochezia. No further evidence for ongoing bleeding. Due to patient's 2023 CSY that was reassuring for no malignancy and showed evidence of radiation changes in the rectum and extensive diverticulosis, in addition to low suspicion for ongoing bleeding, an inpatient CSY was deferred by GI. Patient remained asymptomatic without any additional needs of pRBC and was restarted back on home apixaban without bleeding afterwards. On day of discharge, patient was medically stable and ready for discharge home.     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     Per chart review, patient presented with bp 78/53, HR 57, RR 18, Temp 94.7 F, SpO2 RA 98%. Due to active bleed, patient required emergent transfusion of 1UpRBC and got acetaminophen 1000 mg IV x 1 with good effect. Hb/Hct was 12.3/37.3. CTAP showed diffuse colonic diverticulosis with no active bleeding.     Patient reports that this AM, awoke at 6AM and had daily cup of hot water. Went to the bathroom and had a specific odd odor flatulence that patient recognizes when he may have a bloody stool and noticed dark streaks of blood mixed in with stool. Had several more episodes of bloody stool until feeling lightheadedness, however did not have LOC nor fall. Denies NSAIDs/aspirin use. Endorses taking all medicines, including apixaban. Reports having had some leakage with the Watchman procedure recently and that it plans to be corrected in upcoming weeks. Denies any other diarrhea/constipation, changes in urinary habits, recent hemorrhoids, melena,       (08 Oct 2024 16:12)    Hospital Course:    On the floor, patient remained hemodynamically stable without further episodes of hematochezia, lightheadedness. Because of the history of Afib and watchman procedure, the device was checked and there was believed to have some ishmael-device leakage which was monitored by cardiology here and determined to be followed up outpatient and not believed to contribute to hematochezia. No further evidence for ongoing bleeding. Due to patient's 2023 CSY that was reassuring for no malignancy and showed evidence of radiation changes in the rectum and extensive diverticulosis, in addition to low suspicion for ongoing bleeding, an inpatient CSY was deferred by GI. Patient remained asymptomatic without any additional needs of pRBC and was restarted back on home apixaban without bleeding afterwards. On day of discharge, patient was medically stable and ready for discharge home.   Cardiology had initially recommended LE duplex to r/o DVT. Patient on AC and on exam without tenderness, edema, or concern for DVT- discussed with Cardiology, no longer need to obtain at this time- study discontinued.     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

## 2024-10-10 NOTE — PROGRESS NOTE ADULT - PROBLEM SELECTOR PLAN 2
Patient with Afib s/p watchman procedure with recent evaluation for its functioning, found to have ishmael-device leak. Had recent TTE outpatient with LVEF 45-50% with mildly depressed LV systlic function and global LV hypokinesis with severely dilated LA. Not in RVR. Will monitor.     -Cardiology following  -C/w with home metoprolol succinate 25 mg po qd   -C/w diltiazem 120 mg po qd  -C/w potassium chloride 20 mEq po qd

## 2024-10-10 NOTE — DISCHARGE NOTE PROVIDER - NSDCFUSCHEDAPPT_GEN_ALL_CORE_FT
Obi Hoskins  [facility] Physician [facility]  GASTRO [phone] [facility]  Scheduled Appointment: 11/12/2024    Gaurav Marie  [facility] Physician [facility]  ELECTROPH [phone]  Scheduled Appointment: 11/19/2024

## 2024-10-10 NOTE — DISCHARGE NOTE NURSING/CASE MANAGEMENT/SOCIAL WORK - NSDCPEELIQUISDIET_GEN_ALL_CORE
3 = assistive equipment and person
Eat healthy foods you enjoy. Apixaban/Eliquis DOES NOT have a special diet. Limit your alcohol intake.

## 2024-10-10 NOTE — DISCHARGE NOTE PROVIDER - NSDCMRMEDTOKEN_GEN_ALL_CORE_FT
Albuterol (Eqv-ProAir HFA) 90 mcg/inh inhalation aerosol: 2 puff(s) inhaled every 4 to 6 hours as needed for  shortness of breath and/or wheezing  atorvastatin 20 mg oral tablet: 1 tab(s) orally once a day (at bedtime)  colchicine 0.6 mg oral capsule: 1 cap(s) orally 2 times a day  DilTIAZem (Eqv-Cardizem CD) 120 mg/24 hours oral capsule, extended release: 1 cap(s) orally once a day  dorzolamide-timolol 2.23%-0.68% (2%-0.5% base) ophthalmic solution: 1 drop(s) in the left eye 2 times a day  Eliquis 2.5 mg oral tablet: 1 tab(s) orally 2 times a day  finasteride 5 mg oral tablet: 1 tab(s) orally once a day  furosemide 40 mg oral tablet: 1 tab(s) orally once a day  metoprolol succinate 25 mg oral tablet, extended release: 1 tab(s) orally once a day  montelukast 10 mg oral tablet: 1 tab(s) orally once a day  pantoprazole 40 mg oral delayed release tablet: 1 tab(s) orally once a day  potassium chloride: 20 milliequivalent(s) orally once a day  Symbicort 160 mcg-4.5 mcg/inh inhalation aerosol: 2 puff(s) inhaled once a day  tamsulosin 0.4 mg oral capsule: 1 cap(s) orally once a day

## 2024-10-10 NOTE — DISCHARGE NOTE PROVIDER - PROVIDER TOKENS
PROVIDER:[TOKEN:[[id number]:[facility]:[id number]],ESTABLISHEDPATIENT:[T]] Stelara Counseling:  I discussed with the patient the risks of ustekinumab including but not limited to immunosuppression, malignancy, posterior leukoencephalopathy syndrome, and serious infections.  The patient understands that monitoring is required including a PPD at baseline and must alert us or the primary physician if symptoms of infection or other concerning signs are noted.

## 2024-10-10 NOTE — PROGRESS NOTE ADULT - SUBJECTIVE AND OBJECTIVE BOX
GI Progress Note     Interval Hx:   - hgb stable  - no BM since yesterday AM    OBJECTIVE:    VITAL SIGNS:  ICU Vital Signs Last 24 Hrs  T(C): 36.3 (09 Oct 2024 05:21), Max: 36.7 (08 Oct 2024 17:11)  T(F): 97.4 (09 Oct 2024 05:21), Max: 98.1 (08 Oct 2024 17:11)  HR: 78 (09 Oct 2024 05:21) (62 - 78)  BP: 129/85 (09 Oct 2024 05:21) (84/62 - 129/85)  BP(mean): --  ABP: --  ABP(mean): --  RR: 18 (09 Oct 2024 05:21) (17 - 19)  SpO2: 100% (09 Oct 2024 05:21) (97% - 100%)    O2 Parameters below as of 09 Oct 2024 05:21  Patient On (Oxygen Delivery Method): room air              10-08 @ 07:01  -  10-09 @ 07:00  --------------------------------------------------------  IN: 300 mL / OUT: 450 mL / NET: -150 mL        PHYSICAL EXAM:    GENERAL: lying in bed, in no apparent distress  HEENT: NCAT  NECK: trachea midline  CHEST:  respirations even, non labored  ABDOMEN:  soft, non-tender, non-distended  EXTREMITIES: WWP  SKIN:  warm/dry, no visible rash   PSYCH: appropriate affect, A&O x 3  NEURO: no tremor noted     MEDICATIONS:  MEDICATIONS  (STANDING):  atorvastatin 10 milliGRAM(s) Oral at bedtime  colchicine 0.6 milliGRAM(s) Oral daily  diltiazem    milliGRAM(s) Oral daily  finasteride 5 milliGRAM(s) Oral daily  fluticasone propionate/ salmeterol 250-50 MICROgram(s) Diskus 1 Dose(s) Inhalation two times a day  furosemide    Tablet 40 milliGRAM(s) Oral daily  metoprolol succinate ER 25 milliGRAM(s) Oral daily  montelukast 10 milliGRAM(s) Oral daily  pantoprazole  Injectable 40 milliGRAM(s) IV Push two times a day  polyethylene glycol 3350 17 Gram(s) Oral daily  potassium chloride    Tablet ER 20 milliEquivalent(s) Oral daily    MEDICATIONS  (PRN):  albuterol    90 MICROgram(s) HFA Inhaler 2 Puff(s) Inhalation every 4 hours PRN Bronchospasm      ALLERGIES:  Allergies    No Known Allergies    Intolerances        LABS:                        11.1   5.84  )-----------( 121      ( 09 Oct 2024 06:30 )             32.9     10-09    143  |  107  |  21  ----------------------------<  88  3.3[L]   |  26  |  0.98    Ca    9.1      09 Oct 2024 06:30    TPro  6.4  /  Alb  3.8  /  TBili  0.8  /  DBili  x   /  AST  19  /  ALT  15  /  AlkPhos  85  10-08    PT/INR - ( 08 Oct 2024 09:47 )   PT: 17.2 sec;   INR: 1.45 ratio         PTT - ( 08 Oct 2024 09:47 )  PTT:34.7 sec  Urinalysis Basic - ( 09 Oct 2024 06:30 )    Color: x / Appearance: x / SG: x / pH: x  Gluc: 88 mg/dL / Ketone: x  / Bili: x / Urobili: x   Blood: x / Protein: x / Nitrite: x   Leuk Esterase: x / RBC: x / WBC x   Sq Epi: x / Non Sq Epi: x / Bacteria: x        
Cardiovascular Disease Progress Note    Overnight events: No acute events overnight.  no new cardiac sx  Otherwise review of systems negative    Objective Findings:  T(C): 37.1 (10-09-24 @ 21:55), Max: 37.1 (10-09-24 @ 21:55)  HR: 78 (10-09-24 @ 21:55) (78 - 79)  BP: 128/80 (10-09-24 @ 21:55) (126/78 - 128/80)  RR: 18 (10-09-24 @ 21:55) (17 - 18)  SpO2: 99% (10-09-24 @ 21:55) (97% - 99%)  Wt(kg): --  Daily     Daily       Physical Exam:  Gen: NAD  HEENT: EOMI  CV: RRR, normal S1 + S2, no m/r/g  Lungs: CTAB  Abd: soft, non-tender  Ext: No edema    Telemetry:    Laboratory Data:                        11.1   6.03  )-----------( 120      ( 09 Oct 2024 17:03 )             32.1     10-09    143  |  107  |  21  ----------------------------<  88  3.3[L]   |  26  |  0.98    Ca    9.1      09 Oct 2024 06:30    TPro  6.4  /  Alb  3.8  /  TBili  0.8  /  DBili  x   /  AST  19  /  ALT  15  /  AlkPhos  85  10-08    PT/INR - ( 08 Oct 2024 09:47 )   PT: 17.2 sec;   INR: 1.45 ratio         PTT - ( 08 Oct 2024 09:47 )  PTT:34.7 sec          Inpatient Medications:  MEDICATIONS  (STANDING):  apixaban 2.5 milliGRAM(s) Oral every 12 hours  atorvastatin 10 milliGRAM(s) Oral at bedtime  colchicine 0.6 milliGRAM(s) Oral daily  diltiazem    milliGRAM(s) Oral daily  finasteride 5 milliGRAM(s) Oral daily  fluticasone propionate/ salmeterol 250-50 MICROgram(s) Diskus 1 Dose(s) Inhalation two times a day  furosemide    Tablet 40 milliGRAM(s) Oral daily  metoprolol succinate ER 25 milliGRAM(s) Oral daily  montelukast 10 milliGRAM(s) Oral daily  pantoprazole  Injectable 40 milliGRAM(s) IV Push two times a day  polyethylene glycol 3350 17 Gram(s) Oral daily  potassium chloride    Tablet ER 20 milliEquivalent(s) Oral daily      Assessment:  74 year man with pmhx of HTN, HLD, prostate cancer (s/p XRT), nonobstructive CAD, HFpEF, pericardial effusion in 2023, asthma, hx of diverticulosis with diverticular bleed last week requiring 5units PRBCs, CVA, Atrial flutter s/p Afib ablation and CTI ablation, s/p watchman procedure due to contraindication to oral anticoagulant therapy for AFL on 8/14/23 and found to have ishmael-device leak who presented to [facility] for large bloody bowel movement and dizziness.    Recs:  cardiac stable  no e/o acs or chf  cw diuretics to maintain euvolemic = lasix 40mg qd and kcl supplementation  cw colchicine for hx of pericarditis and gout  cw beta blockers and dilt for rate control and hx of nsvt  AC resumed. pending eventual watchman repair/coiling  cw statin for cad  obtain le yunier duplex given hx of dvt  gi follow up  dcp        Over 25 minutes spent on total encounter; more than 50% of the visit was spent counseling and/or coordinating care by the attending physician.      Isaac Calderón MD   Cardiovascular Disease  [phone]
INPATIENT MEDICINE PROGRESS NOTE:   Authored by Gudelia Verdugo MD     HISTORY OF PRESENT ILLNESS:  78M with history of diverticulosis s/p embolization, hemorrhoids s/p hemorrhoidectomy Afib s/p Watchman's procedure on apixaban, TIA, gout, HDL, HTN, prostate cancer s/p RT, and asthma presents to [facility]-ED for dark bloody stool x 1 day.     NAEON. Afebrile, hemodynamically and respiratory vital signs stable.     Seen and examined at bedside, patient reports doing well and looking forward to going home. However wishes he could get out of bad but can't because of the fall risk bracelet causing the alarm to go off. Otherwise denies lighteadedness, new rashes, headache. No hematuria. No hematochezia. Having flatulence without the odd smell.     PHYSICAL EXAM:  Vital Signs Last 24 Hrs  T(C): 37.1 (09 Oct 2024 21:55), Max: 37.1 (09 Oct 2024 21:55)  T(F): 98.8 (09 Oct 2024 21:55), Max: 98.8 (09 Oct 2024 21:55)  HR: 78 (09 Oct 2024 21:55) (78 - 79)  BP: 128/80 (09 Oct 2024 21:55) (126/78 - 128/80)  BP(mean): --  RR: 18 (09 Oct 2024 21:55) (17 - 18)  SpO2: 99% (09 Oct 2024 21:55) (97% - 99%)    Parameters below as of 09 Oct 2024 21:55  Patient On (Oxygen Delivery Method): room air        General: NAD, calm, cooperative, normal habitus   Neuro: awake, alert, oriented to person/place/time, spontaneous movements   HEENT: NC/AT, moist mucous membranes, no conjunctival pallor, PERRLA b/l   Chest: nonTTP   Heart: S1/S2, irregular rhythym, normal rate   Lungs: CTA b/l, nonlabored breathing   Abd: soft, nonTTP, nondistended, negative murphys sign, no rebound/guarding/rigidity   Ext: warm to touch, no pretibial edema, active strength intact, no koilonychia   Pulses: dorsalis pedis 2+ b/l, radial 2+ b/l   Lines/tubes/drains: none      I&O's Summary    09 Oct 2024 07:01  -  10 Oct 2024 07:00  --------------------------------------------------------  IN: 600 mL / OUT: 1000 mL / NET: -400 mL        LABS:                        11.1   6.03  )-----------( 120      ( 09 Oct 2024 17:03 )             32.1     10-09    143  |  107  |  21  ----------------------------<  88  3.3[L]   |  26  |  0.98    Ca    9.1      09 Oct 2024 06:30    TPro  6.4  /  Alb  3.8  /  TBili  0.8  /  DBili  x   /  AST  19  /  ALT  15  /  AlkPhos  85  10-08    CAPILLARY BLOOD GLUCOSE        PT/INR - ( 08 Oct 2024 09:47 )   PT: 17.2 sec;   INR: 1.45 ratio         PTT - ( 08 Oct 2024 09:47 )  PTT:34.7 sec      Urinalysis Basic - ( 09 Oct 2024 06:30 )    Color: x / Appearance: x / SG: x / pH: x  Gluc: 88 mg/dL / Ketone: x  / Bili: x / Urobili: x   Blood: x / Protein: x / Nitrite: x   Leuk Esterase: x / RBC: x / WBC x   Sq Epi: x / Non Sq Epi: x / Bacteria: x          MEDICATIONS  (STANDING):  apixaban 2.5 milliGRAM(s) Oral every 12 hours  atorvastatin 10 milliGRAM(s) Oral at bedtime  colchicine 0.6 milliGRAM(s) Oral daily  diltiazem    milliGRAM(s) Oral daily  finasteride 5 milliGRAM(s) Oral daily  fluticasone propionate/ salmeterol 250-50 MICROgram(s) Diskus 1 Dose(s) Inhalation two times a day  furosemide    Tablet 40 milliGRAM(s) Oral daily  metoprolol succinate ER 25 milliGRAM(s) Oral daily  montelukast 10 milliGRAM(s) Oral daily  pantoprazole  Injectable 40 milliGRAM(s) IV Push two times a day  polyethylene glycol 3350 17 Gram(s) Oral daily  potassium chloride    Tablet ER 20 milliEquivalent(s) Oral daily    MEDICATIONS  (PRN):  albuterol    90 MICROgram(s) HFA Inhaler 2 Puff(s) Inhalation every 4 hours PRN Bronchospasm      
INPATIENT MEDICINE PROGRESS NOTE:   Authored by Gudelia Verdugo MD     HISTORY OF PRESENT ILLNESS:  78M with history of diverticulosis s/p embolization, hemorrhoids s/p hemorrhoidectomy Afib s/p Watchman's procedure on apixaban, TIA, gout, HDL, HTN, prostate cancer s/p RT, and asthma presents to [facility]ED for dark bloody stool x 1 day.     NAEON. Afebrile, hemodynamically and respiratory vital signs stable.     Seen and examined at bedside, patient reports doing well and has not been able to get out of bed due to bed alarm. Does not use any ambulatory device at baseline. Denies lightheadedness, skin rashes, further bloody stool. Voiding into urinal. Hungry.     PHYSICAL EXAM:  Vital Signs Last 24 Hrs  T(C): 36.3 (09 Oct 2024 05:21), Max: 37.2 (08 Oct 2024 09:35)  T(F): 97.4 (09 Oct 2024 05:21), Max: 98.9 (08 Oct 2024 09:35)  HR: 78 (09 Oct 2024 05:21) (57 - 78)  BP: 129/85 (09 Oct 2024 05:21) (76/53 - 129/85)  BP(mean): --  RR: 18 (09 Oct 2024 05:21) (17 - 19)  SpO2: 100% (09 Oct 2024 05:21) (97% - 100%)    Parameters below as of 09 Oct 2024 05:21  Patient On (Oxygen Delivery Method): room air      Physical Exam:   General: NAD, calm, cooperative, normal habitus   Neuro: awake, alert, oriented to person/place/time, spontaneous movements   HEENT: NC/AT, moist mucous membranes, no conjunctival pallor, PERRLA b/l   Chest: nonTTP   Heart: S1/S2, irregular rhythym, normal rate   Lungs: CTA b/l, nonlabored breathing   Abd: soft, nonTTP, nondistended, negative murphys sign, no rebound/guarding/rigidity   Ext: warm to touch, no pretibial edema, active strength intact   Pulses: dorsalis pedis 2+ b/l, radial 2+ b/l   Lines/tubes/drains: none      I&O's Summary    08 Oct 2024 07:01  -  09 Oct 2024 07:00  --------------------------------------------------------  IN: 300 mL / OUT: 450 mL / NET: -150 mL        LABS:                        11.9   x     )-----------( x        ( 08 Oct 2024 14:38 )             35.1     10-08    144  |  107  |  29[H]  ----------------------------<  154[H]  4.2   |  26  |  1.14    Ca    9.1      08 Oct 2024 10:09    TPro  6.4  /  Alb  3.8  /  TBili  0.8  /  DBili  x   /  AST  19  /  ALT  15  /  AlkPhos  85  10-08    CAPILLARY BLOOD GLUCOSE      POCT Blood Glucose.: 141 mg/dL (08 Oct 2024 11:39)    PT/INR - ( 08 Oct 2024 09:47 )   PT: 17.2 sec;   INR: 1.45 ratio         PTT - ( 08 Oct 2024 09:47 )  PTT:34.7 sec      Urinalysis Basic - ( 08 Oct 2024 10:09 )    Color: x / Appearance: x / SG: x / pH: x  Gluc: 154 mg/dL / Ketone: x  / Bili: x / Urobili: x   Blood: x / Protein: x / Nitrite: x   Leuk Esterase: x / RBC: x / WBC x   Sq Epi: x / Non Sq Epi: x / Bacteria: x          MEDICATIONS  (STANDING):  atorvastatin 10 milliGRAM(s) Oral at bedtime  colchicine 0.6 milliGRAM(s) Oral daily  diltiazem    milliGRAM(s) Oral daily  finasteride 5 milliGRAM(s) Oral daily  fluticasone propionate/ salmeterol 250-50 MICROgram(s) Diskus 1 Dose(s) Inhalation two times a day  furosemide    Tablet 40 milliGRAM(s) Oral daily  metoprolol succinate ER 25 milliGRAM(s) Oral daily  montelukast 10 milliGRAM(s) Oral daily  pantoprazole  Injectable 40 milliGRAM(s) IV Push two times a day  polyethylene glycol 3350 17 Gram(s) Oral daily  potassium chloride    Tablet ER 20 milliEquivalent(s) Oral daily    MEDICATIONS  (PRN):  albuterol    90 MICROgram(s) HFA Inhaler 2 Puff(s) Inhalation every 4 hours PRN Bronchospasm

## 2024-10-16 ENCOUNTER — OUTPATIENT (OUTPATIENT)
Dept: OUTPATIENT SERVICES | Facility: HOSPITAL | Age: 74
LOS: 1 days | End: 2024-10-16

## 2024-10-16 VITALS
HEART RATE: 69 BPM | OXYGEN SATURATION: 98 % | DIASTOLIC BLOOD PRESSURE: 71 MMHG | HEIGHT: 71 IN | RESPIRATION RATE: 14 BRPM | TEMPERATURE: 98 F | WEIGHT: 205.03 LBS | SYSTOLIC BLOOD PRESSURE: 111 MMHG

## 2024-10-16 DIAGNOSIS — Z98.890 OTHER SPECIFIED POSTPROCEDURAL STATES: Chronic | ICD-10-CM

## 2024-10-16 DIAGNOSIS — Z96.651 PRESENCE OF RIGHT ARTIFICIAL KNEE JOINT: Chronic | ICD-10-CM

## 2024-10-16 DIAGNOSIS — I48.91 UNSPECIFIED ATRIAL FIBRILLATION: ICD-10-CM

## 2024-10-16 LAB
ALBUMIN SERPL ELPH-MCNC: 4.4 G/DL — SIGNIFICANT CHANGE UP (ref 3.3–5)
ALP SERPL-CCNC: 101 U/L — SIGNIFICANT CHANGE UP (ref 40–120)
ALT FLD-CCNC: 18 U/L — SIGNIFICANT CHANGE UP (ref 10–45)
ANION GAP SERPL CALC-SCNC: 14 MMOL/L — SIGNIFICANT CHANGE UP (ref 5–17)
AST SERPL-CCNC: 23 U/L — SIGNIFICANT CHANGE UP (ref 10–40)
BASOPHILS # BLD AUTO: 0.06 K/UL — SIGNIFICANT CHANGE UP (ref 0–0.2)
BASOPHILS NFR BLD AUTO: 1.2 % — SIGNIFICANT CHANGE UP (ref 0–2)
BILIRUB SERPL-MCNC: 0.6 MG/DL — SIGNIFICANT CHANGE UP (ref 0.2–1.2)
BLD GP AB SCN SERPL QL: NEGATIVE — SIGNIFICANT CHANGE UP
BUN SERPL-MCNC: 10 MG/DL — SIGNIFICANT CHANGE UP (ref 7–23)
CALCIUM SERPL-MCNC: 9.4 MG/DL — SIGNIFICANT CHANGE UP (ref 8.4–10.5)
CHLORIDE SERPL-SCNC: 102 MMOL/L — SIGNIFICANT CHANGE UP (ref 96–108)
CO2 SERPL-SCNC: 28 MMOL/L — SIGNIFICANT CHANGE UP (ref 22–31)
CREAT SERPL-MCNC: 0.89 MG/DL — SIGNIFICANT CHANGE UP (ref 0.5–1.3)
CRP SERPL-MCNC: <3 MG/L — SIGNIFICANT CHANGE UP
EGFR: 90 ML/MIN/1.73M2 — SIGNIFICANT CHANGE UP
EOSINOPHIL # BLD AUTO: 0.15 K/UL — SIGNIFICANT CHANGE UP (ref 0–0.5)
EOSINOPHIL NFR BLD AUTO: 2.9 % — SIGNIFICANT CHANGE UP (ref 0–6)
FERRITIN SERPL-MCNC: 60 NG/ML — SIGNIFICANT CHANGE UP (ref 30–400)
FOLATE SERPL-MCNC: >20 NG/ML — SIGNIFICANT CHANGE UP
GLUCOSE SERPL-MCNC: 111 MG/DL — HIGH (ref 70–99)
HCT VFR BLD CALC: 34.9 % — LOW (ref 39–50)
HGB BLD-MCNC: 11.7 G/DL — LOW (ref 13–17)
IMM GRANULOCYTES NFR BLD AUTO: 0.2 % — SIGNIFICANT CHANGE UP (ref 0–0.9)
IRON SATN MFR SERPL: 18 % — SIGNIFICANT CHANGE UP (ref 16–55)
IRON SATN MFR SERPL: 52 UG/DL — SIGNIFICANT CHANGE UP (ref 45–165)
LYMPHOCYTES # BLD AUTO: 0.82 K/UL — LOW (ref 1–3.3)
LYMPHOCYTES # BLD AUTO: 16 % — SIGNIFICANT CHANGE UP (ref 13–44)
MCHC RBC-ENTMCNC: 31.6 PG — SIGNIFICANT CHANGE UP (ref 27–34)
MCHC RBC-ENTMCNC: 33.5 GM/DL — SIGNIFICANT CHANGE UP (ref 32–36)
MCV RBC AUTO: 94.3 FL — SIGNIFICANT CHANGE UP (ref 80–100)
MONOCYTES # BLD AUTO: 0.55 K/UL — SIGNIFICANT CHANGE UP (ref 0–0.9)
MONOCYTES NFR BLD AUTO: 10.7 % — SIGNIFICANT CHANGE UP (ref 2–14)
NEUTROPHILS # BLD AUTO: 3.53 K/UL — SIGNIFICANT CHANGE UP (ref 1.8–7.4)
NEUTROPHILS NFR BLD AUTO: 69 % — SIGNIFICANT CHANGE UP (ref 43–77)
PLATELET # BLD AUTO: 191 K/UL — SIGNIFICANT CHANGE UP (ref 150–400)
POTASSIUM SERPL-MCNC: 3.5 MMOL/L — SIGNIFICANT CHANGE UP (ref 3.5–5.3)
POTASSIUM SERPL-SCNC: 3.5 MMOL/L — SIGNIFICANT CHANGE UP (ref 3.5–5.3)
PROT SERPL-MCNC: 7 G/DL — SIGNIFICANT CHANGE UP (ref 6–8.3)
RBC # BLD: 3.7 M/UL — LOW (ref 4.2–5.8)
RBC # FLD: 13.5 % — SIGNIFICANT CHANGE UP (ref 10.3–14.5)
RETICS #: 143.3 K/UL — HIGH (ref 25–125)
RETICS/RBC NFR: 4 % — HIGH (ref 0.5–2.5)
RH IG SCN BLD-IMP: POSITIVE — SIGNIFICANT CHANGE UP
SODIUM SERPL-SCNC: 144 MMOL/L — SIGNIFICANT CHANGE UP (ref 135–145)
TIBC SERPL-MCNC: 296 UG/DL — SIGNIFICANT CHANGE UP (ref 220–430)
UIBC SERPL-MCNC: 243 UG/DL — SIGNIFICANT CHANGE UP (ref 110–370)
VIT B12 SERPL-MCNC: 602 PG/ML — SIGNIFICANT CHANGE UP (ref 232–1245)
WBC # BLD: 5.12 K/UL — SIGNIFICANT CHANGE UP (ref 3.8–10.5)
WBC # FLD AUTO: 5.12 K/UL — SIGNIFICANT CHANGE UP (ref 3.8–10.5)

## 2024-10-16 NOTE — H&P PST ADULT - ENDOCRINE
negative Alternatives Discussed Intro (Do Not Add Period): I discussed alternative treatments to Mohs surgery and specifically discussed the risks and benefits of

## 2024-10-16 NOTE — H&P PST ADULT - LAST ECHOCARDIOGRAM
8/2024 8/2024 to evaluate watchman, 9/2023 EF 61% mild mitral regurg, mild- moderate aortic regurg, severely dilated left atrium, increased relatvie wall thickness with nl left ventricular mass  index, consistent with concentric left ventricular remodeling.  nl left ventricular systolic function, mild pulmonary htn

## 2024-10-16 NOTE — H&P PST ADULT - CARDIOVASCULAR
details… regular rate and rhythm/S1 S2 present regular rate and rhythm/S1 S2 present/no gallops/no rub/no murmur/no JVD/normal PMI/no pedal edema

## 2024-10-16 NOTE — H&P PST ADULT - ATTENDING COMMENTS
decreased ability to use arms for pushing/pulling/decreased ability to use legs for bridging/pushing
Patient here for closure of ishmael-device leak.

## 2024-10-16 NOTE — H&P PST ADULT - GASTROINTESTINAL
soft/nontender/nondistended/normal active bowel sounds details… soft/nontender/nondistended/normal active bowel sounds/no guarding/no rigidity/no organomegaly/no palpable barbra/no masses palpable

## 2024-10-16 NOTE — H&P PST ADULT - HISTORY OF PRESENT ILLNESS
73y/o male scheduled for APRIL watchman procedure on 10/24/2024.  Pt with hx of prostate cancer s/p radiation approx 10 yrs ago, htn, hld, IOP, aflutter, asthma, TIA during the summer 2024, s/p APRIL/watchman 7/2024, recently admitted to [facility] for diverticulosis GI bleed, received 1 unit of blood, was found to have a leakage of Watchman device.  Discharged 10/10/2024."

## 2024-10-16 NOTE — H&P PST ADULT - LYMPHATIC
DISQUALIFIED V-VISIT PROGRESS NOTE    HISTORY    The patient is a 34 year old male who is requesting a Video Visit evaluation of urinary tract condition. Sharp flank pain.     MEDICATIONS  The medication list in the medical record as well as updates to the medication list submitted by the patient with this V-Visit were reviewed.      ALLERGIES  Allergies as of 06/17/2021   • (No Known Allergies)       HISTORIES  I have personally reviewed and updated the following electronic medical record sections: Current medications, Allergies, Problem list, Past Medical History, Past Surgical History, Social History and Family History     ASSESSMENT/PLAN  Upon review of the patient's responses to the questionnaire and a review of the medical record, it is my opinion that a face-to-face medical evaluation of this complaint is required. I recommend the following venue of care - walk-in clinic/urgent care   
No lymphadedenopathy

## 2024-10-16 NOTE — H&P PST ADULT - PROBLEM SELECTOR PLAN 1
Pt scheduled for APRIL watchman procedure on 10/24/2024.  labs done results pending, bmp in sunrise 10/9/2024.  Preop teaching done, pt able to verbalize understanding.   medication day of procedure-  as per Luz in Dr. Dinh office-  hold all medications day of procedure

## 2024-10-16 NOTE — H&P PST ADULT - RESPIRATORY
clear to auscultation bilaterally/no wheezes/no rales/no rhonchi clear to auscultation bilaterally/no wheezes/no rales/no rhonchi/no respiratory distress/no use of accessory muscles/no subcutaneous emphysema/breath sounds equal/good air movement/respirations non-labored

## 2024-10-16 NOTE — H&P PST ADULT - NSICDXPASTMEDICALHX_GEN_ALL_CORE_FT
PAST MEDICAL HISTORY:  Asthma     Atrial flutter     Diverticulitis     Diverticulosis     Elevated IOP     GIB (gastrointestinal bleeding)     Gout     H/O pericarditis     History of transient ischemic attack (TIA)     Hyperlipidemia     Hypertension     Presence of Watchman left atrial appendage closure device     Prostate cancer s/p radiation therapy     PAST MEDICAL HISTORY:  Asthma     Atrial flutter     Diverticulitis     Diverticulosis     Elevated IOP     GERD (gastroesophageal reflux disease)     GIB (gastrointestinal bleeding)     Gout     H/O pericarditis     History of TIAs     History of transient ischemic attack (TIA)     Hyperlipidemia     Hypertension     Presence of Watchman left atrial appendage closure device     Prostate cancer s/p radiation therapy

## 2024-10-16 NOTE — H&P PST ADULT - GASTROINTESTINAL COMMENTS
h/o diverticulitis, s/p admission 05/2023 due to GI bleed, required 5 units PRBC;, 1 unit PRBC 10/2024

## 2024-10-23 NOTE — ASU PATIENT PROFILE, ADULT - FALL HARM RISK - UNIVERSAL INTERVENTIONS
Bed in lowest position, wheels locked, appropriate side rails in place/Call bell, personal items and telephone in reach/Instruct patient to call for assistance before getting out of bed or chair/Non-slip footwear when patient is out of bed/[place] to call system/Physically safe environment - no spills, clutter or unnecessary equipment/Purposeful Proactive Rounding/Room/bathroom lighting operational, light cord in reach

## 2024-10-23 NOTE — ASU PATIENT PROFILE, ADULT - MEDICATIONS BROUGHT TO HOSPITAL, PROFILE
Patient Education     Diabetes: Understanding Carbohydrates  A car needs the right type of fuel to run. And you need the right kind of food to function. To keep your energy level up, your body needs food that has carbohydrates. But carbohydrates raise blood sugar levels higher and faster than other kinds of food. Your dietitian will work with you to figure out the amount of carbohydrates you need.     Starches  Starches are found in grains, some vegetables, and beans. Grain products include bread, pasta, cereal, and tortillas. Starchy vegetables include potatoes, peas, corn, lima beans, yams, and squash. Kidney beans, ruiz beans, black beans, garbanzo beans, and lentils also contain starches.  Sugars  Sugars are found naturally in many foods. Or sugar can be added. Foods that contain natural sugar include fruits and fruit juices, dairy products, honey, and molasses. Added sugars are found in most desserts, processed foods, candy, regular soda, and fruit drinks. These are very  helpful for treating low blood sugar, or hypoglycemia. They provide sugar quickly.   Fiber  Fiber comes from plant foods. Most fiber isn’t digested by the body. Instead of raising blood sugar levels like other carbohydrates, it actually stops blood sugar from rising too fast. Fiber is found in fruits, vegetables, whole grains, beans, peas, and many nuts.  Carb counting  It’s important to keep track of the amount of carbohydrates you eat. This can help you keep the right balance of physical activity and medicine. The amount of carbohydrates needed will vary for each person. It depends on many things such as your health, the medicines you take, and how active you are. Your healthcare team will help you figure out the right amount of carbohydrates for you. You may start with around 45 to 60 grams of carbohydrate per meal, depending on your situation. Ask your dietitian to teach you a method called carb counting. This system helps you keep track  of the carbohydrates you eat at each meal.  Carbohydrates come from a variety of foods. These include grains, starchy vegetables, fruit, milk, beans, and snack foods. You can either count carbohydrate grams or carbohydrate servings. When you count carbohydrate servings, 1 carbohydrate serving = 15 grams of carbohydrates.  Here are some examples of foods containing about 15 grams of carbohydrates (1 serving of carbohydrates):  · 1/2 cup of canned or frozen fruit  · A small piece of fresh fruit (4 ounces)  · 1 slice of bread  · 1/2 cup of oatmeal  · 1/3 cup of rice  · 4 to 6 crackers  · 1/2 English muffin  · 1/2 cup of black beans  · 1/4 of a large baked potato (3 ounces)  · 2/3 cup of plain fat-free yogurt  · 1 cup of soup  · 1/2 cup of casserole  · 6 chicken nuggets  · 2-inch square brownie or cake without frosting  · 2 small cookies  · 1/2 cup of ice cream or sherbet  Carb counting is easier when food labels are available. Look at the label to see how many grams of total carbohydrates the food contains. Then you can figure out how much you should eat.   It’s also important to be consistent with the amount and time you eat when taking a fixed dose of diabetes medicine.   © 8550-1726 [employer]. [address] [place] [address], [place] [address]. All rights reserved. This information is not intended as a substitute for professional medical care. Always follow your healthcare professional's instructions.            no

## 2024-10-23 NOTE — ASU PATIENT PROFILE, ADULT - FALL HARM RISK - TYPE OF ASSESSMENT
Vaccine Information Statement(s) or the Emergency Use Authorization was given today. This has been reviewed, questions answered, and verbal consent given by Patient for injection(s) and administration of Shingles.      Patient tolerated without incident. See immunization grid for documentation.         Admission

## 2024-10-24 ENCOUNTER — RESULT REVIEW (OUTPATIENT)
Age: 74
End: 2024-10-24

## 2024-10-24 ENCOUNTER — INPATIENT (INPATIENT)
Facility: HOSPITAL | Age: 74
LOS: 0 days | Discharge: HOME | End: 2024-10-25
Attending: INTERNAL MEDICINE | Admitting: INTERNAL MEDICINE
Payer: MEDICARE

## 2024-10-24 VITALS
TEMPERATURE: 98 F | HEART RATE: 78 BPM | WEIGHT: 205.03 LBS | DIASTOLIC BLOOD PRESSURE: 67 MMHG | OXYGEN SATURATION: 96 % | SYSTOLIC BLOOD PRESSURE: 131 MMHG | RESPIRATION RATE: 12 BRPM | HEIGHT: 71 IN

## 2024-10-24 DIAGNOSIS — Z96.651 PRESENCE OF RIGHT ARTIFICIAL KNEE JOINT: Chronic | ICD-10-CM

## 2024-10-24 DIAGNOSIS — I48.91 UNSPECIFIED ATRIAL FIBRILLATION: ICD-10-CM

## 2024-10-24 DIAGNOSIS — Z98.890 OTHER SPECIFIED POSTPROCEDURAL STATES: Chronic | ICD-10-CM

## 2024-10-24 PROCEDURE — 93010 ELECTROCARDIOGRAM REPORT: CPT | Mod: 76

## 2024-10-24 PROCEDURE — 71045 X-RAY EXAM CHEST 1 VIEW: CPT | Mod: 26

## 2024-10-24 PROCEDURE — 37242 VASC EMBOLIZE/OCCLUDE ARTERY: CPT

## 2024-10-24 RX ORDER — PANTOPRAZOLE SODIUM 40 MG/1
40 TABLET, DELAYED RELEASE ORAL
Refills: 0 | Status: DISCONTINUED | OUTPATIENT
Start: 2024-10-24 | End: 2024-10-25

## 2024-10-24 RX ORDER — SODIUM CHLORIDE 9 MG/ML
3 INJECTION, SOLUTION INTRAMUSCULAR; INTRAVENOUS; SUBCUTANEOUS EVERY 8 HOURS
Refills: 0 | Status: DISCONTINUED | OUTPATIENT
Start: 2024-10-24 | End: 2024-10-25

## 2024-10-24 RX ORDER — DORZOLAMIDE HYDROCHLORIDE AND TIMOLOL MALEATE PRESERVATIVE FREE 20; 5 MG/ML; MG/ML
1 SOLUTION/ DROPS OPHTHALMIC EVERY 12 HOURS
Refills: 0 | Status: DISCONTINUED | OUTPATIENT
Start: 2024-10-24 | End: 2024-10-25

## 2024-10-24 RX ORDER — DILTIAZEM HCL 5 MG/ML
120 VIAL (ML) INTRAVENOUS DAILY
Refills: 0 | Status: DISCONTINUED | OUTPATIENT
Start: 2024-10-24 | End: 2024-10-25

## 2024-10-24 RX ORDER — FINASTERIDE 5 MG/1
5 TABLET, FILM COATED ORAL DAILY
Refills: 0 | Status: DISCONTINUED | OUTPATIENT
Start: 2024-10-24 | End: 2024-10-25

## 2024-10-24 RX ORDER — TAMSULOSIN HCL 0.4 MG
0.4 CAPSULE ORAL AT BEDTIME
Refills: 0 | Status: DISCONTINUED | OUTPATIENT
Start: 2024-10-24 | End: 2024-10-25

## 2024-10-24 RX ORDER — FUROSEMIDE 40 MG
40 TABLET ORAL DAILY
Refills: 0 | Status: DISCONTINUED | OUTPATIENT
Start: 2024-10-24 | End: 2024-10-25

## 2024-10-24 RX ORDER — ALBUTEROL 90 MCG
2 AEROSOL (GRAM) INHALATION EVERY 6 HOURS
Refills: 0 | Status: DISCONTINUED | OUTPATIENT
Start: 2024-10-24 | End: 2024-10-25

## 2024-10-24 RX ORDER — MONTELUKAST SODIUM 10 MG/1
10 TABLET, FILM COATED ORAL DAILY
Refills: 0 | Status: DISCONTINUED | OUTPATIENT
Start: 2024-10-24 | End: 2024-10-25

## 2024-10-24 RX ORDER — METOPROLOL TARTRATE 50 MG
25 TABLET ORAL DAILY
Refills: 0 | Status: DISCONTINUED | OUTPATIENT
Start: 2024-10-24 | End: 2024-10-25

## 2024-10-24 RX ADMIN — SODIUM CHLORIDE 3 MILLILITER(S): 9 INJECTION, SOLUTION INTRAMUSCULAR; INTRAVENOUS; SUBCUTANEOUS at 22:32

## 2024-10-24 RX ADMIN — Medication 0.4 MILLIGRAM(S): at 22:09

## 2024-10-24 RX ADMIN — Medication 20 MILLIGRAM(S): at 22:09

## 2024-10-24 NOTE — CHART NOTE - NSCHARTNOTEFT_GEN_A_CORE
LENNOX JESUSITA 74yr s s/p  cath via right femoral access for closure of watchman with coil.  Site is stable with no hematoma, active bleed or swelling.  Dressing is clean/dry/intact.  DP pulse is palpable.  Extremities warm to touch. Pulses present b/l, capillary refill appropriate. Patient denies pain, numbness, tingling, CP, SOB. VSS. Will continue to monitor.     T(C): 36.8 (10-24-24 @ 19:33), Max: 36.8 (10-24-24 @ 19:33)  HR: 85 (10-24-24 @ 21:26) (77 - 94)  BP: 118/81 (10-24-24 @ 21:26) (106/71 - 131/90)  RR: 13 (10-24-24 @ 21:26) (12 - 17)  SpO2: 99% (10-24-24 @ 21:26) (95% - 100%)

## 2024-10-24 NOTE — CHART NOTE - NSCHARTNOTEFT_GEN_A_CORE
Pt s/p coil embolization of left atrial appendage closure device via right femoral access. As per Dr. Marie, discontinue Eliquis. At discharge, no need for ASA, Plavix, or Eliquis. Anticoagulation will be addressed by Dr. Marie at patient's next office visit. Pt s/p coil embolization of left atrial appendage closure device via right femoral access. As per Dr. Marie, discontinue Eliquis for now. Dr. Marie will decide about anticoagulation before discharge.

## 2024-10-24 NOTE — PROCEDURE NOTE - PROCEDURE FINDINGS AND DETAILS
successful left atrial appendage coil embolization  collaboration with EP   ARPIL-guided trans-septal puncture for access into LA by EP  multiple coils deployed in the YANG   post embolization contrast injection & APRIL demonstrates very small residual leak at the ostium of the YANG

## 2024-10-24 NOTE — ASU DISCHARGE PLAN (ADULT/PEDIATRIC) - FINANCIAL ASSISTANCE
[facility] provides services at a reduced cost to those who are determined to be eligible through [facility]’s financial assistance program. Information regarding [facility]’s financial assistance program can be found by going to [email or web address] or by calling [phone].

## 2024-10-24 NOTE — PRE PROCEDURE NOTE - PRE PROCEDURE EVALUATION
Interventional Radiology    HPI: 74y Male s/p Watchman with ishmael-device leak. IR to collaborate with EP for YANG embolization.    Allergies: No Known Allergies    Medications (Abx/Cardiac/Anticoagulation/Blood Products)      Data:  180.3  93  T(C): 36.6  HR: 78  BP: 131/67  RR: 12  SpO2: 96%    Exam  General: No acute distress  Chest: Non labored breathing  Abdomen: Non-distended  Extremities: No swelling, warm    Plan:   74y Male s/p Watchman with ishmael-device leak. IR to collaborate with EP for YANG embolization.  -- Relevant imaging and labs were reviewed.   -- Risks, benefits, and alternatives were explained to the patient and informed consent was obtained.
73 y/o male with a PMHx of atrial fibrillation/flutter s/p AF ablation and CTI ablation on Eliquis, CVA, HTN, HLD, asthma, gout, diverticulosis with history of GI bleed, and prostate cancer s/p RT, who recently underwent implantation of a Watchman device and was found to have a post procedural leak on APRIL, now presents for closure of Watchman device using coils. H&P from PST reviewed. Medication reconciliation edited/updated where appropriate. Last dose of Eliquis was 10/23/2024 at 18:00. Last PO intake was 10/23/2024 at 15:30. No history of NIA, dentures, or loose teeth. EKG reviewed. Procedure explained in detail. Risks/benefits discussed. All questions answered. Consent obtained.

## 2024-10-24 NOTE — ASU DISCHARGE PLAN (ADULT/PEDIATRIC) - ASU DC SPECIAL INSTRUCTIONSFT
Patient s/p watchman  Discharge instructions reviewed with patient and written instructions given   Patient understands he/she will take off groin dressing 24 hours from procedure and then can shower with only water at the site; no soap, lotion, cream is to be applied to site ; patient will not put any dressing on the site once the initial dressing is removed  Site will not be submerged under water x 1 week including activities in the pool, bath or jacuzzi   Activity restrictions went over with patient including 1 week of refraining from strenuous activity including jogging, running, or driving   Any indication of infection including fever, chills, redness, swelling or discharge at the site - patient will call office [phone]  For serious symptoms like chest pain, SOB, lightheadedness, dizziness or syncope - patient was directed to the closest ER  All medications and diet reviewed   Follow date and time given November 19 at 1:15PM  [address] [place] [address]   Oncology [facility] 4th [place] [address]   [phone]

## 2024-10-24 NOTE — CHART NOTE - NSCHARTNOTEFT_GEN_A_CORE
Patient received more than 60 minutes of fluoroscopy.  He and his wife were counseled on manifestations and to let us know if there are any issues.     The procedure was coil embolization of left atrial appendage closure device.

## 2024-10-25 ENCOUNTER — TRANSCRIPTION ENCOUNTER (OUTPATIENT)
Age: 74
End: 2024-10-25

## 2024-10-25 VITALS
OXYGEN SATURATION: 97 % | SYSTOLIC BLOOD PRESSURE: 107 MMHG | DIASTOLIC BLOOD PRESSURE: 69 MMHG | RESPIRATION RATE: 17 BRPM | HEART RATE: 78 BPM | TEMPERATURE: 98 F

## 2024-10-25 LAB
ANION GAP SERPL CALC-SCNC: 11 MMOL/L — SIGNIFICANT CHANGE UP (ref 7–14)
BUN SERPL-MCNC: 17 MG/DL — SIGNIFICANT CHANGE UP (ref 7–23)
CALCIUM SERPL-MCNC: 8.9 MG/DL — SIGNIFICANT CHANGE UP (ref 8.4–10.5)
CHLORIDE SERPL-SCNC: 104 MMOL/L — SIGNIFICANT CHANGE UP (ref 98–107)
CO2 SERPL-SCNC: 27 MMOL/L — SIGNIFICANT CHANGE UP (ref 22–31)
CREAT SERPL-MCNC: 1.09 MG/DL — SIGNIFICANT CHANGE UP (ref 0.5–1.3)
EGFR: 71 ML/MIN/1.73M2 — SIGNIFICANT CHANGE UP
GLUCOSE SERPL-MCNC: 94 MG/DL — SIGNIFICANT CHANGE UP (ref 70–99)
HCT VFR BLD CALC: 33.3 % — LOW (ref 39–50)
HGB BLD-MCNC: 10.9 G/DL — LOW (ref 13–17)
MAGNESIUM SERPL-MCNC: 2.3 MG/DL — SIGNIFICANT CHANGE UP (ref 1.6–2.6)
MCHC RBC-ENTMCNC: 32.2 PG — SIGNIFICANT CHANGE UP (ref 27–34)
MCHC RBC-ENTMCNC: 32.7 GM/DL — SIGNIFICANT CHANGE UP (ref 32–36)
MCV RBC AUTO: 98.5 FL — SIGNIFICANT CHANGE UP (ref 80–100)
NRBC # BLD: 0 /100 WBCS — SIGNIFICANT CHANGE UP (ref 0–0)
NRBC # FLD: 0 K/UL — SIGNIFICANT CHANGE UP (ref 0–0)
PHOSPHATE SERPL-MCNC: 3.7 MG/DL — SIGNIFICANT CHANGE UP (ref 2.5–4.5)
PLATELET # BLD AUTO: 149 K/UL — LOW (ref 150–400)
POTASSIUM SERPL-MCNC: 3.7 MMOL/L — SIGNIFICANT CHANGE UP (ref 3.5–5.3)
POTASSIUM SERPL-SCNC: 3.7 MMOL/L — SIGNIFICANT CHANGE UP (ref 3.5–5.3)
RBC # BLD: 3.38 M/UL — LOW (ref 4.2–5.8)
RBC # FLD: 13.3 % — SIGNIFICANT CHANGE UP (ref 10.3–14.5)
SODIUM SERPL-SCNC: 142 MMOL/L — SIGNIFICANT CHANGE UP (ref 135–145)
WBC # BLD: 10.84 K/UL — HIGH (ref 3.8–10.5)
WBC # FLD AUTO: 10.84 K/UL — HIGH (ref 3.8–10.5)

## 2024-10-25 PROCEDURE — 99231 SBSQ HOSP IP/OBS SF/LOW 25: CPT

## 2024-10-25 RX ADMIN — DORZOLAMIDE HYDROCHLORIDE AND TIMOLOL MALEATE PRESERVATIVE FREE 1 DROP(S): 20; 5 SOLUTION/ DROPS OPHTHALMIC at 05:21

## 2024-10-25 RX ADMIN — FINASTERIDE 5 MILLIGRAM(S): 5 TABLET, FILM COATED ORAL at 12:14

## 2024-10-25 RX ADMIN — MONTELUKAST SODIUM 10 MILLIGRAM(S): 10 TABLET, FILM COATED ORAL at 12:14

## 2024-10-25 RX ADMIN — Medication 0.6 MILLIGRAM(S): at 12:13

## 2024-10-25 RX ADMIN — PANTOPRAZOLE SODIUM 40 MILLIGRAM(S): 40 TABLET, DELAYED RELEASE ORAL at 05:24

## 2024-10-25 RX ADMIN — Medication 40 MILLIGRAM(S): at 05:21

## 2024-10-25 RX ADMIN — Medication 25 MILLIGRAM(S): at 05:22

## 2024-10-25 RX ADMIN — Medication 120 MILLIGRAM(S): at 05:20

## 2024-10-25 RX ADMIN — SODIUM CHLORIDE 3 MILLILITER(S): 9 INJECTION, SOLUTION INTRAMUSCULAR; INTRAVENOUS; SUBCUTANEOUS at 05:24

## 2024-10-25 NOTE — PROGRESS NOTE ADULT - PROVIDER SPECIALTY LIST ADULT
Electrophysiology
Labs stable, HBV DNA : undetectable, Liver US: ok.    
Mailed results to patient  
Cardiology

## 2024-10-25 NOTE — PROGRESS NOTE ADULT - SUBJECTIVE AND OBJECTIVE BOX
Cardiovascular Disease Progress Note    Overnight events: No acute events overnight.  no cp/sob/palps/dizziness  Otherwise review of systems negative    Objective Findings:  T(C): 36.6 (10-25-24 @ 05:28), Max: 36.8 (10-24-24 @ 19:33)  HR: 83 (10-25-24 @ 05:28) (77 - 94)  BP: 124/83 (10-25-24 @ 05:28) (106/71 - 131/90)  RR: 14 (10-25-24 @ 05:28) (13 - 17)  SpO2: 99% (10-25-24 @ 05:28) (95% - 100%)  Wt(kg): --  Daily     Daily       Physical Exam:  Gen: NAD  HEENT: EOMI  CV: RRR, normal S1 + S2, no m/r/g  Lungs: CTAB  Abd: soft, non-tender  Ext: No edema    Telemetry:    Laboratory Data:                        10.9   10.84 )-----------( 149      ( 25 Oct 2024 05:21 )             33.3     10-25    142  |  104  |  17  ----------------------------<  94  3.7   |  27  |  1.09    Ca    8.9      25 Oct 2024 05:21  Phos  3.7     10-25  Mg     2.30     10-25                Inpatient Medications:  MEDICATIONS  (STANDING):  atorvastatin 20 milliGRAM(s) Oral at bedtime  colchicine 0.6 milliGRAM(s) Oral daily  diltiazem    milliGRAM(s) Oral daily  dorzolamide 2%/timolol 0.5% Ophthalmic Solution 1 Drop(s) Left EYE every 12 hours  finasteride 5 milliGRAM(s) Oral daily  furosemide    Tablet 40 milliGRAM(s) Oral daily  metoprolol succinate ER 25 milliGRAM(s) Oral daily  montelukast 10 milliGRAM(s) Oral daily  pantoprazole    Tablet 40 milliGRAM(s) Oral before breakfast  sodium chloride 0.9% lock flush 3 milliLiter(s) IV Push every 8 hours  tamsulosin 0.4 milliGRAM(s) Oral at bedtime      Assessment:  74 year man with pmhx of HTN, HLD, prostate cancer (s/p XRT), nonobstructive CAD, HFpEF, pericardial effusion in 2023, asthma, hx of diverticulosis with diverticular bleed, CVA, Atrial flutter s/p Afib ablation and CTI ablation, s/p watchman procedure due to contraindication to oral anticoagulant therapy for AFL on 8/14/23 and found to have ishmael-device leak now s/p coil embolization of left atrial appendage      Recs:  cardiac stable  no e/o acs or chf  cw diuretics to maintain euvolemic = lasix 40mg qd and kcl supplementation (to be resumed at discharge)  cw colchicine for hx of pericarditis and gout  cw beta blockers and dilt for rate control and hx of nsvt  monitor off AC per eps dr hollingsworth. suggest outpatient le yunier duplex given hx of DVT  cw statin for cad  will follow      Over 35 minutes spent on total encounter; more than 50% of the visit was spent counseling and/or coordinating care by the attending physician.      Isaac Calderón MD   Cardiovascular Disease  [phone]
Interval History:  No acute events overnight  Telemetry: NSR with occasional PVCs    MEDICATIONS  (STANDING):  atorvastatin 20 milliGRAM(s) Oral at bedtime  colchicine 0.6 milliGRAM(s) Oral daily  diltiazem    milliGRAM(s) Oral daily  dorzolamide 2%/timolol 0.5% Ophthalmic Solution 1 Drop(s) Left EYE every 12 hours  finasteride 5 milliGRAM(s) Oral daily  furosemide    Tablet 40 milliGRAM(s) Oral daily  metoprolol succinate ER 25 milliGRAM(s) Oral daily  montelukast 10 milliGRAM(s) Oral daily  pantoprazole    Tablet 40 milliGRAM(s) Oral before breakfast  sodium chloride 0.9% lock flush 3 milliLiter(s) IV Push every 8 hours  tamsulosin 0.4 milliGRAM(s) Oral at bedtime    MEDICATIONS  (PRN):  albuterol    90 MICROgram(s) HFA Inhaler 2 Puff(s) Inhalation every 6 hours PRN for shortness of breath and/or wheezing    Vital Signs Last 24 Hrs  T(C): 36.6 (10-25-24 @ 05:28), Max: 36.8 (10-24-24 @ 19:33)  T(F): 97.9 (10-25-24 @ 05:28), Max: 98.3 (10-24-24 @ 19:33)  HR: 83 (10-25-24 @ 05:28) (77 - 94)  BP: 124/83 (10-25-24 @ 05:28) (106/71 - 131/90)  BP(mean): --  RR: 14 (10-25-24 @ 05:28) (13 - 17)  SpO2: 99% (10-25-24 @ 05:28) (95% - 100%)    Appearance: Normal	  HEENT:   Normal oral mucosa, PERRL, EOMI	  Lymphatic: No lymphadenopathy  Cardiovascular: Normal S1 S2, No JVD, No murmurs, No edema  Respiratory: Lungs clear to auscultation	  Psychiatry: A & O x 3, Mood & affect appropriate  Gastrointestinal:  Soft, Non-tender, + BS	  Skin: No rashes, No ecchymoses, No cyanosis	  Neurologic: Non-focal  Extremities: Normal range of motion, No clubbing, cyanosis or edema  Vascular: Peripheral pulses palpable 2+ bilaterally    LABS:	 	    CBC Full  -  ( 25 Oct 2024 05:21 )  WBC Count : 10.84 K/uL  Hemoglobin : 10.9 g/dL  Hematocrit : 33.3 %  Platelet Count - Automated : 149 K/uL  Mean Cell Volume : 98.5 fL  Mean Cell Hemoglobin : 32.2 pg  Mean Cell Hemoglobin Concentration : 32.7 gm/dL  Auto Neutrophil # : x  Auto Lymphocyte # : x  Auto Monocyte # : x  Auto Eosinophil # : x  Auto Basophil # : x  Auto Neutrophil % : x  Auto Lymphocyte % : x  Auto Monocyte % : x  Auto Eosinophil % : x  Auto Basophil % : x    10-25    142  |  104  |  17  ----------------------------<  94  3.7   |  27  |  1.09    Ca    8.9      25 Oct 2024 05:21  Phos  3.7     10-25  Mg     2.30     10-25

## 2024-10-25 NOTE — PROGRESS NOTE ADULT - NS ATTEND AMEND GEN_ALL_CORE FT
Patient underwent coil embolization of ishmael-device leak of left atrial appendage occlusion device (Watchman).  Post procedure doing well. Ready for discharge.  Will hold on AC to prevent recanalization of any potential spaces in the left atrial appendage.

## 2024-10-25 NOTE — DISCHARGE NOTE PROVIDER - NSDCMRMEDTOKEN_GEN_ALL_CORE_FT
Albuterol (Eqv-ProAir HFA) 90 mcg/inh inhalation aerosol: 2 puff(s) inhaled every 4 to 6 hours as needed for  shortness of breath and/or wheezing  atorvastatin 20 mg oral tablet: 1 tab(s) orally once a day (at bedtime)  colchicine 0.6 mg oral capsule: 1 cap(s) orally once a day  DilTIAZem (Eqv-Cardizem CD) 120 mg/24 hours oral capsule, extended release: 1 cap(s) orally once a day  dorzolamide-timolol 2.23%-0.68% (2%-0.5% base) ophthalmic solution: 1 drop(s) in the left eye 2 times a day left  finasteride 5 mg oral tablet: 1 tab(s) orally once a day  furosemide 40 mg oral tablet: 1 tab(s) orally once a day  metoprolol succinate 25 mg oral tablet, extended release: 1 tab(s) orally once a day  montelukast 10 mg oral tablet: 1 tab(s) orally once a day  pantoprazole 40 mg oral delayed release tablet: 1 tab(s) orally once a day  potassium chloride: 20 milliequivalent(s) orally once a day  Symbicort 160 mcg-4.5 mcg/inh inhalation aerosol: 2 puff(s) inhaled once a day  tamsulosin 0.4 mg oral capsule: 1 cap(s) orally once a day

## 2024-10-25 NOTE — DISCHARGE NOTE NURSING/CASE MANAGEMENT/SOCIAL WORK - FINANCIAL ASSISTANCE
[facility] provides services at a reduced cost to those who are determined to be eligible through [facility]’s financial assistance program. Information regarding [facility]’s financial assistance program can be found by going to [email or web address] or by calling [phone].

## 2024-10-25 NOTE — DISCHARGE NOTE PROVIDER - CARE PROVIDERS DIRECT ADDRESSES
[email or web address]

## 2024-10-25 NOTE — DISCHARGE NOTE NURSING/CASE MANAGEMENT/SOCIAL WORK - NSDCVIVACCINE_GEN_ALL_CORE_FT
Tdap; 08-May-2023 07:17; Cedrick Coronel); [employer]; [id number] (Exp. Date: 04-Mar-2025); IntraMuscular; Deltoid Left.; 0.5 milliLiter(s); VIS (VIS Published: 09-May-2013, VIS Presented: 08-May-2023);

## 2024-10-25 NOTE — DISCHARGE NOTE NURSING/CASE MANAGEMENT/SOCIAL WORK - NSDCPEFALRISK_GEN_ALL_CORE
For information on Fall & Injury Prevention, visit: [email or web address] OR  [email or web address] OR  https://www.cdc.gov/steadi/patient.html

## 2024-10-25 NOTE — PROGRESS NOTE ADULT - ASSESSMENT
74y Male s/p Watchman with ishmael-device leak s/p YANG embolization on 10/24    Groin site clean dry and intact  Discharge instructions provided and reviewed with patient  Followup with Dr Gaurav Marie November 19 at 1:15PM  [phone] [place] [address] [address]th [place] [address] [phone]

## 2024-10-25 NOTE — DISCHARGE NOTE PROVIDER - NSDCFUSCHEDAPPT_GEN_ALL_CORE_FT
[employer]  [facility] Physician [facility]  GASTRO [phone] [facility]  Scheduled Appointment: 11/12/2024    Gaurav Marie  [facility] Physician [facility]  ELECTROPH [phone]  Scheduled Appointment: 11/19/2024

## 2024-10-25 NOTE — DISCHARGE NOTE PROVIDER - PROVIDER TOKENS
PROVIDER:[TOKEN:[[id number]],SCHEDULEDAPPT:[11/19/2024],SCHEDULEDAPPTTIME:[01:15 PM]],PROVIDER:[TOKEN:[[id number]],FOLLOWUP:[1 week]]

## 2024-10-25 NOTE — DISCHARGE NOTE PROVIDER - CARE PROVIDER_API CALL
Gaurav Marie  Cardiovascular Disease  [address] [place] [address], [address]  [place] [address]  Phone: [phone]  Fax: [phone]  Scheduled Appointment: 11/19/2024 01:15 PM    Isaac Calderón  Cardiovascular Disease  [address] [place] [address]  Phone: [phone]  Fax: [phone]  Follow Up Time: 1 week

## 2024-10-25 NOTE — CHART NOTE - NSCHARTNOTEFT_GEN_A_CORE
Notified by RN that patient had bigeminies on telemetry. Patient is asymptomatic, denies palpitation, dizziness, chest pain/discomfort and SOB. Recommended to send Am BMP stat and to administer Am dose of cardizem and metoprolol. Will continue to monitor    T(C): 36.6 (10-25-24 @ 05:28), Max: 36.8 (10-24-24 @ 19:33)  HR: 83 (10-25-24 @ 05:28) (77 - 94)  BP: 124/83 (10-25-24 @ 05:28) (106/71 - 131/90)  RR: 14 (10-25-24 @ 05:28) (13 - 17)  SpO2: 99% (10-25-24 @ 05:28) (95% - 100%)

## 2024-10-25 NOTE — DISCHARGE NOTE PROVIDER - HOSPITAL COURSE
74 year man with pmhx of HTN, HLD, prostate cancer (s/p XRT), nonobstructive CAD, HFpEF, pericardial effusion in 2023, asthma, hx of diverticulosis with diverticular bleed, CVA, Atrial flutter s/p Afib ablation and CTI ablation, s/p watchman procedure due to contraindication to oral anticoagulant therapy for AFL on 8/14/23 and found to have ishmael-device leak now s/p Watchman with ishmael-device leak s/p YANG embolization on 10/24.    Groin site clean dry and intact  Discharge instructions provided and reviewed with patient  Followup with Dr Gaurav Marie November 19 at 1:15PM  [phone] [place] [address]   Oncology [facility] 4th Floor   [place], [place] [address] [phone]  cardiac stable  cw diuretics to maintain euvolemic - Lasix 40mg qd and kcl supplementation (to be resumed at discharge)  cw colchicine for hx of pericarditis and gout  cw beta blockers and dilt for rate control and hx of NSVT  monitor off AC per eps dr Marie. suggest outpatient le yunier duplex given hx of DVT  cw statin for cad    As per attending, patient stable for discharge.

## 2024-10-25 NOTE — DISCHARGE NOTE PROVIDER - NSDCCPCAREPLAN_GEN_ALL_CORE_FT
PRINCIPAL DISCHARGE DIAGNOSIS  Diagnosis: Leakage of Watchman left atrial appendage closure device  Assessment and Plan of Treatment: Please follow up with your primary care physician regarding your hospitalization and take all medications prescribed. S/P embolization      SECONDARY DISCHARGE DIAGNOSES  Diagnosis: HTN (hypertension)  Assessment and Plan of Treatment: Please follow up with your primary care physician regarding your hospitalization and take all medications prescribed.    Diagnosis: HLD (hyperlipidemia)  Assessment and Plan of Treatment: Please follow up with your primary care physician regarding your hospitalization and take all medications prescribed.

## 2024-10-27 PROBLEM — I48.0 AF (PAROXYSMAL ATRIAL FIBRILLATION): Status: ACTIVE | Noted: 2024-10-27

## 2024-10-28 PROBLEM — Z86.73 PERSONAL HISTORY OF TRANSIENT ISCHEMIC ATTACK (TIA), AND CEREBRAL INFARCTION WITHOUT RESIDUAL DEFICITS: Chronic | Status: ACTIVE | Noted: 2024-10-16

## 2024-10-28 PROBLEM — K21.9 GASTRO-ESOPHAGEAL REFLUX DISEASE WITHOUT ESOPHAGITIS: Chronic | Status: ACTIVE | Noted: 2024-10-16

## 2024-10-28 PROBLEM — H40.059 OCULAR HYPERTENSION, UNSPECIFIED EYE: Chronic | Status: ACTIVE | Noted: 2024-10-16

## 2024-11-12 ENCOUNTER — APPOINTMENT (OUTPATIENT)
Dept: GASTROENTEROLOGY | Facility: CLINIC | Age: 74
End: 2024-11-12
Payer: MEDICARE

## 2024-11-12 VITALS
DIASTOLIC BLOOD PRESSURE: 85 MMHG | RESPIRATION RATE: 16 BRPM | HEART RATE: 82 BPM | WEIGHT: 200 LBS | BODY MASS INDEX: 28.63 KG/M2 | SYSTOLIC BLOOD PRESSURE: 151 MMHG | HEIGHT: 70 IN | OXYGEN SATURATION: 97 % | TEMPERATURE: 97.9 F

## 2024-11-12 DIAGNOSIS — K92.2 GASTROINTESTINAL HEMORRHAGE, UNSPECIFIED: ICD-10-CM

## 2024-11-12 DIAGNOSIS — Z95.818 PRESENCE OF OTHER CARDIAC IMPLANTS AND GRAFTS: ICD-10-CM

## 2024-11-12 DIAGNOSIS — I48.0 PAROXYSMAL ATRIAL FIBRILLATION: ICD-10-CM

## 2024-11-12 PROCEDURE — 99214 OFFICE O/P EST MOD 30 MIN: CPT | Mod: 25

## 2024-11-12 PROCEDURE — 36415 COLL VENOUS BLD VENIPUNCTURE: CPT

## 2024-11-12 NOTE — H&P ADULT - PROBLEM/PLAN-2
Using CPAP - having issues with the back of his neck - hurting when he wears his mask.          DISPLAY PLAN FREE TEXT

## 2024-11-13 LAB
HCT VFR BLD CALC: 42 %
HGB BLD-MCNC: 13 G/DL
MCHC RBC-ENTMCNC: 31 G/DL
MCHC RBC-ENTMCNC: 31.3 PG
MCV RBC AUTO: 101 FL
PLATELET # BLD AUTO: 269 K/UL
RBC # BLD: 4.16 M/UL
RBC # FLD: 13.6 %
WBC # FLD AUTO: 6.59 K/UL

## 2024-11-19 ENCOUNTER — APPOINTMENT (OUTPATIENT)
Dept: ELECTROPHYSIOLOGY | Facility: CLINIC | Age: 74
End: 2024-11-19

## 2024-11-19 LAB — HEMOCCULT STL QL IA: NEGATIVE

## 2024-12-13 ENCOUNTER — TRANSCRIPTION ENCOUNTER (OUTPATIENT)
Age: 74
End: 2024-12-13

## 2024-12-13 ENCOUNTER — RESULT REVIEW (OUTPATIENT)
Age: 74
End: 2024-12-13

## 2024-12-13 ENCOUNTER — OUTPATIENT (OUTPATIENT)
Dept: OUTPATIENT SERVICES | Facility: HOSPITAL | Age: 74
LOS: 1 days | End: 2024-12-13
Payer: MEDICARE

## 2024-12-13 VITALS
SYSTOLIC BLOOD PRESSURE: 135 MMHG | OXYGEN SATURATION: 97 % | HEART RATE: 65 BPM | DIASTOLIC BLOOD PRESSURE: 93 MMHG | RESPIRATION RATE: 16 BRPM

## 2024-12-13 VITALS
OXYGEN SATURATION: 100 % | HEIGHT: 71 IN | TEMPERATURE: 98 F | SYSTOLIC BLOOD PRESSURE: 132 MMHG | HEART RATE: 77 BPM | DIASTOLIC BLOOD PRESSURE: 89 MMHG | RESPIRATION RATE: 18 BRPM | WEIGHT: 205.03 LBS

## 2024-12-13 DIAGNOSIS — Z98.890 OTHER SPECIFIED POSTPROCEDURAL STATES: Chronic | ICD-10-CM

## 2024-12-13 DIAGNOSIS — R07.9 CHEST PAIN, UNSPECIFIED: ICD-10-CM

## 2024-12-13 DIAGNOSIS — Z96.651 PRESENCE OF RIGHT ARTIFICIAL KNEE JOINT: Chronic | ICD-10-CM

## 2024-12-13 PROCEDURE — 93312 ECHO TRANSESOPHAGEAL: CPT | Mod: 26

## 2024-12-13 PROCEDURE — 93320 DOPPLER ECHO COMPLETE: CPT | Mod: 26,GC

## 2024-12-13 PROCEDURE — 93325 DOPPLER ECHO COLOR FLOW MAPG: CPT | Mod: 26,GC

## 2024-12-13 RX ORDER — SODIUM CHLORIDE 9 MG/ML
3 INJECTION, SOLUTION INTRAMUSCULAR; INTRAVENOUS; SUBCUTANEOUS EVERY 8 HOURS
Refills: 0 | Status: ACTIVE | OUTPATIENT
Start: 2024-12-13 | End: 2025-11-11

## 2024-12-13 NOTE — ASU PREOP CHECKLIST - IDENTIFICATION BAND VERIFIED
Message noted.  I would like patient to change plan to doxazosin (Cardura XL) 4 mg tablet daily rather than the expensive phenoxybenzamine (Dibenzyline) medication.  We discussed the Cardura XL med daily dosing, low risk of side effects such as dizziness, nausea, drowsiness, HA, plan to send me periodic MyChart updates on the home BP levels or if any other questions.  This new Rx has been sent to her [facility] pharmacy.    She agreed with plan, thanked me for the callback.    TERESA Cleaning MD, MS  Endocrinology  [facility]         done

## 2024-12-13 NOTE — ASU PREOP CHECKLIST - TEMPERATURE IN FAHRENHEIT (DEGREES F)
Radha Lacy, APRN   6/2/2023  1:14 PM CDT Back to Top      Testosterone is normal.  Continue current supplementation 97.9

## 2024-12-13 NOTE — H&P CARDIOLOGY - NSICDXPASTMEDICALHX_GEN_ALL_CORE_FT
PAST MEDICAL HISTORY:  Asthma     Atrial flutter     Diverticulitis     Diverticulosis     Elevated IOP     GERD (gastroesophageal reflux disease)     GIB (gastrointestinal bleeding)     Gout     H/O pericarditis     History of TIAs     History of transient ischemic attack (TIA)     Hyperlipidemia     Hypertension     Presence of Watchman left atrial appendage closure device     Prostate cancer s/p radiation therapy

## 2024-12-13 NOTE — ASU DISCHARGE PLAN (ADULT/PEDIATRIC) - ASU DC SPECIAL INSTRUCTIONSFT
DIET:  - Do not eat or drink for 2 hours post procedure, unless instructed by your nurse.  - Once 2 hours have passed, start with a sip of water. If tolerated (without coughing, nausea, vomiting, difficulty swallowing), you may advance to the same diet you were on prior to your procedure. If you do not tolerate water, please wait an additional 30 minutes and repeat the process.  - A mild sore throat is expected. If you are unable to swallow water, experience severe nausea, vomiting, coughing, bleeding, or fever, do not take anything else by mouth. Please call the office at [phone] immediately and ask to speak to a cardiologist. If you are unable to reach the office, please proceed to the nearest emergency room.  --------------------------------------------  ACTIVITY:  For the next 24 hours:  - Do not drive or operate heavy machinery.  - Do not drink any alcoholic beverages.  - Do not make any important decisions or sign legal documents.  - Resume normal activity 24 hours post procedural completion.  ---------------------------------------------  MEDICATION:   - Take your medications as explained (see attached medication reconciliation on discharge paperwork).  --------------------------------------------  ADDITIONAL INSTRUCTIONS:  - Call your doctor to make or confirm your follow up appointment.  - If you are unable to get in contact with your doctor, you may contact the Interventional Recovery Suite at [phone].  - Please reference your discharge instructions for any questions or concerns.

## 2024-12-13 NOTE — H&P CARDIOLOGY - HISTORY OF PRESENT ILLNESS
74 year old male with HTN, HLD, prostate cancer (s/p XRT), nonobstructive CAD, HFpEF, asthma, CVA, Atrial flutter s/p Afib ablation and CTI ablation, s/p watchman procedure due to contraindication to oral anticoagulant therapy and found to have ishmael-device leak of Watchman s/p coil embolization of left atrial appendage on 10/24/24.  Patient has been doing well. P  Presents for APRIL to evaluate Watchman device.     NPO Date and Time:  12/12 at 1800  Mallampati: 2  Anticoagulation Date and Time? N/A  Previous Endoscopy?  NO  Hx of CVA?   yes  Sleep Apnea?  NO  Dentures? NO  Loose Teeth? NO

## 2024-12-13 NOTE — ASU DISCHARGE PLAN (ADULT/PEDIATRIC) - FINANCIAL ASSISTANCE
[facility] provides services at a reduced cost to those who are determined to be eligible through [facility]’s financial assistance program. Information regarding [facility]’s financial assistance program can be found by going to [email or web address] or by calling [phone].

## 2024-12-13 NOTE — ASU DISCHARGE PLAN (ADULT/PEDIATRIC) - CARE PROVIDER_API CALL
Gaurav Marie  Cardiovascular Disease  [address] [place] [address], [address]  [place] [address]  Phone: [phone]  Fax: [phone]  Follow Up Time:     Christophe Calderón  Cardiovascular Disease  [address] [place] [address]  Phone: [phone]  Fax: [phone]  Follow Up Time:

## 2024-12-13 NOTE — ASU DISCHARGE PLAN (ADULT/PEDIATRIC) - NS MD DC FALL RISK RISK
For information on Fall & Injury Prevention, visit: [email or web address] OR  [email or web address] OR  https://www.cdc.gov/steadi/patient.html

## 2024-12-17 ENCOUNTER — NON-APPOINTMENT (OUTPATIENT)
Age: 74
End: 2024-12-17

## 2024-12-17 ENCOUNTER — APPOINTMENT (OUTPATIENT)
Dept: ELECTROPHYSIOLOGY | Facility: CLINIC | Age: 74
End: 2024-12-17
Payer: MEDICARE

## 2024-12-17 VITALS
WEIGHT: 208 LBS | BODY MASS INDEX: 29.78 KG/M2 | DIASTOLIC BLOOD PRESSURE: 78 MMHG | TEMPERATURE: 98.1 F | SYSTOLIC BLOOD PRESSURE: 124 MMHG | HEIGHT: 70 IN | OXYGEN SATURATION: 94 % | HEART RATE: 65 BPM

## 2024-12-17 DIAGNOSIS — Z95.818 PRESENCE OF OTHER CARDIAC IMPLANTS AND GRAFTS: ICD-10-CM

## 2024-12-17 DIAGNOSIS — I10 ESSENTIAL (PRIMARY) HYPERTENSION: ICD-10-CM

## 2024-12-17 DIAGNOSIS — I48.0 PAROXYSMAL ATRIAL FIBRILLATION: ICD-10-CM

## 2024-12-17 PROCEDURE — 99214 OFFICE O/P EST MOD 30 MIN: CPT

## 2024-12-23 NOTE — ED PROVIDER NOTE - PROGRESS NOTE DETAILS
Spoke with mother infomred 22 day for arrival and will call to schedule train  
Lab work noted. CTA performed w/o acute findings. Case discussed w/ Dr. Luu. Will admit to tele for further cardiac work up.

## 2025-01-15 ENCOUNTER — APPOINTMENT (OUTPATIENT)
Dept: GASTROENTEROLOGY | Facility: CLINIC | Age: 75
End: 2025-01-15
Payer: MEDICARE

## 2025-01-15 VITALS
HEART RATE: 73 BPM | BODY MASS INDEX: 29.92 KG/M2 | TEMPERATURE: 98.2 F | WEIGHT: 209 LBS | SYSTOLIC BLOOD PRESSURE: 136 MMHG | HEIGHT: 70 IN | DIASTOLIC BLOOD PRESSURE: 91 MMHG | OXYGEN SATURATION: 98 % | RESPIRATION RATE: 16 BRPM

## 2025-01-15 DIAGNOSIS — K92.2 GASTROINTESTINAL HEMORRHAGE, UNSPECIFIED: ICD-10-CM

## 2025-01-15 DIAGNOSIS — K57.90 DIVERTICULOSIS OF INTESTINE, PART UNSPECIFIED, W/OUT PERFORATION OR ABSCESS W/OUT BLEEDING: ICD-10-CM

## 2025-01-15 DIAGNOSIS — Z86.79 OTHER SPECIFIED POSTPROCEDURAL STATES: ICD-10-CM

## 2025-01-15 DIAGNOSIS — C61 MALIGNANT NEOPLASM OF PROSTATE: ICD-10-CM

## 2025-01-15 DIAGNOSIS — Z98.890 OTHER SPECIFIED POSTPROCEDURAL STATES: ICD-10-CM

## 2025-01-15 DIAGNOSIS — Z95.818 PRESENCE OF OTHER CARDIAC IMPLANTS AND GRAFTS: ICD-10-CM

## 2025-01-15 PROCEDURE — 99213 OFFICE O/P EST LOW 20 MIN: CPT

## 2025-01-15 RX ORDER — COLCHICINE 0.6 MG/1
0.6 TABLET ORAL
Refills: 0 | Status: ACTIVE | COMMUNITY

## 2025-01-15 RX ORDER — FAMOTIDINE 40 MG/1
40 TABLET, FILM COATED ORAL
Refills: 0 | Status: ACTIVE | COMMUNITY

## 2025-01-15 RX ORDER — ALBUTEROL SULFATE 2.5 MG/3ML
(2.5 MG/3ML) SOLUTION RESPIRATORY (INHALATION)
Refills: 0 | Status: ACTIVE | COMMUNITY

## 2025-01-15 RX ORDER — FUROSEMIDE 40 MG/1
40 TABLET ORAL
Refills: 0 | Status: ACTIVE | COMMUNITY

## 2025-01-15 RX ORDER — POTASSIUM CHLORIDE 1.5 G/1.58G
20 POWDER, FOR SOLUTION ORAL
Refills: 0 | Status: ACTIVE | COMMUNITY

## 2025-03-14 ENCOUNTER — APPOINTMENT (OUTPATIENT)
Dept: ELECTROPHYSIOLOGY | Facility: CLINIC | Age: 75
End: 2025-03-14
Payer: MEDICARE

## 2025-03-14 ENCOUNTER — NON-APPOINTMENT (OUTPATIENT)
Age: 75
End: 2025-03-14

## 2025-03-14 VITALS
HEIGHT: 70 IN | SYSTOLIC BLOOD PRESSURE: 147 MMHG | DIASTOLIC BLOOD PRESSURE: 86 MMHG | HEART RATE: 70 BPM | BODY MASS INDEX: 29.92 KG/M2 | OXYGEN SATURATION: 96 % | WEIGHT: 209 LBS

## 2025-03-14 VITALS — DIASTOLIC BLOOD PRESSURE: 77 MMHG | SYSTOLIC BLOOD PRESSURE: 112 MMHG

## 2025-03-14 DIAGNOSIS — E78.5 HYPERLIPIDEMIA, UNSPECIFIED: ICD-10-CM

## 2025-03-14 DIAGNOSIS — I48.0 PAROXYSMAL ATRIAL FIBRILLATION: ICD-10-CM

## 2025-03-14 DIAGNOSIS — Z95.818 PRESENCE OF OTHER CARDIAC IMPLANTS AND GRAFTS: ICD-10-CM

## 2025-03-14 DIAGNOSIS — I10 ESSENTIAL (PRIMARY) HYPERTENSION: ICD-10-CM

## 2025-03-14 PROCEDURE — 99213 OFFICE O/P EST LOW 20 MIN: CPT

## 2025-03-14 PROCEDURE — 93000 ELECTROCARDIOGRAM COMPLETE: CPT

## 2025-07-10 ENCOUNTER — APPOINTMENT (OUTPATIENT)
Dept: OPHTHALMOLOGY | Facility: CLINIC | Age: 75
End: 2025-07-10
Payer: MEDICARE

## 2025-07-10 ENCOUNTER — NON-APPOINTMENT (OUTPATIENT)
Age: 75
End: 2025-07-10

## 2025-07-10 PROCEDURE — 92004 COMPRE OPH EXAM NEW PT 1/>: CPT

## 2025-07-10 PROCEDURE — 92201 OPSCPY EXTND RTA DRAW UNI/BI: CPT

## 2025-07-10 PROCEDURE — 92134 CPTRZ OPH DX IMG PST SGM RTA: CPT

## 2025-07-16 ENCOUNTER — APPOINTMENT (OUTPATIENT)
Dept: GASTROENTEROLOGY | Facility: CLINIC | Age: 75
End: 2025-07-16
Payer: MEDICARE

## 2025-07-16 VITALS
HEART RATE: 69 BPM | TEMPERATURE: 98.1 F | WEIGHT: 205 LBS | DIASTOLIC BLOOD PRESSURE: 79 MMHG | SYSTOLIC BLOOD PRESSURE: 121 MMHG | OXYGEN SATURATION: 97 % | BODY MASS INDEX: 29.35 KG/M2 | HEIGHT: 70 IN

## 2025-07-16 PROCEDURE — 99214 OFFICE O/P EST MOD 30 MIN: CPT

## 2025-07-16 PROCEDURE — G2211 COMPLEX E/M VISIT ADD ON: CPT

## (undated) DEVICE — BASIN EMESIS 10IN GRADUATED MAUVE

## (undated) DEVICE — TUBING SUCTION NONCONDUCTIVE 6MM X 12FT

## (undated) DEVICE — DRSG 2X2

## (undated) DEVICE — TUBING IV SET GRAVITY 3Y 100" MACRO

## (undated) DEVICE — CONTAINER FORMALIN 80ML YELLOW

## (undated) DEVICE — GOWN LG

## (undated) DEVICE — TUBING MEDI-VAC W MAXIGRIP CONNECTORS 1/4"X6'

## (undated) DEVICE — CATH IV SAFE BC 22G X 1" (BLUE)

## (undated) DEVICE — LUBRICATING JELLY HR ONE SHOT 3G

## (undated) DEVICE — BIOPSY FORCEP COLD DISP

## (undated) DEVICE — DRSG BANDAID 0.75X3"

## (undated) DEVICE — BIOPSY FORCEP RADIAL JAW 4 STANDARD WITH NEEDLE

## (undated) DEVICE — DRSG CURITY GAUZE SPONGE 4 X 4" 12-PLY NON-STERILE

## (undated) DEVICE — LINE BREATHE SAMPLNG

## (undated) DEVICE — ELCTR GROUNDING PAD ADULT COVIDIEN

## (undated) DEVICE — ELCTR ECG CONDUCTIVE ADHESIVE

## (undated) DEVICE — SALIVA EJECTOR (BLUE)

## (undated) DEVICE — PACK IV START WITH CHG